# Patient Record
Sex: MALE | NOT HISPANIC OR LATINO | Employment: OTHER | URBAN - NONMETROPOLITAN AREA
[De-identification: names, ages, dates, MRNs, and addresses within clinical notes are randomized per-mention and may not be internally consistent; named-entity substitution may affect disease eponyms.]

---

## 2017-02-04 ENCOUNTER — HOSPITAL ENCOUNTER (INPATIENT)
Facility: HOSPITAL | Age: 52
LOS: 7 days | Discharge: HOME | End: 2017-02-11
Attending: EMERGENCY MEDICINE | Admitting: INTERNAL MEDICINE

## 2017-02-04 DIAGNOSIS — L03.113 CELLULITIS OF RIGHT HAND EXCLUDING FINGERS AND THUMB: Primary | ICD-10-CM

## 2017-02-04 LAB
ALBUMIN SERPL-MCNC: 3.5 G/DL (ref 3.5–5)
ALBUMIN/GLOB SERPL: 1.4 G/DL (ref 1.5–2.5)
ALP SERPL-CCNC: 88 U/L (ref 46–116)
ALT SERPL W P-5'-P-CCNC: 219 U/L (ref 10–44)
ANION GAP SERPL CALCULATED.3IONS-SCNC: 1.3 MMOL/L (ref 3.6–11.2)
AST SERPL-CCNC: 284 U/L (ref 10–34)
BASOPHILS # BLD AUTO: 0.01 10*3/MM3 (ref 0–0.3)
BASOPHILS NFR BLD AUTO: 0.3 % (ref 0–2)
BILIRUB SERPL-MCNC: 2.2 MG/DL (ref 0.2–1.8)
BUN BLD-MCNC: 9 MG/DL (ref 7–21)
BUN/CREAT SERPL: 17.3 (ref 7–25)
CALCIUM SPEC-SCNC: 8.8 MG/DL (ref 7.7–10)
CHLORIDE SERPL-SCNC: 106 MMOL/L (ref 99–112)
CO2 SERPL-SCNC: 30.7 MMOL/L (ref 24.3–31.9)
CREAT BLD-MCNC: 0.52 MG/DL (ref 0.43–1.29)
CRP SERPL-MCNC: 3.68 MG/DL (ref 0–0.99)
D-LACTATE SERPL-SCNC: 1.3 MMOL/L (ref 0.5–2)
DEPRECATED RDW RBC AUTO: 51.6 FL (ref 37–54)
EOSINOPHIL # BLD AUTO: 0.04 10*3/MM3 (ref 0–0.7)
EOSINOPHIL NFR BLD AUTO: 1 % (ref 0–5)
ERYTHROCYTE [DISTWIDTH] IN BLOOD BY AUTOMATED COUNT: 16.2 % (ref 11.5–14.5)
ERYTHROCYTE [SEDIMENTATION RATE] IN BLOOD: 6 MM/HR (ref 0–20)
GFR SERPL CREATININE-BSD FRML MDRD: >150 ML/MIN/1.73
GLOBULIN UR ELPH-MCNC: 2.5 GM/DL
GLUCOSE BLD-MCNC: 177 MG/DL (ref 70–110)
GLUCOSE BLDC GLUCOMTR-MCNC: 122 MG/DL (ref 70–130)
GLUCOSE BLDC GLUCOMTR-MCNC: 237 MG/DL (ref 70–130)
HBA1C MFR BLD: 8.1 % (ref 4.5–5.7)
HCT VFR BLD AUTO: 41.9 % (ref 42–52)
HGB BLD-MCNC: 13.5 G/DL (ref 14–18)
IMM GRANULOCYTES # BLD: 0.01 10*3/MM3 (ref 0–0.03)
IMM GRANULOCYTES NFR BLD: 0.3 % (ref 0–0.5)
LYMPHOCYTES # BLD AUTO: 0.63 10*3/MM3 (ref 1–3)
LYMPHOCYTES NFR BLD AUTO: 15.8 % (ref 21–51)
MAGNESIUM SERPL-MCNC: 1.7 MG/DL (ref 1.7–2.6)
MCH RBC QN AUTO: 27.8 PG (ref 27–33)
MCHC RBC AUTO-ENTMCNC: 32.2 G/DL (ref 33–37)
MCV RBC AUTO: 86.4 FL (ref 80–94)
MONOCYTES # BLD AUTO: 0.34 10*3/MM3 (ref 0.1–0.9)
MONOCYTES NFR BLD AUTO: 8.5 % (ref 0–10)
NEUTROPHILS # BLD AUTO: 2.96 10*3/MM3 (ref 1.4–6.5)
NEUTROPHILS NFR BLD AUTO: 74.1 % (ref 30–70)
OSMOLALITY SERPL CALC.SUM OF ELEC: 278.7 MOSM/KG (ref 273–305)
PLATELET # BLD AUTO: 63 10*3/MM3 (ref 130–400)
PMV BLD AUTO: 11.2 FL (ref 6–10)
POTASSIUM BLD-SCNC: 4.9 MMOL/L (ref 3.5–5.3)
PROT SERPL-MCNC: 6 G/DL (ref 6–8)
RBC # BLD AUTO: 4.85 10*6/MM3 (ref 4.7–6.1)
SODIUM BLD-SCNC: 138 MMOL/L (ref 135–153)
T4 FREE SERPL-MCNC: 1.47 NG/DL (ref 0.89–1.76)
TSH SERPL DL<=0.05 MIU/L-ACNC: 0.28 MIU/ML (ref 0.55–4.78)
WBC NRBC COR # BLD: 3.99 10*3/MM3 (ref 4.5–12.5)

## 2017-02-04 PROCEDURE — 80053 COMPREHEN METABOLIC PANEL: CPT | Performed by: EMERGENCY MEDICINE

## 2017-02-04 PROCEDURE — 85652 RBC SED RATE AUTOMATED: CPT | Performed by: EMERGENCY MEDICINE

## 2017-02-04 PROCEDURE — G0378 HOSPITAL OBSERVATION PER HR: HCPCS

## 2017-02-04 PROCEDURE — 25010000002 VANCOMYCIN PER 500 MG

## 2017-02-04 PROCEDURE — 25010000002 VANCOMYCIN PER 500 MG: Performed by: EMERGENCY MEDICINE

## 2017-02-04 PROCEDURE — 93005 ELECTROCARDIOGRAM TRACING: CPT | Performed by: PHYSICIAN ASSISTANT

## 2017-02-04 PROCEDURE — 87077 CULTURE AEROBIC IDENTIFY: CPT | Performed by: EMERGENCY MEDICINE

## 2017-02-04 PROCEDURE — 86140 C-REACTIVE PROTEIN: CPT | Performed by: EMERGENCY MEDICINE

## 2017-02-04 PROCEDURE — 83735 ASSAY OF MAGNESIUM: CPT | Performed by: PHYSICIAN ASSISTANT

## 2017-02-04 PROCEDURE — 83036 HEMOGLOBIN GLYCOSYLATED A1C: CPT | Performed by: PHYSICIAN ASSISTANT

## 2017-02-04 PROCEDURE — 94799 UNLISTED PULMONARY SVC/PX: CPT

## 2017-02-04 PROCEDURE — 87186 SC STD MICRODIL/AGAR DIL: CPT | Performed by: EMERGENCY MEDICINE

## 2017-02-04 PROCEDURE — 93010 ELECTROCARDIOGRAM REPORT: CPT | Performed by: INTERNAL MEDICINE

## 2017-02-04 PROCEDURE — 82962 GLUCOSE BLOOD TEST: CPT

## 2017-02-04 PROCEDURE — 83605 ASSAY OF LACTIC ACID: CPT | Performed by: PHYSICIAN ASSISTANT

## 2017-02-04 PROCEDURE — 87040 BLOOD CULTURE FOR BACTERIA: CPT | Performed by: EMERGENCY MEDICINE

## 2017-02-04 PROCEDURE — 94640 AIRWAY INHALATION TREATMENT: CPT

## 2017-02-04 PROCEDURE — 87147 CULTURE TYPE IMMUNOLOGIC: CPT | Performed by: EMERGENCY MEDICINE

## 2017-02-04 PROCEDURE — 84443 ASSAY THYROID STIM HORMONE: CPT | Performed by: PHYSICIAN ASSISTANT

## 2017-02-04 PROCEDURE — 99223 1ST HOSP IP/OBS HIGH 75: CPT | Performed by: INTERNAL MEDICINE

## 2017-02-04 PROCEDURE — 85025 COMPLETE CBC W/AUTO DIFF WBC: CPT | Performed by: EMERGENCY MEDICINE

## 2017-02-04 PROCEDURE — 63710000001 INSULIN ASPART PER 5 UNITS: Performed by: PHYSICIAN ASSISTANT

## 2017-02-04 PROCEDURE — 99285 EMERGENCY DEPT VISIT HI MDM: CPT

## 2017-02-04 PROCEDURE — 84439 ASSAY OF FREE THYROXINE: CPT | Performed by: PHYSICIAN ASSISTANT

## 2017-02-04 PROCEDURE — 36415 COLL VENOUS BLD VENIPUNCTURE: CPT

## 2017-02-04 RX ORDER — IPRATROPIUM BROMIDE AND ALBUTEROL SULFATE 2.5; .5 MG/3ML; MG/3ML
3 SOLUTION RESPIRATORY (INHALATION) EVERY 6 HOURS PRN
Status: DISCONTINUED | OUTPATIENT
Start: 2017-02-04 | End: 2017-02-11 | Stop reason: HOSPADM

## 2017-02-04 RX ORDER — SODIUM CHLORIDE 0.9 % (FLUSH) 0.9 %
10 SYRINGE (ML) INJECTION AS NEEDED
Status: DISCONTINUED | OUTPATIENT
Start: 2017-02-04 | End: 2017-02-11 | Stop reason: HOSPADM

## 2017-02-04 RX ORDER — HYDROCODONE BITARTRATE AND ACETAMINOPHEN 7.5; 325 MG/1; MG/1
1 TABLET ORAL EVERY 8 HOURS PRN
Status: ON HOLD | COMMUNITY
End: 2017-02-11

## 2017-02-04 RX ORDER — SODIUM CHLORIDE 0.9 % (FLUSH) 0.9 %
1-10 SYRINGE (ML) INJECTION AS NEEDED
Status: DISCONTINUED | OUTPATIENT
Start: 2017-02-04 | End: 2017-02-11 | Stop reason: HOSPADM

## 2017-02-04 RX ORDER — MAGNESIUM GLUCONATE 27 MG(500)
500 TABLET ORAL 2 TIMES DAILY
COMMUNITY
End: 2017-02-11 | Stop reason: HOSPADM

## 2017-02-04 RX ORDER — METOPROLOL SUCCINATE 25 MG/1
25 TABLET, EXTENDED RELEASE ORAL DAILY
COMMUNITY
End: 2017-02-11 | Stop reason: HOSPADM

## 2017-02-04 RX ORDER — OXYCODONE HYDROCHLORIDE 15 MG/1
15 TABLET ORAL EVERY 4 HOURS PRN
Status: DISCONTINUED | OUTPATIENT
Start: 2017-02-04 | End: 2017-02-11 | Stop reason: HOSPADM

## 2017-02-04 RX ORDER — DEXTROSE MONOHYDRATE 25 G/50ML
25 INJECTION, SOLUTION INTRAVENOUS
Status: DISCONTINUED | OUTPATIENT
Start: 2017-02-04 | End: 2017-02-11 | Stop reason: HOSPADM

## 2017-02-04 RX ORDER — NICOTINE POLACRILEX 4 MG
15 LOZENGE BUCCAL
Status: DISCONTINUED | OUTPATIENT
Start: 2017-02-04 | End: 2017-02-11 | Stop reason: HOSPADM

## 2017-02-04 RX ORDER — DICYCLOMINE HYDROCHLORIDE 10 MG/1
10 CAPSULE ORAL
COMMUNITY
End: 2017-02-11 | Stop reason: HOSPADM

## 2017-02-04 RX ADMIN — CEFEPIME 2 G: 2 INJECTION, POWDER, FOR SOLUTION INTRAVENOUS at 18:36

## 2017-02-04 RX ADMIN — IPRATROPIUM BROMIDE AND ALBUTEROL SULFATE 3 ML: .5; 3 SOLUTION RESPIRATORY (INHALATION) at 19:12

## 2017-02-04 RX ADMIN — VANCOMYCIN HYDROCHLORIDE 1500 MG: 5 INJECTION, POWDER, LYOPHILIZED, FOR SOLUTION INTRAVENOUS at 14:07

## 2017-02-04 RX ADMIN — OXYCODONE HYDROCHLORIDE 15 MG: 15 TABLET ORAL at 20:39

## 2017-02-04 RX ADMIN — INSULIN ASPART 3 UNITS: 100 INJECTION, SOLUTION INTRAVENOUS; SUBCUTANEOUS at 20:39

## 2017-02-04 RX ADMIN — VANCOMYCIN HYDROCHLORIDE 1250 MG: 5 INJECTION, POWDER, LYOPHILIZED, FOR SOLUTION INTRAVENOUS at 22:38

## 2017-02-04 RX ADMIN — METRONIDAZOLE 500 MG: 500 INJECTION, SOLUTION INTRAVENOUS at 21:26

## 2017-02-04 NOTE — ED NOTES
PT IS AAO X4 PT DENIES ANY NEEDS AT THIS TIME NO SIGNS OF DISTRESS BREATHING IS EQUAL AND UNLABORED BILATERALLY     Marika Pearson RN  02/04/17 9846
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
## 2017-02-05 PROBLEM — L03.113 CELLULITIS OF RIGHT HAND EXCLUDING FINGERS AND THUMB: Status: ACTIVE | Noted: 2017-02-05

## 2017-02-05 LAB
ALBUMIN SERPL-MCNC: 2.8 G/DL (ref 3.5–5)
ALBUMIN/GLOB SERPL: 1.2 G/DL (ref 1.5–2.5)
ALP SERPL-CCNC: 91 U/L (ref 46–116)
ALT SERPL W P-5'-P-CCNC: 178 U/L (ref 10–44)
AMPHET+METHAMPHET UR QL: POSITIVE
ANION GAP SERPL CALCULATED.3IONS-SCNC: 2.3 MMOL/L (ref 3.6–11.2)
AST SERPL-CCNC: 221 U/L (ref 10–34)
BARBITURATES UR QL SCN: NEGATIVE
BASOPHILS # BLD AUTO: 0.01 10*3/MM3 (ref 0–0.3)
BASOPHILS NFR BLD AUTO: 0.3 % (ref 0–2)
BENZODIAZ UR QL SCN: NEGATIVE
BILIRUB SERPL-MCNC: 1.3 MG/DL (ref 0.2–1.8)
BILIRUB UR QL STRIP: NEGATIVE
BUN BLD-MCNC: 8 MG/DL (ref 7–21)
BUN/CREAT SERPL: 16.3 (ref 7–25)
BUPRENORPHINE+NOR UR QL SCN: POSITIVE
CALCIUM SPEC-SCNC: 8.3 MG/DL (ref 7.7–10)
CANNABINOIDS SERPL QL: NEGATIVE
CHLORIDE SERPL-SCNC: 107 MMOL/L (ref 99–112)
CLARITY UR: CLEAR
CO2 SERPL-SCNC: 28.7 MMOL/L (ref 24.3–31.9)
COCAINE UR QL: NEGATIVE
COLOR UR: YELLOW
CREAT BLD-MCNC: 0.49 MG/DL (ref 0.43–1.29)
CRP SERPL-MCNC: 3.99 MG/DL (ref 0–0.99)
DEPRECATED RDW RBC AUTO: 52 FL (ref 37–54)
EOSINOPHIL # BLD AUTO: 0.02 10*3/MM3 (ref 0–0.7)
EOSINOPHIL NFR BLD AUTO: 0.6 % (ref 0–5)
ERYTHROCYTE [DISTWIDTH] IN BLOOD BY AUTOMATED COUNT: 16.3 % (ref 11.5–14.5)
GFR SERPL CREATININE-BSD FRML MDRD: >150 ML/MIN/1.73
GLOBULIN UR ELPH-MCNC: 2.3 GM/DL
GLUCOSE BLD-MCNC: 247 MG/DL (ref 70–110)
GLUCOSE BLDC GLUCOMTR-MCNC: 203 MG/DL (ref 70–130)
GLUCOSE BLDC GLUCOMTR-MCNC: 211 MG/DL (ref 70–130)
GLUCOSE BLDC GLUCOMTR-MCNC: 236 MG/DL (ref 70–130)
GLUCOSE BLDC GLUCOMTR-MCNC: 310 MG/DL (ref 70–130)
GLUCOSE UR STRIP-MCNC: ABNORMAL MG/DL
HCT VFR BLD AUTO: 39.5 % (ref 42–52)
HGB BLD-MCNC: 12.9 G/DL (ref 14–18)
HGB UR QL STRIP.AUTO: NEGATIVE
IMM GRANULOCYTES # BLD: 0 10*3/MM3 (ref 0–0.03)
IMM GRANULOCYTES NFR BLD: 0 % (ref 0–0.5)
KETONES UR QL STRIP: ABNORMAL
LEUKOCYTE ESTERASE UR QL STRIP.AUTO: NEGATIVE
LYMPHOCYTES # BLD AUTO: 0.58 10*3/MM3 (ref 1–3)
LYMPHOCYTES NFR BLD AUTO: 17.1 % (ref 21–51)
MCH RBC QN AUTO: 28.4 PG (ref 27–33)
MCHC RBC AUTO-ENTMCNC: 32.7 G/DL (ref 33–37)
MCV RBC AUTO: 86.8 FL (ref 80–94)
METHADONE UR QL SCN: NEGATIVE
MONOCYTES # BLD AUTO: 0.39 10*3/MM3 (ref 0.1–0.9)
MONOCYTES NFR BLD AUTO: 11.5 % (ref 0–10)
NEUTROPHILS # BLD AUTO: 2.4 10*3/MM3 (ref 1.4–6.5)
NEUTROPHILS NFR BLD AUTO: 70.5 % (ref 30–70)
NITRITE UR QL STRIP: NEGATIVE
OPIATES UR QL: NEGATIVE
OSMOLALITY SERPL CALC.SUM OF ELEC: 282.3 MOSM/KG (ref 273–305)
OXYCODONE UR QL SCN: NEGATIVE
PCP UR QL SCN: NEGATIVE
PH UR STRIP.AUTO: 6.5 [PH] (ref 5–8)
PLATELET # BLD AUTO: 66 10*3/MM3 (ref 130–400)
PMV BLD AUTO: 11.9 FL (ref 6–10)
POTASSIUM BLD-SCNC: 3.7 MMOL/L (ref 3.5–5.3)
PROPOXYPH UR QL: NEGATIVE
PROT SERPL-MCNC: 5.1 G/DL (ref 6–8)
PROT UR QL STRIP: NEGATIVE
RBC # BLD AUTO: 4.55 10*6/MM3 (ref 4.7–6.1)
SODIUM BLD-SCNC: 138 MMOL/L (ref 135–153)
SP GR UR STRIP: 1.02 (ref 1–1.03)
UROBILINOGEN UR QL STRIP: ABNORMAL
VANCOMYCIN TROUGH SERPL-MCNC: 8.8 MCG/ML (ref 5–15)
WBC NRBC COR # BLD: 3.4 10*3/MM3 (ref 4.5–12.5)

## 2017-02-05 PROCEDURE — 86140 C-REACTIVE PROTEIN: CPT | Performed by: PHYSICIAN ASSISTANT

## 2017-02-05 PROCEDURE — 25010000002 VANCOMYCIN PER 500 MG

## 2017-02-05 PROCEDURE — 80307 DRUG TEST PRSMV CHEM ANLYZR: CPT | Performed by: INTERNAL MEDICINE

## 2017-02-05 PROCEDURE — 80053 COMPREHEN METABOLIC PANEL: CPT | Performed by: INTERNAL MEDICINE

## 2017-02-05 PROCEDURE — 94640 AIRWAY INHALATION TREATMENT: CPT

## 2017-02-05 PROCEDURE — 81003 URINALYSIS AUTO W/O SCOPE: CPT | Performed by: PHYSICIAN ASSISTANT

## 2017-02-05 PROCEDURE — 80202 ASSAY OF VANCOMYCIN: CPT

## 2017-02-05 PROCEDURE — 99233 SBSQ HOSP IP/OBS HIGH 50: CPT | Performed by: INTERNAL MEDICINE

## 2017-02-05 PROCEDURE — 99252 IP/OBS CONSLTJ NEW/EST SF 35: CPT | Performed by: PHYSICIAN ASSISTANT

## 2017-02-05 PROCEDURE — 94799 UNLISTED PULMONARY SVC/PX: CPT

## 2017-02-05 PROCEDURE — 85025 COMPLETE CBC W/AUTO DIFF WBC: CPT | Performed by: INTERNAL MEDICINE

## 2017-02-05 PROCEDURE — 82962 GLUCOSE BLOOD TEST: CPT

## 2017-02-05 PROCEDURE — 63710000001 INSULIN ASPART PER 5 UNITS: Performed by: PHYSICIAN ASSISTANT

## 2017-02-05 PROCEDURE — 63710000001 INSULIN DETEMIR PER 5 UNITS: Performed by: PHYSICIAN ASSISTANT

## 2017-02-05 RX ORDER — FUROSEMIDE 20 MG/1
20 TABLET ORAL DAILY
Status: CANCELLED | OUTPATIENT
Start: 2017-02-05

## 2017-02-05 RX ORDER — POTASSIUM CHLORIDE 20 MEQ/1
20 TABLET, EXTENDED RELEASE ORAL DAILY
Status: CANCELLED | OUTPATIENT
Start: 2017-02-05

## 2017-02-05 RX ORDER — NAPROXEN 250 MG/1
500 TABLET ORAL 2 TIMES DAILY PRN
Status: CANCELLED | OUTPATIENT
Start: 2017-02-05

## 2017-02-05 RX ORDER — METOPROLOL SUCCINATE 25 MG/1
25 TABLET, EXTENDED RELEASE ORAL DAILY
Status: DISCONTINUED | OUTPATIENT
Start: 2017-02-05 | End: 2017-02-10

## 2017-02-05 RX ORDER — HYDROCODONE BITARTRATE AND ACETAMINOPHEN 7.5; 325 MG/1; MG/1
1 TABLET ORAL EVERY 8 HOURS PRN
Status: CANCELLED | OUTPATIENT
Start: 2017-02-05

## 2017-02-05 RX ORDER — GABAPENTIN 400 MG/1
400 CAPSULE ORAL EVERY 8 HOURS SCHEDULED
Status: DISCONTINUED | OUTPATIENT
Start: 2017-02-05 | End: 2017-02-11 | Stop reason: HOSPADM

## 2017-02-05 RX ORDER — IPRATROPIUM BROMIDE AND ALBUTEROL SULFATE 2.5; .5 MG/3ML; MG/3ML
3 SOLUTION RESPIRATORY (INHALATION) EVERY 4 HOURS PRN
Status: CANCELLED | OUTPATIENT
Start: 2017-02-05

## 2017-02-05 RX ORDER — PANTOPRAZOLE SODIUM 40 MG/1
40 TABLET, DELAYED RELEASE ORAL
Status: DISCONTINUED | OUTPATIENT
Start: 2017-02-05 | End: 2017-02-11 | Stop reason: HOSPADM

## 2017-02-05 RX ORDER — METOCLOPRAMIDE 10 MG/1
10 TABLET ORAL
Status: DISCONTINUED | OUTPATIENT
Start: 2017-02-05 | End: 2017-02-08

## 2017-02-05 RX ORDER — DICYCLOMINE HYDROCHLORIDE 10 MG/1
10 CAPSULE ORAL
Status: DISCONTINUED | OUTPATIENT
Start: 2017-02-05 | End: 2017-02-08

## 2017-02-05 RX ORDER — ASPIRIN 81 MG/1
81 TABLET ORAL DAILY
Status: DISCONTINUED | OUTPATIENT
Start: 2017-02-05 | End: 2017-02-11 | Stop reason: HOSPADM

## 2017-02-05 RX ORDER — POLYETHYLENE GLYCOL 3350 17 G/17G
17 POWDER, FOR SOLUTION ORAL EVERY 12 HOURS SCHEDULED
Status: DISCONTINUED | OUTPATIENT
Start: 2017-02-05 | End: 2017-02-11 | Stop reason: HOSPADM

## 2017-02-05 RX ADMIN — IPRATROPIUM BROMIDE AND ALBUTEROL SULFATE 3 ML: .5; 3 SOLUTION RESPIRATORY (INHALATION) at 06:45

## 2017-02-05 RX ADMIN — INSULIN ASPART 5 UNITS: 100 INJECTION, SOLUTION INTRAVENOUS; SUBCUTANEOUS at 21:30

## 2017-02-05 RX ADMIN — IPRATROPIUM BROMIDE AND ALBUTEROL SULFATE 3 ML: .5; 3 SOLUTION RESPIRATORY (INHALATION) at 17:33

## 2017-02-05 RX ADMIN — METRONIDAZOLE 500 MG: 500 INJECTION, SOLUTION INTRAVENOUS at 14:25

## 2017-02-05 RX ADMIN — CEFEPIME 2 G: 2 INJECTION, POWDER, FOR SOLUTION INTRAVENOUS at 17:07

## 2017-02-05 RX ADMIN — OXYCODONE HYDROCHLORIDE 15 MG: 15 TABLET ORAL at 17:18

## 2017-02-05 RX ADMIN — INSULIN ASPART 3 UNITS: 100 INJECTION, SOLUTION INTRAVENOUS; SUBCUTANEOUS at 12:01

## 2017-02-05 RX ADMIN — VANCOMYCIN HYDROCHLORIDE 1250 MG: 5 INJECTION, POWDER, LYOPHILIZED, FOR SOLUTION INTRAVENOUS at 15:00

## 2017-02-05 RX ADMIN — OXYCODONE HYDROCHLORIDE 15 MG: 15 TABLET ORAL at 08:28

## 2017-02-05 RX ADMIN — ASPIRIN 81 MG: 81 TABLET ORAL at 15:00

## 2017-02-05 RX ADMIN — POLYETHYLENE GLYCOL (3350) 17 G: 17 POWDER, FOR SOLUTION ORAL at 21:29

## 2017-02-05 RX ADMIN — PANTOPRAZOLE SODIUM 40 MG: 40 TABLET, DELAYED RELEASE ORAL at 17:08

## 2017-02-05 RX ADMIN — METOCLOPRAMIDE 10 MG: 10 TABLET ORAL at 17:08

## 2017-02-05 RX ADMIN — PANCRELIPASE 24000 UNITS OF LIPASE: 60000; 12000; 38000 CAPSULE, DELAYED RELEASE PELLETS ORAL at 17:07

## 2017-02-05 RX ADMIN — METRONIDAZOLE 500 MG: 500 INJECTION, SOLUTION INTRAVENOUS at 21:27

## 2017-02-05 RX ADMIN — VANCOMYCIN HYDROCHLORIDE 1500 MG: 5 INJECTION, POWDER, LYOPHILIZED, FOR SOLUTION INTRAVENOUS at 22:29

## 2017-02-05 RX ADMIN — METRONIDAZOLE 500 MG: 500 INJECTION, SOLUTION INTRAVENOUS at 05:12

## 2017-02-05 RX ADMIN — DICYCLOMINE HYDROCHLORIDE 10 MG: 10 CAPSULE ORAL at 17:08

## 2017-02-05 RX ADMIN — INSULIN ASPART 3 UNITS: 100 INJECTION, SOLUTION INTRAVENOUS; SUBCUTANEOUS at 08:30

## 2017-02-05 RX ADMIN — VANCOMYCIN HYDROCHLORIDE 1250 MG: 5 INJECTION, POWDER, LYOPHILIZED, FOR SOLUTION INTRAVENOUS at 06:09

## 2017-02-05 RX ADMIN — CEFEPIME 2 G: 2 INJECTION, POWDER, FOR SOLUTION INTRAVENOUS at 10:36

## 2017-02-05 RX ADMIN — INSULIN DETEMIR 15 UNITS: 100 INJECTION, SOLUTION SUBCUTANEOUS at 21:32

## 2017-02-05 RX ADMIN — INSULIN ASPART 3 UNITS: 100 INJECTION, SOLUTION INTRAVENOUS; SUBCUTANEOUS at 17:18

## 2017-02-05 RX ADMIN — GABAPENTIN 400 MG: 400 CAPSULE ORAL at 15:34

## 2017-02-05 RX ADMIN — METOPROLOL SUCCINATE 25 MG: 25 TABLET, FILM COATED, EXTENDED RELEASE ORAL at 15:00

## 2017-02-05 RX ADMIN — OXYCODONE HYDROCHLORIDE 15 MG: 15 TABLET ORAL at 22:01

## 2017-02-05 RX ADMIN — CEFEPIME 2 G: 2 INJECTION, POWDER, FOR SOLUTION INTRAVENOUS at 01:43

## 2017-02-05 RX ADMIN — PANCRELIPASE 24000 UNITS OF LIPASE: 60000; 12000; 38000 CAPSULE, DELAYED RELEASE PELLETS ORAL at 21:29

## 2017-02-05 RX ADMIN — GABAPENTIN 400 MG: 400 CAPSULE ORAL at 21:29

## 2017-02-05 NOTE — CONSULTS
Patient: Isaias Lang  YOB: 1965  Date of encounter: 2/5/2017  Admitting Physician:     History of Present Illness:   Isaias Lang is a 51 y.o. male who presented to the emergency room with a 1 day history of erythema, edema, and pain of the right dorsal hand.  He states that upon waking yesterday morning he noticed that his entire right hand was swollen and erythemic.  He states is tender throughout the entire hand.  He also reports fever and chills.  He attributes his hand swelling to using dirty insulin needles for drug use.    PMH:   Patient Active Problem List   Diagnosis   • MDD (major depressive disorder), recurrent episode, moderate   • Type 1 diabetes mellitus   • 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   • Cellulitis of right hand       Past Medical History   Diagnosis Date   • Abscess of antecubital fossa 05/2014     Left that required I and D and grew Haemophilus influenza group 1   • Anxiety    • Asthma    • Chronic pancreatitis    • COPD (chronic obstructive pulmonary disease)    • DDD (degenerative disc disease), lumbosacral    • Depression    • Diabetes mellitus    • DVT (deep venous thrombosis)      Right arm   • Essential hypertension    • GERD (gastroesophageal reflux disease)    • Hepatitis-C    • Hypercholesteremia    • Injury of back    • Injury of right Achilles tendon      Complicated by MRSA and Pseudomonas   • Mild CAD      Left heart cath at  2012   • MRSA (methicillin resistant staph aureus) culture positive 09/14/2016     LUE   • Obesity    • Opiate addiction      Suboxone IV   • Self-injurious behavior    • Sleep apnea    • Suicide attempt    • Systolic CHF, chronic      EF 45-50% in 2013, EF 60% 9/2016       PSH:  Past Surgical History   Procedure Laterality Date   • Cholecystectomy  1990s   • Orif ankle fracture Right 2014   • Incision and drainage abscess Left 2016     wrist   • Incision and drainage arm Left 9/15/2016     Procedure: INCISION AND DRAINAGE UPPER  "EXTREMITY;  Surgeon: Rico Oseguera MD;  Location: [facility] OR;  Service:        Allergies:     Allergies   Allergen Reactions   • Robitussin Dm [Dextromethorphan-Guaifenesin] Shortness Of Breath   • Tizanidine Shortness Of Breath   • Metformin Nausea And Vomiting   • Sulfa Antibiotics Other (See Comments)     \"I cannot take them, they do something to me.\"   • Contrast Dye Rash   • Tramadol Rash       Medications:   Prior to Admission medications    Medication Sig Start Date End Date Taking? Authorizing Provider   albuterol (PROVENTIL HFA;VENTOLIN HFA) 108 (90 BASE) MCG/ACT inhaler Inhale 2 puffs Every 6 (Six) Hours As Needed for wheezing.  Patient taking differently: Inhale 2 puffs Every 6 (Six) Hours As Needed for wheezing or shortness of air. 11/18/16  Yes Ankit Barnes MD   aspirin 81 MG EC tablet Take 1 tablet by mouth Daily. 11/18/16  Yes Ankit Barnes MD   dicyclomine (BENTYL) 10 MG capsule Take 10 mg by mouth 2 (Two) Times a Day Before Meals.   Yes Historical Provider, MD   furosemide (LASIX) 20 MG tablet Take 1 tablet by mouth Daily. 11/18/16  Yes Ankit Barnes MD   gabapentin (NEURONTIN) 800 MG tablet Take 1 tablet by mouth 3 (Three) Times a Day.  Patient taking differently: Take 800 mg by mouth 4 (Four) Times a Day. 11/18/16 11/18/17 Yes Ankit Barnes MD   HYDROcodone-acetaminophen (NORCO) 7.5-325 MG per tablet Take 1 tablet by mouth Every 8 (Eight) Hours As Needed for moderate pain (4-6). Patient states he fills rx at pain clinic in [place], not showing on [facility]   Yes Historical Provider, MD   insulin aspart (novoLOG) 100 UNIT/ML injection Inject 20 Units under the skin 3 (Three) Times a Day With Meals.  Patient taking differently: Inject 10-20 Units under the skin 3 (Three) Times a Day With Meals. 11/18/16  Yes Ankit Barnes MD   insulin detemir (LEVEMIR) 100 UNIT/ML injection 20 unit AM , 35 unit PM  Patient taking differently: Inject 20 Units " under the skin Daily With Breakfast. 20 unit AM , 35 unit PM 11/18/16  Yes Ankit Barnes MD   insulin detemir (LEVEMIR) 100 UNIT/ML injection Inject 35 Units under the skin Every Night.   Yes Historical Provider, MD   ipratropium-albuterol (COMBIVENT)  MCG/ACT inhaler Inhale 2 puffs Every 4 (Four) Hours As Needed for wheezing.  Patient taking differently: Inhale 2 puffs Every 4 (Four) Hours As Needed for wheezing or shortness of air. 11/18/16  Yes Ankit Barnes MD   magnesium gluconate (MAGONATE) 500 MG tablet Take 500 mg by mouth 2 (Two) Times a Day.   Yes Historical Provider, MD   metoclopramide (REGLAN) 10 MG tablet Take 1 tablet by mouth 2 (Two) Times a Day. 10/25/16  Yes Ankit Barnes MD   metoprolol succinate XL (TOPROL-XL) 25 MG 24 hr tablet Take 25 mg by mouth Daily.   Yes Historical Provider, MD   naproxen (NAPROSYN) 500 MG tablet Take 1 tablet by mouth 2 (Two) Times a Day As Needed for mild pain (1-3). 11/18/16  Yes Ankit Barnes MD   pancrelipase, Lip-Prot-Amyl, (CREON) 31737 UNITS capsule delayed-release particles capsule Take 2 capsules by mouth 4 (Four) Times a Day With Meals & at Bedtime. 11/18/16  Yes Ankit Barnes MD   pantoprazole (PROTONIX) 40 MG EC tablet Take 1 tablet by mouth 2 (Two) Times a Day. 11/18/16  Yes Ankit Barnes MD   polyethylene glycol (MIRALAX) packet Take 17 g by mouth Daily.  Patient taking differently: Take 17 g by mouth 2 (Two) Times a Day. 11/18/16  Yes Ankit Barnes MD   potassium chloride (K-DUR,KLOR-CON) 20 MEQ CR tablet Take 1 tablet by mouth Daily. 11/18/16  Yes Ankit Barnes MD       Social History:  Social History     Social History   • Marital status:      Spouse name: N/A   • Number of children: N/A   • Years of education: N/A     Occupational History   • Not on file.     Social History Main Topics   • Smoking status: Former Smoker     Years: 1.00     Types:  Cigarettes   • Smokeless tobacco: Never Used      Comment: quit smoking in my 20's   • Alcohol use No      Comment: denies   • Drug use: Yes     Special: IV      Comment: PT STATES THAT HE USED SUBOXONE IN LEFT AC YESTERDAY   • Sexual activity: Defer      Comment: not currently active.      Other Topics Concern   • Not on file     Social History Narrative       Family History:     Family History   Problem Relation Age of Onset   • Gout Other    • Osteoporosis Other    • Arthritis Other      Rheumatoid and osteoarthritis   • Hypertension Other    • Heart disease Other    • Cancer Other    • Coronary artery disease Mother    • Lung disease Mother    • Gout Sister    • Cancer Maternal Grandmother    • Hypertension Maternal Grandfather    • Gout Maternal Grandfather        Review of Systems:   Constitutional: Positive for chills, fatigue and fever. Negative for activity change and appetite change.   HENT: Negative for congestion, postnasal drip, rhinorrhea, sinus pressure and sore throat.   Eyes: Negative for photophobia, pain and visual disturbance.   Respiratory: Negative for cough, chest tightness, shortness of breath and wheezing.   Cardiovascular: Negative for chest pain, palpitations and leg swelling.   Gastrointestinal: Negative for abdominal pain, constipation, diarrhea, nausea and vomiting.   Endocrine: Negative for cold intolerance and heat intolerance.   Genitourinary: Negative for dysuria, flank pain, frequency, hematuria and urgency.   Musculoskeletal: Negative for arthralgias, back pain, gait problem and myalgias.   Erythema and edema of the right dorsal hand    Skin:   Erythema of the right dorsal hand with associated edema    Allergic/Immunologic: Negative for environmental allergies and food allergies.   Neurological: Negative for syncope, weakness, light-headedness, numbness and headaches.   Hematological: Negative for adenopathy. Does not bruise/bleed easily.   Psychiatric/Behavioral: Negative for  confusion, decreased concentration, self-injury and suicidal ideas. The patient is not nervous/anxious.     Physical Exam: 51 y.o. male  General Appearance:    Alert and oriented x 3, cooperative, in no acute distress                   Vitals:    02/04/17 1924 02/04/17 1926 02/05/17 0645 02/05/17 0803   BP:  111/75  117/76   BP Location:  Left arm  Left arm   Patient Position:  Lying  Lying   Pulse:  86 80 90   Resp:  18 18 18   Temp:  98.2 °F (36.8 °C)  97.9 °F (36.6 °C)   TempSrc:  Oral  Oral   SpO2: 96% 97% 96%    Weight:       Height:              HEENT: Unremarkable      Neck: Supple without lymphadenopathy.      Chest: Clear to ascultation bilaterally. Normal respiratory effort.      Heart: Regular rate and rhythm. No murmurs noted.      Musculoskeletal: Examination of the right hand reveals swelling of the right dorsal aspect of the hand with mild erythema.  The digits are spared.  There is no specific area of induration or fluctuation.  He has generalized tenderness throughout the entire dorsal aspect of his hand.  His neurovascular status is grossly intact.      Radiology:     Imaging Results (last 72 hours)     ** No results found for the last 72 hours. **          Assessment    ICD-10-CM ICD-9-CM   1. Cellulitis of right hand excluding fingers and thumb L03.113 682.4       Plan:  Plan to continue  IV antibiotics including vancomycin, cefepime and flagyl. We will watch this closely and if it there is no improvement with antibiotics we will discuss proceeding with an MRI to further evaluate for possible abscess.  We'll continue to watch him closely throughout his stay.    Angie GUERRERO

## 2017-02-05 NOTE — PROGRESS NOTES
Pharmacokinetics Service Note:    Mr. Lang continues on day 2 of vancomycin 1.25 gm q8hrs for his RUE cellulitis and gram positive bacteremia.  A 5.75 hour post infusion trough level was reported as 8.8 mg/L.  This is lower than desired and consistent with good clearance.  Based on this level and his dosing history on our service in the past, will increase the dosage to 1.5 gm q8hrs to target troughs = 15-20 mg/L.  Will plan to repeat a trough level at steady state if treatment continues.      Thank you.  Tanna Rainey, Pharm.D.  2/5/2017  3:53 PM

## 2017-02-05 NOTE — CONSULTS
Nutrition Services    Patient Name:  Isaias Lang  YOB: 1965  MRN: [id number]  Admit Date:  2/4/2017        Rec MVI daily to aide with wound healing.       Electronically signed by:  Telma Aparicio RD  02/05/17 12:55 PM

## 2017-02-05 NOTE — PLAN OF CARE
Problem: Pain, Acute (Adult)  Goal: Identify Related Risk Factors and Signs and Symptoms  Outcome: Ongoing (interventions implemented as appropriate)  Goal: Acceptable Pain Control/Comfort Level  Outcome: Ongoing (interventions implemented as appropriate)    Problem: Cellulitis (Adult)  Goal: Signs and Symptoms of Listed Potential Problems Will be Absent or Manageable (Cellulitis)  Outcome: Ongoing (interventions implemented as appropriate)    Problem: Patient Care Overview (Adult)  Goal: Plan of Care Review  Outcome: Ongoing (interventions implemented as appropriate)  Goal: Adult Individualization and Mutuality  Outcome: Ongoing (interventions implemented as appropriate)  Goal: Discharge Needs Assessment  Outcome: Ongoing (interventions implemented as appropriate)

## 2017-02-05 NOTE — PROGRESS NOTES
[facility] HOSPITALIST PROGRESS NOTE     Patient Identification:  Name:  Isaias Lang  Age:  51 y.o.  Sex:  male  :  1965  MRN:  [id number]  Visit Number:  [id number]  Primary Care Provider:  NOHEMI Felipe    Length of stay:  1    Subjective:  50 yo WM admitted on 2017 with sepsis due to a right hand cellulitis in the setting of continued IV drug use.  Today, the patient states that his right dorsal hand is less swollen and less red today.  He denies fever and denies chills.  He denies chest pain.  He denies shortness of air.  He is still denying any IV drug use in the affected arm and hand.  He is eating well with no nausea, no emesis, and no diarrhea.  ----------------------------------------------------------------------------------------------------------------------  Current Hospital Meds:  aspirin 81 mg Oral Daily   cefepime 2 g Intravenous Q8H   dicyclomine 10 mg Oral BID AC   gabapentin 400 mg Oral Q8H   insulin aspart 0-7 Units Subcutaneous 4x Daily AC & at Bedtime   insulin detemir 15 Units Subcutaneous Nightly   metoclopramide 10 mg Oral BID AC   metoprolol succinate XL 25 mg Oral Daily   metroNIDAZOLE 500 mg Intravenous Q8H   pancrelipase (Lip-Prot-Amyl) 24,000 units of lipase Oral 4x Daily With Meals & Nightly   pantoprazole 40 mg Oral BID AC   polyethylene glycol 17 g Oral Q12H   vancomycin 1,250 mg Intravenous Q8H   ----------------------------------------------------------------------------------------------------------------------  Vital Signs:  Temp:  [97.9 °F (36.6 °C)-98.4 °F (36.9 °C)] 97.9 °F (36.6 °C)  Heart Rate:  [68-91] 90  Resp:  [18] 18  BP: (111-121)/(70-81) 117/76  Last 3 weights    17  1112 17  1642   Weight: 175 lb (79.4 kg) 178 lb 9.6 oz (81 kg)     Body mass index is 27.16 kg/(m^2).        Diet Regular; Consistent Carbohydrate,  Cardiac  ----------------------------------------------------------------------------------------------------------------------  Physical exam:  Constitutional: Well-developed and well-nourished. No respiratory distress.    HENT:  Head: Normocephalic and atraumatic.  Mouth: Moist mucous membranes.    Eyes: Conjunctivae and EOM are normal. Pupils are equal, round, and reactive to light. No scleral icterus.  Neck: Neck supple. No JVD present.    Cardiovascular: Normal rate, regular rhythm and normal heart sounds with no murmur.  Pulmonary/Chest: No respiratory distress, no wheezes, no crackles, with normal breath sounds and good air movement.  Abdominal: Soft. Bowel sounds are normal. No distension and no tenderness.   Musculoskeletal:  Yesterday, the right dorsal hand is erythematous and edematous sparing the digits with decreased ROM of the wrist and the MCP, PIP, and DIP joints, inability to make a fist and decreased ability to move fingers.  Today, the swelling and erythema is much less and he is able to make a fist.  There are no areas opening up to drain. Neurovascularly intact. Tenderness to palpation of the dorsal side of the right hand. No deformities of the left upper extremity or lower extremities.   Neurological: Alert and oriented to person, place, and time. No cranial nerve deficit. No tongue deviation. No facial droop. No slurred speech.   Skin: Skin is warm and dry. No rash noted. No pallor. Erythema and edema of the right dorsal hand sparing the digits. Track marks noted in bilateral antecubital fossas and on the radial right hand. He also had numerous abrasions on his forearms and hands. His toenails and fingernails have been cut to quick and have dry blood around the periphery.  Psychiatric: Normal mood and affect. Behavior is normal. Judgment and thought content normal.   Peripheral vascular: No edema and strong pulses on all 4 extremities.  Genitourinary: making urine, no mccann catheter noted    ----------------------------------------------------------------------------------------------------------------------  Tele:  None  ----------------------------------------------------------------------------------------------------------------------  Results from last 7 days  Lab Units 02/05/17 0447 02/04/17 1709 02/04/17  1354 02/04/17  1319   CRP mg/dL 3.99*  --   --  3.68*   LACTATE mmol/L  --  1.3  --   --    WBC 10*3/mm3 3.40*  --  3.99*  --    HEMOGLOBIN g/dL 12.9*  --  13.5*  --    HEMATOCRIT % 39.5*  --  41.9*  --    MCV fL 86.8  --  86.4  --    MCHC g/dL 32.7*  --  32.2*  --    PLATELETS 10*3/mm3 66*  --  63*  --      Results from last 7 days  Lab Units 02/05/17 0447 02/04/17 1709 02/04/17  1319   SODIUM mmol/L 138  --  138   POTASSIUM mmol/L 3.7  --  4.9   MAGNESIUM mg/dL  --  1.7  --    CHLORIDE mmol/L 107  --  106   TOTAL CO2 mmol/L 28.7  --  30.7   BUN mg/dL 8  --  9   CREATININE mg/dL 0.49  --  0.52   EGFR IF NONAFRICN AM mL/min/1.73 >150  --  >150   CALCIUM mg/dL 8.3  --  8.8   GLUCOSE mg/dL 247*  --  177*   ALBUMIN g/dL 2.80*  --  3.50   BILIRUBIN mg/dL 1.3  --  2.2*   ALK PHOS U/L 91  --  88   AST (SGOT) U/L 221*  --  284*   ALT (SGPT) U/L 178*  --  219*   Estimated Creatinine Clearance: 172.6 mL/min (by C-G formula based on Cr of 0.49).      BLOOD CULTURE   Date Value Ref Range Status   02/04/2017 No growth at less than 24 hours  Preliminary   02/04/2017 No growth at less than 24 hours  Preliminary   ----------------------------------------------------------------------------------------------------------------------  Imaging Results (last 24 hours)     ** No results found for the last 24 hours. **   ----------------------------------------------------------------------------------------------------------------------  Assessment and Plan:  -Cellulitis of the right hand causing sepsis  -Type II diabetes mellitus, insulin dependent with hyperglycemia   -COPD without acute exacerbation    -Essential hypertension, controlled   -Pancytopenia and transaminitis as result of the the  -Hyperlipidemia   -History of coronary artery disease, S/P heart catheterization in 2012  -History of systolic congestive heart failure with EF of 60% on 9/14/2016  -IV drug abuse with history of amphetamine, suboxone, cocaine, and opioid abuse  -History of MRSA of the achilles tendon and LUE  -Mild normocytic anemia   -Depression   -Anxiety   -Obstructive sleep apnea without use of BiPAP  -History of chronic pancreatitis   -Morbid obesity  -Tobacco smoking addiction    I have discussed the patient in person with Dr. Calero; there does not seem to be an abscess present for drainage.  I will continue the vancomycin, Flagyl, and cefepime.  I will repeat his blood work in the morning.  If none of the cultures grow MRSA, then I will discontinue the vancomycin tomorrow.  The sepsis has improved.  His glucose levels are in the 200 range despite us decreasing his insulin; we have started back some of his home Levemir as he does not seem to be eating very well and we don't want to cause any hypoglycemia.     The patient is high risk due to the following diagnoses/reasons:  Cellulitis of the right hand, IV drug abuse, hepatitis C cirrhosis with acute transaminitis causing leukopenia and thrombocytopenia, history of coronary artery disease, type 2 diabetes    Elva Hdz MD  02/05/17  2:34 PM
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
## 2017-02-06 LAB
ALBUMIN SERPL-MCNC: 2.7 G/DL (ref 3.5–5)
ALBUMIN/GLOB SERPL: 1.2 G/DL (ref 1.5–2.5)
ALP SERPL-CCNC: 96 U/L (ref 46–116)
ALT SERPL W P-5'-P-CCNC: 169 U/L (ref 10–44)
ANION GAP SERPL CALCULATED.3IONS-SCNC: 1.3 MMOL/L (ref 3.6–11.2)
AST SERPL-CCNC: 206 U/L (ref 10–34)
BASOPHILS # BLD AUTO: 0.02 10*3/MM3 (ref 0–0.3)
BASOPHILS NFR BLD AUTO: 0.7 % (ref 0–2)
BILIRUB SERPL-MCNC: 1.1 MG/DL (ref 0.2–1.8)
BUN BLD-MCNC: 8 MG/DL (ref 7–21)
BUN/CREAT SERPL: 16.3 (ref 7–25)
CALCIUM SPEC-SCNC: 8.1 MG/DL (ref 7.7–10)
CHLORIDE SERPL-SCNC: 107 MMOL/L (ref 99–112)
CK MB SERPL-CCNC: 5.41 NG/ML (ref 0–5)
CK MB SERPL-RTO: 7 % (ref 0–3)
CK SERPL-CCNC: 77 U/L (ref 24–204)
CO2 SERPL-SCNC: 28.7 MMOL/L (ref 24.3–31.9)
CREAT BLD-MCNC: 0.49 MG/DL (ref 0.43–1.29)
CRP SERPL-MCNC: 3.07 MG/DL (ref 0–0.99)
DEPRECATED RDW RBC AUTO: 52.4 FL (ref 37–54)
EOSINOPHIL # BLD AUTO: 0.05 10*3/MM3 (ref 0–0.7)
EOSINOPHIL NFR BLD AUTO: 1.7 % (ref 0–5)
ERYTHROCYTE [DISTWIDTH] IN BLOOD BY AUTOMATED COUNT: 16.5 % (ref 11.5–14.5)
GFR SERPL CREATININE-BSD FRML MDRD: >150 ML/MIN/1.73
GLOBULIN UR ELPH-MCNC: 2.3 GM/DL
GLUCOSE BLD-MCNC: 246 MG/DL (ref 70–110)
GLUCOSE BLDC GLUCOMTR-MCNC: 163 MG/DL (ref 70–130)
GLUCOSE BLDC GLUCOMTR-MCNC: 169 MG/DL (ref 70–130)
GLUCOSE BLDC GLUCOMTR-MCNC: 191 MG/DL (ref 70–130)
GLUCOSE BLDC GLUCOMTR-MCNC: 206 MG/DL (ref 70–130)
HCT VFR BLD AUTO: 40 % (ref 42–52)
HGB BLD-MCNC: 13 G/DL (ref 14–18)
IMM GRANULOCYTES # BLD: 0 10*3/MM3 (ref 0–0.03)
IMM GRANULOCYTES NFR BLD: 0 % (ref 0–0.5)
LYMPHOCYTES # BLD AUTO: 0.61 10*3/MM3 (ref 1–3)
LYMPHOCYTES NFR BLD AUTO: 20.9 % (ref 21–51)
MAGNESIUM SERPL-MCNC: 1.6 MG/DL (ref 1.7–2.6)
MCH RBC QN AUTO: 28.3 PG (ref 27–33)
MCHC RBC AUTO-ENTMCNC: 32.5 G/DL (ref 33–37)
MCV RBC AUTO: 87.1 FL (ref 80–94)
MONOCYTES # BLD AUTO: 0.35 10*3/MM3 (ref 0.1–0.9)
MONOCYTES NFR BLD AUTO: 12 % (ref 0–10)
MYOGLOBIN SERPL-MCNC: 49 NG/ML (ref 0–109)
NEUTROPHILS # BLD AUTO: 1.89 10*3/MM3 (ref 1.4–6.5)
NEUTROPHILS NFR BLD AUTO: 64.7 % (ref 30–70)
OSMOLALITY SERPL CALC.SUM OF ELEC: 280.3 MOSM/KG (ref 273–305)
PLATELET # BLD AUTO: 68 10*3/MM3 (ref 130–400)
PMV BLD AUTO: 11.5 FL (ref 6–10)
POTASSIUM BLD-SCNC: 4.1 MMOL/L (ref 3.5–5.3)
PROT SERPL-MCNC: 5 G/DL (ref 6–8)
RBC # BLD AUTO: 4.59 10*6/MM3 (ref 4.7–6.1)
SODIUM BLD-SCNC: 137 MMOL/L (ref 135–153)
TROPONIN I SERPL-MCNC: <0.006 NG/ML
WBC NRBC COR # BLD: 2.92 10*3/MM3 (ref 4.5–12.5)

## 2017-02-06 PROCEDURE — 85025 COMPLETE CBC W/AUTO DIFF WBC: CPT | Performed by: INTERNAL MEDICINE

## 2017-02-06 PROCEDURE — 80053 COMPREHEN METABOLIC PANEL: CPT | Performed by: INTERNAL MEDICINE

## 2017-02-06 PROCEDURE — 83735 ASSAY OF MAGNESIUM: CPT | Performed by: INTERNAL MEDICINE

## 2017-02-06 PROCEDURE — 82553 CREATINE MB FRACTION: CPT | Performed by: INTERNAL MEDICINE

## 2017-02-06 PROCEDURE — 63710000001 INSULIN ASPART PER 5 UNITS: Performed by: PHYSICIAN ASSISTANT

## 2017-02-06 PROCEDURE — 82962 GLUCOSE BLOOD TEST: CPT

## 2017-02-06 PROCEDURE — 87341 HEP B SURFACE AG NEUTRLZJ IA: CPT | Performed by: INTERNAL MEDICINE

## 2017-02-06 PROCEDURE — 86140 C-REACTIVE PROTEIN: CPT | Performed by: INTERNAL MEDICINE

## 2017-02-06 PROCEDURE — 84484 ASSAY OF TROPONIN QUANT: CPT | Performed by: INTERNAL MEDICINE

## 2017-02-06 PROCEDURE — 80074 ACUTE HEPATITIS PANEL: CPT | Performed by: INTERNAL MEDICINE

## 2017-02-06 PROCEDURE — 94799 UNLISTED PULMONARY SVC/PX: CPT

## 2017-02-06 PROCEDURE — C1751 CATH, INF, PER/CENT/MIDLINE: HCPCS

## 2017-02-06 PROCEDURE — 25010000002 VANCOMYCIN PER 500 MG

## 2017-02-06 PROCEDURE — 99233 SBSQ HOSP IP/OBS HIGH 50: CPT | Performed by: INTERNAL MEDICINE

## 2017-02-06 PROCEDURE — 82550 ASSAY OF CK (CPK): CPT | Performed by: INTERNAL MEDICINE

## 2017-02-06 PROCEDURE — 83874 ASSAY OF MYOGLOBIN: CPT | Performed by: INTERNAL MEDICINE

## 2017-02-06 PROCEDURE — 25010000002 CEFEPIME: Performed by: PHYSICIAN ASSISTANT

## 2017-02-06 PROCEDURE — 63710000001 INSULIN ASPART PER 5 UNITS: Performed by: INTERNAL MEDICINE

## 2017-02-06 PROCEDURE — 94640 AIRWAY INHALATION TREATMENT: CPT

## 2017-02-06 PROCEDURE — 63710000001 INSULIN DETEMIR PER 5 UNITS: Performed by: INTERNAL MEDICINE

## 2017-02-06 RX ORDER — SODIUM CHLORIDE 0.9 % (FLUSH) 0.9 %
10 SYRINGE (ML) INJECTION AS NEEDED
Status: DISCONTINUED | OUTPATIENT
Start: 2017-02-06 | End: 2017-02-11 | Stop reason: HOSPADM

## 2017-02-06 RX ORDER — SODIUM CHLORIDE 0.9 % (FLUSH) 0.9 %
10 SYRINGE (ML) INJECTION EVERY 12 HOURS SCHEDULED
Status: DISCONTINUED | OUTPATIENT
Start: 2017-02-06 | End: 2017-02-11 | Stop reason: HOSPADM

## 2017-02-06 RX ADMIN — PANTOPRAZOLE SODIUM 40 MG: 40 TABLET, DELAYED RELEASE ORAL at 17:38

## 2017-02-06 RX ADMIN — PANTOPRAZOLE SODIUM 40 MG: 40 TABLET, DELAYED RELEASE ORAL at 08:22

## 2017-02-06 RX ADMIN — INSULIN ASPART 3 UNITS: 100 INJECTION, SOLUTION INTRAVENOUS; SUBCUTANEOUS at 12:33

## 2017-02-06 RX ADMIN — GABAPENTIN 400 MG: 400 CAPSULE ORAL at 14:32

## 2017-02-06 RX ADMIN — METOCLOPRAMIDE 10 MG: 10 TABLET ORAL at 17:38

## 2017-02-06 RX ADMIN — Medication 10 ML: at 21:20

## 2017-02-06 RX ADMIN — INSULIN DETEMIR 20 UNITS: 100 INJECTION, SOLUTION SUBCUTANEOUS at 21:18

## 2017-02-06 RX ADMIN — GABAPENTIN 400 MG: 400 CAPSULE ORAL at 06:21

## 2017-02-06 RX ADMIN — VANCOMYCIN HYDROCHLORIDE 1500 MG: 5 INJECTION, POWDER, LYOPHILIZED, FOR SOLUTION INTRAVENOUS at 14:32

## 2017-02-06 RX ADMIN — POLYETHYLENE GLYCOL (3350) 17 G: 17 POWDER, FOR SOLUTION ORAL at 08:23

## 2017-02-06 RX ADMIN — PANCRELIPASE 24000 UNITS OF LIPASE: 60000; 12000; 38000 CAPSULE, DELAYED RELEASE PELLETS ORAL at 17:38

## 2017-02-06 RX ADMIN — OXYCODONE HYDROCHLORIDE 15 MG: 15 TABLET ORAL at 07:55

## 2017-02-06 RX ADMIN — CEFEPIME 2 G: 2 INJECTION, POWDER, FOR SOLUTION INTRAVENOUS at 02:40

## 2017-02-06 RX ADMIN — INSULIN ASPART 2 UNITS: 100 INJECTION, SOLUTION INTRAVENOUS; SUBCUTANEOUS at 21:19

## 2017-02-06 RX ADMIN — VANCOMYCIN HYDROCHLORIDE 1500 MG: 5 INJECTION, POWDER, LYOPHILIZED, FOR SOLUTION INTRAVENOUS at 21:21

## 2017-02-06 RX ADMIN — DICYCLOMINE HYDROCHLORIDE 10 MG: 10 CAPSULE ORAL at 17:38

## 2017-02-06 RX ADMIN — PANCRELIPASE 24000 UNITS OF LIPASE: 60000; 12000; 38000 CAPSULE, DELAYED RELEASE PELLETS ORAL at 08:22

## 2017-02-06 RX ADMIN — CEFEPIME 2 G: 2 INJECTION, POWDER, FOR SOLUTION INTRAVENOUS at 09:56

## 2017-02-06 RX ADMIN — POLYETHYLENE GLYCOL (3350) 17 G: 17 POWDER, FOR SOLUTION ORAL at 21:20

## 2017-02-06 RX ADMIN — METRONIDAZOLE 500 MG: 500 INJECTION, SOLUTION INTRAVENOUS at 05:14

## 2017-02-06 RX ADMIN — CEFEPIME 2 G: 2 INJECTION, POWDER, FOR SOLUTION INTRAVENOUS at 17:38

## 2017-02-06 RX ADMIN — OXYCODONE HYDROCHLORIDE 15 MG: 15 TABLET ORAL at 21:46

## 2017-02-06 RX ADMIN — METOPROLOL SUCCINATE 25 MG: 25 TABLET, FILM COATED, EXTENDED RELEASE ORAL at 08:22

## 2017-02-06 RX ADMIN — INSULIN ASPART 2 UNITS: 100 INJECTION, SOLUTION INTRAVENOUS; SUBCUTANEOUS at 08:23

## 2017-02-06 RX ADMIN — PANCRELIPASE 24000 UNITS OF LIPASE: 60000; 12000; 38000 CAPSULE, DELAYED RELEASE PELLETS ORAL at 12:33

## 2017-02-06 RX ADMIN — GABAPENTIN 400 MG: 400 CAPSULE ORAL at 21:20

## 2017-02-06 RX ADMIN — METOCLOPRAMIDE 10 MG: 10 TABLET ORAL at 08:22

## 2017-02-06 RX ADMIN — INSULIN ASPART 5 UNITS: 100 INJECTION, SOLUTION INTRAVENOUS; SUBCUTANEOUS at 17:39

## 2017-02-06 RX ADMIN — VANCOMYCIN HYDROCHLORIDE 1500 MG: 5 INJECTION, POWDER, LYOPHILIZED, FOR SOLUTION INTRAVENOUS at 06:24

## 2017-02-06 RX ADMIN — ASPIRIN 81 MG: 81 TABLET ORAL at 08:22

## 2017-02-06 RX ADMIN — METRONIDAZOLE 500 MG: 500 INJECTION, SOLUTION INTRAVENOUS at 21:21

## 2017-02-06 RX ADMIN — OXYCODONE HYDROCHLORIDE 15 MG: 15 TABLET ORAL at 17:39

## 2017-02-06 RX ADMIN — METRONIDAZOLE 500 MG: 500 INJECTION, SOLUTION INTRAVENOUS at 13:39

## 2017-02-06 RX ADMIN — INSULIN ASPART 2 UNITS: 100 INJECTION, SOLUTION INTRAVENOUS; SUBCUTANEOUS at 17:39

## 2017-02-06 RX ADMIN — OXYCODONE HYDROCHLORIDE 15 MG: 15 TABLET ORAL at 02:40

## 2017-02-06 RX ADMIN — DICYCLOMINE HYDROCHLORIDE 10 MG: 10 CAPSULE ORAL at 08:22

## 2017-02-06 RX ADMIN — OXYCODONE HYDROCHLORIDE 15 MG: 15 TABLET ORAL at 12:34

## 2017-02-06 RX ADMIN — PANCRELIPASE 24000 UNITS OF LIPASE: 60000; 12000; 38000 CAPSULE, DELAYED RELEASE PELLETS ORAL at 21:20

## 2017-02-06 RX ADMIN — IPRATROPIUM BROMIDE AND ALBUTEROL SULFATE 3 ML: .5; 3 SOLUTION RESPIRATORY (INHALATION) at 08:04

## 2017-02-06 NOTE — PLAN OF CARE
Problem: Pain, Acute (Adult)  Goal: Identify Related Risk Factors and Signs and Symptoms  Outcome: Ongoing (interventions implemented as appropriate)  Goal: Acceptable Pain Control/Comfort Level  Outcome: Ongoing (interventions implemented as appropriate)    02/06/17 1150   Pain, Acute (Adult)   Acceptable Pain Control/Comfort Level making progress toward outcome         Problem: Cellulitis (Adult)  Goal: Signs and Symptoms of Listed Potential Problems Will be Absent or Manageable (Cellulitis)  Outcome: Ongoing (interventions implemented as appropriate)    02/06/17 1150   Cellulitis   Problems Assessed (Cellulitis) all   Problems Present (Cellulitis) pain         Problem: Patient Care Overview (Adult)  Goal: Plan of Care Review  Outcome: Ongoing (interventions implemented as appropriate)    02/06/17 1150   Coping/Psychosocial Response Interventions   Plan Of Care Reviewed With patient       Goal: Adult Individualization and Mutuality  Outcome: Ongoing (interventions implemented as appropriate)  Goal: Discharge Needs Assessment  Outcome: Ongoing (interventions implemented as appropriate)

## 2017-02-06 NOTE — PROGRESS NOTES
"  I have seen the patient in conjunction with Pattie CULLEN       History of presenting illness:  History was reviewed with the patient.  He woke up suddenly 2 mornings ago with his right hand is edematous and part of his forearm.  This pain associated with this he described as a deep \"ache\".  He states trying to close his hand or move his wrist makes this worse.  He's had no associated fever with this and adamantly denies any IV drug use recently in this arm.  He states breathing is okay although occasionally has some chest discomfort.  Sometimes this is worse when he is up walking around.    Vital Signs  Temp:  [97.4 °F (36.3 °C)-98.9 °F (37.2 °C)] 98.1 °F (36.7 °C)  Heart Rate:  [] 101  Resp:  [18-20] 18  BP: (116-126)/(70-81) 126/81  Body mass index is 27.16 kg/(m^2).      Intake/Output Summary (Last 24 hours) at 02/06/17 1726  Last data filed at 02/06/17 1400   Gross per 24 hour   Intake   1920 ml   Output      0 ml   Net   1920 ml     Intake & Output (last 3 days)       02/03 0701 - 02/04 0700 02/04 0701 - 02/05 0700 02/05 0701 - 02/06 0700 02/06 0701 - 02/07 0700    P.O.  1440 1920 600    IV Piggyback  100 350     Total Intake(mL/kg)  1540 (19) 2270 (28) 600 (7.4)    Urine (mL/kg/hr)  450      Stool  0      Total Output   450        Net   +1090 +2270 +600            Unmeasured Urine Occurrence  3 x 11 x 5 x    Unmeasured Stool Occurrence  0 x 0 x 0 x          Physical exam:  Physical Exam   Constitutional: Well-developed, well-nourished.  Pleasant.  No distress, sleeping on my arrival, easily awakened  Head: Normocephalic and atraumatic.  Hearing is intact, mucous membranes are moist.   Eyes:  Pupils are equal, round, and reactive to light. No scleral icterus.   Cardiovascular: Normal rate, regular rhythm and normal heart sounds.  No murmur rub or gallop  Pulmonary/Chest: Bilateral breath sounds no rhonchus rales or wheezing heard  Abdominal: Soft, flat, bowel sounds are active, nondistended nontender.  No " peritoneal signs   Musculoskeletal: Lower extremity strength is symmetric however and his right hand there is much edema of the fingers and hand including the palmar surface limiting his range of motion is increased pain also with attempt to flex or extend the wrist.  He has excellent palpable distal pulses the edema does not proceed much above the distal forearm, no adenopathy is noted in this arm.  The area of the hand is tender but has minimal erythema.  No track marks are currently noted.  The left arm is unremarkable the left leg is unremarkable the right leg has trace edema good palpable pulses  Neurological is non-focal however subjectively sensation decreased in his feet in a stocking pattern consistent with his diagnosis of neuropathy.  Psychiatric: Mood appears good and he is appreciative of care  Neurological: Nonfocal, alert.  Sensation intact in the lower extremities.    Old records reviewed from a cardiac standpoint    Results Review:  Lab Results   Component Value Date    WBC 2.92 (L) 02/06/2017    HGB 13.0 (L) 02/06/2017    HCT 40.0 (L) 02/06/2017    MCV 87.1 02/06/2017    PLT 68 (L) 02/06/2017     Lab Results   Component Value Date    GLUCOSE 246 (H) 02/06/2017    BUN 8 02/06/2017    CREATININE 0.49 02/06/2017    EGFRIFNONA >150 02/06/2017    EGFRIFAFRI  09/26/2016      Comment:      <15 Indicative of kidney failure.    BCR 16.3 02/06/2017    CO2 28.7 02/06/2017    CALCIUM 8.1 02/06/2017    ALBUMIN 2.70 (L) 02/06/2017    LABIL2 1.2 (L) 02/06/2017     (H) 02/06/2017     (H) 02/06/2017       Imaging Results (last 72 hours)     ** No results found for the last 72 hours. **         Chest x-ray ordered        Assessment/Plan     Principal Problem:    Possible cellulitis of his right hand.  This is somewhat of an atypical presentation.  He did clip his own nails which may have contributed to this.  Patient currently is on vancomycin and Maxipime and Flagyl.  Unfortunately his blood culture  is growing now, will continue vancomycin.  This may be contaminant but he has been bacteremic in the past.  CRP was only modestly elevated, I will consult infectious diseases well.  Orthopedics is also following.  Because of this day mom also going to get a venous Doppler this arm.  I've asked nursing to change IV site to the left arm    Chest pain, patient had a cardiac catheterization 2011 with some minimal plaquing at that time.  He had a stress test with a fixed defect January 2016.  Although is having no chest pain now I am going to check serial enzymes, he had an echocardiogram in the fall without significant findings.  If bacteremia confirmed however he will probably need to have this repeated.  I've consulted cardiology for further evaluation.  Chest x-ray has been ordered as well.  Patient is on aspirin beta blocker but he is not a statin candidate due to elevated transaminases    Thrombocytopenia most likely related to his hepatitis C, as is leukopenia, follow    DVT prophylaxis, SCDs, he is not an anticoagulant candidate secondary to his thrombocytopenia    Diabetes, not well controlled, insulin has been adjusted.    Transaminitis, he is hepatitis C positive in January 2016 however his transaminases normal in November, I'm checking an ultrasound      Memo Quarles MD  02/06/17  5:26 PM

## 2017-02-06 NOTE — PLAN OF CARE
Problem: Pain, Acute (Adult)  Goal: Identify Related Risk Factors and Signs and Symptoms  Outcome: Ongoing (interventions implemented as appropriate)  Goal: Acceptable Pain Control/Comfort Level  Outcome: Ongoing (interventions implemented as appropriate)    Problem: Cellulitis (Adult)  Goal: Signs and Symptoms of Listed Potential Problems Will be Absent or Manageable (Cellulitis)  Outcome: Ongoing (interventions implemented as appropriate)    Problem: Patient Care Overview (Adult)  Goal: Plan of Care Review  Outcome: Ongoing (interventions implemented as appropriate)  Goal: Adult Individualization and Mutuality  Outcome: Ongoing (interventions implemented as appropriate)

## 2017-02-06 NOTE — PAYOR COMM NOTE
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 Intravenous Q8H   ----------------------------------------------------------------------------------------------------------------------  Vital Signs:  Temp:  [97.9 °F (36.6 °C)-98.4 °F (36.9 °C)] 97.9 °F (36.6 °C)  Heart Rate:  [68-91] 90  Resp:  [18] 18  BP: (111-121)/(70-81) 117/76  Last 3 weights    02/04/17  1112 02/04/17  1642   Weight: 175 lb (79.4 kg) 178 lb 9.6 oz (81 kg)     Body mass index is 27.16 kg/(m^2).        Diet Regular; Consistent Carbohydrate, Cardiac  ----------------------------------------------------------------------------------------------------------------------  Physical exam:  Constitutional: Well-developed and well-nourished. No respiratory distress.    HENT:  Head: Normocephalic and atraumatic.  Mouth: Moist mucous membranes.    Eyes: Conjunctivae and EOM are normal. Pupils are equal, round, and reactive to  light. No scleral icterus.  Neck: Neck supple. No JVD present.    Cardiovascular: Normal rate, regular rhythm and normal heart sounds with no murmur.  Pulmonary/Chest: No respiratory distress, no wheezes, no crackles, with normal breath sounds and good air movement.  Abdominal: Soft. Bowel sounds are normal. No distension and no tenderness.   Musculoskeletal:  Yesterday, the right dorsal hand is erythematous and edematous sparing the digits with decreased ROM of the wrist and the MCP, PIP, and DIP joints, inability to make a fist and decreased ability to move fingers.  Today, the swelling and erythema is much less and he is able to make a fist.  There are no areas opening up to drain. Neurovascularly intact. Tenderness to palpation of the dorsal side of the right hand. No deformities of the left upper extremity or lower extremities.   Neurological: Alert and oriented to person, place, and time. No cranial nerve deficit. No tongue deviation. No facial droop. No slurred speech.   Skin: Skin is warm and dry. No rash noted. No pallor. Erythema and edema of the right dorsal hand sparing the digits. Track marks noted in bilateral antecubital fossas and on the radial right hand. He also had numerous abrasions on his forearms and hands. His toenails and fingernails have been cut to quick and have dry blood around the periphery.  Psychiatric: Normal mood and affect. Behavior is normal. Judgment and thought content normal.   Peripheral vascular: No edema and strong pulses on all 4 extremities.  Genitourinary: making urine, no mccann catheter noted   ----------------------------------------------------------------------------------------------------------------------  Tele:  None  ----------------------------------------------------------------------------------------------------------------------  Results from last 7 days  Lab Units 02/05/17  0447 02/04/17  1709 02/04/17  1354 02/04/17  1319   CRP mg/dL 3.99*  --   --  3.68*    LACTATE mmol/L  --  1.3  --   --    WBC 10*3/mm3 3.40*  --  3.99*  --    HEMOGLOBIN g/dL 12.9*  --  13.5*  --    HEMATOCRIT % 39.5*  --  41.9*  --    MCV fL 86.8  --  86.4  --    MCHC g/dL 32.7*  --  32.2*  --    PLATELETS 10*3/mm3 66*  --  63*  --      Results from last 7 days  Lab Units 02/05/17  0447 02/04/17  1709 02/04/17  1319   SODIUM mmol/L 138  --  138   POTASSIUM mmol/L 3.7  --  4.9   MAGNESIUM mg/dL  --  1.7  --    CHLORIDE mmol/L 107  --  106   TOTAL CO2 mmol/L 28.7  --  30.7   BUN mg/dL 8  --  9   CREATININE mg/dL 0.49  --  0.52   EGFR IF NONAFRICN AM mL/min/1.73 >150  --  >150   CALCIUM mg/dL 8.3  --  8.8   GLUCOSE mg/dL 247*  --  177*   ALBUMIN g/dL 2.80*  --  3.50   BILIRUBIN mg/dL 1.3  --  2.2*   ALK PHOS U/L 91  --  88   AST (SGOT) U/L 221*  --  284*   ALT (SGPT) U/L 178*  --  219*   Estimated Creatinine Clearance: 172.6 mL/min (by C-G formula based on Cr of 0.49).      BLOOD CULTURE   Date Value Ref Range Status   02/04/2017 No growth at less than 24 hours  Preliminary   02/04/2017 No growth at less than 24 hours  Preliminary   ----------------------------------------------------------------------------------------------------------------------  Imaging Results (last 24 hours)     ** No results found for the last 24 hours. **   ----------------------------------------------------------------------------------------------------------------------  Assessment and Plan:  -Cellulitis of the right hand causing sepsis  -Type II diabetes mellitus, insulin dependent with hyperglycemia   -COPD without acute exacerbation   -Essential hypertension, controlled   -Pancytopenia and transaminitis as result of the the  -Hyperlipidemia   -History of coronary artery disease, S/P heart catheterization in 2012  -History of systolic congestive heart failure with EF of 60% on 9/14/2016  -IV drug abuse with history of amphetamine, suboxone, cocaine, and opioid abuse  -History of MRSA of the achilles tendon and  LUE  -Mild normocytic anemia   -Depression   -Anxiety   -Obstructive sleep apnea without use of BiPAP  -History of chronic pancreatitis   -Morbid obesity  -Tobacco smoking addiction    I have discussed the patient in person with Dr. Calero; there does not seem to be an abscess present for drainage.  I will continue the vancomycin, Flagyl, and cefepime.  I will repeat his blood work in the morning.  If none of the cultures grow MRSA, then I will discontinue the vancomycin tomorrow.  The sepsis has improved.  His glucose levels are in the 200 range despite us decreasing his insulin; we have started back some of his home Levemir as he does not seem to be eating very well and we don't want to cause any hypoglycemia.     The patient is high risk due to the following diagnoses/reasons:  Cellulitis of the right hand, IV drug abuse, hepatitis C cirrhosis with acute transaminitis causing leukopenia and thrombocytopenia, history of coronary artery disease, type 2 diabetes    Elva Hdz MD  02/05/17  2:34 PM       Electronically signed by Elva Hdz MD at 2/5/2017  2:50 PM        All medication doses during the admission are shown, including meds that are no longer on order.     Scheduled Meds Sorted by Name for Isaias Lang as of 02/06/17 1029       1 Day 3 Days 7 Days 10 Days < Today >    Legend:                           Inactive     Active     Other Encounter    Linked               Medications 01/28 01/29 01/30 01/31 02/01 02/02 02/03 02/04 02/05 02/06   aspirin EC tablet 81 mg  Dose: 81 mg Freq: Daily Route: PO  Start: 02/05/17 1415            1500        0822          cefepime (MAXIPIME) 2 g/100 mL 0.9% NS (mbp)  Dose: 2 g Freq: Every 8 Hours Route: IV  Indications of Use: SKIN AND SOFT TISSUE INFECTION  Start: 02/04/17 1800    Admin Instructions:   Activate vial before using.           1836        0143       1036       1707        0240       0956       1800          dicyclomine (BENTYL) capsule 10  mg  Dose: 10 mg Freq: 2 Times Daily Before Meals Route: PO  Start: 02/05/17 1730            1708        0822       1730          gabapentin (NEURONTIN) capsule 400 mg  Dose: 400 mg Freq: Every 8 Hours Scheduled Route: PO  Start: 02/05/17 1445            1534       2129        0621       1400       2200          insulin aspart (novoLOG) injection 0-7 Units  Dose: 0-7 Units Freq: 4 Times Daily Before Meals & Nightly Route: SC  Start: 02/04/17 1730    Admin Instructions:   Correction - Low Dose.  Less than 40 units/day total insulin dose or lean, elderly, renal patients    Blood glucose 150-199 mg/dL - 2 units  Blood glucose 200-249 mg/dL - 3 units  Blood glucose 250-299 mg/dL - 4 units  Blood glucose 300-349 mg/dL - 5 units  Blood glucose 350-400 mg/dL - 6 units  Blood glucose greater than 400 mg/dL - 7 units and call provider             (1068) [C]       2039        0830       1201 [C]       1718       2130        0823       1130       1730       2100          insulin detemir (LEVEMIR) injection 15 Units  Dose: 15 Units Freq: Nightly Route: SC  Start: 02/05/17 2100    Admin Instructions:               2132 2100          metoclopramide (REGLAN) tablet 10 mg  Dose: 10 mg Freq: 2 Times Daily Before Meals Route: PO  Start: 02/05/17 1730    Admin Instructions:               1708        0822       1730          metoprolol succinate XL (TOPROL-XL) 24 hr tablet 25 mg  Dose: 25 mg Freq: Daily Route: PO  Start: 02/05/17 1415    Admin Instructions:   Hold for hr <60  Do not crush or chew.            1500        0822          metroNIDAZOLE (FLAGYL) IVPB 500 mg  Dose: 500 mg Freq: Every 8 Hours Route: IV  Indications of Use: SKIN AND SOFT TISSUE INFECTION  Start: 02/04/17 2200    Admin Instructions:   Do not refrigerate.           2126        0512       1425       2127        0514       1400       2200          pancrelipase (Lip-Prot-Amyl) (CREON) capsule 24,000 units of lipase  Dose: 24,000 units of lipase Freq: 4 Times  Daily With Meals & Nightly Route: PO  Start: 02/05/17 1800    Admin Instructions:   Give with meals.  Swallow whole.  Do not open, crush or chew capsule.            1703       2127        0822       1200       1800       2100          pantoprazole (PROTONIX) EC tablet 40 mg  Dose: 40 mg Freq: 2 Times Daily Before Meals Route: PO  Start: 02/05/17 1730    Admin Instructions:   Swallow whole; do not crush, split, or chew.            1701        0822       1730          polyethylene glycol (MIRALAX) powder 17 g  Dose: 17 g Freq: Every 12 Hours Scheduled Route: PO  Start: 02/05/17 2499 [phone]       2100          vancomycin (VANCOCIN) 1,250 mg in sodium chloride 0.9 % 250 mL IVPB  Dose: 1,250 mg Freq: Every 8 Hours Route: IV  Indications of Use: SKIN AND SOFT TISSUE INFECTION  Start: 02/04/17 2200 End: 02/05/17 1551           2238        0609       1500       1551-D/C'd       vancomycin (VANCOCIN) 1,500 mg in sodium chloride 0.9 % 500 mL IVPB  Dose: 1,500 mg Freq: Every 8 Hours Route: IV  Indications of Use: SKIN AND SOFT TISSUE INFECTION  Start: 02/05/17 2200            2229        0624       1400       2200          vancomycin (VANCOCIN) 1,500 mg in sodium chloride 0.9 % 500 mL IVPB  Dose: 20 mg/kg Freq: Once Route: IV  Indications of Use: SKIN AND SOFT TISSUE INFECTION  Last Dose: Stopped (02/04/17 1616)  Start: 02/04/17 1256 End: 02/04/17 1616           1407 [C]       1613       1616            Medications 01/28 01/29 01/30 01/31 02/01 02/02 02/03 02/04 02/05 02/06         Continuous Meds Sorted by Name for Isaias Lang as of 02/06/17 1029      Legend:         Inactive     Active     Other Encounter    Linked               Medications 01/28 01/29 01/30 01/31 02/01 02/02 02/03 02/04 02/05 02/06   Pharmacy to dose vancomycin  Freq: Continuous PRN Route: XX  PRN Reason: consult  Start: 02/04/17 1631 End: 02/04/17 1649           1649-D/C'd              PRN Meds Sorted by Name for  Isaias Lang as of 02/06/17 1029      Legend:         Inactive     Active     Other Encounter    Linked               Medications 01/28 01/29 01/30 01/31 02/01 02/02 02/03 02/04 02/05 02/06   dextrose (D50W) solution 25 g  Dose: 25 g Freq: Every 15 Minutes PRN Route: IV  PRN Reason: low blood sugar  PRN Comment: Blood Sugar Less Than 70, Patient Has IV Access - Unresponsive, NPO or Unable To Safely Swallow  Start: 02/04/17 1639                dextrose (GLUTOSE) oral gel 15 g  Dose: 15 g Freq: Every 15 Minutes PRN Route: PO  PRN Reason: low blood sugar  PRN Comment: Blood Sugar Less Than 70, Patient Alert, Is Not NPO & Can Safely Swallow  Start: 02/04/17 1639                glucagon (GLUCAGEN) injection 1 mg  Dose: 1 mg Freq: Every 15 Minutes PRN Route: SC  PRN Comment: Blood Glucose Less Than 70 - Patient Without IV Access - Unresponsive, NPO or Unable To Safely Swallow  Start: 02/04/17 1639                ipratropium-albuterol (DUO-NEB) nebulizer solution 3 mL  Dose: 3 mL Freq: Every 6 Hours PRN Route: NEBULIZATION  PRN Reason: shortness of air  Start: 02/04/17 1658           1912        0645       1733        0804          oxyCODONE (ROXICODONE) immediate release tablet 15 mg  Dose: 15 mg Freq: Every 4 Hours PRN Route: PO  PRN Reason: moderate pain (4-6)  Start: 02/04/17 1950 End: 02/14/17 1949           2039        0828       1718       2201        0240       0755          Pharmacy to dose vancomycin  Freq: Continuous PRN Route: XX  PRN Reason: consult  Start: 02/04/17 1631 End: 02/04/17 1649           1649-D/C'd        sodium chloride 0.9 % flush 1-10 mL  Dose: 1-10 mL Freq: As Needed Route: IV  PRN Reason: line care  Start: 02/04/17 1631                sodium chloride 0.9 % flush 10 mL  Dose: 10 mL Freq: As Needed Route: IV  PRN Reason: line care  Start: 02/04/17 1254                Medications 01/28 01/29 01/30 01/31 02/01 02/02 02/03 02/04 02/05 02/06              Consult Notes (last 72 hours) (Notes from  2/3/2017 10:29 AM through 2/6/2017 10:29 AM)      PATRICK Walters at 2/5/2017  9:49 AM  Version 1 of 1     Consult Orders:    1. Inpatient Consult to Orthopedic Surgery [[id number]] ordered by Rowan Juan DO at 02/04/17 1524                    Patient: Isaias Lang  YOB: 1965  Date of encounter: 2/5/2017  Admitting Physician:     History of Present Illness:   Isaias Lang is a 51 y.o. male who presented to the emergency room with a 1 day history of erythema, edema, and pain of the right dorsal hand.  He states that upon waking yesterday morning he noticed that his entire right hand was swollen and erythemic.  He states is tender throughout the entire hand.  He also reports fever and chills.  He attributes his hand swelling to using dirty insulin needles for drug use.    PMH:   Patient Active Problem List   Diagnosis   • MDD (major depressive disorder), recurrent episode, moderate   • Type 1 diabetes mellitus   • 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   • Cellulitis of right hand       Past Medical History   Diagnosis Date   • Abscess of antecubital fossa 05/2014     Left that required I and D and grew Haemophilus influenza group 1   • Anxiety    • Asthma    • Chronic pancreatitis    • COPD (chronic obstructive pulmonary disease)    • DDD (degenerative disc disease), lumbosacral    • Depression    • Diabetes mellitus    • DVT (deep venous thrombosis)      Right arm   • Essential hypertension    • GERD (gastroesophageal reflux disease)    • Hepatitis-C    • Hypercholesteremia    • Injury of back    • Injury of right Achilles tendon      Complicated by MRSA and Pseudomonas   • Mild CAD      Left heart cath at  2012   • MRSA (methicillin resistant staph aureus) culture positive 09/14/2016     LUE   • Obesity    • Opiate addiction      Suboxone IV   • Self-injurious behavior    • Sleep apnea    • Suicide attempt    • Systolic CHF, chronic      EF 45-50% in 2013, EF 60% 9/2016       PSH:  Past  "Surgical History   Procedure Laterality Date   • Cholecystectomy  1990s   • Orif ankle fracture Right 2014   • Incision and drainage abscess Left 2016     wrist   • Incision and drainage arm Left 9/15/2016     Procedure: INCISION AND DRAINAGE UPPER EXTREMITY;  Surgeon: Rico Oseguera MD;  Location: [facility] OR;  Service:        Allergies:     Allergies   Allergen Reactions   • Robitussin Dm [Dextromethorphan-Guaifenesin] Shortness Of Breath   • Tizanidine Shortness Of Breath   • Metformin Nausea And Vomiting   • Sulfa Antibiotics Other (See Comments)     \"I cannot take them, they do something to me.\"   • Contrast Dye Rash   • Tramadol Rash       Medications:   Prior to Admission medications    Medication Sig Start Date End Date Taking? Authorizing Provider   albuterol (PROVENTIL HFA;VENTOLIN HFA) 108 (90 BASE) MCG/ACT inhaler Inhale 2 puffs Every 6 (Six) Hours As Needed for wheezing.  Patient taking differently: Inhale 2 puffs Every 6 (Six) Hours As Needed for wheezing or shortness of air. 11/18/16  Yes Ankit Barnes MD   aspirin 81 MG EC tablet Take 1 tablet by mouth Daily. 11/18/16  Yes Ankit Barnes MD   dicyclomine (BENTYL) 10 MG capsule Take 10 mg by mouth 2 (Two) Times a Day Before Meals.   Yes Historical Provider, MD   furosemide (LASIX) 20 MG tablet Take 1 tablet by mouth Daily. 11/18/16  Yes Ankit Barnes MD   gabapentin (NEURONTIN) 800 MG tablet Take 1 tablet by mouth 3 (Three) Times a Day.  Patient taking differently: Take 800 mg by mouth 4 (Four) Times a Day. 11/18/16 11/18/17 Yes Ankit Barnes MD   HYDROcodone-acetaminophen (NORCO) 7.5-325 MG per tablet Take 1 tablet by mouth Every 8 (Eight) Hours As Needed for moderate pain (4-6). Patient states he fills rx at pain clinic in [place], not showing on [facility]   Yes Historical Provider, MD   insulin aspart (novoLOG) 100 UNIT/ML injection Inject 20 Units under the skin 3 (Three) Times a Day With Meals.  Patient " taking differently: Inject 10-20 Units under the skin 3 (Three) Times a Day With Meals. 11/18/16  Yes Ankit Barnes MD   insulin detemir (LEVEMIR) 100 UNIT/ML injection 20 unit AM , 35 unit PM  Patient taking differently: Inject 20 Units under the skin Daily With Breakfast. 20 unit AM , 35 unit PM 11/18/16  Yes Ankit Barnes MD   insulin detemir (LEVEMIR) 100 UNIT/ML injection Inject 35 Units under the skin Every Night.   Yes Historical Provider, MD   ipratropium-albuterol (COMBIVENT)  MCG/ACT inhaler Inhale 2 puffs Every 4 (Four) Hours As Needed for wheezing.  Patient taking differently: Inhale 2 puffs Every 4 (Four) Hours As Needed for wheezing or shortness of air. 11/18/16  Yes Ankit Barnes MD   magnesium gluconate (MAGONATE) 500 MG tablet Take 500 mg by mouth 2 (Two) Times a Day.   Yes Historical Provider, MD   metoclopramide (REGLAN) 10 MG tablet Take 1 tablet by mouth 2 (Two) Times a Day. 10/25/16  Yes Ankit Barnes MD   metoprolol succinate XL (TOPROL-XL) 25 MG 24 hr tablet Take 25 mg by mouth Daily.   Yes Historical Provider, MD   naproxen (NAPROSYN) 500 MG tablet Take 1 tablet by mouth 2 (Two) Times a Day As Needed for mild pain (1-3). 11/18/16  Yes Ankit Barnes MD   pancrelipase, Lip-Prot-Amyl, (CREON) 10863 UNITS capsule delayed-release particles capsule Take 2 capsules by mouth 4 (Four) Times a Day With Meals & at Bedtime. 11/18/16  Yes Ankit Barnes MD   pantoprazole (PROTONIX) 40 MG EC tablet Take 1 tablet by mouth 2 (Two) Times a Day. 11/18/16  Yes Ankit aBrnes MD   polyethylene glycol (MIRALAX) packet Take 17 g by mouth Daily.  Patient taking differently: Take 17 g by mouth 2 (Two) Times a Day. 11/18/16  Yes Ankit Barnes MD   potassium chloride (K-DUR,KLOR-CON) 20 MEQ CR tablet Take 1 tablet by mouth Daily. 11/18/16  Yes Ankit Barnes MD       Social History:  Social History     Social History    • Marital status:      Spouse name: N/A   • Number of children: N/A   • Years of education: N/A     Occupational History   • Not on file.     Social History Main Topics   • Smoking status: Former Smoker     Years: 1.00     Types: Cigarettes   • Smokeless tobacco: Never Used      Comment: quit smoking in my 20's   • Alcohol use No      Comment: denies   • Drug use: Yes     Special: IV      Comment: PT STATES THAT HE USED SUBOXONE IN LEFT AC YESTERDAY   • Sexual activity: Defer      Comment: not currently active.      Other Topics Concern   • Not on file     Social History Narrative       Family History:     Family History   Problem Relation Age of Onset   • Gout Other    • Osteoporosis Other    • Arthritis Other      Rheumatoid and osteoarthritis   • Hypertension Other    • Heart disease Other    • Cancer Other    • Coronary artery disease Mother    • Lung disease Mother    • Gout Sister    • Cancer Maternal Grandmother    • Hypertension Maternal Grandfather    • Gout Maternal Grandfather        Review of Systems:   Constitutional: Positive for chills, fatigue and fever. Negative for activity change and appetite change.   HENT: Negative for congestion, postnasal drip, rhinorrhea, sinus pressure and sore throat.   Eyes: Negative for photophobia, pain and visual disturbance.   Respiratory: Negative for cough, chest tightness, shortness of breath and wheezing.   Cardiovascular: Negative for chest pain, palpitations and leg swelling.   Gastrointestinal: Negative for abdominal pain, constipation, diarrhea, nausea and vomiting.   Endocrine: Negative for cold intolerance and heat intolerance.   Genitourinary: Negative for dysuria, flank pain, frequency, hematuria and urgency.   Musculoskeletal: Negative for arthralgias, back pain, gait problem and myalgias.   Erythema and edema of the right dorsal hand    Skin:   Erythema of the right dorsal hand with associated edema    Allergic/Immunologic: Negative for  environmental allergies and food allergies.   Neurological: Negative for syncope, weakness, light-headedness, numbness and headaches.   Hematological: Negative for adenopathy. Does not bruise/bleed easily.   Psychiatric/Behavioral: Negative for confusion, decreased concentration, self-injury and suicidal ideas. The patient is not nervous/anxious.     Physical Exam: 51 y.o. male  General Appearance:    Alert and oriented x 3, cooperative, in no acute distress                   Vitals:    02/04/17 1924 02/04/17 1926 02/05/17 0645 02/05/17 0803   BP:  111/75  117/76   BP Location:  Left arm  Left arm   Patient Position:  Lying  Lying   Pulse:  86 80 90   Resp:  18 18 18   Temp:  98.2 °F (36.8 °C)  97.9 °F (36.6 °C)   TempSrc:  Oral  Oral   SpO2: 96% 97% 96%    Weight:       Height:              HEENT: Unremarkable      Neck: Supple without lymphadenopathy.      Chest: Clear to ascultation bilaterally. Normal respiratory effort.      Heart: Regular rate and rhythm. No murmurs noted.      Musculoskeletal: Examination of the right hand reveals swelling of the right dorsal aspect of the hand with mild erythema.  The digits are spared.  There is no specific area of induration or fluctuation.  He has generalized tenderness throughout the entire dorsal aspect of his hand.  His neurovascular status is grossly intact.      Radiology:     Imaging Results (last 72 hours)     ** No results found for the last 72 hours. **          Assessment    ICD-10-CM ICD-9-CM   1. Cellulitis of right hand excluding fingers and thumb L03.113 682.4       Plan:  Plan to continue  IV antibiotics including vancomycin, cefepime and flagyl. We will watch this closely and if it there is no improvement with antibiotics we will discuss proceeding with an MRI to further evaluate for possible abscess.  We'll continue to watch him closely throughout his stay.    Angie Metz PAC             Electronically signed by PATRICK Walters at 2/5/2017  9:55  AM
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
## 2017-02-07 ENCOUNTER — APPOINTMENT (OUTPATIENT)
Dept: ULTRASOUND IMAGING | Facility: HOSPITAL | Age: 52
End: 2017-02-07

## 2017-02-07 ENCOUNTER — APPOINTMENT (OUTPATIENT)
Dept: GENERAL RADIOLOGY | Facility: HOSPITAL | Age: 52
End: 2017-02-07

## 2017-02-07 ENCOUNTER — APPOINTMENT (OUTPATIENT)
Dept: ULTRASOUND IMAGING | Facility: HOSPITAL | Age: 52
End: 2017-02-07
Attending: INTERNAL MEDICINE

## 2017-02-07 LAB
ALBUMIN SERPL-MCNC: 2.7 G/DL (ref 3.5–5)
ALBUMIN/GLOB SERPL: 1.1 G/DL (ref 1.5–2.5)
ALP SERPL-CCNC: 98 U/L (ref 46–116)
ALT SERPL W P-5'-P-CCNC: 167 U/L (ref 10–44)
ANION GAP SERPL CALCULATED.3IONS-SCNC: 0.5 MMOL/L (ref 3.6–11.2)
AST SERPL-CCNC: 201 U/L (ref 10–34)
BASOPHILS # BLD AUTO: 0.01 10*3/MM3 (ref 0–0.3)
BASOPHILS NFR BLD AUTO: 0.4 % (ref 0–2)
BILIRUB SERPL-MCNC: 1 MG/DL (ref 0.2–1.8)
BUN BLD-MCNC: 8 MG/DL (ref 7–21)
BUN/CREAT SERPL: 10.3 (ref 7–25)
CALCIUM SPEC-SCNC: 8.4 MG/DL (ref 7.7–10)
CHLORIDE SERPL-SCNC: 109 MMOL/L (ref 99–112)
CK MB SERPL-CCNC: 4.78 NG/ML (ref 0–5)
CK MB SERPL-CCNC: 5.14 NG/ML (ref 0–5)
CK MB SERPL-RTO: 6.9 % (ref 0–3)
CK MB SERPL-RTO: 8.6 % (ref 0–3)
CK SERPL-CCNC: 60 U/L (ref 24–204)
CK SERPL-CCNC: 69 U/L (ref 24–204)
CO2 SERPL-SCNC: 30.5 MMOL/L (ref 24.3–31.9)
CREAT BLD-MCNC: 0.78 MG/DL (ref 0.43–1.29)
CRP SERPL-MCNC: 2.2 MG/DL (ref 0–0.99)
DEPRECATED RDW RBC AUTO: 52.9 FL (ref 37–54)
EOSINOPHIL # BLD AUTO: 0.06 10*3/MM3 (ref 0–0.7)
EOSINOPHIL NFR BLD AUTO: 2.5 % (ref 0–5)
ERYTHROCYTE [DISTWIDTH] IN BLOOD BY AUTOMATED COUNT: 16.3 % (ref 11.5–14.5)
GFR SERPL CREATININE-BSD FRML MDRD: 105 ML/MIN/1.73
GLOBULIN UR ELPH-MCNC: 2.4 GM/DL
GLUCOSE BLD-MCNC: 233 MG/DL (ref 70–110)
GLUCOSE BLDC GLUCOMTR-MCNC: 133 MG/DL (ref 70–130)
GLUCOSE BLDC GLUCOMTR-MCNC: 140 MG/DL (ref 70–130)
GLUCOSE BLDC GLUCOMTR-MCNC: 202 MG/DL (ref 70–130)
GLUCOSE BLDC GLUCOMTR-MCNC: 267 MG/DL (ref 70–130)
HAV IGM SERPL QL IA: ABNORMAL
HBV CORE IGM SERPL QL IA: REACTIVE
HBV SURFACE AG SERPL QL CFM: NORMAL
HBV SURFACE AG SERPL QL IA: REACTIVE
HCT VFR BLD AUTO: 40 % (ref 42–52)
HCV AB SER DONR QL: REACTIVE
HGB BLD-MCNC: 13.3 G/DL (ref 14–18)
IMM GRANULOCYTES # BLD: 0 10*3/MM3 (ref 0–0.03)
IMM GRANULOCYTES NFR BLD: 0 % (ref 0–0.5)
LYMPHOCYTES # BLD AUTO: 0.69 10*3/MM3 (ref 1–3)
LYMPHOCYTES NFR BLD AUTO: 28.6 % (ref 21–51)
MCH RBC QN AUTO: 29.2 PG (ref 27–33)
MCHC RBC AUTO-ENTMCNC: 33.3 G/DL (ref 33–37)
MCV RBC AUTO: 87.9 FL (ref 80–94)
MONOCYTES # BLD AUTO: 0.29 10*3/MM3 (ref 0.1–0.9)
MONOCYTES NFR BLD AUTO: 12 % (ref 0–10)
MYOGLOBIN SERPL-MCNC: 28 NG/ML (ref 0–109)
MYOGLOBIN SERPL-MCNC: 41 NG/ML (ref 0–109)
NEUTROPHILS # BLD AUTO: 1.36 10*3/MM3 (ref 1.4–6.5)
NEUTROPHILS NFR BLD AUTO: 56.5 % (ref 30–70)
OSMOLALITY SERPL CALC.SUM OF ELEC: 285.2 MOSM/KG (ref 273–305)
PLATELET # BLD AUTO: 63 10*3/MM3 (ref 130–400)
PMV BLD AUTO: 11.7 FL (ref 6–10)
POTASSIUM BLD-SCNC: 4.3 MMOL/L (ref 3.5–5.3)
PROT SERPL-MCNC: 5.1 G/DL (ref 6–8)
RBC # BLD AUTO: 4.55 10*6/MM3 (ref 4.7–6.1)
SODIUM BLD-SCNC: 140 MMOL/L (ref 135–153)
TROPONIN I SERPL-MCNC: <0.006 NG/ML
TROPONIN I SERPL-MCNC: <0.006 NG/ML
VANCOMYCIN TROUGH SERPL-MCNC: 14.9 MCG/ML (ref 5–15)
WBC NRBC COR # BLD: 2.41 10*3/MM3 (ref 4.5–12.5)

## 2017-02-07 PROCEDURE — 82962 GLUCOSE BLOOD TEST: CPT

## 2017-02-07 PROCEDURE — 80202 ASSAY OF VANCOMYCIN: CPT | Performed by: INTERNAL MEDICINE

## 2017-02-07 PROCEDURE — 93005 ELECTROCARDIOGRAM TRACING: CPT | Performed by: INTERNAL MEDICINE

## 2017-02-07 PROCEDURE — 99254 IP/OBS CNSLTJ NEW/EST MOD 60: CPT | Performed by: INTERNAL MEDICINE

## 2017-02-07 PROCEDURE — 80053 COMPREHEN METABOLIC PANEL: CPT | Performed by: INTERNAL MEDICINE

## 2017-02-07 PROCEDURE — 63710000001 INSULIN ASPART PER 5 UNITS: Performed by: PHYSICIAN ASSISTANT

## 2017-02-07 PROCEDURE — 63710000001 INSULIN DETEMIR PER 5 UNITS: Performed by: INTERNAL MEDICINE

## 2017-02-07 PROCEDURE — 99233 SBSQ HOSP IP/OBS HIGH 50: CPT | Performed by: INTERNAL MEDICINE

## 2017-02-07 PROCEDURE — 86692 HEPATITIS DELTA AGENT ANTBDY: CPT | Performed by: INTERNAL MEDICINE

## 2017-02-07 PROCEDURE — 93971 EXTREMITY STUDY: CPT

## 2017-02-07 PROCEDURE — 83874 ASSAY OF MYOGLOBIN: CPT | Performed by: INTERNAL MEDICINE

## 2017-02-07 PROCEDURE — 71010 XR CHEST AP: CPT | Performed by: RADIOLOGY

## 2017-02-07 PROCEDURE — 93971 EXTREMITY STUDY: CPT | Performed by: RADIOLOGY

## 2017-02-07 PROCEDURE — 25010000002 CEFEPIME: Performed by: PHYSICIAN ASSISTANT

## 2017-02-07 PROCEDURE — 71010 HC CHEST AP: CPT

## 2017-02-07 PROCEDURE — 82553 CREATINE MB FRACTION: CPT | Performed by: INTERNAL MEDICINE

## 2017-02-07 PROCEDURE — 82550 ASSAY OF CK (CPK): CPT | Performed by: INTERNAL MEDICINE

## 2017-02-07 PROCEDURE — 94640 AIRWAY INHALATION TREATMENT: CPT

## 2017-02-07 PROCEDURE — 86140 C-REACTIVE PROTEIN: CPT | Performed by: INTERNAL MEDICINE

## 2017-02-07 PROCEDURE — 94799 UNLISTED PULMONARY SVC/PX: CPT

## 2017-02-07 PROCEDURE — 76700 US EXAM ABDOM COMPLETE: CPT | Performed by: RADIOLOGY

## 2017-02-07 PROCEDURE — 63710000001 INSULIN ASPART PER 5 UNITS: Performed by: INTERNAL MEDICINE

## 2017-02-07 PROCEDURE — 76700 US EXAM ABDOM COMPLETE: CPT

## 2017-02-07 PROCEDURE — 84484 ASSAY OF TROPONIN QUANT: CPT | Performed by: INTERNAL MEDICINE

## 2017-02-07 PROCEDURE — 85025 COMPLETE CBC W/AUTO DIFF WBC: CPT | Performed by: INTERNAL MEDICINE

## 2017-02-07 PROCEDURE — 25010000002 VANCOMYCIN PER 500 MG

## 2017-02-07 PROCEDURE — 93010 ELECTROCARDIOGRAM REPORT: CPT | Performed by: INTERNAL MEDICINE

## 2017-02-07 RX ADMIN — OXYCODONE HYDROCHLORIDE 15 MG: 15 TABLET ORAL at 06:51

## 2017-02-07 RX ADMIN — VANCOMYCIN HYDROCHLORIDE 1500 MG: 5 INJECTION, POWDER, LYOPHILIZED, FOR SOLUTION INTRAVENOUS at 06:51

## 2017-02-07 RX ADMIN — PANTOPRAZOLE SODIUM 40 MG: 40 TABLET, DELAYED RELEASE ORAL at 06:51

## 2017-02-07 RX ADMIN — PANCRELIPASE 24000 UNITS OF LIPASE: 60000; 12000; 38000 CAPSULE, DELAYED RELEASE PELLETS ORAL at 12:25

## 2017-02-07 RX ADMIN — CEFEPIME 2 G: 2 INJECTION, POWDER, FOR SOLUTION INTRAVENOUS at 10:22

## 2017-02-07 RX ADMIN — GABAPENTIN 400 MG: 400 CAPSULE ORAL at 06:51

## 2017-02-07 RX ADMIN — METRONIDAZOLE 500 MG: 500 INJECTION, SOLUTION INTRAVENOUS at 21:36

## 2017-02-07 RX ADMIN — POLYETHYLENE GLYCOL (3350) 17 G: 17 POWDER, FOR SOLUTION ORAL at 20:08

## 2017-02-07 RX ADMIN — GABAPENTIN 400 MG: 400 CAPSULE ORAL at 21:35

## 2017-02-07 RX ADMIN — VANCOMYCIN HYDROCHLORIDE 1500 MG: 5 INJECTION, POWDER, LYOPHILIZED, FOR SOLUTION INTRAVENOUS at 22:50

## 2017-02-07 RX ADMIN — Medication 10 ML: at 20:12

## 2017-02-07 RX ADMIN — METOCLOPRAMIDE 10 MG: 10 TABLET ORAL at 17:12

## 2017-02-07 RX ADMIN — Medication 10 ML: at 13:36

## 2017-02-07 RX ADMIN — OXYCODONE HYDROCHLORIDE 15 MG: 15 TABLET ORAL at 21:35

## 2017-02-07 RX ADMIN — OXYCODONE HYDROCHLORIDE 15 MG: 15 TABLET ORAL at 12:47

## 2017-02-07 RX ADMIN — INSULIN DETEMIR 20 UNITS: 100 INJECTION, SOLUTION SUBCUTANEOUS at 20:14

## 2017-02-07 RX ADMIN — GABAPENTIN 400 MG: 400 CAPSULE ORAL at 13:35

## 2017-02-07 RX ADMIN — DICYCLOMINE HYDROCHLORIDE 10 MG: 10 CAPSULE ORAL at 17:25

## 2017-02-07 RX ADMIN — POLYETHYLENE GLYCOL (3350) 17 G: 17 POWDER, FOR SOLUTION ORAL at 12:47

## 2017-02-07 RX ADMIN — OXYCODONE HYDROCHLORIDE 15 MG: 15 TABLET ORAL at 17:12

## 2017-02-07 RX ADMIN — INSULIN ASPART 5 UNITS: 100 INJECTION, SOLUTION INTRAVENOUS; SUBCUTANEOUS at 17:13

## 2017-02-07 RX ADMIN — PANCRELIPASE 24000 UNITS OF LIPASE: 60000; 12000; 38000 CAPSULE, DELAYED RELEASE PELLETS ORAL at 20:08

## 2017-02-07 RX ADMIN — METOPROLOL SUCCINATE 25 MG: 25 TABLET, FILM COATED, EXTENDED RELEASE ORAL at 12:46

## 2017-02-07 RX ADMIN — METRONIDAZOLE 500 MG: 500 INJECTION, SOLUTION INTRAVENOUS at 13:35

## 2017-02-07 RX ADMIN — VANCOMYCIN HYDROCHLORIDE 1500 MG: 5 INJECTION, POWDER, LYOPHILIZED, FOR SOLUTION INTRAVENOUS at 13:35

## 2017-02-07 RX ADMIN — PANCRELIPASE 24000 UNITS OF LIPASE: 60000; 12000; 38000 CAPSULE, DELAYED RELEASE PELLETS ORAL at 17:12

## 2017-02-07 RX ADMIN — CEFEPIME 2 G: 2 INJECTION, POWDER, FOR SOLUTION INTRAVENOUS at 01:53

## 2017-02-07 RX ADMIN — METRONIDAZOLE 500 MG: 500 INJECTION, SOLUTION INTRAVENOUS at 06:51

## 2017-02-07 RX ADMIN — IPRATROPIUM BROMIDE AND ALBUTEROL SULFATE 3 ML: .5; 3 SOLUTION RESPIRATORY (INHALATION) at 06:37

## 2017-02-07 RX ADMIN — NITROGLYCERIN 1 INCH: 20 OINTMENT TOPICAL at 17:47

## 2017-02-07 RX ADMIN — INSULIN ASPART 5 UNITS: 100 INJECTION, SOLUTION INTRAVENOUS; SUBCUTANEOUS at 12:48

## 2017-02-07 RX ADMIN — PANTOPRAZOLE SODIUM 40 MG: 40 TABLET, DELAYED RELEASE ORAL at 18:34

## 2017-02-07 RX ADMIN — INSULIN ASPART 4 UNITS: 100 INJECTION, SOLUTION INTRAVENOUS; SUBCUTANEOUS at 20:13

## 2017-02-07 RX ADMIN — METOCLOPRAMIDE 10 MG: 10 TABLET ORAL at 06:51

## 2017-02-07 RX ADMIN — DICYCLOMINE HYDROCHLORIDE 10 MG: 10 CAPSULE ORAL at 06:51

## 2017-02-07 RX ADMIN — OXYCODONE HYDROCHLORIDE 15 MG: 15 TABLET ORAL at 01:52

## 2017-02-07 RX ADMIN — ASPIRIN 81 MG: 81 TABLET ORAL at 12:47

## 2017-02-07 RX ADMIN — IPRATROPIUM BROMIDE AND ALBUTEROL SULFATE 3 ML: .5; 3 SOLUTION RESPIRATORY (INHALATION) at 00:58

## 2017-02-07 NOTE — CONSULTS
INFECTIOUS DISEASE CONSULTATION REPORT      Referring Provider: Dr. Quarles  Reason for Consultation: Evaluation positive blood culture and hand abscess      Principal problem: Cellulitis of right hand    Subjective .     History of present illness:    As you well know Dr. Quarles, Mr. Isaias Lang is a 51 y.o. years old male with past medical history significant for diabetes, COPD, hypertension, hepatitis C, multiple abscesses with a history of MRSA , who presented to [facility] Emergency Department on 2/4/2017 for recent swelling of his right hand with erythema and significant pain.  He states he has had multiple abscesses in the past and underwent I&D's.  He states he has had fever and chills as well.  The patient attributes the swelling and pain to using dirty insulin needles.  He has a history of IV drug use and has a positive urine drug screen.  Admitted in September 2016 with left upper extremity cellulitis with an abscess showing growth of MRSA.  One blood culture out of 2 from 2/4/2017 showing growth of gram-positive cocci consistent with staph species. Nurse, Mehnaz, at bedside during exam.    Infectious Disease consultation was requested for antimicrobial management.     History taken from: patient chart RN    Case was discussed with patient and nursing staff    Review of Systems    Constitutional: Positive for fever and chills but no night sweats. No appetite change or unexpected weight change. No fatigue.  Eyes: no eye drainage, itching or redness.  HEENT: no mouth sores, dysphagia or nose bleed.  Respiratory: no for shortness of breath, cough or production of sputum.  Cardiovascular: no chest pain, no palpitations, no orthopnea.  Gastrointestinal: no nausea, vomiting or diarrhea. No abdominal pain, hematemesis or rectal bleeding.  Genitourinary: no dysuria or polyuria.  Hematologic/lymphatic: no lymph node abnormalities, no easy bruising or easy bleeding.  Musculoskeletal: no muscle or  joint pain.  Skin: See history of present illness  Neurological: no loss of consciousness, no seizure, no headache.  Behavioral/Psych: no depression or suicidal ideation.  Endocrine: no hot flashes.  Immunologic: negative.    Past Medical History    Past Medical History   Diagnosis Date   • Abscess of antecubital fossa 05/2014     Left that required I and D and grew Haemophilus influenza group 1   • Anxiety    • Asthma    • Chronic pancreatitis    • COPD (chronic obstructive pulmonary disease)    • DDD (degenerative disc disease), lumbosacral    • Depression    • Diabetes mellitus    • DVT (deep venous thrombosis)      Right arm   • Essential hypertension    • GERD (gastroesophageal reflux disease)    • Hepatitis-C    • Hypercholesteremia    • Injury of back    • Injury of right Achilles tendon      Complicated by MRSA and Pseudomonas   • Mild CAD      Left heart cath at  2012   • MRSA (methicillin resistant staph aureus) culture positive 09/14/2016     LUE   • Obesity    • Opiate addiction      Suboxone IV   • Self-injurious behavior    • Sleep apnea    • Suicide attempt    • Systolic CHF, chronic      EF 45-50% in 2013, EF 60% 9/2016       Past Surgical History    Past Surgical History   Procedure Laterality Date   • Cholecystectomy  1990s   • Orif ankle fracture Right 2014   • Incision and drainage abscess Left 2016     wrist   • Incision and drainage arm Left 9/15/2016     Procedure: INCISION AND DRAINAGE UPPER EXTREMITY;  Surgeon: Rico Oseguera MD;  Location: [facility] OR;  Service:        Family History    Family History   Problem Relation Age of Onset   • Gout Other    • Osteoporosis Other    • Arthritis Other      Rheumatoid and osteoarthritis   • Hypertension Other    • Heart disease Other    • Cancer Other    • Coronary artery disease Mother    • Lung disease Mother    • Gout Sister    • Cancer Maternal Grandmother    • Hypertension Maternal Grandfather    • Gout Maternal Grandfather        Social  "History    Social History   Substance Use Topics   • Smoking status: Former Smoker     Years: 1.00     Types: Cigarettes   • Smokeless tobacco: Never Used      Comment: quit smoking in my 20's   • Alcohol use No      Comment: denies       Allergies    Robitussin dm [dextromethorphan-guaifenesin]; Tizanidine; Metformin; Sulfa antibiotics; Contrast dye; and Tramadol    Objective     Visit Vitals   • /77 (BP Location: Right leg, Patient Position: Lying)   • Pulse 84   • Temp 97.9 °F (36.6 °C) (Oral)   • Resp 18   • Ht 68\" (172.7 cm)   • Wt 178 lb 9.6 oz (81 kg)   • SpO2 93%   • BMI 27.16 kg/m2       Temp:  [97 °F (36.1 °C)-98.3 °F (36.8 °C)] 97.9 °F (36.6 °C)        Intake/Output Summary (Last 24 hours) at 02/07/17 1034  Last data filed at 02/07/17 0800   Gross per 24 hour   Intake    720 ml   Output      0 ml   Net    720 ml         Physical Exam:     General Appearance:    Alert, cooperative, in no acute distress   Head:    Normocephalic, without obvious abnormality, atraumatic   Eyes:            Lids and lashes normal, conjunctivae and sclerae normal, no   icterus, no pallor, corneas clear, PERRLA   Ears:    Ears appear intact with no abnormalities noted   Throat:   No oral lesions, no thrush, oral mucosa moist   Neck:   No adenopathy, supple, trachea midline, no thyromegaly, no   carotid bruit, no JVD   Back:     No tenderness to percussion or palpation, range of motion   normal   Lungs:     Clear to auscultation,respirations regular, even and unlabored. No wheezing, no ronchi and no crackles.    Heart:    Regular rhythm and normal rate, normal S1 and S2, no            murmur, no gallop, no rub, no click   Chest Wall:    No abnormalities observed   Abdomen:     Normal bowel sounds, no masses, no organomegaly, soft        non-tender, non-distended, no guarding, no rebound                tenderness   Rectal:     Deferred   Extremities:   Moves all extremities well, no edema, no cyanosis, no             redness "   Pulses:   Pulses palpable and equal bilaterally   Skin:   right hand with edema extending from fingers to forearm with pain and only minimal erythema.  His fingernails are very short with poor hygiene.     Lymph nodes:   No palpable adenopathy   Neurologic:   Awake, alert and oriented x 3. Following commands.       Results:      Results from last 7 days  Lab Units 02/07/17  0113 02/06/17  0210 02/05/17  0447 02/04/17  1354   WBC 10*3/mm3 2.41* 2.92* 3.40* 3.99*     Lab Results   Component Value Date    NEUTROABS 1.36 (L) 02/07/2017         Results from last 7 days  Lab Units 02/07/17  0113   CREATININE mg/dL 0.78         Results from last 7 days  Lab Units 02/07/17  0113 02/06/17  0210 02/05/17  0447   CRP mg/dL 2.20* 3.07* 3.99*       Imaging Results (last 24 hours)     Procedure Component Value Units Date/Time    XR Chest AP [[id number]] Collected:  02/07/17 1002     Updated:  02/07/17 1018    Narrative:       XR CHEST AP-     REASON FOR EXAM:  cp; L03.113-Cellulitis of right upper limb     The chest is compared with earlier chest done 10/17/2016     FINDINGS: The cardiac silhouette and mediastinum are stable in size and  shape. The lungs are both well aerated and clear. There is some bowel  interposed between the liver and the right diaphragm. The cardiac  silhouette is normal in size and shape.       Impression:       Stable chest. A source of patient's chest pain is not  demonstrated.     This report was finalized on 2/7/2017 10:15 AM by Dr. Keaton Oh II, MD.               Cultures:    BLOOD CULTURE   Date Value Ref Range Status   02/04/2017 No growth at 2 days  Preliminary   02/04/2017 Abnormal Stain (A)  Final   02/04/2017 Staphylococcus, coagulase negative (A)  Final     Comment:     Probable Contaminant.        Results Review:    I have personally reviewed laboratory data, culture results, radiology studies and antimicrobial therapy.    Hospital Medications (active)       Dose Frequency Start End     aspirin EC tablet 81 mg 81 mg Daily 2/5/2017     Sig - Route: Take 1 tablet by mouth Daily. - Oral    Cosign for Ordering: Accepted by Elva Hdz MD on 2/5/2017  3:26 PM    cefepime (MAXIPIME) 2 g/100 mL 0.9% NS (mbp) 2 g Every 8 Hours 2/4/2017     Sig - Route: Infuse 100 mL into a venous catheter Every 8 (Eight) Hours. - Intravenous    Cosign for Ordering: Accepted by Rowan Juan DO on 2/4/2017  8:23 PM    dextrose (D50W) solution 25 g 25 g Every 15 Minutes PRN 2/4/2017     Sig - Route: Infuse 50 mL into a venous catheter Every 15 (Fifteen) Minutes As Needed for low blood sugar (Blood Sugar Less Than 70, Patient Has IV Access - Unresponsive, NPO or Unable To Safely Swallow). - Intravenous    Cosign for Ordering: Accepted by Rowan Juan DO on 2/4/2017  8:23 PM    dextrose (GLUTOSE) oral gel 15 g 15 g Every 15 Minutes PRN 2/4/2017     Sig - Route: Take 15 g by mouth Every 15 (Fifteen) Minutes As Needed for low blood sugar (Blood Sugar Less Than 70, Patient Alert, Is Not NPO & Can Safely Swallow). - Oral    Cosign for Ordering: Accepted by Rowan Juan DO on 2/4/2017  8:23 PM    dicyclomine (BENTYL) capsule 10 mg 10 mg 2 Times Daily Before Meals 2/5/2017     Sig - Route: Take 1 capsule by mouth 2 (Two) Times a Day Before Meals. - Oral    Cosign for Ordering: Accepted by Elva Hdz MD on 2/5/2017  3:26 PM    gabapentin (NEURONTIN) capsule 400 mg 400 mg Every 8 Hours Scheduled 2/5/2017     Sig - Route: Take 1 capsule by mouth Every 8 (Eight) Hours. - Oral    Cosign for Ordering: Accepted by Elva Hdz MD on 2/5/2017  3:26 PM    glucagon (GLUCAGEN) injection 1 mg 1 mg Every 15 Minutes PRN 2/4/2017     Sig - Route: Inject 1 mg under the skin Every 15 (Fifteen) Minutes As Needed (Blood Glucose Less Than 70 - Patient Without IV Access - Unresponsive, NPO or Unable To Safely Swallow). - Subcutaneous    Cosign for Ordering: Accepted by Rowan Juan DO on 2/4/2017  8:23 PM     insulin aspart (novoLOG) injection 0-7 Units 0-7 Units 4 Times Daily Before Meals & Nightly 2/4/2017     Sig - Route: Inject 0-7 Units under the skin 4 (Four) Times a Day Before Meals & at Bedtime. - Subcutaneous    Cosign for Ordering: Accepted by Rowan Juan DO on 2/4/2017  8:23 PM    insulin aspart (novoLOG) injection 5 Units 5 Units 3 Times Daily With Meals 2/6/2017     Sig - Route: Inject 5 Units under the skin 3 (Three) Times a Day With Meals. - Subcutaneous    insulin detemir (LEVEMIR) injection 20 Units 20 Units Nightly 2/6/2017     Sig - Route: Inject 20 Units under the skin Every Night. - Subcutaneous    ipratropium-albuterol (DUO-NEB) nebulizer solution 3 mL 3 mL Every 6 Hours PRN 2/4/2017     Sig - Route: Take 3 mL by nebulization Every 6 (Six) Hours As Needed for shortness of air. - Nebulization    Cosign for Ordering: Accepted by Rowan Juan DO on 2/4/2017  8:23 PM    metoclopramide (REGLAN) tablet 10 mg 10 mg 2 Times Daily Before Meals 2/5/2017     Sig - Route: Take 1 tablet by mouth 2 (Two) Times a Day Before Meals. - Oral    Cosign for Ordering: Accepted by Elva Hdz MD on 2/5/2017  3:26 PM    metoprolol succinate XL (TOPROL-XL) 24 hr tablet 25 mg 25 mg Daily 2/5/2017     Sig - Route: Take 1 tablet by mouth Daily. - Oral    Cosign for Ordering: Accepted by Elva Hdz MD on 2/5/2017  3:26 PM    metroNIDAZOLE (FLAGYL) IVPB 500 mg 500 mg Every 8 Hours 2/4/2017     Sig - Route: Infuse 100 mL into a venous catheter Every 8 (Eight) Hours. - Intravenous    Cosign for Ordering: Accepted by Rowan Juan DO on 2/4/2017  8:23 PM    oxyCODONE (ROXICODONE) immediate release tablet 15 mg 15 mg Every 4 Hours PRN 2/4/2017 2/14/2017    Sig - Route: Take 1 tablet by mouth Every 4 (Four) Hours As Needed for moderate pain (4-6). - Oral    pancrelipase (Lip-Prot-Amyl) (CREON) capsule 24,000 units of lipase 24,000 units of lipase 4 Times Daily With Meals & Nightly 2/5/2017      "Sig - Route: Take 2 capsules by mouth 4 (Four) Times a Day With Meals & at Bedtime. - Oral    Cosign for Ordering: Accepted by Elva Hdz MD on 2/5/2017  3:26 PM    pantoprazole (PROTONIX) EC tablet 40 mg 40 mg 2 Times Daily Before Meals 2/5/2017     Sig - Route: Take 1 tablet by mouth 2 (Two) Times a Day Before Meals. - Oral    Cosign for Ordering: Accepted by Elva Hdz MD on 2/5/2017  3:26 PM    polyethylene glycol (MIRALAX) powder 17 g 17 g Every 12 Hours Scheduled 2/5/2017     Sig - Route: Take 17 g by mouth Every 12 (Twelve) Hours. - Oral    Cosign for Ordering: Accepted by Elva Hdz MD on 2/5/2017  3:26 PM    sodium chloride 0.9 % flush 1-10 mL 1-10 mL As Needed 2/4/2017     Sig - Route: Infuse 1-10 mL into a venous catheter As Needed for line care. - Intravenous    sodium chloride 0.9 % flush 10 mL 10 mL As Needed 2/4/2017     Sig - Route: Infuse 10 mL into a venous catheter As Needed for line care. - Intravenous    Linked Group 1:  \"And\" Linked Group Details        sodium chloride 0.9 % flush 10 mL 10 mL Every 12 Hours Scheduled 2/6/2017     Sig - Route: 10 mL by Intracatheter route Every 12 (Twelve) Hours. - Intracatheter    sodium chloride 0.9 % flush 10 mL 10 mL As Needed 2/6/2017     Sig - Route: 10 mL by Intracatheter route As Needed for line care (After Medication Administration). - Intracatheter    vancomycin (VANCOCIN) 1,500 mg in sodium chloride 0.9 % 500 mL IVPB 1,500 mg Every 8 Hours 2/5/2017     Sig - Route: Infuse 1,500 mg into a venous catheter Every 8 (Eight) Hours. - Intravenous    insulin detemir (LEVEMIR) injection 15 Units (Discontinued) 15 Units Nightly 2/5/2017 2/6/2017    Sig - Route: Inject 15 Units under the skin Every Night. - Subcutaneous    Cosign for Ordering: Accepted by Elva Hdz MD on 2/5/2017  3:26 PM            PROBLEM LIST:    Patient Active Problem List   Diagnosis   • MDD (major depressive disorder), recurrent episode, moderate   • Type 1 " diabetes mellitus   • 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   • Cellulitis of right hand   • Cellulitis of right hand excluding fingers and thumb       Assessment/Plan     ASSESSMENT:    1.  Staph bacteremia versus contaminant in the setting of IV drug use    PLAN:    Due to his illicit drug use history the patient is at high risk for skin and skin structure infection as well as bacteremia.  Would recommend to continue antibiotic coverage with vancomycin and Flagyl and discontinue cefepime for now.    Antibiotics and duration to be decided when blood culture results are available.      Patients findings and recommendations were discussed with patient and nursing staff    Code Status: Full Code    Bethany Piper PA-C  02/07/17  10:34 AM

## 2017-02-07 NOTE — PROGRESS NOTES
I have seen the patient in conjunction with Lidia CULLEN       History of presenting illness:  Patient states that his hand he feels like his right hand pain intensity is about the same.  He feels like the edema is about same as well.  He states when he moves his wrist across to close his hand this gets worse.  No associated fever chills overnight.  This hand edema has been going on now approximately 3 days.  Currently he is having 0/10 chest discomfort, no shortness of breath.  He has tolerated a meal today.  He states his bowels are moving although somewhat liquid.  Patient states that people to live with him are drug and headaches and do use the patient's needles that he uses for insulin.  One of these apparently is hepatitis B positive.    Vital Signs  Temp:  [97 °F (36.1 °C)-98.5 °F (36.9 °C)] 98.5 °F (36.9 °C)  Heart Rate:  [74-88] 79  Resp:  [16-20] 16  BP: (110-136)/(62-77) 122/62  Body mass index is 27.16 kg/(m^2).      Intake/Output Summary (Last 24 hours) at 02/07/17 1701  Last data filed at 02/07/17 1300   Gross per 24 hour   Intake    820 ml   Output      0 ml   Net    820 ml     Intake & Output (last 3 days)       02/04 0701 - 02/05 0700 02/05 0701 - 02/06 0700 02/06 0701 - 02/07 0700 02/07 0701 - 02/08 0700    P.O. 1440 1920 960 460    IV Piggyback 100 350      Total Intake(mL/kg) 1540 (19) 2270 (28) 960 (11.9) 460 (5.7)    Urine (mL/kg/hr) 450  0 (0)     Stool 0  0 (0)     Total Output 450   0      Net +1090 +2270 +960 +460            Unmeasured Urine Occurrence 3 x 11 x 9 x     Unmeasured Stool Occurrence 0 x 0 x 0 x           Physical exam:  Physical Exam   Constitutional: Well-developed, well-nourished.  Pleasant.  No distress  Head: Normocephalic and atraumatic.  Hearing is intact, mucous membranes are moist.   Eyes:  Pupils are equal, round, and reactive to light. No scleral icterus.   Cardiovascular: Normal rate, regular rhythm and normal heart sounds.  No murmur rub or gallop  Pulmonary/Chest:  Bilateral breath sounds no rhonchus rales or wheezing heard  Abdominal: Soft, flat, bowel sounds are active, nondistended nontender.  No peritoneal signs   Musculoskeletal: Lower extremity strength is symmetric, left hand  is good, right hand the edema is about same as yesterday exam was about the same as yesterday as well.  He has good capillary refill and good pulses sensation is intact pain increases with any attempted joint mobility of his metacarpals or interphalangeal joints or wrist.  Neurological: Nonfocal, alert.  Sensation intact in the lower extremities.  Skin: Skin is warm and dry.     EKG: Sinus rhythm, reviewed      Results Review:  Lab Results   Component Value Date    WBC 2.41 (C) 02/07/2017    HGB 13.3 (L) 02/07/2017    HCT 40.0 (L) 02/07/2017    MCV 87.9 02/07/2017    PLT 63 (L) 02/07/2017     Lab Results   Component Value Date    GLUCOSE 233 (H) 02/07/2017    BUN 8 02/07/2017    CREATININE 0.78 02/07/2017    EGFRIFNONA 105 02/07/2017    EGFRIFAFRI  09/26/2016      Comment:      <15 Indicative of kidney failure.    BCR 10.3 02/07/2017    CO2 30.5 02/07/2017    CALCIUM 8.4 02/07/2017    ALBUMIN 2.70 (L) 02/07/2017    LABIL2 1.1 (L) 02/07/2017     (H) 02/07/2017     (H) 02/07/2017       Imaging Results (last 72 hours)     Procedure Component Value Units Date/Time    XR Chest AP [[id number]] Collected:  02/07/17 1002     Updated:  02/07/17 1018    Narrative:       XR CHEST AP-     REASON FOR EXAM:  cp; L03.113-Cellulitis of right upper limb     The chest is compared with earlier chest done 10/17/2016     FINDINGS: The cardiac silhouette and mediastinum are stable in size and  shape. The lungs are both well aerated and clear. There is some bowel  interposed between the liver and the right diaphragm. The cardiac  silhouette is normal in size and shape.       Impression:       Stable chest. A source of patient's chest pain is not  demonstrated.     This report was finalized on 2/7/2017  10:15 AM by Dr. Keaton Oh II, MD.            D/w Dr Nguyễn    No new culture results      Assessment/Plan     Principal Problem:    Cellulitis of right hand, continue vancomycin and Flagyl as recommended by infectious disease.  CRP is coming down.    Positive blood culture, probable contaminant.    Hepatitis B positive, this is new, patient had had previous hepatitis see.  I feel like this is the source of his thrombocytopenia and leukopenia.  Implications and transmissibility explained to the patient.  I recommend he follow-[facility] with [facility] clinic on outpatient basis.  I am going to check hepatitis D antibody.  It is possible patient may be developing portal hypertension.  Spleen was significantly enlarged.  This would also be a possible source of his cytopenias I will check a pro time in the a.m.    Diabetes, improving control, follow.     chest pain, some typical some atypical features, discussed with Dr. Nguyễn, plans stress test tomorrow.    DVT prophylaxis, SCDs, not an anticoagulant candidate secondary to thrombocytopenia        Memo Quarles MD  02/07/17  5:01 PM

## 2017-02-07 NOTE — PLAN OF CARE
Problem: Pain, Acute (Adult)  Intervention: Monitor/Manage Analgesia    02/06/17 0800 02/06/17 2000 02/07/17 0800   Manage Acute Burn Pain   Bowel Intervention --  --  adequate fluid intake promoted   Pain Management Interventions --  relaxation techniques promoted;pillow support;medicated --    Safety Interventions   Medication Review/Management medications reviewed --  --        Intervention: Mutually Develop/Implement Acute Pain Management Plan    02/06/17 2000 02/07/17 0800   Manage Acute Burn Pain   Pain Management Interventions relaxation techniques promoted;pillow support;medicated --    Cognitive Interventions   Sensory Stimulation Regulation --  quiet environment promoted       Intervention: Support/Optimize Psychosocial Response to Acute Pain    02/07/17 0800   Coping Strategies   Trust Relationship/Rapport questions answered;questions encouraged   Diversional Activities television   Family/Support System Care self-care encouraged   Supportive Measures verbalization of feelings encouraged;self-care encouraged         Goal: Acceptable Pain Control/Comfort Level  Outcome: Ongoing (interventions implemented as appropriate)    02/07/17 0123   Pain, Acute (Adult)   Acceptable Pain Control/Comfort Level making progress toward outcome         Problem: Cellulitis (Adult)  Intervention: Monitor/Manage Neurovascular Compromise    02/07/17 0800   Skin Interventions   Skin Protection adhesive use limited;transparent dressing maintained       Intervention: Monitor/Manage Pain    02/06/17 0800 02/06/17 2000 02/07/17 0800   Manage Acute Burn Pain   Bowel Intervention --  --  adequate fluid intake promoted   Pain Management Interventions --  relaxation techniques promoted;pillow support;medicated --    Safety Interventions   Medication Review/Management medications reviewed --  --        Intervention: Prevent/Manage Infection Progression    02/05/17 0828 02/07/17 0000   Safety Interventions   Infection Prevention --   rest/sleep promoted   Isolation Precautions --  contact precautions maintained   Safety Interventions   Infection Management aseptic technique maintained --        Intervention: Prevent/Manage DVT/VTE Risk    02/06/17 0755   Support Surgical/Anesthesia Recovery   Venous Thromboembolism Prevent/Manage bilateral;sequential compression devices off         Goal: Signs and Symptoms of Listed Potential Problems Will be Absent or Manageable (Cellulitis)  Outcome: Ongoing (interventions implemented as appropriate)    02/07/17 0123   Cellulitis   Problems Assessed (Cellulitis) all   Problems Present (Cellulitis) pain         Problem: Patient Care Overview (Adult)  Goal: Plan of Care Review  Outcome: Ongoing (interventions implemented as appropriate)    02/07/17 0123 02/07/17 0800   Coping/Psychosocial Response Interventions   Plan Of Care Reviewed With --  patient   Patient Care Overview   Progress progress toward functional goals as expected --        Goal: Adult Individualization and Mutuality  Outcome: Ongoing (interventions implemented as appropriate)  Goal: Discharge Needs Assessment  Outcome: Ongoing (interventions implemented as appropriate)    02/05/17 1406   Discharge Needs Assessment   Equipment Needed After Discharge none   Living Environment   Transportation Available other (see comments)   Self-Care   Equipment Currently Used at Home (Pt does not have DME at this time. )

## 2017-02-07 NOTE — CONSULTS
"Date of Admit: 2/4/2017  Date of Consult: 02/07/17  No ref. provider found      Principal Problem:    Cellulitis of right hand  Active Problems:    Cellulitis of right hand excluding fingers and thumb        Assessment:    1. Recurrent chest pains with some typical and some atypical features for CCS class 3-4 angina.  2. Nonobstructive coronary artery disease on coronary angiography in January 2011  3. A multiple risk factors for coronary artery disease including a strongly positive family history and insulin-dependent diabetes mellitus  4. Cellulitis of the left arm being treated by the primary care team  5. History of anxiety/depression  6. Dyslipidemia  7. Essential hypertension  8. History of pancreatitis  9. History of hepatitis C with abnormal LFTs.  10. Leukopenia and thrombocytopenia    Recommendations:    1. Continue with aspirin 81 mg daily and metoprolol XL  2. And Nitropaste as tolerated  3. Evaluate further with Lexiscan sestamibi study    Reason for consultation:    Subjective     History of Present Illness: Mr. Lang is a 51-year-old  male with history of nonobstructive coronary artery disease with vascular catheterization in January 2011 revealing about 50% diffuse disease in the distal LAD and about the 50% stenosis in the small second obtuse marginal branch.  He was admitted with complaints of mainly swelling of his right upper extremity for the last few days and was diagnosed to have possible cellulitis in this arm.  He has also been complaining of chest pains for the last week or so and hence a cardiology consultation has been asked for.  On further questioning Mr. Lang describes his chest pains as being felt as sometimes a dull pain and sometimes felt as \"ton of bricks on my chest\" with some shortness of breath.  He had an episode of bleed on the day of admission and then since then has been having some chest soreness.  He also indicates that he gets short of breath walking short " distances.  His cardiac markers so far have been negative for troponin ×3.  Total CPK was normal but the MB fraction is a proportionately  mildly elevated.  His EKG so far has revealed sinus rhythm with no significant ST-T wave changes of myocardial ischemia or infarction.    Cardiac risk factors:diabetes mellitus, hypercholesterolemia, hypertension, Positive family Hx. of premature athersclerotivc disease., Sedentary life style and Age and male gender.    Last Echo-Doppler study on 9/14/2016 revealed:  · Left ventricular function is normal. Estimated EF = 60%.  · All left ventricular wall segments contract normally.  · Mild tricuspid valve regurgitation is present.  · The aortic valve is structurally normal. No aortic valve regurgitation is present. No aortic valve stenosis is present. Nnt  · The mitral valve is normal in structure. Trace mitral valve regurgitation is present. No significant mitral valve stenosis is present.  · The pulmonic valve is structurally normal. There is no significant pulmonic valve stenosis present. There is no pulmonic valve regurgitation present.  · No vegetation seen on any valve.  · There is no evidence of pericardial effusion.      Last Lexiscan Stress test in January 2016 revealed:         Last cardiac Cath in January 2011 revealed:      Past Medical History   Diagnosis Date   • Abscess of antecubital fossa 05/2014     Left that required I and D and grew Haemophilus influenza group 1   • Anxiety    • Asthma    • Chronic pancreatitis    • COPD (chronic obstructive pulmonary disease)    • DDD (degenerative disc disease), lumbosacral    • Depression    • Diabetes mellitus    • DVT (deep venous thrombosis)      Right arm   • Essential hypertension    • GERD (gastroesophageal reflux disease)    • Hepatitis-C    • Hypercholesteremia    • Injury of back    • Injury of right Achilles tendon      Complicated by MRSA and Pseudomonas   • Mild CAD      Left heart cath at  2012   • MRSA  (methicillin resistant staph aureus) culture positive 09/14/2016     LUE   • Obesity    • Opiate addiction      Suboxone IV   • Self-injurious behavior    • Sleep apnea    • Suicide attempt    • Systolic CHF, chronic      EF 45-50% in 2013, EF 60% 9/2016     Past Surgical History   Procedure Laterality Date   • Cholecystectomy  1990s   • Orif ankle fracture Right 2014   • Incision and drainage abscess Left 2016     wrist   • Incision and drainage arm Left 9/15/2016     Procedure: INCISION AND DRAINAGE UPPER EXTREMITY;  Surgeon: Rico Oseguera MD;  Location: [facility] OR;  Service:      Family History   Problem Relation Age of Onset   • Gout Other    • Osteoporosis Other    • Arthritis Other      Rheumatoid and osteoarthritis   • Hypertension Other    • Heart disease Other    • Cancer Other    • Coronary artery disease Mother    • Lung disease Mother    • Gout Sister    • Cancer Maternal Grandmother    • Hypertension Maternal Grandfather    • Gout Maternal Grandfather      Social History   Substance Use Topics   • Smoking status: Former Smoker     Years: 1.00     Types: Cigarettes   • Smokeless tobacco: Never Used      Comment: quit smoking in my 20's   • Alcohol use No      Comment: denies     Prescriptions Prior to Admission   Medication Sig Dispense Refill Last Dose   • albuterol (PROVENTIL HFA;VENTOLIN HFA) 108 (90 BASE) MCG/ACT inhaler Inhale 2 puffs Every 6 (Six) Hours As Needed for wheezing. (Patient taking differently: Inhale 2 puffs Every 6 (Six) Hours As Needed for wheezing or shortness of air.) 1 inhaler 1 2/4/2017 at AM   • aspirin 81 MG EC tablet Take 1 tablet by mouth Daily. 100 tablet 0 2/3/2017 at Unknown time   • dicyclomine (BENTYL) 10 MG capsule Take 10 mg by mouth 2 (Two) Times a Day Before Meals.   2/3/2017 at Unknown time   • furosemide (LASIX) 20 MG tablet Take 1 tablet by mouth Daily. 30 tablet 0 2/3/2017 at Unknown time   • gabapentin (NEURONTIN) 800 MG tablet Take 1 tablet by mouth 3 (Three)  Times a Day. (Patient taking differently: Take 800 mg by mouth 4 (Four) Times a Day.) 90 tablet 0 2/3/2017 at Unknown time   • HYDROcodone-acetaminophen (NORCO) 7.5-325 MG per tablet Take 1 tablet by mouth Every 8 (Eight) Hours As Needed for moderate pain (4-6). Patient states he fills rx at pain clinic in [place], not showing on [facility]   2/3/2017 at Unknown time   • insulin aspart (novoLOG) 100 UNIT/ML injection Inject 20 Units under the skin 3 (Three) Times a Day With Meals. (Patient taking differently: Inject 10-20 Units under the skin 3 (Three) Times a Day With Meals.) 10 mL 4 2/3/2017 at Unknown time   • insulin detemir (LEVEMIR) 100 UNIT/ML injection 20 unit AM , 35 unit PM (Patient taking differently: Inject 20 Units under the skin Daily With Breakfast. 20 unit AM , 35 unit PM) 10 mL 4 2/3/2017 at Unknown time   • insulin detemir (LEVEMIR) 100 UNIT/ML injection Inject 35 Units under the skin Every Night.   2/3/2017 at Unknown time   • ipratropium-albuterol (COMBIVENT)  MCG/ACT inhaler Inhale 2 puffs Every 4 (Four) Hours As Needed for wheezing. (Patient taking differently: Inhale 2 puffs Every 4 (Four) Hours As Needed for wheezing or shortness of air.) 4 g 1 2/3/2017 at Unknown time   • magnesium gluconate (MAGONATE) 500 MG tablet Take 500 mg by mouth 2 (Two) Times a Day.   2/3/2017 at Unknown time   • metoclopramide (REGLAN) 10 MG tablet Take 1 tablet by mouth 2 (Two) Times a Day. 60 tablet 0 2/3/2017 at Unknown time   • metoprolol succinate XL (TOPROL-XL) 25 MG 24 hr tablet Take 25 mg by mouth Daily.   2/3/2017 at Unknown time   • naproxen (NAPROSYN) 500 MG tablet Take 1 tablet by mouth 2 (Two) Times a Day As Needed for mild pain (1-3). 60 tablet 0 2/3/2017 at Unknown time   • pancrelipase, Lip-Prot-Amyl, (CREON) 55025 UNITS capsule delayed-release particles capsule Take 2 capsules by mouth 4 (Four) Times a Day With Meals & at Bedtime. 240 capsule 0 2/3/2017 at Unknown time   • pantoprazole (PROTONIX)  40 MG EC tablet Take 1 tablet by mouth 2 (Two) Times a Day. 60 tablet 0 2/3/2017 at Unknown time   • polyethylene glycol (MIRALAX) packet Take 17 g by mouth Daily. (Patient taking differently: Take 17 g by mouth 2 (Two) Times a Day.) 100 packet 0 2/3/2017 at Unknown time   • potassium chloride (K-DUR,KLOR-CON) 20 MEQ CR tablet Take 1 tablet by mouth Daily. 30 tablet 0 2/3/2017 at Unknown time     Allergies:  Robitussin dm [dextromethorphan-guaifenesin]; Tizanidine; Metformin; Sulfa antibiotics; Contrast dye; and Tramadol    Review of Systems   In general patient has been feeling weak and tired lately.  His been having low-grade fever.  HEENT: Has intermittent mild headaches.  Denies any ear nose or throat problem.  Eyes: No eye redness, photophobia or visual disturbance.  Respiratory: Has mild dry cough.  No hemoptysis.  Shortness of breath with mild-to-moderate exertion.  Cardiovascular: Has intermittent chest pains as mentioned above.  Has palpitations but no dizziness.  Had an episode of syncope apparently about a week ago while he was carrying wood at his house.  Gastrointestinal: No abdominal pains, nausea, vomiting, diarrhea, hematemesis or melena.  Genitourinary: No dysuria or immature, flank pain.  Musculoskeletal: Has swelling of the right upper extremity.  Has generalized weakness especially in his lower extremities he says.  Neurological: Has numbness in both lower extremities and his hands.  Complains of being weak in both lower extremities for a long time.    Hematological: No adenopathy or bleeding problems.  Psychiatric: Has history of intermittent anxiety and depression.  No recent suicidal ideation.    Objective      Vital Signs  Temp:  [97 °F (36.1 °C)-98.3 °F (36.8 °C)] 97.8 °F (36.6 °C)  Heart Rate:  [74-88] 84  Resp:  [18-20] 18  BP: (110-136)/(62-77) 117/67  Vital Signs (last 72 hrs)       02/04 0700  -  02/05 0659 02/05 0700  -  02/06 0659 02/06 0700  -  02/07 0659 02/07 0700  -  02/07 1350    Most Recent    Temp (°F) 98.1 -  98.4    97.4 -  98.9    97 -  98.3    97.8 -  97.9     97.8 (36.6)    Heart Rate 68 -  97    80 -  92    74 -  101      84     84    Resp   18    18 -  20    18 -  20      18     18    /71 -  133/74    116/70 -  120/84    110/62 -  136/74    117/67 -  133/77     117/67    SpO2 (%) 94 -  100    96 -  98    96 -  97    93 -  94     94        Body mass index is 27.16 kg/(m^2).    Intake/Output Summary (Last 24 hours) at 02/07/17 1350  Last data filed at 02/07/17 0800   Gross per 24 hour   Intake    720 ml   Output      0 ml   Net    720 ml     Physical Exam   Constitutional: He appears well-developed and well-nourished.   HENT:   Head: Normocephalic and atraumatic.   Eyes: EOM are normal. Right eye exhibits no discharge. Left eye exhibits no discharge.   Neck: No JVD present. No tracheal deviation present. No thyromegaly present.   Cardiovascular: S1 normal and S2 normal.  PMI is not displaced.  Exam reveals no gallop and no friction rub.    Murmur heard.   Systolic murmur is present with a grade of 2/6   Pulmonary/Chest: No stridor. No respiratory distress. He has wheezes (bilateral mild expiratory wheezing). He has no rales. He exhibits no tenderness.   Abdominal: Soft. He exhibits no distension and no mass. There is no tenderness. There is no guarding.   Musculoskeletal: He exhibits edema (of the right upper extremity).   Neurological: He is alert.   Skin: Skin is warm and dry. No erythema.   Psychiatric: Judgment normal.         Results Review:    I reviewed the patient's new clinical results.    Results from last 7 days  Lab Units 02/07/17  0459 02/07/17  0113 02/06/17  1817   CK TOTAL U/L 60 69 77   TROPONIN I ng/mL <0.006 <0.006 <0.006   CKMB ng/mL 5.14* 4.78 5.41*   MYOGLOBIN ng/mL 28.0 41.0 49.0       Results from last 7 days  Lab Units 02/07/17  0113 02/06/17  0210 02/05/17  0447 02/04/17  1354   WBC 10*3/mm3 2.41* 2.92* 3.40* 3.99*   HEMOGLOBIN g/dL 13.3* 13.0* 12.9*  13.5*   PLATELETS 10*3/mm3 63* 68* 66* 63*       Results from last 7 days  Lab Units 02/07/17  0113 02/06/17  0210 02/05/17  0447 02/04/17  1319   SODIUM mmol/L 140 137 138 138   POTASSIUM mmol/L 4.3 4.1 3.7 4.9   CHLORIDE mmol/L 109 107 107 106   TOTAL CO2 mmol/L 30.5 28.7 28.7 30.7   BUN mg/dL 8 8 8 9   CREATININE mg/dL 0.78 0.49 0.49 0.52   CALCIUM mg/dL 8.4 8.1 8.3 8.8   GLUCOSE mg/dL 233* 246* 247* 177*   ALT (SGPT) U/L 167* 169* 178* 219*   AST (SGOT) U/L 201* 206* 221* 284*     Lab Results   Component Value Date    INR 1.06 09/28/2016    INR 1.47 (H) 01/22/2016    INR 1.30 (H) 01/21/2016    INR 1.62 09/26/2014    INR 1.08 05/14/2014    INR 1.03 03/21/2014     Lab Results   Component Value Date    MG 1.6 (L) 02/06/2017    MG 1.7 02/04/2017    MG 1.6 (L) 10/19/2016     Lab Results   Component Value Date    TSH 0.284 (L) 02/04/2017    CHLPL 193 09/26/2014    TRIG 109 09/18/2016    HDL 27 (L) 09/18/2016     (H) 09/26/2014      Lab Results   Component Value Date    BNP 26 03/21/2014       ECG          Imaging Results (last 72 hours)     Procedure Component Value Units Date/Time    XR Chest AP [[id number]] Collected:  02/07/17 1002     Updated:  02/07/17 1018    Narrative:       XR CHEST AP-     REASON FOR EXAM:  cp; L03.113-Cellulitis of right upper limb     The chest is compared with earlier chest done 10/17/2016     FINDINGS: The cardiac silhouette and mediastinum are stable in size and  shape. The lungs are both well aerated and clear. There is some bowel  interposed between the liver and the right diaphragm. The cardiac  silhouette is normal in size and shape.       Impression:       Stable chest. A source of patient's chest pain is not  demonstrated.     This report was finalized on 2/7/2017 10:15 AM by Dr. Keaton Oh II, MD.              Echo   Lab Results   Component Value Date    ECHOEFEST 60 09/14/2016       Nuclear Stress Test  No components found for: NUCEF    Thank you very much for asking us  to be involved in this patient's care.  We will follow along with you.      Aman Nguyễn MD,[facility]  02/07/17  1:50 PM    Dragon disclaimer:  Much of this encounter note is an electronic transcription/translation of spoken language to printed text. The electronic translation of spoken language may permit erroneous, or at times, nonsensical words or phrases to be inadvertently transcribed; Although I have reviewed the note for such errors, some may still exist.

## 2017-02-07 NOTE — PROGRESS NOTES
Patient continues on day 4 vancomycin and day 4 Flagyl. Vancomycin trough level was reported as 14.9 mg/L today. Based on this level, will continue vancomycin dose of 1.5 gm q8 hrs. Will continue to follow and recheck a trough level when appropriate.    Thank you,    Shiloh Foster [facility]

## 2017-02-08 ENCOUNTER — APPOINTMENT (OUTPATIENT)
Dept: GENERAL RADIOLOGY | Facility: HOSPITAL | Age: 52
End: 2017-02-08

## 2017-02-08 ENCOUNTER — APPOINTMENT (OUTPATIENT)
Dept: NUCLEAR MEDICINE | Facility: HOSPITAL | Age: 52
End: 2017-02-08
Attending: INTERNAL MEDICINE

## 2017-02-08 LAB
ALBUMIN SERPL-MCNC: 2.8 G/DL (ref 3.5–5)
ALBUMIN/GLOB SERPL: 1.1 G/DL (ref 1.5–2.5)
ALP SERPL-CCNC: 88 U/L (ref 46–116)
ALT SERPL W P-5'-P-CCNC: 158 U/L (ref 10–44)
ANION GAP SERPL CALCULATED.3IONS-SCNC: 1.8 MMOL/L (ref 3.6–11.2)
AST SERPL-CCNC: 220 U/L (ref 10–34)
BASOPHILS # BLD AUTO: 0.01 10*3/MM3 (ref 0–0.3)
BASOPHILS NFR BLD AUTO: 0.3 % (ref 0–2)
BILIRUB SERPL-MCNC: 1.3 MG/DL (ref 0.2–1.8)
BUN BLD-MCNC: 9 MG/DL (ref 7–21)
BUN/CREAT SERPL: 23.7 (ref 7–25)
CALCIUM SPEC-SCNC: 8.8 MG/DL (ref 7.7–10)
CHLORIDE SERPL-SCNC: 108 MMOL/L (ref 99–112)
CO2 SERPL-SCNC: 30.2 MMOL/L (ref 24.3–31.9)
CREAT BLD-MCNC: 0.38 MG/DL (ref 0.43–1.29)
CRP SERPL-MCNC: 1.84 MG/DL (ref 0–0.99)
DEPRECATED RDW RBC AUTO: 52.4 FL (ref 37–54)
EOSINOPHIL # BLD AUTO: 0.14 10*3/MM3 (ref 0–0.7)
EOSINOPHIL NFR BLD AUTO: 4.3 % (ref 0–5)
ERYTHROCYTE [DISTWIDTH] IN BLOOD BY AUTOMATED COUNT: 16.5 % (ref 11.5–14.5)
GFR SERPL CREATININE-BSD FRML MDRD: >150 ML/MIN/1.73
GLOBULIN UR ELPH-MCNC: 2.5 GM/DL
GLUCOSE BLD-MCNC: 91 MG/DL (ref 70–110)
GLUCOSE BLDC GLUCOMTR-MCNC: 177 MG/DL (ref 70–130)
GLUCOSE BLDC GLUCOMTR-MCNC: 206 MG/DL (ref 70–130)
GLUCOSE BLDC GLUCOMTR-MCNC: 84 MG/DL (ref 70–130)
GLUCOSE BLDC GLUCOMTR-MCNC: 92 MG/DL (ref 70–130)
GLUCOSE BLDC GLUCOMTR-MCNC: 96 MG/DL (ref 70–130)
HCT VFR BLD AUTO: 40.6 % (ref 42–52)
HGB BLD-MCNC: 13.4 G/DL (ref 14–18)
IMM GRANULOCYTES # BLD: 0.01 10*3/MM3 (ref 0–0.03)
IMM GRANULOCYTES NFR BLD: 0.3 % (ref 0–0.5)
INR PPP: 1.4 (ref 0.8–1.1)
LYMPHOCYTES # BLD AUTO: 0.8 10*3/MM3 (ref 1–3)
LYMPHOCYTES NFR BLD AUTO: 24.6 % (ref 21–51)
MCH RBC QN AUTO: 28.6 PG (ref 27–33)
MCHC RBC AUTO-ENTMCNC: 33 G/DL (ref 33–37)
MCV RBC AUTO: 86.8 FL (ref 80–94)
MONOCYTES # BLD AUTO: 0.35 10*3/MM3 (ref 0.1–0.9)
MONOCYTES NFR BLD AUTO: 10.8 % (ref 0–10)
NEUTROPHILS # BLD AUTO: 1.94 10*3/MM3 (ref 1.4–6.5)
NEUTROPHILS NFR BLD AUTO: 59.7 % (ref 30–70)
OSMOLALITY SERPL CALC.SUM OF ELEC: 277.7 MOSM/KG (ref 273–305)
PLATELET # BLD AUTO: 91 10*3/MM3 (ref 130–400)
PMV BLD AUTO: 11.2 FL (ref 6–10)
POTASSIUM BLD-SCNC: 3.9 MMOL/L (ref 3.5–5.3)
PROT SERPL-MCNC: 5.3 G/DL (ref 6–8)
PROTHROMBIN TIME: 15.9 SECONDS (ref 9.8–11.9)
RBC # BLD AUTO: 4.68 10*6/MM3 (ref 4.7–6.1)
SODIUM BLD-SCNC: 140 MMOL/L (ref 135–153)
TROPONIN I SERPL-MCNC: <0.006 NG/ML
WBC NRBC COR # BLD: 3.25 10*3/MM3 (ref 4.5–12.5)

## 2017-02-08 PROCEDURE — 99233 SBSQ HOSP IP/OBS HIGH 50: CPT | Performed by: INTERNAL MEDICINE

## 2017-02-08 PROCEDURE — 74022 RADEX COMPL AQT ABD SERIES: CPT

## 2017-02-08 PROCEDURE — 93010 ELECTROCARDIOGRAM REPORT: CPT | Performed by: INTERNAL MEDICINE

## 2017-02-08 PROCEDURE — 84484 ASSAY OF TROPONIN QUANT: CPT | Performed by: NURSE PRACTITIONER

## 2017-02-08 PROCEDURE — 85610 PROTHROMBIN TIME: CPT | Performed by: INTERNAL MEDICINE

## 2017-02-08 PROCEDURE — 25010000002 ONDANSETRON PER 1 MG: Performed by: NURSE PRACTITIONER

## 2017-02-08 PROCEDURE — 86140 C-REACTIVE PROTEIN: CPT | Performed by: INTERNAL MEDICINE

## 2017-02-08 PROCEDURE — 63710000001 INSULIN ASPART PER 5 UNITS: Performed by: INTERNAL MEDICINE

## 2017-02-08 PROCEDURE — 93005 ELECTROCARDIOGRAM TRACING: CPT | Performed by: NURSE PRACTITIONER

## 2017-02-08 PROCEDURE — 63710000001 INSULIN DETEMIR PER 5 UNITS: Performed by: INTERNAL MEDICINE

## 2017-02-08 PROCEDURE — 80053 COMPREHEN METABOLIC PANEL: CPT | Performed by: INTERNAL MEDICINE

## 2017-02-08 PROCEDURE — 74022 RADEX COMPL AQT ABD SERIES: CPT | Performed by: RADIOLOGY

## 2017-02-08 PROCEDURE — 82962 GLUCOSE BLOOD TEST: CPT

## 2017-02-08 PROCEDURE — 94640 AIRWAY INHALATION TREATMENT: CPT

## 2017-02-08 PROCEDURE — 94799 UNLISTED PULMONARY SVC/PX: CPT

## 2017-02-08 PROCEDURE — 25010000002 VITAMIN K1 PER 1 MG: Performed by: INTERNAL MEDICINE

## 2017-02-08 PROCEDURE — 25010000002 VANCOMYCIN PER 500 MG

## 2017-02-08 PROCEDURE — 63710000001 INSULIN ASPART PER 5 UNITS: Performed by: PHYSICIAN ASSISTANT

## 2017-02-08 PROCEDURE — 85025 COMPLETE CBC W/AUTO DIFF WBC: CPT | Performed by: INTERNAL MEDICINE

## 2017-02-08 RX ORDER — ONDANSETRON 2 MG/ML
4 INJECTION INTRAMUSCULAR; INTRAVENOUS EVERY 6 HOURS PRN
Status: DISCONTINUED | OUTPATIENT
Start: 2017-02-08 | End: 2017-02-11 | Stop reason: HOSPADM

## 2017-02-08 RX ORDER — PHYTONADIONE 10 MG/ML
5 INJECTION, EMULSION INTRAMUSCULAR; INTRAVENOUS; SUBCUTANEOUS ONCE
Status: COMPLETED | OUTPATIENT
Start: 2017-02-08 | End: 2017-02-08

## 2017-02-08 RX ADMIN — INSULIN ASPART 5 UNITS: 100 INJECTION, SOLUTION INTRAVENOUS; SUBCUTANEOUS at 12:21

## 2017-02-08 RX ADMIN — PANCRELIPASE 24000 UNITS OF LIPASE: 60000; 12000; 38000 CAPSULE, DELAYED RELEASE PELLETS ORAL at 20:04

## 2017-02-08 RX ADMIN — INSULIN ASPART 3 UNITS: 100 INJECTION, SOLUTION INTRAVENOUS; SUBCUTANEOUS at 17:07

## 2017-02-08 RX ADMIN — METOPROLOL SUCCINATE 25 MG: 25 TABLET, FILM COATED, EXTENDED RELEASE ORAL at 09:39

## 2017-02-08 RX ADMIN — OXYCODONE HYDROCHLORIDE 15 MG: 15 TABLET ORAL at 20:14

## 2017-02-08 RX ADMIN — ONDANSETRON 4 MG: 2 INJECTION, SOLUTION INTRAMUSCULAR; INTRAVENOUS at 17:07

## 2017-02-08 RX ADMIN — Medication 10 ML: at 20:04

## 2017-02-08 RX ADMIN — NITROGLYCERIN 1 INCH: 20 OINTMENT TOPICAL at 12:16

## 2017-02-08 RX ADMIN — Medication 10 ML: at 09:40

## 2017-02-08 RX ADMIN — METOCLOPRAMIDE 10 MG: 10 TABLET ORAL at 09:40

## 2017-02-08 RX ADMIN — OXYCODONE HYDROCHLORIDE 15 MG: 15 TABLET ORAL at 02:21

## 2017-02-08 RX ADMIN — PANCRELIPASE 24000 UNITS OF LIPASE: 60000; 12000; 38000 CAPSULE, DELAYED RELEASE PELLETS ORAL at 12:16

## 2017-02-08 RX ADMIN — NITROGLYCERIN 1 INCH: 20 OINTMENT TOPICAL at 00:26

## 2017-02-08 RX ADMIN — METRONIDAZOLE 500 MG: 500 INJECTION, SOLUTION INTRAVENOUS at 06:16

## 2017-02-08 RX ADMIN — VANCOMYCIN HYDROCHLORIDE 1500 MG: 5 INJECTION, POWDER, LYOPHILIZED, FOR SOLUTION INTRAVENOUS at 13:40

## 2017-02-08 RX ADMIN — GABAPENTIN 400 MG: 400 CAPSULE ORAL at 13:40

## 2017-02-08 RX ADMIN — GABAPENTIN 400 MG: 400 CAPSULE ORAL at 21:10

## 2017-02-08 RX ADMIN — OXYCODONE HYDROCHLORIDE 15 MG: 15 TABLET ORAL at 17:06

## 2017-02-08 RX ADMIN — POLYETHYLENE GLYCOL (3350) 17 G: 17 POWDER, FOR SOLUTION ORAL at 20:04

## 2017-02-08 RX ADMIN — IPRATROPIUM BROMIDE AND ALBUTEROL SULFATE 3 ML: .5; 3 SOLUTION RESPIRATORY (INHALATION) at 19:09

## 2017-02-08 RX ADMIN — ASPIRIN 81 MG: 81 TABLET ORAL at 09:38

## 2017-02-08 RX ADMIN — METRONIDAZOLE 500 MG: 500 INJECTION, SOLUTION INTRAVENOUS at 21:10

## 2017-02-08 RX ADMIN — VANCOMYCIN HYDROCHLORIDE 1500 MG: 5 INJECTION, POWDER, LYOPHILIZED, FOR SOLUTION INTRAVENOUS at 07:26

## 2017-02-08 RX ADMIN — Medication 10 ML: at 07:29

## 2017-02-08 RX ADMIN — PANCRELIPASE 24000 UNITS OF LIPASE: 60000; 12000; 38000 CAPSULE, DELAYED RELEASE PELLETS ORAL at 17:06

## 2017-02-08 RX ADMIN — PANTOPRAZOLE SODIUM 40 MG: 40 TABLET, DELAYED RELEASE ORAL at 17:06

## 2017-02-08 RX ADMIN — NITROGLYCERIN 1 INCH: 20 OINTMENT TOPICAL at 17:07

## 2017-02-08 RX ADMIN — OXYCODONE HYDROCHLORIDE 15 MG: 15 TABLET ORAL at 07:36

## 2017-02-08 RX ADMIN — IPRATROPIUM BROMIDE AND ALBUTEROL SULFATE 3 ML: .5; 3 SOLUTION RESPIRATORY (INHALATION) at 06:49

## 2017-02-08 RX ADMIN — METRONIDAZOLE 500 MG: 500 INJECTION, SOLUTION INTRAVENOUS at 13:40

## 2017-02-08 RX ADMIN — DICYCLOMINE HYDROCHLORIDE 10 MG: 10 CAPSULE ORAL at 09:38

## 2017-02-08 RX ADMIN — VANCOMYCIN HYDROCHLORIDE 1500 MG: 5 INJECTION, POWDER, LYOPHILIZED, FOR SOLUTION INTRAVENOUS at 22:21

## 2017-02-08 RX ADMIN — INSULIN DETEMIR 20 UNITS: 100 INJECTION, SOLUTION SUBCUTANEOUS at 21:10

## 2017-02-08 RX ADMIN — INSULIN ASPART 2 UNITS: 100 INJECTION, SOLUTION INTRAVENOUS; SUBCUTANEOUS at 20:07

## 2017-02-08 RX ADMIN — GABAPENTIN 400 MG: 400 CAPSULE ORAL at 06:16

## 2017-02-08 RX ADMIN — PHYTONADIONE 5 MG: 10 INJECTION, EMULSION INTRAMUSCULAR; INTRAVENOUS; SUBCUTANEOUS at 17:38

## 2017-02-08 RX ADMIN — NITROGLYCERIN 1 INCH: 20 OINTMENT TOPICAL at 06:16

## 2017-02-08 RX ADMIN — ONDANSETRON 4 MG: 2 INJECTION, SOLUTION INTRAMUSCULAR; INTRAVENOUS at 10:05

## 2017-02-08 RX ADMIN — PANCRELIPASE 24000 UNITS OF LIPASE: 60000; 12000; 38000 CAPSULE, DELAYED RELEASE PELLETS ORAL at 09:39

## 2017-02-08 RX ADMIN — PANTOPRAZOLE SODIUM 40 MG: 40 TABLET, DELAYED RELEASE ORAL at 09:40

## 2017-02-08 RX ADMIN — OXYCODONE HYDROCHLORIDE 15 MG: 15 TABLET ORAL at 12:26

## 2017-02-08 NOTE — PROGRESS NOTES
"  I have personally seen and examined the patient today and discussed overnight interval progress and issues with nursing staff.    Subjective    Awake and alert. Right hand continues to have swelling and tenderness but no erythema noted. CRP showing improvement and close to normal with improving leukopenia. Venous Duplex shows no DVT. Blood culture one set out of two finalized as coag negative staph. No fever or chills. Denies nausea, vomiting and diarrhea.     History taken from: patient chart RN      Vital Signs    Visit Vitals   • /56 (BP Location: Right leg)   • Pulse 82   • Temp 97.9 °F (36.6 °C) (Oral)   • Resp 18   • Ht 68\" (172.7 cm)   • Wt 178 lb 9.6 oz (81 kg)   • SpO2 95%   • BMI 27.16 kg/m2       Temp:  [97.1 °F (36.2 °C)-98.5 °F (36.9 °C)] 97.9 °F (36.6 °C)      Intake/Output Summary (Last 24 hours) at 02/08/17 1214  Last data filed at 02/08/17 0300   Gross per 24 hour   Intake    820 ml   Output      0 ml   Net    820 ml     Intake & Output (last 3 days)       02/05 0701 - 02/06 0700 02/06 0701 - 02/07 0700 02/07 0701 - 02/08 0700 02/08 0701 - 02/09 0700    P.O. 1920 960 820     IV Piggyback 350       Total Intake(mL/kg) 2270 (28) 960 (11.9) 820 (10.1)     Urine (mL/kg/hr)  0 (0) 0 (0)     Stool  0 (0) 0 (0)     Total Output   0 0      Net +2270 +960 +820              Unmeasured Urine Occurrence 11 x 9 x 3 x     Unmeasured Stool Occurrence 0 x 0 x          Physical Exam:      General Appearance:  Alert, cooperative, in no acute distress   Head:  Normocephalic, without obvious abnormality, atraumatic   Eyes:      Lids and lashes normal, conjunctivae and sclerae normal, no icterus, no pallor, corneas clear, PERRLA   Ears:  Ears appear intact with no abnormalities noted   Throat: No oral lesions, no thrush, oral mucosa moist   Neck: No adenopathy, supple, trachea midline, no thyromegaly, no carotid bruit, no JVD   Back:  No tenderness to percussion or palpation, range of motion normal   Lungs:  " Clear to auscultation,respirations regular, even and unlabored. No wheezing, no ronchi and no crackles.   Heart:  Regular rhythm and normal rate, normal S1 and S2, no murmur, no gallop, no rub, no click   Chest Wall:  No abnormalities observed   Abdomen:  Normal bowel sounds, no masses, no organomegaly, soft non-tender, non-distended, no guarding, no rebound tenderness   Rectal:  Deferred   Extremities: Moves all extremities well, no edema, no cyanosis, no redness   Pulses: Pulses palpable and equal bilaterally   Skin: right hand with edema extending from fingers to forearm with pain and no erythema. His fingernails are very short with poor hygiene.    Lymph nodes: No palpable adenopathy   Neurologic: Awake, alert and oriented x 3. Following commands.     Results:      Results from last 7 days  Lab Units 02/08/17  0612 02/07/17  0113 02/06/17  0210 02/05/17  0447 02/04/17  1354   WBC 10*3/mm3 3.25* 2.41* 2.92* 3.40* 3.99*     Lab Results   Component Value Date    NEUTROABS 1.94 02/08/2017         Results from last 7 days  Lab Units 02/08/17  0612   CREATININE mg/dL 0.38*         Results from last 7 days  Lab Units 02/08/17  0612 02/07/17  0113 02/06/17  0210   CRP mg/dL 1.84* 2.20* 3.07*       Imaging Results (last 24 hours)     Procedure Component Value Units Date/Time    XR Abdomen 2 View With Chest 1 View [[id number]] Collected:  02/08/17 1125     Updated:  02/08/17 1125    Narrative:       XR ABDOMEN 2 VIEW WITH CHEST 1 VIEW-     REASON FOR EXAM:  Vomiting; L03.113-Cellulitis of right upper limb.     FINDINGS: The chest portion of the exam shows the lungs to be well  aerated and clear. There was no pleural fluid or free air beneath the  diaphragm. Supine and upright views of the abdomen show nonspecific  bowel gas pattern. There was nothing seen to suggest obstruction to the  bowel. No soft tissue masses are demonstrated.       Impression:       Nonspecific acute abdominal series.                  Results  Review:    I have personally reviewed laboratory data, culture results, radiology studies and antimicrobial therapy.    Hospital Medications (active)       Dose Frequency Start End    aspirin EC tablet 81 mg 81 mg Daily 2/5/2017     Sig - Route: Take 1 tablet by mouth Daily. - Oral    Cosign for Ordering: Accepted by Elva Hdz MD on 2/5/2017  3:26 PM    dextrose (D50W) solution 25 g 25 g Every 15 Minutes PRN 2/4/2017     Sig - Route: Infuse 50 mL into a venous catheter Every 15 (Fifteen) Minutes As Needed for low blood sugar (Blood Sugar Less Than 70, Patient Has IV Access - Unresponsive, NPO or Unable To Safely Swallow). - Intravenous    Cosign for Ordering: Accepted by Rowan Juan DO on 2/4/2017  8:23 PM    dextrose (GLUTOSE) oral gel 15 g 15 g Every 15 Minutes PRN 2/4/2017     Sig - Route: Take 15 g by mouth Every 15 (Fifteen) Minutes As Needed for low blood sugar (Blood Sugar Less Than 70, Patient Alert, Is Not NPO & Can Safely Swallow). - Oral    Cosign for Ordering: Accepted by Rowan Juan DO on 2/4/2017  8:23 PM    dicyclomine (BENTYL) capsule 10 mg 10 mg 2 Times Daily Before Meals 2/5/2017     Sig - Route: Take 1 capsule by mouth 2 (Two) Times a Day Before Meals. - Oral    Cosign for Ordering: Accepted by Elva Hdz MD on 2/5/2017  3:26 PM    gabapentin (NEURONTIN) capsule 400 mg 400 mg Every 8 Hours Scheduled 2/5/2017     Sig - Route: Take 1 capsule by mouth Every 8 (Eight) Hours. - Oral    Cosign for Ordering: Accepted by Elva Hdz MD on 2/5/2017  3:26 PM    glucagon (GLUCAGEN) injection 1 mg 1 mg Every 15 Minutes PRN 2/4/2017     Sig - Route: Inject 1 mg under the skin Every 15 (Fifteen) Minutes As Needed (Blood Glucose Less Than 70 - Patient Without IV Access - Unresponsive, NPO or Unable To Safely Swallow). - Subcutaneous    Cosign for Ordering: Accepted by Rowan Juan DO on 2/4/2017  8:23 PM    insulin aspart (novoLOG) injection 0-7 Units 0-7 Units 4  Times Daily Before Meals & Nightly 2/4/2017     Sig - Route: Inject 0-7 Units under the skin 4 (Four) Times a Day Before Meals & at Bedtime. - Subcutaneous    Cosign for Ordering: Accepted by Rowan Juan DO on 2/4/2017  8:23 PM    insulin aspart (novoLOG) injection 5 Units 5 Units 3 Times Daily With Meals 2/6/2017     Sig - Route: Inject 5 Units under the skin 3 (Three) Times a Day With Meals. - Subcutaneous    insulin detemir (LEVEMIR) injection 20 Units 20 Units Nightly 2/6/2017     Sig - Route: Inject 20 Units under the skin Every Night. - Subcutaneous    ipratropium-albuterol (DUO-NEB) nebulizer solution 3 mL 3 mL Every 6 Hours PRN 2/4/2017     Sig - Route: Take 3 mL by nebulization Every 6 (Six) Hours As Needed for shortness of air. - Nebulization    Cosign for Ordering: Accepted by Rowan Juan DO on 2/4/2017  8:23 PM    metoclopramide (REGLAN) tablet 10 mg 10 mg 2 Times Daily Before Meals 2/5/2017     Sig - Route: Take 1 tablet by mouth 2 (Two) Times a Day Before Meals. - Oral    Cosign for Ordering: Accepted by Elva Hdz MD on 2/5/2017  3:26 PM    metoprolol succinate XL (TOPROL-XL) 24 hr tablet 25 mg 25 mg Daily 2/5/2017     Sig - Route: Take 1 tablet by mouth Daily. - Oral    Cosign for Ordering: Accepted by Elva Hdz MD on 2/5/2017  3:26 PM    metroNIDAZOLE (FLAGYL) IVPB 500 mg 500 mg Every 8 Hours 2/4/2017     Sig - Route: Infuse 100 mL into a venous catheter Every 8 (Eight) Hours. - Intravenous    Cosign for Ordering: Accepted by Rowan Juan DO on 2/4/2017  8:23 PM    nitroglycerin (NITROSTAT) ointment 1 inch 1 inch Every 6 Hours Scheduled 2/7/2017     Sig - Route: Apply 1 inch topically Every 6 (Six) Hours. - Topical    ondansetron (ZOFRAN) injection 4 mg 4 mg Every 6 Hours PRN 2/8/2017     Sig - Route: Infuse 2 mL into a venous catheter Every 6 (Six) Hours As Needed for nausea or vomiting. - Intravenous    oxyCODONE (ROXICODONE) immediate release tablet 15 mg  "15 mg Every 4 Hours PRN 2/4/2017 2/14/2017    Sig - Route: Take 1 tablet by mouth Every 4 (Four) Hours As Needed for moderate pain (4-6). - Oral    pancrelipase (Lip-Prot-Amyl) (CREON) capsule 24,000 units of lipase 24,000 units of lipase 4 Times Daily With Meals & Nightly 2/5/2017     Sig - Route: Take 2 capsules by mouth 4 (Four) Times a Day With Meals & at Bedtime. - Oral    Cosign for Ordering: Accepted by Elva Hdz MD on 2/5/2017  3:26 PM    pantoprazole (PROTONIX) EC tablet 40 mg 40 mg 2 Times Daily Before Meals 2/5/2017     Sig - Route: Take 1 tablet by mouth 2 (Two) Times a Day Before Meals. - Oral    Cosign for Ordering: Accepted by Elva Hdz MD on 2/5/2017  3:26 PM    polyethylene glycol (MIRALAX) powder 17 g 17 g Every 12 Hours Scheduled 2/5/2017     Sig - Route: Take 17 g by mouth Every 12 (Twelve) Hours. - Oral    Cosign for Ordering: Accepted by Elva Hdz MD on 2/5/2017  3:26 PM    sodium chloride 0.9 % flush 1-10 mL 1-10 mL As Needed 2/4/2017     Sig - Route: Infuse 1-10 mL into a venous catheter As Needed for line care. - Intravenous    sodium chloride 0.9 % flush 10 mL 10 mL As Needed 2/4/2017     Sig - Route: Infuse 10 mL into a venous catheter As Needed for line care. - Intravenous    Linked Group 1:  \"And\" Linked Group Details        sodium chloride 0.9 % flush 10 mL 10 mL Every 12 Hours Scheduled 2/6/2017     Sig - Route: 10 mL by Intracatheter route Every 12 (Twelve) Hours. - Intracatheter    sodium chloride 0.9 % flush 10 mL 10 mL As Needed 2/6/2017     Sig - Route: 10 mL by Intracatheter route As Needed for line care (After Medication Administration). - Intracatheter    vancomycin (VANCOCIN) 1,500 mg in sodium chloride 0.9 % 500 mL IVPB 1,500 mg Every 8 Hours 2/5/2017     Sig - Route: Infuse 1,500 mg into a venous catheter Every 8 (Eight) Hours. - Intravenous            Cultures:    BLOOD CULTURE   Date Value Ref Range Status   02/04/2017 No growth at 3 days  " Preliminary   02/04/2017 Abnormal Stain (A)  Final   02/04/2017 Staphylococcus, coagulase negative (A)  Final     Comment:     Probable Contaminant.        PROBLEM LIST:    Patient Active Problem List   Diagnosis   • MDD (major depressive disorder), recurrent episode, moderate   • Type 1 diabetes mellitus   • 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   • Cellulitis of right hand   • Cellulitis of right hand excluding fingers and thumb       Assessment/Plan     ASSESSMENT:    1. Staph bacteremia versus contaminant in the setting of IV drug use     PLAN:    In the setting of significant clinical improvement and blood culture showing contaminant would consider de-escalating to Doxycycline and Flagyl both PO upon discharge x a 10 days course. The patient is ok to discharge from ID standpoint with application of warm compresses to right hand.      Patients findings and recommendations were discussed with patient and nursing staff    Code Status: Full Code    Bethany Piper PA-C  02/08/17  12:14 PM

## 2017-02-08 NOTE — PLAN OF CARE
Problem: Patient Care Overview (Adult)  Goal: Plan of Care Review  Outcome: Ongoing (interventions implemented as appropriate)  Goal: Discharge Needs Assessment  Outcome: Ongoing (interventions implemented as appropriate)

## 2017-02-08 NOTE — PLAN OF CARE
Problem: Pain, Acute (Adult)  Goal: Identify Related Risk Factors and Signs and Symptoms  Outcome: Ongoing (interventions implemented as appropriate)  Goal: Acceptable Pain Control/Comfort Level  Outcome: Ongoing (interventions implemented as appropriate)    Problem: Cellulitis (Adult)  Goal: Signs and Symptoms of Listed Potential Problems Will be Absent or Manageable (Cellulitis)  Outcome: Ongoing (interventions implemented as appropriate)    Problem: Patient Care Overview (Adult)  Goal: Adult Individualization and Mutuality  Outcome: Ongoing (interventions implemented as appropriate)  Goal: Discharge Needs Assessment  Outcome: Ongoing (interventions implemented as appropriate)

## 2017-02-08 NOTE — CONSULTS
"Diabetes Education  Assessment/Teaching    Patient Name:  Isaias Lang  YOB: 1965  MRN: [id number]  Admit Date:  2/4/2017      Assessment Date:  2/8/2017       Most Recent Value    General Information      Height  5' 8\" (1.727 m)    Height Method  Stated    Weight  178 lb 9.6 oz (81 kg)    Pregnancy Assessment     Diabetes History     Education Preferences     Nutrition Information     Assessment Topics     DM Goals                 Other Comments:  Client very well known to me form previous multiple admissions , isolation noted as well as nausea, will see closer to discharge        Electronically signed by:  Cherri Oliver RN  02/08/17 4:38 PM  "

## 2017-02-08 NOTE — PLAN OF CARE
Problem: Pain, Acute (Adult)  Goal: Identify Related Risk Factors and Signs and Symptoms  Outcome: Outcome(s) achieved Date Met:  02/08/17

## 2017-02-08 NOTE — PROGRESS NOTES
I have seen the patient in conjunction with Lidia CULLEN      History of presenting illness:  Patient states he really didn't feel well through the night with some nausea without he was doing okay, he went down to get a stress test and had a large amount of emesis there and has had occasional emesis since that time.  He states he is however starting to feel better but he has had Zofran.  He still having some epigastric and right upper quadrant abdominal pain at times.  This is about the same intensity is had.  He thinks his hand may be a little bit betterassociated fever and chills with this overnight.  This was right hand and thought to be secondary to cellulitis.  He did eat some lunch and held some of this down but did have a small amount emesis after this.  He states he did have a moderate bowel movement today.    Vital Signs  Temp:  [97.1 °F (36.2 °C)-98.5 °F (36.9 °C)] 97.9 °F (36.6 °C)  Heart Rate:  [78-87] 82  Resp:  [16-20] 18  BP: (103-124)/(56-68) 103/56  Body mass index is 27.16 kg/(m^2).      Intake/Output Summary (Last 24 hours) at 02/08/17 1502  Last data filed at 02/08/17 1300   Gross per 24 hour   Intake   1080 ml   Output      0 ml   Net   1080 ml     Intake & Output (last 3 days)       02/05 0701 - 02/06 0700 02/06 0701 - 02/07 0700 02/07 0701 - 02/08 0700 02/08 0701 - 02/09 0700    P.O. 1920 960 820 720    IV Piggyback 350       Total Intake(mL/kg) 2270 (28) 960 (11.9) 820 (10.1) 720 (8.9)    Urine (mL/kg/hr)  0 (0) 0 (0)     Stool  0 (0) 0 (0)     Total Output   0 0      Net +2270 +960 +820 +720            Unmeasured Urine Occurrence 11 x 9 x 3 x 3 x    Unmeasured Stool Occurrence 0 x 0 x  1 x    Unmeasured Emesis Occurrence    1 x          Physical exam:  Physical Exam   Constitutional: Well-developed, well-nourished.  Appears not feel well, pleasant  Head: Normocephalic and atraumatic.  Hearing is intact, mucous membranes are moist.   Eyes:  Pupils are equal, round, and reactive to light. No  scleral icterus.   Cardiovascular: Normal rate, regular rhythm and normal heart sounds.  No murmur rub or gallop  Pulmonary/Chest: Bilateral breath sounds no rhonchus rales or wheezing heard  Abdominal: Soft, flat, bowel sounds are active, nondistended minimal epigastric tenderness deep palpation but no peritoneal signs  Musculoskeletal: No change in strength he is still has significant edema of his right hand but no erythema and actually edema is less than it has been.  He has better range of motion of the wrist and his fingers as well still fairly significant palmar edema around the thenar eminence especially with some tenderness there is no forearm erythema or tenderness noted no significant lower extremity edema noted  Neurological: Nonfocal, alert.    Skin: Skin is warm and dry.   Psychiatric:  Normal mood and affect.       Results Review:  Lab Results   Component Value Date    WBC 3.25 (L) 02/08/2017    HGB 13.4 (L) 02/08/2017    HCT 40.6 (L) 02/08/2017    MCV 86.8 02/08/2017    PLT 91 (L) 02/08/2017     Lab Results   Component Value Date    GLUCOSE 91 02/08/2017    BUN 9 02/08/2017    CREATININE 0.38 (L) 02/08/2017    EGFRIFNONA >150 02/08/2017    EGFRIFAFRI  09/26/2016      Comment:      <15 Indicative of kidney failure.    BCR 23.7 02/08/2017    CO2 30.2 02/08/2017    CALCIUM 8.8 02/08/2017    ALBUMIN 2.80 (L) 02/08/2017    LABIL2 1.1 (L) 02/08/2017     (H) 02/08/2017     (H) 02/08/2017       Imaging Results (last 72 hours)     Procedure Component Value Units Date/Time    XR Chest AP [[id number]] Collected:  02/07/17 1002     Updated:  02/07/17 1018    Narrative:       XR CHEST AP-     REASON FOR EXAM:  cp; L03.113-Cellulitis of right upper limb     The chest is compared with earlier chest done 10/17/2016     FINDINGS: The cardiac silhouette and mediastinum are stable in size and  shape. The lungs are both well aerated and clear. There is some bowel  interposed between the liver and the right  diaphragm. The cardiac  silhouette is normal in size and shape.       Impression:       Stable chest. A source of patient's chest pain is not  demonstrated.     This report was finalized on 2/7/2017 10:15 AM by Dr. Keaton Oh II, MD.       XR Abdomen 2 View With Chest 1 View [[id number]] Collected:  02/08/17 1125     Updated:  02/08/17 1300    Narrative:       XR ABDOMEN 2 VIEW WITH CHEST 1 VIEW-     REASON FOR EXAM:  Vomiting; L03.113-Cellulitis of right upper limb.     FINDINGS: The chest portion of the exam shows the lungs to be well  aerated and clear. There was no pleural fluid or free air beneath the  diaphragm. Supine and upright views of the abdomen show nonspecific  bowel gas pattern. There was nothing seen to suggest obstruction to the  bowel. No soft tissue masses are demonstrated.       Impression:       Nonspecific acute abdominal series.     This report was finalized on 2/8/2017 12:58 PM by Dr. Keaton Oh II, MD.            EKG image reviewed from yesterday, nonspecific findings  Acute abdominal series Images reviewed      Assessment/Plan     Principal Problem:    Cellulitis of right hand, CRP continues to improve, white count is still low but improving.  His neutropenia however may be related to his hepatitis virus.  Continue Flagyl and vancomycin.    Nausea and vomiting.  Appears to be resolving, he did have a bowel movement and his abdominal series is unremarkable.  I am going to follow, potentially Flagyl could be causing some of this nausea is persistent tomorrow may consider switching to Cleocin.  Abdomen is benign.  I will check an amylase lipase in the a.m.    Hepatitis B, acute, as well as hepatitis C antibody positive.  I feel like this is most likely cause of his transaminases elevation, I'm going to follow the trend    Thrombocytopenia, improving as infectious process is treated, follow.    Positive blood culture, coagulase-negative staph, I feel this is a contaminant.  His other  blood culture negative    Chest pain, for stress test tomorrow.    DVT prophylaxis, continue SCDs    Splenomegaly, his INR is elevated, patient may have an element of cirrhosis, I am going to give vitamin K today and recheck INR in the a.m.    Memo Quarles MD  02/08/17  3:02 PM

## 2017-02-08 NOTE — PROGRESS NOTES
Discharge Planning Assessment  [facility]     Patient Name: Isaias Lang  MRN: [id number]  Today's Date: 2/8/2017    Admit Date: 2/4/2017       Discharge Plan       02/08/17 1130    Case Management/Social Work Plan    Plan SS spoke with pt on this date. Pt states that he still does not have a place to go at discharge. SS contacted [facility] in [place] per Suellen who states that she does have a male room available. Suellen states for pt to contact her before discharge. SS contacted the homeless shelter in [place] [phone] who states they do not have a male spot available at this time. SS spoke with pt to inform. SS informed pt that to go to the homeless shelter in [place], he would have to be able to pass a drug screen before they would accept him. SS will follow and assist as needed.     Patient/Family In Agreement With Plan yes        Discharge Placement     No information found        Expected Discharge Date and Time     Expected Discharge Date Expected Discharge Time    Feb 9, 2017             Opal Rod

## 2017-02-08 NOTE — PLAN OF CARE
Problem: Pain, Acute (Adult)  Goal: Identify Related Risk Factors and Signs and Symptoms  Outcome: Ongoing (interventions implemented as appropriate)  Goal: Acceptable Pain Control/Comfort Level  Outcome: Ongoing (interventions implemented as appropriate)    Problem: Cellulitis (Adult)  Goal: Signs and Symptoms of Listed Potential Problems Will be Absent or Manageable (Cellulitis)  Outcome: Ongoing (interventions implemented as appropriate)    Problem: Patient Care Overview (Adult)  Goal: Plan of Care Review  Outcome: Ongoing (interventions implemented as appropriate)  Goal: Discharge Needs Assessment  Outcome: Ongoing (interventions implemented as appropriate)

## 2017-02-08 NOTE — PLAN OF CARE
Problem: Pain, Acute (Adult)  Goal: Identify Related Risk Factors and Signs and Symptoms  Outcome: Ongoing (interventions implemented as appropriate)  Goal: Acceptable Pain Control/Comfort Level  Outcome: Ongoing (interventions implemented as appropriate)    Problem: Cellulitis (Adult)  Goal: Signs and Symptoms of Listed Potential Problems Will be Absent or Manageable (Cellulitis)  Outcome: Ongoing (interventions implemented as appropriate)

## 2017-02-09 ENCOUNTER — APPOINTMENT (OUTPATIENT)
Dept: CARDIOLOGY | Facility: HOSPITAL | Age: 52
End: 2017-02-09
Attending: INTERNAL MEDICINE

## 2017-02-09 ENCOUNTER — APPOINTMENT (OUTPATIENT)
Dept: NUCLEAR MEDICINE | Facility: HOSPITAL | Age: 52
End: 2017-02-09
Attending: INTERNAL MEDICINE

## 2017-02-09 LAB
ALBUMIN SERPL-MCNC: 2.5 G/DL (ref 3.5–5)
ALBUMIN/GLOB SERPL: 1.1 G/DL (ref 1.5–2.5)
ALP SERPL-CCNC: 94 U/L (ref 46–116)
ALT SERPL W P-5'-P-CCNC: 140 U/L (ref 10–44)
AMYLASE SERPL-CCNC: 38 U/L (ref 28–100)
ANION GAP SERPL CALCULATED.3IONS-SCNC: 1.7 MMOL/L (ref 3.6–11.2)
AST SERPL-CCNC: 193 U/L (ref 10–34)
BACTERIA SPEC AEROBE CULT: ABNORMAL
BACTERIA SPEC AEROBE CULT: ABNORMAL
BACTERIA SPEC AEROBE CULT: NORMAL
BASOPHILS # BLD AUTO: 0.01 10*3/MM3 (ref 0–0.3)
BASOPHILS NFR BLD AUTO: 0.4 % (ref 0–2)
BH CV NUCLEAR PRIOR STUDY: 3
BH CV STRESS BP STAGE 1: NORMAL
BH CV STRESS BP STAGE 2: NORMAL
BH CV STRESS COMMENTS STAGE 1: NORMAL
BH CV STRESS COMMENTS STAGE 2: NORMAL
BH CV STRESS DOSE REGADENOSON STAGE 1: 0.4
BH CV STRESS DURATION MIN STAGE 1: 0
BH CV STRESS DURATION MIN STAGE 2: 4
BH CV STRESS DURATION SEC STAGE 1: 15
BH CV STRESS DURATION SEC STAGE 2: 0
BH CV STRESS HR STAGE 1: 74
BH CV STRESS HR STAGE 2: 83
BH CV STRESS PROTOCOL 1: NORMAL
BH CV STRESS RECOVERY BP: NORMAL MMHG
BH CV STRESS RECOVERY HR: 80 BPM
BH CV STRESS STAGE 1: 1
BH CV STRESS STAGE 2: 2
BILIRUB SERPL-MCNC: 0.8 MG/DL (ref 0.2–1.8)
BUN BLD-MCNC: 9 MG/DL (ref 7–21)
BUN/CREAT SERPL: 19.6 (ref 7–25)
CALCIUM SPEC-SCNC: 8.3 MG/DL (ref 7.7–10)
CHLORIDE SERPL-SCNC: 107 MMOL/L (ref 99–112)
CO2 SERPL-SCNC: 28.3 MMOL/L (ref 24.3–31.9)
CREAT BLD-MCNC: 0.46 MG/DL (ref 0.43–1.29)
DEPRECATED RDW RBC AUTO: 53 FL (ref 37–54)
EOSINOPHIL # BLD AUTO: 0.1 10*3/MM3 (ref 0–0.7)
EOSINOPHIL NFR BLD AUTO: 4.2 % (ref 0–5)
ERYTHROCYTE [DISTWIDTH] IN BLOOD BY AUTOMATED COUNT: 16.5 % (ref 11.5–14.5)
GFR SERPL CREATININE-BSD FRML MDRD: >150 ML/MIN/1.73
GLOBULIN UR ELPH-MCNC: 2.3 GM/DL
GLUCOSE BLD-MCNC: 253 MG/DL (ref 70–110)
GLUCOSE BLDC GLUCOMTR-MCNC: 170 MG/DL (ref 70–130)
GLUCOSE BLDC GLUCOMTR-MCNC: 175 MG/DL (ref 70–130)
GLUCOSE BLDC GLUCOMTR-MCNC: 212 MG/DL (ref 70–130)
GLUCOSE BLDC GLUCOMTR-MCNC: 258 MG/DL (ref 70–130)
GRAM STN SPEC: ABNORMAL
HCT VFR BLD AUTO: 39.7 % (ref 42–52)
HGB BLD-MCNC: 12.7 G/DL (ref 14–18)
IMM GRANULOCYTES # BLD: 0 10*3/MM3 (ref 0–0.03)
IMM GRANULOCYTES NFR BLD: 0 % (ref 0–0.5)
INR PPP: 1.36 (ref 0.8–1.1)
ISOLATED FROM: ABNORMAL
LIPASE SERPL-CCNC: 24 U/L (ref 13–60)
LV EF NUC BP: 55 %
LYMPHOCYTES # BLD AUTO: 0.51 10*3/MM3 (ref 1–3)
LYMPHOCYTES NFR BLD AUTO: 21.3 % (ref 21–51)
MAXIMAL PREDICTED HEART RATE: 169 BPM
MCH RBC QN AUTO: 28.2 PG (ref 27–33)
MCHC RBC AUTO-ENTMCNC: 32 G/DL (ref 33–37)
MCV RBC AUTO: 88.2 FL (ref 80–94)
MONOCYTES # BLD AUTO: 0.26 10*3/MM3 (ref 0.1–0.9)
MONOCYTES NFR BLD AUTO: 10.8 % (ref 0–10)
NEUTROPHILS # BLD AUTO: 1.52 10*3/MM3 (ref 1.4–6.5)
NEUTROPHILS NFR BLD AUTO: 63.3 % (ref 30–70)
OSMOLALITY SERPL CALC.SUM OF ELEC: 281.1 MOSM/KG (ref 273–305)
PERCENT MAX PREDICTED HR: 72.19 %
PLATELET # BLD AUTO: 79 10*3/MM3 (ref 130–400)
PMV BLD AUTO: 11.5 FL (ref 6–10)
POTASSIUM BLD-SCNC: 4 MMOL/L (ref 3.5–5.3)
PROT SERPL-MCNC: 4.8 G/DL (ref 6–8)
PROTHROMBIN TIME: 15.4 SECONDS (ref 9.8–11.9)
RBC # BLD AUTO: 4.5 10*6/MM3 (ref 4.7–6.1)
SODIUM BLD-SCNC: 137 MMOL/L (ref 135–153)
STRESS BASELINE BP: NORMAL MMHG
STRESS BASELINE HR: 81 BPM
STRESS PERCENT HR: 85 %
STRESS POST PEAK BP: NORMAL MMHG
STRESS POST PEAK HR: 122 BPM
STRESS TARGET HR: 144 BPM
WBC NRBC COR # BLD: 2.4 10*3/MM3 (ref 4.5–12.5)

## 2017-02-09 PROCEDURE — 82150 ASSAY OF AMYLASE: CPT | Performed by: INTERNAL MEDICINE

## 2017-02-09 PROCEDURE — 78452 HT MUSCLE IMAGE SPECT MULT: CPT | Performed by: INTERNAL MEDICINE

## 2017-02-09 PROCEDURE — 80053 COMPREHEN METABOLIC PANEL: CPT | Performed by: INTERNAL MEDICINE

## 2017-02-09 PROCEDURE — 63710000001 INSULIN DETEMIR PER 5 UNITS: Performed by: INTERNAL MEDICINE

## 2017-02-09 PROCEDURE — 78451 HT MUSCLE IMAGE SPECT SING: CPT

## 2017-02-09 PROCEDURE — 85025 COMPLETE CBC W/AUTO DIFF WBC: CPT | Performed by: INTERNAL MEDICINE

## 2017-02-09 PROCEDURE — 99232 SBSQ HOSP IP/OBS MODERATE 35: CPT | Performed by: INTERNAL MEDICINE

## 2017-02-09 PROCEDURE — 0 TECHNETIUM SESTAMIBI: Performed by: INTERNAL MEDICINE

## 2017-02-09 PROCEDURE — 85610 PROTHROMBIN TIME: CPT | Performed by: INTERNAL MEDICINE

## 2017-02-09 PROCEDURE — 93017 CV STRESS TEST TRACING ONLY: CPT

## 2017-02-09 PROCEDURE — A9500 TC99M SESTAMIBI: HCPCS | Performed by: INTERNAL MEDICINE

## 2017-02-09 PROCEDURE — 25010000002 VANCOMYCIN PER 500 MG

## 2017-02-09 PROCEDURE — 25010000002 ONDANSETRON PER 1 MG: Performed by: NURSE PRACTITIONER

## 2017-02-09 PROCEDURE — 93018 CV STRESS TEST I&R ONLY: CPT | Performed by: INTERNAL MEDICINE

## 2017-02-09 PROCEDURE — 94640 AIRWAY INHALATION TREATMENT: CPT

## 2017-02-09 PROCEDURE — 82962 GLUCOSE BLOOD TEST: CPT

## 2017-02-09 PROCEDURE — 25010000002 REGADENOSON 0.4 MG/5ML SOLUTION: Performed by: INTERNAL MEDICINE

## 2017-02-09 PROCEDURE — 83690 ASSAY OF LIPASE: CPT | Performed by: INTERNAL MEDICINE

## 2017-02-09 PROCEDURE — 94799 UNLISTED PULMONARY SVC/PX: CPT

## 2017-02-09 PROCEDURE — 63710000001 INSULIN ASPART PER 5 UNITS: Performed by: PHYSICIAN ASSISTANT

## 2017-02-09 RX ADMIN — ASPIRIN 81 MG: 81 TABLET ORAL at 14:09

## 2017-02-09 RX ADMIN — GABAPENTIN 400 MG: 400 CAPSULE ORAL at 14:09

## 2017-02-09 RX ADMIN — Medication 1 DOSE: at 09:00

## 2017-02-09 RX ADMIN — OXYCODONE HYDROCHLORIDE 15 MG: 15 TABLET ORAL at 00:33

## 2017-02-09 RX ADMIN — IPRATROPIUM BROMIDE AND ALBUTEROL SULFATE 3 ML: .5; 3 SOLUTION RESPIRATORY (INHALATION) at 07:44

## 2017-02-09 RX ADMIN — OXYCODONE HYDROCHLORIDE 15 MG: 15 TABLET ORAL at 21:44

## 2017-02-09 RX ADMIN — GABAPENTIN 400 MG: 400 CAPSULE ORAL at 21:31

## 2017-02-09 RX ADMIN — OXYCODONE HYDROCHLORIDE 15 MG: 15 TABLET ORAL at 18:23

## 2017-02-09 RX ADMIN — IPRATROPIUM BROMIDE AND ALBUTEROL SULFATE 3 ML: .5; 3 SOLUTION RESPIRATORY (INHALATION) at 18:45

## 2017-02-09 RX ADMIN — GABAPENTIN 400 MG: 400 CAPSULE ORAL at 05:00

## 2017-02-09 RX ADMIN — VANCOMYCIN HYDROCHLORIDE 1500 MG: 5 INJECTION, POWDER, LYOPHILIZED, FOR SOLUTION INTRAVENOUS at 06:09

## 2017-02-09 RX ADMIN — METRONIDAZOLE 500 MG: 500 INJECTION, SOLUTION INTRAVENOUS at 14:11

## 2017-02-09 RX ADMIN — PANCRELIPASE 24000 UNITS OF LIPASE: 60000; 12000; 38000 CAPSULE, DELAYED RELEASE PELLETS ORAL at 17:26

## 2017-02-09 RX ADMIN — INSULIN ASPART 3 UNITS: 100 INJECTION, SOLUTION INTRAVENOUS; SUBCUTANEOUS at 17:25

## 2017-02-09 RX ADMIN — ONDANSETRON 4 MG: 2 INJECTION, SOLUTION INTRAMUSCULAR; INTRAVENOUS at 06:22

## 2017-02-09 RX ADMIN — METRONIDAZOLE 500 MG: 500 INJECTION, SOLUTION INTRAVENOUS at 05:00

## 2017-02-09 RX ADMIN — OXYCODONE HYDROCHLORIDE 15 MG: 15 TABLET ORAL at 14:10

## 2017-02-09 RX ADMIN — Medication 10 ML: at 21:45

## 2017-02-09 RX ADMIN — ONDANSETRON 4 MG: 2 INJECTION, SOLUTION INTRAMUSCULAR; INTRAVENOUS at 18:23

## 2017-02-09 RX ADMIN — NITROGLYCERIN 1 INCH: 20 OINTMENT TOPICAL at 00:27

## 2017-02-09 RX ADMIN — NITROGLYCERIN 1 INCH: 20 OINTMENT TOPICAL at 05:00

## 2017-02-09 RX ADMIN — OXYCODONE HYDROCHLORIDE 15 MG: 15 TABLET ORAL at 04:57

## 2017-02-09 RX ADMIN — PANTOPRAZOLE SODIUM 40 MG: 40 TABLET, DELAYED RELEASE ORAL at 14:10

## 2017-02-09 RX ADMIN — Medication 1 DOSE: at 12:15

## 2017-02-09 RX ADMIN — INSULIN ASPART 2 UNITS: 100 INJECTION, SOLUTION INTRAVENOUS; SUBCUTANEOUS at 14:11

## 2017-02-09 RX ADMIN — VANCOMYCIN HYDROCHLORIDE 1500 MG: 5 INJECTION, POWDER, LYOPHILIZED, FOR SOLUTION INTRAVENOUS at 21:31

## 2017-02-09 RX ADMIN — PANTOPRAZOLE SODIUM 40 MG: 40 TABLET, DELAYED RELEASE ORAL at 17:26

## 2017-02-09 RX ADMIN — METOPROLOL SUCCINATE 25 MG: 25 TABLET, FILM COATED, EXTENDED RELEASE ORAL at 14:10

## 2017-02-09 RX ADMIN — PANCRELIPASE 24000 UNITS OF LIPASE: 60000; 12000; 38000 CAPSULE, DELAYED RELEASE PELLETS ORAL at 14:09

## 2017-02-09 RX ADMIN — INSULIN ASPART 4 UNITS: 100 INJECTION, SOLUTION INTRAVENOUS; SUBCUTANEOUS at 21:28

## 2017-02-09 RX ADMIN — INSULIN DETEMIR 20 UNITS: 100 INJECTION, SOLUTION SUBCUTANEOUS at 21:28

## 2017-02-09 RX ADMIN — REGADENOSON 0.4 MG: 0.08 INJECTION, SOLUTION INTRAVENOUS at 12:15

## 2017-02-09 RX ADMIN — PANCRELIPASE 24000 UNITS OF LIPASE: 60000; 12000; 38000 CAPSULE, DELAYED RELEASE PELLETS ORAL at 21:30

## 2017-02-09 NOTE — PROGRESS NOTES
Discharge Planning Assessment  [facility]     Patient Name: Isaias Lang  MRN: [id number]  Today's Date: 2/9/2017    Admit Date: 2/4/2017       Discharge Plan       02/09/17 1620    Case Management/Social Work Plan    Plan SS spoke to nurse who states pt is requesting telephone #'s for [facility] and [facility]. SS provided pt with telephone #'s for [facility] and [facility]. SS contacted [facility] [phone] per Suellen who states she will have a place for pt to stay upon discharge. [facility] spoke to pt and request to be contacted back on Friday, 2-10-17. SS discussed nursing home placement with pt. SS to f/u with pt on Friday, 2-10-17.              Expected Discharge Date and Time     Expected Discharge Date Expected Discharge Time    Feb 11, 2017           Gosia Mendoza

## 2017-02-09 NOTE — PROGRESS NOTES
"  I have personally seen and examined the patient today and discussed overnight interval progress and issues with nursing staff.    Subjective    The patient was awake and alert this morning sitting at edge of his bed receiving a breathing treatment.  He reports that the swelling to his right hand has worsened with some mild erythema.  No fever, chills, nausea, vomiting or diarrhea reported.     History taken from: patient chart RN      Vital Signs    Visit Vitals   • /72 (BP Location: Right leg, Patient Position: Lying)   • Pulse 95   • Temp 97.4 °F (36.3 °C) (Oral)   • Resp 18   • Ht 68\" (172.7 cm)   • Wt 178 lb 9.6 oz (81 kg)   • SpO2 95%   • BMI 27.16 kg/m2       Temp:  [97.3 °F (36.3 °C)-97.9 °F (36.6 °C)] 97.4 °F (36.3 °C)      Intake/Output Summary (Last 24 hours) at 02/09/17 1040  Last data filed at 02/09/17 0300   Gross per 24 hour   Intake   1200 ml   Output      0 ml   Net   1200 ml     Intake & Output (last 3 days)       02/06 0701 - 02/07 0700 02/07 0701 - 02/08 0700 02/08 0701 - 02/09 0700 02/09 0701 - 02/10 0700    P.O.      IV Piggyback        Total Intake(mL/kg) 960 (11.9) 820 (10.1) 1200 (14.8)     Urine (mL/kg/hr) 0 (0) 0 (0)      Stool 0 (0) 0 (0)      Total Output 0 0        Net +960 +820 +1200              Unmeasured Urine Occurrence 9 x 3 x 5 x     Unmeasured Stool Occurrence 0 x  1 x     Unmeasured Emesis Occurrence   1 x         Physical Exam:      General Appearance:  Alert, cooperative, in no acute distress   Head:  Normocephalic, without obvious abnormality, atraumatic   Eyes:      Lids and lashes normal, conjunctivae and sclerae normal, no icterus, no pallor, corneas clear, PERRLA   Ears:  Ears appear intact with no abnormalities noted   Throat: No oral lesions, no thrush, oral mucosa moist   Neck: No adenopathy, supple, trachea midline, no thyromegaly, no carotid bruit, no JVD   Back:  No tenderness to percussion or palpation, range of motion normal   Lungs:  Clear to " auscultation,respirations regular, even and unlabored. No wheezing, no ronchi and no crackles.   Heart:  Regular rhythm and normal rate, normal S1 and S2, no murmur, no gallop, no rub, no click   Chest Wall:  No abnormalities observed   Abdomen:  Normal bowel sounds, no masses, no organomegaly, soft non-tender, non-distended, no guarding, no rebound tenderness   Rectal:  Deferred   Extremities: Moves all extremities well, no edema, no cyanosis, no redness   Pulses: Pulses palpable and equal bilaterally   Skin: right hand with edema extending from fingers to forearm with pain and minimal erythema. His fingernails are very short with poor hygiene.    Lymph nodes: No palpable adenopathy   Neurologic: Awake, alert and oriented x 3. Following commands.     Results:      Results from last 7 days  Lab Units 02/09/17  0117 02/08/17  0612 02/07/17  0113 02/06/17  0210 02/05/17  0447 02/04/17  1354   WBC 10*3/mm3 2.40* 3.25* 2.41* 2.92* 3.40* 3.99*     Lab Results   Component Value Date    NEUTROABS 1.52 02/09/2017         Results from last 7 days  Lab Units 02/09/17  0117   CREATININE mg/dL 0.46         Results from last 7 days  Lab Units 02/08/17  0612 02/07/17  0113 02/06/17  0210   CRP mg/dL 1.84* 2.20* 3.07*       Imaging Results (last 24 hours)     Procedure Component Value Units Date/Time    XR Abdomen 2 View With Chest 1 View [[id number]] Collected:  02/08/17 1125     Updated:  02/08/17 1125    Narrative:       XR ABDOMEN 2 VIEW WITH CHEST 1 VIEW-     REASON FOR EXAM:  Vomiting; L03.113-Cellulitis of right upper limb.     FINDINGS: The chest portion of the exam shows the lungs to be well  aerated and clear. There was no pleural fluid or free air beneath the  diaphragm. Supine and upright views of the abdomen show nonspecific  bowel gas pattern. There was nothing seen to suggest obstruction to the  bowel. No soft tissue masses are demonstrated.       Impression:       Nonspecific acute abdominal series.                   Results Review:    I have personally reviewed laboratory data, culture results, radiology studies and antimicrobial therapy.    Hospital Medications (active)       Dose Frequency Start End    aspirin EC tablet 81 mg 81 mg Daily 2/5/2017     Sig - Route: Take 1 tablet by mouth Daily. - Oral    Cosign for Ordering: Accepted by Elva Hdz MD on 2/5/2017  3:26 PM    dextrose (D50W) solution 25 g 25 g Every 15 Minutes PRN 2/4/2017     Sig - Route: Infuse 50 mL into a venous catheter Every 15 (Fifteen) Minutes As Needed for low blood sugar (Blood Sugar Less Than 70, Patient Has IV Access - Unresponsive, NPO or Unable To Safely Swallow). - Intravenous    Cosign for Ordering: Accepted by Rowan Juan DO on 2/4/2017  8:23 PM    dextrose (GLUTOSE) oral gel 15 g 15 g Every 15 Minutes PRN 2/4/2017     Sig - Route: Take 15 g by mouth Every 15 (Fifteen) Minutes As Needed for low blood sugar (Blood Sugar Less Than 70, Patient Alert, Is Not NPO & Can Safely Swallow). - Oral    Cosign for Ordering: Accepted by Rowan Juan DO on 2/4/2017  8:23 PM    dicyclomine (BENTYL) capsule 10 mg 10 mg 2 Times Daily Before Meals 2/5/2017     Sig - Route: Take 1 capsule by mouth 2 (Two) Times a Day Before Meals. - Oral    Cosign for Ordering: Accepted by Elva Hdz MD on 2/5/2017  3:26 PM    gabapentin (NEURONTIN) capsule 400 mg 400 mg Every 8 Hours Scheduled 2/5/2017     Sig - Route: Take 1 capsule by mouth Every 8 (Eight) Hours. - Oral    Cosign for Ordering: Accepted by Elva Hdz MD on 2/5/2017  3:26 PM    glucagon (GLUCAGEN) injection 1 mg 1 mg Every 15 Minutes PRN 2/4/2017     Sig - Route: Inject 1 mg under the skin Every 15 (Fifteen) Minutes As Needed (Blood Glucose Less Than 70 - Patient Without IV Access - Unresponsive, NPO or Unable To Safely Swallow). - Subcutaneous    Cosign for Ordering: Accepted by Rowan Juan DO on 2/4/2017  8:23 PM    insulin aspart (novoLOG) injection 0-7  Units 0-7 Units 4 Times Daily Before Meals & Nightly 2/4/2017     Sig - Route: Inject 0-7 Units under the skin 4 (Four) Times a Day Before Meals & at Bedtime. - Subcutaneous    Cosign for Ordering: Accepted by Rowan Juan DO on 2/4/2017  8:23 PM    insulin aspart (novoLOG) injection 5 Units 5 Units 3 Times Daily With Meals 2/6/2017     Sig - Route: Inject 5 Units under the skin 3 (Three) Times a Day With Meals. - Subcutaneous    insulin detemir (LEVEMIR) injection 20 Units 20 Units Nightly 2/6/2017     Sig - Route: Inject 20 Units under the skin Every Night. - Subcutaneous    ipratropium-albuterol (DUO-NEB) nebulizer solution 3 mL 3 mL Every 6 Hours PRN 2/4/2017     Sig - Route: Take 3 mL by nebulization Every 6 (Six) Hours As Needed for shortness of air. - Nebulization    Cosign for Ordering: Accepted by Rowan Juan DO on 2/4/2017  8:23 PM    metoclopramide (REGLAN) tablet 10 mg 10 mg 2 Times Daily Before Meals 2/5/2017     Sig - Route: Take 1 tablet by mouth 2 (Two) Times a Day Before Meals. - Oral    Cosign for Ordering: Accepted by Elva Hdz MD on 2/5/2017  3:26 PM    metoprolol succinate XL (TOPROL-XL) 24 hr tablet 25 mg 25 mg Daily 2/5/2017     Sig - Route: Take 1 tablet by mouth Daily. - Oral    Cosign for Ordering: Accepted by Elva Hdz MD on 2/5/2017  3:26 PM    metroNIDAZOLE (FLAGYL) IVPB 500 mg 500 mg Every 8 Hours 2/4/2017     Sig - Route: Infuse 100 mL into a venous catheter Every 8 (Eight) Hours. - Intravenous    Cosign for Ordering: Accepted by Rowan Juan DO on 2/4/2017  8:23 PM    nitroglycerin (NITROSTAT) ointment 1 inch 1 inch Every 6 Hours Scheduled 2/7/2017     Sig - Route: Apply 1 inch topically Every 6 (Six) Hours. - Topical    ondansetron (ZOFRAN) injection 4 mg 4 mg Every 6 Hours PRN 2/8/2017     Sig - Route: Infuse 2 mL into a venous catheter Every 6 (Six) Hours As Needed for nausea or vomiting. - Intravenous    oxyCODONE (ROXICODONE) immediate  "release tablet 15 mg 15 mg Every 4 Hours PRN 2/4/2017 2/14/2017    Sig - Route: Take 1 tablet by mouth Every 4 (Four) Hours As Needed for moderate pain (4-6). - Oral    pancrelipase (Lip-Prot-Amyl) (CREON) capsule 24,000 units of lipase 24,000 units of lipase 4 Times Daily With Meals & Nightly 2/5/2017     Sig - Route: Take 2 capsules by mouth 4 (Four) Times a Day With Meals & at Bedtime. - Oral    Cosign for Ordering: Accepted by Elva Hdz MD on 2/5/2017  3:26 PM    pantoprazole (PROTONIX) EC tablet 40 mg 40 mg 2 Times Daily Before Meals 2/5/2017     Sig - Route: Take 1 tablet by mouth 2 (Two) Times a Day Before Meals. - Oral    Cosign for Ordering: Accepted by Elva Hdz MD on 2/5/2017  3:26 PM    polyethylene glycol (MIRALAX) powder 17 g 17 g Every 12 Hours Scheduled 2/5/2017     Sig - Route: Take 17 g by mouth Every 12 (Twelve) Hours. - Oral    Cosign for Ordering: Accepted by Elva Hdz MD on 2/5/2017  3:26 PM    sodium chloride 0.9 % flush 1-10 mL 1-10 mL As Needed 2/4/2017     Sig - Route: Infuse 1-10 mL into a venous catheter As Needed for line care. - Intravenous    sodium chloride 0.9 % flush 10 mL 10 mL As Needed 2/4/2017     Sig - Route: Infuse 10 mL into a venous catheter As Needed for line care. - Intravenous    Linked Group 1:  \"And\" Linked Group Details        sodium chloride 0.9 % flush 10 mL 10 mL Every 12 Hours Scheduled 2/6/2017     Sig - Route: 10 mL by Intracatheter route Every 12 (Twelve) Hours. - Intracatheter    sodium chloride 0.9 % flush 10 mL 10 mL As Needed 2/6/2017     Sig - Route: 10 mL by Intracatheter route As Needed for line care (After Medication Administration). - Intracatheter    vancomycin (VANCOCIN) 1,500 mg in sodium chloride 0.9 % 500 mL IVPB 1,500 mg Every 8 Hours 2/5/2017     Sig - Route: Infuse 1,500 mg into a venous catheter Every 8 (Eight) Hours. - Intravenous            Cultures:    BLOOD CULTURE   Date Value Ref Range Status   02/04/2017 No growth " at 3 days  Preliminary   02/04/2017 Abnormal Stain (A)  Final   02/04/2017 Staphylococcus, coagulase negative (A)  Final     Comment:     Probable Contaminant.        PROBLEM LIST:    Patient Active Problem List   Diagnosis   • MDD (major depressive disorder), recurrent episode, moderate   • Type 1 diabetes mellitus   • 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   • Cellulitis of right hand   • Cellulitis of right hand excluding fingers and thumb       Assessment/Plan     ASSESSMENT:    1. Staph bacteremia versus contaminant in the setting of IV drug use     PLAN:    Recommend to continue with previous recommendations outlined below.    In the setting of significant clinical improvement and blood culture showing contaminant would consider de-escalating to Doxycycline and Flagyl both PO upon discharge x a 10 days course. The patient is ok to discharge from ID standpoint with application of warm compresses to right hand.      Patients findings and recommendations were discussed with patient and nursing staff    Code Status: Full Code    Bethany Piper PA-C  02/09/17  10:40 AM

## 2017-02-09 NOTE — PROGRESS NOTES
I have seen the patient in conjunction with Mehnaz CULLEN       History of presenting illness:  Patient states he is feeling better he did have a stress test today and no chest pain around the time of the stress test.  He thinks his hand is still swelling but better.  His nausea and vomiting is resolved exam no abdominal pain has tolerated a diet since the stress test, bowels are moving.    Vital Signs  Temp:  [97.3 °F (36.3 °C)-97.8 °F (36.6 °C)] 97.4 °F (36.3 °C)  Heart Rate:  [72-95] 76  Resp:  [18-22] 22  BP: (117-130)/(59-75) 126/66  Body mass index is 27.16 kg/(m^2).      Intake/Output Summary (Last 24 hours) at 02/09/17 1553  Last data filed at 02/09/17 0300   Gross per 24 hour   Intake    480 ml   Output      0 ml   Net    480 ml     Intake & Output (last 3 days)       02/06 0701 - 02/07 0700 02/07 0701 - 02/08 0700 02/08 0701 - 02/09 0700 02/09 0701 - 02/10 0700    P.O.      IV Piggyback        Total Intake(mL/kg) 960 (11.9) 820 (10.1) 1200 (14.8)     Urine (mL/kg/hr) 0 (0) 0 (0)      Stool 0 (0) 0 (0)      Total Output 0 0        Net +960 +820 +1200              Unmeasured Urine Occurrence 9 x 3 x 5 x 3 x    Unmeasured Stool Occurrence 0 x  1 x     Unmeasured Emesis Occurrence   1 x           Physical exam:  Physical Exam   His mood appears good he is requesting some help with his living situation as he states he has to get of his current situation, there are many drugs around him,    Lungs have bilateral breath sounds are clear throughout heart regular rhythm without murmur gallop abdomen is soft and benign nondistended nontender    There is right hand edema is improving there is no erythema he can almost make a complete fist now his wrist has much better range of motion.     It is okay, he is appreciative of care.        Results Review:  Lab Results   Component Value Date    WBC 2.40 (C) 02/09/2017    HGB 12.7 (L) 02/09/2017    HCT 39.7 (L) 02/09/2017    MCV 88.2 02/09/2017    PLT 79 (L)  02/09/2017     Lab Results   Component Value Date    GLUCOSE 253 (H) 02/09/2017    BUN 9 02/09/2017    CREATININE 0.46 02/09/2017    EGFRIFNONA >150 02/09/2017    EGFRIFAFRI  09/26/2016      Comment:      <15 Indicative of kidney failure.    BCR 19.6 02/09/2017    CO2 28.3 02/09/2017    CALCIUM 8.3 02/09/2017    ALBUMIN 2.50 (L) 02/09/2017    LABIL2 1.1 (L) 02/09/2017     (H) 02/09/2017     (H) 02/09/2017       Imaging Results (last 72 hours)     Procedure Component Value Units Date/Time    XR Chest AP [[id number]] Collected:  02/07/17 1002     Updated:  02/07/17 1018    Narrative:       XR CHEST AP-     REASON FOR EXAM:  cp; L03.113-Cellulitis of right upper limb     The chest is compared with earlier chest done 10/17/2016     FINDINGS: The cardiac silhouette and mediastinum are stable in size and  shape. The lungs are both well aerated and clear. There is some bowel  interposed between the liver and the right diaphragm. The cardiac  silhouette is normal in size and shape.       Impression:       Stable chest. A source of patient's chest pain is not  demonstrated.     This report was finalized on 2/7/2017 10:15 AM by Dr. Keaton Oh II, MD.       XR Abdomen 2 View With Chest 1 View [[id number]] Collected:  02/08/17 1125     Updated:  02/08/17 1300    Narrative:       XR ABDOMEN 2 VIEW WITH CHEST 1 VIEW-     REASON FOR EXAM:  Vomiting; L03.113-Cellulitis of right upper limb.     FINDINGS: The chest portion of the exam shows the lungs to be well  aerated and clear. There was no pleural fluid or free air beneath the  diaphragm. Supine and upright views of the abdomen show nonspecific  bowel gas pattern. There was nothing seen to suggest obstruction to the  bowel. No soft tissue masses are demonstrated.       Impression:       Nonspecific acute abdominal series.     This report was finalized on 2/8/2017 12:58 PM by Dr. Keaton Oh II, MD.                    Assessment/Plan     Principal  Problem:  Cellulitis right hand hand/ arm, significantly improved, continue vancomycin and Flagyl, consider de-escalating to oral antibiotics tomorrow if present progress continues, I am going to elevate the hand overnight    Elevated INR, slightly improved after vitamin K but overall stable, again he may have cirrhosis.  Further workup as an outpatient    Pancytopenia probably related to the active hepatitis infection as well as splenomegaly    DVT prophylaxis, continue SCDs, not an anticoagulant candidate secondary to thrombocytopenia    Chest pain, resolved, he did not have chest pain during the stress test, stress test results pending.  EF normal in September 1 positive blood culture that is coagulase-negative staph, probable contaminant    Acute hepatitis B infection with associated elevated transaminases, continue to follow trend.    Nausea and vomiting resolved.    Diabetes, overall acceptable control, follow.    Disposition, discussed with , discussed with the patient, we are attempting to find a better solution for him.  With his drug history he does need of his current living situation if he can all possible(                  Memo Quarles MD  02/09/17  3:53 PM

## 2017-02-09 NOTE — CONSULTS
"Diabetes Education  Assessment/Teaching    Patient Name:  Isaias Lang  YOB: 1965  MRN: [id number]  Admit Date:  2/4/2017      Assessment Date:  2/9/2017       Most Recent Value    General Information      Height  5' 8\" (1.727 m)    Height Method  Stated    Weight  178 lb 9.6 oz (81 kg)    Pregnancy Assessment     Diabetes History     What type of diabetes do you have?  Type 2    Length of Diabetes Diagnosis  10 + years    Current DM knowledge  fair    Have you had diabetes education/teaching in the past?  -- [in hospital]    Have you had low blood sugar? (<70mg/dl)  yes [62]    When was your last high blood sugar?  275 something like that I know it is running high    What makes it difficult for you to take care of your diabetes or yourself?  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  other (comment), recurring infections, foot ulcers    Education Preferences     Nutrition Information     Assessment Topics     Healthy Eating - Assessment  Competent    Being Active - Assessment  Competent    Taking Medication - Assessment  Competent    Problem Solving - Assessment  Competent    Reducing Risk - Assessment  Competent    Healthy Coping - Assessment  Competent    Monitoring - Assessment  Competent [\"checking about 4 times  aday\"]    DM Goals                Most Recent Value    DM Education Needs     Meter  Has own    Frequency of Testing  4 times a day    Medication  Insulin, Actions, Side effects, Vial    Problem Solving  Sick days, Signs, Symptoms, Treatment, Other (comment) [infection healing with elevation of glucoses discussed]    Healthy Coping  Appropriate [drowsy]    Discharge Plan  Other (comment)    Motivation  Moderate    Teaching Method  Explanation, Discussion, Handouts, Teach back    Patient Response  Verbalized understanding            Other Comments:  Discussed meter and requirements related " costs & coverage and 800#        Electronically signed by:  Cherri Oliver RN  02/09/17 3:37 PM

## 2017-02-09 NOTE — PLAN OF CARE
Problem: Pain, Acute (Adult)  Goal: Acceptable Pain Control/Comfort Level  Outcome: Ongoing (interventions implemented as appropriate)    Problem: Cellulitis (Adult)  Goal: Signs and Symptoms of Listed Potential Problems Will be Absent or Manageable (Cellulitis)  Outcome: Ongoing (interventions implemented as appropriate)    Problem: Patient Care Overview (Adult)  Goal: Plan of Care Review  Outcome: Ongoing (interventions implemented as appropriate)  Goal: Discharge Needs Assessment  Outcome: Ongoing (interventions implemented as appropriate)    Problem: Diabetes, Type 1 (Adult)  Goal: Signs and Symptoms of Listed Potential Problems Will be Absent or Manageable (Diabetes, Type 1)  Outcome: Ongoing (interventions implemented as appropriate)    Problem: Diabetes, Type 2 (Adult)  Goal: Signs and Symptoms of Listed Potential Problems Will be Absent or Manageable (Diabetes, Type 2)  Outcome: Ongoing (interventions implemented as appropriate)

## 2017-02-10 LAB
ALBUMIN SERPL-MCNC: 2.8 G/DL (ref 3.5–5)
ALBUMIN/GLOB SERPL: 1.2 G/DL (ref 1.5–2.5)
ALP SERPL-CCNC: 123 U/L (ref 46–116)
ALT SERPL W P-5'-P-CCNC: 153 U/L (ref 10–44)
ANION GAP SERPL CALCULATED.3IONS-SCNC: 0.3 MMOL/L (ref 3.6–11.2)
AST SERPL-CCNC: 229 U/L (ref 10–34)
BASOPHILS # BLD AUTO: 0.01 10*3/MM3 (ref 0–0.3)
BASOPHILS NFR BLD AUTO: 0.4 % (ref 0–2)
BILIRUB SERPL-MCNC: 0.8 MG/DL (ref 0.2–1.8)
BUN BLD-MCNC: 8 MG/DL (ref 7–21)
BUN/CREAT SERPL: 19.5 (ref 7–25)
CALCIUM SPEC-SCNC: 8.5 MG/DL (ref 7.7–10)
CHLORIDE SERPL-SCNC: 107 MMOL/L (ref 99–112)
CO2 SERPL-SCNC: 30.7 MMOL/L (ref 24.3–31.9)
CREAT BLD-MCNC: 0.41 MG/DL (ref 0.43–1.29)
DEPRECATED RDW RBC AUTO: 53.2 FL (ref 37–54)
EOSINOPHIL # BLD AUTO: 0.08 10*3/MM3 (ref 0–0.7)
EOSINOPHIL NFR BLD AUTO: 3.5 % (ref 0–5)
ERYTHROCYTE [DISTWIDTH] IN BLOOD BY AUTOMATED COUNT: 16.7 % (ref 11.5–14.5)
GFR SERPL CREATININE-BSD FRML MDRD: >150 ML/MIN/1.73
GLOBULIN UR ELPH-MCNC: 2.3 GM/DL
GLUCOSE BLD-MCNC: 227 MG/DL (ref 70–110)
GLUCOSE BLDC GLUCOMTR-MCNC: 156 MG/DL (ref 70–130)
GLUCOSE BLDC GLUCOMTR-MCNC: 166 MG/DL (ref 70–130)
GLUCOSE BLDC GLUCOMTR-MCNC: 191 MG/DL (ref 70–130)
GLUCOSE BLDC GLUCOMTR-MCNC: 205 MG/DL (ref 70–130)
HCT VFR BLD AUTO: 41.1 % (ref 42–52)
HGB BLD-MCNC: 13.2 G/DL (ref 14–18)
IMM GRANULOCYTES # BLD: 0 10*3/MM3 (ref 0–0.03)
IMM GRANULOCYTES NFR BLD: 0 % (ref 0–0.5)
LYMPHOCYTES # BLD AUTO: 0.47 10*3/MM3 (ref 1–3)
LYMPHOCYTES NFR BLD AUTO: 20.8 % (ref 21–51)
MCH RBC QN AUTO: 28.1 PG (ref 27–33)
MCHC RBC AUTO-ENTMCNC: 32.1 G/DL (ref 33–37)
MCV RBC AUTO: 87.6 FL (ref 80–94)
MONOCYTES # BLD AUTO: 0.3 10*3/MM3 (ref 0.1–0.9)
MONOCYTES NFR BLD AUTO: 13.3 % (ref 0–10)
NEUTROPHILS # BLD AUTO: 1.4 10*3/MM3 (ref 1.4–6.5)
NEUTROPHILS NFR BLD AUTO: 62 % (ref 30–70)
OSMOLALITY SERPL CALC.SUM OF ELEC: 281.1 MOSM/KG (ref 273–305)
PLATELET # BLD AUTO: 82 10*3/MM3 (ref 130–400)
PMV BLD AUTO: 11.7 FL (ref 6–10)
POTASSIUM BLD-SCNC: 4 MMOL/L (ref 3.5–5.3)
PROT SERPL-MCNC: 5.1 G/DL (ref 6–8)
RBC # BLD AUTO: 4.69 10*6/MM3 (ref 4.7–6.1)
SODIUM BLD-SCNC: 138 MMOL/L (ref 135–153)
WBC NRBC COR # BLD: 2.26 10*3/MM3 (ref 4.5–12.5)

## 2017-02-10 PROCEDURE — 63710000001 INSULIN ASPART PER 5 UNITS: Performed by: PHYSICIAN ASSISTANT

## 2017-02-10 PROCEDURE — 99232 SBSQ HOSP IP/OBS MODERATE 35: CPT | Performed by: INTERNAL MEDICINE

## 2017-02-10 PROCEDURE — 25010000002 ONDANSETRON PER 1 MG: Performed by: NURSE PRACTITIONER

## 2017-02-10 PROCEDURE — 82962 GLUCOSE BLOOD TEST: CPT

## 2017-02-10 PROCEDURE — 25010000002 VANCOMYCIN PER 500 MG

## 2017-02-10 PROCEDURE — 63710000001 INSULIN ASPART PER 5 UNITS: Performed by: INTERNAL MEDICINE

## 2017-02-10 PROCEDURE — 94640 AIRWAY INHALATION TREATMENT: CPT

## 2017-02-10 PROCEDURE — 85025 COMPLETE CBC W/AUTO DIFF WBC: CPT | Performed by: INTERNAL MEDICINE

## 2017-02-10 PROCEDURE — 80053 COMPREHEN METABOLIC PANEL: CPT | Performed by: INTERNAL MEDICINE

## 2017-02-10 PROCEDURE — 94799 UNLISTED PULMONARY SVC/PX: CPT

## 2017-02-10 PROCEDURE — 63710000001 INSULIN DETEMIR PER 5 UNITS: Performed by: INTERNAL MEDICINE

## 2017-02-10 RX ORDER — ISOSORBIDE MONONITRATE 60 MG/1
60 TABLET, EXTENDED RELEASE ORAL
Status: DISCONTINUED | OUTPATIENT
Start: 2017-02-10 | End: 2017-02-11 | Stop reason: HOSPADM

## 2017-02-10 RX ORDER — METOPROLOL SUCCINATE 50 MG/1
50 TABLET, EXTENDED RELEASE ORAL DAILY
Status: DISCONTINUED | OUTPATIENT
Start: 2017-02-11 | End: 2017-02-11 | Stop reason: HOSPADM

## 2017-02-10 RX ORDER — DOXYCYCLINE 100 MG/1
100 CAPSULE ORAL EVERY 12 HOURS SCHEDULED
Status: DISCONTINUED | OUTPATIENT
Start: 2017-02-10 | End: 2017-02-11 | Stop reason: HOSPADM

## 2017-02-10 RX ADMIN — METOPROLOL SUCCINATE 25 MG: 25 TABLET, FILM COATED, EXTENDED RELEASE ORAL at 08:14

## 2017-02-10 RX ADMIN — Medication 10 ML: at 00:10

## 2017-02-10 RX ADMIN — PANCRELIPASE 24000 UNITS OF LIPASE: 60000; 12000; 38000 CAPSULE, DELAYED RELEASE PELLETS ORAL at 17:42

## 2017-02-10 RX ADMIN — Medication 10 ML: at 06:17

## 2017-02-10 RX ADMIN — METRONIDAZOLE 500 MG: 500 INJECTION, SOLUTION INTRAVENOUS at 00:10

## 2017-02-10 RX ADMIN — OXYCODONE HYDROCHLORIDE 15 MG: 15 TABLET ORAL at 01:31

## 2017-02-10 RX ADMIN — OXYCODONE HYDROCHLORIDE 15 MG: 15 TABLET ORAL at 11:03

## 2017-02-10 RX ADMIN — OXYCODONE HYDROCHLORIDE 15 MG: 15 TABLET ORAL at 23:16

## 2017-02-10 RX ADMIN — POLYETHYLENE GLYCOL (3350) 17 G: 17 POWDER, FOR SOLUTION ORAL at 08:15

## 2017-02-10 RX ADMIN — GABAPENTIN 400 MG: 400 CAPSULE ORAL at 20:11

## 2017-02-10 RX ADMIN — ONDANSETRON 4 MG: 2 INJECTION, SOLUTION INTRAMUSCULAR; INTRAVENOUS at 11:51

## 2017-02-10 RX ADMIN — GABAPENTIN 400 MG: 400 CAPSULE ORAL at 14:51

## 2017-02-10 RX ADMIN — IPRATROPIUM BROMIDE AND ALBUTEROL SULFATE 3 ML: .5; 3 SOLUTION RESPIRATORY (INHALATION) at 08:06

## 2017-02-10 RX ADMIN — Medication 10 ML: at 06:48

## 2017-02-10 RX ADMIN — PANCRELIPASE 24000 UNITS OF LIPASE: 60000; 12000; 38000 CAPSULE, DELAYED RELEASE PELLETS ORAL at 20:10

## 2017-02-10 RX ADMIN — PANTOPRAZOLE SODIUM 40 MG: 40 TABLET, DELAYED RELEASE ORAL at 08:15

## 2017-02-10 RX ADMIN — PANTOPRAZOLE SODIUM 40 MG: 40 TABLET, DELAYED RELEASE ORAL at 17:42

## 2017-02-10 RX ADMIN — GABAPENTIN 400 MG: 400 CAPSULE ORAL at 06:16

## 2017-02-10 RX ADMIN — DOXYCYCLINE 100 MG: 100 CAPSULE ORAL at 20:10

## 2017-02-10 RX ADMIN — PANCRELIPASE 24000 UNITS OF LIPASE: 60000; 12000; 38000 CAPSULE, DELAYED RELEASE PELLETS ORAL at 08:14

## 2017-02-10 RX ADMIN — Medication 10 ML: at 08:29

## 2017-02-10 RX ADMIN — INSULIN ASPART 2 UNITS: 100 INJECTION, SOLUTION INTRAVENOUS; SUBCUTANEOUS at 17:42

## 2017-02-10 RX ADMIN — INSULIN ASPART 3 UNITS: 100 INJECTION, SOLUTION INTRAVENOUS; SUBCUTANEOUS at 11:51

## 2017-02-10 RX ADMIN — OXYCODONE HYDROCHLORIDE 15 MG: 15 TABLET ORAL at 14:51

## 2017-02-10 RX ADMIN — OXYCODONE HYDROCHLORIDE 15 MG: 15 TABLET ORAL at 06:16

## 2017-02-10 RX ADMIN — INSULIN ASPART 2 UNITS: 100 INJECTION, SOLUTION INTRAVENOUS; SUBCUTANEOUS at 08:28

## 2017-02-10 RX ADMIN — Medication 10 ML: at 21:00

## 2017-02-10 RX ADMIN — DOXYCYCLINE 100 MG: 100 CAPSULE ORAL at 10:00

## 2017-02-10 RX ADMIN — ONDANSETRON 4 MG: 2 INJECTION, SOLUTION INTRAMUSCULAR; INTRAVENOUS at 06:47

## 2017-02-10 RX ADMIN — NITROGLYCERIN 1 INCH: 20 OINTMENT TOPICAL at 00:10

## 2017-02-10 RX ADMIN — INSULIN DETEMIR 25 UNITS: 100 INJECTION, SOLUTION SUBCUTANEOUS at 21:00

## 2017-02-10 RX ADMIN — Medication 10 ML: at 00:11

## 2017-02-10 RX ADMIN — ASPIRIN 81 MG: 81 TABLET ORAL at 08:15

## 2017-02-10 RX ADMIN — POLYETHYLENE GLYCOL (3350) 17 G: 17 POWDER, FOR SOLUTION ORAL at 20:09

## 2017-02-10 RX ADMIN — VANCOMYCIN HYDROCHLORIDE 1500 MG: 5 INJECTION, POWDER, LYOPHILIZED, FOR SOLUTION INTRAVENOUS at 06:16

## 2017-02-10 RX ADMIN — PANCRELIPASE 24000 UNITS OF LIPASE: 60000; 12000; 38000 CAPSULE, DELAYED RELEASE PELLETS ORAL at 11:51

## 2017-02-10 RX ADMIN — POLYETHYLENE GLYCOL (3350) 17 G: 17 POWDER, FOR SOLUTION ORAL at 00:10

## 2017-02-10 RX ADMIN — ISOSORBIDE MONONITRATE 60 MG: 60 TABLET, EXTENDED RELEASE ORAL at 11:03

## 2017-02-10 RX ADMIN — IPRATROPIUM BROMIDE AND ALBUTEROL SULFATE 3 ML: .5; 3 SOLUTION RESPIRATORY (INHALATION) at 19:51

## 2017-02-10 RX ADMIN — OXYCODONE HYDROCHLORIDE 15 MG: 15 TABLET ORAL at 19:21

## 2017-02-10 NOTE — PROGRESS NOTES
"     LOS: 5 days     Name: Isaias Lang  Age/Sex: 51 y.o. male  :  1965        PCP: NOHEMI Felipe  REF: No ref. provider found    Principal Problem:    Cellulitis of right hand  Active Problems:    Cellulitis of right hand excluding fingers and thumb        Subjective  Mr. Lang is a 51-year-old  male with history of nonobstructive coronary artery disease with vascular catheterization in 2011 revealing about 50% diffuse disease in the distal LAD and about the 50% stenosis in the small second obtuse marginal branch. He was admitted with complaints of mainly swelling of his right upper extremity for the last few days and was diagnosed to have possible cellulitis in this arm. He has also been complaining of chest pains for the last week or so and hence a cardiology consultation has been asked for. On further questioning Mr. Lang describes his chest pains as being felt as sometimes a dull pain and sometimes felt as \"ton of bricks on my chest\" with some shortness of breath. He had an episode of bleed on the day of admission and then since then has been having some chest soreness. He also indicates that he gets short of breath walking short distances. His cardiac markers so far have been negative for troponin ×3. Total CPK was normal but the MB fraction is a proportionately  mildly elevated. His EKG so far has revealed sinus rhythm with no significant ST-T wave changes of myocardial ischemia or infarction.     Interval History: He still has some intermittent left-sided chest pains that seem to be associated with some abdominal pains.  He has some nausea with abdominal pains and threw up this morning.  No hematemesis or melena.    ROS    Vital Signs  Temp:  [97.4 °F (36.3 °C)-97.9 °F (36.6 °C)] 97.5 °F (36.4 °C)  Heart Rate:  [72-81] 77  Resp:  [18-22] 18  BP: (126-140)/(66-85) 133/78  Vital Signs (last 72 hrs)        0700  -   0659  0700  -   0659  07 "  -  02/10 0659 02/10 0700  -  02/10 0856   Most Recent    Temp (°F) 97.1 -  98.5    97.3 -  98.1    97.4 -  97.9       97.5 (36.4)    Heart Rate 78 -  87    72 -  84    72 -  95      77     77    Resp 16 -  18    18 -  20    18 -  22      18     18    /67 -  133/77    103/56 -  156/98    126/66 -  140/85       133/78    SpO2 (%) 93 -  96    95 -  100    92 -  98      92     92        Body mass index is 27.16 kg/(m^2).    Intake/Output Summary (Last 24 hours) at 02/10/17 0856  Last data filed at 02/10/17 0300   Gross per 24 hour   Intake   1200 ml   Output      0 ml   Net   1200 ml     Objective        Physical Exam:     General Appearance:    Alert, cooperative, in no acute distress   Head:    Normocephalic, without obvious abnormality, atraumatic   Eyes:            Conjunctivae and sclerae normal, no   icterus, no pallor, corneas clear.   Ears:    Ears appear intact with no abnormalities noted   Throat:   No oral lesions, no thrush, oral mucosa moist   Neck:   No adenopathy, supple, trachea midline, no thyromegaly, no   carotid bruit, no JVD   Back:     No kyphosis present, no scoliosis present, no skin lesions,      erythema or scars, no tenderness to percussion or                   palpation,   range of motion normal   Lungs:     Clear to auscultation,respirations regular, even and                  unlabored    Heart:    Regular rhythm and normal rate, normal S1 and S2, no            murmur, no gallop, no rub, no click   Chest Wall:    No abnormalities observed   Abdomen:     Normal bowel sounds, no masses, no organomegaly, soft        mild tenderness in the epigastrium and left upper quadrant with no rebound tenderness., non-distended, no guarding.   Rectal:     Deferred   Extremities:   Moves all extremities well, no edema, no cyanosis, no             redness   Pulses:   Pulses palpable and equal bilaterally   Skin:   No bleeding, bruising or rash              Procedures    Results Review:     Results  from last 7 days  Lab Units 02/10/17  0211 02/09/17  0117 02/08/17  0612 02/07/17  0113 02/06/17  0210 02/05/17  0447 02/04/17  1354   WBC 10*3/mm3 2.26* 2.40* 3.25* 2.41* 2.92* 3.40* 3.99*   HEMOGLOBIN g/dL 13.2* 12.7* 13.4* 13.3* 13.0* 12.9* 13.5*   PLATELETS 10*3/mm3 82* 79* 91* 63* 68* 66* 63*       Results from last 7 days  Lab Units 02/10/17  0211 02/09/17  0117 02/08/17  0612 02/07/17  0113 02/06/17  0210 02/05/17 0447 02/04/17  1319   SODIUM mmol/L 138 137 140 140 137 138 138   POTASSIUM mmol/L 4.0 4.0 3.9 4.3 4.1 3.7 4.9   CHLORIDE mmol/L 107 107 108 109 107 107 106   TOTAL CO2 mmol/L 30.7 28.3 30.2 30.5 28.7 28.7 30.7   BUN mg/dL 8 9 9 8 8 8 9   CREATININE mg/dL 0.41* 0.46 0.38* 0.78 0.49 0.49 0.52   CALCIUM mg/dL 8.5 8.3 8.8 8.4 8.1 8.3 8.8   GLUCOSE mg/dL 227* 253* 91 233* 246* 247* 177*   ALT (SGPT) U/L 153* 140* 158* 167* 169* 178* 219*   AST (SGOT) U/L 229* 193* 220* 201* 206* 221* 284*       Results from last 7 days  Lab Units 02/08/17  1030 02/07/17  0459 02/07/17  0113 02/06/17  1817   CK TOTAL U/L  --  60 69 77   TROPONIN I ng/mL <0.006 <0.006 <0.006 <0.006   CKMB ng/mL  --  5.14* 4.78 5.41*   MYOGLOBIN ng/mL  --  28.0 41.0 49.0     Lab Results   Component Value Date    INR 1.36 (H) 02/09/2017    INR 1.40 (H) 02/08/2017    INR 1.06 09/28/2016    INR 1.47 (H) 01/22/2016    INR 1.30 (H) 01/21/2016    INR 1.62 09/26/2014    INR 1.08 05/14/2014     Lab Results   Component Value Date    MG 1.6 (L) 02/06/2017    MG 1.7 02/04/2017    MG 1.6 (L) 10/19/2016     Lab Results   Component Value Date    TSH 0.284 (L) 02/04/2017    CHLPL 193 09/26/2014    TRIG 109 09/18/2016    HDL 27 (L) 09/18/2016     (H) 09/26/2014      Lab Results   Component Value Date    BNP 26 03/21/2014     Echo-Doppler study on 9/14/2016:  Lab Results   Component Value Date    ECHOEFEST 60 09/14/2016   · Left ventricular function is normal. Estimated EF = 60%.  · All left ventricular wall segments contract normally.  · Mild  tricuspid valve regurgitation is present.  · The aortic valve is structurally normal. No aortic valve regurgitation is present. No aortic valve stenosis is present. Nnt  · The mitral valve is normal in structure. Trace mitral valve regurgitation is present. No significant mitral valve stenosis is present.  · The pulmonic valve is structurally normal. There is no significant pulmonic valve stenosis present. There is no pulmonic valve regurgitation present.  · No vegetation seen on any valve.  · There is no evidence of pericardial effusion.      Nuclear Stress Test on 2/9/2017 revealed:  · Findings consistent with a normal ECG stress test.  · Myocardial perfusion imaging indicates a normal myocardial perfusion study with no evidence of ischemia.  · Normal LV cavity size. Normal LV wall motion noted.  · Left ventricular ejection fraction is normal (Calculated EF = 50-55%).  · Impressions are consistent with a low risk study.       I reviewed the patient's new clinical results.      Medication Review:     aspirin 81 mg Oral Daily   doxycycline 100 mg Oral Q12H   gabapentin 400 mg Oral Q8H   insulin aspart 0-7 Units Subcutaneous 4x Daily AC & at Bedtime   insulin detemir 20 Units Subcutaneous Nightly   metoprolol succinate XL 25 mg Oral Daily   nitroglycerin 1 inch Topical Q6H   pancrelipase (Lip-Prot-Amyl) 24,000 units of lipase Oral 4x Daily With Meals & Nightly   pantoprazole 40 mg Oral BID AC   polyethylene glycol 17 g Oral Q12H   sodium chloride 10 mL Intracatheter Q12H   technetium sestamibi 1 dose Intravenous Once in imaging            Assessment:    1. Recurrent chest pains with some typical and some atypical features for CCS class III angina  2. Abnormal Lexiscan sestamibi study with no evidence of myocardial ischemia on 2/9/2017  3. History of anxiety and panic attacks  4. History of pancreatitis with intermittent abdominal pains  5. Essential hypertension, controlled    Recommendations:    1. I have reviewed  the results of the nuclear stress test with him.   2. Since he does get some relief with sublingual nitroglycerin, will start him on oral nitrates for symptomatic relief.  3. If he has recurrent significant anginal symptoms then may consider further cardiac evaluation with cardiac catheterization later.      I discussed the patients findings and my recommendations with patient and family        ELSA Mota  02/10/17  8:56 AM    Dragon disclaimer:  Much of this encounter note is an electronic transcription/translation of spoken language to printed text. The electronic translation of spoken language may permit erroneous, or at times, nonsensical words or phrases to be inadvertently transcribed; Although I have reviewed the note for such errors, some may still exist.

## 2017-02-10 NOTE — PLAN OF CARE
Problem: Pain, Acute (Adult)  Goal: Acceptable Pain Control/Comfort Level  Outcome: Ongoing (interventions implemented as appropriate)    02/10/17 0300   Pain, Acute (Adult)   Acceptable Pain Control/Comfort Level making progress toward outcome         Problem: Cellulitis (Adult)  Goal: Signs and Symptoms of Listed Potential Problems Will be Absent or Manageable (Cellulitis)  Outcome: Ongoing (interventions implemented as appropriate)    02/07/17 0123   Cellulitis   Problems Assessed (Cellulitis) all   Problems Present (Cellulitis) pain         Problem: Patient Care Overview (Adult)  Goal: Plan of Care Review  Outcome: Ongoing (interventions implemented as appropriate)    02/07/17 0123 02/09/17 0715   Coping/Psychosocial Response Interventions   Plan Of Care Reviewed With --  patient   Patient Care Overview   Progress progress toward functional goals as expected --        Goal: Adult Individualization and Mutuality  Outcome: Ongoing (interventions implemented as appropriate)    Problem: Diabetes, Type 1 (Adult)  Goal: Signs and Symptoms of Listed Potential Problems Will be Absent or Manageable (Diabetes, Type 1)  Outcome: Ongoing (interventions implemented as appropriate)    02/08/17 2350   Diabetes, Type 1   Problems Assessed (Type 1 Diabetes) all   Problems Present (Type 1 Diabetes) impaired glycemic control         Problem: Diabetes, Type 2 (Adult)  Goal: Signs and Symptoms of Listed Potential Problems Will be Absent or Manageable (Diabetes, Type 2)  Outcome: Ongoing (interventions implemented as appropriate)    02/08/17 2350   Diabetes, Type 2   Problems Assessed (Type 2 Diabetes) all   Problems Present (Type 2 Diabetes) impaired glycemic control

## 2017-02-10 NOTE — PROGRESS NOTES
I have seen the patient in conjunction with Leena CULLEN    History of presenting illness:  Patient states overall he is doing okay he feels like his right hand is improving he's had no fever overnight.  He said no chest pain and breathing is doing okay.  His stress test was negative.  He is tolerating his diet.    Vital Signs  Temp:  [97.4 °F (36.3 °C)-97.9 °F (36.6 °C)] 97.6 °F (36.4 °C)  Heart Rate:  [72-80] 76  Resp:  [18-22] 18  BP: (123-140)/(66-85) 123/68  Body mass index is 27.16 kg/(m^2).      Intake/Output Summary (Last 24 hours) at 02/10/17 1415  Last data filed at 02/10/17 1300   Gross per 24 hour   Intake   2160 ml   Output      0 ml   Net   2160 ml     Intake & Output (last 3 days)       02/07 0701 - 02/08 0700 02/08 0701 - 02/09 0700 02/09 0701 - 02/10 0700 02/10 0701 - 02/11 0700    P.O. 820 1200 1200 960    Total Intake(mL/kg) 820 (10.1) 1200 (14.8) 1200 (14.8) 960 (11.9)    Urine (mL/kg/hr) 0 (0)       Stool 0 (0)       Total Output 0          Net +820 +1200 +1200 +960            Unmeasured Urine Occurrence 3 x 5 x 8 x 2 x    Unmeasured Stool Occurrence  1 x 1 x     Unmeasured Emesis Occurrence  1 x            Physical exam:  Physical Exam   Constitutional: Well-developed, well-nourished.  Pleasant.  No distress  Head: Normocephalic and atraumatic.  Hearing is intact, mucous membranes are moist.   Eyes:  Pupils are equal, round, and reactive to light. No scleral icterus.   Cardiovascular: Normal rate, regular rhythm and normal heart sounds.  No murmur rub or gallop  Pulmonary/Chest: Bilateral breath sounds no rhonchus rales or wheezing heard  Abdominal: Soft, flat, bowel sounds are active, nondistended nontender.  No peritoneal signs   Musculoskeletal: Strength is symmetric, right hand edema is much better wrist has good range of motion he can make a fist now no cellulitis noted  Neurological: Nonfocal, alert.  .  Skin: Skin is warm and dry.   Psychiatric:  Normal mood and affect.       Results  Review:  Lab Results   Component Value Date    WBC 2.26 (C) 02/10/2017    HGB 13.2 (L) 02/10/2017    HCT 41.1 (L) 02/10/2017    MCV 87.6 02/10/2017    PLT 82 (L) 02/10/2017     Lab Results   Component Value Date    GLUCOSE 227 (H) 02/10/2017    BUN 8 02/10/2017    CREATININE 0.41 (L) 02/10/2017    EGFRIFNONA >150 02/10/2017    EGFRIFAFRI  09/26/2016      Comment:      <15 Indicative of kidney failure.    BCR 19.5 02/10/2017    CO2 30.7 02/10/2017    CALCIUM 8.5 02/10/2017    ALBUMIN 2.80 (L) 02/10/2017    LABIL2 1.2 (L) 02/10/2017     (H) 02/10/2017     (H) 02/10/2017       Imaging Results (last 72 hours)     Procedure Component Value Units Date/Time    XR Abdomen 2 View With Chest 1 View [[id number]] Collected:  02/08/17 1125     Updated:  02/08/17 1300    Narrative:       XR ABDOMEN 2 VIEW WITH CHEST 1 VIEW-     REASON FOR EXAM:  Vomiting; L03.113-Cellulitis of right upper limb.     FINDINGS: The chest portion of the exam shows the lungs to be well  aerated and clear. There was no pleural fluid or free air beneath the  diaphragm. Supine and upright views of the abdomen show nonspecific  bowel gas pattern. There was nothing seen to suggest obstruction to the  bowel. No soft tissue masses are demonstrated.       Impression:       Nonspecific acute abdominal series.     This report was finalized on 2/8/2017 12:58 PM by Dr. Keaton Oh II, MD.                    Assessment/Plan     Principal Problem:    Cellulitis of right hand, much improved, on oral antibiotics, anticipate discharge tomorrow.  Active Problems:  Chest pain, noncardiac, stress test negative, resolved, no further intervention    Diabetes, not well controlled, insulin adjusted    Acute/subacute hepatitis B superimposed on hepatitis C antibody positive, he will need outpatient follow-up for this.  I suggested a follow-up with [facility] liver clinic    Disposition, is located at home that can written him a room.  Hospital has  "been gracious in trying to arrange this and make this happen including financial.  Patient states he feels \"scared\".  We discussed this in some detail.    DVT prophylax, continue SCDs, because of thrombocytopenia is not an anticoagulant candidate    Splenomegaly            Memo Quarles MD  02/10/17  2:15 PM    "

## 2017-02-10 NOTE — PROGRESS NOTES
Discharge Planning Assessment   [place]     Patient Name: Isaias Lang  MRN: [id number]  Today's Date: 2/10/2017    Admit Date: 2/4/2017       Discharge Plan       02/10/17 1251    Case Management/Social Work Plan    Plan SS contacted [facility] ([address] [place]) [phone] per Suellen who states rent is $400 per month upon arrival of person. HAMLETRefugiosameer will prorate this month's rent to $300 including $100 deposit. scarletts agreeable to give $100 deposit back if pt doesn't stay. SS spoke to Dr. Quarles who spoke to pt. Pt is unable to pay $300 at this time due to receiving monthly income at a different time. Dr. Quarles plans to discharge pt on Saturday, 2-11-17, if pt is able to go to [facility]. SS contacted [facility] who is agreeable to provide $300 for rent. SS spoke to Administration per Angie who will provide check for rent made out to Suellen Delgado with Hema Madrid approval. SS was informed by Cookie per Suellen Delgado that hospital is not responsible for pt. Cookie to pick check up at [facility] at 14:30. Cookie to give $300 back to [facility] if pt does not come. Pt's dtr to provide transportation on Saturday, 2-11-17 per Dr. Quarles. SS to follow.     14:28 SS spoke to pt who is agreeable to be discharged to [facility] on Saturday, 2-11-17 and is attempting to contact his dtr to assist with picking his belongings up. SS made pt aware that hospital is agreeable to assist with one time $300 rent payment. SS provided check to [facility] per Diamond Oliver. SS discussed R-Yanick with pt. SS to follow.               Expected Discharge Date and Time     Expected Discharge Date Expected Discharge Time    Feb 11, 2017                Gosia Mendoza

## 2017-02-10 NOTE — PAYOR COMM NOTE
"Contact: Bernie Lynch RN @ [facility]  Phone: [phone]  Fax: [phone]    Clinical update              Isaias Avendano (51 y.o. Male)     Date of Birth Social Security Number Address Home Phone MRN    1965  [address]  [place] [address] [phone] [id number]    Mandaeism Marital Status          Yarsani        Admission Date Admission Type Admitting Provider Attending Provider Department, Room/Bed    2/4/17 Emergency Elva Hdz MD Grace, Rowan Samuel DO [address], 3327/1P    Discharge Date Discharge Disposition Discharge Destination                      Attending Provider: Rowan Juan DO     Allergies:  Robitussin Dm [Dextromethorphan-guaifenesin], Tizanidine, Metformin, Sulfa Antibiotics, Contrast Dye, Tramadol    Isolation:  Contact   Infection:  MRSA (01/25/16)   Code Status:  FULL    Ht:  68\" (172.7 cm)   Wt:  178 lb 9.6 oz (81 kg)    Admission Cmt:  None   Principal Problem:  Cellulitis of right hand [L03.113]                 Active Insurance as of 2/4/2017     Primary Coverage     Payor Plan Insurance Group Employer/Plan Group    [facility]      Payor Plan Address Payor Plan Phone Number Effective From Effective To    [address] [address]  2/1/2016     [place] [address]       Subscriber Name Subscriber Birth Date Member ID       ISAIAS AVENDANO 1965 [id number]                 Emergency Contacts      (Rel.) Home Phone Work Phone Mobile Phone    Nicki Dempsey (Sister) [phone] -- --               Physician Progress Notes (last 24 hours) (Notes from 2/9/2017  1:27 PM through 2/10/2017  1:27 PM)      Memo Quarles MD at 2/9/2017  3:53 PM  Version 1 of 1           I have seen the patient in conjunction with Mehnaz CULLEN       History of presenting illness:  Patient states he is feeling better he did have a stress test today and no chest pain around the time of the stress test.  " He thinks his hand is still swelling but better.  His nausea and vomiting is resolved exam no abdominal pain has tolerated a diet since the stress test, bowels are moving.    Vital Signs  Temp:  [97.3 °F (36.3 °C)-97.8 °F (36.6 °C)] 97.4 °F (36.3 °C)  Heart Rate:  [72-95] 76  Resp:  [18-22] 22  BP: (117-130)/(59-75) 126/66  Body mass index is 27.16 kg/(m^2).      Intake/Output Summary (Last 24 hours) at 02/09/17 1553  Last data filed at 02/09/17 0300   Gross per 24 hour   Intake    480 ml   Output      0 ml   Net    480 ml     Intake & Output (last 3 days)       02/06 0701 - 02/07 0700 02/07 0701 - 02/08 0700 02/08 0701 - 02/09 0700 02/09 0701 - 02/10 0700    P.O.      IV Piggyback        Total Intake(mL/kg) 960 (11.9) 820 (10.1) 1200 (14.8)     Urine (mL/kg/hr) 0 (0) 0 (0)      Stool 0 (0) 0 (0)      Total Output 0 0        Net +960 +820 +1200              Unmeasured Urine Occurrence 9 x 3 x 5 x 3 x    Unmeasured Stool Occurrence 0 x  1 x     Unmeasured Emesis Occurrence   1 x           Physical exam:  Physical Exam mood appears good he is requesting some help with his living situation as he states he has to get of his current situation, there are many drugs around him,    Lungs have bilateral breath sounds are clear throughout heart regular rhythm without murmur gallop abdomen is soft and benign nondistended nontender    There is right hand edema is improving there is no erythema he can almost make a complete fist now his wrist has much better range of motion.     It is okay, he is appreciative of care.        Results Review:  Lab Results   Component Value Date    WBC 2.40 (C) 02/09/2017    HGB 12.7 (L) 02/09/2017    HCT 39.7 (L) 02/09/2017    MCV 88.2 02/09/2017    PLT 79 (L) 02/09/2017     Lab Results   Component Value Date    GLUCOSE 253 (H) 02/09/2017    BUN 9 02/09/2017    CREATININE 0.46 02/09/2017    EGFRIFNONA >150 02/09/2017    EGFRIFAFRI  09/26/2016      Comment:      <15 Indicative of kidney  failure.    BCR 19.6 02/09/2017    CO2 28.3 02/09/2017    CALCIUM 8.3 02/09/2017    ALBUMIN 2.50 (L) 02/09/2017    LABIL2 1.1 (L) 02/09/2017     (H) 02/09/2017     (H) 02/09/2017       Imaging Results (last 72 hours)     Procedure Component Value Units Date/Time    XR Chest AP [[id number]] Collected:  02/07/17 1002     Updated:  02/07/17 1018    Narrative:       XR CHEST AP-     REASON FOR EXAM:  cp; L03.113-Cellulitis of right upper limb     The chest is compared with earlier chest done 10/17/2016     FINDINGS: The cardiac silhouette and mediastinum are stable in size and  shape. The lungs are both well aerated and clear. There is some bowel  interposed between the liver and the right diaphragm. The cardiac  silhouette is normal in size and shape.       Impression:       Stable chest. A source of patient's chest pain is not  demonstrated.     This report was finalized on 2/7/2017 10:15 AM by Dr. Keaton Oh II, MD.       XR Abdomen 2 View With Chest 1 View [[id number]] Collected:  02/08/17 1125     Updated:  02/08/17 1300    Narrative:       XR ABDOMEN 2 VIEW WITH CHEST 1 VIEW-     REASON FOR EXAM:  Vomiting; L03.113-Cellulitis of right upper limb.     FINDINGS: The chest portion of the exam shows the lungs to be well  aerated and clear. There was no pleural fluid or free air beneath the  diaphragm. Supine and upright views of the abdomen show nonspecific  bowel gas pattern. There was nothing seen to suggest obstruction to the  bowel. No soft tissue masses are demonstrated.       Impression:       Nonspecific acute abdominal series.     This report was finalized on 2/8/2017 12:58 PM by Dr. Keaton Oh II, MD.                    Assessment&#47;Plan     Principal Problem:right hand hand/ arm, significantly improved, continue vancomycin and Flagyl, consider de-escalating to oral antibiotics tomorrow if present progress continues, I am going to elevate the hand overnight    Elevated INR, slightly  "improved after vitamin K but overall stable, again he may have cirrhosis.  Further workup as an outpatient    Pancytopenia probably related to the active hepatitis infection as well as splenomegaly    DVT prophylaxis, continue SCDs, not an anticoagulant candidate secondary to thrombocytopenia    Chest pain, resolved, he did not have chest pain during the stress test, stress test results pending.  EF normal in September 1 positive blood culture that is coagulase-negative staph, probable contaminant    Acute hepatitis B infection with associated elevated transaminases, continue to follow trend.    Nausea and vomiting resolved.    Diabetes, overall acceptable control, follow.    Disposition, discussed with , discussed with the patient, we are attempting to find a better solution for him.  With his drug history he does need of his current living situation if he can all possible(                  Memo Quarles MD  02/09/17  3:53 PM       Electronically signed by Memo Quarles MD at 2/9/2017  4:04 PM      Tamara Bobby MD at 2/10/2017  8:35 AM  Version 2 of 2           I have personally seen and examined the patient today and discussed overnight interval progress and issues with nursing staff.    Subjective    The patient continues to have significant overall edema especially to the right upper extremity and left lower extremity without any evidence of abscess or warmth with remaining mild erythema to the right thumb but otherwise significant improvement.     History taken from: patient chart RN      Vital Signs    Visit Vitals   • /78 (BP Location: Right leg, Patient Position: Lying)   • Pulse 77   • Temp 97.5 °F (36.4 °C) (Oral)   • Resp 18   • Ht 68\" (172.7 cm)   • Wt 178 lb 9.6 oz (81 kg)   • SpO2 92%   • BMI 27.16 kg/m2       Temp:  [97.4 °F (36.3 °C)-97.9 °F (36.6 °C)] 97.5 °F (36.4 °C)      Intake/Output Summary (Last 24 hours) at 02/10/17 0820  Last data filed at 02/10/17 0300   " Gross per 24 hour   Intake   1200 ml   Output      0 ml   Net   1200 ml     Intake & Output (last 3 days)       02/07 0701 - 02/08 0700 02/08 0701 - 02/09 0700 02/09 0701 - 02/10 0700 02/10 0701 - 02/11 0700    P.O. 820 1200 1200     Total Intake(mL/kg) 820 (10.1) 1200 (14.8) 1200 (14.8)     Urine (mL/kg/hr) 0 (0)       Stool 0 (0)       Total Output 0          Net +820 +1200 +1200              Unmeasured Urine Occurrence 3 x 5 x 8 x     Unmeasured Stool Occurrence  1 x 1 x     Unmeasured Emesis Occurrence  1 x          Physical Exam:      General Appearance:  Alert, cooperative, in no acute distress   Head:  Normocephalic, without obvious abnormality, atraumatic   Eyes:      Lids and lashes normal, conjunctivae and sclerae normal, no icterus, no pallor, corneas clear, PERRLA   Ears:  Ears appear intact with no abnormalities noted   Throat: No oral lesions, no thrush, oral mucosa moist   Neck: No adenopathy, supple, trachea midline, no thyromegaly, no carotid bruit, no JVD   Back:  No tenderness to percussion or palpation, range of motion normal   Lungs:  Clear to auscultation,respirations regular, even and unlabored. No wheezing, no ronchi and no crackles.   Heart:  Regular rhythm and normal rate, normal S1 and S2, no murmur, no gallop, no rub, no click   Chest Wall:  No abnormalities observed   Abdomen:  Normal bowel sounds, no masses, no organomegaly, soft non-tender, non-distended, no guarding, no rebound tenderness   Rectal:  Deferred   Extremities: Moves all extremities well, no edema, no cyanosis, no redness   Pulses: Pulses palpable and equal bilaterally   Skin: overall edema especially to the right upper extremity and left lower extremity without any evidence of abscess or warmth with remaining mild erythema to the right thumb but otherwise significant improvement.   Lymph nodes: No palpable adenopathy   Neurologic: Awake, alert and oriented x 3. Following commands.     Results:      Results from last 7  days  Lab Units 02/10/17  0211 02/09/17  0117 02/08/17  0612 02/07/17  0113 02/06/17  0210 02/05/17  0447 02/04/17  1354   WBC 10*3/mm3 2.26* 2.40* 3.25* 2.41* 2.92* 3.40* 3.99*     Lab Results   Component Value Date    NEUTROABS 1.40 02/10/2017         Results from last 7 days  Lab Units 02/10/17  0211   CREATININE mg/dL 0.41*         Results from last 7 days  Lab Units 02/08/17  0612 02/07/17  0113 02/06/17  0210   CRP mg/dL 1.84* 2.20* 3.07*       Imaging Results (last 24 hours)     Procedure Component Value Units Date/Time    XR Abdomen 2 View With Chest 1 View [[id number]] Collected:  02/08/17 1125     Updated:  02/08/17 1125    Narrative:       XR ABDOMEN 2 VIEW WITH CHEST 1 VIEW-     REASON FOR EXAM:  Vomiting; L03.113-Cellulitis of right upper limb.     FINDINGS: The chest portion of the exam shows the lungs to be well  aerated and clear. There was no pleural fluid or free air beneath the  diaphragm. Supine and upright views of the abdomen show nonspecific  bowel gas pattern. There was nothing seen to suggest obstruction to the  bowel. No soft tissue masses are demonstrated.       Impression:       Nonspecific acute abdominal series.                  Results Review:    I have personally reviewed laboratory data, culture results, radiology studies and antimicrobial therapy.    Hospital Medications (active)       Dose Frequency Start End    aspirin EC tablet 81 mg 81 mg Daily 2/5/2017     Sig - Route: Take 1 tablet by mouth Daily. - Oral    Cosign for Ordering: Accepted by Elva Hdz MD on 2/5/2017  3:26 PM    dextrose (D50W) solution 25 g 25 g Every 15 Minutes PRN 2/4/2017     Sig - Route: Infuse 50 mL into a venous catheter Every 15 (Fifteen) Minutes As Needed for low blood sugar (Blood Sugar Less Than 70, Patient Has IV Access - Unresponsive, NPO or Unable To Safely Swallow). - Intravenous    Cosign for Ordering: Accepted by Rowan Juan DO on 2/4/2017  8:23 PM    dextrose (GLUTOSE) oral gel 15  g 15 g Every 15 Minutes PRN 2/4/2017     Sig - Route: Take 15 g by mouth Every 15 (Fifteen) Minutes As Needed for low blood sugar (Blood Sugar Less Than 70, Patient Alert, Is Not NPO & Can Safely Swallow). - Oral    Cosign for Ordering: Accepted by Rowan uJan DO on 2/4/2017  8:23 PM    dicyclomine (BENTYL) capsule 10 mg 10 mg 2 Times Daily Before Meals 2/5/2017     Sig - Route: Take 1 capsule by mouth 2 (Two) Times a Day Before Meals. - Oral    Cosign for Ordering: Accepted by Elva Hdz MD on 2/5/2017  3:26 PM    gabapentin (NEURONTIN) capsule 400 mg 400 mg Every 8 Hours Scheduled 2/5/2017     Sig - Route: Take 1 capsule by mouth Every 8 (Eight) Hours. - Oral    Cosign for Ordering: Accepted by Elva Hdz MD on 2/5/2017  3:26 PM    glucagon (GLUCAGEN) injection 1 mg 1 mg Every 15 Minutes PRN 2/4/2017     Sig - Route: Inject 1 mg under the skin Every 15 (Fifteen) Minutes As Needed (Blood Glucose Less Than 70 - Patient Without IV Access - Unresponsive, NPO or Unable To Safely Swallow). - Subcutaneous    Cosign for Ordering: Accepted by Rowan Juan DO on 2/4/2017  8:23 PM    insulin aspart (novoLOG) injection 0-7 Units 0-7 Units 4 Times Daily Before Meals & Nightly 2/4/2017     Sig - Route: Inject 0-7 Units under the skin 4 (Four) Times a Day Before Meals & at Bedtime. - Subcutaneous    Cosign for Ordering: Accepted by Rowan Juan DO on 2/4/2017  8:23 PM    insulin aspart (novoLOG) injection 5 Units 5 Units 3 Times Daily With Meals 2/6/2017     Sig - Route: Inject 5 Units under the skin 3 (Three) Times a Day With Meals. - Subcutaneous    insulin detemir (LEVEMIR) injection 20 Units 20 Units Nightly 2/6/2017     Sig - Route: Inject 20 Units under the skin Every Night. - Subcutaneous    ipratropium-albuterol (DUO-NEB) nebulizer solution 3 mL 3 mL Every 6 Hours PRN 2/4/2017     Sig - Route: Take 3 mL by nebulization Every 6 (Six) Hours As Needed for shortness of air. -  Nebulization    Cosign for Ordering: Accepted by Rowan Juan DO on 2/4/2017  8:23 PM    metoclopramide (REGLAN) tablet 10 mg 10 mg 2 Times Daily Before Meals 2/5/2017     Sig - Route: Take 1 tablet by mouth 2 (Two) Times a Day Before Meals. - Oral    Cosign for Ordering: Accepted by Elva Hdz MD on 2/5/2017  3:26 PM    metoprolol succinate XL (TOPROL-XL) 24 hr tablet 25 mg 25 mg Daily 2/5/2017     Sig - Route: Take 1 tablet by mouth Daily. - Oral    Cosign for Ordering: Accepted by Elva Hdz MD on 2/5/2017  3:26 PM    metroNIDAZOLE (FLAGYL) IVPB 500 mg 500 mg Every 8 Hours 2/4/2017     Sig - Route: Infuse 100 mL into a venous catheter Every 8 (Eight) Hours. - Intravenous    Cosign for Ordering: Accepted by Rowan Juan DO on 2/4/2017  8:23 PM    nitroglycerin (NITROSTAT) ointment 1 inch 1 inch Every 6 Hours Scheduled 2/7/2017     Sig - Route: Apply 1 inch topically Every 6 (Six) Hours. - Topical    ondansetron (ZOFRAN) injection 4 mg 4 mg Every 6 Hours PRN 2/8/2017     Sig - Route: Infuse 2 mL into a venous catheter Every 6 (Six) Hours As Needed for nausea or vomiting. - Intravenous    oxyCODONE (ROXICODONE) immediate release tablet 15 mg 15 mg Every 4 Hours PRN 2/4/2017 2/14/2017    Sig - Route: Take 1 tablet by mouth Every 4 (Four) Hours As Needed for moderate pain (4-6). - Oral    pancrelipase (Lip-Prot-Amyl) (CREON) capsule 24,000 units of lipase 24,000 units of lipase 4 Times Daily With Meals & Nightly 2/5/2017     Sig - Route: Take 2 capsules by mouth 4 (Four) Times a Day With Meals & at Bedtime. - Oral    Cosign for Ordering: Accepted by Elva Hdz MD on 2/5/2017  3:26 PM    pantoprazole (PROTONIX) EC tablet 40 mg 40 mg 2 Times Daily Before Meals 2/5/2017     Sig - Route: Take 1 tablet by mouth 2 (Two) Times a Day Before Meals. - Oral    Cosign for Ordering: Accepted by Elva Hdz MD on 2/5/2017  3:26 PM    polyethylene glycol (MIRALAX) powder 17 g 17 g Every 12  "Hours Scheduled 2/5/2017     Sig - Route: Take 17 g by mouth Every 12 (Twelve) Hours. - Oral    Cosign for Ordering: Accepted by Elva Hdz MD on 2/5/2017  3:26 PM    sodium chloride 0.9 % flush 1-10 mL 1-10 mL As Needed 2/4/2017     Sig - Route: Infuse 1-10 mL into a venous catheter As Needed for line care. - Intravenous    sodium chloride 0.9 % flush 10 mL 10 mL As Needed 2/4/2017     Sig - Route: Infuse 10 mL into a venous catheter As Needed for line care. - Intravenous    Linked Group 1:  \"And\" Linked Group Details        sodium chloride 0.9 % flush 10 mL 10 mL Every 12 Hours Scheduled 2/6/2017     Sig - Route: 10 mL by Intracatheter route Every 12 (Twelve) Hours. - Intracatheter    sodium chloride 0.9 % flush 10 mL 10 mL As Needed 2/6/2017     Sig - Route: 10 mL by Intracatheter route As Needed for line care (After Medication Administration). - Intracatheter    vancomycin (VANCOCIN) 1,500 mg in sodium chloride 0.9 % 500 mL IVPB 1,500 mg Every 8 Hours 2/5/2017     Sig - Route: Infuse 1,500 mg into a venous catheter Every 8 (Eight) Hours. - Intravenous            Cultures:    BLOOD CULTURE   Date Value Ref Range Status   02/04/2017 No growth at 3 days  Preliminary   02/04/2017 Abnormal Stain (A)  Final   02/04/2017 Staphylococcus, coagulase negative (A)  Final     Comment:     Probable Contaminant.        PROBLEM LIST:    Patient Active Problem List   Diagnosis   • MDD (major depressive disorder), recurrent episode, moderate   • Type 1 diabetes mellitus   • 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   • Cellulitis of right hand   • Cellulitis of right hand excluding fingers and thumb       Assessment&#47;Plan     ASSESSMENT:    1. Staph bacteremia versus contaminant in the setting of IV drug use     PLAN:    The patient continues to have significant overall edema especially to the right upper extremity and left lower extremity without any evidence of abscess or warmth with remaining mild erythema to the right thumb but " "otherwise significant improvement.    In the setting of significant clinical improvement and resolution of phlebitis, his antibiotic therapy was de-escalated from vancomycin and Flagyl to oral doxycycline 100 mg by mouth twice a day ×7 more days.      Patients findings and recommendations were discussed with patient and nursing staff    Code Status: Full Code    Bethany Piper PA-C  02/10/17  8:35 AM    Physician Attestation:    I have personally seen and examined the patient. I reviewed the patient's data including history of present illness, review of systems, physical examination, assessment and treatment plan and agree with findings above. The assessment and plan are my own.  I have reviewed and edited the note above after discussing the findings with Bethany Piper PA-C.    Tamara Bobby MD  Infectious Diseases  02/10/17  10:24 AM       Electronically signed by Tamara Bobby MD at 2/10/2017 10:24 AM      Bethany Piper PA-C at 2/10/2017  8:35 AM  Version 1 of 2           I have personally seen and examined the patient today and discussed overnight interval progress and issues with nursing staff.    Subjective    The patient continues to have significant overall edema especially to the right upper extremity and left lower extremity without any evidence of abscess or warmth with remaining mild erythema to the right thumb but otherwise significant improvement.     History taken from: patient chart RN      Vital Signs    Visit Vitals   • /78 (BP Location: Right leg, Patient Position: Lying)   • Pulse 77   • Temp 97.5 °F (36.4 °C) (Oral)   • Resp 18   • Ht 68\" (172.7 cm)   • Wt 178 lb 9.6 oz (81 kg)   • SpO2 92%   • BMI 27.16 kg/m2       Temp:  [97.4 °F (36.3 °C)-97.9 °F (36.6 °C)] 97.5 °F (36.4 °C)      Intake/Output Summary (Last 24 hours) at 02/10/17 0835  Last data filed at 02/10/17 0300   Gross per 24 hour   Intake   1200 ml   Output      0 ml   Net   1200 ml     Intake & Output (last 3 " days)       02/07 0701 - 02/08 0700 02/08 0701 - 02/09 0700 02/09 0701 - 02/10 0700 02/10 0701 - 02/11 0700    P.O. 820 1200 1200     Total Intake(mL/kg) 820 (10.1) 1200 (14.8) 1200 (14.8)     Urine (mL/kg/hr) 0 (0)       Stool 0 (0)       Total Output 0          Net +820 +1200 +1200              Unmeasured Urine Occurrence 3 x 5 x 8 x     Unmeasured Stool Occurrence  1 x 1 x     Unmeasured Emesis Occurrence  1 x          Physical Exam:      General Appearance:  Alert, cooperative, in no acute distress   Head:  Normocephalic, without obvious abnormality, atraumatic   Eyes:      Lids and lashes normal, conjunctivae and sclerae normal, no icterus, no pallor, corneas clear, PERRLA   Ears:  Ears appear intact with no abnormalities noted   Throat: No oral lesions, no thrush, oral mucosa moist   Neck: No adenopathy, supple, trachea midline, no thyromegaly, no carotid bruit, no JVD   Back:  No tenderness to percussion or palpation, range of motion normal   Lungs:  Clear to auscultation,respirations regular, even and unlabored. No wheezing, no ronchi and no crackles.   Heart:  Regular rhythm and normal rate, normal S1 and S2, no murmur, no gallop, no rub, no click   Chest Wall:  No abnormalities observed   Abdomen:  Normal bowel sounds, no masses, no organomegaly, soft non-tender, non-distended, no guarding, no rebound tenderness   Rectal:  Deferred   Extremities: Moves all extremities well, no edema, no cyanosis, no redness   Pulses: Pulses palpable and equal bilaterally   Skin: overall edema especially to the right upper extremity and left lower extremity without any evidence of abscess or warmth with remaining mild erythema to the right thumb but otherwise significant improvement.   Lymph nodes: No palpable adenopathy   Neurologic: Awake, alert and oriented x 3. Following commands.     Results:      Results from last 7 days  Lab Units 02/10/17  0211 02/09/17  0117 02/08/17  0612 02/07/17  0113 02/06/17  0210  02/05/17  0447 02/04/17  1354   WBC 10*3/mm3 2.26* 2.40* 3.25* 2.41* 2.92* 3.40* 3.99*     Lab Results   Component Value Date    NEUTROABS 1.40 02/10/2017         Results from last 7 days  Lab Units 02/10/17  0211   CREATININE mg/dL 0.41*         Results from last 7 days  Lab Units 02/08/17  0612 02/07/17  0113 02/06/17  0210   CRP mg/dL 1.84* 2.20* 3.07*       Imaging Results (last 24 hours)     Procedure Component Value Units Date/Time    XR Abdomen 2 View With Chest 1 View [[id number]] Collected:  02/08/17 1125     Updated:  02/08/17 1125    Narrative:       XR ABDOMEN 2 VIEW WITH CHEST 1 VIEW-     REASON FOR EXAM:  Vomiting; L03.113-Cellulitis of right upper limb.     FINDINGS: The chest portion of the exam shows the lungs to be well  aerated and clear. There was no pleural fluid or free air beneath the  diaphragm. Supine and upright views of the abdomen show nonspecific  bowel gas pattern. There was nothing seen to suggest obstruction to the  bowel. No soft tissue masses are demonstrated.       Impression:       Nonspecific acute abdominal series.                  Results Review:    I have personally reviewed laboratory data, culture results, radiology studies and antimicrobial therapy.    Hospital Medications (active)       Dose Frequency Start End    aspirin EC tablet 81 mg 81 mg Daily 2/5/2017     Sig - Route: Take 1 tablet by mouth Daily. - Oral    Cosign for Ordering: Accepted by Elva Hdz MD on 2/5/2017  3:26 PM    dextrose (D50W) solution 25 g 25 g Every 15 Minutes PRN 2/4/2017     Sig - Route: Infuse 50 mL into a venous catheter Every 15 (Fifteen) Minutes As Needed for low blood sugar (Blood Sugar Less Than 70, Patient Has IV Access - Unresponsive, NPO or Unable To Safely Swallow). - Intravenous    Cosign for Ordering: Accepted by Rowan Juan DO on 2/4/2017  8:23 PM    dextrose (GLUTOSE) oral gel 15 g 15 g Every 15 Minutes PRN 2/4/2017     Sig - Route: Take 15 g by mouth Every 15  (Fifteen) Minutes As Needed for low blood sugar (Blood Sugar Less Than 70, Patient Alert, Is Not NPO & Can Safely Swallow). - Oral    Cosign for Ordering: Accepted by Rowan Juan DO on 2/4/2017  8:23 PM    dicyclomine (BENTYL) capsule 10 mg 10 mg 2 Times Daily Before Meals 2/5/2017     Sig - Route: Take 1 capsule by mouth 2 (Two) Times a Day Before Meals. - Oral    Cosign for Ordering: Accepted by Elva Hdz MD on 2/5/2017  3:26 PM    gabapentin (NEURONTIN) capsule 400 mg 400 mg Every 8 Hours Scheduled 2/5/2017     Sig - Route: Take 1 capsule by mouth Every 8 (Eight) Hours. - Oral    Cosign for Ordering: Accepted by Elva Hdz MD on 2/5/2017  3:26 PM    glucagon (GLUCAGEN) injection 1 mg 1 mg Every 15 Minutes PRN 2/4/2017     Sig - Route: Inject 1 mg under the skin Every 15 (Fifteen) Minutes As Needed (Blood Glucose Less Than 70 - Patient Without IV Access - Unresponsive, NPO or Unable To Safely Swallow). - Subcutaneous    Cosign for Ordering: Accepted by Rowan Juan DO on 2/4/2017  8:23 PM    insulin aspart (novoLOG) injection 0-7 Units 0-7 Units 4 Times Daily Before Meals & Nightly 2/4/2017     Sig - Route: Inject 0-7 Units under the skin 4 (Four) Times a Day Before Meals & at Bedtime. - Subcutaneous    Cosign for Ordering: Accepted by Rowan Juan DO on 2/4/2017  8:23 PM    insulin aspart (novoLOG) injection 5 Units 5 Units 3 Times Daily With Meals 2/6/2017     Sig - Route: Inject 5 Units under the skin 3 (Three) Times a Day With Meals. - Subcutaneous    insulin detemir (LEVEMIR) injection 20 Units 20 Units Nightly 2/6/2017     Sig - Route: Inject 20 Units under the skin Every Night. - Subcutaneous    ipratropium-albuterol (DUO-NEB) nebulizer solution 3 mL 3 mL Every 6 Hours PRN 2/4/2017     Sig - Route: Take 3 mL by nebulization Every 6 (Six) Hours As Needed for shortness of air. - Nebulization    Cosign for Ordering: Accepted by Rowan Juan DO on 2/4/2017  8:23  PM    metoclopramide (REGLAN) tablet 10 mg 10 mg 2 Times Daily Before Meals 2/5/2017     Sig - Route: Take 1 tablet by mouth 2 (Two) Times a Day Before Meals. - Oral    Cosign for Ordering: Accepted by Elva Hdz MD on 2/5/2017  3:26 PM    metoprolol succinate XL (TOPROL-XL) 24 hr tablet 25 mg 25 mg Daily 2/5/2017     Sig - Route: Take 1 tablet by mouth Daily. - Oral    Cosign for Ordering: Accepted by Elva Hdz MD on 2/5/2017  3:26 PM    metroNIDAZOLE (FLAGYL) IVPB 500 mg 500 mg Every 8 Hours 2/4/2017     Sig - Route: Infuse 100 mL into a venous catheter Every 8 (Eight) Hours. - Intravenous    Cosign for Ordering: Accepted by Rowan Juan DO on 2/4/2017  8:23 PM    nitroglycerin (NITROSTAT) ointment 1 inch 1 inch Every 6 Hours Scheduled 2/7/2017     Sig - Route: Apply 1 inch topically Every 6 (Six) Hours. - Topical    ondansetron (ZOFRAN) injection 4 mg 4 mg Every 6 Hours PRN 2/8/2017     Sig - Route: Infuse 2 mL into a venous catheter Every 6 (Six) Hours As Needed for nausea or vomiting. - Intravenous    oxyCODONE (ROXICODONE) immediate release tablet 15 mg 15 mg Every 4 Hours PRN 2/4/2017 2/14/2017    Sig - Route: Take 1 tablet by mouth Every 4 (Four) Hours As Needed for moderate pain (4-6). - Oral    pancrelipase (Lip-Prot-Amyl) (CREON) capsule 24,000 units of lipase 24,000 units of lipase 4 Times Daily With Meals & Nightly 2/5/2017     Sig - Route: Take 2 capsules by mouth 4 (Four) Times a Day With Meals & at Bedtime. - Oral    Cosign for Ordering: Accepted by Elva Hdz MD on 2/5/2017  3:26 PM    pantoprazole (PROTONIX) EC tablet 40 mg 40 mg 2 Times Daily Before Meals 2/5/2017     Sig - Route: Take 1 tablet by mouth 2 (Two) Times a Day Before Meals. - Oral    Cosign for Ordering: Accepted by Elva Hdz MD on 2/5/2017  3:26 PM    polyethylene glycol (MIRALAX) powder 17 g 17 g Every 12 Hours Scheduled 2/5/2017     Sig - Route: Take 17 g by mouth Every 12 (Twelve) Hours. -  "Oral    Cosign for Ordering: Accepted by Elva Hdz MD on 2/5/2017  3:26 PM    sodium chloride 0.9 % flush 1-10 mL 1-10 mL As Needed 2/4/2017     Sig - Route: Infuse 1-10 mL into a venous catheter As Needed for line care. - Intravenous    sodium chloride 0.9 % flush 10 mL 10 mL As Needed 2/4/2017     Sig - Route: Infuse 10 mL into a venous catheter As Needed for line care. - Intravenous    Linked Group 1:  \"And\" Linked Group Details        sodium chloride 0.9 % flush 10 mL 10 mL Every 12 Hours Scheduled 2/6/2017     Sig - Route: 10 mL by Intracatheter route Every 12 (Twelve) Hours. - Intracatheter    sodium chloride 0.9 % flush 10 mL 10 mL As Needed 2/6/2017     Sig - Route: 10 mL by Intracatheter route As Needed for line care (After Medication Administration). - Intracatheter    vancomycin (VANCOCIN) 1,500 mg in sodium chloride 0.9 % 500 mL IVPB 1,500 mg Every 8 Hours 2/5/2017     Sig - Route: Infuse 1,500 mg into a venous catheter Every 8 (Eight) Hours. - Intravenous            Cultures:    BLOOD CULTURE   Date Value Ref Range Status   02/04/2017 No growth at 3 days  Preliminary   02/04/2017 Abnormal Stain (A)  Final   02/04/2017 Staphylococcus, coagulase negative (A)  Final     Comment:     Probable Contaminant.        PROBLEM LIST:    Patient Active Problem List   Diagnosis   • MDD (major depressive disorder), recurrent episode, moderate   • Type 1 diabetes mellitus   • 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   • Cellulitis of right hand   • Cellulitis of right hand excluding fingers and thumb       Assessment&#47;Plan     ASSESSMENT:    1. Staph bacteremia versus contaminant in the setting of IV drug use     PLAN:    The patient continues to have significant overall edema especially to the right upper extremity and left lower extremity without any evidence of abscess or warmth with remaining mild erythema to the right thumb but otherwise significant improvement.    In the setting of significant clinical improvement " "and resolution of phlebitis, his antibiotic therapy was de-escalated from vancomycin and Flagyl to oral doxycycline 100 mg by mouth twice a day ×7 more days.      Patients findings and recommendations were discussed with patient and nursing staff    Code Status: Full Code    Bethany Piper PA-C  02/10/17  8:35 AM  k       Electronically signed by Bethany Piper PA-C at 2/10/2017  8:38 AM      Aman Nguyễn MD at 2/10/2017  8:56 AM  Version 1 of 1              LOS: 5 days     Name: Isaias Lang  Age/Sex: 51 y.o. male  :  1965        PCP: NOHEMI Felipe  REF: No ref. provider found    Principal Problem:    Cellulitis of right hand  Active Problems:    Cellulitis of right hand excluding fingers and thumb        Subjective  Mr. Lang is a 51-year-old  male with history of nonobstructive coronary artery disease with vascular catheterization in 2011 revealing about 50% diffuse disease in the distal LAD and about the 50% stenosis in the small second obtuse marginal branch. He was admitted with complaints of mainly swelling of his right upper extremity for the last few days and was diagnosed to have possible cellulitis in this arm. He has also been complaining of chest pains for the last week or so and hence a cardiology consultation has been asked for. On further questioning Mr. Lang describes his chest pains as being felt as sometimes a dull pain and sometimes felt as \"ton of bricks on my chest\" with some shortness of breath. He had an episode of bleed on the day of admission and then since then has been having some chest soreness. He also indicates that he gets short of breath walking short distances. His cardiac markers so far have been negative for troponin ×3. Total CPK was normal but the MB fraction is a proportionately  mildly elevated. His EKG so far has revealed sinus rhythm with no significant ST-T wave changes of myocardial ischemia or " infarction.     Interval History: He still has some intermittent left-sided chest pains that seem to be associated with some abdominal pains.  He has some nausea with abdominal pains and threw up this morning.  No hematemesis or melena.    ROS    Vital Signs  Temp:  [97.4 °F (36.3 °C)-97.9 °F (36.6 °C)] 97.5 °F (36.4 °C)  Heart Rate:  [72-81] 77  Resp:  [18-22] 18  BP: (126-140)/(66-85) 133/78  Vital Signs (last 72 hrs)       02/07 0700  -  02/08 0659 02/08 0700  -  02/09 0659 02/09 0700  -  02/10 0659 02/10 0700  -  02/10 0856   Most Recent    Temp (°F) 97.1 -  98.5    97.3 -  98.1    97.4 -  97.9       97.5 (36.4)    Heart Rate 78 -  87    72 -  84    72 -  95      77     77    Resp 16 -  18    18 -  20    18 -  22      18     18    /67 -  133/77    103/56 -  156/98    126/66 -  140/85       133/78    SpO2 (%) 93 -  96    95 -  100    92 -  98      92     92        Body mass index is 27.16 kg/(m^2).    Intake/Output Summary (Last 24 hours) at 02/10/17 0856  Last data filed at 02/10/17 0300   Gross per 24 hour   Intake   1200 ml   Output      0 ml   Net   1200 ml     Objective        Physical Exam:     General Appearance:    Alert, cooperative, in no acute distress   Head:    Normocephalic, without obvious abnormality, atraumatic   Eyes:            Conjunctivae and sclerae normal, no   icterus, no pallor, corneas clear.   Ears:    Ears appear intact with no abnormalities noted   Throat:   No oral lesions, no thrush, oral mucosa moist   Neck:   No adenopathy, supple, trachea midline, no thyromegaly, no   carotid bruit, no JVD   Back:     No kyphosis present, no scoliosis present, no skin lesions,      erythema or scars, no tenderness to percussion or                   palpation,   range of motion normal   Lungs:     Clear to auscultation,respirations regular, even and                  unlabored    Heart:    Regular rhythm and normal rate, normal S1 and S2, no            murmur, no gallop, no rub, no click    Chest Wall:    No abnormalities observed   Abdomen:     Normal bowel sounds, no masses, no organomegaly, soft        mild tenderness in the epigastrium and left upper quadrant with no rebound tenderness., non-distended, no guarding.   Rectal:     Deferred   Extremities:   Moves all extremities well, no edema, no cyanosis, no             redness   Pulses:   Pulses palpable and equal bilaterally   Skin:   No bleeding, bruising or rash              Procedures    Results Review:     Results from last 7 days  Lab Units 02/10/17  0211 02/09/17  0117 02/08/17  0612 02/07/17  0113 02/06/17  0210 02/05/17  0447 02/04/17  1354   WBC 10*3/mm3 2.26* 2.40* 3.25* 2.41* 2.92* 3.40* 3.99*   HEMOGLOBIN g/dL 13.2* 12.7* 13.4* 13.3* 13.0* 12.9* 13.5*   PLATELETS 10*3/mm3 82* 79* 91* 63* 68* 66* 63*       Results from last 7 days  Lab Units 02/10/17  0211 02/09/17  0117 02/08/17  0612 02/07/17  0113 02/06/17  0210 02/05/17  0447 02/04/17  1319   SODIUM mmol/L 138 137 140 140 137 138 138   POTASSIUM mmol/L 4.0 4.0 3.9 4.3 4.1 3.7 4.9   CHLORIDE mmol/L 107 107 108 109 107 107 106   TOTAL CO2 mmol/L 30.7 28.3 30.2 30.5 28.7 28.7 30.7   BUN mg/dL 8 9 9 8 8 8 9   CREATININE mg/dL 0.41* 0.46 0.38* 0.78 0.49 0.49 0.52   CALCIUM mg/dL 8.5 8.3 8.8 8.4 8.1 8.3 8.8   GLUCOSE mg/dL 227* 253* 91 233* 246* 247* 177*   ALT (SGPT) U/L 153* 140* 158* 167* 169* 178* 219*   AST (SGOT) U/L 229* 193* 220* 201* 206* 221* 284*       Results from last 7 days  Lab Units 02/08/17  1030 02/07/17  0459 02/07/17  0113 02/06/17  1817   CK TOTAL U/L  --  60 69 77   TROPONIN I ng/mL <0.006 <0.006 <0.006 <0.006   CKMB ng/mL  --  5.14* 4.78 5.41*   MYOGLOBIN ng/mL  --  28.0 41.0 49.0     Lab Results   Component Value Date    INR 1.36 (H) 02/09/2017    INR 1.40 (H) 02/08/2017    INR 1.06 09/28/2016    INR 1.47 (H) 01/22/2016    INR 1.30 (H) 01/21/2016    INR 1.62 09/26/2014    INR 1.08 05/14/2014     Lab Results   Component Value Date    MG 1.6 (L) 02/06/2017    MG  1.7 02/04/2017    MG 1.6 (L) 10/19/2016     Lab Results   Component Value Date    TSH 0.284 (L) 02/04/2017    CHLPL 193 09/26/2014    TRIG 109 09/18/2016    HDL 27 (L) 09/18/2016     (H) 09/26/2014      Lab Results   Component Value Date    BNP 26 03/21/2014     Echo-Doppler study on 9/14/2016:  Lab Results   Component Value Date    ECHOEFEST 60 09/14/2016   · Left ventricular function is normal. Estimated EF = 60%.  · All left ventricular wall segments contract normally.  · Mild tricuspid valve regurgitation is present.  · The aortic valve is structurally normal. No aortic valve regurgitation is present. No aortic valve stenosis is present. Nnt  · The mitral valve is normal in structure. Trace mitral valve regurgitation is present. No significant mitral valve stenosis is present.  · The pulmonic valve is structurally normal. There is no significant pulmonic valve stenosis present. There is no pulmonic valve regurgitation present.  · No vegetation seen on any valve.  · There is no evidence of pericardial effusion.      Nuclear Stress Test on 2/9/2017 revealed:  · Findings consistent with a normal ECG stress test.  · Myocardial perfusion imaging indicates a normal myocardial perfusion study with no evidence of ischemia.  · Normal LV cavity size. Normal LV wall motion noted.  · Left ventricular ejection fraction is normal (Calculated EF = 50-55%).  · Impressions are consistent with a low risk study.       I reviewed the patient's new clinical results.      Medication Review:     aspirin 81 mg Oral Daily   doxycycline 100 mg Oral Q12H   gabapentin 400 mg Oral Q8H   insulin aspart 0-7 Units Subcutaneous 4x Daily AC & at Bedtime   insulin detemir 20 Units Subcutaneous Nightly   metoprolol succinate XL 25 mg Oral Daily   nitroglycerin 1 inch Topical Q6H   pancrelipase (Lip-Prot-Amyl) 24,000 units of lipase Oral 4x Daily With Meals & Nightly   pantoprazole 40 mg Oral BID AC   polyethylene glycol 17 g Oral Q12H    sodium chloride 10 mL Intracatheter Q12H   technetium sestamibi 1 dose Intravenous Once in imaging            Assessment:    1. Recurrent chest pains with some typical and some atypical features for CCS class III angina  2. Abnormal Lexiscan sestamibi study with no evidence of myocardial ischemia on 2/9/2017  3. History of anxiety and panic attacks  4. History of pancreatitis with intermittent abdominal pains  5. Essential hypertension, controlled    Recommendations:    1. I have reviewed the results of the nuclear stress test with him.   2. Since he does get some relief with sublingual nitroglycerin, will start him on oral nitrates for symptomatic relief.  3. If he has recurrent significant anginal symptoms then may consider further cardiac evaluation with cardiac catheterization later.      I discussed the patients findings and my recommendations with patient and family        Aman Nguyễn MDFormerly [facility]  02/10/17  8:56 AM    Dragon disclaimer:  Much of this encounter note is an electronic transcription/translation of spoken language to printed text. The electronic translation of spoken language may permit erroneous, or at times, nonsensical words or phrases to be inadvertently transcribed; Although I have reviewed the note for such errors, some may still exist.     Electronically signed by Aman Nguyễn MD at 2/10/2017  9:13 AM        Only active medications are shown.     Scheduled Meds Sorted by Name for Isaias Lang as of 02/10/17 1327      Legend:                           Inactive     Active     Linked               Medications 02/10/17 02/11/17 02/12/17 02/13/17 02/14/17 02/15/17 02/16/17   aspirin EC tablet 81 mg  Dose: 81 mg Freq: Daily Route: PO  Start: 02/05/17 1415    0815        0900        0900        0900        0900        0900        0900          doxycycline (MONODOX) capsule 100 mg  Dose: 100 mg Freq: Every 12 Hours Scheduled Route: PO  Indications of Use: SKIN AND SOFT TISSUE  INFECTION  Start: 02/10/17 0900 End: 02/17/17 0859    Admin Instructions:   Take with food if GI upset occurs. Avoid taking antacids, iron, or dairy products within 2 hours of dose.    1000       2100        0900       2100        0900       2100        0900 2100        0900 2100 0900 2100 0900 2100          gabapentin (NEURONTIN) capsule 400 mg  Dose: 400 mg Freq: Every 8 Hours Scheduled Route: PO  Start: 02/05/17 1445    0616       1400       2200        0600       1400       2200        0600       1400       2200        0600       1400       2200        0600       1400       2200        0600       1400       2200        0600       1400       2200          insulin aspart (novoLOG) injection 0-7 Units  Dose: 0-7 Units Freq: 4 Times Daily Before Meals & Nightly Route: SC  Start: 02/04/17 1730    Admin Instructions:   Correction - Low Dose.  Less than 40 units/day total insulin dose or lean, elderly, renal patients    Blood glucose 150-199 mg/dL - 2 units  Blood glucose 200-249 mg/dL - 3 units  Blood glucose 250-299 mg/dL - 4 units  Blood glucose 300-349 mg/dL - 5 units  Blood glucose 350-400 mg/dL - 6 units  Blood glucose greater than 400 mg/dL - 7 units and call provider      0828       1151       1730       2100        0730       1130       1730       2100        0730       1130       1730       2100        0730       1130       1730       2100        0730       1130       1730       2100        0730       1130       1730       2100        0730       1130       1730       2100          insulin detemir (LEVEMIR) injection 20 Units  Dose: 20 Units Freq: Nightly Route: SC  Start: 02/06/17 2100    Admin Instructions:       2100 2100 2100 2100 2100 2100 2100          isosorbide mononitrate (IMDUR) 24 hr tablet 60 mg  Dose: 60 mg Freq: Every 24 Hours Scheduled Route: PO  Start: 02/10/17 0945    Admin Instructions:   Do not crush, or chew.     1103        0900        0900        0900        0900        0900        0900          metoprolol succinate XL (TOPROL-XL) 24 hr tablet 50 mg  Dose: 50 mg Freq: Daily Route: PO  Start: 02/11/17 0900    Admin Instructions:   Hold for hr <60  Do not crush or chew.     0900        0900        0900        0900        0900        0900          pancrelipase (Lip-Prot-Amyl) (CREON) capsule 24,000 units of lipase  Dose: 24,000 units of lipase Freq: 4 Times Daily With Meals & Nightly Route: PO  Start: 02/05/17 1800    Admin Instructions:   Give with meals.  Swallow whole.  Do not open, crush or chew capsule.    0814       1151       1800       2100        0800       1200       1800       2100        0800       1200       1800       2100        0800       1200       1800       2100        0800       1200       1800       2100        0800       1200       1800       2100        0800       1200       1800       2100          pantoprazole (PROTONIX) EC tablet 40 mg  Dose: 40 mg Freq: 2 Times Daily Before Meals Route: PO  Start: 02/05/17 1730    Admin Instructions:   Swallow whole; do not crush, split, or chew.    0815       1730        0730       1730        0730       1730        0730       1730        0730       1730        0730       1730        0730       1730          polyethylene glycol (MIRALAX) powder 17 g  Dose: 17 g Freq: Every 12 Hours Scheduled Route: PO  Start: 02/05/17 1445    0010       0815       2100        0900 2100        0900 2100 0900 2100 0900 2100 0900 2100 0900 2100          sodium chloride 0.9 % flush 10 mL  Dose: 10 mL Freq: Every 12 Hours Scheduled Route: IK  Start: 02/06/17 2100    0011       0829       2100        0900 2100        0900 2100 0900 2100 0900 2100 0900 2100 0900 2100          technetium sestamibi (CARDIOLITE) injection 1 dose  Dose: 1 dose Freq: Once in Imaging  Route: IV  Start: 02/09/17 1215             Medications 02/10/17 02/11/17 02/12/17 02/13/17 02/14/17 02/15/17 02/16/17         Continuous Meds Sorted by Name for Isaias Lang as of 02/10/17 1327      Legend:         Inactive     Active     Linked               Medications 02/10/17 02/11/17 02/12/17 02/13/17 02/14/17 02/15/17 02/16/17         PRN Meds Sorted by Name for Isaias Lang as of 02/10/17 1327      Legend:         Inactive     Active     Linked               Medications 02/10/17 02/11/17 02/12/17 02/13/17 02/14/17 02/15/17 02/16/17   dextrose (D50W) solution 25 g  Dose: 25 g Freq: Every 15 Minutes PRN Route: IV  PRN Reason: low blood sugar  PRN Comment: Blood Sugar Less Than 70, Patient Has IV Access - Unresponsive, NPO or Unable To Safely Swallow  Start: 02/04/17 1639             dextrose (GLUTOSE) oral gel 15 g  Dose: 15 g Freq: Every 15 Minutes PRN Route: PO  PRN Reason: low blood sugar  PRN Comment: Blood Sugar Less Than 70, Patient Alert, Is Not NPO & Can Safely Swallow  Start: 02/04/17 1639             glucagon (GLUCAGEN) injection 1 mg  Dose: 1 mg Freq: Every 15 Minutes PRN Route: SC  PRN Comment: Blood Glucose Less Than 70 - Patient Without IV Access - Unresponsive, NPO or Unable To Safely Swallow  Start: 02/04/17 1639             ipratropium-albuterol (DUO-NEB) nebulizer solution 3 mL  Dose: 3 mL Freq: Every 6 Hours PRN Route: NEBULIZATION  PRN Reason: shortness of air  Start: 02/04/17 1658    0806                ondansetron (ZOFRAN) injection 4 mg  Dose: 4 mg Freq: Every 6 Hours PRN Route: IV  PRN Reasons: nausea,vomiting  Start: 02/08/17 0958    0647       1151                oxyCODONE (ROXICODONE) immediate release tablet 15 mg  Dose: 15 mg Freq: Every 4 Hours PRN Route: PO  PRN Reason: moderate pain (4-6)  Start: 02/04/17 1950 End: 02/14/17 1949    0131 [C]       0616 [C]       1103           1949-D/C'd        sodium chloride 0.9 % flush 1-10 mL  Dose: 1-10 mL Freq: As Needed Route:  IV  PRN Reason: line care  Start: 02/04/17 1631    0010                sodium chloride 0.9 % flush 10 mL  Dose: 10 mL Freq: As Needed Route: IK  PRN Reason: line care  PRN Comment: After Medication Administration  Start: 02/06/17 1738             sodium chloride 0.9 % flush 10 mL  Dose: 10 mL Freq: As Needed Route: IV  PRN Reason: line care  Start: 02/04/17 1254    0617       0648                Medications 02/10/17 02/11/17 02/12/17 02/13/17 02/14/17 02/15/17 02/16/17           Consult Notes (last 24 hours) (Notes from 2/9/2017  1:27 PM through 2/10/2017  1:27 PM)     No notes of this type exist for this encounter.

## 2017-02-10 NOTE — PLAN OF CARE
Problem: Pain, Acute (Adult)  Goal: Acceptable Pain Control/Comfort Level    02/10/17 0300   Pain, Acute (Adult)   Acceptable Pain Control/Comfort Level making progress toward outcome         Problem: Cellulitis (Adult)  Goal: Signs and Symptoms of Listed Potential Problems Will be Absent or Manageable (Cellulitis)    02/07/17 0123   Cellulitis   Problems Assessed (Cellulitis) all   Problems Present (Cellulitis) pain         Problem: Patient Care Overview (Adult)  Goal: Plan of Care Review    02/07/17 0123 02/10/17 0800   Coping/Psychosocial Response Interventions   Plan Of Care Reviewed With --  patient   Patient Care Overview   Progress progress toward functional goals as expected --          Problem: Diabetes, Type 1 (Adult)  Goal: Signs and Symptoms of Listed Potential Problems Will be Absent or Manageable (Diabetes, Type 1)    02/08/17 2350   Diabetes, Type 1   Problems Assessed (Type 1 Diabetes) all   Problems Present (Type 1 Diabetes) impaired glycemic control         Problem: Diabetes, Type 2 (Adult)  Goal: Signs and Symptoms of Listed Potential Problems Will be Absent or Manageable (Diabetes, Type 2)    02/08/17 2350   Diabetes, Type 2   Problems Assessed (Type 2 Diabetes) all   Problems Present (Type 2 Diabetes) impaired glycemic control

## 2017-02-10 NOTE — PROGRESS NOTES
"  I have personally seen and examined the patient today and discussed overnight interval progress and issues with nursing staff.    Subjective    The patient continues to have significant overall edema especially to the right upper extremity and left lower extremity without any evidence of abscess or warmth with remaining mild erythema to the right thumb but otherwise significant improvement.     History taken from: patient chart RN      Vital Signs    Visit Vitals   • /78 (BP Location: Right leg, Patient Position: Lying)   • Pulse 77   • Temp 97.5 °F (36.4 °C) (Oral)   • Resp 18   • Ht 68\" (172.7 cm)   • Wt 178 lb 9.6 oz (81 kg)   • SpO2 92%   • BMI 27.16 kg/m2       Temp:  [97.4 °F (36.3 °C)-97.9 °F (36.6 °C)] 97.5 °F (36.4 °C)      Intake/Output Summary (Last 24 hours) at 02/10/17 0835  Last data filed at 02/10/17 0300   Gross per 24 hour   Intake   1200 ml   Output      0 ml   Net   1200 ml     Intake & Output (last 3 days)       02/07 0701 - 02/08 0700 02/08 0701 - 02/09 0700 02/09 0701 - 02/10 0700 02/10 0701 - 02/11 0700    P.O. 820 1200 1200     Total Intake(mL/kg) 820 (10.1) 1200 (14.8) 1200 (14.8)     Urine (mL/kg/hr) 0 (0)       Stool 0 (0)       Total Output 0          Net +820 +1200 +1200              Unmeasured Urine Occurrence 3 x 5 x 8 x     Unmeasured Stool Occurrence  1 x 1 x     Unmeasured Emesis Occurrence  1 x          Physical Exam:      General Appearance:  Alert, cooperative, in no acute distress   Head:  Normocephalic, without obvious abnormality, atraumatic   Eyes:      Lids and lashes normal, conjunctivae and sclerae normal, no icterus, no pallor, corneas clear, PERRLA   Ears:  Ears appear intact with no abnormalities noted   Throat: No oral lesions, no thrush, oral mucosa moist   Neck: No adenopathy, supple, trachea midline, no thyromegaly, no carotid bruit, no JVD   Back:  No tenderness to percussion or palpation, range of motion normal   Lungs:  Clear to auscultation,respirations " regular, even and unlabored. No wheezing, no ronchi and no crackles.   Heart:  Regular rhythm and normal rate, normal S1 and S2, no murmur, no gallop, no rub, no click   Chest Wall:  No abnormalities observed   Abdomen:  Normal bowel sounds, no masses, no organomegaly, soft non-tender, non-distended, no guarding, no rebound tenderness   Rectal:  Deferred   Extremities: Moves all extremities well, no edema, no cyanosis, no redness   Pulses: Pulses palpable and equal bilaterally   Skin: overall edema especially to the right upper extremity and left lower extremity without any evidence of abscess or warmth with remaining mild erythema to the right thumb but otherwise significant improvement.   Lymph nodes: No palpable adenopathy   Neurologic: Awake, alert and oriented x 3. Following commands.     Results:      Results from last 7 days  Lab Units 02/10/17  0211 02/09/17  0117 02/08/17  0612 02/07/17  0113 02/06/17  0210 02/05/17  0447 02/04/17  1354   WBC 10*3/mm3 2.26* 2.40* 3.25* 2.41* 2.92* 3.40* 3.99*     Lab Results   Component Value Date    NEUTROABS 1.40 02/10/2017         Results from last 7 days  Lab Units 02/10/17  0211   CREATININE mg/dL 0.41*         Results from last 7 days  Lab Units 02/08/17  0612 02/07/17  0113 02/06/17  0210   CRP mg/dL 1.84* 2.20* 3.07*       Imaging Results (last 24 hours)     Procedure Component Value Units Date/Time    XR Abdomen 2 View With Chest 1 View [[id number]] Collected:  02/08/17 1125     Updated:  02/08/17 1125    Narrative:       XR ABDOMEN 2 VIEW WITH CHEST 1 VIEW-     REASON FOR EXAM:  Vomiting; L03.113-Cellulitis of right upper limb.     FINDINGS: The chest portion of the exam shows the lungs to be well  aerated and clear. There was no pleural fluid or free air beneath the  diaphragm. Supine and upright views of the abdomen show nonspecific  bowel gas pattern. There was nothing seen to suggest obstruction to the  bowel. No soft tissue masses are demonstrated.        Impression:       Nonspecific acute abdominal series.                  Results Review:    I have personally reviewed laboratory data, culture results, radiology studies and antimicrobial therapy.    Hospital Medications (active)       Dose Frequency Start End    aspirin EC tablet 81 mg 81 mg Daily 2/5/2017     Sig - Route: Take 1 tablet by mouth Daily. - Oral    Cosign for Ordering: Accepted by Elva Hdz MD on 2/5/2017  3:26 PM    dextrose (D50W) solution 25 g 25 g Every 15 Minutes PRN 2/4/2017     Sig - Route: Infuse 50 mL into a venous catheter Every 15 (Fifteen) Minutes As Needed for low blood sugar (Blood Sugar Less Than 70, Patient Has IV Access - Unresponsive, NPO or Unable To Safely Swallow). - Intravenous    Cosign for Ordering: Accepted by Rowan Juan DO on 2/4/2017  8:23 PM    dextrose (GLUTOSE) oral gel 15 g 15 g Every 15 Minutes PRN 2/4/2017     Sig - Route: Take 15 g by mouth Every 15 (Fifteen) Minutes As Needed for low blood sugar (Blood Sugar Less Than 70, Patient Alert, Is Not NPO & Can Safely Swallow). - Oral    Cosign for Ordering: Accepted by Rowan Juan DO on 2/4/2017  8:23 PM    dicyclomine (BENTYL) capsule 10 mg 10 mg 2 Times Daily Before Meals 2/5/2017     Sig - Route: Take 1 capsule by mouth 2 (Two) Times a Day Before Meals. - Oral    Cosign for Ordering: Accepted by Elva Hdz MD on 2/5/2017  3:26 PM    gabapentin (NEURONTIN) capsule 400 mg 400 mg Every 8 Hours Scheduled 2/5/2017     Sig - Route: Take 1 capsule by mouth Every 8 (Eight) Hours. - Oral    Cosign for Ordering: Accepted by Elva Hdz MD on 2/5/2017  3:26 PM    glucagon (GLUCAGEN) injection 1 mg 1 mg Every 15 Minutes PRN 2/4/2017     Sig - Route: Inject 1 mg under the skin Every 15 (Fifteen) Minutes As Needed (Blood Glucose Less Than 70 - Patient Without IV Access - Unresponsive, NPO or Unable To Safely Swallow). - Subcutaneous    Cosign for Ordering: Accepted by Rowan Juan DO on  2/4/2017  8:23 PM    insulin aspart (novoLOG) injection 0-7 Units 0-7 Units 4 Times Daily Before Meals & Nightly 2/4/2017     Sig - Route: Inject 0-7 Units under the skin 4 (Four) Times a Day Before Meals & at Bedtime. - Subcutaneous    Cosign for Ordering: Accepted by Rowan Juan DO on 2/4/2017  8:23 PM    insulin aspart (novoLOG) injection 5 Units 5 Units 3 Times Daily With Meals 2/6/2017     Sig - Route: Inject 5 Units under the skin 3 (Three) Times a Day With Meals. - Subcutaneous    insulin detemir (LEVEMIR) injection 20 Units 20 Units Nightly 2/6/2017     Sig - Route: Inject 20 Units under the skin Every Night. - Subcutaneous    ipratropium-albuterol (DUO-NEB) nebulizer solution 3 mL 3 mL Every 6 Hours PRN 2/4/2017     Sig - Route: Take 3 mL by nebulization Every 6 (Six) Hours As Needed for shortness of air. - Nebulization    Cosign for Ordering: Accepted by Rowan Juan DO on 2/4/2017  8:23 PM    metoclopramide (REGLAN) tablet 10 mg 10 mg 2 Times Daily Before Meals 2/5/2017     Sig - Route: Take 1 tablet by mouth 2 (Two) Times a Day Before Meals. - Oral    Cosign for Ordering: Accepted by Elva Hdz MD on 2/5/2017  3:26 PM    metoprolol succinate XL (TOPROL-XL) 24 hr tablet 25 mg 25 mg Daily 2/5/2017     Sig - Route: Take 1 tablet by mouth Daily. - Oral    Cosign for Ordering: Accepted by Elva Hdz MD on 2/5/2017  3:26 PM    metroNIDAZOLE (FLAGYL) IVPB 500 mg 500 mg Every 8 Hours 2/4/2017     Sig - Route: Infuse 100 mL into a venous catheter Every 8 (Eight) Hours. - Intravenous    Cosign for Ordering: Accepted by Rowan Juan DO on 2/4/2017  8:23 PM    nitroglycerin (NITROSTAT) ointment 1 inch 1 inch Every 6 Hours Scheduled 2/7/2017     Sig - Route: Apply 1 inch topically Every 6 (Six) Hours. - Topical    ondansetron (ZOFRAN) injection 4 mg 4 mg Every 6 Hours PRN 2/8/2017     Sig - Route: Infuse 2 mL into a venous catheter Every 6 (Six) Hours As Needed for nausea or  "vomiting. - Intravenous    oxyCODONE (ROXICODONE) immediate release tablet 15 mg 15 mg Every 4 Hours PRN 2/4/2017 2/14/2017    Sig - Route: Take 1 tablet by mouth Every 4 (Four) Hours As Needed for moderate pain (4-6). - Oral    pancrelipase (Lip-Prot-Amyl) (CREON) capsule 24,000 units of lipase 24,000 units of lipase 4 Times Daily With Meals & Nightly 2/5/2017     Sig - Route: Take 2 capsules by mouth 4 (Four) Times a Day With Meals & at Bedtime. - Oral    Cosign for Ordering: Accepted by Elva Hdz MD on 2/5/2017  3:26 PM    pantoprazole (PROTONIX) EC tablet 40 mg 40 mg 2 Times Daily Before Meals 2/5/2017     Sig - Route: Take 1 tablet by mouth 2 (Two) Times a Day Before Meals. - Oral    Cosign for Ordering: Accepted by Elva Hdz MD on 2/5/2017  3:26 PM    polyethylene glycol (MIRALAX) powder 17 g 17 g Every 12 Hours Scheduled 2/5/2017     Sig - Route: Take 17 g by mouth Every 12 (Twelve) Hours. - Oral    Cosign for Ordering: Accepted by Elva Hdz MD on 2/5/2017  3:26 PM    sodium chloride 0.9 % flush 1-10 mL 1-10 mL As Needed 2/4/2017     Sig - Route: Infuse 1-10 mL into a venous catheter As Needed for line care. - Intravenous    sodium chloride 0.9 % flush 10 mL 10 mL As Needed 2/4/2017     Sig - Route: Infuse 10 mL into a venous catheter As Needed for line care. - Intravenous    Linked Group 1:  \"And\" Linked Group Details        sodium chloride 0.9 % flush 10 mL 10 mL Every 12 Hours Scheduled 2/6/2017     Sig - Route: 10 mL by Intracatheter route Every 12 (Twelve) Hours. - Intracatheter    sodium chloride 0.9 % flush 10 mL 10 mL As Needed 2/6/2017     Sig - Route: 10 mL by Intracatheter route As Needed for line care (After Medication Administration). - Intracatheter    vancomycin (VANCOCIN) 1,500 mg in sodium chloride 0.9 % 500 mL IVPB 1,500 mg Every 8 Hours 2/5/2017     Sig - Route: Infuse 1,500 mg into a venous catheter Every 8 (Eight) Hours. - Intravenous            Cultures:    BLOOD " CULTURE   Date Value Ref Range Status   02/04/2017 No growth at 3 days  Preliminary   02/04/2017 Abnormal Stain (A)  Final   02/04/2017 Staphylococcus, coagulase negative (A)  Final     Comment:     Probable Contaminant.        PROBLEM LIST:    Patient Active Problem List   Diagnosis   • MDD (major depressive disorder), recurrent episode, moderate   • Type 1 diabetes mellitus   • 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   • Cellulitis of right hand   • Cellulitis of right hand excluding fingers and thumb       Assessment/Plan     ASSESSMENT:    1. Staph bacteremia versus contaminant in the setting of IV drug use     PLAN:    The patient continues to have significant overall edema especially to the right upper extremity and left lower extremity without any evidence of abscess or warmth with remaining mild erythema to the right thumb but otherwise significant improvement.    In the setting of significant clinical improvement and resolution of phlebitis, his antibiotic therapy was de-escalated from vancomycin and Flagyl to oral doxycycline 100 mg by mouth twice a day ×7 more days.      Patients findings and recommendations were discussed with patient and nursing staff    Code Status: Full Code    Bethany Piper PA-C  02/10/17  8:35 AM    Physician Attestation:    I have personally seen and examined the patient. I reviewed the patient's data including history of present illness, review of systems, physical examination, assessment and treatment plan and agree with findings above. The assessment and plan are my own.  I have reviewed and edited the note above after discussing the findings with Bethany Piper PA-C.    Tamara Bobby MD  Infectious Diseases  02/10/17  10:24 AM

## 2017-02-11 VITALS
HEIGHT: 68 IN | HEART RATE: 75 BPM | WEIGHT: 178.6 LBS | DIASTOLIC BLOOD PRESSURE: 59 MMHG | BODY MASS INDEX: 27.07 KG/M2 | OXYGEN SATURATION: 96 % | RESPIRATION RATE: 18 BRPM | TEMPERATURE: 97.5 F | SYSTOLIC BLOOD PRESSURE: 114 MMHG

## 2017-02-11 LAB
ALBUMIN SERPL-MCNC: 3 G/DL (ref 3.5–5)
ALBUMIN/GLOB SERPL: 1.2 G/DL (ref 1.5–2.5)
ALP SERPL-CCNC: 108 U/L (ref 46–116)
ALT SERPL W P-5'-P-CCNC: 156 U/L (ref 10–44)
ANION GAP SERPL CALCULATED.3IONS-SCNC: 3.3 MMOL/L (ref 3.6–11.2)
AST SERPL-CCNC: 221 U/L (ref 10–34)
BASOPHILS # BLD AUTO: 0.01 10*3/MM3 (ref 0–0.3)
BASOPHILS NFR BLD AUTO: 0.4 % (ref 0–2)
BILIRUB SERPL-MCNC: 0.9 MG/DL (ref 0.2–1.8)
BUN BLD-MCNC: 6 MG/DL (ref 7–21)
BUN/CREAT SERPL: 11.5 (ref 7–25)
CALCIUM SPEC-SCNC: 9 MG/DL (ref 7.7–10)
CHLORIDE SERPL-SCNC: 106 MMOL/L (ref 99–112)
CO2 SERPL-SCNC: 30.7 MMOL/L (ref 24.3–31.9)
CREAT BLD-MCNC: 0.52 MG/DL (ref 0.43–1.29)
DEPRECATED RDW RBC AUTO: 53.5 FL (ref 37–54)
EOSINOPHIL # BLD AUTO: 0.07 10*3/MM3 (ref 0–0.7)
EOSINOPHIL NFR BLD AUTO: 2.5 % (ref 0–5)
ERYTHROCYTE [DISTWIDTH] IN BLOOD BY AUTOMATED COUNT: 16.7 % (ref 11.5–14.5)
GFR SERPL CREATININE-BSD FRML MDRD: >150 ML/MIN/1.73
GLOBULIN UR ELPH-MCNC: 2.5 GM/DL
GLUCOSE BLD-MCNC: 209 MG/DL (ref 70–110)
GLUCOSE BLDC GLUCOMTR-MCNC: 145 MG/DL (ref 70–130)
GLUCOSE BLDC GLUCOMTR-MCNC: 210 MG/DL (ref 70–130)
HCT VFR BLD AUTO: 39.6 % (ref 42–52)
HGB BLD-MCNC: 12.7 G/DL (ref 14–18)
IMM GRANULOCYTES # BLD: 0 10*3/MM3 (ref 0–0.03)
IMM GRANULOCYTES NFR BLD: 0 % (ref 0–0.5)
INR PPP: 1.28 (ref 0.8–1.1)
LYMPHOCYTES # BLD AUTO: 0.66 10*3/MM3 (ref 1–3)
LYMPHOCYTES NFR BLD AUTO: 23.7 % (ref 21–51)
MCH RBC QN AUTO: 28.3 PG (ref 27–33)
MCHC RBC AUTO-ENTMCNC: 32.1 G/DL (ref 33–37)
MCV RBC AUTO: 88.4 FL (ref 80–94)
MONOCYTES # BLD AUTO: 0.3 10*3/MM3 (ref 0.1–0.9)
MONOCYTES NFR BLD AUTO: 10.8 % (ref 0–10)
NEUTROPHILS # BLD AUTO: 1.74 10*3/MM3 (ref 1.4–6.5)
NEUTROPHILS NFR BLD AUTO: 62.6 % (ref 30–70)
OSMOLALITY SERPL CALC.SUM OF ELEC: 283.2 MOSM/KG (ref 273–305)
PLATELET # BLD AUTO: 90 10*3/MM3 (ref 130–400)
PMV BLD AUTO: 11.4 FL (ref 6–10)
POTASSIUM BLD-SCNC: 4.1 MMOL/L (ref 3.5–5.3)
PROT SERPL-MCNC: 5.5 G/DL (ref 6–8)
PROTHROMBIN TIME: 14.5 SECONDS (ref 9.8–11.9)
RBC # BLD AUTO: 4.48 10*6/MM3 (ref 4.7–6.1)
SODIUM BLD-SCNC: 140 MMOL/L (ref 135–153)
WBC NRBC COR # BLD: 2.78 10*3/MM3 (ref 4.5–12.5)

## 2017-02-11 PROCEDURE — 85610 PROTHROMBIN TIME: CPT | Performed by: INTERNAL MEDICINE

## 2017-02-11 PROCEDURE — 94799 UNLISTED PULMONARY SVC/PX: CPT

## 2017-02-11 PROCEDURE — 85025 COMPLETE CBC W/AUTO DIFF WBC: CPT | Performed by: INTERNAL MEDICINE

## 2017-02-11 PROCEDURE — 94640 AIRWAY INHALATION TREATMENT: CPT

## 2017-02-11 PROCEDURE — 80053 COMPREHEN METABOLIC PANEL: CPT | Performed by: INTERNAL MEDICINE

## 2017-02-11 PROCEDURE — 99239 HOSP IP/OBS DSCHRG MGMT >30: CPT | Performed by: INTERNAL MEDICINE

## 2017-02-11 PROCEDURE — 82962 GLUCOSE BLOOD TEST: CPT

## 2017-02-11 PROCEDURE — 63710000001 INSULIN ASPART PER 5 UNITS: Performed by: INTERNAL MEDICINE

## 2017-02-11 RX ORDER — BLOOD-GLUCOSE METER
1 KIT MISCELLANEOUS 4 TIMES DAILY
Qty: 1 EACH | Refills: 0 | Status: SHIPPED | OUTPATIENT
Start: 2017-02-11 | End: 2017-04-03

## 2017-02-11 RX ORDER — GABAPENTIN 400 MG/1
400 CAPSULE ORAL 3 TIMES DAILY
Qty: 90 CAPSULE | Refills: 0 | Status: SHIPPED | OUTPATIENT
Start: 2017-02-11 | End: 2017-04-03

## 2017-02-11 RX ORDER — DOXYCYCLINE 100 MG/1
100 CAPSULE ORAL EVERY 12 HOURS SCHEDULED
Qty: 12 CAPSULE | Refills: 0 | Status: SHIPPED | OUTPATIENT
Start: 2017-02-11 | End: 2017-02-17

## 2017-02-11 RX ORDER — HYDROCODONE BITARTRATE AND ACETAMINOPHEN 7.5; 325 MG/1; MG/1
1 TABLET ORAL EVERY 8 HOURS PRN
Qty: 15 TABLET | Refills: 0 | Status: SHIPPED | OUTPATIENT
Start: 2017-02-11 | End: 2017-04-03

## 2017-02-11 RX ORDER — ASPIRIN 81 MG/1
81 TABLET ORAL DAILY
Qty: 30 TABLET | Refills: 0 | Status: SHIPPED | OUTPATIENT
Start: 2017-02-11 | End: 2017-04-03

## 2017-02-11 RX ORDER — LANCETS
EACH MISCELLANEOUS
Qty: 120 EACH | Refills: 12 | Status: SHIPPED | OUTPATIENT
Start: 2017-02-11 | End: 2017-04-03

## 2017-02-11 RX ORDER — PANTOPRAZOLE SODIUM 40 MG/1
40 TABLET, DELAYED RELEASE ORAL DAILY
Qty: 60 TABLET | Refills: 0 | Status: SHIPPED | OUTPATIENT
Start: 2017-02-11 | End: 2017-04-03

## 2017-02-11 RX ORDER — METOPROLOL SUCCINATE 50 MG/1
50 TABLET, EXTENDED RELEASE ORAL DAILY
Qty: 30 TABLET | Refills: 0 | Status: SHIPPED | OUTPATIENT
Start: 2017-02-11 | End: 2017-04-03

## 2017-02-11 RX ADMIN — METOPROLOL SUCCINATE 50 MG: 50 TABLET, FILM COATED, EXTENDED RELEASE ORAL at 08:05

## 2017-02-11 RX ADMIN — DOXYCYCLINE 100 MG: 100 CAPSULE ORAL at 08:05

## 2017-02-11 RX ADMIN — GABAPENTIN 400 MG: 400 CAPSULE ORAL at 05:22

## 2017-02-11 RX ADMIN — IPRATROPIUM BROMIDE AND ALBUTEROL SULFATE 3 ML: .5; 3 SOLUTION RESPIRATORY (INHALATION) at 07:33

## 2017-02-11 RX ADMIN — PANCRELIPASE 24000 UNITS OF LIPASE: 60000; 12000; 38000 CAPSULE, DELAYED RELEASE PELLETS ORAL at 12:30

## 2017-02-11 RX ADMIN — Medication 10 ML: at 08:15

## 2017-02-11 RX ADMIN — OXYCODONE HYDROCHLORIDE 15 MG: 15 TABLET ORAL at 03:48

## 2017-02-11 RX ADMIN — PANTOPRAZOLE SODIUM 40 MG: 40 TABLET, DELAYED RELEASE ORAL at 08:03

## 2017-02-11 RX ADMIN — ASPIRIN 81 MG: 81 TABLET ORAL at 08:04

## 2017-02-11 RX ADMIN — OXYCODONE HYDROCHLORIDE 15 MG: 15 TABLET ORAL at 12:28

## 2017-02-11 RX ADMIN — OXYCODONE HYDROCHLORIDE 15 MG: 15 TABLET ORAL at 08:03

## 2017-02-11 RX ADMIN — PANCRELIPASE 24000 UNITS OF LIPASE: 60000; 12000; 38000 CAPSULE, DELAYED RELEASE PELLETS ORAL at 08:04

## 2017-02-11 RX ADMIN — POLYETHYLENE GLYCOL (3350) 17 G: 17 POWDER, FOR SOLUTION ORAL at 08:04

## 2017-02-11 RX ADMIN — ISOSORBIDE MONONITRATE 60 MG: 60 TABLET, EXTENDED RELEASE ORAL at 08:05

## 2017-02-11 RX ADMIN — INSULIN ASPART 4 UNITS: 100 INJECTION, SOLUTION INTRAVENOUS; SUBCUTANEOUS at 12:29

## 2017-02-11 NOTE — PROGRESS NOTES
"  I have personally seen and examined the patient today and discussed overnight interval progress and issues with nursing staff.    Subjective    The patient is showing improvement this morning with less edema.  Seems be doing well on de-escalate antibiotic regimen.     History taken from: patient chart RN      Vital Signs    Visit Vitals   • /73 (BP Location: Left arm, Patient Position: Lying)   • Pulse 87   • Temp 97.4 °F (36.3 °C) (Oral)   • Resp 18   • Ht 68\" (172.7 cm)   • Wt 178 lb 9.6 oz (81 kg)   • SpO2 96%   • BMI 27.16 kg/m2       Temp:  [97.4 °F (36.3 °C)-98.6 °F (37 °C)] 97.4 °F (36.3 °C)      Intake/Output Summary (Last 24 hours) at 02/11/17 0929  Last data filed at 02/11/17 0900   Gross per 24 hour   Intake   2280 ml   Output      0 ml   Net   2280 ml     Intake & Output (last 3 days)       02/08 0701 - 02/09 0700 02/09 0701 - 02/10 0700 02/10 0701 - 02/11 0700 02/11 0701 - 02/12 0700    P.O. 1200 1200 2280 360    Total Intake(mL/kg) 1200 (14.8) 1200 (14.8) 2280 (28.1) 360 (4.4)    Urine (mL/kg/hr)        Stool        Total Output            Net +1200 +1200 +2280 +360            Unmeasured Urine Occurrence 5 x 8 x 7 x     Unmeasured Stool Occurrence 1 x 1 x 0 x     Unmeasured Emesis Occurrence 1 x           Physical Exam:      General Appearance:  Alert, cooperative, in no acute distress   Head:  Normocephalic, without obvious abnormality, atraumatic   Eyes:      Lids and lashes normal, conjunctivae and sclerae normal, no icterus, no pallor, corneas clear, PERRLA   Ears:  Ears appear intact with no abnormalities noted   Throat: No oral lesions, no thrush, oral mucosa moist   Neck: No adenopathy, supple, trachea midline, no thyromegaly, no carotid bruit, no JVD   Back:  No tenderness to percussion or palpation, range of motion normal   Lungs:  Clear to auscultation,respirations regular, even and unlabored. No wheezing, no ronchi and no crackles.   Heart:  Regular rhythm and normal rate, normal S1 " and S2, no murmur, no gallop, no rub, no click   Chest Wall:  No abnormalities observed   Abdomen:  Normal bowel sounds, no masses, no organomegaly, soft non-tender, non-distended, no guarding, no rebound tenderness   Rectal:  Deferred   Extremities: Moves all extremities well, no edema, no cyanosis, no redness   Pulses: Pulses palpable and equal bilaterally   Skin:  improvement of overall edema especially to the right upper extremity and left lower extremity without any evidence of abscess or warmth with remaining mild erythema to the right thumb but otherwise significant improvement.   Lymph nodes: No palpable adenopathy   Neurologic: Awake, alert and oriented x 3. Following commands.     Results:      Results from last 7 days  Lab Units 02/11/17  0429 02/10/17  0211 02/09/17  0117 02/08/17  0612 02/07/17  0113 02/06/17  0210 02/05/17  0447   WBC 10*3/mm3 2.78* 2.26* 2.40* 3.25* 2.41* 2.92* 3.40*     Lab Results   Component Value Date    NEUTROABS 1.74 02/11/2017         Results from last 7 days  Lab Units 02/11/17  0429   CREATININE mg/dL 0.52         Results from last 7 days  Lab Units 02/08/17  0612 02/07/17  0113 02/06/17  0210   CRP mg/dL 1.84* 2.20* 3.07*       Imaging Results (last 24 hours)     Procedure Component Value Units Date/Time    XR Abdomen 2 View With Chest 1 View [[id number]] Collected:  02/08/17 1125     Updated:  02/08/17 1125    Narrative:       XR ABDOMEN 2 VIEW WITH CHEST 1 VIEW-     REASON FOR EXAM:  Vomiting; L03.113-Cellulitis of right upper limb.     FINDINGS: The chest portion of the exam shows the lungs to be well  aerated and clear. There was no pleural fluid or free air beneath the  diaphragm. Supine and upright views of the abdomen show nonspecific  bowel gas pattern. There was nothing seen to suggest obstruction to the  bowel. No soft tissue masses are demonstrated.       Impression:       Nonspecific acute abdominal series.                  Results Review:    I have personally  reviewed laboratory data, culture results, radiology studies and antimicrobial therapy.    Hospital Medications (active)       Dose Frequency Start End    aspirin EC tablet 81 mg 81 mg Daily 2/5/2017     Sig - Route: Take 1 tablet by mouth Daily. - Oral    Cosign for Ordering: Accepted by Elva Hdz MD on 2/5/2017  3:26 PM    dextrose (D50W) solution 25 g 25 g Every 15 Minutes PRN 2/4/2017     Sig - Route: Infuse 50 mL into a venous catheter Every 15 (Fifteen) Minutes As Needed for low blood sugar (Blood Sugar Less Than 70, Patient Has IV Access - Unresponsive, NPO or Unable To Safely Swallow). - Intravenous    Cosign for Ordering: Accepted by Rowan Juan DO on 2/4/2017  8:23 PM    dextrose (GLUTOSE) oral gel 15 g 15 g Every 15 Minutes PRN 2/4/2017     Sig - Route: Take 15 g by mouth Every 15 (Fifteen) Minutes As Needed for low blood sugar (Blood Sugar Less Than 70, Patient Alert, Is Not NPO & Can Safely Swallow). - Oral    Cosign for Ordering: Accepted by Rowan Juan DO on 2/4/2017  8:23 PM    dicyclomine (BENTYL) capsule 10 mg 10 mg 2 Times Daily Before Meals 2/5/2017     Sig - Route: Take 1 capsule by mouth 2 (Two) Times a Day Before Meals. - Oral    Cosign for Ordering: Accepted by Elva Hdz MD on 2/5/2017  3:26 PM    gabapentin (NEURONTIN) capsule 400 mg 400 mg Every 8 Hours Scheduled 2/5/2017     Sig - Route: Take 1 capsule by mouth Every 8 (Eight) Hours. - Oral    Cosign for Ordering: Accepted by Elva Hdz MD on 2/5/2017  3:26 PM    glucagon (GLUCAGEN) injection 1 mg 1 mg Every 15 Minutes PRN 2/4/2017     Sig - Route: Inject 1 mg under the skin Every 15 (Fifteen) Minutes As Needed (Blood Glucose Less Than 70 - Patient Without IV Access - Unresponsive, NPO or Unable To Safely Swallow). - Subcutaneous    Cosign for Ordering: Accepted by Rowan Juan DO on 2/4/2017  8:23 PM    insulin aspart (novoLOG) injection 0-7 Units 0-7 Units 4 Times Daily Before Meals &  Nightly 2/4/2017     Sig - Route: Inject 0-7 Units under the skin 4 (Four) Times a Day Before Meals & at Bedtime. - Subcutaneous    Cosign for Ordering: Accepted by Rowan Juan DO on 2/4/2017  8:23 PM    insulin aspart (novoLOG) injection 5 Units 5 Units 3 Times Daily With Meals 2/6/2017     Sig - Route: Inject 5 Units under the skin 3 (Three) Times a Day With Meals. - Subcutaneous    insulin detemir (LEVEMIR) injection 20 Units 20 Units Nightly 2/6/2017     Sig - Route: Inject 20 Units under the skin Every Night. - Subcutaneous    ipratropium-albuterol (DUO-NEB) nebulizer solution 3 mL 3 mL Every 6 Hours PRN 2/4/2017     Sig - Route: Take 3 mL by nebulization Every 6 (Six) Hours As Needed for shortness of air. - Nebulization    Cosign for Ordering: Accepted by Rowan Juan DO on 2/4/2017  8:23 PM    metoclopramide (REGLAN) tablet 10 mg 10 mg 2 Times Daily Before Meals 2/5/2017     Sig - Route: Take 1 tablet by mouth 2 (Two) Times a Day Before Meals. - Oral    Cosign for Ordering: Accepted by Elva Hdz MD on 2/5/2017  3:26 PM    metoprolol succinate XL (TOPROL-XL) 24 hr tablet 25 mg 25 mg Daily 2/5/2017     Sig - Route: Take 1 tablet by mouth Daily. - Oral    Cosign for Ordering: Accepted by Elva Hdz MD on 2/5/2017  3:26 PM    metroNIDAZOLE (FLAGYL) IVPB 500 mg 500 mg Every 8 Hours 2/4/2017     Sig - Route: Infuse 100 mL into a venous catheter Every 8 (Eight) Hours. - Intravenous    Cosign for Ordering: Accepted by Rowan Juan DO on 2/4/2017  8:23 PM    nitroglycerin (NITROSTAT) ointment 1 inch 1 inch Every 6 Hours Scheduled 2/7/2017     Sig - Route: Apply 1 inch topically Every 6 (Six) Hours. - Topical    ondansetron (ZOFRAN) injection 4 mg 4 mg Every 6 Hours PRN 2/8/2017     Sig - Route: Infuse 2 mL into a venous catheter Every 6 (Six) Hours As Needed for nausea or vomiting. - Intravenous    oxyCODONE (ROXICODONE) immediate release tablet 15 mg 15 mg Every 4 Hours PRN  "2/4/2017 2/14/2017    Sig - Route: Take 1 tablet by mouth Every 4 (Four) Hours As Needed for moderate pain (4-6). - Oral    pancrelipase (Lip-Prot-Amyl) (CREON) capsule 24,000 units of lipase 24,000 units of lipase 4 Times Daily With Meals & Nightly 2/5/2017     Sig - Route: Take 2 capsules by mouth 4 (Four) Times a Day With Meals & at Bedtime. - Oral    Cosign for Ordering: Accepted by Elva Hdz MD on 2/5/2017  3:26 PM    pantoprazole (PROTONIX) EC tablet 40 mg 40 mg 2 Times Daily Before Meals 2/5/2017     Sig - Route: Take 1 tablet by mouth 2 (Two) Times a Day Before Meals. - Oral    Cosign for Ordering: Accepted by Elva Hdz MD on 2/5/2017  3:26 PM    polyethylene glycol (MIRALAX) powder 17 g 17 g Every 12 Hours Scheduled 2/5/2017     Sig - Route: Take 17 g by mouth Every 12 (Twelve) Hours. - Oral    Cosign for Ordering: Accepted by Elva Hdz MD on 2/5/2017  3:26 PM    sodium chloride 0.9 % flush 1-10 mL 1-10 mL As Needed 2/4/2017     Sig - Route: Infuse 1-10 mL into a venous catheter As Needed for line care. - Intravenous    sodium chloride 0.9 % flush 10 mL 10 mL As Needed 2/4/2017     Sig - Route: Infuse 10 mL into a venous catheter As Needed for line care. - Intravenous    Linked Group 1:  \"And\" Linked Group Details        sodium chloride 0.9 % flush 10 mL 10 mL Every 12 Hours Scheduled 2/6/2017     Sig - Route: 10 mL by Intracatheter route Every 12 (Twelve) Hours. - Intracatheter    sodium chloride 0.9 % flush 10 mL 10 mL As Needed 2/6/2017     Sig - Route: 10 mL by Intracatheter route As Needed for line care (After Medication Administration). - Intracatheter    vancomycin (VANCOCIN) 1,500 mg in sodium chloride 0.9 % 500 mL IVPB 1,500 mg Every 8 Hours 2/5/2017     Sig - Route: Infuse 1,500 mg into a venous catheter Every 8 (Eight) Hours. - Intravenous            Cultures:    BLOOD CULTURE   Date Value Ref Range Status   02/04/2017 No growth at 3 days  Preliminary   02/04/2017 Abnormal " Stain (A)  Final   02/04/2017 Staphylococcus, coagulase negative (A)  Final     Comment:     Probable Contaminant.        PROBLEM LIST:    Patient Active Problem List   Diagnosis   • MDD (major depressive disorder), recurrent episode, moderate   • Type 1 diabetes mellitus   • 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   • Cellulitis of right hand   • Cellulitis of right hand excluding fingers and thumb       Assessment/Plan     ASSESSMENT:    1. Staph bacteremia versus contaminant in the setting of IV drug use     PLAN:    The patient is showing improvement this morning with less edema.  Seems be doing well on de-escalated antibiotic regimen.    In the setting of significant clinical improvement and resolution of phlebitis, his antibiotic therapy was de-escalated from vancomycin and Flagyl to oral doxycycline 100 mg by mouth twice a day ×6 more days.      Patients findings and recommendations were discussed with patient and nursing staff    Code Status: Full Code    Bethany Piper PA-C  02/11/17  9:29 AM
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
## 2017-02-11 NOTE — PLAN OF CARE
Problem: Pain, Acute (Adult)  Goal: Acceptable Pain Control/Comfort Level  Outcome: Ongoing (interventions implemented as appropriate)    02/10/17 0300   Pain, Acute (Adult)   Acceptable Pain Control/Comfort Level making progress toward outcome         Problem: Cellulitis (Adult)  Goal: Signs and Symptoms of Listed Potential Problems Will be Absent or Manageable (Cellulitis)  Outcome: Ongoing (interventions implemented as appropriate)    02/07/17 0123   Cellulitis   Problems Assessed (Cellulitis) all   Problems Present (Cellulitis) pain         Problem: Patient Care Overview (Adult)  Goal: Plan of Care Review  Outcome: Ongoing (interventions implemented as appropriate)    02/07/17 0123 02/10/17 2000   Coping/Psychosocial Response Interventions   Plan Of Care Reviewed With --  patient   Patient Care Overview   Progress progress toward functional goals as expected --        Goal: Adult Individualization and Mutuality  Outcome: Ongoing (interventions implemented as appropriate)    Problem: Diabetes, Type 1 (Adult)  Goal: Signs and Symptoms of Listed Potential Problems Will be Absent or Manageable (Diabetes, Type 1)  Outcome: Ongoing (interventions implemented as appropriate)    02/08/17 2350   Diabetes, Type 1   Problems Assessed (Type 1 Diabetes) all   Problems Present (Type 1 Diabetes) impaired glycemic control         Problem: Diabetes, Type 2 (Adult)  Goal: Signs and Symptoms of Listed Potential Problems Will be Absent or Manageable (Diabetes, Type 2)  Outcome: Ongoing (interventions implemented as appropriate)    02/08/17 2350   Diabetes, Type 2   Problems Assessed (Type 2 Diabetes) all   Problems Present (Type 2 Diabetes) impaired glycemic control

## 2017-02-11 NOTE — PROGRESS NOTES
Discharge Planning Assessment   Art     Patient Name: Isaias Lang  MRN: [id number]  Today's Date: 2/11/2017    Admit Date: 2/4/2017       Discharge Plan       02/11/17 1145    Case Management/Social Work Plan    Plan SS spoke with pt on this date. Pt states that it is a possibility that he will have transportation on this date. SS spoke with the House Supervisor to inform that if pt does not have transportation, that pt may need help getting the money for a taxi. SS will follow and assist as needed.    Patient/Family In Agreement With Plan yes        Discharge Placement     No information found        Expected Discharge Date and Time     Expected Discharge Date Expected Discharge Time    Feb 11, 2017             Opal Rod

## 2017-02-15 LAB — HDV AB SER-ACNC: NEGATIVE

## 2017-04-02 ENCOUNTER — HOSPITAL ENCOUNTER (EMERGENCY)
Facility: HOSPITAL | Age: 52
Discharge: HOME | End: 2017-04-03
Attending: EMERGENCY MEDICINE | Admitting: EMERGENCY MEDICINE

## 2017-04-02 DIAGNOSIS — K86.1 CHRONIC PANCREATITIS, UNSPECIFIED PANCREATITIS TYPE (HCC): Primary | ICD-10-CM

## 2017-04-02 LAB
BACTERIA UR QL AUTO: ABNORMAL /HPF
BASOPHILS # BLD AUTO: 0.03 10*3/MM3 (ref 0–0.3)
BASOPHILS NFR BLD AUTO: 0.6 % (ref 0–2)
BILIRUB UR QL STRIP: ABNORMAL
CLARITY UR: CLEAR
COLOR UR: ABNORMAL
DEPRECATED RDW RBC AUTO: 51 FL (ref 37–54)
EOSINOPHIL # BLD AUTO: 0.07 10*3/MM3 (ref 0–0.7)
EOSINOPHIL NFR BLD AUTO: 1.5 % (ref 0–5)
ERYTHROCYTE [DISTWIDTH] IN BLOOD BY AUTOMATED COUNT: 15.8 % (ref 11.5–14.5)
FLUAV AG NPH QL: NEGATIVE
FLUBV AG NPH QL IA: NEGATIVE
GLUCOSE UR STRIP-MCNC: NEGATIVE MG/DL
HCT VFR BLD AUTO: 39.1 % (ref 42–52)
HGB BLD-MCNC: 13.3 G/DL (ref 14–18)
HGB UR QL STRIP.AUTO: NEGATIVE
HYALINE CASTS UR QL AUTO: ABNORMAL /LPF
IMM GRANULOCYTES # BLD: 0 10*3/MM3 (ref 0–0.03)
IMM GRANULOCYTES NFR BLD: 0 % (ref 0–0.5)
KETONES UR QL STRIP: ABNORMAL
LEUKOCYTE ESTERASE UR QL STRIP.AUTO: NEGATIVE
LYMPHOCYTES # BLD AUTO: 0.95 10*3/MM3 (ref 1–3)
LYMPHOCYTES NFR BLD AUTO: 19.9 % (ref 21–51)
MCH RBC QN AUTO: 30.1 PG (ref 27–33)
MCHC RBC AUTO-ENTMCNC: 34 G/DL (ref 33–37)
MCV RBC AUTO: 88.5 FL (ref 80–94)
MONOCYTES # BLD AUTO: 0.62 10*3/MM3 (ref 0.1–0.9)
MONOCYTES NFR BLD AUTO: 13 % (ref 0–10)
NEUTROPHILS # BLD AUTO: 3.1 10*3/MM3 (ref 1.4–6.5)
NEUTROPHILS NFR BLD AUTO: 65 % (ref 30–70)
NITRITE UR QL STRIP: POSITIVE
PH UR STRIP.AUTO: 5.5 [PH] (ref 5–8)
PLATELET # BLD AUTO: 80 10*3/MM3 (ref 130–400)
PMV BLD AUTO: 10.7 FL (ref 6–10)
PROT UR QL STRIP: NEGATIVE
RBC # BLD AUTO: 4.42 10*6/MM3 (ref 4.7–6.1)
RBC # UR: ABNORMAL /HPF
REF LAB TEST METHOD: ABNORMAL
SP GR UR STRIP: 1.03 (ref 1–1.03)
SQUAMOUS #/AREA URNS HPF: ABNORMAL /HPF
UROBILINOGEN UR QL STRIP: ABNORMAL
WBC NRBC COR # BLD: 4.77 10*3/MM3 (ref 4.5–12.5)
WBC UR QL AUTO: ABNORMAL /HPF

## 2017-04-02 PROCEDURE — 93010 ELECTROCARDIOGRAM REPORT: CPT | Performed by: INTERNAL MEDICINE

## 2017-04-02 RX ORDER — MORPHINE SULFATE 2 MG/ML
2 INJECTION, SOLUTION INTRAMUSCULAR; INTRAVENOUS ONCE
Status: COMPLETED | OUTPATIENT
Start: 2017-04-02 | End: 2017-04-02

## 2017-04-02 RX ORDER — ONDANSETRON 2 MG/ML
4 INJECTION INTRAMUSCULAR; INTRAVENOUS ONCE
Status: COMPLETED | OUTPATIENT
Start: 2017-04-02 | End: 2017-04-02

## 2017-04-02 RX ADMIN — SODIUM CHLORIDE 1000 ML: 9 INJECTION, SOLUTION INTRAVENOUS at 23:05

## 2017-04-02 RX ADMIN — ONDANSETRON 4 MG: 2 INJECTION INTRAMUSCULAR; INTRAVENOUS at 23:05

## 2017-04-02 RX ADMIN — MORPHINE SULFATE 2 MG: 2 INJECTION, SOLUTION INTRAMUSCULAR; INTRAVENOUS at 23:07

## 2017-04-03 ENCOUNTER — APPOINTMENT (OUTPATIENT)
Dept: GENERAL RADIOLOGY | Facility: HOSPITAL | Age: 52
End: 2017-04-03

## 2017-04-03 ENCOUNTER — HOSPITAL ENCOUNTER (INPATIENT)
Facility: HOSPITAL | Age: 52
LOS: 4 days | Discharge: HOME | End: 2017-04-07
Attending: EMERGENCY MEDICINE | Admitting: INTERNAL MEDICINE

## 2017-04-03 ENCOUNTER — APPOINTMENT (OUTPATIENT)
Dept: CT IMAGING | Facility: HOSPITAL | Age: 52
End: 2017-04-03

## 2017-04-03 VITALS
OXYGEN SATURATION: 97 % | RESPIRATION RATE: 16 BRPM | WEIGHT: 175 LBS | HEIGHT: 68 IN | HEART RATE: 98 BPM | BODY MASS INDEX: 26.52 KG/M2 | DIASTOLIC BLOOD PRESSURE: 74 MMHG | SYSTOLIC BLOOD PRESSURE: 112 MMHG | TEMPERATURE: 97.1 F

## 2017-04-03 DIAGNOSIS — F19.10 POLYSUBSTANCE ABUSE (HCC): ICD-10-CM

## 2017-04-03 DIAGNOSIS — IMO0001 UNCONTROLLED TYPE 1 DIABETES MELLITUS WITHOUT COMPLICATION: ICD-10-CM

## 2017-04-03 DIAGNOSIS — A41.9 SEPSIS, DUE TO UNSPECIFIED ORGANISM: Primary | ICD-10-CM

## 2017-04-03 PROBLEM — R65.21 SEPTIC SHOCK (HCC): Status: ACTIVE | Noted: 2017-04-03

## 2017-04-03 LAB
A-A DO2: 15.9 MMHG (ref 0–300)
ACETONE BLD QL: NEGATIVE
ALBUMIN SERPL-MCNC: 3.4 G/DL (ref 3.5–5)
ALBUMIN SERPL-MCNC: 3.5 G/DL (ref 3.5–5)
ALBUMIN/GLOB SERPL: 1.5 G/DL (ref 1.5–2.5)
ALBUMIN/GLOB SERPL: 1.5 G/DL (ref 1.5–2.5)
ALP SERPL-CCNC: 140 U/L (ref 40–129)
ALP SERPL-CCNC: 166 U/L (ref 40–129)
ALT SERPL W P-5'-P-CCNC: 183 U/L (ref 10–44)
ALT SERPL W P-5'-P-CCNC: 187 U/L (ref 10–44)
AMPHET+METHAMPHET UR QL: POSITIVE
AMYLASE SERPL-CCNC: 29 U/L (ref 28–100)
ANION GAP SERPL CALCULATED.3IONS-SCNC: 5 MMOL/L (ref 3.6–11.2)
ANION GAP SERPL CALCULATED.3IONS-SCNC: 8.6 MMOL/L (ref 3.6–11.2)
ARTERIAL PATENCY WRIST A: POSITIVE
AST SERPL-CCNC: 305 U/L (ref 10–34)
AST SERPL-CCNC: 386 U/L (ref 10–34)
ATMOSPHERIC PRESS: 731 MMHG
BARBITURATES UR QL SCN: NEGATIVE
BASE EXCESS BLDA CALC-SCNC: -2.9 MMOL/L
BASOPHILS # BLD AUTO: 0 10*3/MM3 (ref 0–0.3)
BASOPHILS NFR BLD AUTO: 0 % (ref 0–2)
BDY SITE: ABNORMAL
BENZODIAZ UR QL SCN: NEGATIVE
BILIRUB SERPL-MCNC: 1.7 MG/DL (ref 0.2–1.8)
BILIRUB SERPL-MCNC: 1.9 MG/DL (ref 0.2–1.8)
BILIRUB UR QL STRIP: NEGATIVE
BODY TEMPERATURE: 98.6 C
BUN BLD-MCNC: 12 MG/DL (ref 7–21)
BUN BLD-MCNC: 17 MG/DL (ref 7–21)
BUN/CREAT SERPL: 17.4 (ref 7–25)
BUN/CREAT SERPL: 21.5 (ref 7–25)
BUPRENORPHINE+NOR UR QL SCN: POSITIVE
CALCIUM SPEC-SCNC: 9 MG/DL (ref 7.7–10)
CALCIUM SPEC-SCNC: 9.5 MG/DL (ref 7.7–10)
CANNABINOIDS SERPL QL: NEGATIVE
CHLORIDE SERPL-SCNC: 103 MMOL/L (ref 99–112)
CHLORIDE SERPL-SCNC: 104 MMOL/L (ref 99–112)
CK MB SERPL-CCNC: 46.88 NG/ML (ref 0–5)
CK MB SERPL-RTO: 4.7 % (ref 0–3)
CK SERPL-CCNC: 992 U/L (ref 24–204)
CLARITY UR: CLEAR
CO2 SERPL-SCNC: 27 MMOL/L (ref 24.3–31.9)
CO2 SERPL-SCNC: 27.4 MMOL/L (ref 24.3–31.9)
COCAINE UR QL: NEGATIVE
COHGB MFR BLD: 1.2 % (ref 0–5)
COLOR UR: ABNORMAL
CREAT BLD-MCNC: 0.69 MG/DL (ref 0.43–1.29)
CREAT BLD-MCNC: 0.79 MG/DL (ref 0.43–1.29)
CRP SERPL-MCNC: 3.88 MG/DL (ref 0–0.99)
D-LACTATE SERPL-SCNC: 2.1 MMOL/L (ref 0.5–2)
D-LACTATE SERPL-SCNC: 2.5 MMOL/L (ref 0.5–2)
D-LACTATE SERPL-SCNC: 3.1 MMOL/L (ref 0.5–2)
D-LACTATE SERPL-SCNC: 5.3 MMOL/L (ref 0.5–2)
DEPRECATED RDW RBC AUTO: 51.4 FL (ref 37–54)
EOSINOPHIL # BLD AUTO: 0 10*3/MM3 (ref 0–0.7)
EOSINOPHIL NFR BLD AUTO: 0 % (ref 0–5)
ERYTHROCYTE [DISTWIDTH] IN BLOOD BY AUTOMATED COUNT: 15.9 % (ref 11.5–14.5)
GFR SERPL CREATININE-BSD FRML MDRD: 103 ML/MIN/1.73
GFR SERPL CREATININE-BSD FRML MDRD: 120 ML/MIN/1.73
GLOBULIN UR ELPH-MCNC: 2.2 GM/DL
GLOBULIN UR ELPH-MCNC: 2.3 GM/DL
GLUCOSE BLD-MCNC: 209 MG/DL (ref 70–110)
GLUCOSE BLD-MCNC: 371 MG/DL (ref 70–110)
GLUCOSE BLDC GLUCOMTR-MCNC: 195 MG/DL (ref 70–130)
GLUCOSE BLDC GLUCOMTR-MCNC: 258 MG/DL (ref 70–130)
GLUCOSE UR STRIP-MCNC: ABNORMAL MG/DL
HCO3 BLDA-SCNC: 20.5 MMOL/L (ref 22–26)
HCT VFR BLD AUTO: 39.8 % (ref 42–52)
HCT VFR BLD CALC: 42 % (ref 42–52)
HGB BLD-MCNC: 13.2 G/DL (ref 14–18)
HGB BLDA-MCNC: 14.3 G/DL (ref 12–16)
HGB UR QL STRIP.AUTO: NEGATIVE
HOLD SPECIMEN: NORMAL
HOLD SPECIMEN: NORMAL
HOROWITZ INDEX BLD+IHG-RTO: 21 %
IMM GRANULOCYTES # BLD: 0 10*3/MM3 (ref 0–0.03)
IMM GRANULOCYTES NFR BLD: 0 % (ref 0–0.5)
KETONES UR QL STRIP: ABNORMAL
LEUKOCYTE ESTERASE UR QL STRIP.AUTO: NEGATIVE
LIPASE SERPL-CCNC: 28 U/L (ref 13–60)
LYMPHOCYTES # BLD AUTO: 0.23 10*3/MM3 (ref 1–3)
LYMPHOCYTES NFR BLD AUTO: 11.9 % (ref 21–51)
MAGNESIUM SERPL-MCNC: 1.3 MG/DL (ref 1.7–2.6)
MCH RBC QN AUTO: 29.5 PG (ref 27–33)
MCHC RBC AUTO-ENTMCNC: 33.2 G/DL (ref 33–37)
MCV RBC AUTO: 89 FL (ref 80–94)
METHADONE UR QL SCN: NEGATIVE
METHGB BLD QL: 0.3 % (ref 0–3)
MODALITY: ABNORMAL
MONOCYTES # BLD AUTO: 0.02 10*3/MM3 (ref 0.1–0.9)
MONOCYTES NFR BLD AUTO: 1 % (ref 0–10)
MYOGLOBIN SERPL-MCNC: 268 NG/ML (ref 0–109)
NEUTROPHILS # BLD AUTO: 1.69 10*3/MM3 (ref 1.4–6.5)
NEUTROPHILS NFR BLD AUTO: 87.1 % (ref 30–70)
NITRITE UR QL STRIP: NEGATIVE
OPIATES UR QL: POSITIVE
OSMOLALITY SERPL CALC.SUM OF ELEC: 285.2 MOSM/KG (ref 273–305)
OSMOLALITY SERPL CALC.SUM OF ELEC: 286.9 MOSM/KG (ref 273–305)
OXYCODONE UR QL SCN: NEGATIVE
OXYHGB MFR BLDV: 95 % (ref 85–100)
PCO2 BLDA: 32.3 MM HG (ref 35–45)
PCP UR QL SCN: NEGATIVE
PH BLDA: 7.42 PH UNITS (ref 7.35–7.45)
PH UR STRIP.AUTO: 5.5 [PH] (ref 5–8)
PLATELET # BLD AUTO: 61 10*3/MM3 (ref 130–400)
PMV BLD AUTO: 11.2 FL (ref 6–10)
PO2 BLDA: 89.1 MM HG (ref 80–100)
POTASSIUM BLD-SCNC: 3.5 MMOL/L (ref 3.5–5.3)
POTASSIUM BLD-SCNC: 3.7 MMOL/L (ref 3.5–5.3)
PROPOXYPH UR QL: NEGATIVE
PROT SERPL-MCNC: 5.6 G/DL (ref 6–8)
PROT SERPL-MCNC: 5.8 G/DL (ref 6–8)
PROT UR QL STRIP: NEGATIVE
RBC # BLD AUTO: 4.47 10*6/MM3 (ref 4.7–6.1)
SAO2 % BLDCOA: 96.4 % (ref 90–100)
SODIUM BLD-SCNC: 136 MMOL/L (ref 135–153)
SODIUM BLD-SCNC: 139 MMOL/L (ref 135–153)
SP GR UR STRIP: >1.03 (ref 1–1.03)
TROPONIN I SERPL-MCNC: <0.006 NG/ML
UROBILINOGEN UR QL STRIP: ABNORMAL
WBC NRBC COR # BLD: 1.94 10*3/MM3 (ref 4.5–12.5)
WHOLE BLOOD HOLD SPECIMEN: NORMAL
WHOLE BLOOD HOLD SPECIMEN: NORMAL

## 2017-04-03 PROCEDURE — 80307 DRUG TEST PRSMV CHEM ANLYZR: CPT | Performed by: EMERGENCY MEDICINE

## 2017-04-03 PROCEDURE — B548ZZA ULTRASONOGRAPHY OF SUPERIOR VENA CAVA, GUIDANCE: ICD-10-PCS | Performed by: INTERNAL MEDICINE

## 2017-04-03 PROCEDURE — 84484 ASSAY OF TROPONIN QUANT: CPT | Performed by: NURSE PRACTITIONER

## 2017-04-03 PROCEDURE — 25010000002 VANCOMYCIN PER 500 MG: Performed by: EMERGENCY MEDICINE

## 2017-04-03 PROCEDURE — 83735 ASSAY OF MAGNESIUM: CPT | Performed by: NURSE PRACTITIONER

## 2017-04-03 PROCEDURE — 74022 RADEX COMPL AQT ABD SERIES: CPT | Performed by: RADIOLOGY

## 2017-04-03 PROCEDURE — 71010 HC CHEST PA OR AP: CPT

## 2017-04-03 PROCEDURE — 25010000002 HYDROMORPHONE PER 4 MG

## 2017-04-03 PROCEDURE — 85025 COMPLETE CBC W/AUTO DIFF WBC: CPT | Performed by: EMERGENCY MEDICINE

## 2017-04-03 PROCEDURE — 83050 HGB METHEMOGLOBIN QUAN: CPT | Performed by: EMERGENCY MEDICINE

## 2017-04-03 PROCEDURE — 82009 KETONE BODYS QUAL: CPT | Performed by: EMERGENCY MEDICINE

## 2017-04-03 PROCEDURE — 02HV33Z INSERTION OF INFUSION DEVICE INTO SUPERIOR VENA CAVA, PERCUTANEOUS APPROACH: ICD-10-PCS | Performed by: INTERNAL MEDICINE

## 2017-04-03 PROCEDURE — 74010 HC ABDOMEN SERIES FOR ANEURYSM: CPT

## 2017-04-03 PROCEDURE — 36415 COLL VENOUS BLD VENIPUNCTURE: CPT

## 2017-04-03 PROCEDURE — 36600 WITHDRAWAL OF ARTERIAL BLOOD: CPT | Performed by: EMERGENCY MEDICINE

## 2017-04-03 PROCEDURE — 94640 AIRWAY INHALATION TREATMENT: CPT

## 2017-04-03 PROCEDURE — 99223 1ST HOSP IP/OBS HIGH 75: CPT | Performed by: INTERNAL MEDICINE

## 2017-04-03 PROCEDURE — 83605 ASSAY OF LACTIC ACID: CPT | Performed by: EMERGENCY MEDICINE

## 2017-04-03 PROCEDURE — 82375 ASSAY CARBOXYHB QUANT: CPT | Performed by: EMERGENCY MEDICINE

## 2017-04-03 PROCEDURE — 82550 ASSAY OF CK (CPK): CPT | Performed by: NURSE PRACTITIONER

## 2017-04-03 PROCEDURE — 71010 XR CHEST 1 VW: CPT | Performed by: RADIOLOGY

## 2017-04-03 PROCEDURE — 80053 COMPREHEN METABOLIC PANEL: CPT | Performed by: EMERGENCY MEDICINE

## 2017-04-03 PROCEDURE — 83874 ASSAY OF MYOGLOBIN: CPT | Performed by: NURSE PRACTITIONER

## 2017-04-03 PROCEDURE — 82805 BLOOD GASES W/O2 SATURATION: CPT | Performed by: EMERGENCY MEDICINE

## 2017-04-03 PROCEDURE — 25010000002 HYDROMORPHONE PER 4 MG: Performed by: FAMILY MEDICINE

## 2017-04-03 PROCEDURE — 63710000001 INSULIN ASPART PER 5 UNITS: Performed by: NURSE PRACTITIONER

## 2017-04-03 PROCEDURE — 82962 GLUCOSE BLOOD TEST: CPT

## 2017-04-03 PROCEDURE — 82553 CREATINE MB FRACTION: CPT | Performed by: NURSE PRACTITIONER

## 2017-04-03 PROCEDURE — 25010000002 PIPERACILLIN-TAZOBACTAM: Performed by: EMERGENCY MEDICINE

## 2017-04-03 PROCEDURE — 74176 CT ABD & PELVIS W/O CONTRAST: CPT | Performed by: RADIOLOGY

## 2017-04-03 PROCEDURE — 99285 EMERGENCY DEPT VISIT HI MDM: CPT

## 2017-04-03 PROCEDURE — 81003 URINALYSIS AUTO W/O SCOPE: CPT | Performed by: EMERGENCY MEDICINE

## 2017-04-03 RX ORDER — TRAZODONE HYDROCHLORIDE 100 MG/1
100 TABLET ORAL NIGHTLY PRN
COMMUNITY
End: 2017-04-07 | Stop reason: HOSPADM

## 2017-04-03 RX ORDER — DICYCLOMINE HYDROCHLORIDE 10 MG/1
10 CAPSULE ORAL 2 TIMES DAILY
COMMUNITY
End: 2017-04-07 | Stop reason: HOSPADM

## 2017-04-03 RX ORDER — METOPROLOL TARTRATE 50 MG/1
50 TABLET, FILM COATED ORAL DAILY
COMMUNITY
End: 2017-07-11 | Stop reason: HOSPADM

## 2017-04-03 RX ORDER — MAGNESIUM SULFATE HEPTAHYDRATE 40 MG/ML
2 INJECTION, SOLUTION INTRAVENOUS AS NEEDED
Status: DISCONTINUED | OUTPATIENT
Start: 2017-04-03 | End: 2017-04-07 | Stop reason: HOSPADM

## 2017-04-03 RX ORDER — INSULIN GLARGINE 100 [IU]/ML
35 INJECTION, SOLUTION SUBCUTANEOUS NIGHTLY
Status: ON HOLD | COMMUNITY
End: 2017-04-04

## 2017-04-03 RX ORDER — METHYLPREDNISOLONE SODIUM SUCCINATE 125 MG/2ML
60 INJECTION, POWDER, LYOPHILIZED, FOR SOLUTION INTRAMUSCULAR; INTRAVENOUS ONCE
Status: COMPLETED | OUTPATIENT
Start: 2017-04-03 | End: 2017-04-03

## 2017-04-03 RX ORDER — CITALOPRAM 20 MG/1
20 TABLET ORAL DAILY
Status: ON HOLD | COMMUNITY
End: 2017-07-03

## 2017-04-03 RX ORDER — NITROGLYCERIN 0.4 MG/1
0.4 TABLET SUBLINGUAL
Status: DISCONTINUED | OUTPATIENT
Start: 2017-04-03 | End: 2017-04-07 | Stop reason: HOSPADM

## 2017-04-03 RX ORDER — POTASSIUM CHLORIDE 750 MG/1
40 CAPSULE, EXTENDED RELEASE ORAL AS NEEDED
Status: DISCONTINUED | OUTPATIENT
Start: 2017-04-03 | End: 2017-04-07 | Stop reason: HOSPADM

## 2017-04-03 RX ORDER — MULTIVITAMIN WITH IRON
250 TABLET ORAL 2 TIMES DAILY
COMMUNITY
End: 2017-07-11 | Stop reason: HOSPADM

## 2017-04-03 RX ORDER — NAPROXEN 250 MG/1
250 TABLET ORAL 2 TIMES DAILY
COMMUNITY
End: 2017-04-07 | Stop reason: HOSPADM

## 2017-04-03 RX ORDER — NITROGLYCERIN 0.4 MG/1
0.4 TABLET SUBLINGUAL
COMMUNITY
End: 2017-07-11 | Stop reason: HOSPADM

## 2017-04-03 RX ORDER — GABAPENTIN 800 MG/1
800 TABLET ORAL 4 TIMES DAILY
COMMUNITY
End: 2017-04-07 | Stop reason: HOSPADM

## 2017-04-03 RX ORDER — POTASSIUM CHLORIDE 7.45 MG/ML
10 INJECTION INTRAVENOUS
Status: DISCONTINUED | OUTPATIENT
Start: 2017-04-03 | End: 2017-04-07 | Stop reason: HOSPADM

## 2017-04-03 RX ORDER — NICOTINE POLACRILEX 4 MG
15 LOZENGE BUCCAL
Status: DISCONTINUED | OUTPATIENT
Start: 2017-04-03 | End: 2017-04-07 | Stop reason: HOSPADM

## 2017-04-03 RX ORDER — MAGNESIUM SULFATE HEPTAHYDRATE 40 MG/ML
2 INJECTION, SOLUTION INTRAVENOUS
Status: COMPLETED | OUTPATIENT
Start: 2017-04-04 | End: 2017-04-04

## 2017-04-03 RX ORDER — ALBUTEROL SULFATE 90 UG/1
2 AEROSOL, METERED RESPIRATORY (INHALATION) EVERY 6 HOURS PRN
COMMUNITY
End: 2017-07-11 | Stop reason: HOSPADM

## 2017-04-03 RX ORDER — SODIUM CHLORIDE 9 MG/ML
75 INJECTION, SOLUTION INTRAVENOUS CONTINUOUS
Status: DISCONTINUED | OUTPATIENT
Start: 2017-04-03 | End: 2017-04-06

## 2017-04-03 RX ORDER — POTASSIUM CHLORIDE 750 MG/1
20 TABLET, FILM COATED, EXTENDED RELEASE ORAL 2 TIMES DAILY
COMMUNITY
End: 2017-04-07 | Stop reason: HOSPADM

## 2017-04-03 RX ORDER — DEXTROSE MONOHYDRATE 25 G/50ML
25 INJECTION, SOLUTION INTRAVENOUS
Status: DISCONTINUED | OUTPATIENT
Start: 2017-04-03 | End: 2017-04-07 | Stop reason: HOSPADM

## 2017-04-03 RX ORDER — HYDROMORPHONE HYDROCHLORIDE 1 MG/ML
0.5 INJECTION, SOLUTION INTRAMUSCULAR; INTRAVENOUS; SUBCUTANEOUS ONCE
Status: COMPLETED | OUTPATIENT
Start: 2017-04-03 | End: 2017-04-03

## 2017-04-03 RX ORDER — MAGNESIUM SULFATE HEPTAHYDRATE 40 MG/ML
4 INJECTION, SOLUTION INTRAVENOUS AS NEEDED
Status: DISCONTINUED | OUTPATIENT
Start: 2017-04-03 | End: 2017-04-07 | Stop reason: HOSPADM

## 2017-04-03 RX ORDER — METOCLOPRAMIDE 10 MG/1
10 TABLET ORAL 2 TIMES DAILY
COMMUNITY
End: 2017-04-07 | Stop reason: HOSPADM

## 2017-04-03 RX ORDER — HEPARIN SODIUM 5000 [USP'U]/ML
5000 INJECTION, SOLUTION INTRAVENOUS; SUBCUTANEOUS EVERY 12 HOURS SCHEDULED
Status: CANCELLED | OUTPATIENT
Start: 2017-04-03

## 2017-04-03 RX ORDER — SODIUM CHLORIDE 9 MG/ML
125 INJECTION, SOLUTION INTRAVENOUS CONTINUOUS
Status: DISCONTINUED | OUTPATIENT
Start: 2017-04-03 | End: 2017-04-03

## 2017-04-03 RX ORDER — IPRATROPIUM BROMIDE AND ALBUTEROL SULFATE 2.5; .5 MG/3ML; MG/3ML
3 SOLUTION RESPIRATORY (INHALATION)
COMMUNITY
End: 2017-04-07 | Stop reason: HOSPADM

## 2017-04-03 RX ORDER — ATORVASTATIN CALCIUM 20 MG/1
20 TABLET, FILM COATED ORAL DAILY
COMMUNITY
End: 2017-04-07 | Stop reason: HOSPADM

## 2017-04-03 RX ORDER — SODIUM CHLORIDE 0.9 % (FLUSH) 0.9 %
1-10 SYRINGE (ML) INJECTION AS NEEDED
Status: DISCONTINUED | OUTPATIENT
Start: 2017-04-03 | End: 2017-04-07 | Stop reason: HOSPADM

## 2017-04-03 RX ORDER — INSULIN GLARGINE 100 [IU]/ML
20 INJECTION, SOLUTION SUBCUTANEOUS EVERY MORNING
Status: ON HOLD | COMMUNITY
End: 2017-04-04

## 2017-04-03 RX ORDER — FUROSEMIDE 20 MG/1
20 TABLET ORAL DAILY
COMMUNITY
End: 2017-04-07 | Stop reason: HOSPADM

## 2017-04-03 RX ORDER — POTASSIUM CHLORIDE 1.5 G/1.77G
40 POWDER, FOR SOLUTION ORAL AS NEEDED
Status: DISCONTINUED | OUTPATIENT
Start: 2017-04-03 | End: 2017-04-07 | Stop reason: HOSPADM

## 2017-04-03 RX ORDER — ALBUTEROL SULFATE 2.5 MG/3ML
2.5 SOLUTION RESPIRATORY (INHALATION) ONCE
Status: COMPLETED | OUTPATIENT
Start: 2017-04-03 | End: 2017-04-03

## 2017-04-03 RX ORDER — POLYETHYLENE GLYCOL 3350 17 G/17G
17 POWDER, FOR SOLUTION ORAL DAILY
Qty: 850 G | Refills: 0 | Status: SHIPPED | OUTPATIENT
Start: 2017-04-03 | End: 2017-07-11 | Stop reason: HOSPADM

## 2017-04-03 RX ADMIN — VANCOMYCIN HYDROCHLORIDE 1500 MG: 5 INJECTION, POWDER, LYOPHILIZED, FOR SOLUTION INTRAVENOUS at 16:00

## 2017-04-03 RX ADMIN — PIPERACILLIN SODIUM,TAZOBACTAM SODIUM 3.38 G: 3; .375 INJECTION, POWDER, FOR SOLUTION INTRAVENOUS at 23:53

## 2017-04-03 RX ADMIN — VANCOMYCIN HYDROCHLORIDE 1500 MG: 5 INJECTION, POWDER, LYOPHILIZED, FOR SOLUTION INTRAVENOUS at 23:53

## 2017-04-03 RX ADMIN — HYDROMORPHONE HYDROCHLORIDE 0.5 MG: 1 INJECTION, SOLUTION INTRAMUSCULAR; INTRAVENOUS; SUBCUTANEOUS at 02:10

## 2017-04-03 RX ADMIN — METHYLPREDNISOLONE SODIUM SUCCINATE 60 MG: 125 INJECTION, POWDER, FOR SOLUTION INTRAMUSCULAR; INTRAVENOUS at 01:10

## 2017-04-03 RX ADMIN — SODIUM CHLORIDE 2382 ML: 9 INJECTION, SOLUTION INTRAVENOUS at 15:38

## 2017-04-03 RX ADMIN — MAGNESIUM SULFATE IN WATER 2 G: 40 INJECTION, SOLUTION INTRAVENOUS at 23:54

## 2017-04-03 RX ADMIN — Medication 1 MG: at 20:57

## 2017-04-03 RX ADMIN — INSULIN ASPART 8 UNITS: 100 INJECTION, SOLUTION INTRAVENOUS; SUBCUTANEOUS at 22:16

## 2017-04-03 RX ADMIN — PIPERACILLIN SODIUM,TAZOBACTAM SODIUM 3.38 G: 3; .375 INJECTION, POWDER, FOR SOLUTION INTRAVENOUS at 15:38

## 2017-04-03 RX ADMIN — HYDROMORPHONE HYDROCHLORIDE 1 MG: 1 INJECTION, SOLUTION INTRAMUSCULAR; INTRAVENOUS; SUBCUTANEOUS at 20:15

## 2017-04-03 RX ADMIN — HYDROMORPHONE HYDROCHLORIDE 1 MG: 1 INJECTION, SOLUTION INTRAMUSCULAR; INTRAVENOUS; SUBCUTANEOUS at 20:57

## 2017-04-03 RX ADMIN — ALBUTEROL SULFATE 2.5 MG: 2.5 SOLUTION RESPIRATORY (INHALATION) at 19:21

## 2017-04-03 RX ADMIN — SODIUM CHLORIDE 1000 ML: 9 INJECTION, SOLUTION INTRAVENOUS at 01:10

## 2017-04-03 RX ADMIN — SODIUM CHLORIDE 125 ML/HR: 9 INJECTION, SOLUTION INTRAVENOUS at 18:40

## 2017-04-03 NOTE — ED NOTES
Patient called ambulance feeling like he was hypoglycemic, bs 210 per ems.     Beverley Fernandez RN  04/02/17 2067

## 2017-04-03 NOTE — ED PROVIDER NOTES
"Subjective   HPI Comments: 51yo WM presented to ER w/ c/o of fatigue, and abdominal pain.  Pt stated his blood sugar was low and called an ambulance to pick him up.  Pt admitted to hx of hepatitis.  Pt stated that he just \"feels bad all over.\"        Review of Systems   Constitutional: Positive for fatigue and fever.   HENT: Negative.    Respiratory: Negative.    Cardiovascular: Negative.  Negative for chest pain.   Gastrointestinal: Positive for abdominal pain and nausea.   Endocrine: Negative.    Genitourinary: Negative.  Negative for dysuria.   Skin: Negative.    Neurological: Positive for speech difficulty.   Psychiatric/Behavioral: Positive for confusion.   All other systems reviewed and are negative.      Past Medical History:   Diagnosis Date   • Abscess of antecubital fossa 05/2014    Left that required I and D and grew Haemophilus influenza group 1   • Anxiety    • Asthma    • Chronic pancreatitis    • COPD (chronic obstructive pulmonary disease)    • DDD (degenerative disc disease), lumbosacral    • Depression    • Diabetes mellitus    • DVT (deep venous thrombosis)     Right arm   • Essential hypertension    • GERD (gastroesophageal reflux disease)    • Hepatitis-C    • Hypercholesteremia    • Injury of back    • Injury of right Achilles tendon     Complicated by MRSA and Pseudomonas   • Mild CAD     Left heart cath at  2012   • MRSA (methicillin resistant staph aureus) culture positive 09/14/2016    LUE   • Obesity    • Opiate addiction     Suboxone IV   • Self-injurious behavior    • Sleep apnea    • Suicide attempt    • Systolic CHF, chronic     EF 45-50% in 2013, EF 60% 9/2016       Allergies   Allergen Reactions   • Robitussin Dm [Dextromethorphan-Guaifenesin] Shortness Of Breath   • Tizanidine Shortness Of Breath   • Metformin Nausea And Vomiting   • Sulfa Antibiotics Other (See Comments)     \"I cannot take them, they do something to me.\"   • Contrast Dye Rash   • Tramadol Rash       Past Surgical " History:   Procedure Laterality Date   • CHOLECYSTECTOMY  1990s   • INCISION AND DRAINAGE ABSCESS Left 2016    wrist   • INCISION AND DRAINAGE ARM Left 9/15/2016    Procedure: INCISION AND DRAINAGE UPPER EXTREMITY;  Surgeon: Rico Oseguera MD;  Location: [facility] OR;  Service:    • ORIF ANKLE FRACTURE Right 2014       Family History   Problem Relation Age of Onset   • Gout Other    • Osteoporosis Other    • Arthritis Other      Rheumatoid and osteoarthritis   • Hypertension Other    • Heart disease Other    • Cancer Other    • Coronary artery disease Mother    • Lung disease Mother    • Gout Sister    • Cancer Maternal Grandmother    • Hypertension Maternal Grandfather    • Gout Maternal Grandfather        Social History     Social History   • Marital status:      Spouse name: N/A   • Number of children: N/A   • Years of education: N/A     Social History Main Topics   • Smoking status: Former Smoker     Years: 1.00     Types: Cigarettes   • Smokeless tobacco: Never Used      Comment: quit smoking in my 20's   • Alcohol use No      Comment: denies   • Drug use: Yes     Special: IV      Comment: PT STATES THAT HE USED SUBOXONE IN LEFT AC YESTERDAY   • Sexual activity: Defer      Comment: not currently active.      Other Topics Concern   • None     Social History Narrative           Objective   Physical Exam   Constitutional: He is oriented to person, place, and time. He appears well-developed and well-nourished. No distress.   HENT:   Head: Normocephalic and atraumatic.   Right Ear: External ear normal.   Left Ear: External ear normal.   Nose: Nose normal.   Eyes: Conjunctivae and EOM are normal. Pupils are equal, round, and reactive to light.   Neck: Normal range of motion. Neck supple. No JVD present. No tracheal deviation present.   Cardiovascular: Normal rate, regular rhythm and normal heart sounds.    No murmur heard.  Pulmonary/Chest: Effort normal and breath sounds normal. No respiratory distress. He  has no wheezes.   Abdominal: Soft. Bowel sounds are normal. There is tenderness. There is guarding.   Diffuse tenderness noted.    Musculoskeletal: Normal range of motion. He exhibits no edema or deformity.   Neurological: He is alert and oriented to person, place, and time. No cranial nerve deficit.   Skin: Skin is warm and dry. No rash noted. He is not diaphoretic. No erythema. No pallor.   Psychiatric: He has a normal mood and affect. His behavior is normal. Thought content normal.   Nursing note and vitals reviewed.      Procedures         ED Course  ED Course   Comment By Time    per tech PATRICK Bentley 04/02 2228   Lactic acid 3.1 per lab. PATRICK Bentley 04/03 0025   Discussed findings w/ Dr. Juan.  At present time criteria for admission is not met.  Pt will be able to be d/c home and will f/u w/ pcp. PATRICK Bentley 04/03 0221   CT Abd and Pelvis VRAD:  Questionable mild pancreatitis.  Cirrhosis w/ splenomegaly.  Large stool burden. PATRICK Bentley 04/03 0232                  MDM  Number of Diagnoses or Management Options  Chronic pancreatitis, unspecified pancreatitis type: established and improving     Amount and/or Complexity of Data Reviewed  Clinical lab tests: ordered and reviewed  Tests in the radiology section of CPT®: ordered and reviewed    Risk of Complications, Morbidity, and/or Mortality  Presenting problems: moderate  Diagnostic procedures: moderate  Management options: moderate    Patient Progress  Patient progress: stable      Final diagnoses:   Chronic pancreatitis, unspecified pancreatitis type            PATRICK Bentley  04/03/17 3693
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
## 2017-04-03 NOTE — ED NOTES
Medications given per orders, will continue to monitor.     Beverley Fernandez RN  04/03/17 0148

## 2017-04-03 NOTE — ED NOTES
Upon entering room, pt noted to only respond to painful stimuli.  During conversation, pt closes eyes and only responds to repeated loud verbal stimuli.  Dr. Silva notified of pt's change in mental status.     Marika Sanchez RN  04/03/17 1922

## 2017-04-03 NOTE — ED NOTES
Pt's home medications prepared and placed in secure bags number [id number], [id number], and [id number]; taken to pharmacy by pharmacy personnel.  Pt's small folding knife taken by security in secure envelope no. [id number].  Receipts for pharmacy bags and security bag placed on chart.     Marika Sanchez RN  04/03/17 4037

## 2017-04-03 NOTE — ED PROVIDER NOTES
"Subjective   History of Present Illness  52-year-old white male here today complaining of feeling better.  Patient states that he is having nausea, vomiting unable to keep down any food or fluids.  He complains of diffuse myalgias and generalized weakness.  He was seen last night and discharged home, because there was no indication for admission.  Today he called Dr. Quarles who recommended he return to the emergency department.  Patient is requesting pain medicine at this time.  Review of Systems   All other systems reviewed and are negative.      Past Medical History:   Diagnosis Date   • Abscess of antecubital fossa 05/2014    Left that required I and D and grew Haemophilus influenza group 1   • Anxiety    • Asthma    • Chronic pancreatitis    • COPD (chronic obstructive pulmonary disease)    • DDD (degenerative disc disease), lumbosacral    • Depression    • Diabetes mellitus    • DVT (deep venous thrombosis)     Right arm   • Essential hypertension    • GERD (gastroesophageal reflux disease)    • Hepatitis-C    • Hypercholesteremia    • Injury of back    • Injury of right Achilles tendon     Complicated by MRSA and Pseudomonas   • IV drug abuse    • Mild CAD     Left heart cath at  2012   • MRSA (methicillin resistant staph aureus) culture positive 09/14/2016    LUE   • Obesity    • Opiate addiction     Suboxone IV   • Self-injurious behavior    • Sleep apnea    • Suicide attempt    • Systolic CHF, chronic     EF 45-50% in 2013, EF 60% 9/2016       Allergies   Allergen Reactions   • Robitussin Dm [Dextromethorphan-Guaifenesin] Shortness Of Breath   • Tizanidine Shortness Of Breath   • Metformin Nausea And Vomiting   • Sulfa Antibiotics Other (See Comments)     \"I cannot take them, they do something to me.\"   • Contrast Dye Rash   • Tramadol Rash       Past Surgical History:   Procedure Laterality Date   • CHOLECYSTECTOMY  1990s   • INCISION AND DRAINAGE ABSCESS Left 2016    wrist   • INCISION AND DRAINAGE ARM " Left 9/15/2016    Procedure: INCISION AND DRAINAGE UPPER EXTREMITY;  Surgeon: Rico Oseguera MD;  Location: [facility] OR;  Service:    • ORIF ANKLE FRACTURE Right 2014       Family History   Problem Relation Age of Onset   • Gout Other    • Osteoporosis Other    • Arthritis Other      Rheumatoid and osteoarthritis   • Hypertension Other    • Heart disease Other    • Cancer Other    • Coronary artery disease Mother    • Lung disease Mother    • Gout Sister    • Cancer Maternal Grandmother    • Hypertension Maternal Grandfather    • Gout Maternal Grandfather        Social History     Social History   • Marital status:      Spouse name: N/A   • Number of children: N/A   • Years of education: N/A     Social History Main Topics   • Smoking status: Former Smoker     Years: 1.00     Types: Cigarettes   • Smokeless tobacco: Never Used      Comment: quit smoking in my 20's   • Alcohol use No      Comment: denies   • Drug use: Yes     Special: IV      Comment: PT STATES THAT HE USED SUBOXONE IN LEFT AC YESTERDAY   • Sexual activity: Defer      Comment: not currently active.      Other Topics Concern   • None     Social History Narrative           Objective   Physical Exam   Constitutional: He is oriented to person, place, and time. He appears well-developed and well-nourished.   HENT:   Head: Normocephalic and atraumatic.   Mouth/Throat: Oropharynx is clear and moist.   Cardiovascular: Normal rate, regular rhythm and normal heart sounds.  Exam reveals no gallop and no friction rub.    No murmur heard.  Pulmonary/Chest: Effort normal and breath sounds normal. No respiratory distress. He has no wheezes. He has no rales.   Abdominal: Soft. Bowel sounds are normal. He exhibits no distension. There is tenderness (diffusely mildly tender with no rebound or guarding.).   Musculoskeletal: Normal range of motion. He exhibits no edema.   Neurological: He is alert and oriented to person, place, and time.   Skin: Skin is warm and  dry.   Psychiatric: He has a normal mood and affect.   Nursing note and vitals reviewed.      Procedures  Results for orders placed or performed during the hospital encounter of 04/03/17   Comprehensive Metabolic Panel   Result Value Ref Range    Glucose 371 (H) 70 - 110 mg/dL    BUN 12 7 - 21 mg/dL    Creatinine 0.69 0.43 - 1.29 mg/dL    Sodium 136 135 - 153 mmol/L    Potassium 3.7 3.5 - 5.3 mmol/L    Chloride 104 99 - 112 mmol/L    CO2 27.0 24.3 - 31.9 mmol/L    Calcium 9.0 7.7 - 10.0 mg/dL    Total Protein 5.6 (L) 6.0 - 8.0 g/dL    Albumin 3.40 (L) 3.50 - 5.00 g/dL    ALT (SGPT) 183 (H) 10 - 44 U/L    AST (SGOT) 305 (H) 10 - 34 U/L    Alkaline Phosphatase 166 (H) 40 - 129 U/L    Total Bilirubin 1.7 0.2 - 1.8 mg/dL    eGFR Non African Amer 120 >60 mL/min/1.73    Globulin 2.2 gm/dL    A/G Ratio 1.5 1.5 - 2.5 g/dL    BUN/Creatinine Ratio 17.4 7.0 - 25.0    Anion Gap 5.0 3.6 - 11.2 mmol/L   Urinalysis With / Culture If Indicated   Result Value Ref Range    Color, UA Dark Yellow (A) Yellow, Straw    Appearance, UA Clear Clear    pH, UA 5.5 5.0 - 8.0    Specific Gravity, UA >1.030 (H) 1.005 - 1.030    Glucose, UA >=1000 mg/dL (3+) (A) Negative    Ketones, UA Trace (A) Negative    Bilirubin, UA Negative Negative    Blood, UA Negative Negative    Protein, UA Negative Negative    Leuk Esterase, UA Negative Negative    Nitrite, UA Negative Negative    Urobilinogen, UA 4.0 E.U./dL (A) 0.2 - 1.0 E.U./dL   Urine Drug Screen   Result Value Ref Range    Amphetamine Screen, Urine Positive (A) Negative    Barbiturates Screen, Urine Negative Negative    Benzodiazepine Screen, Urine Negative Negative    Cocaine Screen, Urine Negative Negative    Methadone Screen, Urine Negative Negative    Opiate Screen Positive (A) Negative    Phencyclidine (PCP), Urine Negative Negative    THC, Screen, Urine Negative Negative    Propoxyphene Screen Negative Negative   Buprenorphine Screen Urine   Result Value Ref Range    Buprenorphine, Urine  Positive (A) Negative   Lactic Acid, Plasma   Result Value Ref Range    Lactate 5.3 (C) 0.5 - 2.0 mmol/L   Blood Gas, Arterial   Result Value Ref Range    Site Arterial: left radial     Mihir's Test Positive     pH, Arterial 7.421 7.350 - 7.450 pH units    pCO2, Arterial 32.3 (L) 35.0 - 45.0 mm Hg    pO2, Arterial 89.1 80.0 - 100.0 mm Hg    HCO3, Arterial 20.5 (L) 22.0 - 26.0 mmol/L    Base Excess, Arterial -2.9 mmol/L    O2 Saturation, Arterial 96.4 90.0 - 100.0 %    Hemoglobin, Blood Gas 14.3 12 - 16 g/dL    Hematocrit, Blood Gas 42.0 42.0 - 52.0 %    Oxyhemoglobin 95.0 85 - 100 %    Methemoglobin 0.3 0 - 3 %    Carboxyhemoglobin 1.2 0 - 5 %    A-a Gradiant 15.9 0.0 - 300.0 mmHg    Temperature 98.6 C    Barometric Pressure for Blood Gas 731 mmHg    Modality Room air     FIO2 21 %   Acetone   Result Value Ref Range    Acetone Negative Negative   CBC Auto Differential   Result Value Ref Range    WBC 1.94 (C) 4.50 - 12.50 10*3/mm3    RBC 4.47 (L) 4.70 - 6.10 10*6/mm3    Hemoglobin 13.2 (L) 14.0 - 18.0 g/dL    Hematocrit 39.8 (L) 42.0 - 52.0 %    MCV 89.0 80.0 - 94.0 fL    MCH 29.5 27.0 - 33.0 pg    MCHC 33.2 33.0 - 37.0 g/dL    RDW 15.9 (H) 11.5 - 14.5 %    RDW-SD 51.4 37.0 - 54.0 fl    MPV 11.2 (H) 6.0 - 10.0 fL    Platelets 61 (L) 130 - 400 10*3/mm3    Neutrophil % 87.1 (H) 30.0 - 70.0 %    Lymphocyte % 11.9 (L) 21.0 - 51.0 %    Monocyte % 1.0 0.0 - 10.0 %    Eosinophil % 0.0 0.0 - 5.0 %    Basophil % 0.0 0.0 - 2.0 %    Immature Grans % 0.0 0.0 - 0.5 %    Neutrophils, Absolute 1.69 1.40 - 6.50 10*3/mm3    Lymphocytes, Absolute 0.23 (L) 1.00 - 3.00 10*3/mm3    Monocytes, Absolute 0.02 (L) 0.10 - 0.90 10*3/mm3    Eosinophils, Absolute 0.00 0.00 - 0.70 10*3/mm3    Basophils, Absolute 0.00 0.00 - 0.30 10*3/mm3    Immature Grans, Absolute 0.00 0.00 - 0.03 10*3/mm3   Osmolality, Calculated   Result Value Ref Range    Osmolality Calc 286.9 273.0 - 305.0 mOsm/kg   Oxycodone, Urine   Result Value Ref Range    Oxycodone  Screen, Urine Negative Negative     Ct Abdomen Pelvis Without Contrast    Result Date: 4/3/2017  Narrative: CT ABDOMEN PELVIS WO CONTRAST-  CLINICAL INDICATION: abdominal pain     COMPARISON: 05/12/2013  TECHNIQUE: Axial images were acquired from the lung bases through the pubic symphysis without any IV or oral contrast. Reformatted images were created in both the coronal and sagittal planes.  Radiation dose reduction techniques were utilized per ALARA protocol. Automated exposure control was initiated through either or Internet[employer] or DoseRight software packages by  protocol.     FINDINGS: The lung bases are clear. There are no pleural effusions.  The liver is homogeneous. There is no evidence of focal hepatic mass  The spleen is homogeneous but does appear to be slightly enlarged.  Pancreas is full. Minimal adjacent stranding which could be indicative of pancreatitis. I recommend laboratory correlation with amylase and lipase.  There is no adrenal enlargement.  The kidneys show no evidence of hydronephrosis or hydroureter. I do not see any distal ureteral stones.  Otherwise I do not see any free fluid or walled off fluid collections.  Moderate stool is present in the colon.  There is no evidence of mesenteric or retroperitoneal adenopathy          Impression: 1. Equivocal mild pancreatitis. 2. Large volume stool in the colon. 3. Splenomegaly. 4. Other findings as above.       This report was finalized on 4/3/2017 8:00 AM by Dr. Jersey Maher MD.      Xr Abdomen 3 View    Result Date: 4/3/2017  Narrative: XR ABDOMEN 3 VW-  CLINICAL INDICATION: Abdominal pain     COMPARISON: None available  FINDINGS: Chest: The included view of the chest demonstrates the lungs to be well aerated. There is no focal alveolar infiltrate or pleural effusion. The cardiac silhouette is normal. No acute osseous abnormality is seen within the chest.  Abdomen: Two views of the abdomen show no free air. I do not see abnormal bowel  distention to suggest complete obstruction. The bony structures are intact. No abnormal soft tissue masses are seen. No suspicious appearing calcifications are identified on today's exam.      Impression: 1. Moderate to large volume stool in the colon 2. The lungs are clear. 3. No evidence of bowel obstruction. 4. No free air.        This report was finalized on 4/3/2017 1:01 PM by Dr. Jersey Maher MD.             ED Course  ED Course   Comment By Time   Discussed with Dr. Quarles.  Patient admitted, see orders. Gallo Silva MD 04/03 1442      Diagnosis: Septic Shock    Post Bolus Vital Signs: Temp 97.9 HR 65 RR16 BP98/68    General: Resting Comfortably no distress, Acute Distress and Ill Appearing    Respiratory: Chest non-tender, lungs clear, Normal Breath Sounds, No Respiratory Distress, No accessory muscle use, Respiratory Distress, Decreased Breath Sounds, Accesory Muscle Use, Crackles, Rales, Rhonchi, Stridor, Wheezing, Expiratory, Inspiratory and Pleural Rub    Cardiac/Chest:  Regular Rate, rhythm, No Edema, No Gallop, No JVD, No Murmur, JVD, Bradycardia, Tachycardia, Diastolic Murmur, Systolic Murmur, Gallop/S3,  Gallop/S4, Extra Beats, Friction Rub and Irregularity Irregular    Skin: Normal Color, Warm/Dry, Cyanosis, Diaphoresis, Jaundice, Mottled and Pallor    Capillary Refill: <3 Seconds, Immediate, >3 Seconds, Delayed and None    Peripheral Pulses: Palpable            MDM  Number of Diagnoses or Management Options  Polysubstance abuse:   Sepsis, due to unspecified organism:   Uncontrolled type 1 diabetes mellitus without complication:      Amount and/or Complexity of Data Reviewed  Clinical lab tests: reviewed and ordered  Tests in the radiology section of CPT®: ordered and reviewed  Decide to obtain previous medical records or to obtain history from someone other than the patient: yes    Risk of Complications, Morbidity, and/or Mortality  Presenting problems: high  Diagnostic procedures:  high  Management options: high        Final diagnoses:   Sepsis, due to unspecified organism   Polysubstance abuse   Uncontrolled type 1 diabetes mellitus without complication            Gallo Silva MD  04/03/17 1446       Gallo Silva MD  04/03/17 [id number]

## 2017-04-03 NOTE — PROGRESS NOTES
Discharge Planning Assessment   Art     Patient Name: Isaias Lang  MRN: [id number]  Today's Date: 4/3/2017    Admit Date: 4/3/2017          Discharge Needs Assessment       04/03/17 1944    Discharge Needs Assessment    Concerns To Be Addressed discharge planning concerns    Readmission Within The Last 30 Days no previous admission in last 30 days    Discharge Planning Comments SOCIAL SERVICE CONSULT PLACED R/T DISCHARGE PLANNING.            Discharge Plan             Discharge Placement                     Demographic Summary       04/03/17 1944    Referral Information    Admission Type inpatient    Arrived From home or self-care    Referral Source admission list    Reason For Consult discharge planning            Functional Status                 Psychosocial                 Abuse/Neglect                 Legal                 Substance Abuse                 Patient Forms               Nakia Saez RN

## 2017-04-03 NOTE — ED NOTES
Reviewed previous ED chart, noted blood cultures were obtained on previous ED visit last night.      Marika Sanchez RN  04/03/17 [id number]

## 2017-04-04 ENCOUNTER — APPOINTMENT (OUTPATIENT)
Dept: CARDIOLOGY | Facility: HOSPITAL | Age: 52
End: 2017-04-04
Attending: INTERNAL MEDICINE

## 2017-04-04 ENCOUNTER — APPOINTMENT (OUTPATIENT)
Dept: ULTRASOUND IMAGING | Facility: HOSPITAL | Age: 52
End: 2017-04-04

## 2017-04-04 ENCOUNTER — APPOINTMENT (OUTPATIENT)
Dept: ULTRASOUND IMAGING | Facility: HOSPITAL | Age: 52
End: 2017-04-04
Attending: INTERNAL MEDICINE

## 2017-04-04 LAB
ALBUMIN SERPL-MCNC: 2.8 G/DL (ref 3.5–5)
ALBUMIN/GLOB SERPL: 1.4 G/DL (ref 1.5–2.5)
ALP SERPL-CCNC: 120 U/L (ref 40–129)
ALT SERPL W P-5'-P-CCNC: 144 U/L (ref 10–44)
AMYLASE SERPL-CCNC: 17 U/L (ref 28–100)
ANION GAP SERPL CALCULATED.3IONS-SCNC: 4.7 MMOL/L (ref 3.6–11.2)
AST SERPL-CCNC: 174 U/L (ref 10–34)
BASOPHILS # BLD AUTO: 0 10*3/MM3 (ref 0–0.3)
BASOPHILS NFR BLD AUTO: 0 % (ref 0–2)
BH CV ECHO MEAS - BSA(HAYCOCK): 2 M^2
BH CV ECHO MEAS - BSA: 2 M^2
BH CV ECHO MEAS - BZI_BMI: 28.6 KILOGRAMS/M^2
BH CV ECHO MEAS - BZI_METRIC_HEIGHT: 172.7 CM
BH CV ECHO MEAS - BZI_METRIC_WEIGHT: 85.3 KG
BH CV ECHO MEAS - CONTRAST EF 4CH: 63.2 ML/M^2
BH CV ECHO MEAS - EDV(MOD-SP4): 76 ML
BH CV ECHO MEAS - EF(MOD-SP4): 63.2 %
BH CV ECHO MEAS - ESV(MOD-SP4): 28 ML
BH CV ECHO MEAS - LV DIASTOLIC VOL/BSA (35-75): 38.2 ML/M^2
BH CV ECHO MEAS - LV SYSTOLIC VOL/BSA (12-30): 14.1 ML/M^2
BH CV ECHO MEAS - LVLD AP4: 7.2 CM
BH CV ECHO MEAS - LVLS AP4: 6.8 CM
BH CV ECHO MEAS - MV A MAX VEL: 36 CM/SEC
BH CV ECHO MEAS - MV E MAX VEL: 128.8 CM/SEC
BH CV ECHO MEAS - MV E/A: 3.6
BH CV ECHO MEAS - SI(MOD-SP4): 24.1 ML/M^2
BH CV ECHO MEAS - SV(MOD-SP4): 48 ML
BILIRUB SERPL-MCNC: 1.3 MG/DL (ref 0.2–1.8)
BNP SERPL-MCNC: 504 PG/ML (ref 0–100)
BUN BLD-MCNC: 9 MG/DL (ref 7–21)
BUN/CREAT SERPL: 13 (ref 7–25)
CALCIUM SPEC-SCNC: 7.8 MG/DL (ref 7.7–10)
CHLORIDE SERPL-SCNC: 110 MMOL/L (ref 99–112)
CK MB SERPL-CCNC: 29.23 NG/ML (ref 0–5)
CK MB SERPL-CCNC: 33.84 NG/ML (ref 0–5)
CK MB SERPL-RTO: 5.7 % (ref 0–3)
CK MB SERPL-RTO: 6.9 % (ref 0–3)
CK SERPL-CCNC: 425 U/L (ref 24–204)
CK SERPL-CCNC: 590 U/L (ref 24–204)
CO2 SERPL-SCNC: 24.3 MMOL/L (ref 24.3–31.9)
CREAT BLD-MCNC: 0.69 MG/DL (ref 0.43–1.29)
CRP SERPL-MCNC: 2.28 MG/DL (ref 0–0.99)
D-LACTATE SERPL-SCNC: 1.7 MMOL/L (ref 0.5–2)
D-LACTATE SERPL-SCNC: 2.6 MMOL/L (ref 0.5–2)
DEPRECATED RDW RBC AUTO: 52 FL (ref 37–54)
EOSINOPHIL # BLD AUTO: 0 10*3/MM3 (ref 0–0.7)
EOSINOPHIL NFR BLD AUTO: 0 % (ref 0–5)
ERYTHROCYTE [DISTWIDTH] IN BLOOD BY AUTOMATED COUNT: 16.1 % (ref 11.5–14.5)
GFR SERPL CREATININE-BSD FRML MDRD: 120 ML/MIN/1.73
GLOBULIN UR ELPH-MCNC: 2 GM/DL
GLUCOSE BLD-MCNC: 313 MG/DL (ref 70–110)
GLUCOSE BLDC GLUCOMTR-MCNC: 125 MG/DL (ref 70–130)
GLUCOSE BLDC GLUCOMTR-MCNC: 130 MG/DL (ref 70–130)
GLUCOSE BLDC GLUCOMTR-MCNC: 188 MG/DL (ref 70–130)
GLUCOSE BLDC GLUCOMTR-MCNC: 251 MG/DL (ref 70–130)
HCT VFR BLD AUTO: 35.1 % (ref 42–52)
HGB BLD-MCNC: 11.5 G/DL (ref 14–18)
IMM GRANULOCYTES # BLD: 0.01 10*3/MM3 (ref 0–0.03)
IMM GRANULOCYTES NFR BLD: 0.3 % (ref 0–0.5)
INR PPP: 1.48 (ref 0.8–1.1)
LIPASE SERPL-CCNC: 17 U/L (ref 13–60)
LYMPHOCYTES # BLD AUTO: 0.27 10*3/MM3 (ref 1–3)
LYMPHOCYTES NFR BLD AUTO: 6.9 % (ref 21–51)
MAGNESIUM SERPL-MCNC: 2.3 MG/DL (ref 1.7–2.6)
MCH RBC QN AUTO: 29.9 PG (ref 27–33)
MCHC RBC AUTO-ENTMCNC: 32.8 G/DL (ref 33–37)
MCV RBC AUTO: 91.2 FL (ref 80–94)
MONOCYTES # BLD AUTO: 0.39 10*3/MM3 (ref 0.1–0.9)
MONOCYTES NFR BLD AUTO: 10 % (ref 0–10)
MYOGLOBIN SERPL-MCNC: 148 NG/ML (ref 0–109)
MYOGLOBIN SERPL-MCNC: 182 NG/ML (ref 0–109)
NEUTROPHILS # BLD AUTO: 3.22 10*3/MM3 (ref 1.4–6.5)
NEUTROPHILS NFR BLD AUTO: 82.8 % (ref 30–70)
OSMOLALITY SERPL CALC.SUM OF ELEC: 288.1 MOSM/KG (ref 273–305)
PLATELET # BLD AUTO: 63 10*3/MM3 (ref 130–400)
PMV BLD AUTO: 10.6 FL (ref 6–10)
POTASSIUM BLD-SCNC: 3.6 MMOL/L (ref 3.5–5.3)
POTASSIUM BLD-SCNC: 4.2 MMOL/L (ref 3.5–5.3)
PROT SERPL-MCNC: 4.8 G/DL (ref 6–8)
PROTHROMBIN TIME: 16.9 SECONDS (ref 9.8–11.9)
RBC # BLD AUTO: 3.85 10*6/MM3 (ref 4.7–6.1)
SODIUM BLD-SCNC: 139 MMOL/L (ref 135–153)
TROPONIN I SERPL-MCNC: <0.006 NG/ML
TROPONIN I SERPL-MCNC: <0.006 NG/ML
WBC NRBC COR # BLD: 3.89 10*3/MM3 (ref 4.5–12.5)

## 2017-04-04 PROCEDURE — 25010000002 VITAMIN K1 PER 1 MG: Performed by: INTERNAL MEDICINE

## 2017-04-04 PROCEDURE — 93306 TTE W/DOPPLER COMPLETE: CPT | Performed by: INTERNAL MEDICINE

## 2017-04-04 PROCEDURE — 84132 ASSAY OF SERUM POTASSIUM: CPT | Performed by: INTERNAL MEDICINE

## 2017-04-04 PROCEDURE — 84484 ASSAY OF TROPONIN QUANT: CPT | Performed by: NURSE PRACTITIONER

## 2017-04-04 PROCEDURE — 99233 SBSQ HOSP IP/OBS HIGH 50: CPT | Performed by: INTERNAL MEDICINE

## 2017-04-04 PROCEDURE — 82550 ASSAY OF CK (CPK): CPT | Performed by: NURSE PRACTITIONER

## 2017-04-04 PROCEDURE — 80053 COMPREHEN METABOLIC PANEL: CPT | Performed by: INTERNAL MEDICINE

## 2017-04-04 PROCEDURE — 82150 ASSAY OF AMYLASE: CPT | Performed by: INTERNAL MEDICINE

## 2017-04-04 PROCEDURE — 76705 ECHO EXAM OF ABDOMEN: CPT | Performed by: RADIOLOGY

## 2017-04-04 PROCEDURE — 25010000002 PIPERACILLIN-TAZOBACTAM

## 2017-04-04 PROCEDURE — 25010000002 VANCOMYCIN PER 500 MG

## 2017-04-04 PROCEDURE — 83874 ASSAY OF MYOGLOBIN: CPT | Performed by: NURSE PRACTITIONER

## 2017-04-04 PROCEDURE — 25010000002 HYDROMORPHONE PER 4 MG: Performed by: INTERNAL MEDICINE

## 2017-04-04 PROCEDURE — 93970 EXTREMITY STUDY: CPT

## 2017-04-04 PROCEDURE — 76705 ECHO EXAM OF ABDOMEN: CPT

## 2017-04-04 PROCEDURE — 63710000001 INSULIN ASPART PER 5 UNITS: Performed by: INTERNAL MEDICINE

## 2017-04-04 PROCEDURE — 83735 ASSAY OF MAGNESIUM: CPT | Performed by: INTERNAL MEDICINE

## 2017-04-04 PROCEDURE — 83605 ASSAY OF LACTIC ACID: CPT | Performed by: INTERNAL MEDICINE

## 2017-04-04 PROCEDURE — 82553 CREATINE MB FRACTION: CPT | Performed by: NURSE PRACTITIONER

## 2017-04-04 PROCEDURE — 83880 ASSAY OF NATRIURETIC PEPTIDE: CPT | Performed by: INTERNAL MEDICINE

## 2017-04-04 PROCEDURE — 86140 C-REACTIVE PROTEIN: CPT | Performed by: INTERNAL MEDICINE

## 2017-04-04 PROCEDURE — 93306 TTE W/DOPPLER COMPLETE: CPT

## 2017-04-04 PROCEDURE — 93970 EXTREMITY STUDY: CPT | Performed by: RADIOLOGY

## 2017-04-04 PROCEDURE — 25010000002 MAGNESIUM SULFATE 2 GM/50ML SOLUTION: Performed by: NURSE PRACTITIONER

## 2017-04-04 PROCEDURE — 94799 UNLISTED PULMONARY SVC/PX: CPT

## 2017-04-04 PROCEDURE — 82962 GLUCOSE BLOOD TEST: CPT

## 2017-04-04 PROCEDURE — 63710000001 INSULIN DETEMIR PER 5 UNITS: Performed by: INTERNAL MEDICINE

## 2017-04-04 PROCEDURE — 85610 PROTHROMBIN TIME: CPT | Performed by: INTERNAL MEDICINE

## 2017-04-04 PROCEDURE — 25010000003 POTASSIUM CHLORIDE 10 MEQ/100ML SOLUTION: Performed by: NURSE PRACTITIONER

## 2017-04-04 PROCEDURE — 83690 ASSAY OF LIPASE: CPT | Performed by: INTERNAL MEDICINE

## 2017-04-04 PROCEDURE — 85025 COMPLETE CBC W/AUTO DIFF WBC: CPT | Performed by: INTERNAL MEDICINE

## 2017-04-04 RX ORDER — HYDROMORPHONE HYDROCHLORIDE 1 MG/ML
0.25 INJECTION, SOLUTION INTRAMUSCULAR; INTRAVENOUS; SUBCUTANEOUS EVERY 4 HOURS PRN
Status: DISCONTINUED | OUTPATIENT
Start: 2017-04-04 | End: 2017-04-07 | Stop reason: HOSPADM

## 2017-04-04 RX ORDER — TRAZODONE HYDROCHLORIDE 50 MG/1
100 TABLET ORAL NIGHTLY PRN
Status: CANCELLED | OUTPATIENT
Start: 2017-04-04

## 2017-04-04 RX ORDER — FUROSEMIDE 20 MG/1
20 TABLET ORAL DAILY
Status: CANCELLED | OUTPATIENT
Start: 2017-04-04

## 2017-04-04 RX ORDER — LACTULOSE 10 G/15ML
20 SOLUTION ORAL 2 TIMES DAILY
Status: DISCONTINUED | OUTPATIENT
Start: 2017-04-04 | End: 2017-04-07 | Stop reason: HOSPADM

## 2017-04-04 RX ORDER — ASPIRIN 81 MG/1
81 TABLET, CHEWABLE ORAL DAILY
Status: ON HOLD | COMMUNITY
End: 2017-07-03

## 2017-04-04 RX ORDER — PHYTONADIONE 10 MG/ML
5 INJECTION, EMULSION INTRAMUSCULAR; INTRAVENOUS; SUBCUTANEOUS ONCE
Status: COMPLETED | OUTPATIENT
Start: 2017-04-04 | End: 2017-04-04

## 2017-04-04 RX ORDER — POLYETHYLENE GLYCOL 3350 17 G/17G
17 POWDER, FOR SOLUTION ORAL DAILY
Status: CANCELLED | OUTPATIENT
Start: 2017-04-04

## 2017-04-04 RX ORDER — METOPROLOL TARTRATE 50 MG/1
50 TABLET, FILM COATED ORAL DAILY
Status: CANCELLED | OUTPATIENT
Start: 2017-04-04

## 2017-04-04 RX ORDER — DICYCLOMINE HYDROCHLORIDE 10 MG/1
10 CAPSULE ORAL 2 TIMES DAILY
Status: CANCELLED | OUTPATIENT
Start: 2017-04-04

## 2017-04-04 RX ORDER — PANTOPRAZOLE SODIUM 40 MG/1
40 TABLET, DELAYED RELEASE ORAL DAILY
Status: CANCELLED | OUTPATIENT
Start: 2017-04-04

## 2017-04-04 RX ORDER — IPRATROPIUM BROMIDE AND ALBUTEROL SULFATE 2.5; .5 MG/3ML; MG/3ML
3 SOLUTION RESPIRATORY (INHALATION)
Status: CANCELLED | OUTPATIENT
Start: 2017-04-04

## 2017-04-04 RX ORDER — ALBUTEROL SULFATE 2.5 MG/3ML
2.5 SOLUTION RESPIRATORY (INHALATION) EVERY 6 HOURS PRN
Status: DISCONTINUED | OUTPATIENT
Start: 2017-04-04 | End: 2017-04-07 | Stop reason: HOSPADM

## 2017-04-04 RX ORDER — POTASSIUM CHLORIDE 7.45 MG/ML
10 INJECTION INTRAVENOUS
Status: COMPLETED | OUTPATIENT
Start: 2017-04-04 | End: 2017-04-04

## 2017-04-04 RX ORDER — ALBUTEROL SULFATE 2.5 MG/3ML
2.5 SOLUTION RESPIRATORY (INHALATION) EVERY 6 HOURS PRN
Status: CANCELLED | OUTPATIENT
Start: 2017-04-04

## 2017-04-04 RX ORDER — METOCLOPRAMIDE 10 MG/1
10 TABLET ORAL 2 TIMES DAILY
Status: CANCELLED | OUTPATIENT
Start: 2017-04-04

## 2017-04-04 RX ORDER — NAPROXEN 250 MG/1
250 TABLET ORAL 2 TIMES DAILY
Status: CANCELLED | OUTPATIENT
Start: 2017-04-04

## 2017-04-04 RX ORDER — POTASSIUM CHLORIDE 750 MG/1
20 TABLET, FILM COATED, EXTENDED RELEASE ORAL 2 TIMES DAILY
Status: CANCELLED | OUTPATIENT
Start: 2017-04-04

## 2017-04-04 RX ADMIN — MUPIROCIN: 20 OINTMENT TOPICAL at 18:00

## 2017-04-04 RX ADMIN — PIPERACILLIN SODIUM,TAZOBACTAM SODIUM 3.38 G: 3; .375 INJECTION, POWDER, FOR SOLUTION INTRAVENOUS at 05:34

## 2017-04-04 RX ADMIN — LACTULOSE 20 G: 10 SOLUTION ORAL at 17:51

## 2017-04-04 RX ADMIN — POTASSIUM CHLORIDE 10 MEQ: 10 INJECTION, SOLUTION INTRAVENOUS at 10:34

## 2017-04-04 RX ADMIN — HYDROMORPHONE HYDROCHLORIDE 0.25 MG: 1 INJECTION, SOLUTION INTRAMUSCULAR; INTRAVENOUS; SUBCUTANEOUS at 15:28

## 2017-04-04 RX ADMIN — HYDROMORPHONE HYDROCHLORIDE 0.25 MG: 1 INJECTION, SOLUTION INTRAMUSCULAR; INTRAVENOUS; SUBCUTANEOUS at 18:47

## 2017-04-04 RX ADMIN — SODIUM CHLORIDE 125 ML/HR: 9 INJECTION, SOLUTION INTRAVENOUS at 00:54

## 2017-04-04 RX ADMIN — MAGNESIUM SULFATE IN WATER 2 G: 40 INJECTION, SOLUTION INTRAVENOUS at 04:00

## 2017-04-04 RX ADMIN — MAGNESIUM SULFATE IN WATER 2 G: 40 INJECTION, SOLUTION INTRAVENOUS at 02:08

## 2017-04-04 RX ADMIN — INSULIN DETEMIR 5 UNITS: 100 INJECTION, SOLUTION SUBCUTANEOUS at 00:54

## 2017-04-04 RX ADMIN — POTASSIUM CHLORIDE 10 MEQ: 10 INJECTION, SOLUTION INTRAVENOUS at 05:34

## 2017-04-04 RX ADMIN — INSULIN ASPART 5 UNITS: 100 INJECTION, SOLUTION INTRAVENOUS; SUBCUTANEOUS at 17:50

## 2017-04-04 RX ADMIN — VANCOMYCIN HYDROCHLORIDE 1500 MG: 5 INJECTION, POWDER, LYOPHILIZED, FOR SOLUTION INTRAVENOUS at 10:34

## 2017-04-04 RX ADMIN — SODIUM CHLORIDE 125 ML/HR: 9 INJECTION, SOLUTION INTRAVENOUS at 10:33

## 2017-04-04 RX ADMIN — ALBUTEROL SULFATE 2.5 MG: 2.5 SOLUTION RESPIRATORY (INHALATION) at 23:25

## 2017-04-04 RX ADMIN — PHYTONADIONE 5 MG: 10 INJECTION, EMULSION INTRAMUSCULAR; INTRAVENOUS; SUBCUTANEOUS at 15:27

## 2017-04-04 RX ADMIN — POTASSIUM CHLORIDE 10 MEQ: 10 INJECTION, SOLUTION INTRAVENOUS at 06:00

## 2017-04-04 RX ADMIN — POTASSIUM CHLORIDE 10 MEQ: 7.46 INJECTION, SOLUTION INTRAVENOUS at 11:55

## 2017-04-04 RX ADMIN — POTASSIUM CHLORIDE 10 MEQ: 10 INJECTION, SOLUTION INTRAVENOUS at 06:53

## 2017-04-04 RX ADMIN — MUPIROCIN 1 APPLICATION: 20 OINTMENT TOPICAL at 22:05

## 2017-04-04 RX ADMIN — INSULIN ASPART 5 UNITS: 100 INJECTION, SOLUTION INTRAVENOUS; SUBCUTANEOUS at 11:54

## 2017-04-04 RX ADMIN — INSULIN DETEMIR 10 UNITS: 100 INJECTION, SOLUTION SUBCUTANEOUS at 10:34

## 2017-04-04 NOTE — H&P
ATTENDING PHYSICIAN: Memo Quarles MD    CHIEF COMPLAINT: Abdominal pain, nausea and vomiting.     HISTORY OF PRESENT ILLNESS: The patient is a 52-year-old  male who presented to the emergency department with a chief complaint of abdominal pain, nausea, and vomiting. He also tells me that he has had pain radiating into his back and at times has had pain all over. He tells me the onset of these symptoms was approximately 2 days ago. He has had subjective fever and chills. He states he did have an episode of nausea and vomiting but no diarrhea. Again, he states this pain is in his lower abdomen and that it radiates around to his back. He describes it as achy and sharp. He tells me his last bowel movement was normal and it was on the day prior to presentation.  He was recently hospitalized in February 2017 for sepsis secondary to cellulitis of the right hand. The patient does have a history of IV drug use and has used Suboxone,  amphetamine  and opioids in the past.  He tells me today that he has not used IV drugs for a couple of weeks.     DIAGNOSTIC STUDIES:  A CBC done on arrival revealed a white count of 4.77, and later on dropped down to 1.94. Platelets are 61,000. A CMP revealed a glucose of 371 with elevated LFTs. ABGs were unremarkable except for bicarbonate of 20.5. Influenza A and B tests were both negative. A urine drug screen was positive for amphetamine and opiates as well as buprenorphine. A UA was unremarkable. A chest x-ray was normal. A CT scan of the abdomen and pelvis  showed equivocal mild pancreatitis with a large amount of stool in the colon. An EKG showed sinus tachycardia, heart rate 102. Cardiac markers were negative. A BNP was 504. A lactic acid was 5.3.     He was placed on IV Zosyn and vancomycin and admitted for further evaluation and management.     PAST MEDICAL HISTORY:  1.  Generalized anxiety disorder.  2. Chronic pancreatitis.  3. Hepatitis C.  4. COPD.  5. Degenerative  "disk disease.   6. Asthma.  7.  Multiple abscesses with incision and drainage.  8.  Depression.  9.  Type 2 diabetes mellitus.  10. DVT in the right arm.  11. Essential hypertension.   12. GERD.  13. Hypercholesterolemia.  14. Mild coronary artery disease,  patient tells me that he has had a \"heart attack.\"   15. TIA.  16. Polysubstance abuse.  17. Self-injurious behavior.  18. Sleep apnea.  19. Suicide attempt.   20. Chronic systolic congestive heart failure, ejection fraction was 60% in 2016.     PAST SURGICAL HISTORY:  1.  Cholecystectomy.   2.  ORIF right ankle fracture.   3.  Multiple incision and drainage of  extremities with an MRSA.     FAMILY HISTORY: Mother had coronary artery disease and COPD.     SOCIAL HISTORY:  The patient is . He has 4 children. He has a former history of tobacco abuse. He tells me that he quit smoking in his 20s. He tells me that he thinks his last drug use was 2-3 weeks ago. He denies alcohol use.     ALLERGIES:   1.  ROBITUSSIN.  2.  TIZANIDINE.   3.  METFORMIN.  4.  SULFA.   5.  CONTRAST DYE.  6.  TRAMADOL.     CURRENT MEDICATIONS:  1.  Albuterol HFA 2 puffs q.6 h. p.r.n.   2.  Aspirin 81 mg daily.   3.  Lipitor 20 mg daily.   4.  Celexa 20 mg daily.   5.  Bentyl 10 mg b.i.d.   6.  Lasix 20 mg daily.   7.  Gabapentin 800 mg q.i.d.   8.  Lantus 20 units in the morning and 35 units in the evening.  9.  Sliding scale.  10. Humalog 20 units before each meal.  11. Combivent Respimat, 1 puff q.i.d.   12. DuoNeb 0.5/2.5 q.i.d. p.r.n.  13. Magnesium 250 b.i.d.  14. Reglan 10 mg b.i.d.   15. Lopressor 50 mg daily.   16. Naproxen 250 mg b.i.d.   17. Creon 2 capsules p.o. 4 times a day with meals and at bedtime.   18. Protonix 40 mg daily.   19. MiraLax 17 grams daily.   20. K-Dur 20 mEq b.i.d.   21. Desyrel 100 mg at night p.r.n.   22. Nitrostat 0.4 SL every 5 minutes x3.    Patient states he does not take his medications as he should.     REVIEW OF SYSTEMS:  CONSTITUTIONAL: He " denies unexplained weight loss, weight gain. He states he thinks he has had a fever and chills. He has had increased fatigue and malaise. Appetite has been good except for yesterday when he had nausea and vomiting.   HEENT: He denies any visual disturbance, ear, nose or throat problems.   CARDIOVASCULAR: He has had intermittent chest pain in the left anterior chest associated with some diaphoresis. He reports having swelling in his legs and he is uncertain as to why.   PULMONARY: He has had shortness of breath, cough, and wheezes. He states this is no different than what he usually has.   GASTROINTESTINAL: He had a couple episodes of nausea and vomiting and he has had some dark stools intermittently. He states he has had abdominal pain. See HPI.   GENITOURINARY: He states he has some dysuria. He denies any hematuria or polyuria.   HEMATOLOGIC: He states he has had a blood clot in his arm in the past. He denies he bruises easily or has any bleeding tendencies.   ENDOCRINE: He reports blood sugars are running low at times, however, he tells me he does not check them very often.   NEUROLOGICAL: He denies any history of seizures or unilateral weakness. He reports having a TIA and that he was told by someone in the hospital  that he had a TIA.  He thought he had some left-sided weakness in the past.    INTEGUMENT: He states he has had a rash on his feet which started yesterday. PSYCHIATRIC: Anxiety, depression.    PHYSICAL EXAMINATION:  GENERAL: The patient is a well-developed, well-nourished 52-year-old  male lying in bed. He is in no acute distress.   VITAL SIGNS: Temperature 97.1, blood pressure 120/89, heart rate 98, respiratory rate 20, O2 saturation 100% on room air.   HEENT: Head is normocephalic, atraumatic. Eyes: Pupils are round, equal, and reactive to light and accommodation. Sclerae are clear. There is no icterus. Extraocular movements are intact. Nares are patent without drainage. Mouth and throat:  Oropharynx is clear. Mucous membranes are moist and pink.   NECK: Supple. He has no thyromegaly or lymphadenopathy. He has no carotid bruits. He has no JVD.   HEART: S1, S2. He is tachycardic. He has no murmur, gallop, or rub.   PULMONARY:  Lungs are clear bilaterally.   ABDOMEN: Soft. He has some tenderness in the right and left lower quadrant. He has no rebound, tenderness, or guarding. Bowel sounds are positive in all 4 quadrants.   MUSCULOSKELETAL: He has full range of motion in all extremities with no deformities.   EXTREMITIES: He has 1- 2+ pitting edema bilaterally from feet to the calf.   NEUROLOGICAL: He is alert and oriented x3. He has no focal deficits.   SKIN: Warm, dry, and pink.   PSYCHIATRIC: Normal mood and affect.     ASSESSMENT AND PLAN:  1.  Septic shock.  The patient did have 30 mL/kg bolus in the emergency department and he has been placed on Zosyn and vancomycin. We will evaluate further. Blood cultures are pending. We will also consult infectious disease.   2.  Type 2 diabetes mellitus. We will place on diabetic protocol and adjust medications as needed. He will be placed on low-dose basal insulin for now.   3.  Polysubstance abuse. His drug screen did show positive for amphetamines, as well as buprenorphine. We will continue to monitor and evaluate further.   4.  Abdominal pain. He was noted to be constipated. He will be given enemas and we will evaluate further.   5.  Transaminitis. We will continue to monitor CMP daily. He does have a history of hepatitis C.   6.  Bilateral lower extremity edema. We will check venous Dopplers and repeat echocardiogram. This could be secondary to cirrhosis.   7.  Pancytopenia. Will repeat CBC in the morning. This could be secondary to cirrhosis or sepsis.   8.  DVT prophylaxis. He does have thrombocytopenia, as well as bilateral lower extremity edema and is too high risk for anticoagulation.     I concur, see my separate note     D: GARLAND 04/04/2017 10:04:15  KARINE  T: arben / 04/04/2017 10:57:38 ET  Revision Count: 0  Job ID: [id number]  [id number]  cc:        Shreya Signature:___________________________     NOHEMI Del Rosario

## 2017-04-04 NOTE — ED PROVIDER NOTES
"Subjective   History of Present Illness    Review of Systems    Past Medical History:   Diagnosis Date   • Abscess of antecubital fossa 05/2014    Left that required I and D and grew Haemophilus influenza group 1   • Anxiety    • Asthma    • Chronic pancreatitis    • COPD (chronic obstructive pulmonary disease)    • DDD (degenerative disc disease), lumbosacral    • Depression    • Diabetes mellitus    • DVT (deep venous thrombosis)     Right arm   • Essential hypertension    • GERD (gastroesophageal reflux disease)    • Hepatitis-C    • Hypercholesteremia    • Injury of back    • Injury of right Achilles tendon     Complicated by MRSA and Pseudomonas   • IV drug abuse    • Mild CAD     Left heart cath at  2012   • MRSA (methicillin resistant staph aureus) culture positive 09/14/2016    LUE   • Obesity    • Opiate addiction     Suboxone IV   • Self-injurious behavior    • Sleep apnea    • Suicide attempt    • Systolic CHF, chronic     EF 45-50% in 2013, EF 60% 9/2016       Allergies   Allergen Reactions   • Robitussin Dm [Dextromethorphan-Guaifenesin] Shortness Of Breath   • Tizanidine Shortness Of Breath   • Metformin Nausea And Vomiting   • Sulfa Antibiotics Other (See Comments)     \"I cannot take them, they do something to me.\"   • Contrast Dye Rash   • Tramadol Rash       Past Surgical History:   Procedure Laterality Date   • CHOLECYSTECTOMY  1990s   • INCISION AND DRAINAGE ABSCESS Left 2016    wrist   • INCISION AND DRAINAGE ARM Left 9/15/2016    Procedure: INCISION AND DRAINAGE UPPER EXTREMITY;  Surgeon: Rico Oseguera MD;  Location: [facility];  Service:    • ORIF ANKLE FRACTURE Right 2014       Family History   Problem Relation Age of Onset   • Gout Other    • Osteoporosis Other    • Arthritis Other      Rheumatoid and osteoarthritis   • Hypertension Other    • Heart disease Other    • Cancer Other    • Coronary artery disease Mother    • Lung disease Mother    • Gout Sister    • Cancer Maternal " "Grandmother    • Hypertension Maternal Grandfather    • Gout Maternal Grandfather        Social History     Social History   • Marital status:      Spouse name: N/A   • Number of children: N/A   • Years of education: N/A     Social History Main Topics   • Smoking status: Former Smoker     Years: 1.00     Types: Cigarettes   • Smokeless tobacco: Never Used      Comment: quit smoking in my 20's   • Alcohol use No      Comment: denies   • Drug use: Yes     Special: IV      Comment: PT STATES THAT HE USED SUBOXONE IN LEFT AC YESTERDAY   • Sexual activity: Defer      Comment: not currently active.      Other Topics Concern   • None     Social History Narrative           Objective   Physical Exam    Insert Central Line At Bedside  Date/Time: 4/3/2017 8:40 PM  Performed by: ISSAC YORK  Authorized by: ROYA QUEZADA   Consent: Verbal consent obtained.  Risks and benefits: risks, benefits and alternatives were discussed  Consent given by: patient  Patient understanding: patient states understanding of the procedure being performed  Patient consent: the patient's understanding of the procedure matches consent given  Procedure consent: procedure consent matches procedure scheduled  Relevant documents: relevant documents present and verified  Test results: test results available and properly labeled  Site marked: the operative site was marked  Imaging studies: imaging studies available  Required items: required blood products, implants, devices, and special equipment available  Patient identity confirmed: verbally with patient  Time out: Immediately prior to procedure a \"time out\" was called to verify the correct patient, procedure, equipment, support staff and site/side marked as required.  Indications: vascular access  Anesthesia: local infiltration    Anesthesia:  Anesthesia: local infiltration  Local Anesthetic: lidocaine 1% without epinephrine   Anesthetic total: 2 mL  Sedation:  Patient sedated: " no    Preparation: skin prepped with 2% chlorhexidine  Skin prep agent dried: skin prep agent completely dried prior to procedure  Sterile barriers: all five maximum sterile barriers used - cap, mask, sterile gown, sterile gloves, and large sterile sheet  Hand hygiene: hand hygiene performed prior to central venous catheter insertion  Location details: right subclavian  Patient position: Trendelenburg  Catheter type: triple lumen  Catheter size: 7.5 Fr  Pre-procedure: landmarks identified  Ultrasound guidance: no  Number of attempts: 1  Successful placement: yes  Post-procedure: line sutured and dressing applied  Assessment: blood return through all ports and no pneumothorax on x-ray  Patient tolerance: Patient tolerated the procedure well with no immediate complications               ED Course  ED Course   Comment By Time   Discussed with Dr. Quarles.  Patient admitted, see orders. Gallo Silva MD 04/03 1442                  [facility]    Final diagnoses:   Sepsis, due to unspecified organism   Polysubstance abuse   Uncontrolled type 1 diabetes mellitus without complication            Angie Arreola DO  04/03/17 2273

## 2017-04-04 NOTE — ED NOTES
Attempting to gain another IV access at this time due to sepsis diagnosis.      Lashell Salas, ALYSE  04/04/17 0899

## 2017-04-04 NOTE — PAYOR COMM NOTE
"Rachelle  [phone] fax [phone]      Isaias Avendano (52 y.o. Male)     Date of Birth Social Security Number Address Home Phone MRN    1965  [address] [place] [address] [phone] [id number]    [employer]eterian Marital Status          Mandaeism        Admission Date Admission Type Admitting Provider Attending Provider Department, Room/Bed    4/3/17 Emergency Memo Quarles MD Heinss, Karl F, MD [address], [address]S    Discharge Date Discharge Disposition Discharge Destination                      Attending Provider: Memo Quarles MD     Allergies:  Robitussin Dm [Dextromethorphan-guaifenesin], Tizanidine, Metformin, Sulfa Antibiotics, Contrast Dye, Tramadol    Isolation:  None   Infection:  None   Code Status:  FULL    Ht:  68\" (172.7 cm)   Wt:  188 lb 5 oz (85.4 kg)    Admission Cmt:  None   Principal Problem:  None                Active Insurance as of 4/3/2017     Primary Coverage     Payor Plan Insurance Group Employer/Plan Group    [employer] [place] [facility] [employer] [facility]      Payor Plan Address Payor Plan Phone Number Effective From Effective To    [address] [address]  2/1/2016     [place] [address]       Subscriber Name Subscriber Birth Date Member ID       ISAIAS AVENDANO 1965 [id number]                 Emergency Contacts      (Rel.) Home Phone Work Phone Mobile Phone    Dr Robina (Other) [phone] -- --            History & Physical     No notes of this type exist for this encounter.           Emergency Department Notes      Gallo Silva MD at 4/3/2017 12:13 PM          Subjective   History of Present Illness  52-year-old white male here today complaining of feeling better.  Patient states that he is having nausea, vomiting unable to keep down any food or fluids.  He complains of diffuse myalgias and generalized weakness.  He was seen last night and discharged home, because there was no indication for admission.  Today he called " "Dr. Quarles who recommended he return to the emergency department.  Patient is requesting pain medicine at this time.  Review of Systems   All other systems reviewed and are negative.      Past Medical History:   Diagnosis Date   • Abscess of antecubital fossa 05/2014    Left that required I and D and grew Haemophilus influenza group 1   • Anxiety    • Asthma    • Chronic pancreatitis    • COPD (chronic obstructive pulmonary disease)    • DDD (degenerative disc disease), lumbosacral    • Depression    • Diabetes mellitus    • DVT (deep venous thrombosis)     Right arm   • Essential hypertension    • GERD (gastroesophageal reflux disease)    • Hepatitis-C    • Hypercholesteremia    • Injury of back    • Injury of right Achilles tendon     Complicated by MRSA and Pseudomonas   • IV drug abuse    • Mild CAD     Left heart cath at  2012   • MRSA (methicillin resistant staph aureus) culture positive 09/14/2016    LUE   • Obesity    • Opiate addiction     Suboxone IV   • Self-injurious behavior    • Sleep apnea    • Suicide attempt    • Systolic CHF, chronic     EF 45-50% in 2013, EF 60% 9/2016       Allergies   Allergen Reactions   • Robitussin Dm [Dextromethorphan-Guaifenesin] Shortness Of Breath   • Tizanidine Shortness Of Breath   • Metformin Nausea And Vomiting   • Sulfa Antibiotics Other (See Comments)     \"I cannot take them, they do something to me.\"   • Contrast Dye Rash   • Tramadol Rash       Past Surgical History:   Procedure Laterality Date   • CHOLECYSTECTOMY  1990s   • INCISION AND DRAINAGE ABSCESS Left 2016    wrist   • INCISION AND DRAINAGE ARM Left 9/15/2016    Procedure: INCISION AND DRAINAGE UPPER EXTREMITY;  Surgeon: Rico Oseguera MD;  Location: [facility];  Service:    • ORIF ANKLE FRACTURE Right 2014       Family History   Problem Relation Age of Onset   • Gout Other    • Osteoporosis Other    • Arthritis Other      Rheumatoid and osteoarthritis   • Hypertension Other    • Heart disease Other  "   • Cancer Other    • Coronary artery disease Mother    • Lung disease Mother    • Gout Sister    • Cancer Maternal Grandmother    • Hypertension Maternal Grandfather    • Gout Maternal Grandfather        Social History     Social History   • Marital status:      Spouse name: N/A   • Number of children: N/A   • Years of education: N/A     Social History Main Topics   • Smoking status: Former Smoker     Years: 1.00     Types: Cigarettes   • Smokeless tobacco: Never Used      Comment: quit smoking in my 20's   • Alcohol use No      Comment: denies   • Drug use: Yes     Special: IV      Comment: PT STATES THAT HE USED SUBOXONE IN LEFT AC YESTERDAY   • Sexual activity: Defer      Comment: not currently active.      Other Topics Concern   • None     Social History Narrative           Objective   Physical Exam   Constitutional: He is oriented to person, place, and time. He appears well-developed and well-nourished.   HENT:   Head: Normocephalic and atraumatic.   Mouth/Throat: Oropharynx is clear and moist.   Cardiovascular: Normal rate, regular rhythm and normal heart sounds.  Exam reveals no gallop and no friction rub.    No murmur heard.  Pulmonary/Chest: Effort normal and breath sounds normal. No respiratory distress. He has no wheezes. He has no rales.   Abdominal: Soft. Bowel sounds are normal. He exhibits no distension. There is tenderness (diffusely mildly tender with no rebound or guarding.).   Musculoskeletal: Normal range of motion. He exhibits no edema.   Neurological: He is alert and oriented to person, place, and time.   Skin: Skin is warm and dry.   Psychiatric: He has a normal mood and affect.   Nursing note and vitals reviewed.      Procedures  Results for orders placed or performed during the hospital encounter of 04/03/17   Comprehensive Metabolic Panel   Result Value Ref Range    Glucose 371 (H) 70 - 110 mg/dL    BUN 12 7 - 21 mg/dL    Creatinine 0.69 0.43 - 1.29 mg/dL    Sodium 136 135 - 153  mmol/L    Potassium 3.7 3.5 - 5.3 mmol/L    Chloride 104 99 - 112 mmol/L    CO2 27.0 24.3 - 31.9 mmol/L    Calcium 9.0 7.7 - 10.0 mg/dL    Total Protein 5.6 (L) 6.0 - 8.0 g/dL    Albumin 3.40 (L) 3.50 - 5.00 g/dL    ALT (SGPT) 183 (H) 10 - 44 U/L    AST (SGOT) 305 (H) 10 - 34 U/L    Alkaline Phosphatase 166 (H) 40 - 129 U/L    Total Bilirubin 1.7 0.2 - 1.8 mg/dL    eGFR Non African Amer 120 >60 mL/min/1.73    Globulin 2.2 gm/dL    A/G Ratio 1.5 1.5 - 2.5 g/dL    BUN/Creatinine Ratio 17.4 7.0 - 25.0    Anion Gap 5.0 3.6 - 11.2 mmol/L   Urinalysis With / Culture If Indicated   Result Value Ref Range    Color, UA Dark Yellow (A) Yellow, Straw    Appearance, UA Clear Clear    pH, UA 5.5 5.0 - 8.0    Specific Gravity, UA >1.030 (H) 1.005 - 1.030    Glucose, UA >=1000 mg/dL (3+) (A) Negative    Ketones, UA Trace (A) Negative    Bilirubin, UA Negative Negative    Blood, UA Negative Negative    Protein, UA Negative Negative    Leuk Esterase, UA Negative Negative    Nitrite, UA Negative Negative    Urobilinogen, UA 4.0 E.U./dL (A) 0.2 - 1.0 E.U./dL   Urine Drug Screen   Result Value Ref Range    Amphetamine Screen, Urine Positive (A) Negative    Barbiturates Screen, Urine Negative Negative    Benzodiazepine Screen, Urine Negative Negative    Cocaine Screen, Urine Negative Negative    Methadone Screen, Urine Negative Negative    Opiate Screen Positive (A) Negative    Phencyclidine (PCP), Urine Negative Negative    THC, Screen, Urine Negative Negative    Propoxyphene Screen Negative Negative   Buprenorphine Screen Urine   Result Value Ref Range    Buprenorphine, Urine Positive (A) Negative   Lactic Acid, Plasma   Result Value Ref Range    Lactate 5.3 (C) 0.5 - 2.0 mmol/L   Blood Gas, Arterial   Result Value Ref Range    Site Arterial: left radial     Mihir's Test Positive     pH, Arterial 7.421 7.350 - 7.450 pH units    pCO2, Arterial 32.3 (L) 35.0 - 45.0 mm Hg    pO2, Arterial 89.1 80.0 - 100.0 mm Hg    HCO3, Arterial 20.5  (L) 22.0 - 26.0 mmol/L    Base Excess, Arterial -2.9 mmol/L    O2 Saturation, Arterial 96.4 90.0 - 100.0 %    Hemoglobin, Blood Gas 14.3 12 - 16 g/dL    Hematocrit, Blood Gas 42.0 42.0 - 52.0 %    Oxyhemoglobin 95.0 85 - 100 %    Methemoglobin 0.3 0 - 3 %    Carboxyhemoglobin 1.2 0 - 5 %    A-a Gradiant 15.9 0.0 - 300.0 mmHg    Temperature 98.6 C    Barometric Pressure for Blood Gas 731 mmHg    Modality Room air     FIO2 21 %   Acetone   Result Value Ref Range    Acetone Negative Negative   CBC Auto Differential   Result Value Ref Range    WBC 1.94 (C) 4.50 - 12.50 10*3/mm3    RBC 4.47 (L) 4.70 - 6.10 10*6/mm3    Hemoglobin 13.2 (L) 14.0 - 18.0 g/dL    Hematocrit 39.8 (L) 42.0 - 52.0 %    MCV 89.0 80.0 - 94.0 fL    MCH 29.5 27.0 - 33.0 pg    MCHC 33.2 33.0 - 37.0 g/dL    RDW 15.9 (H) 11.5 - 14.5 %    RDW-SD 51.4 37.0 - 54.0 fl    MPV 11.2 (H) 6.0 - 10.0 fL    Platelets 61 (L) 130 - 400 10*3/mm3    Neutrophil % 87.1 (H) 30.0 - 70.0 %    Lymphocyte % 11.9 (L) 21.0 - 51.0 %    Monocyte % 1.0 0.0 - 10.0 %    Eosinophil % 0.0 0.0 - 5.0 %    Basophil % 0.0 0.0 - 2.0 %    Immature Grans % 0.0 0.0 - 0.5 %    Neutrophils, Absolute 1.69 1.40 - 6.50 10*3/mm3    Lymphocytes, Absolute 0.23 (L) 1.00 - 3.00 10*3/mm3    Monocytes, Absolute 0.02 (L) 0.10 - 0.90 10*3/mm3    Eosinophils, Absolute 0.00 0.00 - 0.70 10*3/mm3    Basophils, Absolute 0.00 0.00 - 0.30 10*3/mm3    Immature Grans, Absolute 0.00 0.00 - 0.03 10*3/mm3   Osmolality, Calculated   Result Value Ref Range    Osmolality Calc 286.9 273.0 - 305.0 mOsm/kg   Oxycodone, Urine   Result Value Ref Range    Oxycodone Screen, Urine Negative Negative     Ct Abdomen Pelvis Without Contrast    Result Date: 4/3/2017  Narrative: CT ABDOMEN PELVIS WO CONTRAST-  CLINICAL INDICATION: abdominal pain     COMPARISON: 05/12/2013  TECHNIQUE: Axial images were acquired from the lung bases through the pubic symphysis without any IV or oral contrast. Reformatted images were created in both the  coronal and sagittal planes.  Radiation dose reduction techniques were utilized per ALARA protocol. Automated exposure control was initiated through either or CareDose or DoseRigAllocade software packages by  protocol.     FINDINGS: The lung bases are clear. There are no pleural effusions.  The liver is homogeneous. There is no evidence of focal hepatic mass  The spleen is homogeneous but does appear to be slightly enlarged.  Pancreas is full. Minimal adjacent stranding which could be indicative of pancreatitis. I recommend laboratory correlation with amylase and lipase.  There is no adrenal enlargement.  The kidneys show no evidence of hydronephrosis or hydroureter. I do not see any distal ureteral stones.  Otherwise I do not see any free fluid or walled off fluid collections.  Moderate stool is present in the colon.  There is no evidence of mesenteric or retroperitoneal adenopathy          Impression: 1. Equivocal mild pancreatitis. 2. Large volume stool in the colon. 3. Splenomegaly. 4. Other findings as above.       This report was finalized on 4/3/2017 8:00 AM by Dr. Jersey Maher MD.      Xr Abdomen 3 View    Result Date: 4/3/2017  Narrative: XR ABDOMEN 3 VW-  CLINICAL INDICATION: Abdominal pain     COMPARISON: None available  FINDINGS: Chest: The included view of the chest demonstrates the lungs to be well aerated. There is no focal alveolar infiltrate or pleural effusion. The cardiac silhouette is normal. No acute osseous abnormality is seen within the chest.  Abdomen: Two views of the abdomen show no free air. I do not see abnormal bowel distention to suggest complete obstruction. The bony structures are intact. No abnormal soft tissue masses are seen. No suspicious appearing calcifications are identified on today's exam.      Impression: 1. Moderate to large volume stool in the colon 2. The lungs are clear. 3. No evidence of bowel obstruction. 4. No free air.        This report was finalized on  4/3/2017 1:01 PM by Dr. Jersey Maher MD.            ED Course  ED Course   Comment By Time   Discussed with Dr. Quarles.  Patient admitted, see orders. Gallo Silva MD 04/03 1442      Diagnosis: Septic Shock    Post Bolus Vital Signs: Temp 97.9 HR 65 RR16 BP98/68    General: Resting Comfortably no distress, Acute Distress and Ill Appearing    Respiratory: Chest non-tender, lungs clear, Normal Breath Sounds, No Respiratory Distress, No accessory muscle use, Respiratory Distress, Decreased Breath Sounds, Accesory Muscle Use, Crackles, Rales, Rhonchi, Stridor, Wheezing, Expiratory, Inspiratory and Pleural Rub    Cardiac/Chest:  Regular Rate, rhythm, No Edema, No Gallop, No JVD, No Murmur, JVD, Bradycardia, Tachycardia, Diastolic Murmur, Systolic Murmur, Gallop/S3,  Gallop/S4, Extra Beats, Friction Rub and Irregularity Irregular    Skin: Normal Color, Warm/Dry, Cyanosis, Diaphoresis, Jaundice, Mottled and Pallor    Capillary Refill: <3 Seconds, Immediate, >3 Seconds, Delayed and None    Peripheral Pulses: Palpable            MDM  Number of Diagnoses or Management Options  Polysubstance abuse:   Sepsis, due to unspecified organism:   Uncontrolled type 1 diabetes mellitus without complication:      Amount and/or Complexity of Data Reviewed  Clinical lab tests: reviewed and ordered  Tests in the radiology section of CPT®:  ordered and reviewed  Decide to obtain previous medical records or to obtain history from someone other than the patient: yes    Risk of Complications, Morbidity, and/or Mortality  Presenting problems: high  Diagnostic procedures: high  Management options: high        Final diagnoses:   Sepsis, due to unspecified organism   Polysubstance abuse   Uncontrolled type 1 diabetes mellitus without complication            Gallo Silva MD  04/03/17 1446       Gallo Silva MD  04/03/17 1914       Electronically signed by Gallo Silva MD at 4/3/2017  7:14 PM      Marika Sanchez RN at 4/3/2017  3:21  PM          Pt's home medications prepared and placed in secure bags number [id number], [id number], and [id number]; taken to pharmacy by pharmacy personnel.  Pt's small folding knife taken by security in secure envelope no. [id number].  Receipts for pharmacy bags and security bag placed on chart.     Marika Sanchez RN  04/03/17 1523       Electronically signed by Marika Sanchez RN at 4/3/2017  3:23 PM      Marika Sanchez RN at 4/3/2017  3:30 PM          Reviewed previous ED chart, noted blood cultures were obtained on previous ED visit last night.      Marika Sanchez RN  04/03/17 1618       Electronically signed by Marika Sanchez RN at 4/3/2017  4:18 PM      Marika Sanchez RN at 4/3/2017  4:05 PM          Upon entering room, pt noted to only respond to painful stimuli.  During conversation, pt closes eyes and only responds to repeated loud verbal stimuli.  Dr. Silva notified of pt's change in mental status.     Marika Sanchez RN  04/03/17 1922       Electronically signed by Marika Sanchez RN at 4/3/2017  7:22 PM      Lashell Salas RN at 4/3/2017  7:19 PM          Attempting to gain another IV access at this time due to sepsis diagnosis.      Lashell Salas RN  04/04/17 0823       Electronically signed by Lashell Salas RN at 4/4/2017  8:23 AM      Lashell Salas RN at 4/3/2017  7:30 PM          Attempted another IV access x 2 with no success at this time.     Lashell Salas RN  04/04/17 0824       Electronically signed by Lashell Salas RN at 4/4/2017  8:24 AM      Lashell Salas RN at 4/3/2017  8:45 PM          Radiology at bedside at this time.      Lashell Salas RN  04/03/17 2049       Electronically signed by Lashell Salas RN at 4/3/2017  8:49 PM      Angie Arreola DO at 4/3/2017  8:47 PM   "    Procedure Orders:    1. Insert Central Line At Bedside [[id number]] ordered by Angie Arreola DO at 04/03/17 2047                Subjective   History of Present Illness    Review of Systems    Past Medical History:   Diagnosis Date   • Abscess of antecubital fossa 05/2014    Left that required I and D and grew Haemophilus influenza group 1   • Anxiety    • Asthma    • Chronic pancreatitis    • COPD (chronic obstructive pulmonary disease)    • DDD (degenerative disc disease), lumbosacral    • Depression    • Diabetes mellitus    • DVT (deep venous thrombosis)     Right arm   • Essential hypertension    • GERD (gastroesophageal reflux disease)    • Hepatitis-C    • Hypercholesteremia    • Injury of back    • Injury of right Achilles tendon     Complicated by MRSA and Pseudomonas   • IV drug abuse    • Mild CAD     Left heart cath at  2012   • MRSA (methicillin resistant staph aureus) culture positive 09/14/2016    LUE   • Obesity    • Opiate addiction     Suboxone IV   • Self-injurious behavior    • Sleep apnea    • Suicide attempt    • Systolic CHF, chronic     EF 45-50% in 2013, EF 60% 9/2016       Allergies   Allergen Reactions   • Robitussin Dm [Dextromethorphan-Guaifenesin] Shortness Of Breath   • Tizanidine Shortness Of Breath   • Metformin Nausea And Vomiting   • Sulfa Antibiotics Other (See Comments)     \"I cannot take them, they do something to me.\"   • Contrast Dye Rash   • Tramadol Rash       Past Surgical History:   Procedure Laterality Date   • CHOLECYSTECTOMY  1990s   • INCISION AND DRAINAGE ABSCESS Left 2016    wrist   • INCISION AND DRAINAGE ARM Left 9/15/2016    Procedure: INCISION AND DRAINAGE UPPER EXTREMITY;  Surgeon: Rico Oseguera MD;  Location: [facility];  Service:    • ORIF ANKLE FRACTURE Right 2014       Family History   Problem Relation Age of Onset   • Gout Other    • Osteoporosis Other    • Arthritis Other      Rheumatoid and osteoarthritis   • Hypertension Other    • Heart " "disease Other    • Cancer Other    • Coronary artery disease Mother    • Lung disease Mother    • Gout Sister    • Cancer Maternal Grandmother    • Hypertension Maternal Grandfather    • Gout Maternal Grandfather        Social History     Social History   • Marital status:      Spouse name: N/A   • Number of children: N/A   • Years of education: N/A     Social History Main Topics   • Smoking status: Former Smoker     Years: 1.00     Types: Cigarettes   • Smokeless tobacco: Never Used      Comment: quit smoking in my 20's   • Alcohol use No      Comment: denies   • Drug use: Yes     Special: IV      Comment: PT STATES THAT HE USED SUBOXONE IN LEFT AC YESTERDAY   • Sexual activity: Defer      Comment: not currently active.      Other Topics Concern   • None     Social History Narrative           Objective   Physical Exam    Insert Central Line At Bedside  Date/Time: 4/3/2017 8:40 PM  Performed by: ISSAC YORK  Authorized by: ROYA QUEZADA   Consent: Verbal consent obtained.  Risks and benefits: risks, benefits and alternatives were discussed  Consent given by: patient  Patient understanding: patient states understanding of the procedure being performed  Patient consent: the patient's understanding of the procedure matches consent given  Procedure consent: procedure consent matches procedure scheduled  Relevant documents: relevant documents present and verified  Test results: test results available and properly labeled  Site marked: the operative site was marked  Imaging studies: imaging studies available  Required items: required blood products, implants, devices, and special equipment available  Patient identity confirmed: verbally with patient  Time out: Immediately prior to procedure a \"time out\" was called to verify the correct patient, procedure, equipment, support staff and site/side marked as required.  Indications: vascular access  Anesthesia: local infiltration    Anesthesia:  Anesthesia: " local infiltration  Local Anesthetic: lidocaine 1% without epinephrine   Anesthetic total: 2 mL  Sedation:  Patient sedated: no    Preparation: skin prepped with 2% chlorhexidine  Skin prep agent dried: skin prep agent completely dried prior to procedure  Sterile barriers: all five maximum sterile barriers used - cap, mask, sterile gown, sterile gloves, and large sterile sheet  Hand hygiene: hand hygiene performed prior to central venous catheter insertion  Location details: right subclavian  Patient position: Trendelenburg  Catheter type: triple lumen  Catheter size: 7.5 Fr  Pre-procedure: landmarks identified  Ultrasound guidance: no  Number of attempts: 1  Successful placement: yes  Post-procedure: line sutured and dressing applied  Assessment: blood return through all ports and no pneumothorax on x-ray  Patient tolerance: Patient tolerated the procedure well with no immediate complications              ED Course  ED Course   Comment By Time   Discussed with Dr. Quarles.  Patient admitted, see orders. Gallo Silva MD 04/03 3112                  [facility]    Final diagnoses:   Sepsis, due to unspecified organism   Polysubstance abuse   Uncontrolled type 1 diabetes mellitus without complication            Angie Arreola DO  04/03/17 2053       Electronically signed by Angie Arreola DO at 4/3/2017  8:53 PM      Lashell Salas RN at 4/3/2017  9:06 PM          Pt left ED at this time with ED tech via stretcher. Neutropenic precautions remain in place, mask on pt upon leaving ED. Contact precaution remained in place upon transport to in patient room assignment., due to history of MRSA. NADN. VS remain stable. NS infusing at 125 ml/hr with no difficulty. Pts security tags placed on pts chart.          Lashell Salas RN  04/04/17 2155       Electronically signed by Lashell Salas RN at 4/4/2017  8:26 AM        Hospital Medications (all)       Dose Frequency Start  End    albuterol (PROVENTIL) nebulizer solution 0.083% 2.5 mg/3mL 2.5 mg Once 4/3/2017 4/3/2017    Sig - Route: Take 2.5 mg by nebulization 1 (One) Time. - Nebulization    dextrose (D50W) solution 25 g 25 g Every 15 Minutes PRN 4/3/2017     Sig - Route: Infuse 50 mL into a venous catheter Every 15 (Fifteen) Minutes As Needed for Low Blood Sugar (Blood Sugar Less Than 70, Patient Has IV Access - Unresponsive, NPO or Unable To Safely Swallow). - Intravenous    dextrose (GLUTOSE) oral gel 15 g 15 g Every 15 Minutes PRN 4/3/2017     Sig - Route: Take 15 g by mouth Every 15 (Fifteen) Minutes As Needed for Low Blood Sugar (Blood Sugar Less Than 70, Patient Alert, Is Not NPO & Can Safely Swallow). - Oral    glucagon (GLUCAGEN) injection 1 mg 1 mg Every 15 Minutes PRN 4/3/2017     Sig - Route: Inject 1 mg under the skin Every 15 (Fifteen) Minutes As Needed (Blood Glucose Less Than 70 - Patient Without IV Access - Unresponsive, NPO or Unable To Safely Swallow). - Subcutaneous    HYDROmorphone (DILAUDID) injection 1 mg 1 mg Once 4/3/2017 4/3/2017    Sig - Route: Infuse 1 mg into a venous catheter 1 (One) Time. - Intravenous    HYDROmorphone (DILAUDID) injection 1 mg 1 mg Once 4/3/2017 4/3/2017    Sig - Route: Infuse 1 mg into a venous catheter 1 (One) Time. - Intravenous    insulin aspart (novoLOG) injection 0-14 Units 0-14 Units 4 Times Daily Before Meals & Nightly 4/3/2017     Sig - Route: Inject 0-14 Units under the skin 4 (Four) Times a Day Before Meals & at Bedtime. - Subcutaneous    insulin aspart (novoLOG) injection 5 Units 5 Units 3 Times Daily With Meals 4/4/2017     Sig - Route: Inject 5 Units under the skin 3 (Three) Times a Day With Meals. - Subcutaneous    insulin detemir (LEVEMIR) injection 10 Units 10 Units Every Morning 4/4/2017     Sig - Route: Inject 10 Units under the skin Every Morning. - Subcutaneous    Magnesium Sulfate 2 gram Bolus, followed by 8 gram infusion (total Mg dose 10 grams)- Mg less than or  "equal to 1mg/dL 2 g As Needed 4/3/2017     Sig - Route: Infuse 50 mL into a venous catheter As Needed (Mg less than or equal to 1mg/dL). - Intravenous    Linked Group 1:  \"Or\" Linked Group Details        magnesium sulfate 2g/50 mL (PREMIX) infusion 2 g Every 2 Hours 4/4/2017 4/4/2017    Sig - Route: Infuse 50 mL into a venous catheter Every 2 (Two) Hours. - Intravenous    magnesium sulfate 4 gram infusion- Mg 1.6-1.9 mg/dL 4 g As Needed 4/3/2017     Sig - Route: Infuse 100 mL into a venous catheter As Needed (Mg 1.6-1.9 mg/dL). - Intravenous    Linked Group 1:  \"Or\" Linked Group Details        Magnesium Sulfate 6 gram Infusion (2 gm x 3) -Mg 1.1 -1.5 mg/dL 2 g As Needed 4/3/2017     Sig - Route: Infuse 50 mL into a venous catheter As Needed (Mg 1.1 -1.5 mg/dL). - Intravenous    Linked Group 1:  \"Or\" Linked Group Details        nitroglycerin (NITROSTAT) SL tablet 0.4 mg 0.4 mg Every 5 Minutes PRN 4/3/2017     Sig - Route: Place 1 tablet under the tongue Every 5 (Five) Minutes As Needed for Chest Pain (if systolic BP greater than 100 mm/Hg.). - Sublingual    Pharmacy to dose vancomycin  Continuous PRN 4/3/2017     Sig - Route: Continuous As Needed for Consult. - Does not apply    Pharmacy to Dose Zosyn  Continuous PRN 4/3/2017     Sig - Route: Continuous As Needed for Consult. - Does not apply    piperacillin-tazobactam (ZOSYN) 3.375 g/100 mL 0.9% NS IVPB (mbp) 3.375 g Once 4/3/2017 4/3/2017    Sig - Route: Infuse 100 mL into a venous catheter 1 (One) Time. - Intravenous    piperacillin-tazobactam (ZOSYN) 3.375 g/100 mL 0.9% NS IVPB (mbp) 3.375 g Every 6 Hours 4/4/2017     Sig - Route: Infuse 100 mL into a venous catheter Every 6 (Six) Hours. - Intravenous    potassium chloride (KLOR-CON) packet 40 mEq 40 mEq As Needed 4/3/2017     Sig - Route: Take 40 mEq by mouth As Needed (potassium replacement, see admin instructions). - Oral    Linked Group 2:  \"Or\" Linked Group Details        potassium chloride (MICRO-K) CR " "capsule 40 mEq 40 mEq As Needed 4/3/2017     Sig - Route: Take 4 capsules by mouth As Needed (potassium replacement.  see admin instructions). - Oral    Linked Group 2:  \"Or\" Linked Group Details        potassium chloride 10 mEq in 100 mL IVPB 10 mEq Every 1 Hour PRN 4/3/2017     Sig - Route: Infuse 100 mL into a venous catheter Every 1 (One) Hour As Needed (potassium protocol PERIPHERAL - see admin instructions). - Intravenous    Linked Group 2:  \"Or\" Linked Group Details        potassium chloride 10 mEq in 100 mL IVPB 10 mEq Every 1 Hour 4/4/2017 4/4/2017    Sig - Route: Infuse 100 mL into a venous catheter Every 1 (One) Hour. - Intravenous    sodium chloride 0.9 % bolus 2,382 mL 30 mL/kg × 79.4 kg Once 4/3/2017 4/3/2017    Sig - Route: Infuse 2,382 mL into a venous catheter 1 (One) Time. - Intravenous    sodium chloride 0.9 % flush 1-10 mL 1-10 mL As Needed 4/3/2017     Sig - Route: Infuse 1-10 mL into a venous catheter As Needed for Line Care. - Intravenous    sodium chloride 0.9 % infusion 125 mL/hr Continuous 4/3/2017     Sig - Route: Infuse 125 mL/hr into a venous catheter Continuous. - Intravenous    vancomycin (VANCOCIN) 1,500 mg in sodium chloride 0.9 % 500 mL IVPB 20 mg/kg × 79.4 kg Once 4/3/2017 4/3/2017    Sig - Route: Infuse 1,500 mg into a venous catheter 1 (One) Time. - Intravenous    vancomycin (VANCOCIN) 1,500 mg in sodium chloride 0.9 % 500 mL IVPB 1,500 mg Every 8 Hours 4/4/2017     Sig - Route: Infuse 1,500 mg into a venous catheter Every 8 (Eight) Hours. - Intravenous    glucagon (human recombinant) (GLUCAGEN DIAGNOSTIC) injection 1 mg (Discontinued) 1 mg Every 15 Minutes PRN 4/3/2017 4/3/2017    Sig - Route: Inject 1 mg under the skin Every 15 (Fifteen) Minutes As Needed (Blood Glucose Less Than 70 - Patient Without IV Access - Unresponsive, NPO or Unable To Safely Swallow). - Subcutaneous    Reason for Discontinue: Reorder    insulin detemir (LEVEMIR) injection 5 Units (Discontinued) 5 Units " Nightly 4/4/2017 4/4/2017    Sig - Route: Inject 5 Units under the skin Every Night. - Subcutaneous    sodium chloride 0.9 % infusion (Discontinued) 125 mL/hr Continuous 4/3/2017 4/3/2017    Sig - Route: Infuse 125 mL/hr into a venous catheter Continuous. - Intravenous          Lab Results (last 72 hours)     Procedure Component Value Units Date/Time    Blood Gas, Arterial [[id number]]  (Abnormal) Collected:  04/03/17 1223    Specimen:  Arterial Blood Updated:  04/03/17 1224     Site Arterial: left radial     Mihir's Test Positive     pH, Arterial 7.421 pH units      pCO2, Arterial 32.3 (L) mm Hg      pO2, Arterial 89.1 mm Hg      HCO3, Arterial 20.5 (L) mmol/L      Base Excess, Arterial -2.9 mmol/L      O2 Saturation, Arterial 96.4 %      Hemoglobin, Blood Gas 14.3 g/dL      Hematocrit, Blood Gas 42.0 %      Oxyhemoglobin 95.0 %      Methemoglobin 0.3 %      Carboxyhemoglobin 1.2 %      A-a Gradiant 15.9 mmHg      Temperature 98.6 C      Barometric Pressure for Blood Gas 731 mmHg      Modality Room air     FIO2 21 %     Acetone [[id number]]  (Normal) Collected:  04/03/17 1236    Specimen:  Blood from Arm, Left Updated:  04/03/17 1250     Acetone Negative    Lactic Acid, Plasma [[id number]]  (Abnormal) Collected:  04/03/17 1236    Specimen:  Blood from Arm, Left Updated:  04/03/17 1257     Lactate 5.3 (C) mmol/L     CBC & Differential [[id number]] Collected:  04/03/17 1236    Specimen:  Blood Updated:  04/03/17 1259    Narrative:       The following orders were created for panel order CBC & Differential.  Procedure                               Abnormality         Status                     ---------                               -----------         ------                     CBC Auto Differential[[id number]]         Abnormal            Final result                 Please view results for these tests on the individual orders.    CBC Auto Differential [[id number]]  (Abnormal) Collected:  04/03/17 1236    Specimen:  Blood from  Arm, Left Updated:  04/03/17 1259     WBC 1.94 (C) 10*3/mm3      RBC 4.47 (L) 10*6/mm3      Hemoglobin 13.2 (L) g/dL      Hematocrit 39.8 (L) %      MCV 89.0 fL      MCH 29.5 pg      MCHC 33.2 g/dL      RDW 15.9 (H) %      RDW-SD 51.4 fl      MPV 11.2 (H) fL      Platelets 61 (L) 10*3/mm3      Neutrophil % 87.1 (H) %      Lymphocyte % 11.9 (L) %      Monocyte % 1.0 %      Eosinophil % 0.0 %      Basophil % 0.0 %      Immature Grans % 0.0 %      Neutrophils, Absolute 1.69 10*3/mm3      Lymphocytes, Absolute 0.23 (L) 10*3/mm3      Monocytes, Absolute 0.02 (L) 10*3/mm3      Eosinophils, Absolute 0.00 10*3/mm3      Basophils, Absolute 0.00 10*3/mm3      Immature Grans, Absolute 0.00 10*3/mm3     Comprehensive Metabolic Panel [[id number]]  (Abnormal) Collected:  04/03/17 1236    Specimen:  Blood from Arm, Left Updated:  04/03/17 1306     Glucose 371 (H) mg/dL      BUN 12 mg/dL      Creatinine 0.69 mg/dL      Sodium 136 mmol/L      Potassium 3.7 mmol/L       1+ Icteric         Chloride 104 mmol/L      CO2 27.0 mmol/L      Calcium 9.0 mg/dL      Total Protein 5.6 (L) g/dL      Albumin 3.40 (L) g/dL      ALT (SGPT) 183 (H) U/L      AST (SGOT) 305 (H) U/L      Alkaline Phosphatase 166 (H) U/L      Total Bilirubin 1.7 mg/dL      eGFR Non African Amer 120 mL/min/1.73      Globulin 2.2 gm/dL      A/G Ratio 1.5 g/dL      BUN/Creatinine Ratio 17.4     Anion Gap 5.0 mmol/L     Osmolality, Calculated [[id number]]  (Normal) Collected:  04/03/17 1236    Specimen:  Blood from Arm, Left Updated:  04/03/17 1306     Osmolality Calc 286.9 mOsm/kg     Urinalysis With / Culture If Indicated [[id number]]  (Abnormal) Collected:  04/03/17 1408    Specimen:  Urine from Urine, Clean Catch Updated:  04/03/17 1414     Color, UA Dark Yellow (A)     Appearance, UA Clear     pH, UA 5.5     Specific Gravity, UA >1.030 (H)     Glucose, UA >=1000 mg/dL (3+) (A)     Ketones, UA Trace (A)     Bilirubin, UA Negative     Blood, UA Negative     Protein, UA  Negative     Leuk Esterase, UA Negative     Nitrite, UA Negative     Urobilinogen, UA 4.0 E.U./dL (A)    Narrative:       Urine microscopic not indicated.    Oxycodone, Urine [[id number]]  (Normal) Collected:  04/03/17 1408    Specimen:  Urine from Urine, Clean Catch Updated:  04/03/17 1416     Oxycodone Screen, Urine Negative    Narrative:       Oxycodone Screen Detects:    Oxycodone          100 ng/mL    Hydrocodone       1562 ng/mL    Codeine          18457 ng/mL    Dihydrocodeine   11889 ng/mL    Ethylmorphine    84728 ng/mL    Hydromorphone    57280 ng/mL    Oxymorphone      1562  ng/mL    Thebaine         48784 ng/mL    Buprenorphine Screen Urine [[id number]]  (Abnormal) Collected:  04/03/17 1408    Specimen:  Urine from Urine, Clean Catch Updated:  04/03/17 1417     Buprenorphine, Urine Positive (A)    Narrative:       Buprenorphine Screen Detects:            Buprenorphine 3 B D Glucaronide             2.5 ng/mL            Buprenorphine                               10 ng/mL            Nalorphine                                  1000 ng/mL            Norbuprenorphine                            42525 ng/ml            Norbuprenorphine  3 B D Glucaronide         87348 ng/mL    Urine Drug Screen [[id number]]  (Abnormal) Collected:  04/03/17 1408    Specimen:  Urine from Urine, Clean Catch Updated:  04/03/17 1437     Amphetamine Screen, Urine Positive (A)     Barbiturates Screen, Urine Negative     Benzodiazepine Screen, Urine Negative     Cocaine Screen, Urine Negative     Methadone Screen, Urine Negative     Opiate Screen Positive (A)     Phencyclidine (PCP), Urine Negative     THC, Screen, Urine Negative     Propoxyphene Screen Negative    Narrative:       Negative Thresholds For Drugs Screened:                  Amphetamines              1000 ng/ml               Barbiturates               200 ng/ml               Benzodiazepines            200 ng/ml              Cocaine                    300 ng/ml               Methadone                  300 ng/ml              Opiates                    300 ng/ml               Phencyclidine               25 ng/ml               Propoxyphene               300 ng/ml              THC                         50 ng/ml    The reference range for all drugs tested is negative. This report includes final unconfirmed qualitative results to be used for medical treatment purposes only. Unconfirmed results must not be used for non-medical purposes such as employment or legal testing. Clinical consideration should be applied to any drug of abuse test, especially when unconfirmed quantitative results are used.      POC Glucose Fingerstick [[id number]]  (Abnormal) Collected:  04/03/17 1901    Specimen:  Blood Updated:  04/03/17 1910     Glucose 195 (H) mg/dL     Narrative:       Meter: [id number] : [id number] Daniel Marika    Lactic Acid, Plasma [[id number]]  (Abnormal) Collected:  04/03/17 2133    Specimen:  Blood Updated:  04/03/17 2207     Lactate 2.5 (C) mmol/L     POC Glucose Fingerstick [[id number]]  (Abnormal) Collected:  04/03/17 2209    Specimen:  Blood Updated:  04/03/17 2212     Glucose 258 (H) mg/dL     Narrative:       Meter: [id number] : [id number] damien francisco    Magnesium [[id number]]  (Abnormal) Collected:  04/03/17 1236    Specimen:  Blood from Arm, Left Updated:  04/03/17 2232     Magnesium 1.3 (C) mg/dL     Troponin [[id number]]  (Normal) Collected:  04/03/17 2225    Specimen:  Blood Updated:  04/03/17 2303     Troponin I <0.006 ng/mL     Narrative:       Ultra Troponin I Reference Range:         <=0.039 ng/mL: Negative    0.04-0.779 ng/mL: Indeterminate Range. Suspicious of MI.  Clinical correlation required.       >=0.78  ng/mL: Consistent with myocardial injury.  Clinical correlation required.    CK [[id number]]  (Abnormal) Collected:  04/03/17 2225    Specimen:  Blood Updated:  04/03/17 2310     Creatine Kinase 992 (C) U/L     CK-MB [[id number]]  (Abnormal) Collected:  04/03/17 2225     Specimen:  Blood Updated:  04/03/17 2310     CKMB 46.88 (C) ng/mL     Myoglobin, Serum [[id number]]  (Abnormal) Collected:  04/03/17 2225    Specimen:  Blood Updated:  04/03/17 2310     Myoglobin 268.0 (C) ng/mL     CK-MB Index [[id number]]  (Abnormal) Collected:  04/03/17 2225    Specimen:  Blood Updated:  04/03/17 2310     CK-MB Index 4.7 (H) %     POC Glucose Fingerstick [[id number]]  (Abnormal) Collected:  04/04/17 0056    Specimen:  Blood Updated:  04/04/17 0103     Glucose 251 (H) mg/dL     Narrative:       Meter: [id number] : [id number] damien maryam    Protime-INR [[id number]]  (Abnormal) Collected:  04/04/17 0200    Specimen:  Blood Updated:  04/04/17 0229     Protime 16.9 (H) Seconds      INR 1.48 (H)    Narrative:       Patients not on anticoagulant therapy:    INR 0.90-1.10     Suggested INR therapeutic range for stable oral anticoagulant therapy:             Routine therapy                      2.00-3.00           Recurrent MI                         2.50-3.50           Mechanical prosthetic valve          2.50-3.50    CBC & Differential [[id number]] Collected:  04/04/17 0201    Specimen:  Blood Updated:  04/04/17 0231    Narrative:       The following orders were created for panel order CBC & Differential.  Procedure                               Abnormality         Status                     ---------                               -----------         ------                     CBC Auto Differential[[id number]]         Abnormal            Final result                 Please view results for these tests on the individual orders.    CBC Auto Differential [[id number]]  (Abnormal) Collected:  04/04/17 0201    Specimen:  Blood Updated:  04/04/17 0231     WBC 3.89 (L) 10*3/mm3      RBC 3.85 (L) 10*6/mm3      Hemoglobin 11.5 (L) g/dL      Hematocrit 35.1 (L) %      MCV 91.2 fL      MCH 29.9 pg      MCHC 32.8 (L) g/dL      RDW 16.1 (H) %      RDW-SD 52.0 fl      MPV 10.6 (H) fL      Platelets 63 (L) 10*3/mm3       Neutrophil % 82.8 (H) %      Lymphocyte % 6.9 (L) %      Monocyte % 10.0 %      Eosinophil % 0.0 %      Basophil % 0.0 %      Immature Grans % 0.3 %      Neutrophils, Absolute 3.22 10*3/mm3      Lymphocytes, Absolute 0.27 (L) 10*3/mm3      Monocytes, Absolute 0.39 10*3/mm3      Eosinophils, Absolute 0.00 10*3/mm3      Basophils, Absolute 0.00 10*3/mm3      Immature Grans, Absolute 0.01 10*3/mm3     C-reactive Protein [[id number]]  (Abnormal) Collected:  04/04/17 0200    Specimen:  Blood Updated:  04/04/17 0240     C-Reactive Protein 2.28 (H) mg/dL     Lactate Acid, Reflex [[id number]]  (Abnormal) Collected:  04/04/17 0201    Specimen:  Blood Updated:  04/04/17 0242     Lactate 2.6 (C) mmol/L     Comprehensive Metabolic Panel [[id number]]  (Abnormal) Collected:  04/04/17 0201    Specimen:  Blood Updated:  04/04/17 0245     Glucose 313 (H) mg/dL      BUN 9 mg/dL      Creatinine 0.69 mg/dL      Sodium 139 mmol/L      Potassium 3.6 mmol/L      Chloride 110 mmol/L      CO2 24.3 mmol/L      Calcium 7.8 mg/dL      Total Protein 4.8 (L) g/dL      Albumin 2.80 (L) g/dL      ALT (SGPT) 144 (H) U/L      AST (SGOT) 174 (H) U/L      Alkaline Phosphatase 120 U/L       Note New Reference Ranges        Total Bilirubin 1.3 mg/dL      eGFR Non African Amer 120 mL/min/1.73      Globulin 2.0 gm/dL      A/G Ratio 1.4 (L) g/dL      BUN/Creatinine Ratio 13.0     Anion Gap 4.7 mmol/L     Osmolality, Calculated [[id number]]  (Normal) Collected:  04/04/17 0201    Specimen:  Blood Updated:  04/04/17 0245     Osmolality Calc 288.1 mOsm/kg     Amylase [[id number]]  (Abnormal) Collected:  04/04/17 0201    Specimen:  Blood Updated:  04/04/17 0249     Amylase 17 (L) U/L     BNP [[id number]]  (Abnormal) Collected:  04/04/17 0201    Specimen:  Blood Updated:  04/04/17 0258     .0 (H) pg/mL     Lipase [[id number]]  (Normal) Collected:  04/04/17 0438    Specimen:  Blood Updated:  04/04/17 0529     Lipase 17 U/L     Myoglobin, Serum [[id number]]  (Abnormal)  Collected:  04/04/17 0438    Specimen:  Blood Updated:  04/04/17 0533     Myoglobin 148.0 (H) ng/mL     Troponin [[id number]]  (Normal) Collected:  04/04/17 0438    Specimen:  Blood Updated:  04/04/17 0533     Troponin I <0.006 ng/mL     Narrative:       Ultra Troponin I Reference Range:         <=0.039 ng/mL: Negative    0.04-0.779 ng/mL: Indeterminate Range. Suspicious of MI.  Clinical correlation required.       >=0.78  ng/mL: Consistent with myocardial injury.  Clinical correlation required.    CK [[id number]]  (Abnormal) Collected:  04/04/17 0438    Specimen:  Blood Updated:  04/04/17 0538     Creatine Kinase 590 (C) U/L     CK-MB [[id number]]  (Abnormal) Collected:  04/04/17 0438    Specimen:  Blood Updated:  04/04/17 0538     CKMB 33.84 (C) ng/mL     CK-MB Index [[id number]]  (Abnormal) Collected:  04/04/17 0438    Specimen:  Blood Updated:  04/04/17 0538     CK-MB Index 5.7 (H) %     Lactic Acid, Plasma [[id number]]  (Normal) Collected:  04/04/17 0752    Specimen:  Blood Updated:  04/04/17 0827     Lactate 1.7 mmol/L     Magnesium [[id number]]  (Normal) Collected:  04/04/17 0752    Specimen:  Blood Updated:  04/04/17 0833     Magnesium 2.3 mg/dL           Imaging Results (last 24 hours)     Procedure Component Value Units Date/Time    XR Abdomen 3 View [[id number]] Collected:  04/03/17 1301     Updated:  04/03/17 1303    Narrative:       XR ABDOMEN 3 VW-     CLINICAL INDICATION: Abdominal pain          COMPARISON: None available      FINDINGS:  Chest:  The included view of the chest demonstrates the lungs to be well  aerated. There is no focal alveolar infiltrate or pleural effusion. The  cardiac silhouette is normal. No acute osseous abnormality is seen  within the chest.     Abdomen:  Two views of the abdomen show no free air. I do not see abnormal bowel  distention to suggest complete obstruction. The bony structures are  intact. No abnormal soft tissue masses are seen. No suspicious appearing  calcifications are  identified on today's exam.       Impression:       1. Moderate to large volume stool in the colon  2. The lungs are clear.  3. No evidence of bowel obstruction.  4. No free air.               This report was finalized on 4/3/2017 1:01 PM by Dr. Jersey Maher MD.       XR Chest 1 View [[id number]] Collected:  04/04/17 0745     Updated:  04/04/17 0812    Narrative:       XR CHEST 1 VW-     CLINICAL INDICATION: subclavian line placement; A41.9-Sepsis,  unspecified organism; F19.10-Other psychoactive substance abuse,  uncomplicated; E10.9-Type 1 diabetes mellitus without complications          COMPARISON: 02/08/2017      TECHNIQUE: Single frontal view of the chest.     FINDINGS:     Right-sided central line has been placed. The tip is in the superior  vena cava. There is no pneumothorax.       There is no evidence of an acute osseous abnormality.   There are no suspicious-appearing parenchymal soft tissue nodules.            Impression:       Central line tip in the superior vena cava.         This report was finalized on 4/4/2017 8:09 AM by Dr. Jersey Maher MD.       US Venous Doppler Lower Extremity Bilateral (duplex) [[id number]] Updated:  04/04/17 0921    US Spleen [[id number]] Collected:  04/04/17 0956     Updated:  04/04/17 1004    Narrative:       EXAMINATION: US SPLEEN-      CLINICAL INDICATION: Thrombocytopenia; A41.9-Sepsis, unspecified  organism; F19.10-Other psychoactive substance abuse, uncomplicated;  E10.9-Type 1 diabetes mellitus without complications          COMPARISON: 02/07/2017     FINDINGS: Sonographic imaging of the spleen demonstrates the spleen to  be enlarged at 18.5 cm. It is homogeneous in echotexture. There is no  focal splenic mass.       Impression:       Splenomegaly.      This report was finalized on 4/4/2017 9:57 AM by Dr. Jersey Maher MD.                Physician Progress Notes (last 24 hours) (Notes from 4/3/2017 10:08 AM through 4/4/2017 10:08 AM)      Memo Quarles MD at 4/3/2017   "7:31 PM  Version 1 of 1           I have seen the patient in conjunction with Nurse in er     I have reviewed this case with the nurse practitioner, have interviewed and examined the patient, I concur with her findings in this note constitutes my findings.    History of presenting illness:  Patient was brought emergency room again this morning because he states he was hurting all over.  He states his abdomen was hurting his back was hurting his legs felt heavy he was shaky.  He is uncertain how long this had gone on although he has had some interaction with the people that live in the home he lives in and apparently has been \"kicked out\".  Some of this may have been doing to heavy laundry as it broke the machine he is unsure exactly.  He states his glucoses have been running low.  The intensity of the pain he relates as moderate to high but tends to wax and wane and he really does not know modifying factors.  He denies any associated fever at home.  He really cannot characterize this pain.    Vital Signs  Temp:  [97.1 °F (36.2 °C)-98.9 °F (37.2 °C)] 97.9 °F (36.6 °C)  Heart Rate:  [] 91  Resp:  [14-18] 18  BP: ()/() 120/89  Body mass index is 26.61 kg/(m^2).    No intake or output data in the 24 hours ending 04/03/17 1931  Intake & Output (last 3 days)     None          Physical exam:  Physical Exam   Constitutional: Well-developed, well-nourished.  Overall appears to be comfortable, pleasant, he became somewhat agitated however when discussing events surrounding where he lives.  Head: Normocephalic and atraumatic.  Hearing is intact, mucous membranes are moist.  Speech is normal  Eyes:  Pupils are equal, round, and reactive to light. No scleral icterus.   Cardiovascular: Mildly tachycardic regular rhythm without murmur rub or gallop  Pulmonary/Chest: Bilateral breath sounds no rhonchus rales or wheezing heard  Abdominal: Soft, rounded mildly distended nontender bowel sounds are active no CVA " tenderness  Musculoskeletal: Strength is symmetric, no joint effusions noted.  2+ edema he had an IV in his right foot that is going to be removed  Neurological: Nonfocal, alert.  Reflexes are 2+ in his patellar region strength overall.  Be symmetric  Skin: Skin is warm and dry.   Psychiatric:  Normal mood and affect.     EKG: nonspecific, reviewed      Results Review:  Lab Results   Component Value Date    WBC 1.94 (C) 04/03/2017    HGB 13.2 (L) 04/03/2017    HCT 39.8 (L) 04/03/2017    MCV 89.0 04/03/2017    PLT 61 (L) 04/03/2017     Lab Results   Component Value Date    GLUCOSE 371 (H) 04/03/2017    BUN 12 04/03/2017    CREATININE 0.69 04/03/2017    EGFRIFNONA 120 04/03/2017    EGFRIFAFRI  09/26/2016      Comment:      <15 Indicative of kidney failure.    BCR 17.4 04/03/2017    CO2 27.0 04/03/2017    CALCIUM 9.0 04/03/2017    ALBUMIN 3.40 (L) 04/03/2017    LABIL2 1.5 04/03/2017     (H) 04/03/2017     (H) 04/03/2017       Imaging Results (last 72 hours)     Procedure Component Value Units Date/Time    XR Abdomen 3 View [[id number]] Collected:  04/03/17 1301     Updated:  04/03/17 1303    Narrative:       XR ABDOMEN 3 VW-     CLINICAL INDICATION: Abdominal pain          COMPARISON: None available      FINDINGS:  Chest:  The included view of the chest demonstrates the lungs to be well  aerated. There is no focal alveolar infiltrate or pleural effusion. The  cardiac silhouette is normal. No acute osseous abnormality is seen  within the chest.     Abdomen:  Two views of the abdomen show no free air. I do not see abnormal bowel  distention to suggest complete obstruction. The bony structures are  intact. No abnormal soft tissue masses are seen. No suspicious appearing  calcifications are identified on today's exam.       Impression:       1. Moderate to large volume stool in the colon  2. The lungs are clear.  3. No evidence of bowel obstruction.  4. No free air.               This report was finalized on  4/3/2017 1:01 PM by Dr. Jersey Maher MD.              Acute abdominal series images reviewed.      Assessment/Plan     Active Problems:    Septic shock by criteria including tachycardia, low white count and elevated lactic acid.  Patient did have the 30 cc/kg bolus and has been started on Zosyn and vancomycin.  Patient however does not appear overall to have an infectious process this may be related to his severe constipation combined with dehydration.  There was a question of pancreatitis.  His amylase lipase were normal last night.  I'm at this time however continuing Zosyn and vancomycin, will follow cultures.  Patient was monitored on telemetry, I've asked infectious disease to evaluate as well.    Diabetes, he relates that he is really not had to take much insulin has been getting some hypoglycemic episodes.  We'll follow his glucoses but of hypoglycemia confirmed may consider cosyntropin test.  No evidence of DKA, I'm starting low-dose basal insulin    Illicit drug use, he admitted to the Suboxone, I was unaware of the amphetamine at the time of my interview however he has a history of methamphetamine use.  I will discuss with him further.    Abdominal discomfort, I feel like this is secondary to constipation, he will be given 2 tap water enemas, will follow.  He has transaminitis probably related to his hepatitis.  This is essentially stable.    DVT prophylaxis, patient is thrombocytopenic therefore will use SCDs only    Edema, check venous Dopplers, EF normal in September, will repeat echo and check BNP, most likely this is due to his cirrhosis    Presumed possible cirrhosis, he had active hepatitis recently, I will check a pro time in the a.m.    Pancytopenia again probably related to cirrhosis        Memo Quarles MD  04/03/17  7:31 PM       Electronically signed by Memo Quarles MD at 4/3/2017  7:47 PM        Consult Notes (last 24 hours) (Notes from 4/3/2017 10:08 AM through 4/4/2017 10:08 AM)     No  notes of this type exist for this encounter.

## 2017-04-04 NOTE — PROGRESS NOTES
I have seen the patient in conjunction with Jeannie CULLEN      History of presenting illness:  Patient states he has had success with his enemas.  He is having some pain on urination, occasional abdominal pain however is having no chest pain or shortness of breath, no nausea no vomiting and he has tolerated his diet here.  He's had no hypoglycemia.  No chills or fever overnight.  He still states he has some chronic low back pain and leg pain.  He adamantly denies the methamphetamine use.  He has amphetamine screen was positive in his urine.    Vital Signs  Temp:  [97.7 °F (36.5 °C)-98.9 °F (37.2 °C)] 98 °F (36.7 °C)  Heart Rate:  [] 76  Resp:  [14-20] 18  BP: ()/(56-89) 108/74  Body mass index is 28.63 kg/(m^2).      Intake/Output Summary (Last 24 hours) at 04/04/17 1506  Last data filed at 04/04/17 1300   Gross per 24 hour   Intake             1320 ml   Output             1100 ml   Net              220 ml     Intake & Output (last 3 days)       04/01 0701 - 04/02 0700 04/02 0701 - 04/03 0700 04/03 0701 - 04/04 0700 04/04 0701 - 04/05 0700    P.O.   600 720    Total Intake(mL/kg)   600 (7) 720 (8.4)    Urine (mL/kg/hr)   300 800 (1.2)    Total Output     300 800    Net     +300 -80                  Physical exam:  Physical Exam   Constitutional: Well-developed, well-nourished.  Pleasant.  No distress overall appears to feel better today  Head: Normocephalic and atraumatic.  Hearing is intact, mucous membranes are moist.  Speech is normal  Eyes:  Pupils are equal, round, and reactive to light. No scleral icterus. .   Cardiovascular: Normal rate, regular rhythm and normal heart sounds.  No murmur rub or gallop  Pulmonary/Chest: Bilateral breath sounds no rhonchus rales or wheezing heard  Abdominal: Soft, rounded, nondistended, minimal diffuse tenderness to deep palpation without peritoneal signs.  Musculoskeletal: Strength is symmetric, 1+ edema but overall improved over yesterday.  Neurological: Nonfocal,  alert.    Skin: Skin is warm and dry.   Psychiatric:  Normal mood and affect.       Telemetry: sinus, reviewed    Results Review:  Lab Results   Component Value Date    WBC 3.89 (L) 04/04/2017    HGB 11.5 (L) 04/04/2017    HCT 35.1 (L) 04/04/2017    MCV 91.2 04/04/2017    PLT 63 (L) 04/04/2017     Lab Results   Component Value Date    GLUCOSE 313 (H) 04/04/2017    BUN 9 04/04/2017    CREATININE 0.69 04/04/2017    EGFRIFNONA 120 04/04/2017    EGFRIFAFRI  09/26/2016      Comment:      <15 Indicative of kidney failure.    BCR 13.0 04/04/2017    CO2 24.3 04/04/2017    CALCIUM 7.8 04/04/2017    ALBUMIN 2.80 (L) 04/04/2017    LABIL2 1.4 (L) 04/04/2017     (H) 04/04/2017     (H) 04/04/2017       Imaging Results (last 72 hours)     Procedure Component Value Units Date/Time    XR Abdomen 3 View [[id number]] Collected:  04/03/17 1301     Updated:  04/03/17 1303    Narrative:       XR ABDOMEN 3 VW-     CLINICAL INDICATION: Abdominal pain          COMPARISON: None available      FINDINGS:  Chest:  The included view of the chest demonstrates the lungs to be well  aerated. There is no focal alveolar infiltrate or pleural effusion. The  cardiac silhouette is normal. No acute osseous abnormality is seen  within the chest.     Abdomen:  Two views of the abdomen show no free air. I do not see abnormal bowel  distention to suggest complete obstruction. The bony structures are  intact. No abnormal soft tissue masses are seen. No suspicious appearing  calcifications are identified on today's exam.       Impression:       1. Moderate to large volume stool in the colon  2. The lungs are clear.  3. No evidence of bowel obstruction.  4. No free air.               This report was finalized on 4/3/2017 1:01 PM by Dr. Jersey Maher MD.       XR Chest 1 View [[id number]] Collected:  04/04/17 0745     Updated:  04/04/17 0812    Narrative:       XR CHEST 1 VW-     CLINICAL INDICATION: subclavian line placement;  A41.9-Sepsis,  unspecified organism; F19.10-Other psychoactive substance abuse,  uncomplicated; E10.9-Type 1 diabetes mellitus without complications          COMPARISON: 02/08/2017      TECHNIQUE: Single frontal view of the chest.     FINDINGS:     Right-sided central line has been placed. The tip is in the superior  vena cava. There is no pneumothorax.       There is no evidence of an acute osseous abnormality.   There are no suspicious-appearing parenchymal soft tissue nodules.            Impression:       Central line tip in the superior vena cava.         This report was finalized on 4/4/2017 8:09 AM by Dr. Jersey Maher MD.       US Spleen [[id number]] Collected:  04/04/17 0956     Updated:  04/04/17 1004    Narrative:       EXAMINATION: US SPLEEN-      CLINICAL INDICATION: Thrombocytopenia; A41.9-Sepsis, unspecified  organism; F19.10-Other psychoactive substance abuse, uncomplicated;  E10.9-Type 1 diabetes mellitus without complications          COMPARISON: 02/07/2017     FINDINGS: Sonographic imaging of the spleen demonstrates the spleen to  be enlarged at 18.5 cm. It is homogeneous in echotexture. There is no  focal splenic mass.       Impression:       Splenomegaly.      This report was finalized on 4/4/2017 9:57 AM by Dr. Jersey Maher MD.       US Venous Doppler Lower Extremity Bilateral (duplex) [[id number]] Collected:  04/04/17 1016     Updated:  04/04/17 1018    Narrative:       EXAMINATION: US VENOUS DOPPLER LOWER EXTREMITY BILATERAL (DUPLEX)-      CLINICAL INDICATION:     Leg Pain and Swelling  leg edema; A41.9-Sepsis, unspecified organism; F19.10-Other psychoactive  substance abuse, uncomplicated; E10.9-Type 1 diabetes mellitus without  complications      TECHNIQUE: Multiplanar gray scale and Doppler vascular sonographic  imaging of the deep veins  without and with compression.      COMPARISON: NONE      FINDINGS: The visualized deep veins demonstrate flow and are  compressible. No evidence of deep  venous thrombosis.             Impression:       No DVT.     This report was finalized on 4/4/2017 10:16 AM by Dr. Brendan Tolentino MD.            cxr image reviewed, deep line looks good        Assessment/Plan     Active Problems:    Septic shock, ruled out overall cultures are negative.  Antibiotics have been stopped, lactic acid normalized.  His constipation has improved.  I'm continuing IV fluids were decreased these.    Diabetes, A1c will be rechecked in the a.m., glucoses are overall under acceptable control, will follow on current insulin.    Cirrhosis most likely, INR is up, spleen ultrasound is 18.5 cm.  This is consistent with portal hypertension.  Patient has a history of hepatitis.  I've explained this to the patient this is very serious condition.  I am giving a dose of vitamin K to see if INR corrects.  Hepatitis B and C have been positive in the past.    Illicit drug use, patient adamantly denies any methamphetamine use.  He does admit to the Suboxone use.    Pain, he is not a good NSAID or Tylenol candidate because of his cirrhosis and a low counts, I'm going to give a very low-dose of Dilaudid when necessary, he is allergic to Ultram.    Constipation, improved, I have added scheduled lactulose.    Edema, slightly improved, venous Dopplers negative.  This likely is secondary to the cirrhosis as well.     is working on his disposition, patient is wanting potentially nursing home placement.          Memo Quarles MD  04/04/17  3:06 PM

## 2017-04-04 NOTE — DISCHARGE PLACEMENT REQUEST
"Isaias Avendano (52 y.o. Male)     Date of Birth Social Security Number Address Home Phone MRN    1965  [address] [facility] [phone] [id number]    Congregation Marital Status          Synagogue        Admission Date Admission Type Admitting Provider Attending Provider Department, Room/Bed    4/3/17 Emergency Memo Quarles MD Heinss, Karl F, MD [address], 3308/1S    Discharge Date Discharge Disposition Discharge Destination                      Attending Provider: Memo Quarles MD     Allergies:  Robitussin Dm [Dextromethorphan-guaifenesin], Tizanidine, Metformin, Sulfa Antibiotics, Contrast Dye, Tramadol    Isolation:  None   Infection:  None   Code Status:  FULL    Ht:  68\" (172.7 cm)   Wt:  188 lb 5 oz (85.4 kg)    Admission Cmt:  None   Principal Problem:  None                Active Insurance as of 4/3/2017     Primary Coverage     Payor Plan Insurance Group Employer/Plan Group    [facility] [employer] [facility] [employer] [place]      Payor Plan Address Payor Plan Phone Number Effective From Effective To    [address] [address]  2/1/2016     [facility] [place] [address]       Subscriber Name Subscriber Birth Date Member ID       ISAIAS AVENDANO 1965 [id number]                 Emergency Contacts      (Rel.) Home Phone Work Phone Mobile Phone    Dr Robina (Other) [phone] -- --            Emergency Contact Information     Name Relation Home Work Mobile    Dr Robina Other [phone]            Insurance Information                [facility] [employer] [place]/[facility] [employer] [place] Phone:     Subscriber: Isaias Avendano Subscriber#: [id number]    Group#:  Precert#:           Treatment Team     Provider Relationship Specialty Contact    Memo Quarles MD Attending, Consulting Physician Hospitalist  [phone]    NOHEMI Aguilar Nurse Practitioner Nurse Practitioner  [phone]    Jeannie Burr, RN Registered Nurse --  [phone]    " Winifred Elam Patient Care Technician --  [phone]    Opal Morales Rashid, RRT Respiratory Therapist --  [phone]    Nakia Cabrera, RRT Respiratory Therapist --  [phone]    Tamara Bobby MD Consulting Physician Infectious Diseases  [phone]          Problem List           Codes Noted - Resolved       Hospital    Septic shock ICD-10-CM: A41.9, R65.21  ICD-9-CM: 038.9, 785.52, 995.92 4/3/2017 - Present       Non-Hospital    Cellulitis of right hand excluding fingers and thumb ICD-10-CM: L03.113  ICD-9-CM: 682.4 2/5/2017 - Present    Cellulitis of right hand ICD-10-CM: L03.113  ICD-9-CM: 682.4 2/4/2017 - Present    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 ICD-10-CM: G89.21  ICD-9-CM: 338.21 11/9/2016 - Present    Type 1 diabetes mellitus ICD-10-CM: E10.9  ICD-9-CM: 250.01 11/8/2016 - Present    MDD (major depressive disorder), recurrent episode, moderate ICD-10-CM: F33.1  ICD-9-CM: 296.32 11/7/2016 - Present          History & Physical     No notes of this type exist for this encounter.        Vital Signs (last 24 hours)       04/03 0700  -  04/04 0659 04/04 0700  -  04/04 1330   Most Recent    Temp (°F) 97.9 -  98.9    97.7 -  98     98 (36.7)    Heart Rate 65 -  (!)123    76 -  79     76    Resp 14 -  20    16 -  18     18    BP 98/56 -  120/89    101/70 -  108/74     108/74    SpO2 (%) 94 -  100      98     98          Lines, Drains & Airways    Active LDAs     Name:   Placement date:   Placement time:   Site:   Days:    Percutaneous Central Line - Triple Lumen 04/03/17 2040 subclavian vein, right 7 Fr;20 gauge  04/03/17    2040      less than 1    Peripheral IV Line - Single Lumen 04/03/17 1200 other (see comments) 20 gauge  04/03/17    1200      1    Peripheral IV Line - Single Lumen 04/03/17 1500  22 gauge  04/03/17    1500      less than 1                Prior to Admission Medications     Prescriptions Last Dose Informant Patient Reported? Taking?    albuterol (PROVENTIL HFA;VENTOLIN HFA) 108 (90 BASE)  MCG/ACT inhaler 4/2/2017 Self Yes Yes    Inhale 2 puffs Every 6 (Six) Hours As Needed for Wheezing.    aspirin 81 MG chewable tablet 4/2/2017 Self Yes Yes    Chew 81 mg Daily.    atorvastatin (LIPITOR) 20 MG tablet 4/2/2017 Self Yes Yes    Take 20 mg by mouth Daily.    citalopram (CeleXA) 20 MG tablet 4/2/2017 Self Yes Yes    Take 20 mg by mouth Daily.    dicyclomine (BENTYL) 10 MG capsule 4/2/2017 Self Yes Yes    Take 10 mg by mouth 2 (Two) Times a Day.    furosemide (LASIX) 20 MG tablet 4/2/2017 Self Yes Yes    Take 20 mg by mouth Daily.    gabapentin (NEURONTIN) 800 MG tablet 4/2/2017 Self Yes Yes    Take 800 mg by mouth 4 (Four) Times a Day.    insulin detemir (LEVEMIR) 100 UNIT/ML injection 04/03/2017  Yes Yes    Inject 20 Units under the skin Daily.    insulin detemir (LEVEMIR) 100 UNIT/ML injection 04/02/2017  Yes Yes    Inject 35 Units under the skin Every Night.    insulin lispro (humaLOG) 100 UNIT/ML injection 4/2/2017 Self Yes Yes    Inject 0-5 Units under the skin Every Night. 150-200: 2 units; 200-250: 5 units    insulin lispro (humaLOG) 100 UNIT/ML injection  Self Yes Yes    Inject 20 Units under the skin 3 (Three) Times a Day Before Meals.    ipratropium-albuterol (COMBIVENT RESPIMAT)  MCG/ACT inhaler 4/2/2017 Self Yes Yes    Inhale 1 puff 4 (Four) Times a Day.    ipratropium-albuterol (DUO-NEB) 0.5-2.5 mg/mL nebulizer 4/3/2017 Self Yes Yes    Take 3 mL by nebulization 4 (Four) Times a Day.    Magnesium 250 MG tablet 4/2/2017 Self Yes Yes    Take 250 mg by mouth 2 (Two) Times a Day.    metoclopramide (REGLAN) 10 MG tablet 4/2/2017 Self Yes Yes    Take 10 mg by mouth 2 (Two) Times a Day.    metoprolol tartrate (LOPRESSOR) 50 MG tablet 4/2/2017 Self Yes Yes    Take 50 mg by mouth Daily.    naproxen (NAPROSYN) 250 MG tablet 4/2/2017 Self Yes Yes    Take 250 mg by mouth 2 (Two) Times a Day.    pancrelipase, Lip-Prot-Amyl, (CREON) 86413 UNITS capsule delayed-release particles capsule 4/2/2017 Self No  Yes    Take 2 capsules by mouth 4 (Four) Times a Day With Meals & at Bedtime.    pantoprazole (PROTONIX) 40 MG EC tablet 4/2/2017 Self No Yes    Take 1 tablet by mouth Daily.    Patient taking differently:  Take 40 mg by mouth 2 (Two) Times a Day.    polyethylene glycol (MIRALAX) powder 4/2/2017 Self No Yes    Take 17 g by mouth Daily.    Patient taking differently:  Take 17 g by mouth 2 (Two) Times a Day.    potassium chloride (K-DUR) 10 MEQ CR tablet 4/2/2017 Self Yes Yes    Take 20 mEq by mouth 2 (Two) Times a Day.    traZODone (DESYREL) 100 MG tablet Past Week Self Yes Yes    Take 100 mg by mouth At Night As Needed for Sleep.    EASY TOUCH LANCETS 28G/TWIST misc   No No    use as directed    nitroglycerin (NITROSTAT) 0.4 MG SL tablet Unknown Self Yes No    Place 0.4 mg under the tongue Every 5 (Five) Minutes As Needed for Chest Pain. Take no more than 3 doses in 15 minutes.          Hospital Medications (active)       Dose Frequency Start End    albuterol (PROVENTIL) nebulizer solution 0.083% 2.5 mg/3mL 2.5 mg Once 4/3/2017 4/3/2017    Sig - Route: Take 2.5 mg by nebulization 1 (One) Time. - Nebulization    albuterol (PROVENTIL) nebulizer solution 0.083% 2.5 mg/3mL 2.5 mg Every 6 Hours PRN 4/4/2017     Sig - Route: Take 2.5 mg by nebulization Every 6 (Six) Hours As Needed for Shortness of Air. - Nebulization    dextrose (D50W) solution 25 g 25 g Every 15 Minutes PRN 4/3/2017     Sig - Route: Infuse 50 mL into a venous catheter Every 15 (Fifteen) Minutes As Needed for Low Blood Sugar (Blood Sugar Less Than 70, Patient Has IV Access - Unresponsive, NPO or Unable To Safely Swallow). - Intravenous    dextrose (GLUTOSE) oral gel 15 g 15 g Every 15 Minutes PRN 4/3/2017     Sig - Route: Take 15 g by mouth Every 15 (Fifteen) Minutes As Needed for Low Blood Sugar (Blood Sugar Less Than 70, Patient Alert, Is Not NPO & Can Safely Swallow). - Oral    glucagon (GLUCAGEN) injection 1 mg 1 mg Every 15 Minutes PRN 4/3/2017      "Sig - Route: Inject 1 mg under the skin Every 15 (Fifteen) Minutes As Needed (Blood Glucose Less Than 70 - Patient Without IV Access - Unresponsive, NPO or Unable To Safely Swallow). - Subcutaneous    HYDROmorphone (DILAUDID) injection 1 mg 1 mg Once 4/3/2017 4/3/2017    Sig - Route: Infuse 1 mg into a venous catheter 1 (One) Time. - Intravenous    HYDROmorphone (DILAUDID) injection 1 mg 1 mg Once 4/3/2017 4/3/2017    Sig - Route: Infuse 1 mg into a venous catheter 1 (One) Time. - Intravenous    insulin aspart (novoLOG) injection 0-14 Units 0-14 Units 4 Times Daily Before Meals & Nightly 4/3/2017     Sig - Route: Inject 0-14 Units under the skin 4 (Four) Times a Day Before Meals & at Bedtime. - Subcutaneous    insulin aspart (novoLOG) injection 5 Units 5 Units 3 Times Daily With Meals 4/4/2017     Sig - Route: Inject 5 Units under the skin 3 (Three) Times a Day With Meals. - Subcutaneous    insulin detemir (LEVEMIR) injection 10 Units 10 Units Every Morning 4/4/2017     Sig - Route: Inject 10 Units under the skin Every Morning. - Subcutaneous    Magnesium Sulfate 2 gram Bolus, followed by 8 gram infusion (total Mg dose 10 grams)- Mg less than or equal to 1mg/dL 2 g As Needed 4/3/2017     Sig - Route: Infuse 50 mL into a venous catheter As Needed (Mg less than or equal to 1mg/dL). - Intravenous    Linked Group 1:  \"Or\" Linked Group Details        magnesium sulfate 2g/50 mL (PREMIX) infusion 2 g Every 2 Hours 4/4/2017 4/4/2017    Sig - Route: Infuse 50 mL into a venous catheter Every 2 (Two) Hours. - Intravenous    magnesium sulfate 4 gram infusion- Mg 1.6-1.9 mg/dL 4 g As Needed 4/3/2017     Sig - Route: Infuse 100 mL into a venous catheter As Needed (Mg 1.6-1.9 mg/dL). - Intravenous    Linked Group 1:  \"Or\" Linked Group Details        Magnesium Sulfate 6 gram Infusion (2 gm x 3) -Mg 1.1 -1.5 mg/dL 2 g As Needed 4/3/2017     Sig - Route: Infuse 50 mL into a venous catheter As Needed (Mg 1.1 -1.5 mg/dL). - Intravenous " "   Linked Group 1:  \"Or\" Linked Group Details        mupirocin (BACTROBAN) 2 % ointment  Every 12 Hours Scheduled 4/4/2017     Sig - Route: Apply  topically Every 12 (Twelve) Hours. - Topical    Cosign for Ordering: Required by Memo Quarles MD    nitroglycerin (NITROSTAT) SL tablet 0.4 mg 0.4 mg Every 5 Minutes PRN 4/3/2017     Sig - Route: Place 1 tablet under the tongue Every 5 (Five) Minutes As Needed for Chest Pain (if systolic BP greater than 100 mm/Hg.). - Sublingual    Pharmacy to dose vancomycin  Continuous PRN 4/3/2017     Sig - Route: Continuous As Needed for Consult. - Does not apply    Pharmacy to Dose Zosyn  Continuous PRN 4/3/2017     Sig - Route: Continuous As Needed for Consult. - Does not apply    piperacillin-tazobactam (ZOSYN) 3.375 g/100 mL 0.9% NS IVPB (mbp) 3.375 g Once 4/3/2017 4/3/2017    Sig - Route: Infuse 100 mL into a venous catheter 1 (One) Time. - Intravenous    potassium chloride (KLOR-CON) packet 40 mEq 40 mEq As Needed 4/3/2017     Sig - Route: Take 40 mEq by mouth As Needed (potassium replacement, see admin instructions). - Oral    Linked Group 2:  \"Or\" Linked Group Details        potassium chloride (MICRO-K) CR capsule 40 mEq 40 mEq As Needed 4/3/2017     Sig - Route: Take 4 capsules by mouth As Needed (potassium replacement.  see admin instructions). - Oral    Linked Group 2:  \"Or\" Linked Group Details        potassium chloride 10 mEq in 100 mL IVPB 10 mEq Every 1 Hour PRN 4/3/2017     Sig - Route: Infuse 100 mL into a venous catheter Every 1 (One) Hour As Needed (potassium protocol PERIPHERAL - see admin instructions). - Intravenous    Linked Group 2:  \"Or\" Linked Group Details        potassium chloride 10 mEq in 100 mL IVPB 10 mEq Every 1 Hour 4/4/2017 4/4/2017    Sig - Route: Infuse 100 mL into a venous catheter Every 1 (One) Hour. - Intravenous    sodium chloride 0.9 % bolus 2,382 mL 30 mL/kg × 79.4 kg Once 4/3/2017 4/3/2017    Sig - Route: Infuse 2,382 mL into a venous " catheter 1 (One) Time. - Intravenous    sodium chloride 0.9 % flush 1-10 mL 1-10 mL As Needed 4/3/2017     Sig - Route: Infuse 1-10 mL into a venous catheter As Needed for Line Care. - Intravenous    sodium chloride 0.9 % infusion 125 mL/hr Continuous 4/3/2017     Sig - Route: Infuse 125 mL/hr into a venous catheter Continuous. - Intravenous    vancomycin (VANCOCIN) 1,500 mg in sodium chloride 0.9 % 500 mL IVPB 20 mg/kg × 79.4 kg Once 4/3/2017 4/3/2017    Sig - Route: Infuse 1,500 mg into a venous catheter 1 (One) Time. - Intravenous    glucagon (human recombinant) (GLUCAGEN DIAGNOSTIC) injection 1 mg (Discontinued) 1 mg Every 15 Minutes PRN 4/3/2017 4/3/2017    Sig - Route: Inject 1 mg under the skin Every 15 (Fifteen) Minutes As Needed (Blood Glucose Less Than 70 - Patient Without IV Access - Unresponsive, NPO or Unable To Safely Swallow). - Subcutaneous    Reason for Discontinue: Reorder    insulin detemir (LEVEMIR) injection 5 Units (Discontinued) 5 Units Nightly 4/4/2017 4/4/2017    Sig - Route: Inject 5 Units under the skin Every Night. - Subcutaneous    piperacillin-tazobactam (ZOSYN) 3.375 g/100 mL 0.9% NS IVPB (mbp) (Discontinued) 3.375 g Every 6 Hours 4/4/2017 4/4/2017    Sig - Route: Infuse 100 mL into a venous catheter Every 6 (Six) Hours. - Intravenous    sodium chloride 0.9 % infusion (Discontinued) 125 mL/hr Continuous 4/3/2017 4/3/2017    Sig - Route: Infuse 125 mL/hr into a venous catheter Continuous. - Intravenous    vancomycin (VANCOCIN) 1,500 mg in sodium chloride 0.9 % 500 mL IVPB (Discontinued) 1,500 mg Every 8 Hours 4/4/2017 4/4/2017    Sig - Route: Infuse 1,500 mg into a venous catheter Every 8 (Eight) Hours. - Intravenous            Lab Results (last 24 hours)     Procedure Component Value Units Date/Time    Urinalysis With / Culture If Indicated [[id number]]  (Abnormal) Collected:  04/03/17 1408    Specimen:  Urine from Urine, Clean Catch Updated:  04/03/17 1414     Color, UA Dark Yellow (A)      Appearance, UA Clear     pH, UA 5.5     Specific Gravity, UA >1.030 (H)     Glucose, UA >=1000 mg/dL (3+) (A)     Ketones, UA Trace (A)     Bilirubin, UA Negative     Blood, UA Negative     Protein, UA Negative     Leuk Esterase, UA Negative     Nitrite, UA Negative     Urobilinogen, UA 4.0 E.U./dL (A)    Narrative:       Urine microscopic not indicated.    Oxycodone, Urine [[id number]]  (Normal) Collected:  04/03/17 1408    Specimen:  Urine from Urine, Clean Catch Updated:  04/03/17 1416     Oxycodone Screen, Urine Negative    Narrative:       Oxycodone Screen Detects:    Oxycodone          100 ng/mL    Hydrocodone       1562 ng/mL    Codeine          62387 ng/mL    Dihydrocodeine   24050 ng/mL    Ethylmorphine    28978 ng/mL    Hydromorphone    64694 ng/mL    Oxymorphone      1562  ng/mL    Thebaine         78263 ng/mL    Buprenorphine Screen Urine [[id number]]  (Abnormal) Collected:  04/03/17 1408    Specimen:  Urine from Urine, Clean Catch Updated:  04/03/17 1417     Buprenorphine, Urine Positive (A)    Narrative:       Buprenorphine Screen Detects:            Buprenorphine 3 B D Glucaronide             2.5 ng/mL            Buprenorphine                               10 ng/mL            Nalorphine                                  1000 ng/mL            Norbuprenorphine                            31926 ng/ml            Norbuprenorphine  3 B D Glucaronide         84321 ng/mL    Urine Drug Screen [[id number]]  (Abnormal) Collected:  04/03/17 1408    Specimen:  Urine from Urine, Clean Catch Updated:  04/03/17 1437     Amphetamine Screen, Urine Positive (A)     Barbiturates Screen, Urine Negative     Benzodiazepine Screen, Urine Negative     Cocaine Screen, Urine Negative     Methadone Screen, Urine Negative     Opiate Screen Positive (A)     Phencyclidine (PCP), Urine Negative     THC, Screen, Urine Negative     Propoxyphene Screen Negative    Narrative:       Negative Thresholds For Drugs Screened:                   Amphetamines              1000 ng/ml               Barbiturates               200 ng/ml               Benzodiazepines            200 ng/ml              Cocaine                    300 ng/ml              Methadone                  300 ng/ml              Opiates                    300 ng/ml               Phencyclidine               25 ng/ml               Propoxyphene               300 ng/ml              THC                         50 ng/ml    The reference range for all drugs tested is negative. This report includes final unconfirmed qualitative results to be used for medical treatment purposes only. Unconfirmed results must not be used for non-medical purposes such as employment or legal testing. Clinical consideration should be applied to any drug of abuse test, especially when unconfirmed quantitative results are used.      POC Glucose Fingerstick [[id number]]  (Abnormal) Collected:  04/03/17 1901    Specimen:  Blood Updated:  04/03/17 1910     Glucose 195 (H) mg/dL     Narrative:       Meter: [id number] : [id number] Daniel Hairston    Lactic Acid, Plasma [[id number]]  (Abnormal) Collected:  04/03/17 2133    Specimen:  Blood Updated:  04/03/17 2207     Lactate 2.5 (C) mmol/L     POC Glucose Fingerstick [[id number]]  (Abnormal) Collected:  04/03/17 2209    Specimen:  Blood Updated:  04/03/17 2212     Glucose 258 (H) mg/dL     Narrative:       Meter: [id number] : [id number] damien francisco    Magnesium [[id number]]  (Abnormal) Collected:  04/03/17 1236    Specimen:  Blood from Arm, Left Updated:  04/03/17 2232     Magnesium 1.3 (C) mg/dL     Troponin [[id number]]  (Normal) Collected:  04/03/17 2225    Specimen:  Blood Updated:  04/03/17 2303     Troponin I <0.006 ng/mL     Narrative:       Ultra Troponin I Reference Range:         <=0.039 ng/mL: Negative    0.04-0.779 ng/mL: Indeterminate Range. Suspicious of MI.  Clinical correlation required.       >=0.78  ng/mL: Consistent with myocardial injury.  Clinical correlation  required.    CK [[id number]]  (Abnormal) Collected:  04/03/17 2225    Specimen:  Blood Updated:  04/03/17 2310     Creatine Kinase 992 (C) U/L     CK-MB [[id number]]  (Abnormal) Collected:  04/03/17 2225    Specimen:  Blood Updated:  04/03/17 2310     CKMB 46.88 (C) ng/mL     Myoglobin, Serum [[id number]]  (Abnormal) Collected:  04/03/17 2225    Specimen:  Blood Updated:  04/03/17 2310     Myoglobin 268.0 (C) ng/mL     CK-MB Index [[id number]]  (Abnormal) Collected:  04/03/17 2225    Specimen:  Blood Updated:  04/03/17 2310     CK-MB Index 4.7 (H) %     POC Glucose Fingerstick [[id number]]  (Abnormal) Collected:  04/04/17 0056    Specimen:  Blood Updated:  04/04/17 0103     Glucose 251 (H) mg/dL     Narrative:       Meter: [id number] : [id number] damien maryam    Protime-INR [[id number]]  (Abnormal) Collected:  04/04/17 0200    Specimen:  Blood Updated:  04/04/17 0229     Protime 16.9 (H) Seconds      INR 1.48 (H)    Narrative:       Patients not on anticoagulant therapy:    INR 0.90-1.10     Suggested INR therapeutic range for stable oral anticoagulant therapy:             Routine therapy                      2.00-3.00           Recurrent MI                         2.50-3.50           Mechanical prosthetic valve          2.50-3.50    CBC & Differential [[id number]] Collected:  04/04/17 0201    Specimen:  Blood Updated:  04/04/17 0231    Narrative:       The following orders were created for panel order CBC & Differential.  Procedure                               Abnormality         Status                     ---------                               -----------         ------                     CBC Auto Differential[[id number]]         Abnormal            Final result                 Please view results for these tests on the individual orders.    CBC Auto Differential [[id number]]  (Abnormal) Collected:  04/04/17 0201    Specimen:  Blood Updated:  04/04/17 0231     WBC 3.89 (L) 10*3/mm3      RBC 3.85 (L) 10*6/mm3       Hemoglobin 11.5 (L) g/dL      Hematocrit 35.1 (L) %      MCV 91.2 fL      MCH 29.9 pg      MCHC 32.8 (L) g/dL      RDW 16.1 (H) %      RDW-SD 52.0 fl      MPV 10.6 (H) fL      Platelets 63 (L) 10*3/mm3      Neutrophil % 82.8 (H) %      Lymphocyte % 6.9 (L) %      Monocyte % 10.0 %      Eosinophil % 0.0 %      Basophil % 0.0 %      Immature Grans % 0.3 %      Neutrophils, Absolute 3.22 10*3/mm3      Lymphocytes, Absolute 0.27 (L) 10*3/mm3      Monocytes, Absolute 0.39 10*3/mm3      Eosinophils, Absolute 0.00 10*3/mm3      Basophils, Absolute 0.00 10*3/mm3      Immature Grans, Absolute 0.01 10*3/mm3     C-reactive Protein [[id number]]  (Abnormal) Collected:  04/04/17 0200    Specimen:  Blood Updated:  04/04/17 0240     C-Reactive Protein 2.28 (H) mg/dL     Lactate Acid, Reflex [[id number]]  (Abnormal) Collected:  04/04/17 0201    Specimen:  Blood Updated:  04/04/17 0242     Lactate 2.6 (C) mmol/L     Comprehensive Metabolic Panel [[id number]]  (Abnormal) Collected:  04/04/17 0201    Specimen:  Blood Updated:  04/04/17 0245     Glucose 313 (H) mg/dL      BUN 9 mg/dL      Creatinine 0.69 mg/dL      Sodium 139 mmol/L      Potassium 3.6 mmol/L      Chloride 110 mmol/L      CO2 24.3 mmol/L      Calcium 7.8 mg/dL      Total Protein 4.8 (L) g/dL      Albumin 2.80 (L) g/dL      ALT (SGPT) 144 (H) U/L      AST (SGOT) 174 (H) U/L      Alkaline Phosphatase 120 U/L       Note New Reference Ranges        Total Bilirubin 1.3 mg/dL      eGFR Non African Amer 120 mL/min/1.73      Globulin 2.0 gm/dL      A/G Ratio 1.4 (L) g/dL      BUN/Creatinine Ratio 13.0     Anion Gap 4.7 mmol/L     Osmolality, Calculated [[id number]]  (Normal) Collected:  04/04/17 0201    Specimen:  Blood Updated:  04/04/17 0245     Osmolality Calc 288.1 mOsm/kg     Amylase [[id number]]  (Abnormal) Collected:  04/04/17 0201    Specimen:  Blood Updated:  04/04/17 0249     Amylase 17 (L) U/L     BNP [[id number]]  (Abnormal) Collected:  04/04/17 0201    Specimen:  Blood Updated:   04/04/17 0258     .0 (H) pg/mL     Lipase [[id number]]  (Normal) Collected:  04/04/17 0438    Specimen:  Blood Updated:  04/04/17 0529     Lipase 17 U/L     Myoglobin, Serum [[id number]]  (Abnormal) Collected:  04/04/17 0438    Specimen:  Blood Updated:  04/04/17 0533     Myoglobin 148.0 (H) ng/mL     Troponin [[id number]]  (Normal) Collected:  04/04/17 0438    Specimen:  Blood Updated:  04/04/17 0533     Troponin I <0.006 ng/mL     Narrative:       Ultra Troponin I Reference Range:         <=0.039 ng/mL: Negative    0.04-0.779 ng/mL: Indeterminate Range. Suspicious of MI.  Clinical correlation required.       >=0.78  ng/mL: Consistent with myocardial injury.  Clinical correlation required.    CK [[id number]]  (Abnormal) Collected:  04/04/17 0438    Specimen:  Blood Updated:  04/04/17 0538     Creatine Kinase 590 (C) U/L     CK-MB [[id number]]  (Abnormal) Collected:  04/04/17 0438    Specimen:  Blood Updated:  04/04/17 0538     CKMB 33.84 (C) ng/mL     CK-MB Index [[id number]]  (Abnormal) Collected:  04/04/17 0438    Specimen:  Blood Updated:  04/04/17 0538     CK-MB Index 5.7 (H) %     Lactic Acid, Plasma [[id number]]  (Normal) Collected:  04/04/17 0752    Specimen:  Blood Updated:  04/04/17 0827     Lactate 1.7 mmol/L     Magnesium [[id number]]  (Normal) Collected:  04/04/17 0752    Specimen:  Blood Updated:  04/04/17 0833     Magnesium 2.3 mg/dL     POC Glucose Fingerstick [[id number]]  (Abnormal) Collected:  04/04/17 1018    Specimen:  Blood Updated:  04/04/17 1022     Glucose 188 (H) mg/dL     Narrative:       Meter: [id number] : [id number] Leno Dennis    Myoglobin, Serum [[id number]]  (Abnormal) Collected:  04/04/17 1101    Specimen:  Blood Updated:  04/04/17 1147     Myoglobin 182.0 (H) ng/mL     Troponin [[id number]]  (Normal) Collected:  04/04/17 1101    Specimen:  Blood Updated:  04/04/17 1147     Troponin I <0.006 ng/mL     Narrative:       Ultra Troponin I Reference Range:         <=0.039 ng/mL: Negative     0.04-0.779 ng/mL: Indeterminate Range. Suspicious of MI.  Clinical correlation required.       >=0.78  ng/mL: Consistent with myocardial injury.  Clinical correlation required.    CK-MB [[id number]]  (Abnormal) Collected:  04/04/17 1101    Specimen:  Blood Updated:  04/04/17 1159     CKMB 29.23 (C) ng/mL     CK [[id number]]  (Abnormal) Collected:  04/04/17 1101    Specimen:  Blood Updated:  04/04/17 1200     Creatine Kinase 425 (C) U/L     CK-MB Index [[id number]]  (Abnormal) Collected:  04/04/17 1101    Specimen:  Blood Updated:  04/04/17 1200     CK-MB Index 6.9 (H) %     Potassium [[id number]]  (Normal) Collected:  04/04/17 1101    Specimen:  Blood Updated:  04/04/17 1204     Potassium 4.2 mmol/L         Imaging Results (last 24 hours)     Procedure Component Value Units Date/Time    XR Chest 1 View [[id number]] Collected:  04/04/17 0745     Updated:  04/04/17 0812    Narrative:       XR CHEST 1 VW-     CLINICAL INDICATION: subclavian line placement; A41.9-Sepsis,  unspecified organism; F19.10-Other psychoactive substance abuse,  uncomplicated; E10.9-Type 1 diabetes mellitus without complications          COMPARISON: 02/08/2017      TECHNIQUE: Single frontal view of the chest.     FINDINGS:     Right-sided central line has been placed. The tip is in the superior  vena cava. There is no pneumothorax.       There is no evidence of an acute osseous abnormality.   There are no suspicious-appearing parenchymal soft tissue nodules.            Impression:       Central line tip in the superior vena cava.         This report was finalized on 4/4/2017 8:09 AM by Dr. Jersey Maher MD.       US Spleen [[id number]] Collected:  04/04/17 0956     Updated:  04/04/17 1004    Narrative:       EXAMINATION: US SPLEEN-      CLINICAL INDICATION: Thrombocytopenia; A41.9-Sepsis, unspecified  organism; F19.10-Other psychoactive substance abuse, uncomplicated;  E10.9-Type 1 diabetes mellitus without complications          COMPARISON: 02/07/2017      FINDINGS: Sonographic imaging of the spleen demonstrates the spleen to  be enlarged at 18.5 cm. It is homogeneous in echotexture. There is no  focal splenic mass.       Impression:       Splenomegaly.      This report was finalized on 4/4/2017 9:57 AM by Dr. Jersey Maher MD.       US Venous Doppler Lower Extremity Bilateral (duplex) [[id number]] Collected:  04/04/17 1016     Updated:  04/04/17 1018    Narrative:       EXAMINATION: US VENOUS DOPPLER LOWER EXTREMITY BILATERAL (DUPLEX)-      CLINICAL INDICATION:     Leg Pain and Swelling  leg edema; A41.9-Sepsis, unspecified organism; F19.10-Other psychoactive  substance abuse, uncomplicated; E10.9-Type 1 diabetes mellitus without  complications      TECHNIQUE: Multiplanar gray scale and Doppler vascular sonographic  imaging of the deep veins  without and with compression.      COMPARISON: NONE      FINDINGS: The visualized deep veins demonstrate flow and are  compressible. No evidence of deep venous thrombosis.             Impression:       No DVT.     This report was finalized on 4/4/2017 10:16 AM by Dr. Brendan Tolentino MD.             ECG/EMG Results (last 24 hours)     Procedure Component Value Units Date/Time    Adult Transthoracic Echo Complete [[id number]] Collected:  04/04/17 0903     Updated:  04/04/17 0932     BSA 2.0 m^2      LVLd ap4 7.2 cm      EDV(MOD-sp4) 76.0 ml      LVLs ap4 6.8 cm      ESV(MOD-sp4) 28.0 ml      EF(MOD-sp4) 63.2 %      SV(MOD-sp4) 48.0 ml      SI(MOD-sp4) 24.1 ml/m^2      CONTRAST EF 4CH 63.2 ml/m^2      LV Diastolic corrected for BSA 38.2 ml/m^2      LV Systolic corrected for BSA 14.1 ml/m^2      MV E max asaf 128.8 cm/sec      MV A max asaf 36.0 cm/sec      MV E/A 3.6     BH CV ECHO YONATHAN - BZI_BMI 28.6 kilograms/m^2       CV ECHO YONATHAN - BSA(HAYCOCK) 2.0 m^2       CV ECHO YONATHAN - BZI_METRIC_WEIGHT 85.3 kg       CV ECHO YONATHAN - BZI_METRIC_HEIGHT 172.7 cm           Orders (last 24 hrs)     Start     Ordered    04/05/17 0600   Comprehensive Metabolic Panel  Morning Draw      04/04/17 0727    04/05/17 0600  CBC & Differential  Morning Draw      04/04/17 0727    04/05/17 0600  C-reactive Protein  Morning Draw      04/04/17 0727    04/05/17 0600  Magnesium  Morning Draw      04/04/17 0727    04/04/17 1600  Potassium  Once      04/04/17 1147    04/04/17 1240  albuterol (PROVENTIL) nebulizer solution 0.083% 2.5 mg/3mL  Every 6 Hours PRN      04/04/17 1240    04/04/17 1200  mupirocin (BACTROBAN) 2 % ointment  Every 12 Hours Scheduled      04/04/17 1040    04/04/17 1151  CK-MB Index  Once      04/04/17 1150    04/04/17 1041  Wound care  2 Times Daily      04/04/17 1040    04/04/17 1022  POC Glucose Fingerstick  Once      04/04/17 1022    04/04/17 0900  insulin detemir (LEVEMIR) injection 10 Units  Every Morning      04/04/17 0726    04/04/17 0800  insulin aspart (novoLOG) injection 5 Units  3 Times Daily With Meals      04/04/17 0726    04/04/17 0731  Magnesium  Once      04/04/17 0730    04/04/17 0728  Lactic Acid, Plasma  STAT      04/04/17 0727    04/04/17 0728  US Spleen  1 Time Imaging      04/04/17 0727    04/04/17 0700  Wound Ostomy Eval & Treat  Once      04/04/17 0311    04/04/17 0600  CBC Auto Differential  Morning Draw,   Status:  Canceled      04/03/17 2146 04/04/17 0600  Comprehensive Metabolic Panel  Morning Draw,   Status:  Canceled      04/03/17 2146 04/04/17 0600  Comprehensive Metabolic Panel  Morning Draw      04/03/17 2146 04/04/17 0600  CBC & Differential  Morning Draw      04/03/17 2146 04/04/17 0600  C-reactive Protein  Morning Draw      04/03/17 2146    04/04/17 0600  Lipase  Morning Draw      04/03/17 2146    04/04/17 0600  Amylase  Morning Draw      04/03/17 2146    04/04/17 0600  Protime-INR  Morning Draw      04/03/17 2146    04/04/17 0600  BNP  Morning Draw      04/03/17 2146    04/04/17 0600  CBC Auto Differential  PROCEDURE ONCE      04/04/17 0001    04/04/17 0535  CK-MB Index  Once      04/04/17 0534     04/04/17 0500  potassium chloride 10 mEq in 100 mL IVPB  Every 1 Hour      04/04/17 0317    04/04/17 0246  Osmolality, Calculated  Once      04/04/17 0245    04/04/17 0200  Lactate Acid, Reflex  Timed      04/03/17 2207    04/04/17 0104  POC Glucose Fingerstick  Once      04/04/17 0103    04/04/17 0000  Vital Signs  Every 4 Hours      04/03/17 2146    04/04/17 0000  insulin detemir (LEVEMIR) injection 5 Units  Nightly,   Status:  Discontinued      04/03/17 2146 04/04/17 0000  vancomycin (VANCOCIN) 1,500 mg in sodium chloride 0.9 % 500 mL IVPB  Every 8 Hours,   Status:  Discontinued      04/03/17 2241 04/04/17 0000  piperacillin-tazobactam (ZOSYN) 3.375 g/100 mL 0.9% NS IVPB (mbp)  Every 6 Hours,   Status:  Discontinued      04/03/17 2241 04/04/17 0000  magnesium sulfate 2g/50 mL (PREMIX) infusion  Every 2 Hours      04/03/17 2250    04/03/17 2306  CK-MB Index  Once      04/03/17 2305    04/03/17 2300  insulin aspart (novoLOG) injection 0-14 Units  4 Times Daily Before Meals & Nightly      04/03/17 2146    04/03/17 2230  sodium chloride 0.9 % infusion  Continuous      04/03/17 2146    04/03/17 2213  POC Glucose Fingerstick  Once      04/03/17 2212    04/03/17 2211  glucagon (GLUCAGEN) injection 1 mg  Every 15 Minutes PRN      04/03/17 2211    04/03/17 2200  POC Glucose Fingerstick  4 Times Daily Before Meals & at Bedtime      04/03/17 2146 04/03/17 2200  Tap Water Enema  Every 2 Hours      04/03/17 2146 04/03/17 2147  Weigh patient  Once      04/03/17 2146 04/03/17 2147  Oxygen Therapy-  Continuous      04/03/17 2146 04/03/17 2147  Insert Peripheral IV  Once      04/03/17 2146 04/03/17 2147  Saline Lock & Maintain IV Access  Continuous      04/03/17 2146 04/03/17 2147  Full Code  Continuous      04/03/17 2146 04/03/17 2147  VTE Risk Assessment - Moderate Risk  Once      04/03/17 2146 04/03/17 2147  Place Sequential Compression Device  Once      04/03/17 2146 04/03/17 2147   Maintain Sequential Compression Device  Continuous      04/03/17 2146 04/03/17 2147  Saline Lock  Continuous,   Status:  Canceled      04/03/17 2146 04/03/17 2147  Continuous Pulse Oximetry  Continuous     Comments:  For Unresponsiveness, Acute Dyspnea, Cyanosis or Suspected Hypoxemia.  Notify Physician    04/03/17 2146    04/03/17 2147  Oxygen Therapy- Nasal Cannula; Titrate for spO2: 90%, equal to or greater than  Continuous     Comments:  For Unresponsiveness, Acute Dyspnea, Cyanosis or Suspected Hypoxemia.  Notify Physician    04/03/17 2146 04/03/17 2147  Nurse to Place Order for ECG 12-Lead if Patient Experiences Acute Chest Pain or Dysrhythmias  Continuous      04/03/17 2146 04/03/17 2147  May Be Off Telemetry for Tests  Continuous      04/03/17 2146 04/03/17 2147  ACLS Protocol For Life Threatening Dysrhythmias (If No DNR Order)  Continuous      04/03/17 2146 04/03/17 2147  Notify Provider if ACLS Protocol Activated  Until Discontinued      04/03/17 2146 04/03/17 2147  Diet Regular  Diet Effective Now      04/03/17 2146 04/03/17 2147  Magnesium  Once      04/03/17 2146 04/03/17 2147  Inpatient Consult to Infectious Diseases  Once     Specialty:  Infectious Diseases  Provider:  Tamara Bobby MD    04/03/17 2146 04/03/17 2147  CK  Now Then Every 6 Hours      04/03/17 2146 04/03/17 2147  CK-MB  Now Then Every 6 Hours      04/03/17 2146 04/03/17 2147  Myoglobin, Serum  Now Then Every 6 Hours      04/03/17 2146 04/03/17 2147  Troponin  Now Then Every 6 Hours      04/03/17 2146 04/03/17 2147  Do NOT Hold Basal Insulin When Patient is NPO, Hold Bolus Dose if NPO  Continuous      04/03/17 2146 04/03/17 2147  Follow [facility] Hypoglycemia Protocol For Blood Glucose Less Than 70 mg/dL  Until Discontinued      04/03/17 2146 04/03/17 2147  Hypoglycemia Treatment - Alert Patient That is Not NPO and Can Safely Swallow  Until Discontinued     Comments:  Administer 4 oz Fruit  Juice OR 4 oz Regular Soda OR 8 oz Milk OR 15-30 grams (1 tube) of Glucose Gel  Recheck Blood Glucose 15 Minutes After Ingestion, Repeat Treatment & Continue to Recheck Blood Sugar Every 15 Minutes Until Blood Glucose is 70 or Higher  Once Blood Glucose is 70 or Higher, Give Snack (Peanut Butter & Crackers) if Next Meal Will Be Given in More Than 60 Minutes, Provide Meal Tray As Soon As Possible    04/03/17 2146 04/03/17 2147  Hypoglycemia Treatment - Patient Has IV Access - Unresponsive, NPO or Unable To Safely Swallow  Until Discontinued     Comments:  Administer 25g D50W IV Push (1 Whole Vial)  Recheck Blood Glucose 15 Minutes After Administration, if Blood Glucose Remains Less Than 70, Repeat Treatment   Recheck Blood Glucose 15 Minutes After 2nd Administration, if Blood Glucose Remains Less Than 70 After 2nd Dose of D50W Contact Provider for Further Treatment Orders & Consider Adding IVF With D5 for Maintenance    04/03/17 2146 04/03/17 2147  Hypoglycemia Treatment - Patient Without IV Access - Unresponsive, NPO or Unable To Safely Swallow  Until Discontinued     Comments:  Administer 1mg Glucagon SQ & Establish IV Access  Turn Patient on Side - Nausea / Vomiting May Occur  Recheck Blood Glucose 15 Minutes After Administration  If IV Access Has Not Been Established & Blood Glucose Remains Less Than 70, Repeat Treatment With Glucagon  If IV Access Established, Administer 25g D50W IV Push (1 Whole Vial)  Recheck Blood Glucose 15 Minutes After Administration of 2nd Medication, if Blood Glucose Remains Less Than 70, Contact Provider for Further Treatment Orders & Consider Adding IVF With D5 for Maintenance    04/03/17 2146 04/03/17 2147  Inpatient Consult to Infectious Diseases  Once     Specialty:  Infectious Diseases  Provider:  Tamara Bobby MD    04/03/17 2146 04/03/17 2147  US Venous Doppler Lower Extremity Bilateral (duplex)  1 Time Imaging      04/03/17 2146 04/03/17 2147  Inpatient  Consult For PICC  Once     Comments:  After Line Placement, Flushes and Line Care Should Be Ordered Using the Line Care (Flushes & Dressing Changes) - All Line Types Order Set   Provider:  (Not yet assigned)    04/03/17 2146 04/03/17 2147  Adult Transthoracic Echo Complete  Once      04/03/17 2146 04/03/17 2146  potassium chloride (MICRO-K) CR capsule 40 mEq  As Needed      04/03/17 2146 04/03/17 2146  potassium chloride (KLOR-CON) packet 40 mEq  As Needed      04/03/17 2146 04/03/17 2146  potassium chloride 10 mEq in 100 mL IVPB  Every 1 Hour PRN      04/03/17 2146 04/03/17 2146  Magnesium Sulfate 2 gram Bolus, followed by 8 gram infusion (total Mg dose 10 grams)- Mg less than or equal to 1mg/dL  As Needed      04/03/17 2146 04/03/17 2146  Magnesium Sulfate 6 gram Infusion (2 gm x 3) -Mg 1.1 -1.5 mg/dL  As Needed      04/03/17 2146 04/03/17 2146  magnesium sulfate 4 gram infusion- Mg 1.6-1.9 mg/dL  As Needed      04/03/17 2146 04/03/17 2146  Pharmacy to dose vancomycin  Continuous PRN      04/03/17 2146 04/03/17 2146  Pharmacy to Dose Zosyn  Continuous PRN      04/03/17 2146 04/03/17 2146  sodium chloride 0.9 % flush 1-10 mL  As Needed      04/03/17 2146 04/03/17 2146  nitroglycerin (NITROSTAT) SL tablet 0.4 mg  Every 5 Minutes PRN      04/03/17 2146 04/03/17 2146  dextrose (GLUTOSE) oral gel 15 g  Every 15 Minutes PRN      04/03/17 2146 04/03/17 2146  dextrose (D50W) solution 25 g  Every 15 Minutes PRN      04/03/17 2146 04/03/17 2146  glucagon (human recombinant) (GLUCAGEN DIAGNOSTIC) injection 1 mg  Every 15 Minutes PRN,   Status:  Discontinued      04/03/17 2146 04/03/17 2054  HYDROmorphone (DILAUDID) injection 1 mg  Once      04/03/17 2052 04/03/17 2048  Insert Central Line At Bedside  Once     Comments:  This order was created via procedure documentation    04/03/17 2047 04/03/17 2042  XR Chest 1 View  1 Time Imaging      04/03/17 2041 04/03/17 2011   HYDROmorphone (DILAUDID) injection 1 mg  Once      04/03/17 2009 04/03/17 1947  Inpatient Consult to Case Management   Once     Provider:  (Not yet assigned)    04/03/17 1946 04/03/17 1917  POC Glucose Fingerstick  STAT      04/03/17 1916 04/03/17 1912  Lactic Acid, Plasma  Once      04/03/17 1911 04/03/17 1911  POC Glucose Fingerstick  Once      04/03/17 1910 04/03/17 1910  albuterol (PROVENTIL) nebulizer solution 0.083% 2.5 mg/3mL  Once      04/03/17 1908    04/03/17 1846  sodium chloride 0.9 % infusion  Continuous,   Status:  Discontinued      04/03/17 1844 04/03/17 1814  Diet Regular; Consistent Carbohydrate  Diet Effective Now,   Status:  Canceled      04/03/17 1814 04/03/17 1444  piperacillin-tazobactam (ZOSYN) 3.375 g/100 mL 0.9% NS IVPB (mbp)  Once      04/03/17 1442    04/03/17 1444  vancomycin (VANCOCIN) 1,500 mg in sodium chloride 0.9 % 500 mL IVPB  Once      04/03/17 1442    04/03/17 1441  sodium chloride 0.9 % bolus 2,382 mL  Once      04/03/17 1439    04/03/17 1441  Hospitalist (on-call MD unless specified)  Once     Comments:  1440   Specialty:  Hospitalist  Provider:  Memo Zhou MD    04/03/17 1440    04/03/17 1434  Inpatient Admission  Once     Comments:  Admit as inpatient  vo dr zhou    04/03/17 1438    04/03/17 1411  Oxycodone, Urine  Once      04/03/17 1410    Unscheduled  Potassium  As Needed     Comments:  For Ventricular Arrhythmias    04/03/17 2146    Unscheduled  Magnesium  As Needed     Comments:  For Ventricular Arrhythmias    04/03/17 2146    Unscheduled  Digoxin Level  As Needed     Comments:  For Atrial Arrhythmias    04/03/17 2146    Unscheduled  Blood Gas, Arterial  As Needed     Comments:  Per O2 Policy  Notify Physician    04/03/17 2146    Signed and Held  heparin (porcine) 5000 UNIT/ML injection 5,000 Units  Every 12 Hours Scheduled,   Status:  Canceled      Signed and Held    --  ipratropium-albuterol (COMBIVENT RESPIMAT)  MCG/ACT  inhaler  4 Times Daily      04/03/17 1918    --  metoclopramide (REGLAN) 10 MG tablet  2 Times Daily      04/03/17 1918    --  ipratropium-albuterol (DUO-NEB) 0.5-2.5 mg/mL nebulizer  4 Times Daily - RT      04/03/17 1918    --  potassium chloride (K-DUR) 10 MEQ CR tablet  2 Times Daily      04/03/17 1918    --  naproxen (NAPROSYN) 250 MG tablet  2 Times Daily      04/03/17 1918    --  metoprolol tartrate (LOPRESSOR) 50 MG tablet  Daily      04/03/17 1920    --  Magnesium 250 MG tablet  2 Times Daily      04/03/17 1920    --  gabapentin (NEURONTIN) 800 MG tablet  4 Times Daily      04/03/17 1923    --  furosemide (LASIX) 20 MG tablet  Daily      04/03/17 1923    --  dicyclomine (BENTYL) 10 MG capsule  2 Times Daily      04/03/17 1923    --  citalopram (CeleXA) 20 MG tablet  Daily      04/03/17 1923    --  atorvastatin (LIPITOR) 20 MG tablet  Daily      04/03/17 1923    --  albuterol (PROVENTIL HFA;VENTOLIN HFA) 108 (90 BASE) MCG/ACT inhaler  Every 6 Hours PRN      04/03/17 1923    --  nitroglycerin (NITROSTAT) 0.4 MG SL tablet  Every 5 Minutes PRN      04/03/17 1925    --  traZODone (DESYREL) 100 MG tablet  Nightly PRN      04/03/17 1925    --  insulin glargine (LANTUS) 100 UNIT/ML injection  Every Morning,   Status:  Discontinued      04/03/17 1934    --  insulin glargine (LANTUS) 100 UNIT/ML injection  Nightly,   Status:  Discontinued      04/03/17 1934    --  insulin lispro (humaLOG) 100 UNIT/ML injection  Nightly PRN,   Status:  Discontinued      04/03/17 1937    --  insulin lispro (humaLOG) 100 UNIT/ML injection  Nightly      04/03/17 1937    Pending  albuterol (PROVENTIL) nebulizer solution 0.083% 2.5 mg/3mL  Every 6 Hours PRN      Pending    Pending  atorvastatin (LIPITOR) tablet 20 mg  Daily      Pending    Pending  citalopram (CeleXA) tablet 20 mg  Daily      Pending    Pending  dicyclomine (BENTYL) capsule 10 mg  2 Times Daily      Pending    Pending  furosemide (LASIX) tablet 20 mg  Daily      Pending     Pending  gabapentin (NEURONTIN) capsule 800 mg  4 Times Daily      Pending    Pending  ipratropium-albuterol (DUO-NEB) nebulizer solution 3 mL  4 Times Daily - RT      Pending    Pending  magnesium oxide (MAGOX) tablet 200 mg  2 Times Daily      Pending    Pending  metoclopramide (REGLAN) tablet 10 mg  2 Times Daily      Pending    Pending  metoprolol tartrate (LOPRESSOR) tablet 50 mg  Daily      Pending    Pending  naproxen (NAPROSYN) tablet 250 mg  2 Times Daily      Pending    Pending  nitroglycerin (NITROSTAT) SL tablet 0.4 mg  Every 5 Minutes PRN      Pending    Pending  pancrelipase (Lip-Prot-Amyl) (CREON) capsule 24,000 units of lipase  4 Times Daily With Meals & Nightly      Pending    Pending  potassium chloride (K-DUR,KLOR-CON) ER tablet 20 mEq  2 Times Daily      Pending    Pending  traZODone (DESYREL) tablet 100 mg  Nightly PRN      Pending    --  aspirin 81 MG chewable tablet  Daily      04/04/17 0053    Pending  aspirin chewable tablet 81 mg  Daily      Pending    Pending  pantoprazole (PROTONIX) EC tablet 40 mg  2 Times Daily      Pending    Pending  polyethylene glycol (MIRALAX) powder 17 g  Daily      Pending    --  insulin lispro (humaLOG) 100 UNIT/ML injection  3 Times Daily Before Meals      04/04/17 0635    Pending  insulin aspart (novoLOG) injection 20 Units  3 Times Daily With Meals      Pending    --  insulin detemir (LEVEMIR) 100 UNIT/ML injection  Daily      04/04/17 1111    --  insulin detemir (LEVEMIR) 100 UNIT/ML injection  Nightly      04/04/17 1111    Pending  insulin detemir (LEVEMIR) injection 20 Units  Daily      Pending    Pending  insulin detemir (LEVEMIR) injection 35 Units  Nightly      Pending          Operative/Procedure Notes (last 24 hours) (Notes from 4/3/2017  1:30 PM through 4/4/2017  1:30 PM)     No notes of this type exist for this encounter.           Physician Progress Notes (last 24 hours) (Notes from 4/3/2017  1:30 PM through 4/4/2017  1:30 PM)      Memo Quarles  "MD at 4/3/2017  7:31 PM  Version 1 of 1           I have seen the patient in conjunction with Nurse in er     I have reviewed this case with the nurse practitioner, have interviewed and examined the patient, I concur with her findings in this note constitutes my findings.    History of presenting illness:  Patient was brought emergency room again this morning because he states he was hurting all over.  He states his abdomen was hurting his back was hurting his legs felt heavy he was shaky.  He is uncertain how long this had gone on although he has had some interaction with the people that live in the home he lives in and apparently has been \"kicked out\".  Some of this may have been doing to heavy laundry as it broke the machine he is unsure exactly.  He states his glucoses have been running low.  The intensity of the pain he relates as moderate to high but tends to wax and wane and he really does not know modifying factors.  He denies any associated fever at home.  He really cannot characterize this pain.    Vital Signs  Temp:  [97.1 °F (36.2 °C)-98.9 °F (37.2 °C)] 97.9 °F (36.6 °C)  Heart Rate:  [] 91  Resp:  [14-18] 18  BP: ()/() 120/89  Body mass index is 26.61 kg/(m^2).    No intake or output data in the 24 hours ending 04/03/17 1931  Intake & Output (last 3 days)     None          Physical exam:  Physical Exam   Constitutional: Well-developed, well-nourished.  Overall appears to be comfortable, pleasant, he became somewhat agitated however when discussing events surrounding where he lives.  Head: Normocephalic and atraumatic.  Hearing is intact, mucous membranes are moist.  Speech is normal  Eyes:  Pupils are equal, round, and reactive to light. No scleral icterus.   Cardiovascular: Mildly tachycardic regular rhythm without murmur rub or gallop  Pulmonary/Chest: Bilateral breath sounds no rhonchus rales or wheezing heard  Abdominal: Soft, rounded mildly distended nontender bowel sounds are " active no CVA tenderness  Musculoskeletal: Strength is symmetric, no joint effusions noted.  2+ edema he had an IV in his right foot that is going to be removed  Neurological: Nonfocal, alert.  Reflexes are 2+ in his patellar region strength overall.  Be symmetric  Skin: Skin is warm and dry.   Psychiatric:  Normal mood and affect.     EKG: nonspecific, reviewed      Results Review:  Lab Results   Component Value Date    WBC 1.94 (C) 04/03/2017    HGB 13.2 (L) 04/03/2017    HCT 39.8 (L) 04/03/2017    MCV 89.0 04/03/2017    PLT 61 (L) 04/03/2017     Lab Results   Component Value Date    GLUCOSE 371 (H) 04/03/2017    BUN 12 04/03/2017    CREATININE 0.69 04/03/2017    EGFRIFNONA 120 04/03/2017    EGFRIFAFRI  09/26/2016      Comment:      <15 Indicative of kidney failure.    BCR 17.4 04/03/2017    CO2 27.0 04/03/2017    CALCIUM 9.0 04/03/2017    ALBUMIN 3.40 (L) 04/03/2017    LABIL2 1.5 04/03/2017     (H) 04/03/2017     (H) 04/03/2017       Imaging Results (last 72 hours)     Procedure Component Value Units Date/Time    XR Abdomen 3 View [[id number]] Collected:  04/03/17 1301     Updated:  04/03/17 1303    Narrative:       XR ABDOMEN 3 VW-     CLINICAL INDICATION: Abdominal pain          COMPARISON: None available      FINDINGS:  Chest:  The included view of the chest demonstrates the lungs to be well  aerated. There is no focal alveolar infiltrate or pleural effusion. The  cardiac silhouette is normal. No acute osseous abnormality is seen  within the chest.     Abdomen:  Two views of the abdomen show no free air. I do not see abnormal bowel  distention to suggest complete obstruction. The bony structures are  intact. No abnormal soft tissue masses are seen. No suspicious appearing  calcifications are identified on today's exam.       Impression:       1. Moderate to large volume stool in the colon  2. The lungs are clear.  3. No evidence of bowel obstruction.  4. No free air.               This report was  finalized on 4/3/2017 1:01 PM by Dr. Jersey Maher MD.              Acute abdominal series images reviewed.      Assessment/Plan     Active Problems:    Septic shock by criteria including tachycardia, low white count and elevated lactic acid.  Patient did have the 30 cc/kg bolus and has been started on Zosyn and vancomycin.  Patient however does not appear overall to have an infectious process this may be related to his severe constipation combined with dehydration.  There was a question of pancreatitis.  His amylase lipase were normal last night.  I'm at this time however continuing Zosyn and vancomycin, will follow cultures.  Patient was monitored on telemetry, I've asked infectious disease to evaluate as well.    Diabetes, he relates that he is really not had to take much insulin has been getting some hypoglycemic episodes.  We'll follow his glucoses but of hypoglycemia confirmed may consider cosyntropin test.  No evidence of DKA, I'm starting low-dose basal insulin    Illicit drug use, he admitted to the Suboxone, I was unaware of the amphetamine at the time of my interview however he has a history of methamphetamine use.  I will discuss with him further.    Abdominal discomfort, I feel like this is secondary to constipation, he will be given 2 tap water enemas, will follow.  He has transaminitis probably related to his hepatitis.  This is essentially stable.    DVT prophylaxis, patient is thrombocytopenic therefore will use SCDs only    Edema, check venous Dopplers, EF normal in September, will repeat echo and check BNP, most likely this is due to his cirrhosis    Presumed possible cirrhosis, he had active hepatitis recently, I will check a pro time in the a.m.    Pancytopenia again probably related to cirrhosis        Memo Quarles MD  04/03/17  7:31 PM       Electronically signed by Memo Quarles MD at 4/3/2017  7:47 PM           Consult Notes (last 24 hours) (Notes from 4/3/2017  1:30 PM through 4/4/2017   1:30 PM)      Bethany Piper PA-C at 4/4/2017 11:37 AM  Version 1 of 1     Consult Orders:    1. Inpatient Consult to Infectious Diseases [[id number]] ordered by Memo Quarles MD at 04/03/17 1929                  INFECTIOUS DISEASE CONSULTATION REPORT      Referring Provider: Dr. Quarles  Reason for Consultation: Severe sepsis      Principal problem: <principal problem not specified>    Subjective .     History of present illness:    As you well know Dr. Quarles, Mr. Isaias Lang is a 52 y.o. years old male who was recently admitted with right hand phlebitis and seen by the infectious disease team.  He presented to the ED on 4/3/2017 complaining of hurting all over and was found to be tachycardic with leukopenia and elevated lactic acid.  Upon exam today the patient was very sedated and is unable to provide history of present illness or review of systems.    Infectious Disease consultation was requested for antimicrobial management.     History taken from: patient chart RN    Case was discussed with patient and nursing staff    Review of Systems-unable to obtain as the patient was very sedated this morning    Past Medical History    Past Medical History:   Diagnosis Date   • Abscess of antecubital fossa 05/2014    Left that required I and D and grew Haemophilus influenza group 1   • Anxiety    • Asthma    • Chronic pancreatitis    • COPD (chronic obstructive pulmonary disease)    • DDD (degenerative disc disease), lumbosacral    • Depression    • Diabetes mellitus    • DVT (deep venous thrombosis)     Right arm   • Essential hypertension    • GERD (gastroesophageal reflux disease)    • Hepatitis-C    • Hypercholesteremia    • Injury of back    • Injury of right Achilles tendon     Complicated by MRSA and Pseudomonas   • IV drug abuse    • Mild CAD     Left heart cath at  2012   • MRSA (methicillin resistant staph aureus) culture positive 09/14/2016    LUE   • Obesity    • Opiate addiction     Suboxone IV   •  "Self-injurious behavior    • Sleep apnea    • Suicide attempt    • Systolic CHF, chronic     EF 45-50% in 2013, EF 60% 9/2016       Past Surgical History    Past Surgical History:   Procedure Laterality Date   • CHOLECYSTECTOMY  1990s   • INCISION AND DRAINAGE ABSCESS Left 2016    wrist   • INCISION AND DRAINAGE ARM Left 9/15/2016    Procedure: INCISION AND DRAINAGE UPPER EXTREMITY;  Surgeon: Rico Oseguera MD;  Location: [facility];  Service:    • ORIF ANKLE FRACTURE Right 2014       Family History    Family History   Problem Relation Age of Onset   • Gout Other    • Osteoporosis Other    • Arthritis Other      Rheumatoid and osteoarthritis   • Hypertension Other    • Heart disease Other    • Cancer Other    • Coronary artery disease Mother    • Lung disease Mother    • Gout Sister    • Cancer Maternal Grandmother    • Hypertension Maternal Grandfather    • Gout Maternal Grandfather        Social History    Social History   Substance Use Topics   • Smoking status: Former Smoker     Years: 1.00     Types: Cigarettes   • Smokeless tobacco: Never Used      Comment: quit smoking in my 20's   • Alcohol use No      Comment: denies       Allergies    Robitussin dm [dextromethorphan-guaifenesin]; Tizanidine; Metformin; Sulfa antibiotics; Contrast dye; and Tramadol    Objective     /74 (BP Location: Right arm, Patient Position: Lying)  Pulse 76  Temp 98 °F (36.7 °C) (Oral)   Resp 18  Ht 68\" (172.7 cm)  Wt 188 lb 5 oz (85.4 kg)  SpO2 98%  BMI 28.63 kg/m2    Temp:  [97.7 °F (36.5 °C)-98.9 °F (37.2 °C)] 98 °F (36.7 °C)        Intake/Output Summary (Last 24 hours) at 04/04/17 1138  Last data filed at 04/03/17 2115   Gross per 24 hour   Intake              600 ml   Output                0 ml   Net              600 ml         Physical Exam:     General Appearance:    very sedated    Head:    Normocephalic, without obvious abnormality, atraumatic   Eyes:            Lids and lashes normal, conjunctivae and sclerae " normal, no   icterus, no pallor, corneas clear, PERRLA   Ears:    Ears appear intact with no abnormalities noted   Throat:   No oral lesions, no thrush, oral mucosa moist   Neck:   No adenopathy, supple, trachea midline, no thyromegaly, no   carotid bruit, no JVD   Back:     No tenderness to percussion or palpation, range of motion   normal   Lungs:     Clear to auscultation,respirations regular, even and unlabored. No wheezing, no ronchi and no crackles.    Heart:    Regular rhythm and normal rate, normal S1 and S2, no            murmur, no gallop, no rub, no click   Chest Wall:    No abnormalities observed   Abdomen:     Normal bowel sounds, no masses, no organomegaly, soft        non-tender, non-distended, no guarding, no rebound                tenderness   Rectal:     Deferred   Extremities:   Moves all extremities well, no edema, no cyanosis, no             redness   Pulses:   Pulses palpable and equal bilaterally   Skin:   No bleeding, bruising or rash   Lymph nodes:   No palpable adenopathy   Neurologic:   very sedated        Results:      Results from last 7 days  Lab Units 04/04/17  0201 04/03/17  1236 04/02/17  2318   WBC 10*3/mm3 3.89* 1.94* 4.77     Lab Results   Component Value Date    NEUTROABS 3.22 04/04/2017         Results from last 7 days  Lab Units 04/04/17  0201   CREATININE mg/dL 0.69         Results from last 7 days  Lab Units 04/04/17  0200 04/02/17  2318   CRP mg/dL 2.28* 3.88*       Imaging Results (last 24 hours)     Procedure Component Value Units Date/Time    XR Abdomen 3 View [[id number]] Collected:  04/03/17 1301     Updated:  04/03/17 1303    Narrative:       XR ABDOMEN 3 VW-     CLINICAL INDICATION: Abdominal pain          COMPARISON: None available      FINDINGS:  Chest:  The included view of the chest demonstrates the lungs to be well  aerated. There is no focal alveolar infiltrate or pleural effusion. The  cardiac silhouette is normal. No acute osseous abnormality is seen  within the  chest.     Abdomen:  Two views of the abdomen show no free air. I do not see abnormal bowel  distention to suggest complete obstruction. The bony structures are  intact. No abnormal soft tissue masses are seen. No suspicious appearing  calcifications are identified on today's exam.       Impression:       1. Moderate to large volume stool in the colon  2. The lungs are clear.  3. No evidence of bowel obstruction.  4. No free air.               This report was finalized on 4/3/2017 1:01 PM by Dr. Jersey Maher MD.       XR Chest 1 View [[id number]] Collected:  04/04/17 0745     Updated:  04/04/17 0812    Narrative:       XR CHEST 1 VW-     CLINICAL INDICATION: subclavian line placement; A41.9-Sepsis,  unspecified organism; F19.10-Other psychoactive substance abuse,  uncomplicated; E10.9-Type 1 diabetes mellitus without complications          COMPARISON: 02/08/2017      TECHNIQUE: Single frontal view of the chest.     FINDINGS:     Right-sided central line has been placed. The tip is in the superior  vena cava. There is no pneumothorax.       There is no evidence of an acute osseous abnormality.   There are no suspicious-appearing parenchymal soft tissue nodules.            Impression:       Central line tip in the superior vena cava.         This report was finalized on 4/4/2017 8:09 AM by Dr. Jersey Maher MD.       US Spleen [[id number]] Collected:  04/04/17 0956     Updated:  04/04/17 1004    Narrative:       EXAMINATION: US SPLEEN-      CLINICAL INDICATION: Thrombocytopenia; A41.9-Sepsis, unspecified  organism; F19.10-Other psychoactive substance abuse, uncomplicated;  E10.9-Type 1 diabetes mellitus without complications          COMPARISON: 02/07/2017     FINDINGS: Sonographic imaging of the spleen demonstrates the spleen to  be enlarged at 18.5 cm. It is homogeneous in echotexture. There is no  focal splenic mass.       Impression:       Splenomegaly.      This report was finalized on 4/4/2017 9:57 AM by Dr. Putnam  MD Gunnar.       US Venous Doppler Lower Extremity Bilateral (duplex) [[id number]] Collected:  04/04/17 1016     Updated:  04/04/17 1018    Narrative:       EXAMINATION: US VENOUS DOPPLER LOWER EXTREMITY BILATERAL (DUPLEX)-      CLINICAL INDICATION:     Leg Pain and Swelling  leg edema; A41.9-Sepsis, unspecified organism; F19.10-Other psychoactive  substance abuse, uncomplicated; E10.9-Type 1 diabetes mellitus without  complications      TECHNIQUE: Multiplanar gray scale and Doppler vascular sonographic  imaging of the deep veins  without and with compression.      COMPARISON: NONE      FINDINGS: The visualized deep veins demonstrate flow and are  compressible. No evidence of deep venous thrombosis.             Impression:       No DVT.     This report was finalized on 4/4/2017 10:16 AM by Dr. Brendan Tolentino MD.               Cultures:    Blood Culture   Date Value Ref Range Status   04/02/2017 No growth at 24 hours  Preliminary     Urine Culture   Date Value Ref Range Status   04/02/2017 No growth at 24 hours  Preliminary       Results Review:    I have personally reviewed laboratory data, culture results, radiology studies and antimicrobial therapy.    Hospital Medications (active)       Dose Frequency Start End    albuterol (PROVENTIL) nebulizer solution 0.083% 2.5 mg/3mL 2.5 mg Once 4/3/2017 4/3/2017    Sig - Route: Take 2.5 mg by nebulization 1 (One) Time. - Nebulization    dextrose (D50W) solution 25 g 25 g Every 15 Minutes PRN 4/3/2017     Sig - Route: Infuse 50 mL into a venous catheter Every 15 (Fifteen) Minutes As Needed for Low Blood Sugar (Blood Sugar Less Than 70, Patient Has IV Access - Unresponsive, NPO or Unable To Safely Swallow). - Intravenous    dextrose (GLUTOSE) oral gel 15 g 15 g Every 15 Minutes PRN 4/3/2017     Sig - Route: Take 15 g by mouth Every 15 (Fifteen) Minutes As Needed for Low Blood Sugar (Blood Sugar Less Than 70, Patient Alert, Is Not NPO & Can Safely Swallow). - Oral    glucagon  "(GLUCAGEN) injection 1 mg 1 mg Every 15 Minutes PRN 4/3/2017     Sig - Route: Inject 1 mg under the skin Every 15 (Fifteen) Minutes As Needed (Blood Glucose Less Than 70 - Patient Without IV Access - Unresponsive, NPO or Unable To Safely Swallow). - Subcutaneous    HYDROmorphone (DILAUDID) injection 1 mg 1 mg Once 4/3/2017 4/3/2017    Sig - Route: Infuse 1 mg into a venous catheter 1 (One) Time. - Intravenous    HYDROmorphone (DILAUDID) injection 1 mg 1 mg Once 4/3/2017 4/3/2017    Sig - Route: Infuse 1 mg into a venous catheter 1 (One) Time. - Intravenous    insulin aspart (novoLOG) injection 0-14 Units 0-14 Units 4 Times Daily Before Meals & Nightly 4/3/2017     Sig - Route: Inject 0-14 Units under the skin 4 (Four) Times a Day Before Meals & at Bedtime. - Subcutaneous    insulin aspart (novoLOG) injection 5 Units 5 Units 3 Times Daily With Meals 4/4/2017     Sig - Route: Inject 5 Units under the skin 3 (Three) Times a Day With Meals. - Subcutaneous    insulin detemir (LEVEMIR) injection 10 Units 10 Units Every Morning 4/4/2017     Sig - Route: Inject 10 Units under the skin Every Morning. - Subcutaneous    Magnesium Sulfate 2 gram Bolus, followed by 8 gram infusion (total Mg dose 10 grams)- Mg less than or equal to 1mg/dL 2 g As Needed 4/3/2017     Sig - Route: Infuse 50 mL into a venous catheter As Needed (Mg less than or equal to 1mg/dL). - Intravenous    Linked Group 1:  \"Or\" Linked Group Details        magnesium sulfate 2g/50 mL (PREMIX) infusion 2 g Every 2 Hours 4/4/2017 4/4/2017    Sig - Route: Infuse 50 mL into a venous catheter Every 2 (Two) Hours. - Intravenous    magnesium sulfate 4 gram infusion- Mg 1.6-1.9 mg/dL 4 g As Needed 4/3/2017     Sig - Route: Infuse 100 mL into a venous catheter As Needed (Mg 1.6-1.9 mg/dL). - Intravenous    Linked Group 1:  \"Or\" Linked Group Details        Magnesium Sulfate 6 gram Infusion (2 gm x 3) -Mg 1.1 -1.5 mg/dL 2 g As Needed 4/3/2017     Sig - Route: Infuse 50 mL " "into a venous catheter As Needed (Mg 1.1 -1.5 mg/dL). - Intravenous    Linked Group 1:  \"Or\" Linked Group Details        mupirocin (BACTROBAN) 2 % ointment  Every 12 Hours Scheduled 4/4/2017     Sig - Route: Apply  topically Every 12 (Twelve) Hours. - Topical    Cosign for Ordering: Required by Memo Quarles MD    nitroglycerin (NITROSTAT) SL tablet 0.4 mg 0.4 mg Every 5 Minutes PRN 4/3/2017     Sig - Route: Place 1 tablet under the tongue Every 5 (Five) Minutes As Needed for Chest Pain (if systolic BP greater than 100 mm/Hg.). - Sublingual    Pharmacy to dose vancomycin  Continuous PRN 4/3/2017     Sig - Route: Continuous As Needed for Consult. - Does not apply    Pharmacy to Dose Zosyn  Continuous PRN 4/3/2017     Sig - Route: Continuous As Needed for Consult. - Does not apply    piperacillin-tazobactam (ZOSYN) 3.375 g/100 mL 0.9% NS IVPB (mbp) 3.375 g Once 4/3/2017 4/3/2017    Sig - Route: Infuse 100 mL into a venous catheter 1 (One) Time. - Intravenous    piperacillin-tazobactam (ZOSYN) 3.375 g/100 mL 0.9% NS IVPB (mbp) 3.375 g Every 6 Hours 4/4/2017     Sig - Route: Infuse 100 mL into a venous catheter Every 6 (Six) Hours. - Intravenous    potassium chloride (KLOR-CON) packet 40 mEq 40 mEq As Needed 4/3/2017     Sig - Route: Take 40 mEq by mouth As Needed (potassium replacement, see admin instructions). - Oral    Linked Group 2:  \"Or\" Linked Group Details        potassium chloride (MICRO-K) CR capsule 40 mEq 40 mEq As Needed 4/3/2017     Sig - Route: Take 4 capsules by mouth As Needed (potassium replacement.  see admin instructions). - Oral    Linked Group 2:  \"Or\" Linked Group Details        potassium chloride 10 mEq in 100 mL IVPB 10 mEq Every 1 Hour PRN 4/3/2017     Sig - Route: Infuse 100 mL into a venous catheter Every 1 (One) Hour As Needed (potassium protocol PERIPHERAL - see admin instructions). - Intravenous    Linked Group 2:  \"Or\" Linked Group Details        potassium chloride 10 mEq in 100 mL IVPB " 10 mEq Every 1 Hour 4/4/2017 4/4/2017    Sig - Route: Infuse 100 mL into a venous catheter Every 1 (One) Hour. - Intravenous    sodium chloride 0.9 % bolus 2,382 mL 30 mL/kg × 79.4 kg Once 4/3/2017 4/3/2017    Sig - Route: Infuse 2,382 mL into a venous catheter 1 (One) Time. - Intravenous    sodium chloride 0.9 % flush 1-10 mL 1-10 mL As Needed 4/3/2017     Sig - Route: Infuse 1-10 mL into a venous catheter As Needed for Line Care. - Intravenous    sodium chloride 0.9 % infusion 125 mL/hr Continuous 4/3/2017     Sig - Route: Infuse 125 mL/hr into a venous catheter Continuous. - Intravenous    vancomycin (VANCOCIN) 1,500 mg in sodium chloride 0.9 % 500 mL IVPB 20 mg/kg × 79.4 kg Once 4/3/2017 4/3/2017    Sig - Route: Infuse 1,500 mg into a venous catheter 1 (One) Time. - Intravenous    vancomycin (VANCOCIN) 1,500 mg in sodium chloride 0.9 % 500 mL IVPB 1,500 mg Every 8 Hours 4/4/2017     Sig - Route: Infuse 1,500 mg into a venous catheter Every 8 (Eight) Hours. - Intravenous    glucagon (human recombinant) (GLUCAGEN DIAGNOSTIC) injection 1 mg (Discontinued) 1 mg Every 15 Minutes PRN 4/3/2017 4/3/2017    Sig - Route: Inject 1 mg under the skin Every 15 (Fifteen) Minutes As Needed (Blood Glucose Less Than 70 - Patient Without IV Access - Unresponsive, NPO or Unable To Safely Swallow). - Subcutaneous    Reason for Discontinue: Reorder    insulin detemir (LEVEMIR) injection 5 Units (Discontinued) 5 Units Nightly 4/4/2017 4/4/2017    Sig - Route: Inject 5 Units under the skin Every Night. - Subcutaneous    sodium chloride 0.9 % infusion (Discontinued) 125 mL/hr Continuous 4/3/2017 4/3/2017    Sig - Route: Infuse 125 mL/hr into a venous catheter Continuous. - Intravenous            PROBLEM LIST:    Patient Active Problem List   Diagnosis   • MDD (major depressive disorder), recurrent episode, moderate   • Type 1 diabetes mellitus   • 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   • Cellulitis of right hand   • Cellulitis of right hand  excluding fingers and thumb   • Septic shock       Assessment/Plan     ASSESSMENT:    1.  Severe sepsis with lactic acid over 2    PLAN:    At this time an infectious etiology of the patient's sepsis is not obvious with chest x-ray showing no evidence of pneumonia, normalized lactic acid with cultures in progress.  We agree that this could be related to constipation as well as dehydration.  Antibiotic therapy will be discontinued at this point and we will continue to observe off of coverage very closely.        Patients findings and recommendations were discussed with patient and nursing staff    Code Status: Full Code    Bethany Piper PA-C  04/04/17  11:38 AM       Electronically signed by Bethany Piper PA-C at 4/4/2017 11:49 AM      Bethany Piper PA-C at 4/4/2017 11:49 AM  Version 1 of 1     Consult Orders:    1. Inpatient Consult to Infectious Diseases [[id number]] ordered by NOHEMI Levine at 04/03/17 1438                Repeat consult. See other consult note     Electronically signed by Bethany Piper PA-C at 4/4/2017 11:49 AM        Nutrition Notes (last 24 hours) (Notes from 4/3/2017  1:30 PM through 4/4/2017  1:30 PM)     No notes of this type exist for this encounter.            Speech Language Pathology Notes (last 24 hours) (Notes from 4/3/2017  1:31 PM through 4/4/2017  1:31 PM)     No notes of this type exist for this encounter.            Discharge Summary     No notes of this type exist for this encounter.        Discharge Order     None

## 2017-04-04 NOTE — CONSULTS
MIDLINE CONSULT ORDERED PRIOR TO CENTRAL LINE INSERTION. MIDLINE ON HOLD AT THIS TIME. Dr Quarles to notify me if pt requires further evaluation for IV access at a later date.
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
## 2017-04-04 NOTE — CONSULTS
INFECTIOUS DISEASE CONSULTATION REPORT      Referring Provider: Dr. Quarles  Reason for Consultation: Severe sepsis      Principal problem: <principal problem not specified>    Subjective .     History of present illness:    As you well know Dr. Quarles, Mr. Isaias Lang is a 52 y.o. years old male who was recently admitted with right hand phlebitis and seen by the infectious disease team.  He presented to the ED on 4/3/2017 complaining of hurting all over and was found to be tachycardic with leukopenia and elevated lactic acid.  Upon exam today the patient was very sedated and is unable to provide history of present illness or review of systems.    Infectious Disease consultation was requested for antimicrobial management.     History taken from: patient chart RN    Case was discussed with patient and nursing staff    Review of Systems-unable to obtain as the patient was very sedated this morning    Past Medical History    Past Medical History:   Diagnosis Date   • Abscess of antecubital fossa 05/2014    Left that required I and D and grew Haemophilus influenza group 1   • Anxiety    • Asthma    • Chronic pancreatitis    • COPD (chronic obstructive pulmonary disease)    • DDD (degenerative disc disease), lumbosacral    • Depression    • Diabetes mellitus    • DVT (deep venous thrombosis)     Right arm   • Essential hypertension    • GERD (gastroesophageal reflux disease)    • Hepatitis-C    • Hypercholesteremia    • Injury of back    • Injury of right Achilles tendon     Complicated by MRSA and Pseudomonas   • IV drug abuse    • Mild CAD     Left heart cath at  2012   • MRSA (methicillin resistant staph aureus) culture positive 09/14/2016    LUE   • Obesity    • Opiate addiction     Suboxone IV   • Self-injurious behavior    • Sleep apnea    • Suicide attempt    • Systolic CHF, chronic     EF 45-50% in 2013, EF 60% 9/2016       Past Surgical History    Past Surgical History:   Procedure Laterality Date   •  "CHOLECYSTECTOMY  1990s   • INCISION AND DRAINAGE ABSCESS Left 2016    wrist   • INCISION AND DRAINAGE ARM Left 9/15/2016    Procedure: INCISION AND DRAINAGE UPPER EXTREMITY;  Surgeon: Rico Oseguera MD;  Location: [facility];  Service:    • ORIF ANKLE FRACTURE Right 2014       Family History    Family History   Problem Relation Age of Onset   • Gout Other    • Osteoporosis Other    • Arthritis Other      Rheumatoid and osteoarthritis   • Hypertension Other    • Heart disease Other    • Cancer Other    • Coronary artery disease Mother    • Lung disease Mother    • Gout Sister    • Cancer Maternal Grandmother    • Hypertension Maternal Grandfather    • Gout Maternal Grandfather        Social History    Social History   Substance Use Topics   • Smoking status: Former Smoker     Years: 1.00     Types: Cigarettes   • Smokeless tobacco: Never Used      Comment: quit smoking in my 20's   • Alcohol use No      Comment: denies       Allergies    Robitussin dm [dextromethorphan-guaifenesin]; Tizanidine; Metformin; Sulfa antibiotics; Contrast dye; and Tramadol    Objective     /74 (BP Location: Right arm, Patient Position: Lying)  Pulse 76  Temp 98 °F (36.7 °C) (Oral)   Resp 18  Ht 68\" (172.7 cm)  Wt 188 lb 5 oz (85.4 kg)  SpO2 98%  BMI 28.63 kg/m2    Temp:  [97.7 °F (36.5 °C)-98.9 °F (37.2 °C)] 98 °F (36.7 °C)        Intake/Output Summary (Last 24 hours) at 04/04/17 1138  Last data filed at 04/03/17 2115   Gross per 24 hour   Intake              600 ml   Output                0 ml   Net              600 ml         Physical Exam:     General Appearance:    very sedated    Head:    Normocephalic, without obvious abnormality, atraumatic   Eyes:            Lids and lashes normal, conjunctivae and sclerae normal, no   icterus, no pallor, corneas clear, PERRLA   Ears:    Ears appear intact with no abnormalities noted   Throat:   No oral lesions, no thrush, oral mucosa moist   Neck:   No adenopathy, supple, trachea " midline, no thyromegaly, no   carotid bruit, no JVD   Back:     No tenderness to percussion or palpation, range of motion   normal   Lungs:     Clear to auscultation,respirations regular, even and unlabored. No wheezing, no ronchi and no crackles.    Heart:    Regular rhythm and normal rate, normal S1 and S2, no            murmur, no gallop, no rub, no click   Chest Wall:    No abnormalities observed   Abdomen:     Normal bowel sounds, no masses, no organomegaly, soft        non-tender, non-distended, no guarding, no rebound                tenderness   Rectal:     Deferred   Extremities:   Moves all extremities well, no edema, no cyanosis, no             redness   Pulses:   Pulses palpable and equal bilaterally   Skin:   No bleeding, bruising or rash   Lymph nodes:   No palpable adenopathy   Neurologic:   very sedated        Results:      Results from last 7 days  Lab Units 04/04/17  0201 04/03/17  1236 04/02/17  2318   WBC 10*3/mm3 3.89* 1.94* 4.77     Lab Results   Component Value Date    NEUTROABS 3.22 04/04/2017         Results from last 7 days  Lab Units 04/04/17  0201   CREATININE mg/dL 0.69         Results from last 7 days  Lab Units 04/04/17  0200 04/02/17  2318   CRP mg/dL 2.28* 3.88*       Imaging Results (last 24 hours)     Procedure Component Value Units Date/Time    XR Abdomen 3 View [[id number]] Collected:  04/03/17 1301     Updated:  04/03/17 1303    Narrative:       XR ABDOMEN 3 VW-     CLINICAL INDICATION: Abdominal pain          COMPARISON: None available      FINDINGS:  Chest:  The included view of the chest demonstrates the lungs to be well  aerated. There is no focal alveolar infiltrate or pleural effusion. The  cardiac silhouette is normal. No acute osseous abnormality is seen  within the chest.     Abdomen:  Two views of the abdomen show no free air. I do not see abnormal bowel  distention to suggest complete obstruction. The bony structures are  intact. No abnormal soft tissue masses are seen.  No suspicious appearing  calcifications are identified on today's exam.       Impression:       1. Moderate to large volume stool in the colon  2. The lungs are clear.  3. No evidence of bowel obstruction.  4. No free air.               This report was finalized on 4/3/2017 1:01 PM by Dr. Jersey Maher MD.       XR Chest 1 View [[id number]] Collected:  04/04/17 0745     Updated:  04/04/17 0812    Narrative:       XR CHEST 1 VW-     CLINICAL INDICATION: subclavian line placement; A41.9-Sepsis,  unspecified organism; F19.10-Other psychoactive substance abuse,  uncomplicated; E10.9-Type 1 diabetes mellitus without complications          COMPARISON: 02/08/2017      TECHNIQUE: Single frontal view of the chest.     FINDINGS:     Right-sided central line has been placed. The tip is in the superior  vena cava. There is no pneumothorax.       There is no evidence of an acute osseous abnormality.   There are no suspicious-appearing parenchymal soft tissue nodules.            Impression:       Central line tip in the superior vena cava.         This report was finalized on 4/4/2017 8:09 AM by Dr. Jersey Maher MD.       US Spleen [[id number]] Collected:  04/04/17 0956     Updated:  04/04/17 1004    Narrative:       EXAMINATION: US SPLEEN-      CLINICAL INDICATION: Thrombocytopenia; A41.9-Sepsis, unspecified  organism; F19.10-Other psychoactive substance abuse, uncomplicated;  E10.9-Type 1 diabetes mellitus without complications          COMPARISON: 02/07/2017     FINDINGS: Sonographic imaging of the spleen demonstrates the spleen to  be enlarged at 18.5 cm. It is homogeneous in echotexture. There is no  focal splenic mass.       Impression:       Splenomegaly.      This report was finalized on 4/4/2017 9:57 AM by Dr. Jersey Maher MD.       US Venous Doppler Lower Extremity Bilateral (duplex) [[id number]] Collected:  04/04/17 1016     Updated:  04/04/17 1018    Narrative:       EXAMINATION: US VENOUS DOPPLER LOWER EXTREMITY  BILATERAL (DUPLEX)-      CLINICAL INDICATION:     Leg Pain and Swelling  leg edema; A41.9-Sepsis, unspecified organism; F19.10-Other psychoactive  substance abuse, uncomplicated; E10.9-Type 1 diabetes mellitus without  complications      TECHNIQUE: Multiplanar gray scale and Doppler vascular sonographic  imaging of the deep veins  without and with compression.      COMPARISON: NONE      FINDINGS: The visualized deep veins demonstrate flow and are  compressible. No evidence of deep venous thrombosis.             Impression:       No DVT.     This report was finalized on 4/4/2017 10:16 AM by Dr. Brendan Tolentino MD.               Cultures:    Blood Culture   Date Value Ref Range Status   04/02/2017 No growth at 24 hours  Preliminary     Urine Culture   Date Value Ref Range Status   04/02/2017 No growth at 24 hours  Preliminary       Results Review:    I have personally reviewed laboratory data, culture results, radiology studies and antimicrobial therapy.    Hospital Medications (active)       Dose Frequency Start End    albuterol (PROVENTIL) nebulizer solution 0.083% 2.5 mg/3mL 2.5 mg Once 4/3/2017 4/3/2017    Sig - Route: Take 2.5 mg by nebulization 1 (One) Time. - Nebulization    dextrose (D50W) solution 25 g 25 g Every 15 Minutes PRN 4/3/2017     Sig - Route: Infuse 50 mL into a venous catheter Every 15 (Fifteen) Minutes As Needed for Low Blood Sugar (Blood Sugar Less Than 70, Patient Has IV Access - Unresponsive, NPO or Unable To Safely Swallow). - Intravenous    dextrose (GLUTOSE) oral gel 15 g 15 g Every 15 Minutes PRN 4/3/2017     Sig - Route: Take 15 g by mouth Every 15 (Fifteen) Minutes As Needed for Low Blood Sugar (Blood Sugar Less Than 70, Patient Alert, Is Not NPO & Can Safely Swallow). - Oral    glucagon (GLUCAGEN) injection 1 mg 1 mg Every 15 Minutes PRN 4/3/2017     Sig - Route: Inject 1 mg under the skin Every 15 (Fifteen) Minutes As Needed (Blood Glucose Less Than 70 - Patient Without IV Access -  "Unresponsive, NPO or Unable To Safely Swallow). - Subcutaneous    HYDROmorphone (DILAUDID) injection 1 mg 1 mg Once 4/3/2017 4/3/2017    Sig - Route: Infuse 1 mg into a venous catheter 1 (One) Time. - Intravenous    HYDROmorphone (DILAUDID) injection 1 mg 1 mg Once 4/3/2017 4/3/2017    Sig - Route: Infuse 1 mg into a venous catheter 1 (One) Time. - Intravenous    insulin aspart (novoLOG) injection 0-14 Units 0-14 Units 4 Times Daily Before Meals & Nightly 4/3/2017     Sig - Route: Inject 0-14 Units under the skin 4 (Four) Times a Day Before Meals & at Bedtime. - Subcutaneous    insulin aspart (novoLOG) injection 5 Units 5 Units 3 Times Daily With Meals 4/4/2017     Sig - Route: Inject 5 Units under the skin 3 (Three) Times a Day With Meals. - Subcutaneous    insulin detemir (LEVEMIR) injection 10 Units 10 Units Every Morning 4/4/2017     Sig - Route: Inject 10 Units under the skin Every Morning. - Subcutaneous    Magnesium Sulfate 2 gram Bolus, followed by 8 gram infusion (total Mg dose 10 grams)- Mg less than or equal to 1mg/dL 2 g As Needed 4/3/2017     Sig - Route: Infuse 50 mL into a venous catheter As Needed (Mg less than or equal to 1mg/dL). - Intravenous    Linked Group 1:  \"Or\" Linked Group Details        magnesium sulfate 2g/50 mL (PREMIX) infusion 2 g Every 2 Hours 4/4/2017 4/4/2017    Sig - Route: Infuse 50 mL into a venous catheter Every 2 (Two) Hours. - Intravenous    magnesium sulfate 4 gram infusion- Mg 1.6-1.9 mg/dL 4 g As Needed 4/3/2017     Sig - Route: Infuse 100 mL into a venous catheter As Needed (Mg 1.6-1.9 mg/dL). - Intravenous    Linked Group 1:  \"Or\" Linked Group Details        Magnesium Sulfate 6 gram Infusion (2 gm x 3) -Mg 1.1 -1.5 mg/dL 2 g As Needed 4/3/2017     Sig - Route: Infuse 50 mL into a venous catheter As Needed (Mg 1.1 -1.5 mg/dL). - Intravenous    Linked Group 1:  \"Or\" Linked Group Details        mupirocin (BACTROBAN) 2 % ointment  Every 12 Hours Scheduled 4/4/2017     Sig - " "Route: Apply  topically Every 12 (Twelve) Hours. - Topical    Cosign for Ordering: Required by Memo Quarles MD    nitroglycerin (NITROSTAT) SL tablet 0.4 mg 0.4 mg Every 5 Minutes PRN 4/3/2017     Sig - Route: Place 1 tablet under the tongue Every 5 (Five) Minutes As Needed for Chest Pain (if systolic BP greater than 100 mm/Hg.). - Sublingual    Pharmacy to dose vancomycin  Continuous PRN 4/3/2017     Sig - Route: Continuous As Needed for Consult. - Does not apply    Pharmacy to Dose Zosyn  Continuous PRN 4/3/2017     Sig - Route: Continuous As Needed for Consult. - Does not apply    piperacillin-tazobactam (ZOSYN) 3.375 g/100 mL 0.9% NS IVPB (mbp) 3.375 g Once 4/3/2017 4/3/2017    Sig - Route: Infuse 100 mL into a venous catheter 1 (One) Time. - Intravenous    piperacillin-tazobactam (ZOSYN) 3.375 g/100 mL 0.9% NS IVPB (mbp) 3.375 g Every 6 Hours 4/4/2017     Sig - Route: Infuse 100 mL into a venous catheter Every 6 (Six) Hours. - Intravenous    potassium chloride (KLOR-CON) packet 40 mEq 40 mEq As Needed 4/3/2017     Sig - Route: Take 40 mEq by mouth As Needed (potassium replacement, see admin instructions). - Oral    Linked Group 2:  \"Or\" Linked Group Details        potassium chloride (MICRO-K) CR capsule 40 mEq 40 mEq As Needed 4/3/2017     Sig - Route: Take 4 capsules by mouth As Needed (potassium replacement.  see admin instructions). - Oral    Linked Group 2:  \"Or\" Linked Group Details        potassium chloride 10 mEq in 100 mL IVPB 10 mEq Every 1 Hour PRN 4/3/2017     Sig - Route: Infuse 100 mL into a venous catheter Every 1 (One) Hour As Needed (potassium protocol PERIPHERAL - see admin instructions). - Intravenous    Linked Group 2:  \"Or\" Linked Group Details        potassium chloride 10 mEq in 100 mL IVPB 10 mEq Every 1 Hour 4/4/2017 4/4/2017    Sig - Route: Infuse 100 mL into a venous catheter Every 1 (One) Hour. - Intravenous    sodium chloride 0.9 % bolus 2,382 mL 30 mL/kg × 79.4 kg Once 4/3/2017 " 4/3/2017    Sig - Route: Infuse 2,382 mL into a venous catheter 1 (One) Time. - Intravenous    sodium chloride 0.9 % flush 1-10 mL 1-10 mL As Needed 4/3/2017     Sig - Route: Infuse 1-10 mL into a venous catheter As Needed for Line Care. - Intravenous    sodium chloride 0.9 % infusion 125 mL/hr Continuous 4/3/2017     Sig - Route: Infuse 125 mL/hr into a venous catheter Continuous. - Intravenous    vancomycin (VANCOCIN) 1,500 mg in sodium chloride 0.9 % 500 mL IVPB 20 mg/kg × 79.4 kg Once 4/3/2017 4/3/2017    Sig - Route: Infuse 1,500 mg into a venous catheter 1 (One) Time. - Intravenous    vancomycin (VANCOCIN) 1,500 mg in sodium chloride 0.9 % 500 mL IVPB 1,500 mg Every 8 Hours 4/4/2017     Sig - Route: Infuse 1,500 mg into a venous catheter Every 8 (Eight) Hours. - Intravenous    glucagon (human recombinant) (GLUCAGEN DIAGNOSTIC) injection 1 mg (Discontinued) 1 mg Every 15 Minutes PRN 4/3/2017 4/3/2017    Sig - Route: Inject 1 mg under the skin Every 15 (Fifteen) Minutes As Needed (Blood Glucose Less Than 70 - Patient Without IV Access - Unresponsive, NPO or Unable To Safely Swallow). - Subcutaneous    Reason for Discontinue: Reorder    insulin detemir (LEVEMIR) injection 5 Units (Discontinued) 5 Units Nightly 4/4/2017 4/4/2017    Sig - Route: Inject 5 Units under the skin Every Night. - Subcutaneous    sodium chloride 0.9 % infusion (Discontinued) 125 mL/hr Continuous 4/3/2017 4/3/2017    Sig - Route: Infuse 125 mL/hr into a venous catheter Continuous. - Intravenous            PROBLEM LIST:    Patient Active Problem List   Diagnosis   • MDD (major depressive disorder), recurrent episode, moderate   • Type 1 diabetes mellitus   • 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   • Cellulitis of right hand   • Cellulitis of right hand excluding fingers and thumb   • Septic shock       Assessment/Plan     ASSESSMENT:    1.  Severe sepsis with lactic acid over 2    PLAN:    At this time an infectious etiology of the patient's sepsis is  not obvious with chest x-ray showing no evidence of pneumonia, normalized lactic acid with cultures in progress.  We agree that this could be related to constipation as well as dehydration.  Antibiotic therapy will be discontinued at this point and we will continue to observe off of coverage very closely.        Patients findings and recommendations were discussed with patient and nursing staff    Code Status: Full Code    Bethany Piper PA-C  04/04/17  11:38 AM

## 2017-04-04 NOTE — ED NOTES
Pt left ED at this time with ED tech via stretcher. Neutropenic precautions remain in place, mask on pt upon leaving ED. Contact precaution remained in place upon transport to in patient room assignment., due to history of MRSA. NADN. VS remain stable. NS infusing at 125 ml/hr with no difficulty. Pts security tags placed on pts chart.          Lashell Salas RN  04/04/17 [id number]

## 2017-04-04 NOTE — ED NOTES
Attempted another IV access x 2 with no success at this time.     Lashell Salas, ALYSE  04/04/17 7089

## 2017-04-05 ENCOUNTER — APPOINTMENT (OUTPATIENT)
Dept: GENERAL RADIOLOGY | Facility: HOSPITAL | Age: 52
End: 2017-04-05

## 2017-04-05 PROBLEM — A41.9 SEPSIS (HCC): Status: ACTIVE | Noted: 2017-04-05

## 2017-04-05 LAB
ALBUMIN SERPL-MCNC: 2.8 G/DL (ref 3.5–5)
ALBUMIN/GLOB SERPL: 1.3 G/DL (ref 1.5–2.5)
ALP SERPL-CCNC: 123 U/L (ref 40–129)
ALT SERPL W P-5'-P-CCNC: 127 U/L (ref 10–44)
ANION GAP SERPL CALCULATED.3IONS-SCNC: 2.6 MMOL/L (ref 3.6–11.2)
AST SERPL-CCNC: 106 U/L (ref 10–34)
BACTERIA SPEC AEROBE CULT: NO GROWTH
BASOPHILS # BLD AUTO: 0.01 10*3/MM3 (ref 0–0.3)
BASOPHILS NFR BLD AUTO: 0.3 % (ref 0–2)
BILIRUB SERPL-MCNC: 1.1 MG/DL (ref 0.2–1.8)
BUN BLD-MCNC: 8 MG/DL (ref 7–21)
BUN/CREAT SERPL: 12.7 (ref 7–25)
CALCIUM SPEC-SCNC: 8.3 MG/DL (ref 7.7–10)
CHLORIDE SERPL-SCNC: 109 MMOL/L (ref 99–112)
CK MB SERPL-CCNC: 10.56 NG/ML (ref 0–5)
CK MB SERPL-RTO: 6 % (ref 0–3)
CK SERPL-CCNC: 177 U/L (ref 24–204)
CO2 SERPL-SCNC: 28.4 MMOL/L (ref 24.3–31.9)
CREAT BLD-MCNC: 0.63 MG/DL (ref 0.43–1.29)
CRP SERPL-MCNC: 1.86 MG/DL (ref 0–0.99)
DEPRECATED RDW RBC AUTO: 53.8 FL (ref 37–54)
EOSINOPHIL # BLD AUTO: 0.04 10*3/MM3 (ref 0–0.7)
EOSINOPHIL NFR BLD AUTO: 1.2 % (ref 0–5)
ERYTHROCYTE [DISTWIDTH] IN BLOOD BY AUTOMATED COUNT: 16.4 % (ref 11.5–14.5)
GFR SERPL CREATININE-BSD FRML MDRD: 134 ML/MIN/1.73
GLOBULIN UR ELPH-MCNC: 2.1 GM/DL
GLUCOSE BLD-MCNC: 300 MG/DL (ref 70–110)
GLUCOSE BLDC GLUCOMTR-MCNC: 134 MG/DL (ref 70–130)
GLUCOSE BLDC GLUCOMTR-MCNC: 142 MG/DL (ref 70–130)
GLUCOSE BLDC GLUCOMTR-MCNC: 180 MG/DL (ref 70–130)
GLUCOSE BLDC GLUCOMTR-MCNC: 191 MG/DL (ref 70–130)
GLUCOSE BLDC GLUCOMTR-MCNC: 252 MG/DL (ref 70–130)
HBA1C MFR BLD: 6.9 % (ref 4.5–5.7)
HCT VFR BLD AUTO: 36.9 % (ref 42–52)
HGB BLD-MCNC: 12 G/DL (ref 14–18)
HIV1+2 AB SER QL: NORMAL
IMM GRANULOCYTES # BLD: 0 10*3/MM3 (ref 0–0.03)
IMM GRANULOCYTES NFR BLD: 0 % (ref 0–0.5)
INR PPP: 1.37 (ref 0.8–1.1)
LYMPHOCYTES # BLD AUTO: 0.64 10*3/MM3 (ref 1–3)
LYMPHOCYTES NFR BLD AUTO: 19.2 % (ref 21–51)
MAGNESIUM SERPL-MCNC: 1.8 MG/DL (ref 1.7–2.6)
MCH RBC QN AUTO: 30.1 PG (ref 27–33)
MCHC RBC AUTO-ENTMCNC: 32.5 G/DL (ref 33–37)
MCV RBC AUTO: 92.5 FL (ref 80–94)
MONOCYTES # BLD AUTO: 0.42 10*3/MM3 (ref 0.1–0.9)
MONOCYTES NFR BLD AUTO: 12.6 % (ref 0–10)
MYOGLOBIN SERPL-MCNC: 102 NG/ML (ref 0–109)
NEUTROPHILS # BLD AUTO: 2.22 10*3/MM3 (ref 1.4–6.5)
NEUTROPHILS NFR BLD AUTO: 66.7 % (ref 30–70)
OSMOLALITY SERPL CALC.SUM OF ELEC: 288.9 MOSM/KG (ref 273–305)
PLATELET # BLD AUTO: 67 10*3/MM3 (ref 130–400)
PMV BLD AUTO: 10.6 FL (ref 6–10)
POTASSIUM BLD-SCNC: 3.8 MMOL/L (ref 3.5–5.3)
PROT SERPL-MCNC: 4.9 G/DL (ref 6–8)
PROTHROMBIN TIME: 15.5 SECONDS (ref 9.8–11.9)
RBC # BLD AUTO: 3.99 10*6/MM3 (ref 4.7–6.1)
SODIUM BLD-SCNC: 140 MMOL/L (ref 135–153)
TROPONIN I SERPL-MCNC: <0.006 NG/ML
WBC NRBC COR # BLD: 3.33 10*3/MM3 (ref 4.5–12.5)

## 2017-04-05 PROCEDURE — 99233 SBSQ HOSP IP/OBS HIGH 50: CPT | Performed by: INTERNAL MEDICINE

## 2017-04-05 PROCEDURE — 83874 ASSAY OF MYOGLOBIN: CPT | Performed by: INTERNAL MEDICINE

## 2017-04-05 PROCEDURE — 83735 ASSAY OF MAGNESIUM: CPT | Performed by: INTERNAL MEDICINE

## 2017-04-05 PROCEDURE — 85025 COMPLETE CBC W/AUTO DIFF WBC: CPT | Performed by: INTERNAL MEDICINE

## 2017-04-05 PROCEDURE — 83036 HEMOGLOBIN GLYCOSYLATED A1C: CPT | Performed by: INTERNAL MEDICINE

## 2017-04-05 PROCEDURE — 93005 ELECTROCARDIOGRAM TRACING: CPT | Performed by: INTERNAL MEDICINE

## 2017-04-05 PROCEDURE — 74000 HC ABDOMEN KUB: CPT

## 2017-04-05 PROCEDURE — 74000 XR ABDOMEN KUB: CPT | Performed by: RADIOLOGY

## 2017-04-05 PROCEDURE — 63710000001 INSULIN ASPART PER 5 UNITS: Performed by: NURSE PRACTITIONER

## 2017-04-05 PROCEDURE — 94799 UNLISTED PULMONARY SVC/PX: CPT

## 2017-04-05 PROCEDURE — 93010 ELECTROCARDIOGRAM REPORT: CPT | Performed by: INTERNAL MEDICINE

## 2017-04-05 PROCEDURE — 25010000002 HYDROMORPHONE PER 4 MG: Performed by: INTERNAL MEDICINE

## 2017-04-05 PROCEDURE — 63710000001 INSULIN ASPART PER 5 UNITS: Performed by: INTERNAL MEDICINE

## 2017-04-05 PROCEDURE — 80053 COMPREHEN METABOLIC PANEL: CPT | Performed by: INTERNAL MEDICINE

## 2017-04-05 PROCEDURE — 63710000001 INSULIN DETEMIR PER 5 UNITS: Performed by: INTERNAL MEDICINE

## 2017-04-05 PROCEDURE — 86140 C-REACTIVE PROTEIN: CPT | Performed by: INTERNAL MEDICINE

## 2017-04-05 PROCEDURE — G0378 HOSPITAL OBSERVATION PER HR: HCPCS

## 2017-04-05 PROCEDURE — 84484 ASSAY OF TROPONIN QUANT: CPT | Performed by: INTERNAL MEDICINE

## 2017-04-05 PROCEDURE — 85610 PROTHROMBIN TIME: CPT | Performed by: INTERNAL MEDICINE

## 2017-04-05 PROCEDURE — 82553 CREATINE MB FRACTION: CPT | Performed by: INTERNAL MEDICINE

## 2017-04-05 PROCEDURE — G0432 EIA HIV-1/HIV-2 SCREEN: HCPCS | Performed by: PHYSICIAN ASSISTANT

## 2017-04-05 PROCEDURE — 82550 ASSAY OF CK (CPK): CPT | Performed by: INTERNAL MEDICINE

## 2017-04-05 PROCEDURE — 82962 GLUCOSE BLOOD TEST: CPT

## 2017-04-05 RX ORDER — NITROGLYCERIN 0.4 MG/1
0.4 TABLET SUBLINGUAL
Status: DISCONTINUED | OUTPATIENT
Start: 2017-04-05 | End: 2017-04-05

## 2017-04-05 RX ORDER — METOPROLOL TARTRATE 50 MG/1
50 TABLET, FILM COATED ORAL EVERY 12 HOURS SCHEDULED
Status: DISCONTINUED | OUTPATIENT
Start: 2017-04-05 | End: 2017-04-07 | Stop reason: HOSPADM

## 2017-04-05 RX ORDER — GABAPENTIN 300 MG/1
300 CAPSULE ORAL 3 TIMES DAILY
Status: DISCONTINUED | OUTPATIENT
Start: 2017-04-05 | End: 2017-04-07 | Stop reason: HOSPADM

## 2017-04-05 RX ORDER — MAGNESIUM SULFATE HEPTAHYDRATE 40 MG/ML
4 INJECTION, SOLUTION INTRAVENOUS ONCE
Status: COMPLETED | OUTPATIENT
Start: 2017-04-05 | End: 2017-04-05

## 2017-04-05 RX ORDER — POLYETHYLENE GLYCOL 3350 17 G/17G
17 POWDER, FOR SOLUTION ORAL 2 TIMES DAILY
Status: DISCONTINUED | OUTPATIENT
Start: 2017-04-05 | End: 2017-04-07 | Stop reason: HOSPADM

## 2017-04-05 RX ORDER — CITALOPRAM 20 MG/1
20 TABLET ORAL DAILY
Status: DISCONTINUED | OUTPATIENT
Start: 2017-04-05 | End: 2017-04-07 | Stop reason: HOSPADM

## 2017-04-05 RX ORDER — ATORVASTATIN CALCIUM 20 MG/1
20 TABLET, FILM COATED ORAL DAILY
Status: DISCONTINUED | OUTPATIENT
Start: 2017-04-05 | End: 2017-04-05

## 2017-04-05 RX ORDER — PANTOPRAZOLE SODIUM 40 MG/1
40 TABLET, DELAYED RELEASE ORAL 2 TIMES DAILY
Status: DISCONTINUED | OUTPATIENT
Start: 2017-04-05 | End: 2017-04-07 | Stop reason: HOSPADM

## 2017-04-05 RX ORDER — ASPIRIN 81 MG/1
81 TABLET, CHEWABLE ORAL DAILY
Status: DISCONTINUED | OUTPATIENT
Start: 2017-04-05 | End: 2017-04-05

## 2017-04-05 RX ADMIN — HYDROMORPHONE HYDROCHLORIDE 0.25 MG: 1 INJECTION, SOLUTION INTRAMUSCULAR; INTRAVENOUS; SUBCUTANEOUS at 18:04

## 2017-04-05 RX ADMIN — ASPIRIN 81 MG: 81 TABLET ORAL at 10:40

## 2017-04-05 RX ADMIN — ALBUTEROL SULFATE 2.5 MG: 2.5 SOLUTION RESPIRATORY (INHALATION) at 07:10

## 2017-04-05 RX ADMIN — METOPROLOL TARTRATE 50 MG: 50 TABLET, FILM COATED ORAL at 22:29

## 2017-04-05 RX ADMIN — POLYETHYLENE GLYCOL (3350) 17 G: 17 POWDER, FOR SOLUTION ORAL at 17:22

## 2017-04-05 RX ADMIN — PANCRELIPASE 24000 UNITS OF LIPASE: 60000; 12000; 38000 CAPSULE, DELAYED RELEASE PELLETS ORAL at 20:20

## 2017-04-05 RX ADMIN — INSULIN DETEMIR 10 UNITS: 100 INJECTION, SOLUTION SUBCUTANEOUS at 08:14

## 2017-04-05 RX ADMIN — INSULIN ASPART 5 UNITS: 100 INJECTION, SOLUTION INTRAVENOUS; SUBCUTANEOUS at 12:17

## 2017-04-05 RX ADMIN — MUPIROCIN: 20 OINTMENT TOPICAL at 08:17

## 2017-04-05 RX ADMIN — HYDROMORPHONE HYDROCHLORIDE 0.25 MG: 1 INJECTION, SOLUTION INTRAMUSCULAR; INTRAVENOUS; SUBCUTANEOUS at 13:42

## 2017-04-05 RX ADMIN — MUPIROCIN: 20 OINTMENT TOPICAL at 21:21

## 2017-04-05 RX ADMIN — CITALOPRAM HYDROBROMIDE 20 MG: 20 TABLET ORAL at 09:35

## 2017-04-05 RX ADMIN — PANTOPRAZOLE SODIUM 40 MG: 40 TABLET, DELAYED RELEASE ORAL at 17:22

## 2017-04-05 RX ADMIN — LACTULOSE 20 G: 10 SOLUTION ORAL at 17:23

## 2017-04-05 RX ADMIN — GABAPENTIN 300 MG: 300 CAPSULE ORAL at 15:42

## 2017-04-05 RX ADMIN — GABAPENTIN 300 MG: 300 CAPSULE ORAL at 20:21

## 2017-04-05 RX ADMIN — INSULIN ASPART 8 UNITS: 100 INJECTION, SOLUTION INTRAVENOUS; SUBCUTANEOUS at 12:16

## 2017-04-05 RX ADMIN — INSULIN ASPART 3 UNITS: 100 INJECTION, SOLUTION INTRAVENOUS; SUBCUTANEOUS at 08:13

## 2017-04-05 RX ADMIN — HYDROMORPHONE HYDROCHLORIDE 0.25 MG: 1 INJECTION, SOLUTION INTRAMUSCULAR; INTRAVENOUS; SUBCUTANEOUS at 09:25

## 2017-04-05 RX ADMIN — LACTULOSE 20 G: 10 SOLUTION ORAL at 08:16

## 2017-04-05 RX ADMIN — SODIUM CHLORIDE 75 ML/HR: 9 INJECTION, SOLUTION INTRAVENOUS at 20:21

## 2017-04-05 RX ADMIN — PANCRELIPASE 24000 UNITS OF LIPASE: 60000; 12000; 38000 CAPSULE, DELAYED RELEASE PELLETS ORAL at 09:34

## 2017-04-05 RX ADMIN — HYDROMORPHONE HYDROCHLORIDE 0.25 MG: 1 INJECTION, SOLUTION INTRAMUSCULAR; INTRAVENOUS; SUBCUTANEOUS at 05:03

## 2017-04-05 RX ADMIN — MAGNESIUM SULFATE HEPTAHYDRATE 4 G: 40 INJECTION, SOLUTION INTRAVENOUS at 04:57

## 2017-04-05 RX ADMIN — ATORVASTATIN CALCIUM 20 MG: 20 TABLET, FILM COATED ORAL at 09:35

## 2017-04-05 RX ADMIN — HYDROMORPHONE HYDROCHLORIDE 0.25 MG: 1 INJECTION, SOLUTION INTRAMUSCULAR; INTRAVENOUS; SUBCUTANEOUS at 00:16

## 2017-04-05 RX ADMIN — PANCRELIPASE 24000 UNITS OF LIPASE: 60000; 12000; 38000 CAPSULE, DELAYED RELEASE PELLETS ORAL at 17:22

## 2017-04-05 RX ADMIN — PANTOPRAZOLE SODIUM 40 MG: 40 TABLET, DELAYED RELEASE ORAL at 09:35

## 2017-04-05 RX ADMIN — INSULIN ASPART 5 UNITS: 100 INJECTION, SOLUTION INTRAVENOUS; SUBCUTANEOUS at 17:22

## 2017-04-05 RX ADMIN — SODIUM CHLORIDE 75 ML/HR: 9 INJECTION, SOLUTION INTRAVENOUS at 05:33

## 2017-04-05 RX ADMIN — GABAPENTIN 300 MG: 300 CAPSULE ORAL at 09:34

## 2017-04-05 RX ADMIN — INSULIN ASPART 5 UNITS: 100 INJECTION, SOLUTION INTRAVENOUS; SUBCUTANEOUS at 08:14

## 2017-04-05 NOTE — PAYOR COMM NOTE
"Rachelle  [phone] fax [phone]    Faxing additional infor for reconsideration    Isaias Avendano (52 y.o. Male)     Date of Birth Social Security Number Address Home Phone MRN    1965   [facility] [phone] [id number]    Yazidi Marital Status          Congregational        Admission Date Admission Type Admitting Provider Attending Provider Department, Room/Bed    4/3/17 Emergency Memo Quarles MD Heinss, Karl F, MD [address], 3308/1S    Discharge Date Discharge Disposition Discharge Destination                      Attending Provider: Memo Quarles MD     Allergies:  Robitussin Dm [Dextromethorphan-guaifenesin], Tizanidine, Metformin, Sulfa Antibiotics, Contrast Dye, Tramadol    Isolation:  None   Infection:  None   Code Status:  FULL    Ht:  68\" (172.7 cm)   Wt:  186 lb 11.2 oz (84.7 kg)    Admission Cmt:  None   Principal Problem:  None                Active Insurance as of 4/3/2017     Primary Coverage     Payor Plan Insurance Group Employer/Plan Group    [facility]      Payor Plan Address Payor Plan Phone Number Effective From Effective To    [address] [address]  2/1/2016     [place] [address]       Subscriber Name Subscriber Birth Date Member ID       ISAIAS AVENDANO 1965 [id number]                 Emergency Contacts      (Rel.) Home Phone Work Phone Mobile Phone    Dr Robina (Other) [phone] -- --            Vital Signs (last 72 hrs)       04/02 0700  -  04/03 0659 04/03 0700  -  04/04 0659 04/04 0700  -  04/05 0659 04/05 0700  -  04/05 0823   Most Recent    Temp (°F) 97.1 -  98.9    97.9 -  98.9    97.7 -  98       97.9 (36.6)    Heart Rate 98 -  115    65 -  (!)123    70 -  109      82     82    Resp 16 -  18    14 -  20    16 -  20      20     20    /74 -  (!) 143/120    98/56 -  120/89    101/70 -  124/57       103/69    SpO2 (%) 96 -  97    94 -  100    97 -  98     "  95     95          Hospital Medications (all)       Dose Frequency Start End    albuterol (PROVENTIL) nebulizer solution 0.083% 2.5 mg/3mL 2.5 mg Once 4/3/2017 4/3/2017    Sig - Route: Take 2.5 mg by nebulization 1 (One) Time. - Nebulization    albuterol (PROVENTIL) nebulizer solution 0.083% 2.5 mg/3mL 2.5 mg Every 6 Hours PRN 4/4/2017     Sig - Route: Take 2.5 mg by nebulization Every 6 (Six) Hours As Needed for Shortness of Air. - Nebulization    aspirin chewable tablet 81 mg 81 mg Daily 4/5/2017     Sig - Route: Chew 1 tablet Daily. - Oral    Cosign for Ordering: Required by Memo Quarles MD    atorvastatin (LIPITOR) tablet 20 mg 20 mg Daily 4/5/2017     Sig - Route: Take 1 tablet by mouth Daily. - Oral    Cosign for Ordering: Required by Memo Quarles MD    citalopram (CeleXA) tablet 20 mg 20 mg Daily 4/5/2017     Sig - Route: Take 1 tablet by mouth Daily. - Oral    Cosign for Ordering: Required by Memo Quarles MD    dextrose (D50W) solution 25 g 25 g Every 15 Minutes PRN 4/3/2017     Sig - Route: Infuse 50 mL into a venous catheter Every 15 (Fifteen) Minutes As Needed for Low Blood Sugar (Blood Sugar Less Than 70, Patient Has IV Access - Unresponsive, NPO or Unable To Safely Swallow). - Intravenous    dextrose (GLUTOSE) oral gel 15 g 15 g Every 15 Minutes PRN 4/3/2017     Sig - Route: Take 15 g by mouth Every 15 (Fifteen) Minutes As Needed for Low Blood Sugar (Blood Sugar Less Than 70, Patient Alert, Is Not NPO & Can Safely Swallow). - Oral    gabapentin (NEURONTIN) capsule 300 mg 300 mg 3 Times Daily 4/5/2017     Sig - Route: Take 1 capsule by mouth 3 (Three) Times a Day. - Oral    Cosign for Ordering: Required by Memo Quarles MD    glucagon (GLUCAGEN) injection 1 mg 1 mg Every 15 Minutes PRN 4/3/2017     Sig - Route: Inject 1 mg under the skin Every 15 (Fifteen) Minutes As Needed (Blood Glucose Less Than 70 - Patient Without IV Access - Unresponsive, NPO or Unable To Safely Swallow). - Subcutaneous     "HYDROmorphone (DILAUDID) injection 0.25 mg 0.25 mg Every 4 Hours PRN 4/4/2017 4/14/2017    Sig - Route: Infuse 0.25 mg into a venous catheter Every 4 (Four) Hours As Needed for Severe Pain (7-10). - Intravenous    HYDROmorphone (DILAUDID) injection 1 mg 1 mg Once 4/3/2017 4/3/2017    Sig - Route: Infuse 1 mg into a venous catheter 1 (One) Time. - Intravenous    HYDROmorphone (DILAUDID) injection 1 mg 1 mg Once 4/3/2017 4/3/2017    Sig - Route: Infuse 1 mg into a venous catheter 1 (One) Time. - Intravenous    insulin aspart (novoLOG) injection 0-14 Units 0-14 Units 4 Times Daily Before Meals & Nightly 4/3/2017     Sig - Route: Inject 0-14 Units under the skin 4 (Four) Times a Day Before Meals & at Bedtime. - Subcutaneous    insulin aspart (novoLOG) injection 5 Units 5 Units 3 Times Daily With Meals 4/4/2017     Sig - Route: Inject 5 Units under the skin 3 (Three) Times a Day With Meals. - Subcutaneous    insulin detemir (LEVEMIR) injection 10 Units 10 Units Every Morning 4/4/2017     Sig - Route: Inject 10 Units under the skin Every Morning. - Subcutaneous    lactulose (CHRONULAC) 10 GM/15ML solution 20 g 20 g 2 Times Daily 4/4/2017     Sig - Route: Take 30 mL by mouth 2 (Two) Times a Day. - Oral    Magnesium Sulfate 2 gram Bolus, followed by 8 gram infusion (total Mg dose 10 grams)- Mg less than or equal to 1mg/dL 2 g As Needed 4/3/2017     Sig - Route: Infuse 50 mL into a venous catheter As Needed (Mg less than or equal to 1mg/dL). - Intravenous    Linked Group 1:  \"Or\" Linked Group Details        magnesium sulfate 2g/50 mL (PREMIX) infusion 2 g Every 2 Hours 4/4/2017 4/4/2017    Sig - Route: Infuse 50 mL into a venous catheter Every 2 (Two) Hours. - Intravenous    magnesium sulfate 4 gram infusion- Mg 1.6-1.9 mg/dL 4 g As Needed 4/3/2017     Sig - Route: Infuse 100 mL into a venous catheter As Needed (Mg 1.6-1.9 mg/dL). - Intravenous    Linked Group 1:  \"Or\" Linked Group Details        magnesium sulfate 4 gram " "infusion- Mg 1.6-1.9 mg/dL 4 g Once 4/5/2017     Sig - Route: Infuse 100 mL into a venous catheter 1 (One) Time. - Intravenous    Magnesium Sulfate 6 gram Infusion (2 gm x 3) -Mg 1.1 -1.5 mg/dL 2 g As Needed 4/3/2017     Sig - Route: Infuse 50 mL into a venous catheter As Needed (Mg 1.1 -1.5 mg/dL). - Intravenous    Linked Group 1:  \"Or\" Linked Group Details        mupirocin (BACTROBAN) 2 % ointment  Every 12 Hours Scheduled 4/4/2017     Sig - Route: Apply  topically Every 12 (Twelve) Hours. - Topical    Cosign for Ordering: Required by Memo Quarles MD    nitroglycerin (NITROSTAT) SL tablet 0.4 mg 0.4 mg Every 5 Minutes PRN 4/3/2017     Sig - Route: Place 1 tablet under the tongue Every 5 (Five) Minutes As Needed for Chest Pain (if systolic BP greater than 100 mm/Hg.). - Sublingual    pancrelipase (Lip-Prot-Amyl) (CREON) capsule 24,000 units of lipase 24,000 units of lipase 4 Times Daily With Meals & Nightly 4/5/2017     Sig - Route: Take 2 capsules by mouth 4 (Four) Times a Day With Meals & at Bedtime. - Oral    Cosign for Ordering: Required by Memo Quarles MD    pantoprazole (PROTONIX) EC tablet 40 mg 40 mg 2 Times Daily 4/5/2017     Sig - Route: Take 1 tablet by mouth 2 (Two) Times a Day. - Oral    Cosign for Ordering: Required by Memo Quarles MD    Pharmacy to dose vancomycin  Continuous PRN 4/3/2017     Sig - Route: Continuous As Needed for Consult. - Does not apply    Pharmacy to Dose Zosyn  Continuous PRN 4/3/2017     Sig - Route: Continuous As Needed for Consult. - Does not apply    phytonadione (AQUA-MEPHYTON, VITAMIN K) injection 5 mg 5 mg Once 4/4/2017 4/4/2017    Sig - Route: Take 0.5 mL by mouth 1 (One) Time. - Oral    piperacillin-tazobactam (ZOSYN) 3.375 g/100 mL 0.9% NS IVPB (mbp) 3.375 g Once 4/3/2017 4/3/2017    Sig - Route: Infuse 100 mL into a venous catheter 1 (One) Time. - Intravenous    potassium chloride (KLOR-CON) packet 40 mEq 40 mEq As Needed 4/3/2017     Sig - Route: Take 40 mEq by " "mouth As Needed (potassium replacement, see admin instructions). - Oral    Linked Group 2:  \"Or\" Linked Group Details        potassium chloride (MICRO-K) CR capsule 40 mEq 40 mEq As Needed 4/3/2017     Sig - Route: Take 4 capsules by mouth As Needed (potassium replacement.  see admin instructions). - Oral    Linked Group 2:  \"Or\" Linked Group Details        potassium chloride 10 mEq in 100 mL IVPB 10 mEq Every 1 Hour PRN 4/3/2017     Sig - Route: Infuse 100 mL into a venous catheter Every 1 (One) Hour As Needed (potassium protocol PERIPHERAL - see admin instructions). - Intravenous    Linked Group 2:  \"Or\" Linked Group Details        potassium chloride 10 mEq in 100 mL IVPB 10 mEq Every 1 Hour 4/4/2017 4/4/2017    Sig - Route: Infuse 100 mL into a venous catheter Every 1 (One) Hour. - Intravenous    sodium chloride 0.9 % bolus 2,382 mL 30 mL/kg × 79.4 kg Once 4/3/2017 4/3/2017    Sig - Route: Infuse 2,382 mL into a venous catheter 1 (One) Time. - Intravenous    sodium chloride 0.9 % flush 1-10 mL 1-10 mL As Needed 4/3/2017     Sig - Route: Infuse 1-10 mL into a venous catheter As Needed for Line Care. - Intravenous    sodium chloride 0.9 % infusion 75 mL/hr Continuous 4/3/2017     Sig - Route: Infuse 75 mL/hr into a venous catheter Continuous. - Intravenous    vancomycin (VANCOCIN) 1,500 mg in sodium chloride 0.9 % 500 mL IVPB 20 mg/kg × 79.4 kg Once 4/3/2017 4/3/2017    Sig - Route: Infuse 1,500 mg into a venous catheter 1 (One) Time. - Intravenous    glucagon (human recombinant) (GLUCAGEN DIAGNOSTIC) injection 1 mg (Discontinued) 1 mg Every 15 Minutes PRN 4/3/2017 4/3/2017    Sig - Route: Inject 1 mg under the skin Every 15 (Fifteen) Minutes As Needed (Blood Glucose Less Than 70 - Patient Without IV Access - Unresponsive, NPO or Unable To Safely Swallow). - Subcutaneous    Reason for Discontinue: Reorder    insulin detemir (LEVEMIR) injection 5 Units (Discontinued) 5 Units Nightly 4/4/2017 4/4/2017    Sig - Route: " Inject 5 Units under the skin Every Night. - Subcutaneous    nitroglycerin (NITROSTAT) SL tablet 0.4 mg (Discontinued) 0.4 mg Every 5 Minutes PRN 4/5/2017 4/5/2017    Sig - Route: Place 1 tablet under the tongue Every 5 (Five) Minutes As Needed for Chest Pain. - Sublingual    Cosign for Ordering: Required by Memo Quarles MD    piperacillin-tazobactam (ZOSYN) 3.375 g/100 mL 0.9% NS IVPB (mbp) (Discontinued) 3.375 g Every 6 Hours 4/4/2017 4/4/2017    Sig - Route: Infuse 100 mL into a venous catheter Every 6 (Six) Hours. - Intravenous    sodium chloride 0.9 % infusion (Discontinued) 125 mL/hr Continuous 4/3/2017 4/3/2017    Sig - Route: Infuse 125 mL/hr into a venous catheter Continuous. - Intravenous    vancomycin (VANCOCIN) 1,500 mg in sodium chloride 0.9 % 500 mL IVPB (Discontinued) 1,500 mg Every 8 Hours 4/4/2017 4/4/2017    Sig - Route: Infuse 1,500 mg into a venous catheter Every 8 (Eight) Hours. - Intravenous          Lab Results (last 72 hours)     Procedure Component Value Units Date/Time    Blood Gas, Arterial [[id number]]  (Abnormal) Collected:  04/03/17 1223    Specimen:  Arterial Blood Updated:  04/03/17 1224     Site Arterial: left radial     Mihir's Test Positive     pH, Arterial 7.421 pH units      pCO2, Arterial 32.3 (L) mm Hg      pO2, Arterial 89.1 mm Hg      HCO3, Arterial 20.5 (L) mmol/L      Base Excess, Arterial -2.9 mmol/L      O2 Saturation, Arterial 96.4 %      Hemoglobin, Blood Gas 14.3 g/dL      Hematocrit, Blood Gas 42.0 %      Oxyhemoglobin 95.0 %      Methemoglobin 0.3 %      Carboxyhemoglobin 1.2 %      A-a Gradiant 15.9 mmHg      Temperature 98.6 C      Barometric Pressure for Blood Gas 731 mmHg      Modality Room air     FIO2 21 %     Acetone [[id number]]  (Normal) Collected:  04/03/17 1236    Specimen:  Blood from Arm, Left Updated:  04/03/17 1250     Acetone Negative    Lactic Acid, Plasma [[id number]]  (Abnormal) Collected:  04/03/17 1236    Specimen:  Blood from Arm, Left Updated:   04/03/17 1257     Lactate 5.3 (C) mmol/L     CBC & Differential [[id number]] Collected:  04/03/17 1236    Specimen:  Blood Updated:  04/03/17 1259    Narrative:       The following orders were created for panel order CBC & Differential.  Procedure                               Abnormality         Status                     ---------                               -----------         ------                     CBC Auto Differential[[id number]]         Abnormal            Final result                 Please view results for these tests on the individual orders.    CBC Auto Differential [[id number]]  (Abnormal) Collected:  04/03/17 1236    Specimen:  Blood from Arm, Left Updated:  04/03/17 1259     WBC 1.94 (C) 10*3/mm3      RBC 4.47 (L) 10*6/mm3      Hemoglobin 13.2 (L) g/dL      Hematocrit 39.8 (L) %      MCV 89.0 fL      MCH 29.5 pg      MCHC 33.2 g/dL      RDW 15.9 (H) %      RDW-SD 51.4 fl      MPV 11.2 (H) fL      Platelets 61 (L) 10*3/mm3      Neutrophil % 87.1 (H) %      Lymphocyte % 11.9 (L) %      Monocyte % 1.0 %      Eosinophil % 0.0 %      Basophil % 0.0 %      Immature Grans % 0.0 %      Neutrophils, Absolute 1.69 10*3/mm3      Lymphocytes, Absolute 0.23 (L) 10*3/mm3      Monocytes, Absolute 0.02 (L) 10*3/mm3      Eosinophils, Absolute 0.00 10*3/mm3      Basophils, Absolute 0.00 10*3/mm3      Immature Grans, Absolute 0.00 10*3/mm3     Comprehensive Metabolic Panel [[id number]]  (Abnormal) Collected:  04/03/17 1236    Specimen:  Blood from Arm, Left Updated:  04/03/17 1306     Glucose 371 (H) mg/dL      BUN 12 mg/dL      Creatinine 0.69 mg/dL      Sodium 136 mmol/L      Potassium 3.7 mmol/L       1+ Icteric         Chloride 104 mmol/L      CO2 27.0 mmol/L      Calcium 9.0 mg/dL      Total Protein 5.6 (L) g/dL      Albumin 3.40 (L) g/dL      ALT (SGPT) 183 (H) U/L      AST (SGOT) 305 (H) U/L      Alkaline Phosphatase 166 (H) U/L      Total Bilirubin 1.7 mg/dL      eGFR Non African Amer 120 mL/min/1.73      Globulin  2.2 gm/dL      A/G Ratio 1.5 g/dL      BUN/Creatinine Ratio 17.4     Anion Gap 5.0 mmol/L     Osmolality, Calculated [[id number]]  (Normal) Collected:  04/03/17 1236    Specimen:  Blood from Arm, Left Updated:  04/03/17 1306     Osmolality Calc 286.9 mOsm/kg     Urinalysis With / Culture If Indicated [[id number]]  (Abnormal) Collected:  04/03/17 1408    Specimen:  Urine from Urine, Clean Catch Updated:  04/03/17 1414     Color, UA Dark Yellow (A)     Appearance, UA Clear     pH, UA 5.5     Specific Gravity, UA >1.030 (H)     Glucose, UA >=1000 mg/dL (3+) (A)     Ketones, UA Trace (A)     Bilirubin, UA Negative     Blood, UA Negative     Protein, UA Negative     Leuk Esterase, UA Negative     Nitrite, UA Negative     Urobilinogen, UA 4.0 E.U./dL (A)    Narrative:       Urine microscopic not indicated.    Oxycodone, Urine [[id number]]  (Normal) Collected:  04/03/17 1408    Specimen:  Urine from Urine, Clean Catch Updated:  04/03/17 1416     Oxycodone Screen, Urine Negative    Narrative:       Oxycodone Screen Detects:    Oxycodone          100 ng/mL    Hydrocodone       1562 ng/mL    Codeine          19821 ng/mL    Dihydrocodeine   31906 ng/mL    Ethylmorphine    00771 ng/mL    Hydromorphone    22540 ng/mL    Oxymorphone      1562  ng/mL    Thebaine         46632 ng/mL    Buprenorphine Screen Urine [[id number]]  (Abnormal) Collected:  04/03/17 1408    Specimen:  Urine from Urine, Clean Catch Updated:  04/03/17 1417     Buprenorphine, Urine Positive (A)    Narrative:       Buprenorphine Screen Detects:            Buprenorphine 3 B D Glucaronide             2.5 ng/mL            Buprenorphine                               10 ng/mL            Nalorphine                                  1000 ng/mL            Norbuprenorphine                            93436 ng/ml            Norbuprenorphine  3 B D Glucaronide         95026 ng/mL    Urine Drug Screen [[id number]]  (Abnormal) Collected:  04/03/17 1408    Specimen:  Urine from Urine,  Clean Catch Updated:  04/03/17 1437     Amphetamine Screen, Urine Positive (A)     Barbiturates Screen, Urine Negative     Benzodiazepine Screen, Urine Negative     Cocaine Screen, Urine Negative     Methadone Screen, Urine Negative     Opiate Screen Positive (A)     Phencyclidine (PCP), Urine Negative     THC, Screen, Urine Negative     Propoxyphene Screen Negative    Narrative:       Negative Thresholds For Drugs Screened:                  Amphetamines              1000 ng/ml               Barbiturates               200 ng/ml               Benzodiazepines            200 ng/ml              Cocaine                    300 ng/ml              Methadone                  300 ng/ml              Opiates                    300 ng/ml               Phencyclidine               25 ng/ml               Propoxyphene               300 ng/ml              THC                         50 ng/ml    The reference range for all drugs tested is negative. This report includes final unconfirmed qualitative results to be used for medical treatment purposes only. Unconfirmed results must not be used for non-medical purposes such as employment or legal testing. Clinical consideration should be applied to any drug of abuse test, especially when unconfirmed quantitative results are used.      POC Glucose Fingerstick [[id number]]  (Abnormal) Collected:  04/03/17 1901    Specimen:  Blood Updated:  04/03/17 1910     Glucose 195 (H) mg/dL     Narrative:       Meter: [id number] : [id number] Daniel Hairston    Lactic Acid, Plasma [[id number]]  (Abnormal) Collected:  04/03/17 2133    Specimen:  Blood Updated:  04/03/17 2207     Lactate 2.5 (C) mmol/L     POC Glucose Fingerstick [[id number]]  (Abnormal) Collected:  04/03/17 2209    Specimen:  Blood Updated:  04/03/17 2212     Glucose 258 (H) mg/dL     Narrative:       Meter: [id number] : [id number] damien francisco    Magnesium [[id number]]  (Abnormal) Collected:  04/03/17 1236    Specimen:  Blood from Arm,  Left Updated:  04/03/17 2232     Magnesium 1.3 (C) mg/dL     Troponin [[id number]]  (Normal) Collected:  04/03/17 2225    Specimen:  Blood Updated:  04/03/17 2303     Troponin I <0.006 ng/mL     Narrative:       Ultra Troponin I Reference Range:         <=0.039 ng/mL: Negative    0.04-0.779 ng/mL: Indeterminate Range. Suspicious of MI.  Clinical correlation required.       >=0.78  ng/mL: Consistent with myocardial injury.  Clinical correlation required.    CK [[id number]]  (Abnormal) Collected:  04/03/17 2225    Specimen:  Blood Updated:  04/03/17 2310     Creatine Kinase 992 (C) U/L     CK-MB [[id number]]  (Abnormal) Collected:  04/03/17 2225    Specimen:  Blood Updated:  04/03/17 2310     CKMB 46.88 (C) ng/mL     Myoglobin, Serum [[id number]]  (Abnormal) Collected:  04/03/17 2225    Specimen:  Blood Updated:  04/03/17 2310     Myoglobin 268.0 (C) ng/mL     CK-MB Index [[id number]]  (Abnormal) Collected:  04/03/17 2225    Specimen:  Blood Updated:  04/03/17 2310     CK-MB Index 4.7 (H) %     POC Glucose Fingerstick [[id number]]  (Abnormal) Collected:  04/04/17 0056    Specimen:  Blood Updated:  04/04/17 0103     Glucose 251 (H) mg/dL     Narrative:       Meter: [id number] : [id number] damien francisco    Protime-INR [[id number]]  (Abnormal) Collected:  04/04/17 0200    Specimen:  Blood Updated:  04/04/17 0229     Protime 16.9 (H) Seconds      INR 1.48 (H)    Narrative:       Patients not on anticoagulant therapy:    INR 0.90-1.10     Suggested INR therapeutic range for stable oral anticoagulant therapy:             Routine therapy                      2.00-3.00           Recurrent MI                         2.50-3.50           Mechanical prosthetic valve          2.50-3.50    CBC & Differential [[id number]] Collected:  04/04/17 0201    Specimen:  Blood Updated:  04/04/17 0231    Narrative:       The following orders were created for panel order CBC & Differential.  Procedure                               Abnormality          Status                     ---------                               -----------         ------                     CBC Auto Differential[[id number]]         Abnormal            Final result                 Please view results for these tests on the individual orders.    CBC Auto Differential [[id number]]  (Abnormal) Collected:  04/04/17 0201    Specimen:  Blood Updated:  04/04/17 0231     WBC 3.89 (L) 10*3/mm3      RBC 3.85 (L) 10*6/mm3      Hemoglobin 11.5 (L) g/dL      Hematocrit 35.1 (L) %      MCV 91.2 fL      MCH 29.9 pg      MCHC 32.8 (L) g/dL      RDW 16.1 (H) %      RDW-SD 52.0 fl      MPV 10.6 (H) fL      Platelets 63 (L) 10*3/mm3      Neutrophil % 82.8 (H) %      Lymphocyte % 6.9 (L) %      Monocyte % 10.0 %      Eosinophil % 0.0 %      Basophil % 0.0 %      Immature Grans % 0.3 %      Neutrophils, Absolute 3.22 10*3/mm3      Lymphocytes, Absolute 0.27 (L) 10*3/mm3      Monocytes, Absolute 0.39 10*3/mm3      Eosinophils, Absolute 0.00 10*3/mm3      Basophils, Absolute 0.00 10*3/mm3      Immature Grans, Absolute 0.01 10*3/mm3     C-reactive Protein [[id number]]  (Abnormal) Collected:  04/04/17 0200    Specimen:  Blood Updated:  04/04/17 0240     C-Reactive Protein 2.28 (H) mg/dL     Lactate Acid, Reflex [[id number]]  (Abnormal) Collected:  04/04/17 0201    Specimen:  Blood Updated:  04/04/17 0242     Lactate 2.6 (C) mmol/L     Comprehensive Metabolic Panel [[id number]]  (Abnormal) Collected:  04/04/17 0201    Specimen:  Blood Updated:  04/04/17 0245     Glucose 313 (H) mg/dL      BUN 9 mg/dL      Creatinine 0.69 mg/dL      Sodium 139 mmol/L      Potassium 3.6 mmol/L      Chloride 110 mmol/L      CO2 24.3 mmol/L      Calcium 7.8 mg/dL      Total Protein 4.8 (L) g/dL      Albumin 2.80 (L) g/dL      ALT (SGPT) 144 (H) U/L      AST (SGOT) 174 (H) U/L      Alkaline Phosphatase 120 U/L       Note New Reference Ranges        Total Bilirubin 1.3 mg/dL      eGFR Non African Amer 120 mL/min/1.73      Globulin 2.0 gm/dL      A/G  Ratio 1.4 (L) g/dL      BUN/Creatinine Ratio 13.0     Anion Gap 4.7 mmol/L     Osmolality, Calculated [[id number]]  (Normal) Collected:  04/04/17 0201    Specimen:  Blood Updated:  04/04/17 0245     Osmolality Calc 288.1 mOsm/kg     Amylase [[id number]]  (Abnormal) Collected:  04/04/17 0201    Specimen:  Blood Updated:  04/04/17 0249     Amylase 17 (L) U/L     BNP [[id number]]  (Abnormal) Collected:  04/04/17 0201    Specimen:  Blood Updated:  04/04/17 0258     .0 (H) pg/mL     Lipase [[id number]]  (Normal) Collected:  04/04/17 0438    Specimen:  Blood Updated:  04/04/17 0529     Lipase 17 U/L     Myoglobin, Serum [[id number]]  (Abnormal) Collected:  04/04/17 0438    Specimen:  Blood Updated:  04/04/17 0533     Myoglobin 148.0 (H) ng/mL     Troponin [[id number]]  (Normal) Collected:  04/04/17 0438    Specimen:  Blood Updated:  04/04/17 0533     Troponin I <0.006 ng/mL     Narrative:       Ultra Troponin I Reference Range:         <=0.039 ng/mL: Negative    0.04-0.779 ng/mL: Indeterminate Range. Suspicious of MI.  Clinical correlation required.       >=0.78  ng/mL: Consistent with myocardial injury.  Clinical correlation required.    CK [[id number]]  (Abnormal) Collected:  04/04/17 0438    Specimen:  Blood Updated:  04/04/17 0538     Creatine Kinase 590 (C) U/L     CK-MB [[id number]]  (Abnormal) Collected:  04/04/17 0438    Specimen:  Blood Updated:  04/04/17 0538     CKMB 33.84 (C) ng/mL     CK-MB Index [[id number]]  (Abnormal) Collected:  04/04/17 0438    Specimen:  Blood Updated:  04/04/17 0538     CK-MB Index 5.7 (H) %     Lactic Acid, Plasma [[id number]]  (Normal) Collected:  04/04/17 0752    Specimen:  Blood Updated:  04/04/17 0827     Lactate 1.7 mmol/L     Magnesium [[id number]]  (Normal) Collected:  04/04/17 0752    Specimen:  Blood Updated:  04/04/17 0833     Magnesium 2.3 mg/dL     POC Glucose Fingerstick [[id number]]  (Abnormal) Collected:  04/04/17 1018    Specimen:  Blood Updated:  04/04/17 1022     Glucose 188 (H)  mg/dL     Narrative:       Meter: [id number] : [id number] Leno Dennis    Myoglobin, Serum [[id number]]  (Abnormal) Collected:  04/04/17 1101    Specimen:  Blood Updated:  04/04/17 1147     Myoglobin 182.0 (H) ng/mL     Troponin [[id number]]  (Normal) Collected:  04/04/17 1101    Specimen:  Blood Updated:  04/04/17 1147     Troponin I <0.006 ng/mL     Narrative:       Ultra Troponin I Reference Range:         <=0.039 ng/mL: Negative    0.04-0.779 ng/mL: Indeterminate Range. Suspicious of MI.  Clinical correlation required.       >=0.78  ng/mL: Consistent with myocardial injury.  Clinical correlation required.    CK-MB [[id number]]  (Abnormal) Collected:  04/04/17 1101    Specimen:  Blood Updated:  04/04/17 1159     CKMB 29.23 (C) ng/mL     CK [[id number]]  (Abnormal) Collected:  04/04/17 1101    Specimen:  Blood Updated:  04/04/17 1200     Creatine Kinase 425 (C) U/L     CK-MB Index [[id number]]  (Abnormal) Collected:  04/04/17 1101    Specimen:  Blood Updated:  04/04/17 1200     CK-MB Index 6.9 (H) %     Potassium [[id number]]  (Normal) Collected:  04/04/17 1101    Specimen:  Blood Updated:  04/04/17 1204     Potassium 4.2 mmol/L     POC Glucose Fingerstick [[id number]]  (Normal) Collected:  04/04/17 1537    Specimen:  Blood Updated:  04/04/17 1549     Glucose 130 mg/dL     Narrative:       Meter: [id number] : [id number] ULISES YOUNG    POC Glucose Fingerstick [[id number]]  (Normal) Collected:  04/04/17 2027    Specimen:  Blood Updated:  04/04/17 2030     Glucose 125 mg/dL     Narrative:       Meter: [id number] : [id number] alina weber    CBC & Differential [[id number]] Collected:  04/05/17 0144    Specimen:  Blood Updated:  04/05/17 0212    Narrative:       The following orders were created for panel order CBC & Differential.  Procedure                               Abnormality         Status                     ---------                               -----------         ------                     CBC Auto  Differential[[id number]]         Abnormal            Final result                 Please view results for these tests on the individual orders.    CBC Auto Differential [[id number]]  (Abnormal) Collected:  04/05/17 0144    Specimen:  Blood Updated:  04/05/17 0212     WBC 3.33 (L) 10*3/mm3      RBC 3.99 (L) 10*6/mm3      Hemoglobin 12.0 (L) g/dL      Hematocrit 36.9 (L) %      MCV 92.5 fL      MCH 30.1 pg      MCHC 32.5 (L) g/dL      RDW 16.4 (H) %      RDW-SD 53.8 fl      MPV 10.6 (H) fL      Platelets 67 (L) 10*3/mm3      Neutrophil % 66.7 %      Lymphocyte % 19.2 (L) %      Monocyte % 12.6 (H) %      Eosinophil % 1.2 %      Basophil % 0.3 %      Immature Grans % 0.0 %      Neutrophils, Absolute 2.22 10*3/mm3      Lymphocytes, Absolute 0.64 (L) 10*3/mm3      Monocytes, Absolute 0.42 10*3/mm3      Eosinophils, Absolute 0.04 10*3/mm3      Basophils, Absolute 0.01 10*3/mm3      Immature Grans, Absolute 0.00 10*3/mm3     Protime-INR [[id number]]  (Abnormal) Collected:  04/05/17 0144    Specimen:  Blood Updated:  04/05/17 0224     Protime 15.5 (H) Seconds      INR 1.37 (H)    Narrative:       Patients not on anticoagulant therapy:    INR 0.90-1.10     Suggested INR therapeutic range for stable oral anticoagulant therapy:             Routine therapy                      2.00-3.00           Recurrent MI                         2.50-3.50           Mechanical prosthetic valve          2.50-3.50    Hemoglobin A1c [[id number]]  (Abnormal) Collected:  04/05/17 0144    Specimen:  Blood Updated:  04/05/17 0224     Hemoglobin A1C 6.90 (H) %     Comprehensive Metabolic Panel [[id number]]  (Abnormal) Collected:  04/05/17 0144    Specimen:  Blood Updated:  04/05/17 0235     Glucose 300 (H) mg/dL      BUN 8 mg/dL      Creatinine 0.63 mg/dL      Sodium 140 mmol/L      Potassium 3.8 mmol/L      Chloride 109 mmol/L      CO2 28.4 mmol/L      Calcium 8.3 mg/dL      Total Protein 4.9 (L) g/dL      Albumin 2.80 (L) g/dL      ALT (SGPT) 127 (H) U/L       AST (SGOT) 106 (H) U/L      Alkaline Phosphatase 123 U/L       Note New Reference Ranges        Total Bilirubin 1.1 mg/dL      eGFR Non African Amer 134 mL/min/1.73      Globulin 2.1 gm/dL      A/G Ratio 1.3 (L) g/dL      BUN/Creatinine Ratio 12.7     Anion Gap 2.6 (L) mmol/L     C-reactive Protein [[id number]]  (Abnormal) Collected:  04/05/17 0144    Specimen:  Blood Updated:  04/05/17 0235     C-Reactive Protein 1.86 (H) mg/dL     Magnesium [[id number]]  (Normal) Collected:  04/05/17 0144    Specimen:  Blood Updated:  04/05/17 0235     Magnesium 1.8 mg/dL     Osmolality, Calculated [[id number]]  (Normal) Collected:  04/05/17 0144    Specimen:  Blood Updated:  04/05/17 0235     Osmolality Calc 288.9 mOsm/kg     POC Glucose Fingerstick [[id number]]  (Abnormal) Collected:  04/05/17 0658    Specimen:  Blood Updated:  04/05/17 0702     Glucose 191 (H) mg/dL     Narrative:       Meter: [id number] : [id number] [facility]          Imaging Results (last 72 hours)     Procedure Component Value Units Date/Time    XR Abdomen 3 View [[id number]] Collected:  04/03/17 1301     Updated:  04/03/17 1303    Narrative:       XR ABDOMEN 3 VW-     CLINICAL INDICATION: Abdominal pain          COMPARISON: None available      FINDINGS:  Chest:  The included view of the chest demonstrates the lungs to be well  aerated. There is no focal alveolar infiltrate or pleural effusion. The  cardiac silhouette is normal. No acute osseous abnormality is seen  within the chest.     Abdomen:  Two views of the abdomen show no free air. I do not see abnormal bowel  distention to suggest complete obstruction. The bony structures are  intact. No abnormal soft tissue masses are seen. No suspicious appearing  calcifications are identified on today's exam.       Impression:       1. Moderate to large volume stool in the colon  2. The lungs are clear.  3. No evidence of bowel obstruction.  4. No free air.               This report was finalized on 4/3/2017  1:01 PM by Dr. Jersey Maher MD.       XR Chest 1 View [[id number]] Collected:  04/04/17 0745     Updated:  04/04/17 0812    Narrative:       XR CHEST 1 VW-     CLINICAL INDICATION: subclavian line placement; A41.9-Sepsis,  unspecified organism; F19.10-Other psychoactive substance abuse,  uncomplicated; E10.9-Type 1 diabetes mellitus without complications          COMPARISON: 02/08/2017      TECHNIQUE: Single frontal view of the chest.     FINDINGS:     Right-sided central line has been placed. The tip is in the superior  vena cava. There is no pneumothorax.       There is no evidence of an acute osseous abnormality.   There are no suspicious-appearing parenchymal soft tissue nodules.            Impression:       Central line tip in the superior vena cava.         This report was finalized on 4/4/2017 8:09 AM by Dr. Jersey Maher MD.       US Spleen [[id number]] Collected:  04/04/17 0956     Updated:  04/04/17 1004    Narrative:       EXAMINATION: US SPLEEN-      CLINICAL INDICATION: Thrombocytopenia; A41.9-Sepsis, unspecified  organism; F19.10-Other psychoactive substance abuse, uncomplicated;  E10.9-Type 1 diabetes mellitus without complications          COMPARISON: 02/07/2017     FINDINGS: Sonographic imaging of the spleen demonstrates the spleen to  be enlarged at 18.5 cm. It is homogeneous in echotexture. There is no  focal splenic mass.       Impression:       Splenomegaly.      This report was finalized on 4/4/2017 9:57 AM by Dr. Jersey Maher MD.       US Venous Doppler Lower Extremity Bilateral (duplex) [[id number]] Collected:  04/04/17 1016     Updated:  04/04/17 1018    Narrative:       EXAMINATION: US VENOUS DOPPLER LOWER EXTREMITY BILATERAL (DUPLEX)-      CLINICAL INDICATION:     Leg Pain and Swelling  leg edema; A41.9-Sepsis, unspecified organism; F19.10-Other psychoactive  substance abuse, uncomplicated; E10.9-Type 1 diabetes mellitus without  complications      TECHNIQUE: Multiplanar gray scale and Doppler  "vascular sonographic  imaging of the deep veins  without and with compression.      COMPARISON: NONE      FINDINGS: The visualized deep veins demonstrate flow and are  compressible. No evidence of deep venous thrombosis.             Impression:       No DVT.     This report was finalized on 4/4/2017 10:16 AM by Dr. Brendan Tolentino MD.                Physician Progress Notes (last 72 hours) (Notes from 4/2/2017  8:23 AM through 4/5/2017  8:23 AM)      Memo Quarles MD at 4/3/2017  7:31 PM  Version 1 of 1           I have seen the patient in conjunction with Nurse in er     I have reviewed this case with the nurse practitioner, have interviewed and examined the patient, I concur with her findings in this note constitutes my findings.    History of presenting illness:  Patient was brought emergency room again this morning because he states he was hurting all over.  He states his abdomen was hurting his back was hurting his legs felt heavy he was shaky.  He is uncertain how long this had gone on although he has had some interaction with the people that live in the home he lives in and apparently has been \"kicked out\".  Some of this may have been doing to heavy laundry as it broke the machine he is unsure exactly.  He states his glucoses have been running low.  The intensity of the pain he relates as moderate to high but tends to wax and wane and he really does not know modifying factors.  He denies any associated fever at home.  He really cannot characterize this pain.    Vital Signs  Temp:  [97.1 °F (36.2 °C)-98.9 °F (37.2 °C)] 97.9 °F (36.6 °C)  Heart Rate:  [] 91  Resp:  [14-18] 18  BP: ()/() 120/89  Body mass index is 26.61 kg/(m^2).    No intake or output data in the 24 hours ending 04/03/17 1931  Intake & Output (last 3 days)     None          Physical exam:  Physical Exam   Constitutional: Well-developed, well-nourished.  Overall appears to be comfortable, pleasant, he became somewhat agitated " however when discussing events surrounding where he lives.  Head: Normocephalic and atraumatic.  Hearing is intact, mucous membranes are moist.  Speech is normal  Eyes:  Pupils are equal, round, and reactive to light. No scleral icterus.   Cardiovascular: Mildly tachycardic regular rhythm without murmur rub or gallop  Pulmonary/Chest: Bilateral breath sounds no rhonchus rales or wheezing heard  Abdominal: Soft, rounded mildly distended nontender bowel sounds are active no CVA tenderness  Musculoskeletal: Strength is symmetric, no joint effusions noted.  2+ edema he had an IV in his right foot that is going to be removed  Neurological: Nonfocal, alert.  Reflexes are 2+ in his patellar region strength overall.  Be symmetric  Skin: Skin is warm and dry.   Psychiatric:  Normal mood and affect.     EKG: nonspecific, reviewed      Results Review:  Lab Results   Component Value Date    WBC 1.94 (C) 04/03/2017    HGB 13.2 (L) 04/03/2017    HCT 39.8 (L) 04/03/2017    MCV 89.0 04/03/2017    PLT 61 (L) 04/03/2017     Lab Results   Component Value Date    GLUCOSE 371 (H) 04/03/2017    BUN 12 04/03/2017    CREATININE 0.69 04/03/2017    EGFRIFNONA 120 04/03/2017    EGFRIFAFRI  09/26/2016      Comment:      <15 Indicative of kidney failure.    BCR 17.4 04/03/2017    CO2 27.0 04/03/2017    CALCIUM 9.0 04/03/2017    ALBUMIN 3.40 (L) 04/03/2017    LABIL2 1.5 04/03/2017     (H) 04/03/2017     (H) 04/03/2017       Imaging Results (last 72 hours)     Procedure Component Value Units Date/Time    XR Abdomen 3 View [[id number]] Collected:  04/03/17 1301     Updated:  04/03/17 1303    Narrative:       XR ABDOMEN 3 VW-     CLINICAL INDICATION: Abdominal pain          COMPARISON: None available      FINDINGS:  Chest:  The included view of the chest demonstrates the lungs to be well  aerated. There is no focal alveolar infiltrate or pleural effusion. The  cardiac silhouette is normal. No acute osseous abnormality is seen  within  the chest.     Abdomen:  Two views of the abdomen show no free air. I do not see abnormal bowel  distention to suggest complete obstruction. The bony structures are  intact. No abnormal soft tissue masses are seen. No suspicious appearing  calcifications are identified on today's exam.       Impression:       1. Moderate to large volume stool in the colon  2. The lungs are clear.  3. No evidence of bowel obstruction.  4. No free air.               This report was finalized on 4/3/2017 1:01 PM by Dr. Jersey Maher MD.              Acute abdominal series images reviewed.      Assessment/Plan     Active Problems:    Septic shock by criteria including tachycardia, low white count and elevated lactic acid.  Patient did have the 30 cc/kg bolus and has been started on Zosyn and vancomycin.  Patient however does not appear overall to have an infectious process this may be related to his severe constipation combined with dehydration.  There was a question of pancreatitis.  His amylase lipase were normal last night.  I'm at this time however continuing Zosyn and vancomycin, will follow cultures.  Patient was monitored on telemetry, I've asked infectious disease to evaluate as well.    Diabetes, he relates that he is really not had to take much insulin has been getting some hypoglycemic episodes.  We'll follow his glucoses but of hypoglycemia confirmed may consider cosyntropin test.  No evidence of DKA, I'm starting low-dose basal insulin    Illicit drug use, he admitted to the Suboxone, I was unaware of the amphetamine at the time of my interview however he has a history of methamphetamine use.  I will discuss with him further.    Abdominal discomfort, I feel like this is secondary to constipation, he will be given 2 tap water enemas, will follow.  He has transaminitis probably related to his hepatitis.  This is essentially stable.    DVT prophylaxis, patient is thrombocytopenic therefore will use SCDs only    Edema, check  venous Dopplers, EF normal in September, will repeat echo and check BNP, most likely this is due to his cirrhosis    Presumed possible cirrhosis, he had active hepatitis recently, I will check a pro time in the a.m.    Pancytopenia again probably related to cirrhosis        Memo Quarles MD  04/03/17  7:31 PM       Electronically signed by Memo Quarles MD at 4/3/2017  7:47 PM      Memo Quarles MD at 4/4/2017  3:06 PM  Version 2 of 2           I have seen the patient in conjunction with Jeannie CULLEN      History of presenting illness:  Patient states he has had success with his enemas.  He is having some pain on urination, occasional abdominal pain however is having no chest pain or shortness of breath, no nausea no vomiting and he has tolerated his diet here.  He's had no hypoglycemia.  No chills or fever overnight.  He still states he has some chronic low back pain and leg pain.  He adamantly denies the methamphetamine use.  He has amphetamine screen was positive in his urine.    Vital Signs  Temp:  [97.7 °F (36.5 °C)-98.9 °F (37.2 °C)] 98 °F (36.7 °C)  Heart Rate:  [] 76  Resp:  [14-20] 18  BP: ()/(56-89) 108/74  Body mass index is 28.63 kg/(m^2).      Intake/Output Summary (Last 24 hours) at 04/04/17 1506  Last data filed at 04/04/17 1300   Gross per 24 hour   Intake             1320 ml   Output             1100 ml   Net              220 ml     Intake & Output (last 3 days)       04/01 0701 - 04/02 0700 04/02 0701 - 04/03 0700 04/03 0701 - 04/04 0700 04/04 0701 - 04/05 0700    P.O.   600 720    Total Intake(mL/kg)   600 (7) 720 (8.4)    Urine (mL/kg/hr)   300 800 (1.2)    Total Output     300 800    Net     +300 -80                  Physical exam:  Physical Exam   Constitutional: Well-developed, well-nourished.  Pleasant.  No distress overall appears to feel better today  Head: Normocephalic and atraumatic.  Hearing is intact, mucous membranes are moist.  Speech is normal  Eyes:  Pupils are equal,  round, and reactive to light. No scleral icterus. .   Cardiovascular: Normal rate, regular rhythm and normal heart sounds.  No murmur rub or gallop  Pulmonary/Chest: Bilateral breath sounds no rhonchus rales or wheezing heard  Abdominal: Soft, rounded, nondistended, minimal diffuse tenderness to deep palpation without peritoneal signs.  Musculoskeletal: Strength is symmetric, 1+ edema but overall improved over yesterday.  Neurological: Nonfocal, alert.    Skin: Skin is warm and dry.   Psychiatric:  Normal mood and affect.       Telemetry: sinus, reviewed    Results Review:  Lab Results   Component Value Date    WBC 3.89 (L) 04/04/2017    HGB 11.5 (L) 04/04/2017    HCT 35.1 (L) 04/04/2017    MCV 91.2 04/04/2017    PLT 63 (L) 04/04/2017     Lab Results   Component Value Date    GLUCOSE 313 (H) 04/04/2017    BUN 9 04/04/2017    CREATININE 0.69 04/04/2017    EGFRIFNONA 120 04/04/2017    EGFRIFAFRI  09/26/2016      Comment:      <15 Indicative of kidney failure.    BCR 13.0 04/04/2017    CO2 24.3 04/04/2017    CALCIUM 7.8 04/04/2017    ALBUMIN 2.80 (L) 04/04/2017    LABIL2 1.4 (L) 04/04/2017     (H) 04/04/2017     (H) 04/04/2017       Imaging Results (last 72 hours)     Procedure Component Value Units Date/Time    XR Abdomen 3 View [[id number]] Collected:  04/03/17 1301     Updated:  04/03/17 1303    Narrative:       XR ABDOMEN 3 VW-     CLINICAL INDICATION: Abdominal pain          COMPARISON: None available      FINDINGS:  Chest:  The included view of the chest demonstrates the lungs to be well  aerated. There is no focal alveolar infiltrate or pleural effusion. The  cardiac silhouette is normal. No acute osseous abnormality is seen  within the chest.     Abdomen:  Two views of the abdomen show no free air. I do not see abnormal bowel  distention to suggest complete obstruction. The bony structures are  intact. No abnormal soft tissue masses are seen. No suspicious appearing  calcifications are identified on  today's exam.       Impression:       1. Moderate to large volume stool in the colon  2. The lungs are clear.  3. No evidence of bowel obstruction.  4. No free air.               This report was finalized on 4/3/2017 1:01 PM by Dr. Jersey Maher MD.       XR Chest 1 View [[id number]] Collected:  04/04/17 0745     Updated:  04/04/17 0812    Narrative:       XR CHEST 1 VW-     CLINICAL INDICATION: subclavian line placement; A41.9-Sepsis,  unspecified organism; F19.10-Other psychoactive substance abuse,  uncomplicated; E10.9-Type 1 diabetes mellitus without complications          COMPARISON: 02/08/2017      TECHNIQUE: Single frontal view of the chest.     FINDINGS:     Right-sided central line has been placed. The tip is in the superior  vena cava. There is no pneumothorax.       There is no evidence of an acute osseous abnormality.   There are no suspicious-appearing parenchymal soft tissue nodules.            Impression:       Central line tip in the superior vena cava.         This report was finalized on 4/4/2017 8:09 AM by Dr. Jersey Maher MD.       US Spleen [[id number]] Collected:  04/04/17 0956     Updated:  04/04/17 1004    Narrative:       EXAMINATION: US SPLEEN-      CLINICAL INDICATION: Thrombocytopenia; A41.9-Sepsis, unspecified  organism; F19.10-Other psychoactive substance abuse, uncomplicated;  E10.9-Type 1 diabetes mellitus without complications          COMPARISON: 02/07/2017     FINDINGS: Sonographic imaging of the spleen demonstrates the spleen to  be enlarged at 18.5 cm. It is homogeneous in echotexture. There is no  focal splenic mass.       Impression:       Splenomegaly.      This report was finalized on 4/4/2017 9:57 AM by Dr. Jersey Maher MD.       US Venous Doppler Lower Extremity Bilateral (duplex) [[id number]] Collected:  04/04/17 1016     Updated:  04/04/17 1018    Narrative:       EXAMINATION: US VENOUS DOPPLER LOWER EXTREMITY BILATERAL (DUPLEX)-      CLINICAL INDICATION:     Leg Pain and  Swelling  leg edema; A41.9-Sepsis, unspecified organism; F19.10-Other psychoactive  substance abuse, uncomplicated; E10.9-Type 1 diabetes mellitus without  complications      TECHNIQUE: Multiplanar gray scale and Doppler vascular sonographic  imaging of the deep veins  without and with compression.      COMPARISON: NONE      FINDINGS: The visualized deep veins demonstrate flow and are  compressible. No evidence of deep venous thrombosis.             Impression:       No DVT.     This report was finalized on 4/4/2017 10:16 AM by Dr. Brendan Tolentino MD.            cxr image reviewed, deep line looks good        Assessment/Plan     Active Problems:    Septic shock, ruled out overall cultures are negative.  Antibiotics have been stopped, lactic acid normalized.  His constipation has improved.  I'm continuing IV fluids were decreased these.    Diabetes, A1c will be rechecked in the a.m., glucoses are overall under acceptable control, will follow on current insulin.    Cirrhosis most likely, INR is up, spleen ultrasound is 18.5 cm.  This is consistent with portal hypertension.  Patient has a history of hepatitis.  I've explained this to the patient this is very serious condition.  I am giving a dose of vitamin K to see if INR corrects.  Hepatitis B and C have been positive in the past.    Illicit drug use, patient adamantly denies any methamphetamine use.  He does admit to the Suboxone use.    Pain, he is not a good NSAID or Tylenol candidate because of his cirrhosis and a low counts, I'm going to give a very low-dose of Dilaudid when necessary, he is allergic to Ultram.    Constipation, improved, I have added scheduled lactulose.    Edema, slightly improved, venous Dopplers negative.  This likely is secondary to the cirrhosis as well.     is working on his disposition, patient is wanting potentially nursing home placement.          Memo Quarles MD  04/04/17  3:06 PM       Electronically signed by Memo ELKINS  MD Robina at 4/4/2017  3:16 PM      Memo Quarles MD at 4/4/2017  3:06 PM  Version 1 of 2           I have seen the patient in conjunction with Jeannie CULLEN      History of presenting illness:  Patient states he has had success with his enemas.  He is having some pain on urination, occasional abdominal pain however is having no chest pain or shortness of breath, no nausea no vomiting and he has tolerated his diet here.  He's had no hypoglycemia.  No chills or fever overnight.  He still states he has some chronic low back pain and leg pain.  He adamantly denies the methamphetamine use.  He has amphetamine screen was positive in his urine.    Vital Signs  Temp:  [97.7 °F (36.5 °C)-98.9 °F (37.2 °C)] 98 °F (36.7 °C)  Heart Rate:  [] 76  Resp:  [14-20] 18  BP: ()/(56-89) 108/74  Body mass index is 28.63 kg/(m^2).      Intake/Output Summary (Last 24 hours) at 04/04/17 1506  Last data filed at 04/04/17 1300   Gross per 24 hour   Intake             1320 ml   Output             1100 ml   Net              220 ml     Intake & Output (last 3 days)       04/01 0701 - 04/02 0700 04/02 0701 - 04/03 0700 04/03 0701 - 04/04 0700 04/04 0701 - 04/05 0700    P.O.   600 720    Total Intake(mL/kg)   600 (7) 720 (8.4)    Urine (mL/kg/hr)   300 800 (1.2)    Total Output     300 800    Net     +300 -80                  Physical exam:  Physical Exam   Constitutional: Well-developed, well-nourished.  Pleasant.  No distress overall appears to feel better today  Head: Normocephalic and atraumatic.  Hearing is intact, mucous membranes are moist.  Speech is normal  Eyes:  Pupils are equal, round, and reactive to light. No scleral icterus. .   Cardiovascular: Normal rate, regular rhythm and normal heart sounds.  No murmur rub or gallop  Pulmonary/Chest: Bilateral breath sounds no rhonchus rales or wheezing heard  Abdominal: Soft, rounded, nondistended, minimal diffuse tenderness to deep palpation without peritoneal  signs.  Musculoskeletal: Strength is symmetric, 1+ edema but overall improved over yesterday.  Neurological: Nonfocal, alert.    Skin: Skin is warm and dry.   Psychiatric:  Normal mood and affect.       Telemetry: sinus, reviewed    Results Review:  Lab Results   Component Value Date    WBC 3.89 (L) 04/04/2017    HGB 11.5 (L) 04/04/2017    HCT 35.1 (L) 04/04/2017    MCV 91.2 04/04/2017    PLT 63 (L) 04/04/2017     Lab Results   Component Value Date    GLUCOSE 313 (H) 04/04/2017    BUN 9 04/04/2017    CREATININE 0.69 04/04/2017    EGFRIFNONA 120 04/04/2017    EGFRIFAFRI  09/26/2016      Comment:      <15 Indicative of kidney failure.    BCR 13.0 04/04/2017    CO2 24.3 04/04/2017    CALCIUM 7.8 04/04/2017    ALBUMIN 2.80 (L) 04/04/2017    LABIL2 1.4 (L) 04/04/2017     (H) 04/04/2017     (H) 04/04/2017       Imaging Results (last 72 hours)     Procedure Component Value Units Date/Time    XR Abdomen 3 View [[id number]] Collected:  04/03/17 1301     Updated:  04/03/17 1303    Narrative:       XR ABDOMEN 3 VW-     CLINICAL INDICATION: Abdominal pain          COMPARISON: None available      FINDINGS:  Chest:  The included view of the chest demonstrates the lungs to be well  aerated. There is no focal alveolar infiltrate or pleural effusion. The  cardiac silhouette is normal. No acute osseous abnormality is seen  within the chest.     Abdomen:  Two views of the abdomen show no free air. I do not see abnormal bowel  distention to suggest complete obstruction. The bony structures are  intact. No abnormal soft tissue masses are seen. No suspicious appearing  calcifications are identified on today's exam.       Impression:       1. Moderate to large volume stool in the colon  2. The lungs are clear.  3. No evidence of bowel obstruction.  4. No free air.               This report was finalized on 4/3/2017 1:01 PM by Dr. Jersey Maher MD.       XR Chest 1 View [[id number]] Collected:  04/04/17 0750     Updated:   04/04/17 0812    Narrative:       XR CHEST 1 VW-     CLINICAL INDICATION: subclavian line placement; A41.9-Sepsis,  unspecified organism; F19.10-Other psychoactive substance abuse,  uncomplicated; E10.9-Type 1 diabetes mellitus without complications          COMPARISON: 02/08/2017      TECHNIQUE: Single frontal view of the chest.     FINDINGS:     Right-sided central line has been placed. The tip is in the superior  vena cava. There is no pneumothorax.       There is no evidence of an acute osseous abnormality.   There are no suspicious-appearing parenchymal soft tissue nodules.            Impression:       Central line tip in the superior vena cava.         This report was finalized on 4/4/2017 8:09 AM by Dr. Jersey Maher MD.       US Spleen [[id number]] Collected:  04/04/17 0956     Updated:  04/04/17 1004    Narrative:       EXAMINATION: US SPLEEN-      CLINICAL INDICATION: Thrombocytopenia; A41.9-Sepsis, unspecified  organism; F19.10-Other psychoactive substance abuse, uncomplicated;  E10.9-Type 1 diabetes mellitus without complications          COMPARISON: 02/07/2017     FINDINGS: Sonographic imaging of the spleen demonstrates the spleen to  be enlarged at 18.5 cm. It is homogeneous in echotexture. There is no  focal splenic mass.       Impression:       Splenomegaly.      This report was finalized on 4/4/2017 9:57 AM by Dr. Jersey Maher MD.       US Venous Doppler Lower Extremity Bilateral (duplex) [[id number]] Collected:  04/04/17 1016     Updated:  04/04/17 1018    Narrative:       EXAMINATION: US VENOUS DOPPLER LOWER EXTREMITY BILATERAL (DUPLEX)-      CLINICAL INDICATION:     Leg Pain and Swelling  leg edema; A41.9-Sepsis, unspecified organism; F19.10-Other psychoactive  substance abuse, uncomplicated; E10.9-Type 1 diabetes mellitus without  complications      TECHNIQUE: Multiplanar gray scale and Doppler vascular sonographic  imaging of the deep veins  without and with compression.      COMPARISON: NONE       FINDINGS: The visualized deep veins demonstrate flow and are  compressible. No evidence of deep venous thrombosis.             Impression:       No DVT.     This report was finalized on 4/4/2017 10:16 AM by Dr. Brendan Tolentino MD.            cxr image reviewed        Assessment/Plan     Active Problems:    Septic shock, ruled out overall cultures are negative.  Antibiotics have been stopped, lactic acid normalized.  His constipation has improved.  I'm continuing IV fluids were decreased these.    Diabetes, A1c will be rechecked in the a.m., glucoses are overall under acceptable control, will follow on current insulin.    Cirrhosis most likely, INR is up, spleen ultrasound is 18.5 cm.  This is consistent with portal hypertension.  Patient has a history of hepatitis.  I've explained this to the patient this is very serious condition.  I am giving a dose of vitamin K to see if INR corrects.  Hepatitis B and C have been positive in the past.    Illicit drug use, patient adamantly denies any methamphetamine use.  He does admit to the Suboxone use.    Pain, he is not a good NSAID or Tylenol candidate because of his cirrhosis and a low counts, I'm going to give a very low-dose of Dilaudid when necessary, he is allergic to Ultram.    Constipation, improved, I have added scheduled lactulose.    Edema, slightly improved, venous Dopplers negative.  This likely is secondary to the cirrhosis as well.     is working on his disposition, patient is wanting potentially nursing home placement.          Memo Quarles MD  04/04/17  3:06 PM       Electronically signed by Memo Quarles MD at 4/4/2017  3:15 PM           Consult Notes (last 72 hours) (Notes from 4/2/2017  8:23 AM through 4/5/2017  8:23 AM)      Bethany Piper PA-C at 4/4/2017 11:37 AM  Version 1 of 1     Consult Orders:    1. Inpatient Consult to Infectious Diseases [[id number]] ordered by Memo Quarles MD at 04/03/17 3006                  INFECTIOUS  DISEASE CONSULTATION REPORT      Referring Provider: Dr. Quarles  Reason for Consultation: Severe sepsis      Principal problem: <principal problem not specified>    Subjective .     History of present illness:    As you well know Dr. Quarles, Mr. Isaias Lang is a 52 y.o. years old male who was recently admitted with right hand phlebitis and seen by the infectious disease team.  He presented to the ED on 4/3/2017 complaining of hurting all over and was found to be tachycardic with leukopenia and elevated lactic acid.  Upon exam today the patient was very sedated and is unable to provide history of present illness or review of systems.    Infectious Disease consultation was requested for antimicrobial management.     History taken from: patient chart RN    Case was discussed with patient and nursing staff    Review of Systems-unable to obtain as the patient was very sedated this morning    Past Medical History    Past Medical History:   Diagnosis Date   • Abscess of antecubital fossa 05/2014    Left that required I and D and grew Haemophilus influenza group 1   • Anxiety    • Asthma    • Chronic pancreatitis    • COPD (chronic obstructive pulmonary disease)    • DDD (degenerative disc disease), lumbosacral    • Depression    • Diabetes mellitus    • DVT (deep venous thrombosis)     Right arm   • Essential hypertension    • GERD (gastroesophageal reflux disease)    • Hepatitis-C    • Hypercholesteremia    • Injury of back    • Injury of right Achilles tendon     Complicated by MRSA and Pseudomonas   • IV drug abuse    • Mild CAD     Left heart cath at  2012   • MRSA (methicillin resistant staph aureus) culture positive 09/14/2016    LUE   • Obesity    • Opiate addiction     Suboxone IV   • Self-injurious behavior    • Sleep apnea    • Suicide attempt    • Systolic CHF, chronic     EF 45-50% in 2013, EF 60% 9/2016       Past Surgical History    Past Surgical History:   Procedure Laterality Date   • CHOLECYSTECTOMY   "1990s   • INCISION AND DRAINAGE ABSCESS Left 2016    wrist   • INCISION AND DRAINAGE ARM Left 9/15/2016    Procedure: INCISION AND DRAINAGE UPPER EXTREMITY;  Surgeon: Rico Oseguera MD;  Location: [facility];  Service:    • ORIF ANKLE FRACTURE Right 2014       Family History    Family History   Problem Relation Age of Onset   • Gout Other    • Osteoporosis Other    • Arthritis Other      Rheumatoid and osteoarthritis   • Hypertension Other    • Heart disease Other    • Cancer Other    • Coronary artery disease Mother    • Lung disease Mother    • Gout Sister    • Cancer Maternal Grandmother    • Hypertension Maternal Grandfather    • Gout Maternal Grandfather        Social History    Social History   Substance Use Topics   • Smoking status: Former Smoker     Years: 1.00     Types: Cigarettes   • Smokeless tobacco: Never Used      Comment: quit smoking in my 20's   • Alcohol use No      Comment: denies       Allergies    Robitussin dm [dextromethorphan-guaifenesin]; Tizanidine; Metformin; Sulfa antibiotics; Contrast dye; and Tramadol    Objective     /74 (BP Location: Right arm, Patient Position: Lying)  Pulse 76  Temp 98 °F (36.7 °C) (Oral)   Resp 18  Ht 68\" (172.7 cm)  Wt 188 lb 5 oz (85.4 kg)  SpO2 98%  BMI 28.63 kg/m2    Temp:  [97.7 °F (36.5 °C)-98.9 °F (37.2 °C)] 98 °F (36.7 °C)        Intake/Output Summary (Last 24 hours) at 04/04/17 1138  Last data filed at 04/03/17 2115   Gross per 24 hour   Intake              600 ml   Output                0 ml   Net              600 ml         Physical Exam:     General Appearance:    very sedated    Head:    Normocephalic, without obvious abnormality, atraumatic   Eyes:            Lids and lashes normal, conjunctivae and sclerae normal, no   icterus, no pallor, corneas clear, PERRLA   Ears:    Ears appear intact with no abnormalities noted   Throat:   No oral lesions, no thrush, oral mucosa moist   Neck:   No adenopathy, supple, trachea midline, no " thyromegaly, no   carotid bruit, no JVD   Back:     No tenderness to percussion or palpation, range of motion   normal   Lungs:     Clear to auscultation,respirations regular, even and unlabored. No wheezing, no ronchi and no crackles.    Heart:    Regular rhythm and normal rate, normal S1 and S2, no            murmur, no gallop, no rub, no click   Chest Wall:    No abnormalities observed   Abdomen:     Normal bowel sounds, no masses, no organomegaly, soft        non-tender, non-distended, no guarding, no rebound                tenderness   Rectal:     Deferred   Extremities:   Moves all extremities well, no edema, no cyanosis, no             redness   Pulses:   Pulses palpable and equal bilaterally   Skin:   No bleeding, bruising or rash   Lymph nodes:   No palpable adenopathy   Neurologic:   very sedated        Results:      Results from last 7 days  Lab Units 04/04/17  0201 04/03/17  1236 04/02/17  2318   WBC 10*3/mm3 3.89* 1.94* 4.77     Lab Results   Component Value Date    NEUTROABS 3.22 04/04/2017         Results from last 7 days  Lab Units 04/04/17  0201   CREATININE mg/dL 0.69         Results from last 7 days  Lab Units 04/04/17  0200 04/02/17  2318   CRP mg/dL 2.28* 3.88*       Imaging Results (last 24 hours)     Procedure Component Value Units Date/Time    XR Abdomen 3 View [[id number]] Collected:  04/03/17 1301     Updated:  04/03/17 1303    Narrative:       XR ABDOMEN 3 VW-     CLINICAL INDICATION: Abdominal pain          COMPARISON: None available      FINDINGS:  Chest:  The included view of the chest demonstrates the lungs to be well  aerated. There is no focal alveolar infiltrate or pleural effusion. The  cardiac silhouette is normal. No acute osseous abnormality is seen  within the chest.     Abdomen:  Two views of the abdomen show no free air. I do not see abnormal bowel  distention to suggest complete obstruction. The bony structures are  intact. No abnormal soft tissue masses are seen. No  suspicious appearing  calcifications are identified on today's exam.       Impression:       1. Moderate to large volume stool in the colon  2. The lungs are clear.  3. No evidence of bowel obstruction.  4. No free air.               This report was finalized on 4/3/2017 1:01 PM by Dr. Jersey Maher MD.       XR Chest 1 View [[id number]] Collected:  04/04/17 0745     Updated:  04/04/17 0812    Narrative:       XR CHEST 1 VW-     CLINICAL INDICATION: subclavian line placement; A41.9-Sepsis,  unspecified organism; F19.10-Other psychoactive substance abuse,  uncomplicated; E10.9-Type 1 diabetes mellitus without complications          COMPARISON: 02/08/2017      TECHNIQUE: Single frontal view of the chest.     FINDINGS:     Right-sided central line has been placed. The tip is in the superior  vena cava. There is no pneumothorax.       There is no evidence of an acute osseous abnormality.   There are no suspicious-appearing parenchymal soft tissue nodules.            Impression:       Central line tip in the superior vena cava.         This report was finalized on 4/4/2017 8:09 AM by Dr. Jersey Maher MD.       US Spleen [[id number]] Collected:  04/04/17 0956     Updated:  04/04/17 1004    Narrative:       EXAMINATION: US SPLEEN-      CLINICAL INDICATION: Thrombocytopenia; A41.9-Sepsis, unspecified  organism; F19.10-Other psychoactive substance abuse, uncomplicated;  E10.9-Type 1 diabetes mellitus without complications          COMPARISON: 02/07/2017     FINDINGS: Sonographic imaging of the spleen demonstrates the spleen to  be enlarged at 18.5 cm. It is homogeneous in echotexture. There is no  focal splenic mass.       Impression:       Splenomegaly.      This report was finalized on 4/4/2017 9:57 AM by Dr. Jersey Maher MD.       US Venous Doppler Lower Extremity Bilateral (duplex) [[id number]] Collected:  04/04/17 1016     Updated:  04/04/17 1018    Narrative:       EXAMINATION: US VENOUS DOPPLER LOWER EXTREMITY BILATERAL  (DUPLEX)-      CLINICAL INDICATION:     Leg Pain and Swelling  leg edema; A41.9-Sepsis, unspecified organism; F19.10-Other psychoactive  substance abuse, uncomplicated; E10.9-Type 1 diabetes mellitus without  complications      TECHNIQUE: Multiplanar gray scale and Doppler vascular sonographic  imaging of the deep veins  without and with compression.      COMPARISON: NONE      FINDINGS: The visualized deep veins demonstrate flow and are  compressible. No evidence of deep venous thrombosis.             Impression:       No DVT.     This report was finalized on 4/4/2017 10:16 AM by Dr. Brendan Tolentino MD.               Cultures:    Blood Culture   Date Value Ref Range Status   04/02/2017 No growth at 24 hours  Preliminary     Urine Culture   Date Value Ref Range Status   04/02/2017 No growth at 24 hours  Preliminary       Results Review:    I have personally reviewed laboratory data, culture results, radiology studies and antimicrobial therapy.    Hospital Medications (active)       Dose Frequency Start End    albuterol (PROVENTIL) nebulizer solution 0.083% 2.5 mg/3mL 2.5 mg Once 4/3/2017 4/3/2017    Sig - Route: Take 2.5 mg by nebulization 1 (One) Time. - Nebulization    dextrose (D50W) solution 25 g 25 g Every 15 Minutes PRN 4/3/2017     Sig - Route: Infuse 50 mL into a venous catheter Every 15 (Fifteen) Minutes As Needed for Low Blood Sugar (Blood Sugar Less Than 70, Patient Has IV Access - Unresponsive, NPO or Unable To Safely Swallow). - Intravenous    dextrose (GLUTOSE) oral gel 15 g 15 g Every 15 Minutes PRN 4/3/2017     Sig - Route: Take 15 g by mouth Every 15 (Fifteen) Minutes As Needed for Low Blood Sugar (Blood Sugar Less Than 70, Patient Alert, Is Not NPO & Can Safely Swallow). - Oral    glucagon (GLUCAGEN) injection 1 mg 1 mg Every 15 Minutes PRN 4/3/2017     Sig - Route: Inject 1 mg under the skin Every 15 (Fifteen) Minutes As Needed (Blood Glucose Less Than 70 - Patient Without IV Access - Unresponsive,  "NPO or Unable To Safely Swallow). - Subcutaneous    HYDROmorphone (DILAUDID) injection 1 mg 1 mg Once 4/3/2017 4/3/2017    Sig - Route: Infuse 1 mg into a venous catheter 1 (One) Time. - Intravenous    HYDROmorphone (DILAUDID) injection 1 mg 1 mg Once 4/3/2017 4/3/2017    Sig - Route: Infuse 1 mg into a venous catheter 1 (One) Time. - Intravenous    insulin aspart (novoLOG) injection 0-14 Units 0-14 Units 4 Times Daily Before Meals & Nightly 4/3/2017     Sig - Route: Inject 0-14 Units under the skin 4 (Four) Times a Day Before Meals & at Bedtime. - Subcutaneous    insulin aspart (novoLOG) injection 5 Units 5 Units 3 Times Daily With Meals 4/4/2017     Sig - Route: Inject 5 Units under the skin 3 (Three) Times a Day With Meals. - Subcutaneous    insulin detemir (LEVEMIR) injection 10 Units 10 Units Every Morning 4/4/2017     Sig - Route: Inject 10 Units under the skin Every Morning. - Subcutaneous    Magnesium Sulfate 2 gram Bolus, followed by 8 gram infusion (total Mg dose 10 grams)- Mg less than or equal to 1mg/dL 2 g As Needed 4/3/2017     Sig - Route: Infuse 50 mL into a venous catheter As Needed (Mg less than or equal to 1mg/dL). - Intravenous    Linked Group 1:  \"Or\" Linked Group Details        magnesium sulfate 2g/50 mL (PREMIX) infusion 2 g Every 2 Hours 4/4/2017 4/4/2017    Sig - Route: Infuse 50 mL into a venous catheter Every 2 (Two) Hours. - Intravenous    magnesium sulfate 4 gram infusion- Mg 1.6-1.9 mg/dL 4 g As Needed 4/3/2017     Sig - Route: Infuse 100 mL into a venous catheter As Needed (Mg 1.6-1.9 mg/dL). - Intravenous    Linked Group 1:  \"Or\" Linked Group Details        Magnesium Sulfate 6 gram Infusion (2 gm x 3) -Mg 1.1 -1.5 mg/dL 2 g As Needed 4/3/2017     Sig - Route: Infuse 50 mL into a venous catheter As Needed (Mg 1.1 -1.5 mg/dL). - Intravenous    Linked Group 1:  \"Or\" Linked Group Details        mupirocin (BACTROBAN) 2 % ointment  Every 12 Hours Scheduled 4/4/2017     Sig - Route: Apply  " "topically Every 12 (Twelve) Hours. - Topical    Cosign for Ordering: Required by Memo Quarles MD    nitroglycerin (NITROSTAT) SL tablet 0.4 mg 0.4 mg Every 5 Minutes PRN 4/3/2017     Sig - Route: Place 1 tablet under the tongue Every 5 (Five) Minutes As Needed for Chest Pain (if systolic BP greater than 100 mm/Hg.). - Sublingual    Pharmacy to dose vancomycin  Continuous PRN 4/3/2017     Sig - Route: Continuous As Needed for Consult. - Does not apply    Pharmacy to Dose Zosyn  Continuous PRN 4/3/2017     Sig - Route: Continuous As Needed for Consult. - Does not apply    piperacillin-tazobactam (ZOSYN) 3.375 g/100 mL 0.9% NS IVPB (mbp) 3.375 g Once 4/3/2017 4/3/2017    Sig - Route: Infuse 100 mL into a venous catheter 1 (One) Time. - Intravenous    piperacillin-tazobactam (ZOSYN) 3.375 g/100 mL 0.9% NS IVPB (mbp) 3.375 g Every 6 Hours 4/4/2017     Sig - Route: Infuse 100 mL into a venous catheter Every 6 (Six) Hours. - Intravenous    potassium chloride (KLOR-CON) packet 40 mEq 40 mEq As Needed 4/3/2017     Sig - Route: Take 40 mEq by mouth As Needed (potassium replacement, see admin instructions). - Oral    Linked Group 2:  \"Or\" Linked Group Details        potassium chloride (MICRO-K) CR capsule 40 mEq 40 mEq As Needed 4/3/2017     Sig - Route: Take 4 capsules by mouth As Needed (potassium replacement.  see admin instructions). - Oral    Linked Group 2:  \"Or\" Linked Group Details        potassium chloride 10 mEq in 100 mL IVPB 10 mEq Every 1 Hour PRN 4/3/2017     Sig - Route: Infuse 100 mL into a venous catheter Every 1 (One) Hour As Needed (potassium protocol PERIPHERAL - see admin instructions). - Intravenous    Linked Group 2:  \"Or\" Linked Group Details        potassium chloride 10 mEq in 100 mL IVPB 10 mEq Every 1 Hour 4/4/2017 4/4/2017    Sig - Route: Infuse 100 mL into a venous catheter Every 1 (One) Hour. - Intravenous    sodium chloride 0.9 % bolus 2,382 mL 30 mL/kg × 79.4 kg Once 4/3/2017 4/3/2017    Sig - " Route: Infuse 2,382 mL into a venous catheter 1 (One) Time. - Intravenous    sodium chloride 0.9 % flush 1-10 mL 1-10 mL As Needed 4/3/2017     Sig - Route: Infuse 1-10 mL into a venous catheter As Needed for Line Care. - Intravenous    sodium chloride 0.9 % infusion 125 mL/hr Continuous 4/3/2017     Sig - Route: Infuse 125 mL/hr into a venous catheter Continuous. - Intravenous    vancomycin (VANCOCIN) 1,500 mg in sodium chloride 0.9 % 500 mL IVPB 20 mg/kg × 79.4 kg Once 4/3/2017 4/3/2017    Sig - Route: Infuse 1,500 mg into a venous catheter 1 (One) Time. - Intravenous    vancomycin (VANCOCIN) 1,500 mg in sodium chloride 0.9 % 500 mL IVPB 1,500 mg Every 8 Hours 4/4/2017     Sig - Route: Infuse 1,500 mg into a venous catheter Every 8 (Eight) Hours. - Intravenous    glucagon (human recombinant) (GLUCAGEN DIAGNOSTIC) injection 1 mg (Discontinued) 1 mg Every 15 Minutes PRN 4/3/2017 4/3/2017    Sig - Route: Inject 1 mg under the skin Every 15 (Fifteen) Minutes As Needed (Blood Glucose Less Than 70 - Patient Without IV Access - Unresponsive, NPO or Unable To Safely Swallow). - Subcutaneous    Reason for Discontinue: Reorder    insulin detemir (LEVEMIR) injection 5 Units (Discontinued) 5 Units Nightly 4/4/2017 4/4/2017    Sig - Route: Inject 5 Units under the skin Every Night. - Subcutaneous    sodium chloride 0.9 % infusion (Discontinued) 125 mL/hr Continuous 4/3/2017 4/3/2017    Sig - Route: Infuse 125 mL/hr into a venous catheter Continuous. - Intravenous            PROBLEM LIST:    Patient Active Problem List   Diagnosis   • MDD (major depressive disorder), recurrent episode, moderate   • Type 1 diabetes mellitus   • 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   • Cellulitis of right hand   • Cellulitis of right hand excluding fingers and thumb   • Septic shock       Assessment/Plan     ASSESSMENT:    1.  Severe sepsis with lactic acid over 2    PLAN:    At this time an infectious etiology of the patient's sepsis is not obvious with  chest x-ray showing no evidence of pneumonia, normalized lactic acid with cultures in progress.  We agree that this could be related to constipation as well as dehydration.  Antibiotic therapy will be discontinued at this point and we will continue to observe off of coverage very closely.        Patients findings and recommendations were discussed with patient and nursing staff    Code Status: Full Code    Bethany Piper PA-C  04/04/17  11:38 AM       Electronically signed by Bethany Piper PA-C at 4/4/2017 11:49 AM      Bethany Piper PA-C at 4/4/2017 11:49 AM  Version 1 of 1     Consult Orders:    1. Inpatient Consult to Infectious Diseases [[id number]] ordered by NOHEMI Levine at 04/03/17 1438                Repeat consult. See other consult note     Electronically signed by Bethany Piper PA-C at 4/4/2017 11:49 AM

## 2017-04-05 NOTE — PROGRESS NOTES
Discharge Planning Assessment   Art     Patient Name: Isaias Lang  MRN: [id number]  Today's Date: 4/5/2017    Admit Date: 4/3/2017          Discharge Needs Assessment     None            Discharge Plan       04/05/17 1457    Case Management/Social Work Plan    Plan SS spoke with Kaylah at [facility].  [facility] stated they could not meet pt's needs.  Pt to be discharged back to [facility] [place] in am.  SS will follow.        Discharge Placement     No information found                Demographic Summary     None            Functional Status     None            Psychosocial     None            Abuse/Neglect     None            Legal     None            Substance Abuse     None            Patient Forms     None          Sho Ferro

## 2017-04-05 NOTE — PROGRESS NOTES
I have seen the patient in conjunction with Laura CULLEN      History of presenting illness:  Patient states he is actually feeling better but his bowels are not moving as much is yesterday.  He denies any chest pain, breathing is okay and he is starting to actually tolerating his diet.  He is still feeling weak but stronger he is able to ambulate to the bathroom now.  Last night he was wanting some she does indeed eat these apparently which certainly adversely affects his diabetes.  No further urinary symptoms.  No abdominal pain.    Vital Signs  Temp:  [97.9 °F (36.6 °C)-98 °F (36.7 °C)] 97.9 °F (36.6 °C)  Heart Rate:  [] 92  Resp:  [18-20] 20  BP: ()/(57-70) 97/57  Body mass index is 28.39 kg/(m^2).      Intake/Output Summary (Last 24 hours) at 04/05/17 1214  Last data filed at 04/05/17 1107   Gross per 24 hour   Intake             1620 ml   Output             3650 ml   Net            -2030 ml     Intake & Output (last 3 days)       04/02 0701 - 04/03 0700 04/03 0701 - 04/04 0700 04/04 0701 - 04/05 0700 04/05 0701 - 04/06 0700    P.O.  600 1520 360    I.V. (mL/kg)   100 (1.2)     Total Intake(mL/kg)  600 (7) 1620 (19.1) 360 (4.3)    Urine (mL/kg/hr)  300 3300 (1.6) 350 (0.8)    Stool   0 (0)     Total Output   300 3300 350    Net   +300 -1680 +10            Unmeasured Stool Occurrence   2 x           Physical exam:  Physical Exam   Constitutional: Well-developed, well-nourished.  Pleasant.  No distress overall he appears to feel better  Head: Normocephalic and atraumatic.  Hearing is intact, mucous membranes are moist.   Eyes:  Pupils are equal, round, and reactive to light. No scleral icterus. .   Cardiovascular: Normal rate, regular rhythm and normal heart sounds.  No murmur rub or gallop  Pulmonary/Chest: Bilateral breath sounds no rhonchus rales or wheezing heard  Abdominal: Soft, rounded bowel sounds are active and nontender today  Musculoskeletal: Strength is symmetric,  Trace to 1+ edema  pedal  Neurological: Nonfocal, alert..  Skin: Skin is warm and dry.   Psychiatric:  Mood appears better affect normal    Telemetry: Sinus rhythm, reviewed    Results Review:  Lab Results   Component Value Date    WBC 3.33 (L) 04/05/2017    HGB 12.0 (L) 04/05/2017    HCT 36.9 (L) 04/05/2017    MCV 92.5 04/05/2017    PLT 67 (L) 04/05/2017     Lab Results   Component Value Date    GLUCOSE 300 (H) 04/05/2017    BUN 8 04/05/2017    CREATININE 0.63 04/05/2017    EGFRIFNONA 134 04/05/2017    EGFRIFAFRI  09/26/2016      Comment:      <15 Indicative of kidney failure.    BCR 12.7 04/05/2017    CO2 28.4 04/05/2017    CALCIUM 8.3 04/05/2017    ALBUMIN 2.80 (L) 04/05/2017    LABIL2 1.3 (L) 04/05/2017     (H) 04/05/2017     (H) 04/05/2017       Imaging Results (last 72 hours)     Procedure Component Value Units Date/Time    XR Abdomen 3 View [[id number]] Collected:  04/03/17 1301     Updated:  04/03/17 1303    Narrative:       XR ABDOMEN 3 VW-     CLINICAL INDICATION: Abdominal pain          COMPARISON: None available      FINDINGS:  Chest:  The included view of the chest demonstrates the lungs to be well  aerated. There is no focal alveolar infiltrate or pleural effusion. The  cardiac silhouette is normal. No acute osseous abnormality is seen  within the chest.     Abdomen:  Two views of the abdomen show no free air. I do not see abnormal bowel  distention to suggest complete obstruction. The bony structures are  intact. No abnormal soft tissue masses are seen. No suspicious appearing  calcifications are identified on today's exam.       Impression:       1. Moderate to large volume stool in the colon  2. The lungs are clear.  3. No evidence of bowel obstruction.  4. No free air.               This report was finalized on 4/3/2017 1:01 PM by Dr. Jersey Maher MD.       XR Chest 1 View [[id number]] Collected:  04/04/17 0745     Updated:  04/04/17 0812    Narrative:       XR CHEST 1 VW-     CLINICAL INDICATION:  subclavian line placement; A41.9-Sepsis,  unspecified organism; F19.10-Other psychoactive substance abuse,  uncomplicated; E10.9-Type 1 diabetes mellitus without complications          COMPARISON: 02/08/2017      TECHNIQUE: Single frontal view of the chest.     FINDINGS:     Right-sided central line has been placed. The tip is in the superior  vena cava. There is no pneumothorax.       There is no evidence of an acute osseous abnormality.   There are no suspicious-appearing parenchymal soft tissue nodules.            Impression:       Central line tip in the superior vena cava.         This report was finalized on 4/4/2017 8:09 AM by Dr. Jersey Maher MD.       US Spleen [[id number]] Collected:  04/04/17 0956     Updated:  04/04/17 1004    Narrative:       EXAMINATION: US SPLEEN-      CLINICAL INDICATION: Thrombocytopenia; A41.9-Sepsis, unspecified  organism; F19.10-Other psychoactive substance abuse, uncomplicated;  E10.9-Type 1 diabetes mellitus without complications          COMPARISON: 02/07/2017     FINDINGS: Sonographic imaging of the spleen demonstrates the spleen to  be enlarged at 18.5 cm. It is homogeneous in echotexture. There is no  focal splenic mass.       Impression:       Splenomegaly.      This report was finalized on 4/4/2017 9:57 AM by Dr. Jersey Maher MD.       US Venous Doppler Lower Extremity Bilateral (duplex) [[id number]] Collected:  04/04/17 1016     Updated:  04/04/17 1018    Narrative:       EXAMINATION: US VENOUS DOPPLER LOWER EXTREMITY BILATERAL (DUPLEX)-      CLINICAL INDICATION:     Leg Pain and Swelling  leg edema; A41.9-Sepsis, unspecified organism; F19.10-Other psychoactive  substance abuse, uncomplicated; E10.9-Type 1 diabetes mellitus without  complications      TECHNIQUE: Multiplanar gray scale and Doppler vascular sonographic  imaging of the deep veins  without and with compression.      COMPARISON: NONE      FINDINGS: The visualized deep veins demonstrate flow and  are  compressible. No evidence of deep venous thrombosis.             Impression:       No DVT.     This report was finalized on 4/4/2017 10:16 AM by Dr. Brendan Tolentino MD.          Ejection fraction acceptable small pericardial effusion noted.    Cultures negative      Assessment/Plan     Active Problems:   Initially met septic shock criteria but this was ruled out.    Cirrhosis, he was given vitamin K yesterday and INR is about the same.  Long-term prognosis is guarded.  This is most likely secondary to hepatitis.  Transaminases are improving.  Lipitor was stopped.    Constipation, I have added MiraLAX, will follow.  Will repeat a KUB in the a.m.    Weakness, ambulation team today, overall appears to be improving.    Diabetes, somewhat wide fluctuations but his diet is suspect, continue current insulin, no evidence of DKA.    His chronic pain with an acute exacerbation due to constipation is improving, continue current dose of Dilaudid    Low protein secondary to cirrhosis.    Illicit drug use, again counseled on the need to stay abstinent and the ongoing risk to his liver.    Thrombocytopenia related to splenomegaly related to the portal hypertension from cirrhosis, essentially stable.  Continue to monitor.  Because his stress test was unremarkable, he is at increased risk of bleeding both from cirrhosis and furthermore platelets, I'm stopping his aspirin.      Memo Quarles MD  04/05/17  12:14 PM

## 2017-04-05 NOTE — PROGRESS NOTES
"  I have personally seen and examined the patient today and discussed overnight interval progress and pertinent issues with nursing staff.    Review of Systems-unable to obtain as the patient continues to be very sedated      History taken from: patient chart RN      Vital Signs    BP 97/57 (BP Location: Right arm, Patient Position: Lying)  Pulse 92  Temp 97.9 °F (36.6 °C) (Oral)   Resp 20  Ht 68\" (172.7 cm)  Wt 186 lb 11.2 oz (84.7 kg)  SpO2 98%  BMI 28.39 kg/m2    Temp:  [97.9 °F (36.6 °C)-98 °F (36.7 °C)] 97.9 °F (36.6 °C)      Intake/Output Summary (Last 24 hours) at 04/05/17 1120  Last data filed at 04/05/17 0855   Gross per 24 hour   Intake             1620 ml   Output             3300 ml   Net            -1680 ml     Intake & Output (last 3 days)       04/02 0701 - 04/03 0700 04/03 0701 - 04/04 0700 04/04 0701 - 04/05 0700 04/05 0701 - 04/06 0700    P.O.  600 1520 360    I.V. (mL/kg)   100 (1.2)     Total Intake(mL/kg)  600 (7) 1620 (19.1) 360 (4.3)    Urine (mL/kg/hr)  300 3300 (1.6)     Stool   0 (0)     Total Output   300 3300      Net   +300 -1680 +360            Unmeasured Stool Occurrence   2 x           Physical Exam:      General Appearance:  very sedated    Head:  Normocephalic, without obvious abnormality, atraumatic   Eyes:      Lids and lashes normal, conjunctivae and sclerae normal, no icterus, no pallor, corneas clear, PERRLA   Ears:  Ears appear intact with no abnormalities noted   Throat: No oral lesions, no thrush, oral mucosa moist   Neck: No adenopathy, supple, trachea midline, no thyromegaly, no carotid bruit, no JVD   Back:  No tenderness to percussion or palpation, range of motion normal   Lungs:  Clear to auscultation,respirations regular, even and unlabored. No wheezing, no ronchi and no crackles.   Heart:  Regular rhythm and normal rate, normal S1 and S2, no murmur, no gallop, no rub, no click   Chest Wall:  No abnormalities observed   Abdomen:  Normal bowel sounds, no masses, " no organomegaly, soft non-tender, non-distended, no guarding, no rebound tenderness   Rectal:  Deferred   Extremities: Moves all extremities well, no edema, no cyanosis, no redness   Pulses: Pulses palpable and equal bilaterally   Skin: No bleeding, bruising or rash   Lymph nodes: No palpable adenopathy   Neurologic: very sedated          Results:      Results from last 7 days  Lab Units 04/05/17  0144 04/04/17  0201 04/03/17  1236 04/02/17  2318   WBC 10*3/mm3 3.33* 3.89* 1.94* 4.77     Lab Results   Component Value Date    NEUTROABS 2.22 04/05/2017         Results from last 7 days  Lab Units 04/05/17  0144   CREATININE mg/dL 0.63         Results from last 7 days  Lab Units 04/05/17  0144 04/04/17  0200 04/02/17  2318   CRP mg/dL 1.86* 2.28* 3.88*       Results Review:    I have personally reviewed laboratory data, culture results, radiology studies and antimicrobial therapy.    Hospital Medications (active)       Dose Frequency Start End    albuterol (PROVENTIL) nebulizer solution 0.083% 2.5 mg/3mL 2.5 mg Every 6 Hours PRN 4/4/2017     Sig - Route: Take 2.5 mg by nebulization Every 6 (Six) Hours As Needed for Shortness of Air. - Nebulization    aspirin chewable tablet 81 mg 81 mg Daily 4/5/2017     Sig - Route: Chew 1 tablet Daily. - Oral    Cosign for Ordering: Required by Memo Quarles MD    atorvastatin (LIPITOR) tablet 20 mg 20 mg Daily 4/5/2017     Sig - Route: Take 1 tablet by mouth Daily. - Oral    Cosign for Ordering: Required by Memo Quarles MD    citalopram (CeleXA) tablet 20 mg 20 mg Daily 4/5/2017     Sig - Route: Take 1 tablet by mouth Daily. - Oral    Cosign for Ordering: Required by Memo Quarles MD    dextrose (D50W) solution 25 g 25 g Every 15 Minutes PRN 4/3/2017     Sig - Route: Infuse 50 mL into a venous catheter Every 15 (Fifteen) Minutes As Needed for Low Blood Sugar (Blood Sugar Less Than 70, Patient Has IV Access - Unresponsive, NPO or Unable To Safely Swallow). - Intravenous     "dextrose (GLUTOSE) oral gel 15 g 15 g Every 15 Minutes PRN 4/3/2017     Sig - Route: Take 15 g by mouth Every 15 (Fifteen) Minutes As Needed for Low Blood Sugar (Blood Sugar Less Than 70, Patient Alert, Is Not NPO & Can Safely Swallow). - Oral    gabapentin (NEURONTIN) capsule 300 mg 300 mg 3 Times Daily 4/5/2017     Sig - Route: Take 1 capsule by mouth 3 (Three) Times a Day. - Oral    Cosign for Ordering: Required by Memo Quarles MD    glucagon (GLUCAGEN) injection 1 mg 1 mg Every 15 Minutes PRN 4/3/2017     Sig - Route: Inject 1 mg under the skin Every 15 (Fifteen) Minutes As Needed (Blood Glucose Less Than 70 - Patient Without IV Access - Unresponsive, NPO or Unable To Safely Swallow). - Subcutaneous    HYDROmorphone (DILAUDID) injection 0.25 mg 0.25 mg Every 4 Hours PRN 4/4/2017 4/14/2017    Sig - Route: Infuse 0.25 mg into a venous catheter Every 4 (Four) Hours As Needed for Severe Pain (7-10). - Intravenous    insulin aspart (novoLOG) injection 0-14 Units 0-14 Units 4 Times Daily Before Meals & Nightly 4/3/2017     Sig - Route: Inject 0-14 Units under the skin 4 (Four) Times a Day Before Meals & at Bedtime. - Subcutaneous    insulin aspart (novoLOG) injection 5 Units 5 Units 3 Times Daily With Meals 4/4/2017     Sig - Route: Inject 5 Units under the skin 3 (Three) Times a Day With Meals. - Subcutaneous    insulin detemir (LEVEMIR) injection 10 Units 10 Units Every Morning 4/4/2017     Sig - Route: Inject 10 Units under the skin Every Morning. - Subcutaneous    lactulose (CHRONULAC) 10 GM/15ML solution 20 g 20 g 2 Times Daily 4/4/2017     Sig - Route: Take 30 mL by mouth 2 (Two) Times a Day. - Oral    Magnesium Sulfate 2 gram Bolus, followed by 8 gram infusion (total Mg dose 10 grams)- Mg less than or equal to 1mg/dL 2 g As Needed 4/3/2017     Sig - Route: Infuse 50 mL into a venous catheter As Needed (Mg less than or equal to 1mg/dL). - Intravenous    Linked Group 1:  \"Or\" Linked Group Details        " "magnesium sulfate 4 gram infusion- Mg 1.6-1.9 mg/dL 4 g As Needed 4/3/2017     Sig - Route: Infuse 100 mL into a venous catheter As Needed (Mg 1.6-1.9 mg/dL). - Intravenous    Linked Group 1:  \"Or\" Linked Group Details        magnesium sulfate 4 gram infusion- Mg 1.6-1.9 mg/dL 4 g Once 4/5/2017 4/5/2017    Sig - Route: Infuse 100 mL into a venous catheter 1 (One) Time. - Intravenous    Magnesium Sulfate 6 gram Infusion (2 gm x 3) -Mg 1.1 -1.5 mg/dL 2 g As Needed 4/3/2017     Sig - Route: Infuse 50 mL into a venous catheter As Needed (Mg 1.1 -1.5 mg/dL). - Intravenous    Linked Group 1:  \"Or\" Linked Group Details        mupirocin (BACTROBAN) 2 % ointment  Every 12 Hours Scheduled 4/4/2017     Sig - Route: Apply  topically Every 12 (Twelve) Hours. - Topical    Cosign for Ordering: Required by Memo Quarles MD    nitroglycerin (NITROSTAT) SL tablet 0.4 mg 0.4 mg Every 5 Minutes PRN 4/3/2017     Sig - Route: Place 1 tablet under the tongue Every 5 (Five) Minutes As Needed for Chest Pain (if systolic BP greater than 100 mm/Hg.). - Sublingual    pancrelipase (Lip-Prot-Amyl) (CREON) capsule 24,000 units of lipase 24,000 units of lipase 4 Times Daily With Meals & Nightly 4/5/2017     Sig - Route: Take 2 capsules by mouth 4 (Four) Times a Day With Meals & at Bedtime. - Oral    Cosign for Ordering: Required by Memo Quarles MD    pantoprazole (PROTONIX) EC tablet 40 mg 40 mg 2 Times Daily 4/5/2017     Sig - Route: Take 1 tablet by mouth 2 (Two) Times a Day. - Oral    Cosign for Ordering: Required by Memo Quarles MD    Pharmacy to dose vancomycin  Continuous PRN 4/3/2017     Sig - Route: Continuous As Needed for Consult. - Does not apply    Pharmacy to Dose Zosyn  Continuous PRN 4/3/2017     Sig - Route: Continuous As Needed for Consult. - Does not apply    phytonadione (AQUA-MEPHYTON, VITAMIN K) injection 5 mg 5 mg Once 4/4/2017 4/4/2017    Sig - Route: Take 0.5 mL by mouth 1 (One) Time. - Oral    potassium chloride " "(KLOR-CON) packet 40 mEq 40 mEq As Needed 4/3/2017     Sig - Route: Take 40 mEq by mouth As Needed (potassium replacement, see admin instructions). - Oral    Linked Group 2:  \"Or\" Linked Group Details        potassium chloride (MICRO-K) CR capsule 40 mEq 40 mEq As Needed 4/3/2017     Sig - Route: Take 4 capsules by mouth As Needed (potassium replacement.  see admin instructions). - Oral    Linked Group 2:  \"Or\" Linked Group Details        potassium chloride 10 mEq in 100 mL IVPB 10 mEq Every 1 Hour PRN 4/3/2017     Sig - Route: Infuse 100 mL into a venous catheter Every 1 (One) Hour As Needed (potassium protocol PERIPHERAL - see admin instructions). - Intravenous    Linked Group 2:  \"Or\" Linked Group Details        potassium chloride 10 mEq in 100 mL IVPB 10 mEq Every 1 Hour 4/4/2017 4/4/2017    Sig - Route: Infuse 100 mL into a venous catheter Every 1 (One) Hour. - Intravenous    sodium chloride 0.9 % flush 1-10 mL 1-10 mL As Needed 4/3/2017     Sig - Route: Infuse 1-10 mL into a venous catheter As Needed for Line Care. - Intravenous    sodium chloride 0.9 % infusion 75 mL/hr Continuous 4/3/2017     Sig - Route: Infuse 75 mL/hr into a venous catheter Continuous. - Intravenous    nitroglycerin (NITROSTAT) SL tablet 0.4 mg (Discontinued) 0.4 mg Every 5 Minutes PRN 4/5/2017 4/5/2017    Sig - Route: Place 1 tablet under the tongue Every 5 (Five) Minutes As Needed for Chest Pain. - Sublingual    Cosign for Ordering: Required by Memo Quarles MD    piperacillin-tazobactam (ZOSYN) 3.375 g/100 mL 0.9% NS IVPB (mbp) (Discontinued) 3.375 g Every 6 Hours 4/4/2017 4/4/2017    Sig - Route: Infuse 100 mL into a venous catheter Every 6 (Six) Hours. - Intravenous    vancomycin (VANCOCIN) 1,500 mg in sodium chloride 0.9 % 500 mL IVPB (Discontinued) 1,500 mg Every 8 Hours 4/4/2017 4/4/2017    Sig - Route: Infuse 1,500 mg into a venous catheter Every 8 (Eight) Hours. - Intravenous            Cultures:    Blood Culture   Date Value " Ref Range Status   04/02/2017 No growth at 2 days  Preliminary     Urine Culture   Date Value Ref Range Status   04/02/2017 No growth  Final       PROBLEM LIST:    Patient Active Problem List   Diagnosis   • MDD (major depressive disorder), recurrent episode, moderate   • Type 1 diabetes mellitus   • 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   • Cellulitis of right hand   • Cellulitis of right hand excluding fingers and thumb   • Septic shock   • Sepsis       Assessment/Plan     ASSESSMENT:    1. Severe sepsis with lactic acid over 2     PLAN:     The patient seems to be stable from infectious disease standpoint with an almost normal CRP level normalized lactic acid.  HIV test ordered.    Infectious disease will sign off.  Please call back with any questions.  Thank you.    At this time an infectious etiology of the patient's sepsis is not obvious with chest x-ray showing no evidence of pneumonia, normalized lactic acid and negative cultures.  We agree that this could be related to constipation as well as dehydration.       Patients findings and recommendations were discussed with patient and nursing staff    Code Status: Full Code    Bethany Piper PA-C  04/05/17  11:20 AM

## 2017-04-06 LAB
A-A DO2: 3.8 MMHG (ref 0–300)
ALBUMIN SERPL-MCNC: 2.6 G/DL (ref 3.5–5)
ALBUMIN/GLOB SERPL: 1.2 G/DL (ref 1.5–2.5)
ALP SERPL-CCNC: 140 U/L (ref 40–129)
ALT SERPL W P-5'-P-CCNC: 107 U/L (ref 10–44)
ANION GAP SERPL CALCULATED.3IONS-SCNC: 0.3 MMOL/L (ref 3.6–11.2)
ARTERIAL PATENCY WRIST A: NORMAL
AST SERPL-CCNC: 80 U/L (ref 10–34)
ATMOSPHERIC PRESS: 716 MMHG
BASE EXCESS BLDA CALC-SCNC: 0.7 MMOL/L
BASOPHILS # BLD AUTO: 0.02 10*3/MM3 (ref 0–0.3)
BASOPHILS NFR BLD AUTO: 0.7 % (ref 0–2)
BDY SITE: NORMAL
BILIRUB SERPL-MCNC: 1.3 MG/DL (ref 0.2–1.8)
BODY TEMPERATURE: 98.6 C
BUN BLD-MCNC: 5 MG/DL (ref 7–21)
BUN/CREAT SERPL: 9.4 (ref 7–25)
CALCIUM SPEC-SCNC: 7.9 MG/DL (ref 7.7–10)
CHLORIDE SERPL-SCNC: 105 MMOL/L (ref 99–112)
CO2 SERPL-SCNC: 30.7 MMOL/L (ref 24.3–31.9)
COHGB MFR BLD: 1.4 % (ref 0–5)
CREAT BLD-MCNC: 0.53 MG/DL (ref 0.43–1.29)
CRP SERPL-MCNC: 1.26 MG/DL (ref 0–0.99)
DEPRECATED RDW RBC AUTO: 54 FL (ref 37–54)
EOSINOPHIL # BLD AUTO: 0.1 10*3/MM3 (ref 0–0.7)
EOSINOPHIL NFR BLD AUTO: 3.3 % (ref 0–5)
ERYTHROCYTE [DISTWIDTH] IN BLOOD BY AUTOMATED COUNT: 16.5 % (ref 11.5–14.5)
GFR SERPL CREATININE-BSD FRML MDRD: >150 ML/MIN/1.73
GLOBULIN UR ELPH-MCNC: 2.1 GM/DL
GLUCOSE BLD-MCNC: 240 MG/DL (ref 70–110)
GLUCOSE BLDC GLUCOMTR-MCNC: 135 MG/DL (ref 70–130)
GLUCOSE BLDC GLUCOMTR-MCNC: 141 MG/DL (ref 70–130)
GLUCOSE BLDC GLUCOMTR-MCNC: 190 MG/DL (ref 70–130)
GLUCOSE BLDC GLUCOMTR-MCNC: 205 MG/DL (ref 70–130)
HCO3 BLDA-SCNC: 26 MMOL/L (ref 22–26)
HCT VFR BLD AUTO: 39.1 % (ref 42–52)
HCT VFR BLD CALC: 44 % (ref 42–52)
HGB BLD-MCNC: 12.2 G/DL (ref 14–18)
HGB BLDA-MCNC: 15.1 G/DL (ref 12–16)
HOROWITZ INDEX BLD+IHG-RTO: 21 %
IMM GRANULOCYTES # BLD: 0 10*3/MM3 (ref 0–0.03)
IMM GRANULOCYTES NFR BLD: 0 % (ref 0–0.5)
LYMPHOCYTES # BLD AUTO: 0.74 10*3/MM3 (ref 1–3)
LYMPHOCYTES NFR BLD AUTO: 24.4 % (ref 21–51)
MAGNESIUM SERPL-MCNC: 1.9 MG/DL (ref 1.7–2.6)
MCH RBC QN AUTO: 29.3 PG (ref 27–33)
MCHC RBC AUTO-ENTMCNC: 31.2 G/DL (ref 33–37)
MCV RBC AUTO: 94 FL (ref 80–94)
METHGB BLD QL: 0.2 % (ref 0–3)
MODALITY: NORMAL
MONOCYTES # BLD AUTO: 0.41 10*3/MM3 (ref 0.1–0.9)
MONOCYTES NFR BLD AUTO: 13.5 % (ref 0–10)
NEUTROPHILS # BLD AUTO: 1.76 10*3/MM3 (ref 1.4–6.5)
NEUTROPHILS NFR BLD AUTO: 58.1 % (ref 30–70)
OSMOLALITY SERPL CALC.SUM OF ELEC: 277.1 MOSM/KG (ref 273–305)
OXYHGB MFR BLDV: 94.7 % (ref 85–100)
PCO2 BLDA: 43.8 MM HG (ref 35–45)
PH BLDA: 7.39 PH UNITS (ref 7.35–7.45)
PLATELET # BLD AUTO: 72 10*3/MM3 (ref 130–400)
PMV BLD AUTO: 10.5 FL (ref 6–10)
PO2 BLDA: 84.2 MM HG (ref 80–100)
POTASSIUM BLD-SCNC: 3.8 MMOL/L (ref 3.5–5.3)
PROT SERPL-MCNC: 4.7 G/DL (ref 6–8)
RBC # BLD AUTO: 4.16 10*6/MM3 (ref 4.7–6.1)
SAO2 % BLDCOA: 96.2 % (ref 90–100)
SODIUM BLD-SCNC: 136 MMOL/L (ref 135–153)
TROPONIN I SERPL-MCNC: <0.006 NG/ML
TROPONIN I SERPL-MCNC: <0.006 NG/ML
WBC NRBC COR # BLD: 3.03 10*3/MM3 (ref 4.5–12.5)

## 2017-04-06 PROCEDURE — 83050 HGB METHEMOGLOBIN QUAN: CPT | Performed by: INTERNAL MEDICINE

## 2017-04-06 PROCEDURE — 83735 ASSAY OF MAGNESIUM: CPT | Performed by: INTERNAL MEDICINE

## 2017-04-06 PROCEDURE — 63710000001 INSULIN DETEMIR PER 5 UNITS: Performed by: INTERNAL MEDICINE

## 2017-04-06 PROCEDURE — 36600 WITHDRAWAL OF ARTERIAL BLOOD: CPT | Performed by: INTERNAL MEDICINE

## 2017-04-06 PROCEDURE — 82375 ASSAY CARBOXYHB QUANT: CPT | Performed by: INTERNAL MEDICINE

## 2017-04-06 PROCEDURE — 99233 SBSQ HOSP IP/OBS HIGH 50: CPT | Performed by: INTERNAL MEDICINE

## 2017-04-06 PROCEDURE — 82962 GLUCOSE BLOOD TEST: CPT

## 2017-04-06 PROCEDURE — 80053 COMPREHEN METABOLIC PANEL: CPT | Performed by: INTERNAL MEDICINE

## 2017-04-06 PROCEDURE — 84484 ASSAY OF TROPONIN QUANT: CPT | Performed by: INTERNAL MEDICINE

## 2017-04-06 PROCEDURE — 25010000002 HYDROMORPHONE PER 4 MG: Performed by: INTERNAL MEDICINE

## 2017-04-06 PROCEDURE — 82805 BLOOD GASES W/O2 SATURATION: CPT | Performed by: INTERNAL MEDICINE

## 2017-04-06 PROCEDURE — 94799 UNLISTED PULMONARY SVC/PX: CPT

## 2017-04-06 PROCEDURE — 85025 COMPLETE CBC W/AUTO DIFF WBC: CPT | Performed by: INTERNAL MEDICINE

## 2017-04-06 PROCEDURE — 99254 IP/OBS CNSLTJ NEW/EST MOD 60: CPT | Performed by: INTERNAL MEDICINE

## 2017-04-06 PROCEDURE — 63710000001 INSULIN ASPART PER 5 UNITS: Performed by: NURSE PRACTITIONER

## 2017-04-06 PROCEDURE — 63710000001 INSULIN ASPART PER 5 UNITS: Performed by: INTERNAL MEDICINE

## 2017-04-06 PROCEDURE — G0378 HOSPITAL OBSERVATION PER HR: HCPCS

## 2017-04-06 PROCEDURE — 86140 C-REACTIVE PROTEIN: CPT | Performed by: INTERNAL MEDICINE

## 2017-04-06 RX ORDER — MAGNESIUM SULFATE HEPTAHYDRATE 40 MG/ML
4 INJECTION, SOLUTION INTRAVENOUS ONCE
Status: COMPLETED | OUTPATIENT
Start: 2017-04-06 | End: 2017-04-06

## 2017-04-06 RX ADMIN — MAGNESIUM SULFATE HEPTAHYDRATE 4 G: 40 INJECTION, SOLUTION INTRAVENOUS at 09:49

## 2017-04-06 RX ADMIN — MUPIROCIN: 20 OINTMENT TOPICAL at 21:36

## 2017-04-06 RX ADMIN — INSULIN DETEMIR 10 UNITS: 100 INJECTION, SOLUTION SUBCUTANEOUS at 09:48

## 2017-04-06 RX ADMIN — PANCRELIPASE 24000 UNITS OF LIPASE: 60000; 12000; 38000 CAPSULE, DELAYED RELEASE PELLETS ORAL at 13:13

## 2017-04-06 RX ADMIN — LACTULOSE 20 G: 10 SOLUTION ORAL at 09:48

## 2017-04-06 RX ADMIN — GABAPENTIN 300 MG: 300 CAPSULE ORAL at 20:42

## 2017-04-06 RX ADMIN — PANCRELIPASE 24000 UNITS OF LIPASE: 60000; 12000; 38000 CAPSULE, DELAYED RELEASE PELLETS ORAL at 20:42

## 2017-04-06 RX ADMIN — GABAPENTIN 300 MG: 300 CAPSULE ORAL at 16:14

## 2017-04-06 RX ADMIN — INSULIN ASPART 3 UNITS: 100 INJECTION, SOLUTION INTRAVENOUS; SUBCUTANEOUS at 20:41

## 2017-04-06 RX ADMIN — PANTOPRAZOLE SODIUM 40 MG: 40 TABLET, DELAYED RELEASE ORAL at 17:57

## 2017-04-06 RX ADMIN — MUPIROCIN: 20 OINTMENT TOPICAL at 13:14

## 2017-04-06 RX ADMIN — ALBUTEROL SULFATE 2.5 MG: 2.5 SOLUTION RESPIRATORY (INHALATION) at 18:09

## 2017-04-06 RX ADMIN — INSULIN ASPART 5 UNITS: 100 INJECTION, SOLUTION INTRAVENOUS; SUBCUTANEOUS at 09:49

## 2017-04-06 RX ADMIN — HYDROMORPHONE HYDROCHLORIDE 0.25 MG: 1 INJECTION, SOLUTION INTRAMUSCULAR; INTRAVENOUS; SUBCUTANEOUS at 09:48

## 2017-04-06 RX ADMIN — HYDROMORPHONE HYDROCHLORIDE 0.25 MG: 1 INJECTION, SOLUTION INTRAMUSCULAR; INTRAVENOUS; SUBCUTANEOUS at 16:14

## 2017-04-06 RX ADMIN — CITALOPRAM HYDROBROMIDE 20 MG: 20 TABLET ORAL at 09:47

## 2017-04-06 RX ADMIN — LACTULOSE 20 G: 10 SOLUTION ORAL at 17:57

## 2017-04-06 RX ADMIN — POLYETHYLENE GLYCOL (3350) 17 G: 17 POWDER, FOR SOLUTION ORAL at 09:47

## 2017-04-06 RX ADMIN — SODIUM CHLORIDE 75 ML/HR: 9 INJECTION, SOLUTION INTRAVENOUS at 13:15

## 2017-04-06 RX ADMIN — METOPROLOL TARTRATE 50 MG: 50 TABLET, FILM COATED ORAL at 20:41

## 2017-04-06 RX ADMIN — ALBUTEROL SULFATE 2.5 MG: 2.5 SOLUTION RESPIRATORY (INHALATION) at 07:05

## 2017-04-06 RX ADMIN — PANCRELIPASE 24000 UNITS OF LIPASE: 60000; 12000; 38000 CAPSULE, DELAYED RELEASE PELLETS ORAL at 09:47

## 2017-04-06 RX ADMIN — GABAPENTIN 300 MG: 300 CAPSULE ORAL at 09:48

## 2017-04-06 RX ADMIN — HYDROMORPHONE HYDROCHLORIDE: 1 INJECTION, SOLUTION INTRAMUSCULAR; INTRAVENOUS; SUBCUTANEOUS at 20:40

## 2017-04-06 RX ADMIN — PANTOPRAZOLE SODIUM 40 MG: 40 TABLET, DELAYED RELEASE ORAL at 09:48

## 2017-04-06 RX ADMIN — METOPROLOL TARTRATE 50 MG: 50 TABLET, FILM COATED ORAL at 09:47

## 2017-04-06 RX ADMIN — POLYETHYLENE GLYCOL (3350) 17 G: 17 POWDER, FOR SOLUTION ORAL at 17:57

## 2017-04-06 RX ADMIN — PANCRELIPASE 24000 UNITS OF LIPASE: 60000; 12000; 38000 CAPSULE, DELAYED RELEASE PELLETS ORAL at 17:57

## 2017-04-06 NOTE — PLAN OF CARE
Problem: Sepsis (Adult)  Goal: Signs and Symptoms of Listed Potential Problems Will be Absent or Manageable (Sepsis)  Outcome: Ongoing (interventions implemented as appropriate)    Problem: Diabetes, Type 2 (Adult)  Goal: Signs and Symptoms of Listed Potential Problems Will be Absent or Manageable (Diabetes, Type 2)  Outcome: Ongoing (interventions implemented as appropriate)

## 2017-04-06 NOTE — CONSULTS
Chief complaint:  Atrial fibrillation    History of present illness:   Mr. Lang is an unfortunate 52-year-old gentleman who presented to the hospital with a long history of multiple medical problems.  Notably, he complains of multiple different types of pain and on this hospitalization also complained of nausea and vomiting but no diarrhea.  He does have a history of IV drug abuse and does use Vioxx and amphetamines in the past.  He was admitted and treated for sepsis as well as constipation.  His drug screen was notable for use of amphetamines.  In any event, he had been stabilized and have been related to go home when he had what appears to have been a half-hour episode of atrial fibrillation.  I discussed with him whether or not he had palpitations and he states that he indeed did.  However, he describes relatively vague description of intermittent fluttering sensation but then seems to perseverate on discomfort and pain.  History is notable in that he underwent a cardiac catheterization in 2011 which was notable for mild nonobstructive coronary artery disease and preserved overall LV systolic function.  He apparently also underwent a cardiac catheterization in 2012 at the [facility] which apparently yielded similar results.  He did have a Holter monitor in 2011 which was unremarkable and a stress Cardiolite test in February 2017 which revealed preserved LV function without evidence of inducible ischemia or prior infarct.  He believes he has not had a previous stroke.  He did have a head CT scan in 2012 which was unremarkable for prior stroke.  He does have a history of diabetes.  He does not have a history of hypertension.  He does have a history of being chronically volume overloaded however this is marginally related to his cirrhosis and hypoalbuminemia.    His 14 point review of systems is negative as was otherwise mentioned in history of present illness.    Past medical  history:  Diabetes  History of cirrhosis  History of substance abuse  History of nonobstructive coronary artery disease  History of COPD  History of DVT  History of depression  History of chronic pancreatitis  History of hyperlipidemia  History of hepatitis C  History of obesity    Current medications:  Celexa 20 mg daily  Neurontin 300 mg 3 times a day  Insulin as directed  Lactulose  Metoprolol 50 mg twice daily  Protonix 40 mg daily    He has allergies Robitussin, tizanidine, metformin, sulfa antibiotics, IV dye and tramadol    Current physical examination:  Blood pressure is 116/79 with a heart rate of 92 and an oxygen saturation of 94%.  In general he is a gentleman in no apparent distress alert.  He is oriented to person place and time but not necessarily his situation.  His HEENT exam reveals his oral mucosa to be normal.  He has no significant JVD or hepatojugular reflex.  He has no carotid bruits noted.  Chest is symmetric  Lungs revealed decreased air movement bilaterally with minimal diffuse wheezing noted.  There is a slightly prolonged expiratory phase.  He is not using accessory muscles for review.  Cardiac exam reveals an intact PMI with a regular rate and rhythm.  There is a normal S1 and S2.  There is no S3 or S4.  There are no murmurs rubs or bruits.  His abdominal exam reveals relatively tense abdomen there are normal bowel sounds.  There are no masses noted.  His liver is not easily identified.  Extremities show no clubbing or cyanosis.  There is 2+ edema bilaterally.  His musculoskeletal exam is normal.    Current laboratory data:  Serial cardiac enzymes are unremarkable.(CK-MB is mildly elevated however, I was only on one occurrence the troponins are completely normal.)  CMP is notable for a glucose of 240.  His ALT is 107 with an AST of 80.  His albumin is 2.6  His CBC is notable for white count of 3.03 with an H&H of 12.2 and 39.1 respectively.  His platelet count is 72.  EKG reveals normal  sinus rhythm with normal intervals and durations.  There are no acute or chronic ischemic changes noted.  Review of telemetry data is notable for 1 strip that suggests atrial fibrillation at 958 last night.  His heart rate trend suggests that his heart rate was elevated from approximately 9:45 PM to 10:15 PM.  There are no palpitations of this in the nursing notes.    Final impression plan:  Paroxysmal atrial fibrillation.  Most important in his discussion is whether or not he should be anticoagulated.  From a strictly mathematical point of view he has a CHADS2-Vasc score of 3 for treated hypertension, diabetes, and vascular disease.  This yields a 3.2% chance per year of stroke.  Alternatively he has a HAS-BLED score of 2 for substance abuse and abnormal liver function.  This yields a 1.9% chance per year of bleeding.  However, in addition to this he has thrombocytopenia present and his ability to comply with the potentially dangerous drug such as Coumadin or any of the NOACs would be unlikely.  As such, as part of his HAS-BLED score he should also be considered to have the equivalent of drug therapy (non-functioning platelets) and labile INR's (inability to comply with medical instructions).  This revealed a score of 4 or an approximate 9% chance per year of bleeding.  As such, I would favor not placing him on anticoagulant therapy.  Additionally, an attempt at rhythm control with antiarrhythmic therapy will also be unlikely to be beneficial given his coexistent amphetamine use and liver failure.  I agree with increasing his metoprolol to 50 mg daily as a reasonable attempt at rate control should he go back into atrial fibrillation.  Obviously avoiding amphetamines would be of paramount importance.  Obviously this is not a final impression area I will discuss with Isaias the great importance of medical compliance and if he can demonstrate the ability to make follow-up appointments I will reconsider placing him on  anticoagulant therapy.  I have discussed all this with my colleague Dr. Hebert who agrees that anticoagulant therapy in the face of medical noncompliance and liver disease would have disastrously high risk of a significant bleeding event.

## 2017-04-06 NOTE — CONSULTS
"Diabetes Education  Assessment/Teaching    Patient Name:  Isaias Lang  YOB: 1965  MRN: [id number]  Admit Date:  4/3/2017      Assessment Date:  4/6/2017       Most Recent Value    General Information      Height  5' 8\" (1.727 m)    Height Method  Stated    Weight  186 lb 12.8 oz (84.7 kg)    Weight Method  Bed scale    Pregnancy Assessment     Diabetes History     What type of diabetes do you have?  Type 2    Education Preferences     Nutrition Information     Assessment Topics     DM Goals                 Other Comments:  A1c 6.9 \" do not need anything\"        Electronically signed by:  Cherri Oliver RN  04/06/17 12:09 PM  "

## 2017-04-06 NOTE — PROGRESS NOTES
"  I have seen the patient in conjunction with Maria G CULLEN RN      History of presenting illness:  Patient is complaining of some dysuria.  Patient also developed SVT last p.m.  He had no chest pain with this but was short of breath.  He did have \"childhood asthma\".  He is having no significant cough but does feel short of breath on and off.  He states he is also having some dysuria.  He is uncertain how long this is been going on.  It feels like a sharp pain sometimes when he urinates.  He is tolerating his diet without emesis.    Vital Signs  Temp:  [98 °F (36.7 °C)-98.4 °F (36.9 °C)] 98 °F (36.7 °C)  Heart Rate:  [] 66  Resp:  [18] 18  BP: (105-135)/(65-98) 114/68  Body mass index is 28.4 kg/(m^2).      Intake/Output Summary (Last 24 hours) at 04/06/17 1801  Last data filed at 04/06/17 1337   Gross per 24 hour   Intake             2920 ml   Output                0 ml   Net             2920 ml     Intake & Output (last 3 days)       04/03 0701 - 04/04 0700 04/04 0701 - 04/05 0700 04/05 0701 - 04/06 0700 04/06 0701 - 04/07 0700    P.O. 600 1520 1680 840    I.V. (mL/kg)  100 (1.2) 1000 (11.8)     Total Intake(mL/kg) 600 (7) 1620 (19.1) 2680 (31.6) 840 (9.9)    Urine (mL/kg/hr) 300 3300 (1.6) 350 (0.2)     Stool  0 (0)      Total Output 300 3300 350      Net +300 -1680 +2330 +840            Unmeasured Urine Occurrence   4 x 3 x    Unmeasured Stool Occurrence  2 x            Physical exam:  Physical Exam   Constitutional: Well-developed, well-nourished.  Pleasant.  No respiratory distress.  Initially his saturation was reading 87% but then this quickly changed to 97% and is probably more of a monitor problem.  Head: Normocephalic and atraumatic.  Hearing is intact, mucous membranes are moist.   Eyes:  Pupils are equal, round, and reactive to light. No scleral icterus. .   Cardiovascular: Normal rate, regular rhythm and normal heart sounds.  No murmur rub or gallop  Pulmonary/Chest: Bilateral breath sounds rare " end-expiratory wheeze heard I've asked nursing to get him a breathing treatment his word when necessary  Abdominal: Soft, rounded bowel sounds are active and nontender today  Musculoskeletal: Strength is symmetric,  Trace to 1-2+ edema pedal  Neurological: Nonfocal, alert. Mood is good  Skin: Skin is warm and dry.     Telemetry: Sinus rhythm, reviewed however there was an episode of SVT that I feel like was atrial fibrillation last p.m.    EKG from last p.m. images reviewed and nonspecific    Results Review:  Lab Results   Component Value Date    WBC 3.03 (L) 04/06/2017    HGB 12.2 (L) 04/06/2017    HCT 39.1 (L) 04/06/2017    MCV 94.0 04/06/2017    PLT 72 (L) 04/06/2017     Lab Results   Component Value Date    GLUCOSE 240 (H) 04/06/2017    BUN 5 (L) 04/06/2017    CREATININE 0.53 04/06/2017    EGFRIFNONA >150 04/06/2017    EGFRIFAFRI  09/26/2016      Comment:      <15 Indicative of kidney failure.    BCR 9.4 04/06/2017    CO2 30.7 04/06/2017    CALCIUM 7.9 04/06/2017    ALBUMIN 2.60 (L) 04/06/2017    LABIL2 1.2 (L) 04/06/2017    AST 80 (H) 04/06/2017     (H) 04/06/2017       Imaging Results (last 72 hours)     Procedure Component Value Units Date/Time    XR Abdomen 3 View [[id number]] Collected:  04/03/17 1301     Updated:  04/03/17 1303    Narrative:       XR ABDOMEN 3 VW-     CLINICAL INDICATION: Abdominal pain          COMPARISON: None available      FINDINGS:  Chest:  The included view of the chest demonstrates the lungs to be well  aerated. There is no focal alveolar infiltrate or pleural effusion. The  cardiac silhouette is normal. No acute osseous abnormality is seen  within the chest.     Abdomen:  Two views of the abdomen show no free air. I do not see abnormal bowel  distention to suggest complete obstruction. The bony structures are  intact. No abnormal soft tissue masses are seen. No suspicious appearing  calcifications are identified on today's exam.       Impression:       1. Moderate to large  volume stool in the colon  2. The lungs are clear.  3. No evidence of bowel obstruction.  4. No free air.               This report was finalized on 4/3/2017 1:01 PM by Dr. Jersey Maher MD.       XR Chest 1 View [[id number]] Collected:  04/04/17 0745     Updated:  04/04/17 0812    Narrative:       XR CHEST 1 VW-     CLINICAL INDICATION: subclavian line placement; A41.9-Sepsis,  unspecified organism; F19.10-Other psychoactive substance abuse,  uncomplicated; E10.9-Type 1 diabetes mellitus without complications          COMPARISON: 02/08/2017      TECHNIQUE: Single frontal view of the chest.     FINDINGS:     Right-sided central line has been placed. The tip is in the superior  vena cava. There is no pneumothorax.       There is no evidence of an acute osseous abnormality.   There are no suspicious-appearing parenchymal soft tissue nodules.            Impression:       Central line tip in the superior vena cava.         This report was finalized on 4/4/2017 8:09 AM by Dr. Jersey Maher MD.       US Spleen [[id number]] Collected:  04/04/17 0956     Updated:  04/04/17 1004    Narrative:       EXAMINATION: US SPLEEN-      CLINICAL INDICATION: Thrombocytopenia; A41.9-Sepsis, unspecified  organism; F19.10-Other psychoactive substance abuse, uncomplicated;  E10.9-Type 1 diabetes mellitus without complications          COMPARISON: 02/07/2017     FINDINGS: Sonographic imaging of the spleen demonstrates the spleen to  be enlarged at 18.5 cm. It is homogeneous in echotexture. There is no  focal splenic mass.       Impression:       Splenomegaly.      This report was finalized on 4/4/2017 9:57 AM by Dr. Jersey Maher MD.       US Venous Doppler Lower Extremity Bilateral (duplex) [[id number]] Collected:  04/04/17 1016     Updated:  04/04/17 1018    Narrative:       EXAMINATION: US VENOUS DOPPLER LOWER EXTREMITY BILATERAL (DUPLEX)-      CLINICAL INDICATION:     Leg Pain and Swelling  leg edema; A41.9-Sepsis, unspecified organism;  F19.10-Other psychoactive  substance abuse, uncomplicated; E10.9-Type 1 diabetes mellitus without  complications      TECHNIQUE: Multiplanar gray scale and Doppler vascular sonographic  imaging of the deep veins  without and with compression.      COMPARISON: NONE      FINDINGS: The visualized deep veins demonstrate flow and are  compressible. No evidence of deep venous thrombosis.             Impression:       No DVT.     This report was finalized on 4/4/2017 10:16 AM by Dr. Brendan Tolentino MD.          Ejection fraction acceptable small pericardial effusion noted.    D/W Dr Jessica PAGE image reviewed, still moderate stool        Assessment/Plan     Active Problems:   Initially met septic shock criteria but this was ruled out.    Cirrhosis, most likely related to hepatitis B and C.  Transaminases are coming down.  He does have a coagulopathy, platelet count appears to be recovering.    Constipation, on MiraLAX, I will add one enema.      Shortness of breath, some wheezing, I turned off his IV fluids, will check a chest x-ray in the a.m., ABG is pending, nebs    Atrial fibrillation, rapid rate, he is on a beta blocker, he would be high risk for anticoagulation secondary to thrombocytopenia and some coagulopathy, EF normal, I've asked cardiology for his opinion as well.    Weakness, improving with ambulation    Diabetes, improving control continue current insulin therapy     Low protein secondary to cirrhosis causing his edema    Thrombocytopenia improving, related to splenomegaly related to the portal hypertension from cirrhosis, essentially stable.  Continue to monitor.     Dysuria, will check a UA.      Memo Quarles MD  04/06/17  6:01 PM

## 2017-04-06 NOTE — PROGRESS NOTES
Discharge Planning Assessment  [facility]     Patient Name: Isaias Lang  MRN: [id number]  Today's Date: 4/6/2017    Admit Date: 4/3/2017          Discharge Needs Assessment     None            Discharge Plan       04/06/17 1646    Case Management/Social Work Plan    Plan SS spoke with Suellen at [facility] at [phone] who stated that pt could be discharged back to [facility] at discharge.  [facility] address is [address] [place] [address].  SS will follow and assit         Discharge Placement     No information found                Demographic Summary     None            Functional Status     None            Psychosocial     None            Abuse/Neglect     None            Legal     None            Substance Abuse     None            Patient Forms     None          Sho Ferro

## 2017-04-07 ENCOUNTER — APPOINTMENT (OUTPATIENT)
Dept: GENERAL RADIOLOGY | Facility: HOSPITAL | Age: 52
End: 2017-04-07

## 2017-04-07 VITALS
WEIGHT: 188.06 LBS | OXYGEN SATURATION: 96 % | HEIGHT: 68 IN | BODY MASS INDEX: 28.5 KG/M2 | DIASTOLIC BLOOD PRESSURE: 79 MMHG | TEMPERATURE: 97.5 F | SYSTOLIC BLOOD PRESSURE: 112 MMHG | HEART RATE: 77 BPM | RESPIRATION RATE: 16 BRPM

## 2017-04-07 LAB
ALBUMIN SERPL-MCNC: 2.7 G/DL (ref 3.5–5)
ALBUMIN/GLOB SERPL: 1.3 G/DL (ref 1.5–2.5)
ALP SERPL-CCNC: 135 U/L (ref 40–129)
ALT SERPL W P-5'-P-CCNC: 96 U/L (ref 10–44)
ANION GAP SERPL CALCULATED.3IONS-SCNC: 1.3 MMOL/L (ref 3.6–11.2)
AST SERPL-CCNC: 73 U/L (ref 10–34)
BASOPHILS # BLD AUTO: 0.02 10*3/MM3 (ref 0–0.3)
BASOPHILS NFR BLD AUTO: 0.5 % (ref 0–2)
BILIRUB SERPL-MCNC: 1.4 MG/DL (ref 0.2–1.8)
BNP SERPL-MCNC: 304 PG/ML (ref 0–100)
BUN BLD-MCNC: 7 MG/DL (ref 7–21)
BUN/CREAT SERPL: 12.5 (ref 7–25)
CALCIUM SPEC-SCNC: 8.3 MG/DL (ref 7.7–10)
CHLORIDE SERPL-SCNC: 108 MMOL/L (ref 99–112)
CO2 SERPL-SCNC: 31.7 MMOL/L (ref 24.3–31.9)
CREAT BLD-MCNC: 0.56 MG/DL (ref 0.43–1.29)
DEPRECATED RDW RBC AUTO: 52.9 FL (ref 37–54)
EOSINOPHIL # BLD AUTO: 0.17 10*3/MM3 (ref 0–0.7)
EOSINOPHIL NFR BLD AUTO: 4.7 % (ref 0–5)
ERYTHROCYTE [DISTWIDTH] IN BLOOD BY AUTOMATED COUNT: 16.1 % (ref 11.5–14.5)
GFR SERPL CREATININE-BSD FRML MDRD: >150 ML/MIN/1.73
GLOBULIN UR ELPH-MCNC: 2.1 GM/DL
GLUCOSE BLD-MCNC: 171 MG/DL (ref 70–110)
GLUCOSE BLDC GLUCOMTR-MCNC: 142 MG/DL (ref 70–130)
GLUCOSE BLDC GLUCOMTR-MCNC: 160 MG/DL (ref 70–130)
GLUCOSE BLDC GLUCOMTR-MCNC: 191 MG/DL (ref 70–130)
HCT VFR BLD AUTO: 39.4 % (ref 42–52)
HGB BLD-MCNC: 12.7 G/DL (ref 14–18)
IMM GRANULOCYTES # BLD: 0.01 10*3/MM3 (ref 0–0.03)
IMM GRANULOCYTES NFR BLD: 0.3 % (ref 0–0.5)
INR PPP: 1.37 (ref 0.8–1.1)
LYMPHOCYTES # BLD AUTO: 0.95 10*3/MM3 (ref 1–3)
LYMPHOCYTES NFR BLD AUTO: 26.1 % (ref 21–51)
MAGNESIUM SERPL-MCNC: 2 MG/DL (ref 1.7–2.6)
MCH RBC QN AUTO: 30.2 PG (ref 27–33)
MCHC RBC AUTO-ENTMCNC: 32.2 G/DL (ref 33–37)
MCV RBC AUTO: 93.6 FL (ref 80–94)
MONOCYTES # BLD AUTO: 0.38 10*3/MM3 (ref 0.1–0.9)
MONOCYTES NFR BLD AUTO: 10.4 % (ref 0–10)
NEUTROPHILS # BLD AUTO: 2.11 10*3/MM3 (ref 1.4–6.5)
NEUTROPHILS NFR BLD AUTO: 58 % (ref 30–70)
OSMOLALITY SERPL CALC.SUM OF ELEC: 283.3 MOSM/KG (ref 273–305)
PLATELET # BLD AUTO: 79 10*3/MM3 (ref 130–400)
PMV BLD AUTO: 10.9 FL (ref 6–10)
POTASSIUM BLD-SCNC: 4.1 MMOL/L (ref 3.5–5.3)
PROT SERPL-MCNC: 4.8 G/DL (ref 6–8)
PROTHROMBIN TIME: 15.5 SECONDS (ref 9.8–11.9)
RBC # BLD AUTO: 4.21 10*6/MM3 (ref 4.7–6.1)
SODIUM BLD-SCNC: 141 MMOL/L (ref 135–153)
WBC NRBC COR # BLD: 3.64 10*3/MM3 (ref 4.5–12.5)

## 2017-04-07 PROCEDURE — 25010000002 HYDROMORPHONE PER 4 MG: Performed by: INTERNAL MEDICINE

## 2017-04-07 PROCEDURE — 94799 UNLISTED PULMONARY SVC/PX: CPT

## 2017-04-07 PROCEDURE — 71010 HC CHEST AP: CPT

## 2017-04-07 PROCEDURE — 63710000001 INSULIN ASPART PER 5 UNITS: Performed by: NURSE PRACTITIONER

## 2017-04-07 PROCEDURE — 83880 ASSAY OF NATRIURETIC PEPTIDE: CPT | Performed by: INTERNAL MEDICINE

## 2017-04-07 PROCEDURE — 85025 COMPLETE CBC W/AUTO DIFF WBC: CPT | Performed by: INTERNAL MEDICINE

## 2017-04-07 PROCEDURE — 80053 COMPREHEN METABOLIC PANEL: CPT | Performed by: INTERNAL MEDICINE

## 2017-04-07 PROCEDURE — 63710000001 INSULIN DETEMIR PER 5 UNITS: Performed by: INTERNAL MEDICINE

## 2017-04-07 PROCEDURE — G0378 HOSPITAL OBSERVATION PER HR: HCPCS

## 2017-04-07 PROCEDURE — 85610 PROTHROMBIN TIME: CPT | Performed by: INTERNAL MEDICINE

## 2017-04-07 PROCEDURE — 99239 HOSP IP/OBS DSCHRG MGMT >30: CPT | Performed by: INTERNAL MEDICINE

## 2017-04-07 PROCEDURE — 71010 XR CHEST AP: CPT | Performed by: RADIOLOGY

## 2017-04-07 PROCEDURE — 63710000001 INSULIN ASPART PER 5 UNITS: Performed by: INTERNAL MEDICINE

## 2017-04-07 PROCEDURE — 82962 GLUCOSE BLOOD TEST: CPT

## 2017-04-07 PROCEDURE — 83735 ASSAY OF MAGNESIUM: CPT | Performed by: INTERNAL MEDICINE

## 2017-04-07 RX ORDER — LACTULOSE 10 G/15ML
20 SOLUTION ORAL 2 TIMES DAILY
Qty: 236 ML | Refills: 0 | Status: ON HOLD | OUTPATIENT
Start: 2017-04-07 | End: 2017-07-03

## 2017-04-07 RX ORDER — GABAPENTIN 100 MG/1
200 CAPSULE ORAL 2 TIMES DAILY
Qty: 120 CAPSULE | Refills: 0 | Status: ON HOLD | OUTPATIENT
Start: 2017-04-07 | End: 2017-07-03

## 2017-04-07 RX ADMIN — PANTOPRAZOLE SODIUM 40 MG: 40 TABLET, DELAYED RELEASE ORAL at 17:23

## 2017-04-07 RX ADMIN — INSULIN ASPART 5 UNITS: 100 INJECTION, SOLUTION INTRAVENOUS; SUBCUTANEOUS at 09:30

## 2017-04-07 RX ADMIN — POLYETHYLENE GLYCOL (3350) 17 G: 17 POWDER, FOR SOLUTION ORAL at 17:24

## 2017-04-07 RX ADMIN — POLYETHYLENE GLYCOL (3350) 17 G: 17 POWDER, FOR SOLUTION ORAL at 09:28

## 2017-04-07 RX ADMIN — GABAPENTIN 300 MG: 300 CAPSULE ORAL at 09:29

## 2017-04-07 RX ADMIN — INSULIN ASPART 3 UNITS: 100 INJECTION, SOLUTION INTRAVENOUS; SUBCUTANEOUS at 09:28

## 2017-04-07 RX ADMIN — HYDROMORPHONE HYDROCHLORIDE 0.25 MG: 1 INJECTION, SOLUTION INTRAMUSCULAR; INTRAVENOUS; SUBCUTANEOUS at 08:00

## 2017-04-07 RX ADMIN — HYDROMORPHONE HYDROCHLORIDE 0.25 MG: 1 INJECTION, SOLUTION INTRAMUSCULAR; INTRAVENOUS; SUBCUTANEOUS at 12:05

## 2017-04-07 RX ADMIN — INSULIN ASPART 5 UNITS: 100 INJECTION, SOLUTION INTRAVENOUS; SUBCUTANEOUS at 12:05

## 2017-04-07 RX ADMIN — PANCRELIPASE 24000 UNITS OF LIPASE: 60000; 12000; 38000 CAPSULE, DELAYED RELEASE PELLETS ORAL at 17:23

## 2017-04-07 RX ADMIN — PANCRELIPASE 24000 UNITS OF LIPASE: 60000; 12000; 38000 CAPSULE, DELAYED RELEASE PELLETS ORAL at 12:08

## 2017-04-07 RX ADMIN — PANTOPRAZOLE SODIUM 40 MG: 40 TABLET, DELAYED RELEASE ORAL at 09:29

## 2017-04-07 RX ADMIN — HYDROMORPHONE HYDROCHLORIDE 0.25 MG: 1 INJECTION, SOLUTION INTRAMUSCULAR; INTRAVENOUS; SUBCUTANEOUS at 16:05

## 2017-04-07 RX ADMIN — ALBUTEROL SULFATE 2.5 MG: 2.5 SOLUTION RESPIRATORY (INHALATION) at 11:11

## 2017-04-07 RX ADMIN — MUPIROCIN: 20 OINTMENT TOPICAL at 09:30

## 2017-04-07 RX ADMIN — METOPROLOL TARTRATE 50 MG: 50 TABLET, FILM COATED ORAL at 09:29

## 2017-04-07 RX ADMIN — PANCRELIPASE 24000 UNITS OF LIPASE: 60000; 12000; 38000 CAPSULE, DELAYED RELEASE PELLETS ORAL at 09:29

## 2017-04-07 RX ADMIN — LACTULOSE 20 G: 10 SOLUTION ORAL at 09:28

## 2017-04-07 RX ADMIN — INSULIN ASPART 3 UNITS: 100 INJECTION, SOLUTION INTRAVENOUS; SUBCUTANEOUS at 12:05

## 2017-04-07 RX ADMIN — GABAPENTIN 300 MG: 300 CAPSULE ORAL at 17:23

## 2017-04-07 RX ADMIN — HYDROMORPHONE HYDROCHLORIDE 0.25 MG: 1 INJECTION, SOLUTION INTRAMUSCULAR; INTRAVENOUS; SUBCUTANEOUS at 02:40

## 2017-04-07 RX ADMIN — INSULIN DETEMIR 10 UNITS: 100 INJECTION, SOLUTION SUBCUTANEOUS at 09:29

## 2017-04-07 RX ADMIN — CITALOPRAM HYDROBROMIDE 20 MG: 20 TABLET ORAL at 09:29

## 2017-04-07 NOTE — PROGRESS NOTES
Discharge Planning Assessment   [facility]     Patient Name: Isaias Lang  MRN: [id number]  Today's Date: 4/7/2017    Admit Date: 4/3/2017          Discharge Needs Assessment     None            Discharge Plan       04/07/17 1648    Final Note    Final Note Pt to be discharged home on this date.  [facility] to transport pt home.  No further intervention needed.        Discharge Placement     No information found        Expected Discharge Date and Time     Expected Discharge Date Expected Discharge Time    Apr 7, 2017               Demographic Summary     None            Functional Status     None            Psychosocial     None            Abuse/Neglect     None            Legal     None            Substance Abuse     None            Patient Forms     None          Sho Ferro

## 2017-04-07 NOTE — PLAN OF CARE
Problem: Sepsis (Adult)  Goal: Signs and Symptoms of Listed Potential Problems Will be Absent or Manageable (Sepsis)  Outcome: Ongoing (interventions implemented as appropriate)    04/06/17 2332   Sepsis   Problems Assessed (Sepsis) infection progression;glycemic control impaired   Problems Present (Sepsis) glycemic control, impaired;progression of infection         Problem: Patient Care Overview (Adult)  Goal: Plan of Care Review  Outcome: Ongoing (interventions implemented as appropriate)  Goal: Adult Individualization and Mutuality  Outcome: Ongoing (interventions implemented as appropriate)    Problem: Diabetes, Type 2 (Adult)  Goal: Signs and Symptoms of Listed Potential Problems Will be Absent or Manageable (Diabetes, Type 2)  Outcome: Ongoing (interventions implemented as appropriate)

## 2017-04-07 NOTE — PLAN OF CARE
Problem: Sepsis (Adult)  Goal: Signs and Symptoms of Listed Potential Problems Will be Absent or Manageable (Sepsis)  Outcome: Ongoing (interventions implemented as appropriate)    Problem: Patient Care Overview (Adult)  Goal: Plan of Care Review  Outcome: Ongoing (interventions implemented as appropriate)    Problem: Diabetes, Type 2 (Adult)  Goal: Signs and Symptoms of Listed Potential Problems Will be Absent or Manageable (Diabetes, Type 2)  Outcome: Ongoing (interventions implemented as appropriate)

## 2017-04-07 NOTE — DISCHARGE SUMMARY
DATE OF ADMISSION: 04/03/2017  DATE OF DISCHARGE: 04/07/2017    DISCHARGE DIAGNOSIS: Systemic inflammatory response syndrome.     SECONDARY DIAGNOSES:  1. Constipation.   2. Cirrhosis.   3. Coagulopathy.   4. Thrombocytopenia with splenomegaly.   5. History of hepatitis C and B positivity.   6. Illicit drug use.   7. Atrial fibrillation with a rapid ventricular rate.   8. Diabetes.   9. Chronic pain syndrome.   10. Transaminitis related to his hepatitis, but improving.   11. Bilateral lower extremity edema with negative Dopplers and echocardiogram, most likely related to his Neurontin therapy and cirrhosis.     CONSULTANTS:  1. Vipul Lopez MD, Cardiology  2. Tamara Bobby MD, Infectious Disease     PROCEDURE: None.     PHYSICAL EXAMINATION:  VITAL SIGNS: Blood pressure 104/63, respiratory rate 18, pulse 70, temperature 98.1.   GENERAL: Exam on the day of discharge was assisted by ALYSE Green, on [address]. The patient was resting comfortably. He was easily awakened. He had a bowel movement yesterday and refused his enema last night. He has tolerated his food today and, in fact, had spaghetti for lunch and he has done well with this.   LUNGS: Clear, present in all fields. No rhonchi, rales, or wheezing heard.   HEART: Regular rate and rhythm without murmur, rub, or gallop.   ABDOMEN: Soft, rounded, but benign and nontender.   EXTREMITIES: Still has 1+ edema, but improved over admission. Strength is symmetric.   PSYCHIATRIC: Mood is good.     ADMISSION DIAGNOSIS: He was initially thought to have septic shock. However, no infectious etiology was found; therefore, this was ruled out.     HOSPITAL COURSE: The patient was admitted to the telemetry unit. He remained in normal sinus rhythm except for he did have a run of atrial fibrillation with a rapid ventricular rate. His cardiac enzymes were negative. This being the case, Dr. Lopez was consulted as the patient had already had an echocardiogram this admission showing  an ejection fraction of 51% to 55%. There was a very small pericardial effusion, but no tamponade. Because of the patient's thrombocytopenia and coagulopathy and high risk of bleeding it was thought that he was not an anticoagulant candidate, although his CHADS VASc score was about 3. The patient has overall been maintaining sinus rhythm and he will be continued on metoprolol on discharge. Dr. Lopez did discuss this also with Dr. Elian Hebert, electrophysiologist in [place]. The patient was constipated on admission. He was given enemas and cathartics and his bowels moved and he felt better. He has had widely fluctuating glucose at home, but now his glucoses are excellently controlled with his current diet and insulin and he will be followed up in 1 week with his primary care provider, NOHEMI Gu. It is noted that he does have most likely cirrhosis. He has splenomegaly. He has elevated transaminases and a history of hepatitis B and C positivity and coagulopathy. This being the case, I have explained that he needs to stay away from illicit drugs and hopefully he can follow up at the [facility] at some point in the future (I will leave referral of this to his primary care provider as they see fit). I have explained the implications of cirrhosis. The patient was on high-dose Neurontin which I think was contributing to his edema. I have decreased this significantly and given him a new prescription for this and recommend that he stays on the lower dose. The patient did have venous Dopplers of his lower extremities that showed no evidence of DVT.     CONDITION: Stable and much improved.     DIAGNOSTIC DATA: His ALT has come down from 183 to 96. His AST has come down from 305 to 73. Phosphorus 5.2. Hemoglobin A1c was 6.9. Iron profile was normal. Folic acid normal. Back in 10/2016 INR was 1.48. I did give vitamin K and this only came down to 1.37. On discharge white count 3.64,  hemoglobin 12.7, hematocrit 39, platelet count 79,000. Blood culture remains negative. Toxicology screen was positive for amphetamines and opiates. Suboxone screen was also positive. HIV negative. CT of the abdomen and pelvis equivocally mild pancreatitis. Amylase and lipase normal. Ultrasound spleen showed the spleen to be about 18.5 cm.     DISCHARGE MEDICATIONS (I have tried to simplify):  1. Gabapentin 200 mg b.i.d.   2. Lactulose 30 mL b.i.d.   3. Levemir 10 units every night.   4. Lispro 5 units t.i.d.   5. Protonix 40 mg daily.   6. MiraLAX 17 g daily.   7. Home dose of albuterol.   8. Home dose of aspirin.   9. Home dose of citalopram.   10. Home dose of magnesium.   11. Home dose of Lopressor.   12. Home dose of sublingual nitroglycerin.   13. Home dose of Pancrease.     I have discussed this case with Dr. Lopez today. This has been a greater than 30-minute discharge.     D: HOANG 04/07/2017 16:04:15 ET  T: ab / 04/07/2017 16:19:58 ET  Revision Count: 0  Job ID: [id number]  [id number]  cc: Vipul Lopez MD     Dictator Signature:___________________________     Memo Quarles MD

## 2017-04-08 LAB — BACTERIA SPEC AEROBE CULT: NORMAL

## 2017-04-08 NOTE — PAYOR COMM NOTE
"[facility]   VERNON JAIN  PHONE  [phone]  [phone]    PATIENT D/C 4/7/17    Isaias Avendano (52 y.o. Male)     Date of Birth Social Security Number Address Home Phone MRN    1965  [address] [facility] [address] [phone] [id number]    Alevism Marital Status          Restoration        Admission Date Admission Type Admitting Provider Attending Provider Department, Room/Bed    4/3/17 Emergency Memo Quarles MD  [address], 3308/1S    Discharge Date Discharge Disposition Discharge Destination        4/7/2017 Home or Self Care             Attending Provider: (none)    Allergies:  Robitussin Dm [Dextromethorphan-guaifenesin], Tizanidine, Metformin, Sulfa Antibiotics, Contrast Dye, Tramadol    Isolation:  None   Infection:  None   Code Status:  Prior    Ht:  68\" (172.7 cm)   Wt:  188 lb 1 oz (85.3 kg)    Admission Cmt:  None   Principal Problem:  None                Active Insurance as of 4/3/2017     Primary Coverage     Payor Plan Insurance Group Employer/Plan Group    [facility] [employer] [facility]      Payor Plan Address Payor Plan Phone Number Effective From Effective To    [address] [address]  2/1/2016     [place] [address]       Subscriber Name Subscriber Birth Date Member ID       ISAIAS AVENDANO 1965 [id number]                 Emergency Contacts      (Rel.) Home Phone Work Phone Mobile Phone    Dr Robina (Other) [phone] -- --              "

## 2017-04-10 ENCOUNTER — TELEPHONE (OUTPATIENT)
Dept: TELEMETRY | Facility: HOSPITAL | Age: 52
End: 2017-04-10

## 2017-07-03 ENCOUNTER — HOSPITAL ENCOUNTER (EMERGENCY)
Facility: HOSPITAL | Age: 52
Discharge: STILL ADMITTED OR ADMINISTRATIVE | End: 2017-07-03
Attending: EMERGENCY MEDICINE

## 2017-07-03 ENCOUNTER — HOSPITAL ENCOUNTER (INPATIENT)
Facility: HOSPITAL | Age: 52
LOS: 8 days | Discharge: HOME | End: 2017-07-11
Attending: PSYCHIATRY & NEUROLOGY

## 2017-07-03 VITALS
DIASTOLIC BLOOD PRESSURE: 72 MMHG | WEIGHT: 177 LBS | OXYGEN SATURATION: 99 % | HEIGHT: 68 IN | BODY MASS INDEX: 26.83 KG/M2 | SYSTOLIC BLOOD PRESSURE: 116 MMHG | TEMPERATURE: 98 F | RESPIRATION RATE: 18 BRPM | HEART RATE: 72 BPM

## 2017-07-03 DIAGNOSIS — R45.851 SUICIDAL IDEATION: Primary | ICD-10-CM

## 2017-07-03 PROBLEM — F32.A DEPRESSION WITH SUICIDAL IDEATION: Status: ACTIVE | Noted: 2017-07-03

## 2017-07-03 LAB
6-ACETYL MORPHINE: NEGATIVE
ALBUMIN SERPL-MCNC: 2.8 G/DL (ref 3.5–5)
ALBUMIN/GLOB SERPL: 1.1 G/DL (ref 1.5–2.5)
ALP SERPL-CCNC: 139 U/L (ref 40–129)
ALT SERPL W P-5'-P-CCNC: 81 U/L (ref 10–44)
AMPHET+METHAMPHET UR QL: POSITIVE
ANION GAP SERPL CALCULATED.3IONS-SCNC: 1.1 MMOL/L (ref 3.6–11.2)
AST SERPL-CCNC: 141 U/L (ref 10–34)
BARBITURATES UR QL SCN: NEGATIVE
BASOPHILS # BLD AUTO: 0.02 10*3/MM3 (ref 0–0.3)
BASOPHILS NFR BLD AUTO: 0.6 % (ref 0–2)
BENZODIAZ UR QL SCN: NEGATIVE
BILIRUB SERPL-MCNC: 1.1 MG/DL (ref 0.2–1.8)
BILIRUB UR QL STRIP: ABNORMAL
BUN BLD-MCNC: 9 MG/DL (ref 7–21)
BUN/CREAT SERPL: 17 (ref 7–25)
BUPRENORPHINE SERPL-MCNC: POSITIVE NG/ML
CALCIUM SPEC-SCNC: 8.9 MG/DL (ref 7.7–10)
CANNABINOIDS SERPL QL: NEGATIVE
CHLORIDE SERPL-SCNC: 109 MMOL/L (ref 99–112)
CLARITY UR: CLEAR
CO2 SERPL-SCNC: 31.9 MMOL/L (ref 24.3–31.9)
COCAINE UR QL: NEGATIVE
COLOR UR: ABNORMAL
CREAT BLD-MCNC: 0.53 MG/DL (ref 0.43–1.29)
DEPRECATED RDW RBC AUTO: 52.5 FL (ref 37–54)
EOSINOPHIL # BLD AUTO: 0.13 10*3/MM3 (ref 0–0.7)
EOSINOPHIL NFR BLD AUTO: 3.7 % (ref 0–5)
ERYTHROCYTE [DISTWIDTH] IN BLOOD BY AUTOMATED COUNT: 15.9 % (ref 11.5–14.5)
ETHANOL BLD-MCNC: <10 MG/DL
ETHANOL UR QL: <0.01 %
GFR SERPL CREATININE-BSD FRML MDRD: >150 ML/MIN/1.73
GLOBULIN UR ELPH-MCNC: 2.5 GM/DL
GLUCOSE BLD-MCNC: 120 MG/DL (ref 70–110)
GLUCOSE BLDC GLUCOMTR-MCNC: 134 MG/DL (ref 70–130)
GLUCOSE BLDC GLUCOMTR-MCNC: 74 MG/DL (ref 70–130)
GLUCOSE UR STRIP-MCNC: NEGATIVE MG/DL
HBA1C MFR BLD: 6.4 % (ref 4.5–5.7)
HCT VFR BLD AUTO: 38.6 % (ref 42–52)
HGB BLD-MCNC: 12.9 G/DL (ref 14–18)
HGB UR QL STRIP.AUTO: NEGATIVE
IMM GRANULOCYTES # BLD: 0.01 10*3/MM3 (ref 0–0.03)
IMM GRANULOCYTES NFR BLD: 0.3 % (ref 0–0.5)
KETONES UR QL STRIP: NEGATIVE
LEUKOCYTE ESTERASE UR QL STRIP.AUTO: NEGATIVE
LIPASE SERPL-CCNC: 26 U/L (ref 13–60)
LYMPHOCYTES # BLD AUTO: 0.86 10*3/MM3 (ref 1–3)
LYMPHOCYTES NFR BLD AUTO: 24.2 % (ref 21–51)
MCH RBC QN AUTO: 31 PG (ref 27–33)
MCHC RBC AUTO-ENTMCNC: 33.4 G/DL (ref 33–37)
MCV RBC AUTO: 92.8 FL (ref 80–94)
MDMA UR QL SCN: NEGATIVE
METHADONE UR QL SCN: NEGATIVE
MONOCYTES # BLD AUTO: 0.41 10*3/MM3 (ref 0.1–0.9)
MONOCYTES NFR BLD AUTO: 11.5 % (ref 0–10)
NEUTROPHILS # BLD AUTO: 2.12 10*3/MM3 (ref 1.4–6.5)
NEUTROPHILS NFR BLD AUTO: 59.7 % (ref 30–70)
NITRITE UR QL STRIP: NEGATIVE
OPIATES UR QL: NEGATIVE
OSMOLALITY SERPL CALC.SUM OF ELEC: 283 MOSM/KG (ref 273–305)
OXYCODONE UR QL SCN: NEGATIVE
PCP UR QL SCN: NEGATIVE
PH UR STRIP.AUTO: 7 [PH] (ref 5–8)
PLATELET # BLD AUTO: 66 10*3/MM3 (ref 130–400)
PMV BLD AUTO: 10.5 FL (ref 6–10)
POTASSIUM BLD-SCNC: 3.8 MMOL/L (ref 3.5–5.3)
PROT SERPL-MCNC: 5.3 G/DL (ref 6–8)
PROT UR QL STRIP: NEGATIVE
RBC # BLD AUTO: 4.16 10*6/MM3 (ref 4.7–6.1)
SODIUM BLD-SCNC: 142 MMOL/L (ref 135–153)
SP GR UR STRIP: 1.02 (ref 1–1.03)
UROBILINOGEN UR QL STRIP: ABNORMAL
WBC NRBC COR # BLD: 3.55 10*3/MM3 (ref 4.5–12.5)

## 2017-07-03 PROCEDURE — 83036 HEMOGLOBIN GLYCOSYLATED A1C: CPT | Performed by: PSYCHIATRY & NEUROLOGY

## 2017-07-03 PROCEDURE — 63710000001 INSULIN DETEMIR PER 5 UNITS: Performed by: PSYCHIATRY & NEUROLOGY

## 2017-07-03 PROCEDURE — 94799 UNLISTED PULMONARY SVC/PX: CPT

## 2017-07-03 PROCEDURE — 82962 GLUCOSE BLOOD TEST: CPT

## 2017-07-03 PROCEDURE — 94640 AIRWAY INHALATION TREATMENT: CPT

## 2017-07-03 RX ORDER — CLONIDINE HYDROCHLORIDE 0.1 MG/1
0.1 TABLET ORAL 4 TIMES DAILY PRN
Status: ACTIVE | OUTPATIENT
Start: 2017-07-03 | End: 2017-07-04

## 2017-07-03 RX ORDER — NICOTINE POLACRILEX 4 MG
15 LOZENGE BUCCAL
Status: DISCONTINUED | OUTPATIENT
Start: 2017-07-03 | End: 2017-07-11 | Stop reason: HOSPADM

## 2017-07-03 RX ORDER — DEXTROSE MONOHYDRATE 25 G/50ML
25 INJECTION, SOLUTION INTRAVENOUS
Status: DISCONTINUED | OUTPATIENT
Start: 2017-07-03 | End: 2017-07-11 | Stop reason: HOSPADM

## 2017-07-03 RX ORDER — CLONIDINE HYDROCHLORIDE 0.1 MG/1
0.1 TABLET ORAL ONCE AS NEEDED
Status: DISPENSED | OUTPATIENT
Start: 2017-07-07 | End: 2017-07-08

## 2017-07-03 RX ORDER — CLONIDINE HYDROCHLORIDE 0.1 MG/1
0.1 TABLET ORAL 3 TIMES DAILY PRN
Status: ACTIVE | OUTPATIENT
Start: 2017-07-05 | End: 2017-07-06

## 2017-07-03 RX ORDER — METOPROLOL TARTRATE 50 MG/1
50 TABLET, FILM COATED ORAL 2 TIMES DAILY
Status: ON HOLD | COMMUNITY
End: 2017-07-03

## 2017-07-03 RX ORDER — ASPIRIN 81 MG/1
81 TABLET ORAL DAILY
COMMUNITY
End: 2017-07-11 | Stop reason: HOSPADM

## 2017-07-03 RX ORDER — POLYETHYLENE GLYCOL 3350 17 G/17G
17 POWDER, FOR SOLUTION ORAL 2 TIMES DAILY
Status: DISCONTINUED | OUTPATIENT
Start: 2017-07-03 | End: 2017-07-11 | Stop reason: HOSPADM

## 2017-07-03 RX ORDER — POTASSIUM CHLORIDE 20 MEQ/1
20 TABLET, EXTENDED RELEASE ORAL DAILY
Status: DISCONTINUED | OUTPATIENT
Start: 2017-07-03 | End: 2017-07-11 | Stop reason: HOSPADM

## 2017-07-03 RX ORDER — POTASSIUM CHLORIDE 750 MG/1
20 TABLET, EXTENDED RELEASE ORAL DAILY
COMMUNITY
End: 2017-07-11 | Stop reason: HOSPADM

## 2017-07-03 RX ORDER — LOPERAMIDE HYDROCHLORIDE 2 MG/1
2 CAPSULE ORAL 4 TIMES DAILY PRN
Status: DISCONTINUED | OUTPATIENT
Start: 2017-07-03 | End: 2017-07-11 | Stop reason: HOSPADM

## 2017-07-03 RX ORDER — CYCLOBENZAPRINE HCL 10 MG
10 TABLET ORAL 3 TIMES DAILY PRN
Status: ACTIVE | OUTPATIENT
Start: 2017-07-03 | End: 2017-07-08

## 2017-07-03 RX ORDER — BENZONATATE 100 MG/1
100 CAPSULE ORAL 3 TIMES DAILY PRN
Status: DISCONTINUED | OUTPATIENT
Start: 2017-07-03 | End: 2017-07-11 | Stop reason: HOSPADM

## 2017-07-03 RX ORDER — TRAZODONE HYDROCHLORIDE 50 MG/1
50 TABLET ORAL NIGHTLY PRN
Status: DISCONTINUED | OUTPATIENT
Start: 2017-07-03 | End: 2017-07-05

## 2017-07-03 RX ORDER — ASPIRIN 81 MG/1
81 TABLET ORAL ONCE
Status: DISCONTINUED | OUTPATIENT
Start: 2017-07-03 | End: 2017-07-11 | Stop reason: HOSPADM

## 2017-07-03 RX ORDER — IPRATROPIUM BROMIDE AND ALBUTEROL SULFATE 2.5; .5 MG/3ML; MG/3ML
3 SOLUTION RESPIRATORY (INHALATION)
Status: DISCONTINUED | OUTPATIENT
Start: 2017-07-03 | End: 2017-07-04

## 2017-07-03 RX ORDER — FUROSEMIDE 20 MG/1
20 TABLET ORAL DAILY
COMMUNITY
End: 2017-07-11 | Stop reason: HOSPADM

## 2017-07-03 RX ORDER — DICYCLOMINE HYDROCHLORIDE 10 MG/1
10 CAPSULE ORAL 3 TIMES DAILY PRN
Status: ACTIVE | OUTPATIENT
Start: 2017-07-03 | End: 2017-07-08

## 2017-07-03 RX ORDER — ALUMINA, MAGNESIA, AND SIMETHICONE 2400; 2400; 240 MG/30ML; MG/30ML; MG/30ML
15 SUSPENSION ORAL EVERY 6 HOURS PRN
Status: DISCONTINUED | OUTPATIENT
Start: 2017-07-03 | End: 2017-07-11 | Stop reason: HOSPADM

## 2017-07-03 RX ORDER — FUROSEMIDE 20 MG/1
20 TABLET ORAL DAILY
Status: DISCONTINUED | OUTPATIENT
Start: 2017-07-04 | End: 2017-07-11 | Stop reason: HOSPADM

## 2017-07-03 RX ORDER — KETOROLAC TROMETHAMINE 30 MG/ML
60 INJECTION, SOLUTION INTRAMUSCULAR; INTRAVENOUS ONCE
Status: COMPLETED | OUTPATIENT
Start: 2017-07-03 | End: 2017-07-03

## 2017-07-03 RX ORDER — PANTOPRAZOLE SODIUM 40 MG/1
40 TABLET, DELAYED RELEASE ORAL 2 TIMES DAILY
Status: DISCONTINUED | OUTPATIENT
Start: 2017-07-03 | End: 2017-07-11 | Stop reason: HOSPADM

## 2017-07-03 RX ORDER — FUROSEMIDE 20 MG/1
20 TABLET ORAL ONCE
Status: DISCONTINUED | OUTPATIENT
Start: 2017-07-03 | End: 2017-07-11 | Stop reason: HOSPADM

## 2017-07-03 RX ORDER — METOPROLOL TARTRATE 50 MG/1
50 TABLET, FILM COATED ORAL ONCE
Status: DISCONTINUED | OUTPATIENT
Start: 2017-07-03 | End: 2017-07-11 | Stop reason: HOSPADM

## 2017-07-03 RX ORDER — METOCLOPRAMIDE 10 MG/1
10 TABLET ORAL 2 TIMES DAILY
Status: DISCONTINUED | OUTPATIENT
Start: 2017-07-03 | End: 2017-07-11 | Stop reason: HOSPADM

## 2017-07-03 RX ORDER — METOPROLOL TARTRATE 50 MG/1
50 TABLET, FILM COATED ORAL DAILY
Status: DISCONTINUED | OUTPATIENT
Start: 2017-07-04 | End: 2017-07-11 | Stop reason: HOSPADM

## 2017-07-03 RX ORDER — IBUPROFEN 400 MG/1
600 TABLET ORAL EVERY 6 HOURS PRN
Status: DISCONTINUED | OUTPATIENT
Start: 2017-07-03 | End: 2017-07-11 | Stop reason: HOSPADM

## 2017-07-03 RX ORDER — METOCLOPRAMIDE 10 MG/1
10 TABLET ORAL 2 TIMES DAILY
COMMUNITY
End: 2017-07-11 | Stop reason: HOSPADM

## 2017-07-03 RX ORDER — ASPIRIN 81 MG/1
81 TABLET ORAL DAILY
Status: DISCONTINUED | OUTPATIENT
Start: 2017-07-04 | End: 2017-07-11 | Stop reason: HOSPADM

## 2017-07-03 RX ORDER — POTASSIUM CITRATE 5 MEQ/1
TABLET, EXTENDED RELEASE ORAL DAILY
Status: ON HOLD | COMMUNITY
End: 2017-07-03

## 2017-07-03 RX ORDER — ONDANSETRON 4 MG/1
4 TABLET, FILM COATED ORAL EVERY 6 HOURS PRN
Status: DISCONTINUED | OUTPATIENT
Start: 2017-07-03 | End: 2017-07-11 | Stop reason: HOSPADM

## 2017-07-03 RX ORDER — CLONIDINE HYDROCHLORIDE 0.1 MG/1
0.1 TABLET ORAL 2 TIMES DAILY PRN
Status: ACTIVE | OUTPATIENT
Start: 2017-07-06 | End: 2017-07-07

## 2017-07-03 RX ORDER — DICYCLOMINE HYDROCHLORIDE 10 MG/1
10 CAPSULE ORAL 2 TIMES DAILY
Status: ON HOLD | COMMUNITY
End: 2017-07-03

## 2017-07-03 RX ORDER — ALBUTEROL SULFATE 90 UG/1
2 AEROSOL, METERED RESPIRATORY (INHALATION) EVERY 6 HOURS PRN
Status: DISCONTINUED | OUTPATIENT
Start: 2017-07-03 | End: 2017-07-11 | Stop reason: HOSPADM

## 2017-07-03 RX ORDER — FAMOTIDINE 20 MG/1
20 TABLET, FILM COATED ORAL 2 TIMES DAILY PRN
Status: DISCONTINUED | OUTPATIENT
Start: 2017-07-03 | End: 2017-07-11 | Stop reason: HOSPADM

## 2017-07-03 RX ORDER — CLONIDINE HYDROCHLORIDE 0.1 MG/1
0.1 TABLET ORAL 4 TIMES DAILY PRN
Status: ACTIVE | OUTPATIENT
Start: 2017-07-04 | End: 2017-07-05

## 2017-07-03 RX ORDER — NICOTINE 21 MG/24HR
1 PATCH, TRANSDERMAL 24 HOURS TRANSDERMAL
Status: DISCONTINUED | OUTPATIENT
Start: 2017-07-03 | End: 2017-07-04

## 2017-07-03 RX ORDER — IPRATROPIUM BROMIDE AND ALBUTEROL SULFATE 2.5; .5 MG/3ML; MG/3ML
3 SOLUTION RESPIRATORY (INHALATION)
COMMUNITY
End: 2017-07-11 | Stop reason: HOSPADM

## 2017-07-03 RX ORDER — HYDROXYZINE 50 MG/1
50 TABLET, FILM COATED ORAL EVERY 6 HOURS PRN
Status: DISCONTINUED | OUTPATIENT
Start: 2017-07-03 | End: 2017-07-11 | Stop reason: HOSPADM

## 2017-07-03 RX ORDER — ECHINACEA PURPUREA EXTRACT 125 MG
2 TABLET ORAL AS NEEDED
Status: DISCONTINUED | OUTPATIENT
Start: 2017-07-03 | End: 2017-07-11 | Stop reason: HOSPADM

## 2017-07-03 RX ORDER — NITROGLYCERIN 0.4 MG/1
0.4 TABLET SUBLINGUAL
Status: DISCONTINUED | OUTPATIENT
Start: 2017-07-03 | End: 2017-07-11 | Stop reason: HOSPADM

## 2017-07-03 RX ADMIN — KETOROLAC TROMETHAMINE 60 MG: 60 INJECTION, SOLUTION INTRAMUSCULAR at 17:28

## 2017-07-03 RX ADMIN — POLYETHYLENE GLYCOL 3350 17 G: 1 POWDER ORAL at 21:38

## 2017-07-03 RX ADMIN — INSULIN DETEMIR 10 UNITS: 100 INJECTION, SOLUTION SUBCUTANEOUS at 21:39

## 2017-07-03 RX ADMIN — IPRATROPIUM BROMIDE AND ALBUTEROL SULFATE 3 ML: .5; 3 SOLUTION RESPIRATORY (INHALATION) at 19:46

## 2017-07-03 RX ADMIN — PANCRELIPASE 24000 UNITS OF LIPASE: 60000; 12000; 38000 CAPSULE, DELAYED RELEASE PELLETS ORAL at 21:38

## 2017-07-03 RX ADMIN — METOCLOPRAMIDE 10 MG: 10 TABLET ORAL at 21:38

## 2017-07-03 RX ADMIN — PANTOPRAZOLE SODIUM 40 MG: 40 TABLET, DELAYED RELEASE ORAL at 21:38

## 2017-07-03 NOTE — ED PROVIDER NOTES
Subjective   Patient is a 52 y.o. male presenting with mental health disorder.   History provided by:  Patient   used: No    Mental Health Problem   Presenting symptoms: depression, self-mutilation, suicidal thoughts and suicidal threats    Presenting symptoms: no agitation and no suicide attempt    Degree of incapacity (severity):  Moderate  Onset quality:  Sudden  Duration:  1 day  Timing:  Constant  Progression:  Worsening  Chronicity:  New  Context: not alcohol use, not drug abuse, not noncompliant and not recent medication change    Time since last psychoactive medication taken:  1 day  Relieved by:  Nothing  Worsened by:  Nothing  Ineffective treatments:  None tried  Associated symptoms: no abdominal pain, no anhedonia, no anxiety, no chest pain, no decreased need for sleep, not distractible, no euphoric mood, no feelings of worthlessness, no headaches, no hypersomnia, no insomnia, no irritability, no school problems and no weight change    Risk factors: no family hx of mental illness, no family violence, no hx of suicide attempts and no neurological disease        Review of Systems   Constitutional: Negative for irritability.   Cardiovascular: Negative for chest pain.   Gastrointestinal: Negative for abdominal pain.   Skin: Negative for pallor and rash.   Neurological: Negative for headaches.   Psychiatric/Behavioral: Positive for self-injury and suicidal ideas. Negative for agitation. The patient is not nervous/anxious and does not have insomnia.    All other systems reviewed and are negative.      Past Medical History:   Diagnosis Date   • Abscess of antecubital fossa 05/2014    Left that required I and D and grew Haemophilus influenza group 1   • Anxiety    • Asthma    • Chronic pancreatitis    • COPD (chronic obstructive pulmonary disease)    • DDD (degenerative disc disease), lumbosacral    • Depression    • Diabetes mellitus    • DVT (deep venous thrombosis)     Right arm   •  "Essential hypertension    • GERD (gastroesophageal reflux disease)    • Hepatitis-C    • Hypercholesteremia    • Injury of back    • Injury of right Achilles tendon     Complicated by MRSA and Pseudomonas   • IV drug abuse    • Mild CAD     Left heart cath at  2012   • MRSA (methicillin resistant staph aureus) culture positive 09/14/2016    LUE   • Obesity    • Opiate addiction     Suboxone IV   • Self-injurious behavior    • Sleep apnea    • Suicide attempt    • Systolic CHF, chronic     EF 45-50% in 2013, EF 60% 9/2016       Allergies   Allergen Reactions   • Robitussin Dm [Dextromethorphan-Guaifenesin] Shortness Of Breath   • Tizanidine Shortness Of Breath   • Metformin Nausea And Vomiting   • Sulfa Antibiotics Other (See Comments)     \"I cannot take them, they do something to me.\"   • Contrast Dye Rash   • Tramadol Rash       Past Surgical History:   Procedure Laterality Date   • CHOLECYSTECTOMY  1990s   • INCISION AND DRAINAGE ABSCESS Left 2016    wrist   • INCISION AND DRAINAGE ARM Left 9/15/2016    Procedure: INCISION AND DRAINAGE UPPER EXTREMITY;  Surgeon: Rico Oseguera MD;  Location[facility];  Service:    • ORIF ANKLE FRACTURE Right 2014       Family History   Problem Relation Age of Onset   • Gout Other    • Osteoporosis Other    • Arthritis Other      Rheumatoid and osteoarthritis   • Hypertension Other    • Heart disease Other    • Cancer Other    • Coronary artery disease Mother    • Lung disease Mother    • Gout Sister    • Cancer Maternal Grandmother    • Hypertension Maternal Grandfather    • Gout Maternal Grandfather        Social History     Social History   • Marital status:      Spouse name: N/A   • Number of children: N/A   • Years of education: N/A     Social History Main Topics   • Smoking status: Former Smoker     Years: 1.00     Types: Cigarettes   • Smokeless tobacco: Never Used      Comment: quit smoking in my 20's   • Alcohol use No      Comment: denies   • Drug use: Yes " "    Special: IV      Comment: PT STATES THAT HE USED SUBOXONE IN LEFT AC YESTERDAY   • Sexual activity: Defer      Comment: not currently active.      Other Topics Concern   • None     Social History Narrative           Objective   Physical Exam   Constitutional: He is oriented to person, place, and time. He appears well-developed and well-nourished.   HENT:   Head: Normocephalic and atraumatic.   Right Ear: External ear normal.   Left Ear: External ear normal.   Nose: Nose normal.   Mouth/Throat: Oropharynx is clear and moist.   Eyes: Conjunctivae and EOM are normal. Pupils are equal, round, and reactive to light.   Neck: Normal range of motion. Neck supple.   Cardiovascular: Normal rate, regular rhythm and normal heart sounds.    Pulmonary/Chest: Effort normal and breath sounds normal.   Abdominal: Soft. Normal appearance and bowel sounds are normal. He exhibits no mass. There is no tenderness. There is no guarding.   Musculoskeletal: Normal range of motion.   Neurological: He is alert and oriented to person, place, and time. He displays normal reflexes. No cranial nerve deficit. Coordination normal.   Skin: Skin is warm and dry.   Psychiatric: He has a normal mood and affect. His behavior is normal. Judgment and thought content normal.   Nursing note and vitals reviewed.      Procedures         ED Course  ED Course   Comment By Time   52-year-old male comes in complaining of suicidal ideation for the past 2 days.  Patient does have previous history of suicide attempts with cutting himself.  Patient does state that he has a specific plan with \"usually not cut my wrists\".  Patient also states that he's had history of drug abuse.  He reports recently using times one week ago. Miles Metz PA-C 07/03 8929   Medically cleared for psych.   This case was discussed with psychiatry. Patient will be admitted to psych for further evaluation. Miles Metz PA-C 07/03 0020                  [facility]  Number of " Diagnoses or Management Options  Suicidal ideation: new and requires workup     Amount and/or Complexity of Data Reviewed  Clinical lab tests: reviewed and ordered  Tests in the radiology section of CPT®: ordered and reviewed    Risk of Complications, Morbidity, and/or Mortality  Presenting problems: moderate  Diagnostic procedures: moderate  Management options: moderate    Patient Progress  Patient progress: stable      Final diagnoses:   Suicidal ideation            Miles Metz PA-C  07/03/17 1806

## 2017-07-04 LAB
GLUCOSE BLDC GLUCOMTR-MCNC: 119 MG/DL (ref 70–130)
GLUCOSE BLDC GLUCOMTR-MCNC: 121 MG/DL (ref 70–130)
GLUCOSE BLDC GLUCOMTR-MCNC: 129 MG/DL (ref 70–130)
GLUCOSE BLDC GLUCOMTR-MCNC: 156 MG/DL (ref 70–130)

## 2017-07-04 PROCEDURE — 94799 UNLISTED PULMONARY SVC/PX: CPT

## 2017-07-04 PROCEDURE — 63710000001 INSULIN ASPART PER 5 UNITS: Performed by: PSYCHIATRY & NEUROLOGY

## 2017-07-04 PROCEDURE — 99223 1ST HOSP IP/OBS HIGH 75: CPT | Performed by: PSYCHIATRY & NEUROLOGY

## 2017-07-04 PROCEDURE — 82962 GLUCOSE BLOOD TEST: CPT

## 2017-07-04 PROCEDURE — 63710000001 INSULIN DETEMIR PER 5 UNITS: Performed by: PSYCHIATRY & NEUROLOGY

## 2017-07-04 RX ORDER — GABAPENTIN 400 MG/1
400 CAPSULE ORAL 3 TIMES DAILY
Status: DISCONTINUED | OUTPATIENT
Start: 2017-07-04 | End: 2017-07-11 | Stop reason: HOSPADM

## 2017-07-04 RX ORDER — IPRATROPIUM BROMIDE AND ALBUTEROL SULFATE 2.5; .5 MG/3ML; MG/3ML
3 SOLUTION RESPIRATORY (INHALATION) EVERY 6 HOURS PRN
Status: DISCONTINUED | OUTPATIENT
Start: 2017-07-04 | End: 2017-07-11 | Stop reason: HOSPADM

## 2017-07-04 RX ORDER — BUPROPION HYDROCHLORIDE 150 MG/1
150 TABLET, EXTENDED RELEASE ORAL EVERY 12 HOURS SCHEDULED
Status: DISCONTINUED | OUTPATIENT
Start: 2017-07-04 | End: 2017-07-11 | Stop reason: HOSPADM

## 2017-07-04 RX ADMIN — INSULIN ASPART 2 UNITS: 100 INJECTION, SOLUTION INTRAVENOUS; SUBCUTANEOUS at 12:07

## 2017-07-04 RX ADMIN — BUPROPION HYDROCHLORIDE 150 MG: 150 TABLET, EXTENDED RELEASE ORAL at 21:20

## 2017-07-04 RX ADMIN — ASPIRIN 81 MG: 81 TABLET ORAL at 10:36

## 2017-07-04 RX ADMIN — BUPROPION HYDROCHLORIDE 150 MG: 150 TABLET, EXTENDED RELEASE ORAL at 13:43

## 2017-07-04 RX ADMIN — METOPROLOL TARTRATE 50 MG: 50 TABLET, FILM COATED ORAL at 10:36

## 2017-07-04 RX ADMIN — POLYETHYLENE GLYCOL 3350 17 G: 1 POWDER ORAL at 21:17

## 2017-07-04 RX ADMIN — PANTOPRAZOLE SODIUM 40 MG: 40 TABLET, DELAYED RELEASE ORAL at 21:18

## 2017-07-04 RX ADMIN — PANCRELIPASE 24000 UNITS OF LIPASE: 60000; 12000; 38000 CAPSULE, DELAYED RELEASE PELLETS ORAL at 21:17

## 2017-07-04 RX ADMIN — ALBUTEROL SULFATE 2 PUFF: 90 AEROSOL, METERED RESPIRATORY (INHALATION) at 22:44

## 2017-07-04 RX ADMIN — METOCLOPRAMIDE 10 MG: 10 TABLET ORAL at 10:36

## 2017-07-04 RX ADMIN — ALBUTEROL SULFATE 2 PUFF: 90 AEROSOL, METERED RESPIRATORY (INHALATION) at 16:43

## 2017-07-04 RX ADMIN — MAGNESIUM GLUCONATE 500 MG ORAL TABLET 200 MG: 500 TABLET ORAL at 10:36

## 2017-07-04 RX ADMIN — MAGNESIUM GLUCONATE 500 MG ORAL TABLET 200 MG: 500 TABLET ORAL at 21:17

## 2017-07-04 RX ADMIN — PANCRELIPASE 24000 UNITS OF LIPASE: 60000; 12000; 38000 CAPSULE, DELAYED RELEASE PELLETS ORAL at 17:09

## 2017-07-04 RX ADMIN — FUROSEMIDE 20 MG: 20 TABLET ORAL at 10:36

## 2017-07-04 RX ADMIN — POLYETHYLENE GLYCOL 3350 17 G: 1 POWDER ORAL at 10:39

## 2017-07-04 RX ADMIN — METOCLOPRAMIDE 10 MG: 10 TABLET ORAL at 21:18

## 2017-07-04 RX ADMIN — INSULIN DETEMIR 10 UNITS: 100 INJECTION, SOLUTION SUBCUTANEOUS at 20:14

## 2017-07-04 RX ADMIN — GABAPENTIN 400 MG: 400 CAPSULE ORAL at 21:19

## 2017-07-04 RX ADMIN — PANTOPRAZOLE SODIUM 40 MG: 40 TABLET, DELAYED RELEASE ORAL at 10:36

## 2017-07-04 RX ADMIN — PANCRELIPASE 24000 UNITS OF LIPASE: 60000; 12000; 38000 CAPSULE, DELAYED RELEASE PELLETS ORAL at 10:37

## 2017-07-04 RX ADMIN — GABAPENTIN 400 MG: 400 CAPSULE ORAL at 16:19

## 2017-07-04 NOTE — PLAN OF CARE
Problem:  Patient Care Overview (Adult)  Goal: Plan of Care Review  Outcome: Ongoing (interventions implemented as appropriate)    07/04/17 1415   Coping/Psychosocial Response Interventions   Plan Of Care Reviewed With patient   Coping/Psychosocial   Patient Agreement with Plan of Care agrees   Patient Care Overview   Progress no change   Outcome Evaluation   Outcome Summary/Follow up Plan Reports having suicidal thoughta on and off. Denies withdrawals and craving. Out to dayroom and watched tv . Calm and cooperative.         Problem:  Overarching Goals  Goal: Adheres to Safety Considerations for Self and Others  Outcome: Ongoing (interventions implemented as appropriate)  Goal: Optimized Coping Skills in Response to Life Stressors  Outcome: Ongoing (interventions implemented as appropriate)  Goal: Develops/Participates in Therapeutic [place] to Support Successful Transition  Outcome: Ongoing (interventions implemented as appropriate)

## 2017-07-04 NOTE — H&P
"Clinician:  Iván Chan   Admission Date: 7/3/2017  12:22 PM 07/04/17      Subjective     Chief Complaint:  \"Suicidal\"    HPI:  Isaias Lang is a 52 y.o. white male who is 11th grade educated as a unemployed however he receives SSI.  Patient was  twice and  twice.  Has 4 adult children.  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  He is homeless at this time.  He has history of previous psychiatric admissions at the [facility].  Patient presented to the emergency department the [facility] and he had self-inflicted lacerations to the right forearm and then he used IV Suboxone in the place.  Patient says that he was attempting suicide.  He is assisted suicide and today.  He says for 5 months he was doing very good and was living in [facility] and was a stable then he says he went to [place] to visit family members and he was 2 days late to pay his monthly rent and he claims that he called from [place] and told the management that he was going to be late however day terminated him from the house and they threw away his belongings that is what he says.  Patient says for the past month he has been homeless again and has been sleeping here and there.  Patient rates depression and anxiety 10 on a scale of 1-10 and he feels hopeless, helpless and worthless.  He admits to thoughts of suicide and plan however denies homicidal thoughts.  Patient urine drug screening is positive for methamphetamine and Suboxone he admits that he has been using IV Suboxone but he says that he was not using methamphetamine and claims that he was using some cold medication Sudafed and asked why he is positive for methamphetamine however day history of the past documentation indicates that patient was a drug user methamphetamine was unwilling to drugs that he used in the past.  Patient's sleep is poor with initial, intermittent and terminal insomnia.  Appetite has been poor.  Patient says that he has " been hearing voices that they tell him to kill himself.  Patient is admitted for crisis intervention, stabilization and securing his safety.      Past Psych History:   Patient has history of previous psychiatric admissions and diagnoses of major depressive disorder as well as alcohol and drug use.    Substance Abuse:   Patient says when he was younger he was a drinker but then he started using narcotic analgesics and then he went to a Suboxone clinic and now he has been shooting Suboxone.  He has had history of methamphetamine use in the past and his urine drug screening is positive for them stimulants.  He has smokes.    Family History:  family history includes Arthritis in his other; Cancer in his maternal grandmother and other; Coronary artery disease in his mother; Gout in his maternal grandfather, other, and sister; Heart disease in his other; Hypertension in his maternal grandfather and other; Lung disease in his mother; Osteoporosis in his other.    Personal and social history:  Born and raised in [place].  His parents were .  Patient quit at the 11th grade he says he has problem with his spending but he can read.  He says in his younger days he worked some but has been on SSI for a long time the.  Patient has 4 grownup children but he says they don't do anything with him.  He has a son who is a part-time  in [employer] and he says among his children he is a 10 might help him a little bit however he says he is remarried now and they are expecting a child and he does not have time for father.  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  Medical/Surgical History:  Past Medical History:   Diagnosis Date   • Abscess of antecubital fossa 05/2014    Left that required I and D and grew Haemophilus influenza group 1   • Anxiety    • Asthma    • Chronic pancreatitis    • COPD (chronic obstructive pulmonary disease)    • DDD (degenerative  "disc disease), lumbosacral    • Depression    • Diabetes mellitus    • DVT (deep venous thrombosis)     Right arm   • Essential hypertension    • GERD (gastroesophageal reflux disease)    • Hepatitis-C    • Hypercholesteremia    • Injury of back    • Injury of right Achilles tendon     Complicated by MRSA and Pseudomonas   • IV drug abuse    • Mild CAD     Left heart cath at  2012   • MRSA (methicillin resistant staph aureus) culture positive 09/14/2016    LUE   • Obesity    • Opiate addiction     Suboxone IV   • Self-injurious behavior    • Sleep apnea    • Suicide attempt    • Systolic CHF, chronic     EF 45-50% in 2013, EF 60% 9/2016     Past Surgical History:   Procedure Laterality Date   • CHOLECYSTECTOMY  1990s   • INCISION AND DRAINAGE ABSCESS Left 2016    wrist   • INCISION AND DRAINAGE ARM Left 9/15/2016    Procedure: INCISION AND DRAINAGE UPPER EXTREMITY;  Surgeon: Rico Oseguera MD;  Location: [facility];  Service:    • ORIF ANKLE FRACTURE Right 2014       Allergies   Allergen Reactions   • Robitussin Dm [Dextromethorphan-Guaifenesin] Shortness Of Breath   • Tizanidine Shortness Of Breath   • Metformin Nausea And Vomiting   • Sulfa Antibiotics Other (See Comments)     \"I cannot take them, they do something to me.\"   • Contrast Dye Rash   • Tramadol Rash     Social History   Substance Use Topics   • Smoking status: Former Smoker     Years: 1.00     Types: Cigarettes   • Smokeless tobacco: Never Used      Comment: quit smoking in my 20's   • Alcohol use No      Comment: denies     Current Medications:   Current Facility-Administered Medications   Medication Dose Route Frequency Provider Last Rate Last Dose   • albuterol (PROVENTIL HFA;VENTOLIN HFA) inhaler 2 puff  2 puff Inhalation Q6H PRN Nate Kelley MD       • aluminum-magnesium hydroxide-simethicone (MAALOX MAX) 400-400-40 MG/5ML suspension 15 mL  15 mL Oral Q6H PRN Nate Kelley MD       • aspirin EC tablet 81 mg  81 mg Oral Daily " Nate Kelley MD   81 mg at 07/04/17 1036   • aspirin EC tablet 81 mg  81 mg Oral Once Nate Kelley MD       • benzonatate (TESSALON) capsule 100 mg  100 mg Oral TID PRN Nate Kelley MD       • buPROPion SR (WELLBUTRIN SR) 12 hr tablet 150 mg  150 mg Oral Q12H Ankit Barnes MD       • CloNIDine (CATAPRES) tablet 0.1 mg  0.1 mg Oral 4x Daily PRN Nate Kelley MD        Followed by   • [START ON 7/5/2017] CloNIDine (CATAPRES) tablet 0.1 mg  0.1 mg Oral TID PRN Nate Kelley MD        Followed by   • [START ON 7/6/2017] CloNIDine (CATAPRES) tablet 0.1 mg  0.1 mg Oral BID PRN Nate Kelley MD        Followed by   • [START ON 7/7/2017] CloNIDine (CATAPRES) tablet 0.1 mg  0.1 mg Oral Once PRN Nate Kelley MD       • cyclobenzaprine (FLEXERIL) tablet 10 mg  10 mg Oral TID PRN Nate Kelley MD       • dextrose (D50W) solution 25 g  25 g Intravenous Q15 Min PRN Nate Kelley MD       • dextrose (GLUTOSE) oral gel 15 g  15 g Oral Q15 Min PRN Nate Kelley MD       • dicyclomine (BENTYL) capsule 10 mg  10 mg Oral TID PRN Nate Kelley MD       • famotidine (PEPCID) tablet 20 mg  20 mg Oral BID PRN Nate Kelley MD       • furosemide (LASIX) tablet 20 mg  20 mg Oral Daily Nate Kelley MD   20 mg at 07/04/17 1036   • furosemide (LASIX) tablet 20 mg  20 mg Oral Once Nate Kelley MD       • gabapentin (NEURONTIN) capsule 400 mg  400 mg Oral TID Ankit Barnes MD       • glucagon (human recombinant) (GLUCAGEN DIAGNOSTIC) injection 1 mg  1 mg Subcutaneous Q15 Min PRN Nate Kelley MD       • hydrOXYzine (ATARAX) tablet 50 mg  50 mg Oral Q6H PRN Nate Kelley MD       • ibuprofen (ADVIL,MOTRIN) tablet 600 mg  600 mg Oral Q6H PRN Nate Kelley MD       • insulin aspart (novoLOG) injection 2-5 Units  2-5 Units Subcutaneous TID With Meals Nate Kelley MD   2 Units at  07/04/17 1207   • insulin detemir (LEVEMIR) injection 10 Units  10 Units Subcutaneous Nightly Nate Kelley MD   10 Units at 07/03/17 2139   • ipratropium-albuterol (DUO-NEB) nebulizer solution 3 mL  3 mL Nebulization 4x Daily - RT Nate Kelley MD   3 mL at 07/03/17 1946   • loperamide (IMODIUM) capsule 2 mg  2 mg Oral 4x Daily PRN Nate Kelley MD       • magnesium hydroxide (MILK OF MAGNESIA) suspension 2400 mg/10mL 10 mL  10 mL Oral Daily PRN Nate Kelley MD       • magnesium oxide (MAGOX) tablet 200 mg  200 mg Oral BID Nate Kelley MD   200 mg at 07/04/17 1036   • metoclopramide (REGLAN) tablet 10 mg  10 mg Oral BID Nate Kelley MD   10 mg at 07/04/17 1036   • metoprolol tartrate (LOPRESSOR) tablet 50 mg  50 mg Oral Daily Nate Kelley MD   50 mg at 07/04/17 1036   • metoprolol tartrate (LOPRESSOR) tablet 50 mg  50 mg Oral Once Nate Kelley MD       • nitroglycerin (NITROSTAT) SL tablet 0.4 mg  0.4 mg Sublingual Q5 Min PRN Nate Kelley MD       • ondansetron (ZOFRAN) tablet 4 mg  4 mg Oral Q6H PRN Nate Kelley MD       • pancrelipase (Lip-Prot-Amyl) (CREON) capsule 24,000 units of lipase  24,000 units of lipase Oral 4x Daily With Meals & Nightly Nate Kelley MD   24,000 units of lipase at 07/04/17 1037   • pantoprazole (PROTONIX) EC tablet 40 mg  40 mg Oral BID Nate Kelley MD   40 mg at 07/04/17 1036   • polyethylene glycol (MIRALAX) powder 17 g  17 g Oral BID Nate Kelley MD   17 g at 07/04/17 1039   • potassium chloride (K-DUR,KLOR-CON) CR tablet 20 mEq  20 mEq Oral Daily Nate Kelley MD       • sodium chloride (OCEAN) nasal spray 2 spray  2 spray Each Nare PRN Nate Kelley MD       • traZODone (DESYREL) tablet 50 mg  50 mg Oral Nightly PRN Nate Kelley MD           Review of Systems    Review of Systems - General ROS: negative for - chills, fever or malaise  Ophthalmic  ROS: negative for - loss of vision  ENT ROS: negative for - hearing change  Allergy and Immunology ROS: negative for - hives  Hematological and Lymphatic ROS: negative for - bleeding problems  Endocrine ROS: negative for - skin changes  Respiratory ROS: no cough, shortness of breath, or wheezing  Cardiovascular ROS: no chest pain or dyspnea on exertion  Gastrointestinal ROS: no abdominal pain, change in bowel habits, or black or bloody stools  Genito-Urinary ROS: no dysuria, trouble voiding, or hematuria  Musculoskeletal ROS: negative for - gait disturbance  Neurological ROS: no TIA or stroke symptoms  Dermatological ROS: negative for rash    Objective       General Appearance:    Alert, cooperative, in no acute distress   Head:    Normocephalic, without obvious abnormality, atraumatic   Eyes:            Lids and lashes normal, conjunctivae and sclerae normal, no   icterus, no pallor, corneas clear, PERRLA   Ears:    Ears appear intact with no abnormalities noted   Throat:   No oral lesions, no thrush, oral mucosa moist   Neck:   No adenopathy, supple, trachea midline, no thyromegaly, no   carotid bruit, no JVD   Back:     No kyphosis present, no scoliosis present, no skin lesions,      erythema or scars, no tenderness to percussion or                   palpation,   range of motion normal   Lungs:     Clear to auscultation,respirations regular, even and                  unlabored    Heart:    Regular rhythm and normal rate, normal S1 and S2, no            murmur, no gallop, no rub, no click   Chest Wall:    No abnormalities observed   Abdomen:     Normal bowel sounds, no masses, no organomegaly, soft        non-tender, non-distended, no guarding, no rebound                tenderness   Rectal:     Deferred   Extremities:   Moves all extremities well, no edema, no cyanosis, no             redness   Pulses:   Pulses palpable and equal bilaterally   Skin:   No bleeding, bruising or rash   Lymph nodes:   No palpable  "adenopathy   Neurologic:   Cranial nerves 2 - 12 grossly intact, sensation intact, DTR       present and equal bilaterally       Blood pressure 114/71, pulse 74, temperature 97.8 °F (36.6 °C), temperature source Temporal Artery , resp. rate 18, height 68\" (172.7 cm), weight 177 lb (80.3 kg), SpO2 91 %.    Mental Status Exam:   Hygiene:   poor  Cooperation:  Cooperative  Eye Contact:  Fair  Psychomotor Behavior:  Aggitated  Affect:  Restricted  Hopelessness: 8  Speech:  Rapid circumstantial to a point tangential   Thought Process:  Linear  Thought Content:  Mood congurent  Suicidal:  Suicidal Ideation and Suicidal plan  Homicidal:  None  Hallucinations:  Auditory  Delusion:  None  Memory:  Intact  Orientation:  Person, Place, Time and Situation  Reliability:  poor  Insight:  Poor  Judgement:  Poor  Impulse Control:  Poor  Physical/Medical Issues:  No     Medical Decision Making:   Labs:     Lab Results (last 24 hours)     Procedure Component Value Units Date/Time    Hemoglobin A1c [[id number]]  (Abnormal) Collected:  07/03/17 1906    Specimen:  Blood Updated:  07/03/17 1919     Hemoglobin A1C 6.40 (H) %     POC Glucose Fingerstick [[id number]]  (Normal) Collected:  07/03/17 1945    Specimen:  Blood Updated:  07/03/17 1952     Glucose 74 mg/dL     Narrative:       Meter: [id number] : [id number] Britt Lesile    POC Glucose Fingerstick [[id number]]  (Abnormal) Collected:  07/03/17 2136    Specimen:  Blood Updated:  07/03/17 2201     Glucose 134 (H) mg/dL     Narrative:       Meter: [id number] : [id number] Britt Leslie    POC Glucose Fingerstick [[id number]]  (Normal) Collected:  07/04/17 0651    Specimen:  Blood Updated:  07/04/17 0702     Glucose 119 mg/dL     Narrative:       Meter: [id number] : [id number] Britt Leslie    POC Glucose Fingerstick [[id number]]  (Abnormal) Collected:  07/04/17 1135    Specimen:  Blood Updated:  07/04/17 1149     Glucose 156 (H) mg/dL     Narrative:       Meter: [id number] " : [id number] Zheng Lois                          Lab Results   Component Value Date    WBC 3.55 (L) 07/03/2017    HGB 12.9 (L) 07/03/2017    HCT 38.6 (L) 07/03/2017    MCV 92.8 07/03/2017    PLT 66 (L) 07/03/2017     Lab Results   Component Value Date    GLUCOSE 120 (H) 07/03/2017    BUN 9 07/03/2017    CREATININE 0.53 07/03/2017    EGFRIFNONA >150 07/03/2017    EGFRIFAFRI  09/26/2016      Comment:      <15 Indicative of kidney failure.    BCR 17.0 07/03/2017    CO2 31.9 07/03/2017    CALCIUM 8.9 07/03/2017    ALBUMIN 2.80 (L) 07/03/2017    LABIL2 1.1 (L) 07/03/2017     (H) 07/03/2017    ALT 81 (H) 07/03/2017        Radiology:     Imaging Results (last 24 hours)     ** No results found for the last 24 hours. **            EKG:     ECG/EMG Results (most recent)     None           Medications:     aspirin 81 mg Oral Daily   aspirin 81 mg Oral Once   buPROPion  mg Oral Q12H   furosemide 20 mg Oral Daily   furosemide 20 mg Oral Once   gabapentin 400 mg Oral TID   insulin aspart 2-5 Units Subcutaneous TID With Meals   insulin detemir 10 Units Subcutaneous Nightly   ipratropium-albuterol 3 mL Nebulization 4x Daily - RT   magnesium oxide 200 mg Oral BID   metoclopramide 10 mg Oral BID   metoprolol tartrate 50 mg Oral Daily   metoprolol tartrate 50 mg Oral Once   pancrelipase (Lip-Prot-Amyl) 24,000 units of lipase Oral 4x Daily With Meals & Nightly   pantoprazole 40 mg Oral BID   polyethylene glycol 17 g Oral BID   potassium chloride 20 mEq Oral Daily       •  albuterol  •  aluminum-magnesium hydroxide-simethicone  •  benzonatate  •  CloNIDine **FOLLOWED BY** [START ON 7/5/2017] CloNIDine **FOLLOWED BY** [START ON 7/6/2017] CloNIDine **FOLLOWED BY** [START ON 7/7/2017] CloNIDine  •  cyclobenzaprine  •  dextrose  •  dextrose  •  dicyclomine  •  famotidine  •  glucagon (human recombinant)  •  hydrOXYzine  •  ibuprofen  •  loperamide  •  magnesium hydroxide  •  nitroglycerin  •  ondansetron  •  sodium  chloride  •  traZODone   All medications reviewed.    Special Precautions: Continue current level of Special Precautions.            Assessment/Plan     Patient Active Problem List   Diagnosis Code   • MDD (major depressive disorder), recurrent episode, moderate F33.1   • Type 1 diabetes mellitus E10.9   • 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 G89.21   • Cellulitis of right hand L03.113   • Cellulitis of right hand excluding fingers and thumb L03.113   • Septic shock A41.9, R65.21   • Sepsis A41.9   • Depression with suicidal ideation F32.9, R45.851     Patient is admitted to adult psychiatry unit on routine orders and precautions 3 to secure his safety.  He is on the scheduled and when necessary medications.  He is assigned to a master level counselor working with him in individual, group and family sessions and helping him with disposition planning.  He will be followed with daily clinical evaluations.  Any further treatment will be done per course.      We discussed risks, benefits, and side effects of the above medication and the patient was agreeable with the plan.   H&P was generated from Dr. Archibald evaluation, documentation, verbal discussion and instruction.         Attestation:   Physician Attestation: I attest that the above note accurately reflects work and decisions made by me.    NIKA CADENA MD

## 2017-07-04 NOTE — PLAN OF CARE
Problem: BH Patient Care Overview (Adult)  Goal: Interdisciplinary Rounds/Family Conference  Outcome: Ongoing (interventions implemented as appropriate)    07/04/17 1132   Interdisciplinary Rounds/Family Conf   Summary Met with patient and treatment team for initial assessment and treatment planning   Participants psychiatrist;social work  (Scribe for Dr. Barnes )       Goal: Individualization and Mutuality  Outcome: Ongoing (interventions implemented as appropriate)    07/03/17 1923 07/04/17 1132   Behavioral Health Screens   Patient Personal Strengths --  expressive of emotions;expressive of needs;insight into illness/situation;motivated for recovery;motivated for treatment;spiritual/[place] support   Patient Personal Strengths Comment --  willing to seek help    Patient Vulnerabilities --  lack of social support    Individualization   Patient Specific Preferences --  to get help    Patient Specific Goals --  kmood stabilization    Patient Specific Interventions --  Individual and group therapy to focus on healthy coping and follow up care    Mutuality/Individual Preferences   What Anxieties, Fears or Concerns Do You Have About Your Health or Care? anxiety about where he will go after treatment- how he's going to recover belonging lost --    What Questions Do You Have About Your Health or Care? no questions at this time --    What Information Would Help Us Give You More Personalized Care? none verbalized --        Goal: Discharge Needs Assessment  Outcome: Ongoing (interventions implemented as appropriate)    07/03/17 1923 07/04/17 1132   Discharge Needs Assessment   Concerns To Be Addressed --  homelessness;discharge planning concerns;coping/stress concerns;basic needs concerns   Readmission Within The Last 30 Days --  no previous admission in last 30 days   Community Agency Name(S) --  [facility] to be determined    Current Discharge Risk --  psychiatric illness;substance abuse;homeless   Discharge Planning  Comments --  pt is able to obtain his medications    Discharge Needs Assessment   Outpatient/Agency/Support Group Needs --  outpatient medication management;outpatient psychiatric care (specify);outpatient substance abuse treatment (specify)   Anticipated Discharge Disposition --  homeless shelter   Discharge Disposition --  homeless shelter   Living Environment   Transportation Available family or friend will provide --       Met with patient and treatment team for initial assessment and treatment planning. Introduced self as assigned therapist he was agreeable. Completed PSA treatment plan and integrated summary. Discussed treatment objectives and briefly discussed disposition plans. He is considering his follow up care options.     The Patient presented to the ER having suicidal thoughts, he reports increased depression and anxiety for the past 1 month since being homeless. He is having thoughts to harm himself possible to jump off a abdirizak, overdose or to hang himself. Patient reports he has been doing really well for the past 5 months until he was in [place] and was 2 days late paying his rent at [facility] and he was evicted and all his belongings thrown away. He has had increased depression since that time and feeling hopeless. Patient reports he is homeless with 11th grade education and is disabled and says his adult children wont help him.   He reports Dr. Strange has helped him and brought him things that he needs and bought him clothes. Patient has a history of previous admission the last was over 6 months ago.      Treatment team to stabilize patients symptoms, provide 24 hr nursing supervision and provide individual and group therapy to focus on healthy coping and follow up care. Follow up care to be determined.

## 2017-07-05 ENCOUNTER — TRANSCRIBE ORDERS (OUTPATIENT)
Dept: ENDOCRINOLOGY | Facility: CLINIC | Age: 52
End: 2017-07-05

## 2017-07-05 DIAGNOSIS — E11.9 TYPE 2 DIABETES MELLITUS WITHOUT COMPLICATION, UNSPECIFIED LONG TERM INSULIN USE STATUS: Primary | ICD-10-CM

## 2017-07-05 LAB
GLUCOSE BLDC GLUCOMTR-MCNC: 118 MG/DL (ref 70–130)
GLUCOSE BLDC GLUCOMTR-MCNC: 122 MG/DL (ref 70–130)
GLUCOSE BLDC GLUCOMTR-MCNC: 125 MG/DL (ref 70–130)
GLUCOSE BLDC GLUCOMTR-MCNC: 142 MG/DL (ref 70–130)

## 2017-07-05 PROCEDURE — 82962 GLUCOSE BLOOD TEST: CPT

## 2017-07-05 PROCEDURE — 94799 UNLISTED PULMONARY SVC/PX: CPT

## 2017-07-05 PROCEDURE — 99232 SBSQ HOSP IP/OBS MODERATE 35: CPT | Performed by: PSYCHIATRY & NEUROLOGY

## 2017-07-05 RX ORDER — DOXEPIN HYDROCHLORIDE 25 MG/1
50 CAPSULE ORAL NIGHTLY
Status: DISCONTINUED | OUTPATIENT
Start: 2017-07-05 | End: 2017-07-11 | Stop reason: HOSPADM

## 2017-07-05 RX ADMIN — METOPROLOL TARTRATE 50 MG: 50 TABLET, FILM COATED ORAL at 08:48

## 2017-07-05 RX ADMIN — PANTOPRAZOLE SODIUM 40 MG: 40 TABLET, DELAYED RELEASE ORAL at 08:49

## 2017-07-05 RX ADMIN — PANCRELIPASE 24000 UNITS OF LIPASE: 60000; 12000; 38000 CAPSULE, DELAYED RELEASE PELLETS ORAL at 08:49

## 2017-07-05 RX ADMIN — BUPROPION HYDROCHLORIDE 150 MG: 150 TABLET, EXTENDED RELEASE ORAL at 21:45

## 2017-07-05 RX ADMIN — PANTOPRAZOLE SODIUM 40 MG: 40 TABLET, DELAYED RELEASE ORAL at 17:08

## 2017-07-05 RX ADMIN — PANCRELIPASE 24000 UNITS OF LIPASE: 60000; 12000; 38000 CAPSULE, DELAYED RELEASE PELLETS ORAL at 21:45

## 2017-07-05 RX ADMIN — METOCLOPRAMIDE 10 MG: 10 TABLET ORAL at 08:50

## 2017-07-05 RX ADMIN — GABAPENTIN 400 MG: 400 CAPSULE ORAL at 16:30

## 2017-07-05 RX ADMIN — METOCLOPRAMIDE 10 MG: 10 TABLET ORAL at 17:08

## 2017-07-05 RX ADMIN — GABAPENTIN 400 MG: 400 CAPSULE ORAL at 21:45

## 2017-07-05 RX ADMIN — MAGNESIUM GLUCONATE 500 MG ORAL TABLET 200 MG: 500 TABLET ORAL at 17:08

## 2017-07-05 RX ADMIN — ASPIRIN 81 MG: 81 TABLET ORAL at 08:49

## 2017-07-05 RX ADMIN — POLYETHYLENE GLYCOL 3350 17 G: 1 POWDER ORAL at 17:08

## 2017-07-05 RX ADMIN — PANCRELIPASE 24000 UNITS OF LIPASE: 60000; 12000; 38000 CAPSULE, DELAYED RELEASE PELLETS ORAL at 17:08

## 2017-07-05 RX ADMIN — BUPROPION HYDROCHLORIDE 150 MG: 150 TABLET, EXTENDED RELEASE ORAL at 08:49

## 2017-07-05 RX ADMIN — POLYETHYLENE GLYCOL 3350 17 G: 1 POWDER ORAL at 08:48

## 2017-07-05 RX ADMIN — IPRATROPIUM BROMIDE AND ALBUTEROL SULFATE 3 ML: .5; 3 SOLUTION RESPIRATORY (INHALATION) at 08:34

## 2017-07-05 RX ADMIN — MAGNESIUM GLUCONATE 500 MG ORAL TABLET 200 MG: 500 TABLET ORAL at 08:48

## 2017-07-05 RX ADMIN — PANCRELIPASE 24000 UNITS OF LIPASE: 60000; 12000; 38000 CAPSULE, DELAYED RELEASE PELLETS ORAL at 11:56

## 2017-07-05 RX ADMIN — DOXEPIN HYDROCHLORIDE 50 MG: 25 CAPSULE ORAL at 21:45

## 2017-07-05 RX ADMIN — IPRATROPIUM BROMIDE AND ALBUTEROL SULFATE 3 ML: .5; 3 SOLUTION RESPIRATORY (INHALATION) at 19:30

## 2017-07-05 RX ADMIN — POTASSIUM CHLORIDE 20 MEQ: 1500 TABLET, EXTENDED RELEASE ORAL at 08:49

## 2017-07-05 RX ADMIN — GABAPENTIN 400 MG: 400 CAPSULE ORAL at 08:48

## 2017-07-05 RX ADMIN — FUROSEMIDE 20 MG: 20 TABLET ORAL at 08:49

## 2017-07-05 NOTE — PLAN OF CARE
Problem: BH Patient Care Overview (Adult)  Goal: Plan of Care Review  Outcome: Ongoing (interventions implemented as appropriate)    07/04/17 3395   Coping/Psychosocial Response Interventions   Plan Of Care Reviewed With patient   Coping/Psychosocial   Patient Agreement with Plan of Care agrees   Patient Care Overview   Progress no change   Outcome Evaluation   Outcome Summary/Follow up Plan PT REPORTED FEELING IRRITABLE, ANXIETY & DEPRESSION BOTH 8, HAVING SUICIDAL THOUGHTS NO PLAN, FEELING A LITTLE SHAKY, CRAVING 7 FOR COCAINE, COWS 3.         Problem:  Overarching Goals  Goal: Adheres to Safety Considerations for Self and Others  Outcome: Ongoing (interventions implemented as appropriate)  Goal: Optimized Coping Skills in Response to Life Stressors  Outcome: Ongoing (interventions implemented as appropriate)  Goal: Develops/Participates in Therapeutic Felt to Support Successful Transition  Outcome: Ongoing (interventions implemented as appropriate)

## 2017-07-05 NOTE — DISCHARGE PLACEMENT REQUEST
"Isaias Avendano (52 y.o. Male)     Date of Birth Social Security Number Address Home Phone MRN    1965  [id number] [address] [phone] [id number]    Presybeterian Marital Status          Mandaeism        Admission Date Admission Type Admitting Provider Attending Provider Department, Room/Bed    7/3/17 Emergency Nate Kelley MD Vora, Chintamani Raju, MD [facility] ADULT PSYCHIATRIC, 1015/1S    Discharge Date Discharge Disposition Discharge Destination                      Attending Provider: Nate Kelley MD     Allergies:  Robitussin Dm [Dextromethorphan-guaifenesin], Tizanidine, Metformin, Sulfa Antibiotics, Contrast Dye, Tramadol    Isolation:  None   Infection:  None   Code Status:  FULL    Ht:  68\" (172.7 cm)   Wt:  177 lb (80.3 kg)    Admission Cmt:  None   Principal Problem:  None                Active Insurance as of 7/3/2017     Primary Coverage     Payor Plan Insurance Group Employer/Plan Group    [facility] [employer] [facility]      Payor Plan Address Payor Plan Phone Number Effective From Effective To    [address] [address]  2/1/2016     [id number] [address]       Subscriber Name Subscriber Birth Date Member ID       ISAIAS AVENDANO 1965 [id number]                 Emergency Contacts      (Rel.) Home Phone Work Phone Mobile Phone    Domenico Mckinney (Father) [phone] -- --               History & Physical      Ankit Barnes MD at 7/4/2017 12:21 PM          Clinician:  Iván Chan   Admission Date: 7/3/2017  12:22 PM 07/04/17      Subjective     Chief Complaint:  \"Suicidal\"    HPI:  Isaias Avendano is a 52 y.o. white male who is 11th grade educated as a unemployed however he receives SSI.  Patient was  twice and  twice.  Has 4 adult children.  He is a Mandaeism and goes to [employer] occasionally.  He is homeless at this time.  He has history of previous psychiatric admissions at the [facility] " [place].  Patient presented to the emergency department the [facility] and he had self-inflicted lacerations to the right forearm and then he used IV Suboxone in the place.  Patient says that he was attempting suicide.  He is assisted suicide and today.  He says for 5 months he was doing very good and was living in [facility] [place] and was a stable then he says he went to [place] to visit family members and he was 2 days late to pay his monthly rent and he claims that he called from [place] and told the management that he was going to be late however day terminated him from the house and they threw away his belongings that is what he says.  Patient says for the past month he has been homeless again and has been sleeping here and there.  Patient rates depression and anxiety 10 on a scale of 1-10 and he feels hopeless, helpless and worthless.  He admits to thoughts of suicide and plan however denies homicidal thoughts.  Patient urine drug screening is positive for methamphetamine and Suboxone he admits that he has been using IV Suboxone but he says that he was not using methamphetamine and claims that he was using some cold medication Sudafed and asked why he is positive for methamphetamine however day history of the past documentation indicates that patient was a drug user methamphetamine was unwilling to drugs that he used in the past.  Patient's sleep is poor with initial, intermittent and terminal insomnia.  Appetite has been poor.  Patient says that he has been hearing voices that they tell him to kill himself.  Patient is admitted for crisis intervention, stabilization and securing his safety.      Past Psych History:   Patient has history of previous psychiatric admissions and diagnoses of major depressive disorder as well as alcohol and drug use.    Substance Abuse:   Patient says when he was younger he was a drinker but then he started using narcotic analgesics and then he went to a Suboxone clinic  and now he has been shooting Suboxone.  He has had history of methamphetamine use in the past and his urine drug screening is positive for them stimulants.  He has smokes.    Family History:  family history includes Arthritis in his other; Cancer in his maternal grandmother and other; Coronary artery disease in his mother; Gout in his maternal grandfather, other, and sister; Heart disease in his other; Hypertension in his maternal grandfather and other; Lung disease in his mother; Osteoporosis in his other.    Personal and social history:  Born and raised in [place].  His parents were .  Patient quit at the 11th grade he says he has problem with his spending but he can read.  He says in his younger days he worked some but has been on SSI for a long time the.  Patient has 4 grownup children but he says they don't do anything with him.  He has a son who is a part-time  in [employer] and he says among his children he is a 10 might help him a little bit however he says he is remarried now and they are expecting a child and he does not have time for father.  Patient says he is a Buddhist and was going to Jehovah's witness regularly when he was living in [place] [facility].  Medical/Surgical History:  Past Medical History:   Diagnosis Date   • Abscess of antecubital fossa 05/2014    Left that required I and D and grew Haemophilus influenza group 1   • Anxiety    • Asthma    • Chronic pancreatitis    • COPD (chronic obstructive pulmonary disease)    • DDD (degenerative disc disease), lumbosacral    • Depression    • Diabetes mellitus    • DVT (deep venous thrombosis)     Right arm   • Essential hypertension    • GERD (gastroesophageal reflux disease)    • Hepatitis-C    • Hypercholesteremia    • Injury of back    • Injury of right Achilles tendon     Complicated by MRSA and Pseudomonas   • IV drug abuse    • Mild CAD     Left heart cath at  2012   • MRSA (methicillin resistant staph aureus) culture positive  "09/14/2016    LUE   • Obesity    • Opiate addiction     Suboxone IV   • Self-injurious behavior    • Sleep apnea    • Suicide attempt    • Systolic CHF, chronic     EF 45-50% in 2013, EF 60% 9/2016     Past Surgical History:   Procedure Laterality Date   • CHOLECYSTECTOMY  1990s   • INCISION AND DRAINAGE ABSCESS Left 2016    wrist   • INCISION AND DRAINAGE ARM Left 9/15/2016    Procedure: INCISION AND DRAINAGE UPPER EXTREMITY;  Surgeon: Rico Oseguera MD;  Location: [facility] OR;  Service:    • ORIF ANKLE FRACTURE Right 2014       Allergies   Allergen Reactions   • Robitussin Dm [Dextromethorphan-Guaifenesin] Shortness Of Breath   • Tizanidine Shortness Of Breath   • Metformin Nausea And Vomiting   • Sulfa Antibiotics Other (See Comments)     \"I cannot take them, they do something to me.\"   • Contrast Dye Rash   • Tramadol Rash     Social History   Substance Use Topics   • Smoking status: Former Smoker     Years: 1.00     Types: Cigarettes   • Smokeless tobacco: Never Used      Comment: quit smoking in my 20's   • Alcohol use No      Comment: denies     Current Medications:   Current Facility-Administered Medications   Medication Dose Route Frequency Provider Last Rate Last Dose   • albuterol (PROVENTIL HFA;VENTOLIN HFA) inhaler 2 puff  2 puff Inhalation Q6H PRN Nate Kelley MD       • aluminum-magnesium hydroxide-simethicone (MAALOX MAX) 400-400-40 MG/5ML suspension 15 mL  15 mL Oral Q6H PRN Nate Kelley MD       • aspirin EC tablet 81 mg  81 mg Oral Daily Nate Kelley MD   81 mg at 07/04/17 1036   • aspirin EC tablet 81 mg  81 mg Oral Once Nate Kelley MD       • benzonatate (TESSALON) capsule 100 mg  100 mg Oral TID PRN Nate Kelley MD       • buPROPion SR (WELLBUTRIN SR) 12 hr tablet 150 mg  150 mg Oral Q12H Ankit Barnes MD       • CloNIDine (CATAPRES) tablet 0.1 mg  0.1 mg Oral 4x Daily PRN Nate Kelley MD        Followed by   • [START ON " 7/5/2017] CloNIDine (CATAPRES) tablet 0.1 mg  0.1 mg Oral TID PRN Nate Kelley MD        Followed by   • [START ON 7/6/2017] CloNIDine (CATAPRES) tablet 0.1 mg  0.1 mg Oral BID PRN Nate Kelley MD        Followed by   • [START ON 7/7/2017] CloNIDine (CATAPRES) tablet 0.1 mg  0.1 mg Oral Once PRN Nate Kelley MD       • cyclobenzaprine (FLEXERIL) tablet 10 mg  10 mg Oral TID PRN Nate Kelley MD       • dextrose (D50W) solution 25 g  25 g Intravenous Q15 Min PRN Nate Kelley MD       • dextrose (GLUTOSE) oral gel 15 g  15 g Oral Q15 Min PRN Nate Kelley MD       • dicyclomine (BENTYL) capsule 10 mg  10 mg Oral TID PRN Nate Kelley MD       • famotidine (PEPCID) tablet 20 mg  20 mg Oral BID PRN Nate Kelley MD       • furosemide (LASIX) tablet 20 mg  20 mg Oral Daily Nate Kelley MD   20 mg at 07/04/17 1036   • furosemide (LASIX) tablet 20 mg  20 mg Oral Once Nate Kelley MD       • gabapentin (NEURONTIN) capsule 400 mg  400 mg Oral TID Ankit Barnes MD       • glucagon (human recombinant) (GLUCAGEN DIAGNOSTIC) injection 1 mg  1 mg Subcutaneous Q15 Min PRN Nate Kelley MD       • hydrOXYzine (ATARAX) tablet 50 mg  50 mg Oral Q6H PRN Nate Kelley MD       • ibuprofen (ADVIL,MOTRIN) tablet 600 mg  600 mg Oral Q6H PRN Nate Kelley MD       • insulin aspart (novoLOG) injection 2-5 Units  2-5 Units Subcutaneous TID With Meals Nate Kelley MD   2 Units at 07/04/17 1207   • insulin detemir (LEVEMIR) injection 10 Units  10 Units Subcutaneous Nightly Nate Kelley MD   10 Units at 07/03/17 2139   • ipratropium-albuterol (DUO-NEB) nebulizer solution 3 mL  3 mL Nebulization 4x Daily - RT Nate Kelley MD   3 mL at 07/03/17 1946   • loperamide (IMODIUM) capsule 2 mg  2 mg Oral 4x Daily PRN Nate Kelley MD       • magnesium hydroxide (MILK OF MAGNESIA) suspension 2400  mg/10mL 10 mL  10 mL Oral Daily PRN Nate Kelley MD       • magnesium oxide (MAGOX) tablet 200 mg  200 mg Oral BID Nate Kelley MD   200 mg at 07/04/17 1036   • metoclopramide (REGLAN) tablet 10 mg  10 mg Oral BID Nate Kelley MD   10 mg at 07/04/17 1036   • metoprolol tartrate (LOPRESSOR) tablet 50 mg  50 mg Oral Daily Nate Kelley MD   50 mg at 07/04/17 1036   • metoprolol tartrate (LOPRESSOR) tablet 50 mg  50 mg Oral Once Nate Kelley MD       • nitroglycerin (NITROSTAT) SL tablet 0.4 mg  0.4 mg Sublingual Q5 Min PRN Nate Kelley MD       • ondansetron (ZOFRAN) tablet 4 mg  4 mg Oral Q6H PRN Nate Kelley MD       • pancrelipase (Lip-Prot-Amyl) (CREON) capsule 24,000 units of lipase  24,000 units of lipase Oral 4x Daily With Meals & Nightly Nate Kelley MD   24,000 units of lipase at 07/04/17 1037   • pantoprazole (PROTONIX) EC tablet 40 mg  40 mg Oral BID Nate Kelley MD   40 mg at 07/04/17 1036   • polyethylene glycol (MIRALAX) powder 17 g  17 g Oral BID Nate Kelley MD   17 g at 07/04/17 1039   • potassium chloride (K-DUR,KLOR-CON) CR tablet 20 mEq  20 mEq Oral Daily Nate Kelley MD       • sodium chloride (OCEAN) nasal spray 2 spray  2 spray Each Nare PRN Nate Kelley MD       • traZODone (DESYREL) tablet 50 mg  50 mg Oral Nightly PRN Nate Kelley MD           Review of Systems    Review of Systems - General ROS: negative for - chills, fever or malaise  Ophthalmic ROS: negative for - loss of vision  ENT ROS: negative for - hearing change  Allergy and Immunology ROS: negative for - hives  Hematological and Lymphatic ROS: negative for - bleeding problems  Endocrine ROS: negative for - skin changes  Respiratory ROS: no cough, shortness of breath, or wheezing  Cardiovascular ROS: no chest pain or dyspnea on exertion  Gastrointestinal ROS: no abdominal pain, change in bowel habits, or black or bloody  "stools  Genito-Urinary ROS: no dysuria, trouble voiding, or hematuria  Musculoskeletal ROS: negative for - gait disturbance  Neurological ROS: no TIA or stroke symptoms  Dermatological ROS: negative for rash    Objective       General Appearance:    Alert, cooperative, in no acute distress   Head:    Normocephalic, without obvious abnormality, atraumatic   Eyes:            Lids and lashes normal, conjunctivae and sclerae normal, no   icterus, no pallor, corneas clear, PERRLA   Ears:    Ears appear intact with no abnormalities noted   Throat:   No oral lesions, no thrush, oral mucosa moist   Neck:   No adenopathy, supple, trachea midline, no thyromegaly, no   carotid bruit, no JVD   Back:     No kyphosis present, no scoliosis present, no skin lesions,      erythema or scars, no tenderness to percussion or                   palpation,   range of motion normal   Lungs:     Clear to auscultation,respirations regular, even and                  unlabored    Heart:    Regular rhythm and normal rate, normal S1 and S2, no            murmur, no gallop, no rub, no click   Chest Wall:    No abnormalities observed   Abdomen:     Normal bowel sounds, no masses, no organomegaly, soft        non-tender, non-distended, no guarding, no rebound                tenderness   Rectal:     Deferred   Extremities:   Moves all extremities well, no edema, no cyanosis, no             redness   Pulses:   Pulses palpable and equal bilaterally   Skin:   No bleeding, bruising or rash   Lymph nodes:   No palpable adenopathy   Neurologic:   Cranial nerves 2 - 12 grossly intact, sensation intact, DTR       present and equal bilaterally       Blood pressure 114/71, pulse 74, temperature 97.8 °F (36.6 °C), temperature source Temporal Artery , resp. rate 18, height 68\" (172.7 cm), weight 177 lb (80.3 kg), SpO2 91 %.    Mental Status Exam:   Hygiene:   poor  Cooperation:  Cooperative  Eye Contact:  Fair  Psychomotor Behavior:  Aggitated  Affect:  " Restricted  Hopelessness: 8  Speech:  Rapid circumstantial to a point tangential   Thought Process:  Linear  Thought Content:  Mood congurent  Suicidal:  Suicidal Ideation and Suicidal plan  Homicidal:  None  Hallucinations:  Auditory  Delusion:  None  Memory:  Intact  Orientation:  Person, Place, Time and Situation  Reliability:  poor  Insight:  Poor  Judgement:  Poor  Impulse Control:  Poor  Physical/Medical Issues:  No     Medical Decision Making:   Labs:     Lab Results (last 24 hours)     Procedure Component Value Units Date/Time    Hemoglobin A1c [[id number]]  (Abnormal) Collected:  07/03/17 1906    Specimen:  Blood Updated:  07/03/17 1919     Hemoglobin A1C 6.40 (H) %     POC Glucose Fingerstick [[id number]]  (Normal) Collected:  07/03/17 1945    Specimen:  Blood Updated:  07/03/17 1952     Glucose 74 mg/dL     Narrative:       Meter: [id number] : [id number] Britt Leslie    POC Glucose Fingerstick [[id number]]  (Abnormal) Collected:  07/03/17 2136    Specimen:  Blood Updated:  07/03/17 2201     Glucose 134 (H) mg/dL     Narrative:       Meter: [id number] : [id number] Britt Leslie    POC Glucose Fingerstick [[id number]]  (Normal) Collected:  07/04/17 0651    Specimen:  Blood Updated:  07/04/17 0702     Glucose 119 mg/dL     Narrative:       Meter: [id number] : [id number] Britt Leslie    POC Glucose Fingerstick [[id number]]  (Abnormal) Collected:  07/04/17 1135    Specimen:  Blood Updated:  07/04/17 1149     Glucose 156 (H) mg/dL     Narrative:       Meter: [id number] : [id number] Zehng Lois                          Lab Results   Component Value Date    WBC 3.55 (L) 07/03/2017    HGB 12.9 (L) 07/03/2017    HCT 38.6 (L) 07/03/2017    MCV 92.8 07/03/2017    PLT 66 (L) 07/03/2017     Lab Results   Component Value Date    GLUCOSE 120 (H) 07/03/2017    BUN 9 07/03/2017    CREATININE 0.53 07/03/2017    EGFRIFNONA >150 07/03/2017    EGFRIFAFRI  09/26/2016      Comment:      <15 Indicative of kidney  failure.    BCR 17.0 07/03/2017    CO2 31.9 07/03/2017    CALCIUM 8.9 07/03/2017    ALBUMIN 2.80 (L) 07/03/2017    LABIL2 1.1 (L) 07/03/2017     (H) 07/03/2017    ALT 81 (H) 07/03/2017        Radiology:     Imaging Results (last 24 hours)     ** No results found for the last 24 hours. **            EKG:     ECG/EMG Results (most recent)     None           Medications:     aspirin 81 mg Oral Daily   aspirin 81 mg Oral Once   buPROPion  mg Oral Q12H   furosemide 20 mg Oral Daily   furosemide 20 mg Oral Once   gabapentin 400 mg Oral TID   insulin aspart 2-5 Units Subcutaneous TID With Meals   insulin detemir 10 Units Subcutaneous Nightly   ipratropium-albuterol 3 mL Nebulization 4x Daily - RT   magnesium oxide 200 mg Oral BID   metoclopramide 10 mg Oral BID   metoprolol tartrate 50 mg Oral Daily   metoprolol tartrate 50 mg Oral Once   pancrelipase (Lip-Prot-Amyl) 24,000 units of lipase Oral 4x Daily With Meals & Nightly   pantoprazole 40 mg Oral BID   polyethylene glycol 17 g Oral BID   potassium chloride 20 mEq Oral Daily       •  albuterol  •  aluminum-magnesium hydroxide-simethicone  •  benzonatate  •  CloNIDine **FOLLOWED BY** [START ON 7/5/2017] CloNIDine **FOLLOWED BY** [START ON 7/6/2017] CloNIDine **FOLLOWED BY** [START ON 7/7/2017] CloNIDine  •  cyclobenzaprine  •  dextrose  •  dextrose  •  dicyclomine  •  famotidine  •  glucagon (human recombinant)  •  hydrOXYzine  •  ibuprofen  •  loperamide  •  magnesium hydroxide  •  nitroglycerin  •  ondansetron  •  sodium chloride  •  traZODone   All medications reviewed.    Special Precautions: Continue current level of Special Precautions.            Assessment/Plan     Patient Active Problem List   Diagnosis Code   • MDD (major depressive disorder), recurrent episode, moderate F33.1   • Type 1 diabetes mellitus E10.9   • 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 G89.21   • Cellulitis of right hand L03.113   • Cellulitis of right hand excluding fingers and thumb L03.113    • Septic shock A41.9, R65.21   • Sepsis A41.9   • Depression with suicidal ideation F32.9, R45.851     Patient is admitted to adult psychiatry unit on routine orders and precautions 3 to secure his safety.  He is on the scheduled and when necessary medications.  He is assigned to a master level counselor working with him in individual, group and family sessions and helping him with disposition planning.  He will be followed with daily clinical evaluations.  Any further treatment will be done per course.      We discussed risks, benefits, and side effects of the above medication and the patient was agreeable with the plan.   H&P was generated from Dr. Archibald evaluation, documentation, verbal discussion and instruction.         Attestation:   Physician Attestation: I attest that the above note accurately reflects work and decisions made by me.    NIKA CADENA MD     Electronically signed by Ankit Cadena MD at 7/5/2017  8:32 AM        Hospital Medications (active)       Dose Frequency Start End    albuterol (PROVENTIL HFA;VENTOLIN HFA) inhaler 2 puff 2 puff Every 6 Hours PRN 7/3/2017     Sig - Route: Inhale 2 puffs Every 6 (Six) Hours As Needed for Wheezing. - Inhalation    aluminum-magnesium hydroxide-simethicone (MAALOX MAX) 400-400-40 MG/5ML suspension 15 mL 15 mL Every 6 Hours PRN 7/3/2017     Sig - Route: Take 15 mL by mouth Every 6 (Six) Hours As Needed for Indigestion or Heartburn. - Oral    aspirin EC tablet 81 mg 81 mg Daily 7/4/2017     Sig - Route: Take 1 tablet by mouth Daily. - Oral    aspirin EC tablet 81 mg 81 mg Once 7/3/2017     Sig - Route: Take 1 tablet by mouth 1 (One) Time. - Oral    benzonatate (TESSALON) capsule 100 mg 100 mg 3 Times Daily PRN 7/3/2017     Sig - Route: Take 1 capsule by mouth 3 (Three) Times a Day As Needed for Cough. - Oral    buPROPion SR (WELLBUTRIN SR) 12 hr tablet 150 mg 150 mg Every 12 Hours Scheduled 7/4/2017     Sig - Route: Take 1 tablet by mouth  "Every 12 (Twelve) Hours. - Oral    CloNIDine (CATAPRES) tablet 0.1 mg 0.1 mg 4 Times Daily PRN 7/4/2017 7/5/2017    Sig - Route: Take 1 tablet by mouth 4 (Four) Times a Day As Needed for Other (See Administration Instructions). - Oral    Linked Group 1:  \"Followed by\" Linked Group Details        CloNIDine (CATAPRES) tablet 0.1 mg 0.1 mg 3 Times Daily PRN 7/5/2017 7/6/2017    Sig - Route: Take 1 tablet by mouth 3 (Three) Times a Day As Needed for Other (See Administration Instructions). - Oral    Linked Group 1:  \"Followed by\" Linked Group Details        CloNIDine (CATAPRES) tablet 0.1 mg 0.1 mg 2 Times Daily PRN 7/6/2017 7/7/2017    Sig - Route: Take 1 tablet by mouth 2 (Two) Times a Day As Needed for Other (See Administration Instructions). - Oral    Linked Group 1:  \"Followed by\" Linked Group Details        CloNIDine (CATAPRES) tablet 0.1 mg 0.1 mg Once As Needed 7/7/2017 7/8/2017    Sig - Route: Take 1 tablet by mouth 1 (One) Time As Needed for Other (See Administration Instructions). - Oral    Linked Group 1:  \"Followed by\" Linked Group Details        cyclobenzaprine (FLEXERIL) tablet 10 mg 10 mg 3 Times Daily PRN 7/3/2017 7/8/2017    Sig - Route: Take 1 tablet by mouth 3 (Three) Times a Day As Needed for Muscle Spasms. - Oral    dextrose (D50W) solution 25 g 25 g Every 15 Minutes PRN 7/3/2017     Sig - Route: Infuse 50 mL into a venous catheter Every 15 (Fifteen) Minutes As Needed for Low Blood Sugar (Blood Sugar Less Than 70, Patient Has IV Access - Unresponsive, NPO or Unable To Safely Swallow). - Intravenous    dextrose (GLUTOSE) oral gel 15 g 15 g Every 15 Minutes PRN 7/3/2017     Sig - Route: Take 15 g by mouth Every 15 (Fifteen) Minutes As Needed for Low Blood Sugar (Blood Sugar Less Than 70, Patient Alert, Is Not NPO & Can Safely Swallow). - Oral    dicyclomine (BENTYL) capsule 10 mg 10 mg 3 Times Daily PRN 7/3/2017 7/8/2017    Sig - Route: Take 1 capsule by mouth 3 (Three) Times a Day As Needed " (Abdominal Cramps). - Oral    doxepin (SINEquan) capsule 50 mg 50 mg Nightly 7/5/2017     Sig - Route: Take 2 capsules by mouth Every Night. - Oral    famotidine (PEPCID) tablet 20 mg 20 mg 2 Times Daily PRN 7/3/2017     Sig - Route: Take 1 tablet by mouth 2 (Two) Times a Day As Needed for Heartburn. - Oral    furosemide (LASIX) tablet 20 mg 20 mg Daily 7/4/2017     Sig - Route: Take 1 tablet by mouth Daily. - Oral    furosemide (LASIX) tablet 20 mg 20 mg Once 7/3/2017     Sig - Route: Take 1 tablet by mouth 1 (One) Time. - Oral    gabapentin (NEURONTIN) capsule 400 mg 400 mg 3 Times Daily 7/4/2017     Sig - Route: Take 1 capsule by mouth 3 (Three) Times a Day. - Oral    glucagon (human recombinant) (GLUCAGEN DIAGNOSTIC) injection 1 mg 1 mg Every 15 Minutes PRN 7/3/2017     Sig - Route: Inject 1 mg under the skin Every 15 (Fifteen) Minutes As Needed (Blood Glucose Less Than 70 - Patient Without IV Access - Unresponsive, NPO or Unable To Safely Swallow). - Subcutaneous    hydrOXYzine (ATARAX) tablet 50 mg 50 mg Every 6 Hours PRN 7/3/2017     Sig - Route: Take 1 tablet by mouth Every 6 (Six) Hours As Needed for Anxiety. - Oral    ibuprofen (ADVIL,MOTRIN) tablet 600 mg 600 mg Every 6 Hours PRN 7/3/2017     Sig - Route: Take 1.5 tablets by mouth Every 6 (Six) Hours As Needed for Mild Pain (1-3) or Moderate Pain (4-6) (severe pain (7-10)). - Oral    insulin aspart (novoLOG) injection 2-5 Units 2-5 Units 3 Times Daily With Meals 7/3/2017     Sig - Route: Inject 2-5 Units under the skin 3 (Three) Times a Day With Meals. - Subcutaneous    Notes to Pharmacy: 150-200= 2 units   200-250= 5 units    insulin detemir (LEVEMIR) injection 10 Units 10 Units Nightly 7/3/2017     Sig - Route: Inject 10 Units under the skin Every Night. - Subcutaneous    ipratropium-albuterol (DUO-NEB) nebulizer solution 3 mL 3 mL Every 6 Hours PRN 7/4/2017     Sig - Route: Take 3 mL by nebulization Every 6 (Six) Hours As Needed for Shortness of Air. -  Nebulization    loperamide (IMODIUM) capsule 2 mg 2 mg 4 Times Daily PRN 7/3/2017     Sig - Route: Take 1 capsule by mouth 4 (Four) Times a Day As Needed for Diarrhea. - Oral    magnesium hydroxide (MILK OF MAGNESIA) suspension 2400 mg/10mL 10 mL 10 mL Daily PRN 7/3/2017     Sig - Route: Take 10 mL by mouth Daily As Needed for Constipation. - Oral    magnesium oxide (MAGOX) tablet 200 mg 200 mg 2 Times Daily 7/3/2017     Sig - Route: Take 0.5 tablets by mouth 2 (Two) Times a Day. - Oral    metoclopramide (REGLAN) tablet 10 mg 10 mg 2 Times Daily 7/3/2017     Sig - Route: Take 1 tablet by mouth 2 (Two) Times a Day. - Oral    metoprolol tartrate (LOPRESSOR) tablet 50 mg 50 mg Daily 7/4/2017     Sig - Route: Take 1 tablet by mouth Daily. - Oral    metoprolol tartrate (LOPRESSOR) tablet 50 mg 50 mg Once 7/3/2017     Sig - Route: Take 1 tablet by mouth 1 (One) Time. - Oral    nitroglycerin (NITROSTAT) SL tablet 0.4 mg 0.4 mg Every 5 Minutes PRN 7/3/2017     Sig - Route: Place 1 tablet under the tongue Every 5 (Five) Minutes As Needed for Chest Pain. - Sublingual    ondansetron (ZOFRAN) tablet 4 mg 4 mg Every 6 Hours PRN 7/3/2017     Sig - Route: Take 1 tablet by mouth Every 6 (Six) Hours As Needed for Nausea or Vomiting. - Oral    pancrelipase (Lip-Prot-Amyl) (CREON) capsule 24,000 units of lipase 24,000 units of lipase 4 Times Daily With Meals & Nightly 7/3/2017     Sig - Route: Take 2 capsules by mouth 4 (Four) Times a Day With Meals & at Bedtime. - Oral    pantoprazole (PROTONIX) EC tablet 40 mg 40 mg 2 Times Daily 7/3/2017     Sig - Route: Take 1 tablet by mouth 2 (Two) Times a Day. - Oral    polyethylene glycol (MIRALAX) powder 17 g 17 g 2 Times Daily 7/3/2017     Sig - Route: Take 17 g by mouth 2 (Two) Times a Day. - Oral    potassium chloride (K-DUR,KLOR-CON) CR tablet 20 mEq 20 mEq Daily 7/3/2017     Sig - Route: Take 1 tablet by mouth Daily. - Oral    sodium chloride (OCEAN) nasal spray 2 spray 2 spray As Needed  "7/3/2017     Sig - Route: 2 sprays by Each Nare route As Needed for Congestion. - Each Nare    traZODone (DESYREL) tablet 50 mg (Discontinued) 50 mg Nightly PRN 7/3/2017 7/5/2017    Sig - Route: Take 1 tablet by mouth At Night As Needed for Sleep. - Oral             Physician Progress Notes (last 72 hours) (Notes from 7/2/2017  4:38 PM through 7/5/2017  4:38 PM)      Ankit Barnes MD at 7/5/2017  8:36 AM  Version 1 of 1               PROGRESS NOTE  Clinician: NIKA Barnes MD  Admission Date: 7/3/2017  8:36 AM 07/05/17    Behavioral Health Treatment Plan and Problem List: I have reviewed and approved the Behavioral Health Treatment Plan and Problem list.    Allergies  Allergies   Allergen Reactions   • Robitussin Dm [Dextromethorphan-Guaifenesin] Shortness Of Breath   • Tizanidine Shortness Of Breath   • Metformin Nausea And Vomiting   • Sulfa Antibiotics Other (See Comments)     \"I cannot take them, they do something to me.\"   • Contrast Dye Rash   • Tramadol Rash       Hospital Day: 2 days      Assessment completed within view of staff    History    CC: \"Bad depressed today\"    Followed for: Depression/ Polysubstance Dependency.     Interval HPI: Patient seen and evaluated by me.  Chart reviewed.  Patient remains depressed berating the [facility]'s owner for forcing him to leave there for late (none?) payment of rent 4-6 weeks PTA. Feeling hopeless and helpless. The mother of the friend where he had been staying this past month dropped him off at the ER day before yesterday.       The patient has already spent all of his July SSI check, so, presents down and out, depressed voicing SI,  homeless, without any money and no visible supportive, interested third party. Dr. Quarles and his Sikh has helped him in the past, not at all sure they will re-engage?       Will restart the Wellbutrin that was associated with improvement last admission.     Interval Review of Systems:   General ROS: negative for - " "fever or malaise  Endocrine ROS: negative for - palpitations  Respiratory ROS: no cough, shortness of breath, or wheezing  Cardiovascular ROS: no chest pain or dyspnea on exertion  Gastrointestinal ROS: no abdominal pain,no black or bloody stools    BP 93/62 (BP Location: Right arm, Patient Position: Sitting)  Pulse 74  Temp 98.6 °F (37 °C) (Temporal Artery )   Resp 18  Ht 68\" (172.7 cm)  Wt 177 lb (80.3 kg)  SpO2 97%  BMI 26.91 kg/m2    Mental Status Exam    Mood/Affect: depressed and blunted  Thought Processes:  associations intact  Thought Content:  Negativistic  Hallucination(s): none  Hopelessness: yes  Optimistic:No  Suicidal Thoughts:  slight  Suicidal Plan/Intent: none  Homicidal Thoughts:  absent      Medical Decision Making:   All recent completed LAB results reviewed and discussed with the patient.        Radiology:     Imaging Results (last 24 hours)     ** No results found for the last 24 hours. **            EKG:     ECG/EMG Results (most recent)     None           Medications:     aspirin 81 mg Oral Daily   aspirin 81 mg Oral Once   buPROPion  mg Oral Q12H   furosemide 20 mg Oral Daily   furosemide 20 mg Oral Once   gabapentin 400 mg Oral TID   insulin aspart 2-5 Units Subcutaneous TID With Meals   insulin detemir 10 Units Subcutaneous Nightly   magnesium oxide 200 mg Oral BID   metoclopramide 10 mg Oral BID   metoprolol tartrate 50 mg Oral Daily   metoprolol tartrate 50 mg Oral Once   pancrelipase (Lip-Prot-Amyl) 24,000 units of lipase Oral 4x Daily With Meals & Nightly   pantoprazole 40 mg Oral BID   polyethylene glycol 17 g Oral BID   potassium chloride 20 mEq Oral Daily       •  albuterol  •  aluminum-magnesium hydroxide-simethicone  •  benzonatate  •  [] CloNIDine **FOLLOWED BY** CloNIDine **FOLLOWED BY** [START ON 2017] CloNIDine **FOLLOWED BY** [START ON 2017] CloNIDine  •  cyclobenzaprine  •  dextrose  •  dextrose  •  dicyclomine  •  famotidine  •  glucagon (human " recombinant)  •  hydrOXYzine  •  ibuprofen  •  ipratropium-albuterol  •  loperamide  •  magnesium hydroxide  •  nitroglycerin  •  ondansetron  •  sodium chloride  •  traZODone   All medications reviewed.    Special Precautions: Continue current level of Special Precautions.    Assessment/Diagnosis: Active Problems:    Depression with suicidal ideation      Treatment Plan: Continue current treatment plan.    Attestation:   Physician Attestation: I attest that the above note accurately reflects work and decisions made by me.               Electronically signed by Ankit Barnes MD at 7/5/2017  8:53 AM

## 2017-07-05 NOTE — PROGRESS NOTES
1530  Met with patient for individual, he continues to report feeling depressed and hopeless at times. He reports the depression makes him have thoughts to harm self. He reoports getting discouraged at his lack of support from family. Consents for Personal Care Facility are signed for [facility]. We discussed the [facility] in [place] and also calling [facility] to discuss him returning there, he declined to sign the consent for Ms. Delgado and refuses the [facility]. He says he cant return to the [facility] or the [facility]. He says he is too sick for the shelters.     Patient continues to report feeling depressed and  having suicidal thoughts, he rates anxiety and depression at 7. He reports feeling hopeless at times and discouraged about his future.     Continue stabilization, individual and group therapy to focus on healthy coping and disposition planning. Patient is aware that he will be seen by a covering therapist.

## 2017-07-05 NOTE — PROGRESS NOTES
"      PROGRESS NOTE  Clinician: NIKA Barnes MD  Admission Date: 7/3/2017  8:36 AM 07/05/17    Behavioral Health Treatment Plan and Problem List: I have reviewed and approved the Behavioral Health Treatment Plan and Problem list.    Allergies  Allergies   Allergen Reactions   • Robitussin Dm [Dextromethorphan-Guaifenesin] Shortness Of Breath   • Tizanidine Shortness Of Breath   • Metformin Nausea And Vomiting   • Sulfa Antibiotics Other (See Comments)     \"I cannot take them, they do something to me.\"   • Contrast Dye Rash   • Tramadol Rash       Hospital Day: 2 days      Assessment completed within view of staff    History    CC: \"Bad depressed today\"    Followed for: Depression/ Polysubstance Dependency.     Interval HPI: Patient seen and evaluated by me.  Chart reviewed.  Patient remains depressed berating the [facility]'s owner for forcing him to leave there for late (none?) payment of rent 4-6 weeks PTA. Feeling hopeless and helpless. The mother of the friend where he had been staying this past month dropped him off at the ER day before yesterday.       The patient has already spent all of his July SSI check, so, presents down and out, depressed voicing SI,  homeless, without any money and no visible supportive, interested third party. Dr. Quarles and his Yazidism has helped him in the past, not at all sure they will re-engage?       Will restart the Wellbutrin that was associated with improvement last admission.     Interval Review of Systems:   General ROS: negative for - fever or malaise  Endocrine ROS: negative for - palpitations  Respiratory ROS: no cough, shortness of breath, or wheezing  Cardiovascular ROS: no chest pain or dyspnea on exertion  Gastrointestinal ROS: no abdominal pain,no black or bloody stools    BP 93/62 (BP Location: Right arm, Patient Position: Sitting)  Pulse 74  Temp 98.6 °F (37 °C) (Temporal Artery )   Resp 18  Ht 68\" (172.7 cm)  Wt 177 lb (80.3 kg)  SpO2 97%  BMI 26.91 " kg/m2    Mental Status Exam    Mood/Affect: depressed and blunted  Thought Processes:  associations intact  Thought Content:  Negativistic  Hallucination(s): none  Hopelessness: yes  Optimistic:No  Suicidal Thoughts:  slight  Suicidal Plan/Intent: none  Homicidal Thoughts:  absent      Medical Decision Making:   All recent completed LAB results reviewed and discussed with the patient.        Radiology:     Imaging Results (last 24 hours)     ** No results found for the last 24 hours. **            EKG:     ECG/EMG Results (most recent)     None           Medications:     aspirin 81 mg Oral Daily   aspirin 81 mg Oral Once   buPROPion  mg Oral Q12H   furosemide 20 mg Oral Daily   furosemide 20 mg Oral Once   gabapentin 400 mg Oral TID   insulin aspart 2-5 Units Subcutaneous TID With Meals   insulin detemir 10 Units Subcutaneous Nightly   magnesium oxide 200 mg Oral BID   metoclopramide 10 mg Oral BID   metoprolol tartrate 50 mg Oral Daily   metoprolol tartrate 50 mg Oral Once   pancrelipase (Lip-Prot-Amyl) 24,000 units of lipase Oral 4x Daily With Meals & Nightly   pantoprazole 40 mg Oral BID   polyethylene glycol 17 g Oral BID   potassium chloride 20 mEq Oral Daily       •  albuterol  •  aluminum-magnesium hydroxide-simethicone  •  benzonatate  •  [] CloNIDine **FOLLOWED BY** CloNIDine **FOLLOWED BY** [START ON 2017] CloNIDine **FOLLOWED BY** [START ON 2017] CloNIDine  •  cyclobenzaprine  •  dextrose  •  dextrose  •  dicyclomine  •  famotidine  •  glucagon (human recombinant)  •  hydrOXYzine  •  ibuprofen  •  ipratropium-albuterol  •  loperamide  •  magnesium hydroxide  •  nitroglycerin  •  ondansetron  •  sodium chloride  •  traZODone   All medications reviewed.    Special Precautions: Continue current level of Special Precautions.    Assessment/Diagnosis: Active Problems:    Depression with suicidal ideation      Treatment Plan: Continue current treatment plan.    Attestation:   Physician  Attestation: I attest that the above note accurately reflects work and decisions made by me.

## 2017-07-05 NOTE — PLAN OF CARE
Problem:  Patient Care Overview (Adult)  Goal: Plan of Care Review  Outcome: Ongoing (interventions implemented as appropriate)    07/05/17 1630   Coping/Psychosocial Response Interventions   Plan Of Care Reviewed With patient   Coping/Psychosocial   Patient Agreement with Plan of Care agrees   Patient Care Overview   Progress no change   Outcome Evaluation   Outcome Summary/Follow up Plan Rates anxiety and depression both 8 and continues to have suicidal thoughts..Denies withdrawals. Cooperative and quiet.         Problem:  Overarching Goals  Goal: Adheres to Safety Considerations for Self and Others  Outcome: Ongoing (interventions implemented as appropriate)  Goal: Optimized Coping Skills in Response to Life Stressors  Outcome: Ongoing (interventions implemented as appropriate)  Goal: Develops/Participates in Therapeutic [place] to Support Successful Transition  Outcome: Ongoing (interventions implemented as appropriate)

## 2017-07-05 NOTE — PROGRESS NOTES
Navigator is helping Primary Therapist with discharge planning for patient. Zinaator sent the following referrals on this day:    [facility] - [phone]  Sent 7/5    [facility] [phone]  Sent 7/5    [facility] - [phone]  Sent 7/5

## 2017-07-06 ENCOUNTER — APPOINTMENT (OUTPATIENT)
Dept: GENERAL RADIOLOGY | Facility: HOSPITAL | Age: 52
End: 2017-07-06

## 2017-07-06 LAB
GLUCOSE BLDC GLUCOMTR-MCNC: 108 MG/DL (ref 70–130)
GLUCOSE BLDC GLUCOMTR-MCNC: 144 MG/DL (ref 70–130)
GLUCOSE BLDC GLUCOMTR-MCNC: 146 MG/DL (ref 70–130)
GLUCOSE BLDC GLUCOMTR-MCNC: 171 MG/DL (ref 70–130)

## 2017-07-06 PROCEDURE — 94799 UNLISTED PULMONARY SVC/PX: CPT

## 2017-07-06 PROCEDURE — 82962 GLUCOSE BLOOD TEST: CPT

## 2017-07-06 PROCEDURE — 63710000001 INSULIN ASPART PER 5 UNITS: Performed by: PSYCHIATRY & NEUROLOGY

## 2017-07-06 PROCEDURE — 73130 X-RAY EXAM OF HAND: CPT

## 2017-07-06 PROCEDURE — 99232 SBSQ HOSP IP/OBS MODERATE 35: CPT | Performed by: PSYCHIATRY & NEUROLOGY

## 2017-07-06 PROCEDURE — 73130 X-RAY EXAM OF HAND: CPT | Performed by: RADIOLOGY

## 2017-07-06 RX ADMIN — PANCRELIPASE 24000 UNITS OF LIPASE: 60000; 12000; 38000 CAPSULE, DELAYED RELEASE PELLETS ORAL at 12:08

## 2017-07-06 RX ADMIN — BUPROPION HYDROCHLORIDE 150 MG: 150 TABLET, EXTENDED RELEASE ORAL at 08:46

## 2017-07-06 RX ADMIN — MAGNESIUM GLUCONATE 500 MG ORAL TABLET 200 MG: 500 TABLET ORAL at 18:02

## 2017-07-06 RX ADMIN — PANCRELIPASE 24000 UNITS OF LIPASE: 60000; 12000; 38000 CAPSULE, DELAYED RELEASE PELLETS ORAL at 08:47

## 2017-07-06 RX ADMIN — INSULIN ASPART 2 UNITS: 100 INJECTION, SOLUTION INTRAVENOUS; SUBCUTANEOUS at 07:14

## 2017-07-06 RX ADMIN — PANCRELIPASE 24000 UNITS OF LIPASE: 60000; 12000; 38000 CAPSULE, DELAYED RELEASE PELLETS ORAL at 20:52

## 2017-07-06 RX ADMIN — ALBUTEROL SULFATE 2 PUFF: 90 AEROSOL, METERED RESPIRATORY (INHALATION) at 16:41

## 2017-07-06 RX ADMIN — ASPIRIN 81 MG: 81 TABLET ORAL at 08:47

## 2017-07-06 RX ADMIN — METOCLOPRAMIDE 10 MG: 10 TABLET ORAL at 08:46

## 2017-07-06 RX ADMIN — GABAPENTIN 400 MG: 400 CAPSULE ORAL at 15:55

## 2017-07-06 RX ADMIN — PANTOPRAZOLE SODIUM 40 MG: 40 TABLET, DELAYED RELEASE ORAL at 18:02

## 2017-07-06 RX ADMIN — PANCRELIPASE 24000 UNITS OF LIPASE: 60000; 12000; 38000 CAPSULE, DELAYED RELEASE PELLETS ORAL at 18:02

## 2017-07-06 RX ADMIN — IPRATROPIUM BROMIDE AND ALBUTEROL SULFATE 3 ML: .5; 3 SOLUTION RESPIRATORY (INHALATION) at 20:15

## 2017-07-06 RX ADMIN — METOCLOPRAMIDE 10 MG: 10 TABLET ORAL at 18:02

## 2017-07-06 RX ADMIN — DOXEPIN HYDROCHLORIDE 50 MG: 25 CAPSULE ORAL at 20:52

## 2017-07-06 RX ADMIN — POTASSIUM CHLORIDE 20 MEQ: 1500 TABLET, EXTENDED RELEASE ORAL at 08:47

## 2017-07-06 RX ADMIN — POLYETHYLENE GLYCOL 3350 17 G: 1 POWDER ORAL at 08:46

## 2017-07-06 RX ADMIN — GABAPENTIN 400 MG: 400 CAPSULE ORAL at 20:53

## 2017-07-06 RX ADMIN — PANTOPRAZOLE SODIUM 40 MG: 40 TABLET, DELAYED RELEASE ORAL at 08:47

## 2017-07-06 RX ADMIN — BUPROPION HYDROCHLORIDE 150 MG: 150 TABLET, EXTENDED RELEASE ORAL at 20:52

## 2017-07-06 RX ADMIN — MAGNESIUM GLUCONATE 500 MG ORAL TABLET 200 MG: 500 TABLET ORAL at 08:46

## 2017-07-06 RX ADMIN — GABAPENTIN 400 MG: 400 CAPSULE ORAL at 08:49

## 2017-07-06 NOTE — PLAN OF CARE
"Problem:  Patient Care Overview (Adult)  Goal: Interdisciplinary Rounds/Family Conference  Outcome: Ongoing (interventions implemented as appropriate)    07/06/17 1030   Interdisciplinary Rounds/Family Conf   Summary Individual Session    Participants patient;social work      D: Therapist met with patient on this date to discuss patient needs treatment progress. Therapist introduced as covering therapist and patient was agreeable.  Patient reports doing \"fair\".  Discussed completing referrals at more personal care homes than the 3 referrals that were sent to yesterday.  Therapist of obtained consent from patient for [facility], [facility], and [facility].  Patient continues to isolate himself in his room and stay in the bed.     A: Patient appeared lethargic and reports mood as \"fair\". Patient endorsed continued suicidal thoughts.  Patient denies HI/AVH.     P: Patient remains hospitalized for safety stabilization.  Therapist will continue to work with patient and treatment team to establish appropriate disposition plan.  Assigned therapist will follow-up with discharge navigator on personal care home referrals.      "

## 2017-07-06 NOTE — PLAN OF CARE
Problem:  Patient Care Overview (Adult)  Goal: Plan of Care Review  Outcome: Ongoing (interventions implemented as appropriate)    07/06/17 0047   Coping/Psychosocial Response Interventions   Plan Of Care Reviewed With patient   Coping/Psychosocial   Patient Agreement with Plan of Care agrees   Outcome Evaluation   Outcome Summary/Follow up Plan Pt. reports appetite and sleep is still poor. Rates anxiety 5/10 and depression 8/10. Pt. states he still has some suicidal thoughts at times but not at this moment. Patient rates cravings 5/10 and c/o stomach cramps, sweats, restlessness, and slight tremor.          Problem:  Overarching Goals  Goal: Adheres to Safety Considerations for Self and Others  Outcome: Ongoing (interventions implemented as appropriate)  Goal: Optimized Coping Skills in Response to Life Stressors  Outcome: Ongoing (interventions implemented as appropriate)  Goal: Develops/Participates in Therapeutic [place] to Support Successful Transition  Outcome: Ongoing (interventions implemented as appropriate)

## 2017-07-06 NOTE — PROGRESS NOTES
"      PROGRESS NOTE  Clinician: NIKA Barnes MD  Admission Date: 7/3/2017  9:06 AM 07/06/17    Behavioral Health Treatment Plan and Problem List: I have reviewed and approved the Behavioral Health Treatment Plan and Problem list.    Allergies  Allergies   Allergen Reactions   • Robitussin Dm [Dextromethorphan-Guaifenesin] Shortness Of Breath   • Tizanidine Shortness Of Breath   • Metformin Nausea And Vomiting   • Sulfa Antibiotics Other (See Comments)     \"I cannot take them, they do something to me.\"   • Contrast Dye Rash   • Tramadol Rash       Hospital Day: 3 days      Assessment completed within view of staff    History    CC: \"Fair\"     Followed for: depression/ substance abuse.     Interval HPI: Patient seen and evaluated by me.  Chart reviewed.  Rates his depression @ 5-6/10, \"a little\" suicidal.   Lethargic this AM. Slept well. Actively working on a disposition plan: considerations include a personal Care home. Has swelling right hand for the past 3-4 weeks, no signs of inflammation, no history of trauma. Also dependent edema LE's.     Interval Review of Systems:   General ROS: negative for - fever or malaise  Endocrine ROS: negative for - palpitations  Respiratory ROS: no cough, shortness of breath, or wheezing  Cardiovascular ROS: no chest pain or dyspnea on exertion  Gastrointestinal ROS: no abdominal pain,no black or bloody stools    BP 93/60 (BP Location: Right arm, Patient Position: Sitting)  Pulse 82  Temp 98.2 °F (36.8 °C) (Temporal Artery )   Resp 18  Ht 68\" (172.7 cm)  Wt 177 lb (80.3 kg)  SpO2 97%  BMI 26.91 kg/m2    Mental Status Exam    Mood/Affect: depressed and flat  Thought Processes:  associations intact  Thought Content:  Negativistic  Hallucination(s): none  Hopelessness: yes  Optimistic:No  Suicidal Thoughts:  \"a little\".   Suicidal Plan/Intent: none  Homicidal Thoughts:  absent      Medical Decision Making:   All recent completed LAB results reviewed and discussed with the " patient.        Radiology:     Imaging Results (last 24 hours)     ** No results found for the last 24 hours. **            EKG:     ECG/EMG Results (most recent)     None           Medications:     aspirin 81 mg Oral Daily   aspirin 81 mg Oral Once   buPROPion  mg Oral Q12H   doxepin 50 mg Oral Nightly   furosemide 20 mg Oral Daily   furosemide 20 mg Oral Once   gabapentin 400 mg Oral TID   insulin aspart 2-5 Units Subcutaneous TID With Meals   insulin detemir 10 Units Subcutaneous Nightly   magnesium oxide 200 mg Oral BID   metoclopramide 10 mg Oral BID   metoprolol tartrate 50 mg Oral Daily   metoprolol tartrate 50 mg Oral Once   pancrelipase (Lip-Prot-Amyl) 24,000 units of lipase Oral 4x Daily With Meals & Nightly   pantoprazole 40 mg Oral BID   polyethylene glycol 17 g Oral BID   potassium chloride 20 mEq Oral Daily       •  albuterol  •  aluminum-magnesium hydroxide-simethicone  •  benzonatate  •  [] CloNIDine **FOLLOWED BY** [] CloNIDine **FOLLOWED BY** CloNIDine **FOLLOWED BY** [START ON 2017] CloNIDine  •  cyclobenzaprine  •  dextrose  •  dextrose  •  dicyclomine  •  famotidine  •  glucagon (human recombinant)  •  hydrOXYzine  •  ibuprofen  •  ipratropium-albuterol  •  loperamide  •  magnesium hydroxide  •  nitroglycerin  •  ondansetron  •  sodium chloride   All medications reviewed.    Special Precautions: Continue current level of Special Precautions.    Assessment/Diagnosis: Active Problems:    Depression with suicidal ideation      Treatment Plan: Continue current treatment plan.    Attestation:   Physician Attestation: I attest that the above note accurately reflects work and decisions made by me.

## 2017-07-06 NOTE — PLAN OF CARE
Problem:  Patient Care Overview (Adult)  Goal: Plan of Care Review  Outcome: Ongoing (interventions implemented as appropriate)  PT. VERBALIZES SLEPT 6 HOURS RATES DEP./ANX. A 7 HE VERBALIZES  HAS THOUGHTS TO HURT SELF DENIES A PLAN DENIES A/V HALLUCINATIONS XRAY DONE of rt. Hand for edema  CONSISTENT WITH CELLULITIS DR. Abarca was consulted added to team left message for him to call pt. Has been calm and cooperative     Problem:  Overarching Goals  Goal: Adheres to Safety Considerations for Self and Others  Outcome: Ongoing (interventions implemented as appropriate)  Goal: Optimized Coping Skills in Response to Life Stressors  Outcome: Ongoing (interventions implemented as appropriate)  Goal: Develops/Participates in Therapeutic [place] to Support Successful Transition  Outcome: Ongoing (interventions implemented as appropriate)

## 2017-07-06 NOTE — PROGRESS NOTES
Navigator is helping Primary Therapist with discharge planning for patient. Navigator sent the following referrals on this day:     [facility] - [phone]  Sent 7/5. Still in Review 7/6.     [facility] - [phone]  Sent 7/5. Mena will try to come tomorrow for a face to face assessment. She will call in the morning to let us know if she is going to be available.      [facility] - [phone]  Sent 7/5. No Bed Available 7/6.    [facility] - [phone]  Sent 7/6    [facility] - [phone]  Sent 7/6    [phone]  Sent 7/6.

## 2017-07-06 NOTE — DISCHARGE PLACEMENT REQUEST
"Isaias Avendano (52 y.o. Male)     Date of Birth Social Security Number Address Home Phone MRN    1965  [address] [facility] [phone] [id number]    Protestant Marital Status          Buddhism        Admission Date Admission Type Admitting Provider Attending Provider Department, Room/Bed    7/3/17 Emergency Nate Kelley MD Vora, Chintamani Raju, MD [facility] ADULT PSYCHIATRIC, 1015/1S    Discharge Date Discharge Disposition Discharge Destination                      Attending Provider: Nate Kelley MD     Allergies:  Robitussin Dm [Dextromethorphan-guaifenesin], Tizanidine, Metformin, Sulfa Antibiotics, Contrast Dye, Tramadol    Isolation:  None   Infection:  None   Code Status:  FULL    Ht:  68\" (172.7 cm)   Wt:  177 lb (80.3 kg)    Admission Cmt:  None   Principal Problem:  None                Active Insurance as of 7/3/2017     Primary Coverage     Payor Plan Insurance Group Employer/Plan Group    Formerly [facility] [employer] [facility] [employer] [facility]      Payor Plan Address Payor Plan Phone Number Effective From Effective To    [address] [address]  2/1/2016     [place] [address]       Subscriber Name Subscriber Birth Date Member ID       ISAIAS AVENDANO 1965 [id number]                 Emergency Contacts      (Rel.) Home Phone Work Phone Mobile Phone    Domenico Mckinney (Father) [phone] -- --               History & Physical      Ankit Barnes MD at 7/4/2017 12:21 PM          Clinician:  Iván Chan   Admission Date: 7/3/2017  12:22 PM 07/04/17      Subjective     Chief Complaint:  \"Suicidal\"    HPI:  Isaias Avendano is a 52 y.o. white male who is 11th grade educated as a unemployed however he receives SSI.  Patient was  twice and  twice.  Has 4 adult children.  He is a Buddhism and goes to [employer] occasionally.  He is homeless at this time.  He has history of previous psychiatric admissions at the [facility] " [place].  Patient presented to the emergency department the [facility] and he had self-inflicted lacerations to the right forearm and then he used IV Suboxone in the place.  Patient says that he was attempting suicide.  He is assisted suicide and today.  He says for 5 months he was doing very good and was living in [facility] and was a stable then he says he went to [place] to visit family members and he was 2 days late to pay his monthly rent and he claims that he called from [place] and told the management that he was going to be late however day terminated him from the house and they threw away his belongings that is what he says.  Patient says for the past month he has been homeless again and has been sleeping here and there.  Patient rates depression and anxiety 10 on a scale of 1-10 and he feels hopeless, helpless and worthless.  He admits to thoughts of suicide and plan however denies homicidal thoughts.  Patient urine drug screening is positive for methamphetamine and Suboxone he admits that he has been using IV Suboxone but he says that he was not using methamphetamine and claims that he was using some cold medication Sudafed and asked why he is positive for methamphetamine however day history of the past documentation indicates that patient was a drug user methamphetamine was unwilling to drugs that he used in the past.  Patient's sleep is poor with initial, intermittent and terminal insomnia.  Appetite has been poor.  Patient says that he has been hearing voices that they tell him to kill himself.  Patient is admitted for crisis intervention, stabilization and securing his safety.      Past Psych History:   Patient has history of previous psychiatric admissions and diagnoses of major depressive disorder as well as alcohol and drug use.    Substance Abuse:   Patient says when he was younger he was a drinker but then he started using narcotic analgesics and then he went to a Suboxone clinic  and now he has been shooting Suboxone.  He has had history of methamphetamine use in the past and his urine drug screening is positive for them stimulants.  He has smokes.    Family History:  family history includes Arthritis in his other; Cancer in his maternal grandmother and other; Coronary artery disease in his mother; Gout in his maternal grandfather, other, and sister; Heart disease in his other; Hypertension in his maternal grandfather and other; Lung disease in his mother; Osteoporosis in his other.    Personal and social history:  Born and raised in [place].  His parents were .  Patient quit at the 11th grade he says he has problem with his spending but he can read.  He says in his younger days he worked some but has been on SSI for a long time the.  Patient has 4 grownup children but he says they don't do anything with him.  He has a son who is a part-time  in [employer] and he says among his children he is a 10 might help him a little bit however he says he is remarried now and they are expecting a child and he does not have time for father.  Patient says he is a Evangelical and was going to [employer] regularly when he was living in [facility].  Medical/Surgical History:  Past Medical History:   Diagnosis Date   • Abscess of antecubital fossa 05/2014    Left that required I and D and grew Haemophilus influenza group 1   • Anxiety    • Asthma    • Chronic pancreatitis    • COPD (chronic obstructive pulmonary disease)    • DDD (degenerative disc disease), lumbosacral    • Depression    • Diabetes mellitus    • DVT (deep venous thrombosis)     Right arm   • Essential hypertension    • GERD (gastroesophageal reflux disease)    • Hepatitis-C    • Hypercholesteremia    • Injury of back    • Injury of right Achilles tendon     Complicated by MRSA and Pseudomonas   • IV drug abuse    • Mild CAD     Left heart cath at  2012   • MRSA (methicillin resistant staph aureus) culture positive  "09/14/2016    LUE   • Obesity    • Opiate addiction     Suboxone IV   • Self-injurious behavior    • Sleep apnea    • Suicide attempt    • Systolic CHF, chronic     EF 45-50% in 2013, EF 60% 9/2016     Past Surgical History:   Procedure Laterality Date   • CHOLECYSTECTOMY  1990s   • INCISION AND DRAINAGE ABSCESS Left 2016    wrist   • INCISION AND DRAINAGE ARM Left 9/15/2016    Procedure: INCISION AND DRAINAGE UPPER EXTREMITY;  Surgeon: Rico Oseguera MD;  Location: [facility] OR;  Service:    • ORIF ANKLE FRACTURE Right 2014       Allergies   Allergen Reactions   • Robitussin Dm [Dextromethorphan-Guaifenesin] Shortness Of Breath   • Tizanidine Shortness Of Breath   • Metformin Nausea And Vomiting   • Sulfa Antibiotics Other (See Comments)     \"I cannot take them, they do something to me.\"   • Contrast Dye Rash   • Tramadol Rash     Social History   Substance Use Topics   • Smoking status: Former Smoker     Years: 1.00     Types: Cigarettes   • Smokeless tobacco: Never Used      Comment: quit smoking in my 20's   • Alcohol use No      Comment: denies     Current Medications:   Current Facility-Administered Medications   Medication Dose Route Frequency Provider Last Rate Last Dose   • albuterol (PROVENTIL HFA;VENTOLIN HFA) inhaler 2 puff  2 puff Inhalation Q6H PRN Nate Kelley MD       • aluminum-magnesium hydroxide-simethicone (MAALOX MAX) 400-400-40 MG/5ML suspension 15 mL  15 mL Oral Q6H PRN Nate Kelley MD       • aspirin EC tablet 81 mg  81 mg Oral Daily Nate Kelley MD   81 mg at 07/04/17 1036   • aspirin EC tablet 81 mg  81 mg Oral Once Nate Kelley MD       • benzonatate (TESSALON) capsule 100 mg  100 mg Oral TID PRN Nate Kelley MD       • buPROPion SR (WELLBUTRIN SR) 12 hr tablet 150 mg  150 mg Oral Q12H Ankit Barnes MD       • CloNIDine (CATAPRES) tablet 0.1 mg  0.1 mg Oral 4x Daily PRN Nate Kelley MD        Followed by   • [START ON " 7/5/2017] CloNIDine (CATAPRES) tablet 0.1 mg  0.1 mg Oral TID PRN Nate Kelley MD        Followed by   • [START ON 7/6/2017] CloNIDine (CATAPRES) tablet 0.1 mg  0.1 mg Oral BID PRN Nate Kelley MD        Followed by   • [START ON 7/7/2017] CloNIDine (CATAPRES) tablet 0.1 mg  0.1 mg Oral Once PRN Nate Kelley MD       • cyclobenzaprine (FLEXERIL) tablet 10 mg  10 mg Oral TID PRN Nate Kelley MD       • dextrose (D50W) solution 25 g  25 g Intravenous Q15 Min PRN Nate Kelley MD       • dextrose (GLUTOSE) oral gel 15 g  15 g Oral Q15 Min PRN Nate Kelley MD       • dicyclomine (BENTYL) capsule 10 mg  10 mg Oral TID PRN Nate Kelley MD       • famotidine (PEPCID) tablet 20 mg  20 mg Oral BID PRN Nate Kelley MD       • furosemide (LASIX) tablet 20 mg  20 mg Oral Daily Nate Kelley MD   20 mg at 07/04/17 1036   • furosemide (LASIX) tablet 20 mg  20 mg Oral Once Nate Kelley MD       • gabapentin (NEURONTIN) capsule 400 mg  400 mg Oral TID Ankit Barnes MD       • glucagon (human recombinant) (GLUCAGEN DIAGNOSTIC) injection 1 mg  1 mg Subcutaneous Q15 Min PRN Nate Kelley MD       • hydrOXYzine (ATARAX) tablet 50 mg  50 mg Oral Q6H PRN Nate Kelley MD       • ibuprofen (ADVIL,MOTRIN) tablet 600 mg  600 mg Oral Q6H PRN Nate Kelley MD       • insulin aspart (novoLOG) injection 2-5 Units  2-5 Units Subcutaneous TID With Meals Nate Kelley MD   2 Units at 07/04/17 1207   • insulin detemir (LEVEMIR) injection 10 Units  10 Units Subcutaneous Nightly Nate Kelley MD   10 Units at 07/03/17 2139   • ipratropium-albuterol (DUO-NEB) nebulizer solution 3 mL  3 mL Nebulization 4x Daily - RT Nate Kelley MD   3 mL at 07/03/17 1946   • loperamide (IMODIUM) capsule 2 mg  2 mg Oral 4x Daily PRN Nate Kelley MD       • magnesium hydroxide (MILK OF MAGNESIA) suspension 2400  mg/10mL 10 mL  10 mL Oral Daily PRN Nate Kelley MD       • magnesium oxide (MAGOX) tablet 200 mg  200 mg Oral BID Nate Kelley MD   200 mg at 07/04/17 1036   • metoclopramide (REGLAN) tablet 10 mg  10 mg Oral BID Nate Kelley MD   10 mg at 07/04/17 1036   • metoprolol tartrate (LOPRESSOR) tablet 50 mg  50 mg Oral Daily Nate Kelley MD   50 mg at 07/04/17 1036   • metoprolol tartrate (LOPRESSOR) tablet 50 mg  50 mg Oral Once Nate Kelley MD       • nitroglycerin (NITROSTAT) SL tablet 0.4 mg  0.4 mg Sublingual Q5 Min PRN Nate Kelley MD       • ondansetron (ZOFRAN) tablet 4 mg  4 mg Oral Q6H PRN Nate Kelley MD       • pancrelipase (Lip-Prot-Amyl) (CREON) capsule 24,000 units of lipase  24,000 units of lipase Oral 4x Daily With Meals & Nightly Nate Kelley MD   24,000 units of lipase at 07/04/17 1037   • pantoprazole (PROTONIX) EC tablet 40 mg  40 mg Oral BID Nate Kelley MD   40 mg at 07/04/17 1036   • polyethylene glycol (MIRALAX) powder 17 g  17 g Oral BID Nate Kelley MD   17 g at 07/04/17 1039   • potassium chloride (K-DUR,KLOR-CON) CR tablet 20 mEq  20 mEq Oral Daily Nate Kelley MD       • sodium chloride (OCEAN) nasal spray 2 spray  2 spray Each Nare PRN Nate Kelley MD       • traZODone (DESYREL) tablet 50 mg  50 mg Oral Nightly PRN Nate Kelley MD           Review of Systems    Review of Systems - General ROS: negative for - chills, fever or malaise  Ophthalmic ROS: negative for - loss of vision  ENT ROS: negative for - hearing change  Allergy and Immunology ROS: negative for - hives  Hematological and Lymphatic ROS: negative for - bleeding problems  Endocrine ROS: negative for - skin changes  Respiratory ROS: no cough, shortness of breath, or wheezing  Cardiovascular ROS: no chest pain or dyspnea on exertion  Gastrointestinal ROS: no abdominal pain, change in bowel habits, or black or bloody  "stools  Genito-Urinary ROS: no dysuria, trouble voiding, or hematuria  Musculoskeletal ROS: negative for - gait disturbance  Neurological ROS: no TIA or stroke symptoms  Dermatological ROS: negative for rash    Objective       General Appearance:    Alert, cooperative, in no acute distress   Head:    Normocephalic, without obvious abnormality, atraumatic   Eyes:            Lids and lashes normal, conjunctivae and sclerae normal, no   icterus, no pallor, corneas clear, PERRLA   Ears:    Ears appear intact with no abnormalities noted   Throat:   No oral lesions, no thrush, oral mucosa moist   Neck:   No adenopathy, supple, trachea midline, no thyromegaly, no   carotid bruit, no JVD   Back:     No kyphosis present, no scoliosis present, no skin lesions,      erythema or scars, no tenderness to percussion or                   palpation,   range of motion normal   Lungs:     Clear to auscultation,respirations regular, even and                  unlabored    Heart:    Regular rhythm and normal rate, normal S1 and S2, no            murmur, no gallop, no rub, no click   Chest Wall:    No abnormalities observed   Abdomen:     Normal bowel sounds, no masses, no organomegaly, soft        non-tender, non-distended, no guarding, no rebound                tenderness   Rectal:     Deferred   Extremities:   Moves all extremities well, no edema, no cyanosis, no             redness   Pulses:   Pulses palpable and equal bilaterally   Skin:   No bleeding, bruising or rash   Lymph nodes:   No palpable adenopathy   Neurologic:   Cranial nerves 2 - 12 grossly intact, sensation intact, DTR       present and equal bilaterally       Blood pressure 114/71, pulse 74, temperature 97.8 °F (36.6 °C), temperature source Temporal Artery , resp. rate 18, height 68\" (172.7 cm), weight 177 lb (80.3 kg), SpO2 91 %.    Mental Status Exam:   Hygiene:   poor  Cooperation:  Cooperative  Eye Contact:  Fair  Psychomotor Behavior:  Aggitated  Affect:  " Restricted  Hopelessness: 8  Speech:  Rapid circumstantial to a point tangential   Thought Process:  Linear  Thought Content:  Mood congurent  Suicidal:  Suicidal Ideation and Suicidal plan  Homicidal:  None  Hallucinations:  Auditory  Delusion:  None  Memory:  Intact  Orientation:  Person, Place, Time and Situation  Reliability:  poor  Insight:  Poor  Judgement:  Poor  Impulse Control:  Poor  Physical/Medical Issues:  No     Medical Decision Making:   Labs:     Lab Results (last 24 hours)     Procedure Component Value Units Date/Time    Hemoglobin A1c [[id number]]  (Abnormal) Collected:  07/03/17 1906    Specimen:  Blood Updated:  07/03/17 1919     Hemoglobin A1C 6.40 (H) %     POC Glucose Fingerstick [[id number]]  (Normal) Collected:  07/03/17 1945    Specimen:  Blood Updated:  07/03/17 1952     Glucose 74 mg/dL     Narrative:       Meter: [id number] : [id number] Britt Leslie    POC Glucose Fingerstick [[id number]]  (Abnormal) Collected:  07/03/17 2136    Specimen:  Blood Updated:  07/03/17 2201     Glucose 134 (H) mg/dL     Narrative:       Meter: [id number] : [id number] Britt Leslie    POC Glucose Fingerstick [[id number]]  (Normal) Collected:  07/04/17 0651    Specimen:  Blood Updated:  07/04/17 0702     Glucose 119 mg/dL     Narrative:       Meter: [id number] : [id number] Britt Leslie    POC Glucose Fingerstick [[id number]]  (Abnormal) Collected:  07/04/17 1135    Specimen:  Blood Updated:  07/04/17 1149     Glucose 156 (H) mg/dL     Narrative:       Meter: [id number] : [id number] Zheng Lois                          Lab Results   Component Value Date    WBC 3.55 (L) 07/03/2017    HGB 12.9 (L) 07/03/2017    HCT 38.6 (L) 07/03/2017    MCV 92.8 07/03/2017    PLT 66 (L) 07/03/2017     Lab Results   Component Value Date    GLUCOSE 120 (H) 07/03/2017    BUN 9 07/03/2017    CREATININE 0.53 07/03/2017    EGFRIFNONA >150 07/03/2017    EGFRIFAFRI  09/26/2016      Comment:      <15 Indicative of kidney  failure.    BCR 17.0 07/03/2017    CO2 31.9 07/03/2017    CALCIUM 8.9 07/03/2017    ALBUMIN 2.80 (L) 07/03/2017    LABIL2 1.1 (L) 07/03/2017     (H) 07/03/2017    ALT 81 (H) 07/03/2017        Radiology:     Imaging Results (last 24 hours)     ** No results found for the last 24 hours. **            EKG:     ECG/EMG Results (most recent)     None           Medications:     aspirin 81 mg Oral Daily   aspirin 81 mg Oral Once   buPROPion  mg Oral Q12H   furosemide 20 mg Oral Daily   furosemide 20 mg Oral Once   gabapentin 400 mg Oral TID   insulin aspart 2-5 Units Subcutaneous TID With Meals   insulin detemir 10 Units Subcutaneous Nightly   ipratropium-albuterol 3 mL Nebulization 4x Daily - RT   magnesium oxide 200 mg Oral BID   metoclopramide 10 mg Oral BID   metoprolol tartrate 50 mg Oral Daily   metoprolol tartrate 50 mg Oral Once   pancrelipase (Lip-Prot-Amyl) 24,000 units of lipase Oral 4x Daily With Meals & Nightly   pantoprazole 40 mg Oral BID   polyethylene glycol 17 g Oral BID   potassium chloride 20 mEq Oral Daily       •  albuterol  •  aluminum-magnesium hydroxide-simethicone  •  benzonatate  •  CloNIDine **FOLLOWED BY** [START ON 7/5/2017] CloNIDine **FOLLOWED BY** [START ON 7/6/2017] CloNIDine **FOLLOWED BY** [START ON 7/7/2017] CloNIDine  •  cyclobenzaprine  •  dextrose  •  dextrose  •  dicyclomine  •  famotidine  •  glucagon (human recombinant)  •  hydrOXYzine  •  ibuprofen  •  loperamide  •  magnesium hydroxide  •  nitroglycerin  •  ondansetron  •  sodium chloride  •  traZODone   All medications reviewed.    Special Precautions: Continue current level of Special Precautions.            Assessment/Plan     Patient Active Problem List   Diagnosis Code   • MDD (major depressive disorder), recurrent episode, moderate F33.1   • Type 1 diabetes mellitus E10.9   • 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 G89.21   • Cellulitis of right hand L03.113   • Cellulitis of right hand excluding fingers and thumb L03.113    • Septic shock A41.9, R65.21   • Sepsis A41.9   • Depression with suicidal ideation F32.9, R45.851     Patient is admitted to adult psychiatry unit on routine orders and precautions 3 to secure his safety.  He is on the scheduled and when necessary medications.  He is assigned to a master level counselor working with him in individual, group and family sessions and helping him with disposition planning.  He will be followed with daily clinical evaluations.  Any further treatment will be done per course.      We discussed risks, benefits, and side effects of the above medication and the patient was agreeable with the plan.   H&P was generated from Dr. Archibald evaluation, documentation, verbal discussion and instruction.         Attestation:   Physician Attestation: I attest that the above note accurately reflects work and decisions made by me.    NIKA CADENA MD     Electronically signed by Ankit Cadena MD at 7/5/2017  8:32 AM        Hospital Medications (active)       Dose Frequency Start End    albuterol (PROVENTIL HFA;VENTOLIN HFA) inhaler 2 puff 2 puff Every 6 Hours PRN 7/3/2017     Sig - Route: Inhale 2 puffs Every 6 (Six) Hours As Needed for Wheezing. - Inhalation    aluminum-magnesium hydroxide-simethicone (MAALOX MAX) 400-400-40 MG/5ML suspension 15 mL 15 mL Every 6 Hours PRN 7/3/2017     Sig - Route: Take 15 mL by mouth Every 6 (Six) Hours As Needed for Indigestion or Heartburn. - Oral    aspirin EC tablet 81 mg 81 mg Daily 7/4/2017     Sig - Route: Take 1 tablet by mouth Daily. - Oral    aspirin EC tablet 81 mg 81 mg Once 7/3/2017     Sig - Route: Take 1 tablet by mouth 1 (One) Time. - Oral    benzonatate (TESSALON) capsule 100 mg 100 mg 3 Times Daily PRN 7/3/2017     Sig - Route: Take 1 capsule by mouth 3 (Three) Times a Day As Needed for Cough. - Oral    buPROPion SR (WELLBUTRIN SR) 12 hr tablet 150 mg 150 mg Every 12 Hours Scheduled 7/4/2017     Sig - Route: Take 1 tablet by mouth  "Every 12 (Twelve) Hours. - Oral    CloNIDine (CATAPRES) tablet 0.1 mg 0.1 mg 3 Times Daily PRN 7/5/2017 7/6/2017    Sig - Route: Take 1 tablet by mouth 3 (Three) Times a Day As Needed for Other (See Administration Instructions). - Oral    Linked Group 1:  \"Followed by\" Linked Group Details        CloNIDine (CATAPRES) tablet 0.1 mg 0.1 mg 2 Times Daily PRN 7/6/2017 7/7/2017    Sig - Route: Take 1 tablet by mouth 2 (Two) Times a Day As Needed for Other (See Administration Instructions). - Oral    Linked Group 1:  \"Followed by\" Linked Group Details        CloNIDine (CATAPRES) tablet 0.1 mg 0.1 mg Once As Needed 7/7/2017 7/8/2017    Sig - Route: Take 1 tablet by mouth 1 (One) Time As Needed for Other (See Administration Instructions). - Oral    Linked Group 1:  \"Followed by\" Linked Group Details        cyclobenzaprine (FLEXERIL) tablet 10 mg 10 mg 3 Times Daily PRN 7/3/2017 7/8/2017    Sig - Route: Take 1 tablet by mouth 3 (Three) Times a Day As Needed for Muscle Spasms. - Oral    dextrose (D50W) solution 25 g 25 g Every 15 Minutes PRN 7/3/2017     Sig - Route: Infuse 50 mL into a venous catheter Every 15 (Fifteen) Minutes As Needed for Low Blood Sugar (Blood Sugar Less Than 70, Patient Has IV Access - Unresponsive, NPO or Unable To Safely Swallow). - Intravenous    dextrose (GLUTOSE) oral gel 15 g 15 g Every 15 Minutes PRN 7/3/2017     Sig - Route: Take 15 g by mouth Every 15 (Fifteen) Minutes As Needed for Low Blood Sugar (Blood Sugar Less Than 70, Patient Alert, Is Not NPO & Can Safely Swallow). - Oral    dicyclomine (BENTYL) capsule 10 mg 10 mg 3 Times Daily PRN 7/3/2017 7/8/2017    Sig - Route: Take 1 capsule by mouth 3 (Three) Times a Day As Needed (Abdominal Cramps). - Oral    doxepin (SINEquan) capsule 50 mg 50 mg Nightly 7/5/2017     Sig - Route: Take 2 capsules by mouth Every Night. - Oral    famotidine (PEPCID) tablet 20 mg 20 mg 2 Times Daily PRN 7/3/2017     Sig - Route: Take 1 tablet by mouth 2 (Two) Times " a Day As Needed for Heartburn. - Oral    furosemide (LASIX) tablet 20 mg 20 mg Daily 7/4/2017     Sig - Route: Take 1 tablet by mouth Daily. - Oral    furosemide (LASIX) tablet 20 mg 20 mg Once 7/3/2017     Sig - Route: Take 1 tablet by mouth 1 (One) Time. - Oral    gabapentin (NEURONTIN) capsule 400 mg 400 mg 3 Times Daily 7/4/2017     Sig - Route: Take 1 capsule by mouth 3 (Three) Times a Day. - Oral    glucagon (human recombinant) (GLUCAGEN DIAGNOSTIC) injection 1 mg 1 mg Every 15 Minutes PRN 7/3/2017     Sig - Route: Inject 1 mg under the skin Every 15 (Fifteen) Minutes As Needed (Blood Glucose Less Than 70 - Patient Without IV Access - Unresponsive, NPO or Unable To Safely Swallow). - Subcutaneous    hydrOXYzine (ATARAX) tablet 50 mg 50 mg Every 6 Hours PRN 7/3/2017     Sig - Route: Take 1 tablet by mouth Every 6 (Six) Hours As Needed for Anxiety. - Oral    ibuprofen (ADVIL,MOTRIN) tablet 600 mg 600 mg Every 6 Hours PRN 7/3/2017     Sig - Route: Take 1.5 tablets by mouth Every 6 (Six) Hours As Needed for Mild Pain (1-3) or Moderate Pain (4-6) (severe pain (7-10)). - Oral    insulin aspart (novoLOG) injection 2-5 Units 2-5 Units 3 Times Daily With Meals 7/3/2017     Sig - Route: Inject 2-5 Units under the skin 3 (Three) Times a Day With Meals. - Subcutaneous    Notes to Pharmacy: 150-200= 2 units   200-250= 5 units    insulin detemir (LEVEMIR) injection 10 Units 10 Units Nightly 7/3/2017     Sig - Route: Inject 10 Units under the skin Every Night. - Subcutaneous    ipratropium-albuterol (DUO-NEB) nebulizer solution 3 mL 3 mL Every 6 Hours PRN 7/4/2017     Sig - Route: Take 3 mL by nebulization Every 6 (Six) Hours As Needed for Shortness of Air. - Nebulization    loperamide (IMODIUM) capsule 2 mg 2 mg 4 Times Daily PRN 7/3/2017     Sig - Route: Take 1 capsule by mouth 4 (Four) Times a Day As Needed for Diarrhea. - Oral    magnesium hydroxide (MILK OF MAGNESIA) suspension 2400 mg/10mL 10 mL 10 mL Daily PRN  7/3/2017     Sig - Route: Take 10 mL by mouth Daily As Needed for Constipation. - Oral    magnesium oxide (MAGOX) tablet 200 mg 200 mg 2 Times Daily 7/3/2017     Sig - Route: Take 0.5 tablets by mouth 2 (Two) Times a Day. - Oral    metoclopramide (REGLAN) tablet 10 mg 10 mg 2 Times Daily 7/3/2017     Sig - Route: Take 1 tablet by mouth 2 (Two) Times a Day. - Oral    metoprolol tartrate (LOPRESSOR) tablet 50 mg 50 mg Daily 7/4/2017     Sig - Route: Take 1 tablet by mouth Daily. - Oral    metoprolol tartrate (LOPRESSOR) tablet 50 mg 50 mg Once 7/3/2017     Sig - Route: Take 1 tablet by mouth 1 (One) Time. - Oral    nitroglycerin (NITROSTAT) SL tablet 0.4 mg 0.4 mg Every 5 Minutes PRN 7/3/2017     Sig - Route: Place 1 tablet under the tongue Every 5 (Five) Minutes As Needed for Chest Pain. - Sublingual    ondansetron (ZOFRAN) tablet 4 mg 4 mg Every 6 Hours PRN 7/3/2017     Sig - Route: Take 1 tablet by mouth Every 6 (Six) Hours As Needed for Nausea or Vomiting. - Oral    pancrelipase (Lip-Prot-Amyl) (CREON) capsule 24,000 units of lipase 24,000 units of lipase 4 Times Daily With Meals & Nightly 7/3/2017     Sig - Route: Take 2 capsules by mouth 4 (Four) Times a Day With Meals & at Bedtime. - Oral    pantoprazole (PROTONIX) EC tablet 40 mg 40 mg 2 Times Daily 7/3/2017     Sig - Route: Take 1 tablet by mouth 2 (Two) Times a Day. - Oral    polyethylene glycol (MIRALAX) powder 17 g 17 g 2 Times Daily 7/3/2017     Sig - Route: Take 17 g by mouth 2 (Two) Times a Day. - Oral    potassium chloride (K-DUR,KLOR-CON) CR tablet 20 mEq 20 mEq Daily 7/3/2017     Sig - Route: Take 1 tablet by mouth Daily. - Oral    sodium chloride (OCEAN) nasal spray 2 spray 2 spray As Needed 7/3/2017     Sig - Route: 2 sprays by Each Nare route As Needed for Congestion. - Each Nare             Physician Progress Notes (last 72 hours) (Notes from 7/3/2017 11:30 AM through 7/6/2017 11:30 AM)      Ankit Barnes MD at 7/5/2017  8:36 AM   "Version 1 of 1               PROGRESS NOTE  Clinician: NIKA Barnes MD  Admission Date: 7/3/2017  8:36 AM 07/05/17    Behavioral Health Treatment Plan and Problem List: I have reviewed and approved the Behavioral Health Treatment Plan and Problem list.    Allergies  Allergies   Allergen Reactions   • Robitussin Dm [Dextromethorphan-Guaifenesin] Shortness Of Breath   • Tizanidine Shortness Of Breath   • Metformin Nausea And Vomiting   • Sulfa Antibiotics Other (See Comments)     \"I cannot take them, they do something to me.\"   • Contrast Dye Rash   • Tramadol Rash       Hospital Day: 2 days      Assessment completed within view of staff    History    CC: \"Bad depressed today\"    Followed for: Depression/ Polysubstance Dependency.     Interval HPI: Patient seen and evaluated by me.  Chart reviewed.  Patient remains depressed berating the [employer] owner for forcing him to leave there for late (none?) payment of rent 4-6 weeks PTA. Feeling hopeless and helpless. The mother of the friend where he had been staying this past month dropped him off at the ER day before yesterday.       The patient has already spent all of his July SSI check, so, presents down and out, depressed voicing SI,  homeless, without any money and no visible supportive, interested third party. Dr. Quarles and his [employer] has helped him in the past, not at all sure they will re-engage?       Will restart the Wellbutrin that was associated with improvement last admission.     Interval Review of Systems:   General ROS: negative for - fever or malaise  Endocrine ROS: negative for - palpitations  Respiratory ROS: no cough, shortness of breath, or wheezing  Cardiovascular ROS: no chest pain or dyspnea on exertion  Gastrointestinal ROS: no abdominal pain,no black or bloody stools    BP 93/62 (BP Location: Right arm, Patient Position: Sitting)  Pulse 74  Temp 98.6 °F (37 °C) (Temporal Artery )   Resp 18  Ht 68\" (172.7 cm)  Wt 177 lb (80.3 kg)  " SpO2 97%  BMI 26.91 kg/m2    Mental Status Exam    Mood/Affect: depressed and blunted  Thought Processes:  associations intact  Thought Content:  Negativistic  Hallucination(s): none  Hopelessness: yes  Optimistic:No  Suicidal Thoughts:  slight  Suicidal Plan/Intent: none  Homicidal Thoughts:  absent      Medical Decision Making:   All recent completed LAB results reviewed and discussed with the patient.        Radiology:     Imaging Results (last 24 hours)     ** No results found for the last 24 hours. **            EKG:     ECG/EMG Results (most recent)     None           Medications:     aspirin 81 mg Oral Daily   aspirin 81 mg Oral Once   buPROPion  mg Oral Q12H   furosemide 20 mg Oral Daily   furosemide 20 mg Oral Once   gabapentin 400 mg Oral TID   insulin aspart 2-5 Units Subcutaneous TID With Meals   insulin detemir 10 Units Subcutaneous Nightly   magnesium oxide 200 mg Oral BID   metoclopramide 10 mg Oral BID   metoprolol tartrate 50 mg Oral Daily   metoprolol tartrate 50 mg Oral Once   pancrelipase (Lip-Prot-Amyl) 24,000 units of lipase Oral 4x Daily With Meals & Nightly   pantoprazole 40 mg Oral BID   polyethylene glycol 17 g Oral BID   potassium chloride 20 mEq Oral Daily       •  albuterol  •  aluminum-magnesium hydroxide-simethicone  •  benzonatate  •  [] CloNIDine **FOLLOWED BY** CloNIDine **FOLLOWED BY** [START ON 2017] CloNIDine **FOLLOWED BY** [START ON 2017] CloNIDine  •  cyclobenzaprine  •  dextrose  •  dextrose  •  dicyclomine  •  famotidine  •  glucagon (human recombinant)  •  hydrOXYzine  •  ibuprofen  •  ipratropium-albuterol  •  loperamide  •  magnesium hydroxide  •  nitroglycerin  •  ondansetron  •  sodium chloride  •  traZODone   All medications reviewed.    Special Precautions: Continue current level of Special Precautions.    Assessment/Diagnosis: Active Problems:    Depression with suicidal ideation      Treatment Plan: Continue current treatment  "plan.    Attestation:   Physician Attestation: I attest that the above note accurately reflects work and decisions made by me.               Electronically signed by Ankit Barnes MD at 7/5/2017  8:53 AM      Ankit Barnes MD at 7/6/2017  9:06 AM  Version 2 of 2               PROGRESS NOTE  Clinician: NIKA Barnes MD  Admission Date: 7/3/2017  9:06 AM 07/06/17    Behavioral Health Treatment Plan and Problem List: I have reviewed and approved the Behavioral Health Treatment Plan and Problem list.    Allergies  Allergies   Allergen Reactions   • Robitussin Dm [Dextromethorphan-Guaifenesin] Shortness Of Breath   • Tizanidine Shortness Of Breath   • Metformin Nausea And Vomiting   • Sulfa Antibiotics Other (See Comments)     \"I cannot take them, they do something to me.\"   • Contrast Dye Rash   • Tramadol Rash       Hospital Day: 3 days      Assessment completed within view of staff    History    CC: \"Fair\"     Followed for: depression/ substance abuse.     Interval HPI: Patient seen and evaluated by me.  Chart reviewed.  Rates his depression @ 5-6/10, \"a little\" suicidal.   Lethargic this AM. Slept well. Actively working on a disposition plan: considerations include a personal Care home. Has swelling right hand for the past 3-4 weeks, no signs of inflammation, no history of trauma. Also dependent edema LE's.     Interval Review of Systems:   General ROS: negative for - fever or malaise  Endocrine ROS: negative for - palpitations  Respiratory ROS: no cough, shortness of breath, or wheezing  Cardiovascular ROS: no chest pain or dyspnea on exertion  Gastrointestinal ROS: no abdominal pain,no black or bloody stools    BP 93/60 (BP Location: Right arm, Patient Position: Sitting)  Pulse 82  Temp 98.2 °F (36.8 °C) (Temporal Artery )   Resp 18  Ht 68\" (172.7 cm)  Wt 177 lb (80.3 kg)  SpO2 97%  BMI 26.91 kg/m2    Mental Status Exam    Mood/Affect: depressed and flat  Thought Processes:  " "associations intact  Thought Content:  Negativistic  Hallucination(s): none  Hopelessness: yes  Optimistic:No  Suicidal Thoughts:  \"a little\".   Suicidal Plan/Intent: none  Homicidal Thoughts:  absent      Medical Decision Making:   All recent completed LAB results reviewed and discussed with the patient.        Radiology:     Imaging Results (last 24 hours)     ** No results found for the last 24 hours. **            EKG:     ECG/EMG Results (most recent)     None           Medications:     aspirin 81 mg Oral Daily   aspirin 81 mg Oral Once   buPROPion  mg Oral Q12H   doxepin 50 mg Oral Nightly   furosemide 20 mg Oral Daily   furosemide 20 mg Oral Once   gabapentin 400 mg Oral TID   insulin aspart 2-5 Units Subcutaneous TID With Meals   insulin detemir 10 Units Subcutaneous Nightly   magnesium oxide 200 mg Oral BID   metoclopramide 10 mg Oral BID   metoprolol tartrate 50 mg Oral Daily   metoprolol tartrate 50 mg Oral Once   pancrelipase (Lip-Prot-Amyl) 24,000 units of lipase Oral 4x Daily With Meals & Nightly   pantoprazole 40 mg Oral BID   polyethylene glycol 17 g Oral BID   potassium chloride 20 mEq Oral Daily       •  albuterol  •  aluminum-magnesium hydroxide-simethicone  •  benzonatate  •  [] CloNIDine **FOLLOWED BY** [] CloNIDine **FOLLOWED BY** CloNIDine **FOLLOWED BY** [START ON 2017] CloNIDine  •  cyclobenzaprine  •  dextrose  •  dextrose  •  dicyclomine  •  famotidine  •  glucagon (human recombinant)  •  hydrOXYzine  •  ibuprofen  •  ipratropium-albuterol  •  loperamide  •  magnesium hydroxide  •  nitroglycerin  •  ondansetron  •  sodium chloride   All medications reviewed.    Special Precautions: Continue current level of Special Precautions.    Assessment/Diagnosis: Active Problems:    Depression with suicidal ideation      Treatment Plan: Continue current treatment plan.    Attestation:   Physician Attestation: I attest that the above note accurately reflects work and decisions " "made by me.               Electronically signed by Ankit Barnes MD at 7/6/2017  9:18 AM      Ankit Barnes MD at 7/6/2017  9:06 AM  Version 1 of 2               PROGRESS NOTE  Clinician: NIKA Barnes MD  Admission Date: 7/3/2017  9:06 AM 07/06/17    Behavioral Health Treatment Plan and Problem List: I have reviewed and approved the Behavioral Health Treatment Plan and Problem list.    Allergies  Allergies   Allergen Reactions   • Robitussin Dm [Dextromethorphan-Guaifenesin] Shortness Of Breath   • Tizanidine Shortness Of Breath   • Metformin Nausea And Vomiting   • Sulfa Antibiotics Other (See Comments)     \"I cannot take them, they do something to me.\"   • Contrast Dye Rash   • Tramadol Rash       Hospital Day: 3 days      Assessment completed within view of staff    History    CC: \"Fair\"     Followed for: depression/ substance abuse.     Interval HPI: Patient seen and evaluated by me.  Chart reviewed.  Rates his depression @ 5-6/10, \"a little\" suicidal.   Lethargic this AM. Slept well. Actively working on a disposition plan: considerations include a personal Care home. Has swelling right hand for the past 3-4 weeks, no signs of inflammation, no history of trauma. Also dependent edema LE's.     Interval Review of Systems:   General ROS: negative for - fever or malaise  Endocrine ROS: negative for - palpitations  Respiratory ROS: no cough, shortness of breath, or wheezing  Cardiovascular ROS: no chest pain or dyspnea on exertion  Gastrointestinal ROS: no abdominal pain,no black or bloody stools    BP 93/60 (BP Location: Right arm, Patient Position: Sitting)  Pulse 82  Temp 98.2 °F (36.8 °C) (Temporal Artery )   Resp 18  Ht 68\" (172.7 cm)  Wt 177 lb (80.3 kg)  SpO2 97%  BMI 26.91 kg/m2    Mental Status Exam    Mood/Affect: depressed and flat  Thought Processes:  associations intact  Thought Content:  Negativistic  Hallucination(s): none  Hopelessness: yes  Optimistic:No  Suicidal " "Thoughts:  \"a little\".   Suicidal Plan/Intent: none  Homicidal Thoughts:  absent      Medical Decision Making:   All recent completed LAB results reviewed and discussed with the patient.        Radiology:     Imaging Results (last 24 hours)     ** No results found for the last 24 hours. **            EKG:     ECG/EMG Results (most recent)     None           Medications:     aspirin 81 mg Oral Daily   aspirin 81 mg Oral Once   buPROPion  mg Oral Q12H   doxepin 50 mg Oral Nightly   furosemide 20 mg Oral Daily   furosemide 20 mg Oral Once   gabapentin 400 mg Oral TID   insulin aspart 2-5 Units Subcutaneous TID With Meals   insulin detemir 10 Units Subcutaneous Nightly   magnesium oxide 200 mg Oral BID   metoclopramide 10 mg Oral BID   metoprolol tartrate 50 mg Oral Daily   metoprolol tartrate 50 mg Oral Once   pancrelipase (Lip-Prot-Amyl) 24,000 units of lipase Oral 4x Daily With Meals & Nightly   pantoprazole 40 mg Oral BID   polyethylene glycol 17 g Oral BID   potassium chloride 20 mEq Oral Daily       •  albuterol  •  aluminum-magnesium hydroxide-simethicone  •  benzonatate  •  [] CloNIDine **FOLLOWED BY** [] CloNIDine **FOLLOWED BY** CloNIDine **FOLLOWED BY** [START ON 2017] CloNIDine  •  cyclobenzaprine  •  dextrose  •  dextrose  •  dicyclomine  •  famotidine  •  glucagon (human recombinant)  •  hydrOXYzine  •  ibuprofen  •  ipratropium-albuterol  •  loperamide  •  magnesium hydroxide  •  nitroglycerin  •  ondansetron  •  sodium chloride   All medications reviewed.    Special Precautions: Continue current level of Special Precautions.    Assessment/Diagnosis: Active Problems:    Depression with suicidal ideation      Treatment Plan: Continue current treatment plan.    Attestation:   Physician Attestation: I attest that the above note accurately reflects work and decisions made by me.               Electronically signed by Ankit Barnes MD at 2017  9:17 AM        Consult Notes " (last 72 hours) (Notes from 7/3/2017 11:30 AM through 7/6/2017 11:30 AM)     No notes of this type exist for this encounter.

## 2017-07-07 LAB
BASOPHILS # BLD AUTO: 0.01 10*3/MM3 (ref 0–0.3)
BASOPHILS NFR BLD AUTO: 0.3 % (ref 0–2)
DEPRECATED RDW RBC AUTO: 52.9 FL (ref 37–54)
EOSINOPHIL # BLD AUTO: 0.05 10*3/MM3 (ref 0–0.7)
EOSINOPHIL NFR BLD AUTO: 1.4 % (ref 0–5)
ERYTHROCYTE [DISTWIDTH] IN BLOOD BY AUTOMATED COUNT: 16.1 % (ref 11.5–14.5)
GLUCOSE BLDC GLUCOMTR-MCNC: 123 MG/DL (ref 70–130)
GLUCOSE BLDC GLUCOMTR-MCNC: 142 MG/DL (ref 70–130)
GLUCOSE BLDC GLUCOMTR-MCNC: 162 MG/DL (ref 70–130)
GLUCOSE BLDC GLUCOMTR-MCNC: 168 MG/DL (ref 70–130)
GLUCOSE BLDC GLUCOMTR-MCNC: 175 MG/DL (ref 70–130)
HCT VFR BLD AUTO: 40.4 % (ref 42–52)
HGB BLD-MCNC: 13.5 G/DL (ref 14–18)
IMM GRANULOCYTES # BLD: 0 10*3/MM3 (ref 0–0.03)
IMM GRANULOCYTES NFR BLD: 0 % (ref 0–0.5)
LYMPHOCYTES # BLD AUTO: 0.72 10*3/MM3 (ref 1–3)
LYMPHOCYTES NFR BLD AUTO: 20.7 % (ref 21–51)
MCH RBC QN AUTO: 31.1 PG (ref 27–33)
MCHC RBC AUTO-ENTMCNC: 33.4 G/DL (ref 33–37)
MCV RBC AUTO: 93.1 FL (ref 80–94)
MONOCYTES # BLD AUTO: 0.32 10*3/MM3 (ref 0.1–0.9)
MONOCYTES NFR BLD AUTO: 9.2 % (ref 0–10)
NEUTROPHILS # BLD AUTO: 2.38 10*3/MM3 (ref 1.4–6.5)
NEUTROPHILS NFR BLD AUTO: 68.4 % (ref 30–70)
PLATELET # BLD AUTO: 65 10*3/MM3 (ref 130–400)
PMV BLD AUTO: 10.9 FL (ref 6–10)
RBC # BLD AUTO: 4.34 10*6/MM3 (ref 4.7–6.1)
WBC NRBC COR # BLD: 3.48 10*3/MM3 (ref 4.5–12.5)

## 2017-07-07 PROCEDURE — 82962 GLUCOSE BLOOD TEST: CPT

## 2017-07-07 PROCEDURE — 63710000001 INSULIN ASPART PER 5 UNITS: Performed by: PSYCHIATRY & NEUROLOGY

## 2017-07-07 PROCEDURE — 99232 SBSQ HOSP IP/OBS MODERATE 35: CPT | Performed by: PSYCHIATRY & NEUROLOGY

## 2017-07-07 PROCEDURE — 94799 UNLISTED PULMONARY SVC/PX: CPT

## 2017-07-07 PROCEDURE — 63710000001 INSULIN DETEMIR PER 5 UNITS: Performed by: PSYCHIATRY & NEUROLOGY

## 2017-07-07 PROCEDURE — 85025 COMPLETE CBC W/AUTO DIFF WBC: CPT | Performed by: PSYCHIATRY & NEUROLOGY

## 2017-07-07 RX ADMIN — INSULIN ASPART 2 UNITS: 100 INJECTION, SOLUTION INTRAVENOUS; SUBCUTANEOUS at 11:35

## 2017-07-07 RX ADMIN — GABAPENTIN 400 MG: 400 CAPSULE ORAL at 16:03

## 2017-07-07 RX ADMIN — MAGNESIUM GLUCONATE 500 MG ORAL TABLET 200 MG: 500 TABLET ORAL at 08:40

## 2017-07-07 RX ADMIN — GABAPENTIN 400 MG: 400 CAPSULE ORAL at 08:42

## 2017-07-07 RX ADMIN — FUROSEMIDE 20 MG: 20 TABLET ORAL at 08:41

## 2017-07-07 RX ADMIN — MAGNESIUM GLUCONATE 500 MG ORAL TABLET 200 MG: 500 TABLET ORAL at 17:50

## 2017-07-07 RX ADMIN — PANTOPRAZOLE SODIUM 40 MG: 40 TABLET, DELAYED RELEASE ORAL at 17:50

## 2017-07-07 RX ADMIN — CLONIDINE HYDROCHLORIDE 0.1 MG: 0.1 TABLET ORAL at 16:12

## 2017-07-07 RX ADMIN — TUBERCULIN PURIFIED PROTEIN DERIVATIVE 5 UNITS: 5 INJECTION, SOLUTION INTRADERMAL at 15:30

## 2017-07-07 RX ADMIN — PANCRELIPASE 24000 UNITS OF LIPASE: 60000; 12000; 38000 CAPSULE, DELAYED RELEASE PELLETS ORAL at 11:35

## 2017-07-07 RX ADMIN — PANTOPRAZOLE SODIUM 40 MG: 40 TABLET, DELAYED RELEASE ORAL at 08:41

## 2017-07-07 RX ADMIN — ALBUTEROL SULFATE 2 PUFF: 90 AEROSOL, METERED RESPIRATORY (INHALATION) at 08:40

## 2017-07-07 RX ADMIN — DOXEPIN HYDROCHLORIDE 50 MG: 25 CAPSULE ORAL at 21:25

## 2017-07-07 RX ADMIN — PANCRELIPASE 24000 UNITS OF LIPASE: 60000; 12000; 38000 CAPSULE, DELAYED RELEASE PELLETS ORAL at 17:49

## 2017-07-07 RX ADMIN — GABAPENTIN 400 MG: 400 CAPSULE ORAL at 21:24

## 2017-07-07 RX ADMIN — BUPROPION HYDROCHLORIDE 150 MG: 150 TABLET, EXTENDED RELEASE ORAL at 21:24

## 2017-07-07 RX ADMIN — METOCLOPRAMIDE 10 MG: 10 TABLET ORAL at 17:50

## 2017-07-07 RX ADMIN — PANCRELIPASE 24000 UNITS OF LIPASE: 60000; 12000; 38000 CAPSULE, DELAYED RELEASE PELLETS ORAL at 08:41

## 2017-07-07 RX ADMIN — ONDANSETRON 4 MG: 4 TABLET, FILM COATED ORAL at 08:51

## 2017-07-07 RX ADMIN — POTASSIUM CHLORIDE 20 MEQ: 1500 TABLET, EXTENDED RELEASE ORAL at 08:41

## 2017-07-07 RX ADMIN — POLYETHYLENE GLYCOL 3350 17 G: 1 POWDER ORAL at 08:41

## 2017-07-07 RX ADMIN — PANCRELIPASE 24000 UNITS OF LIPASE: 60000; 12000; 38000 CAPSULE, DELAYED RELEASE PELLETS ORAL at 21:24

## 2017-07-07 RX ADMIN — ASPIRIN 81 MG: 81 TABLET ORAL at 08:41

## 2017-07-07 RX ADMIN — BUPROPION HYDROCHLORIDE 150 MG: 150 TABLET, EXTENDED RELEASE ORAL at 08:41

## 2017-07-07 RX ADMIN — METOCLOPRAMIDE 10 MG: 10 TABLET ORAL at 08:41

## 2017-07-07 RX ADMIN — INSULIN DETEMIR 10 UNITS: 100 INJECTION, SOLUTION SUBCUTANEOUS at 21:26

## 2017-07-07 NOTE — PLAN OF CARE
Problem:  Patient Care Overview (Adult)  Goal: Plan of Care Review  Outcome: Ongoing (interventions implemented as appropriate)    07/07/17 0252   Coping/Psychosocial Response Interventions   Plan Of Care Reviewed With patient   Coping/Psychosocial   Patient Agreement with Plan of Care agrees   Patient Care Overview   Progress improving   Outcome Evaluation   Outcome Summary/Follow up Plan Pt. reports appetite is fair and sleep is good. Rates anxiety 4/10 and depression 7/10. Denies SI/HI, terrell, or cravings, but c/o stomach cramps, and tremors.          Problem:  Overarching Goals  Goal: Adheres to Safety Considerations for Self and Others  Outcome: Ongoing (interventions implemented as appropriate)  Goal: Optimized Coping Skills in Response to Life Stressors  Outcome: Ongoing (interventions implemented as appropriate)  Goal: Develops/Participates in Therapeutic [place] to Support Successful Transition  Outcome: Ongoing (interventions implemented as appropriate)

## 2017-07-07 NOTE — PROGRESS NOTES
"      PROGRESS NOTE  Clinician: NIKA Barnes MD  Admission Date: 7/3/2017  9:29 AM 07/07/17    Behavioral Health Treatment Plan and Problem List: I have reviewed and approved the Behavioral Health Treatment Plan and Problem list.    Allergies  Allergies   Allergen Reactions   • Robitussin Dm [Dextromethorphan-Guaifenesin] Shortness Of Breath   • Tizanidine Shortness Of Breath   • Metformin Nausea And Vomiting   • Sulfa Antibiotics Other (See Comments)     \"I cannot take them, they do something to me.\"   • Contrast Dye Rash   • Tramadol Rash       Hospital Day: 4 days      Assessment completed within view of staff    History    CC:\"Been sick, chills, shakes, vomited this morning\"    Followed for: depression.     Interval HPI: Patient seen and evaluated by me.  Chart reviewed.  Depressed with feeling hopeless \"on and off\". Denies active suicidal intent but might be a concern if had to be transferred to the medical floor at this time. Awaiting results of consult with infectious disease regarding possible cellulitis right hand.     Appreciate disposition planning efforts - has contact several personal Care facilities.     Have restarted Wellbutrin for the depression.     Interval Review of Systems:   General ROS: negative for - fever or malaise  Endocrine ROS: negative for - palpitations  Respiratory ROS: no cough, shortness of breath, or wheezing  Cardiovascular ROS: no chest pain or dyspnea on exertion  Gastrointestinal ROS: no abdominal pain,no black or bloody stools    BP 99/64 (BP Location: Right arm, Patient Position: Standing)  Pulse 93  Temp 97.2 °F (36.2 °C) (Temporal Artery )   Resp 18  Ht 68\" (172.7 cm)  Wt 177 lb (80.3 kg)  SpO2 96%  BMI 26.91 kg/m2    Mental Status Exam    Mood/Affect: depressed  Thought Processes:  linear  Thought Content:  Negativistic and distorted  Hallucination(s): hears someone call his name out occasionally.   Hopelessness: yes  Optimistic:No  Suicidal Thoughts:  Slight, " intermittent without plan.   Suicidal Plan/Intent: none  Homicidal Thoughts:  absent      Medical Decision Making:   All recent completed LAB results reviewed and discussed with the patient.        Radiology:     Imaging Results (last 24 hours)     Procedure Component Value Units Date/Time    XR Hand 3+ View Right [[id number]] Collected:  17 1038     Updated:  17 1041    Narrative:       EXAMINATION: XR HAND 2 VW RIGHT-      CLINICAL INDICATION:swelling        COMPARISON: None      TECHNIQUE: 3 views right hand     FINDINGS:   Diffuse soft tissue edema is noted predominantly involving the first  through third digits. No underlying fracture is identified. No  dislocation. No radiopaque foreign body. Please correlate for  cellulitis.       Impression:       Diffuse soft tissue edema of the right hand which can be seen with  cellulitis. No underlying fracture is identified.     This report was finalized on 2017 10:38 AM by Dr. Alexsander Son MD.               EKG:     ECG/EMG Results (most recent)     None           Medications:     aspirin 81 mg Oral Daily   aspirin 81 mg Oral Once   buPROPion  mg Oral Q12H   doxepin 50 mg Oral Nightly   furosemide 20 mg Oral Daily   furosemide 20 mg Oral Once   gabapentin 400 mg Oral TID   insulin aspart 2-5 Units Subcutaneous TID With Meals   insulin detemir 10 Units Subcutaneous Nightly   magnesium oxide 200 mg Oral BID   metoclopramide 10 mg Oral BID   metoprolol tartrate 50 mg Oral Daily   metoprolol tartrate 50 mg Oral Once   pancrelipase (Lip-Prot-Amyl) 24,000 units of lipase Oral 4x Daily With Meals & Nightly   pantoprazole 40 mg Oral BID   polyethylene glycol 17 g Oral BID   potassium chloride 20 mEq Oral Daily       •  albuterol  •  aluminum-magnesium hydroxide-simethicone  •  benzonatate  •  [] CloNIDine **FOLLOWED BY** [] CloNIDine **FOLLOWED BY** [] CloNIDine **FOLLOWED BY** CloNIDine  •  cyclobenzaprine  •  dextrose  •   dextrose  •  dicyclomine  •  famotidine  •  glucagon (human recombinant)  •  hydrOXYzine  •  ibuprofen  •  ipratropium-albuterol  •  loperamide  •  magnesium hydroxide  •  nitroglycerin  •  ondansetron  •  sodium chloride   All medications reviewed.    Special Precautions: Continue current level of Special Precautions.    Assessment/Diagnosis: Active Problems:    Depression with suicidal ideation      Treatment Plan: Continue current treatment plan.    Attestation:   Physician Attestation: I attest that the above note accurately reflects work and decisions made by me.

## 2017-07-07 NOTE — DISCHARGE INSTR - APPOINTMENTS
[address], [place] [address]   [phone]    July 11 2017.   Facility will transport patient.      [address] [address] [phone]    July 13 2017 at 9:00am with Pema.  July 25 2017 at 8:00am for Med Management.    DR. FENG OFFICE TO SEE JUANPABLO NGUYEN  7/18/17 0945  [address]  [phone]

## 2017-07-07 NOTE — CONSULTS
Case discussed with Dr. Barnes and the patients nurse as the patient has swelling to the left upper extremity. Dr. Bobby is out of town and will follow up with patient on Monday.

## 2017-07-07 NOTE — PROGRESS NOTES
Mena cui [facility] came to the unit and met with patient about placement in the facility. She reported that she felt patient was appropriate for admission in the facility. She reported that they could admit on Monday and can provide transportation for patient to facility. She reports he will need a TB skin test completed before admission if possible.

## 2017-07-07 NOTE — PROGRESS NOTES
Navigator is helping Primary Therapist with discharge planning for patient. Navigator sent the following referrals on this day:      [facility] - [phone]  Sent 7/5. Inna will call with answer later today.      [facility] - [phone]  Mena is willing to accept patient on Monday, July 10.      [facility] - [phone]  Sent 7/5. No Bed Available 7/6.     [facility] [phone]  Declined Patient.     [facility] - [phone]  Sent 7/6     [facility] - [phone]  Sent 7/6.

## 2017-07-07 NOTE — PLAN OF CARE
Problem: BH Patient Care Overview (Adult)  Goal: Plan of Care Review  Outcome: Ongoing (interventions implemented as appropriate)  Rates anxiety 7 and depression 8. Denies suicidal thoughts.   Goal: Interdisciplinary Rounds/Family Conference  Outcome: Ongoing (interventions implemented as appropriate)  Goal: Individualization and Mutuality  Outcome: Ongoing (interventions implemented as appropriate)  Goal: Discharge Needs Assessment  Outcome: Ongoing (interventions implemented as appropriate)    Problem:  Overarching Goals  Goal: Adheres to Safety Considerations for Self and Others  Outcome: Ongoing (interventions implemented as appropriate)  Goal: Optimized Coping Skills in Response to Life Stressors  Outcome: Ongoing (interventions implemented as appropriate)  Goal: Develops/Participates in Therapeutic [place] to Support Successful Transition  Outcome: Ongoing (interventions implemented as appropriate)    Problem: Risk for Self Injury/Neglect  Goal: Treatment Goal: Remain safe during length of stay, learn and adopt new coping skills, and be free of self-injurious ideation, impulses and acts at the time of discharge  Outcome: Ongoing (interventions implemented as appropriate)  Goal: Refrain from harming self  Outcome: Ongoing (interventions implemented as appropriate)    Problem: SUBSTANCE USE/ABUSE  Goal: By day 5 will complete medical detox and be discharged with an appropriate treatment plan in place.  Outcome: Ongoing (interventions implemented as appropriate)

## 2017-07-08 LAB
GLUCOSE BLDC GLUCOMTR-MCNC: 100 MG/DL (ref 70–130)
GLUCOSE BLDC GLUCOMTR-MCNC: 113 MG/DL (ref 70–130)
GLUCOSE BLDC GLUCOMTR-MCNC: 127 MG/DL (ref 70–130)
GLUCOSE BLDC GLUCOMTR-MCNC: 127 MG/DL (ref 70–130)
GLUCOSE BLDC GLUCOMTR-MCNC: 162 MG/DL (ref 70–130)

## 2017-07-08 PROCEDURE — 63710000001 INSULIN DETEMIR PER 5 UNITS: Performed by: PSYCHIATRY & NEUROLOGY

## 2017-07-08 PROCEDURE — 99231 SBSQ HOSP IP/OBS SF/LOW 25: CPT | Performed by: PSYCHIATRY & NEUROLOGY

## 2017-07-08 PROCEDURE — 94799 UNLISTED PULMONARY SVC/PX: CPT

## 2017-07-08 PROCEDURE — 82962 GLUCOSE BLOOD TEST: CPT

## 2017-07-08 RX ADMIN — METOCLOPRAMIDE 10 MG: 10 TABLET ORAL at 17:05

## 2017-07-08 RX ADMIN — GABAPENTIN 400 MG: 400 CAPSULE ORAL at 08:45

## 2017-07-08 RX ADMIN — ASPIRIN 81 MG: 81 TABLET ORAL at 08:43

## 2017-07-08 RX ADMIN — PANCRELIPASE 24000 UNITS OF LIPASE: 60000; 12000; 38000 CAPSULE, DELAYED RELEASE PELLETS ORAL at 22:00

## 2017-07-08 RX ADMIN — GABAPENTIN 400 MG: 400 CAPSULE ORAL at 16:56

## 2017-07-08 RX ADMIN — METOCLOPRAMIDE 10 MG: 10 TABLET ORAL at 08:44

## 2017-07-08 RX ADMIN — PANTOPRAZOLE SODIUM 40 MG: 40 TABLET, DELAYED RELEASE ORAL at 17:01

## 2017-07-08 RX ADMIN — MAGNESIUM GLUCONATE 500 MG ORAL TABLET 200 MG: 500 TABLET ORAL at 17:01

## 2017-07-08 RX ADMIN — METOPROLOL TARTRATE 50 MG: 50 TABLET, FILM COATED ORAL at 08:44

## 2017-07-08 RX ADMIN — POLYETHYLENE GLYCOL 3350 17 G: 1 POWDER ORAL at 08:46

## 2017-07-08 RX ADMIN — PANTOPRAZOLE SODIUM 40 MG: 40 TABLET, DELAYED RELEASE ORAL at 08:44

## 2017-07-08 RX ADMIN — POLYETHYLENE GLYCOL 3350 17 G: 1 POWDER ORAL at 17:02

## 2017-07-08 RX ADMIN — GABAPENTIN 400 MG: 400 CAPSULE ORAL at 22:00

## 2017-07-08 RX ADMIN — BUPROPION HYDROCHLORIDE 150 MG: 150 TABLET, EXTENDED RELEASE ORAL at 08:44

## 2017-07-08 RX ADMIN — IPRATROPIUM BROMIDE AND ALBUTEROL SULFATE 3 ML: .5; 3 SOLUTION RESPIRATORY (INHALATION) at 20:21

## 2017-07-08 RX ADMIN — IPRATROPIUM BROMIDE AND ALBUTEROL SULFATE 3 ML: .5; 3 SOLUTION RESPIRATORY (INHALATION) at 07:42

## 2017-07-08 RX ADMIN — INSULIN DETEMIR 10 UNITS: 100 INJECTION, SOLUTION SUBCUTANEOUS at 22:00

## 2017-07-08 RX ADMIN — FUROSEMIDE 20 MG: 20 TABLET ORAL at 08:43

## 2017-07-08 RX ADMIN — PANCRELIPASE 24000 UNITS OF LIPASE: 60000; 12000; 38000 CAPSULE, DELAYED RELEASE PELLETS ORAL at 08:43

## 2017-07-08 RX ADMIN — PANCRELIPASE 24000 UNITS OF LIPASE: 60000; 12000; 38000 CAPSULE, DELAYED RELEASE PELLETS ORAL at 11:45

## 2017-07-08 RX ADMIN — MAGNESIUM GLUCONATE 500 MG ORAL TABLET 200 MG: 500 TABLET ORAL at 08:43

## 2017-07-08 RX ADMIN — POTASSIUM CHLORIDE 20 MEQ: 1500 TABLET, EXTENDED RELEASE ORAL at 08:44

## 2017-07-08 RX ADMIN — DOXEPIN HYDROCHLORIDE 50 MG: 25 CAPSULE ORAL at 22:00

## 2017-07-08 RX ADMIN — BUPROPION HYDROCHLORIDE 150 MG: 150 TABLET, EXTENDED RELEASE ORAL at 22:00

## 2017-07-08 RX ADMIN — PANCRELIPASE 24000 UNITS OF LIPASE: 60000; 12000; 38000 CAPSULE, DELAYED RELEASE PELLETS ORAL at 17:01

## 2017-07-08 NOTE — PLAN OF CARE
Problem: BH Patient Care Overview (Adult)  Goal: Plan of Care Review  Outcome: Ongoing (interventions implemented as appropriate)  Rates anxiety 5 and depression 7. Denies suicidal thoughts.  Goal: Interdisciplinary Rounds/Family Conference  Outcome: Ongoing (interventions implemented as appropriate)  Goal: Individualization and Mutuality  Outcome: Ongoing (interventions implemented as appropriate)  Goal: Discharge Needs Assessment  Outcome: Ongoing (interventions implemented as appropriate)    Problem:  Overarching Goals  Goal: Adheres to Safety Considerations for Self and Others  Outcome: Ongoing (interventions implemented as appropriate)  Goal: Optimized Coping Skills in Response to Life Stressors  Outcome: Ongoing (interventions implemented as appropriate)  Goal: Develops/Participates in Therapeutic [place] to Support Successful Transition  Outcome: Ongoing (interventions implemented as appropriate)    Problem: Risk for Self Injury/Neglect  Goal: Treatment Goal: Remain safe during length of stay, learn and adopt new coping skills, and be free of self-injurious ideation, impulses and acts at the time of discharge  Outcome: Ongoing (interventions implemented as appropriate)  Goal: Refrain from harming self  Outcome: Ongoing (interventions implemented as appropriate)    Problem: SUBSTANCE USE/ABUSE  Goal: By day 5 will complete medical detox and be discharged with an appropriate treatment plan in place.  Outcome: Ongoing (interventions implemented as appropriate)

## 2017-07-08 NOTE — PLAN OF CARE
Problem:  Patient Care Overview (Adult)  Goal: Plan of Care Review  Outcome: Ongoing (interventions implemented as appropriate)  Pt rates anxiety 5/10 and depression 7/10. Denies SI/HI. Denies craving/withdrawals. States his medicine is working and he is able to sleep better. He slept well through the night.    07/08/17 0405   Coping/Psychosocial Response Interventions   Plan Of Care Reviewed With patient   Coping/Psychosocial   Patient Agreement with Plan of Care agrees   Patient Care Overview   Progress no change

## 2017-07-08 NOTE — PROGRESS NOTES
"    PROGRESS NOTE    Behavioral Health Treatment Plan and Problem List: I have reviewed and approved the Behavioral Health Treatment Plan and Problem list.    ID:Isaias Lang is a 52 y.o. male    Admission Date: 7/3/2017  Hospital Day: 5 days    CC: Depression and SI     Subjective: This morning he stated that he feels he has had poor sleep. He stated that he rates depression at 5/10 and anxiety is a 5/10. He stated his appetite is good. He is hopeful about getting into placement.     Interval Review of Systems:   CONSTITUTIONAL:  No fever, chills, weakness or fatigue.  CARDIOVASCULAR:  No chest pain. No palpitations or edema.  RESPIRATORY:  +SOB, no cough  GASTROINTESTINAL:  No nausea, vomiting or diarrhea. No abdominal pain or blood.   NEUROLOGICAL:  No headache, dizziness, ataxia, numbness or tingling in the extremities.   MUSCULOSKELETAL:  +LLE pain from prior surgery and RUE swelling and pain, +back pain  PSYCHIATRIC:  See above    Temp:  [97 °F (36.1 °C)-97.4 °F (36.3 °C)] 97 °F (36.1 °C)  Heart Rate:  [] 101  Resp:  [18] 18  BP: (100-159)/(58-92) 124/82    MENTAL STATUS EXAM:  Appearance:Neatly, casually dressed, fair hygeine. Appears tired  Cooperation:Cooperative  Orientation: Ox4  Gait and station stable.   Psychomotor: No psychomotor agitation/retardation, No EPS, No motor tics  Speech-normal rate, amount.  Mood \"good\"   Affect-congruent/appropriate.  Thought Content-goal directed, no delusional material present  Thought process-linear, organized.  Suicidality: No SI  Homicidality: No HI  Perception: No AH/VH  Memory is intact for recent and remote events  Concentration: good  Impulse control-good  Insight-fair   Judgement-fair    Lab Results (last 24 hours)     Procedure Component Value Units Date/Time    POC Glucose Fingerstick [[id number]]  (Abnormal) Collected:  07/07/17 1121    Specimen:  Blood Updated:  07/07/17 1130     Glucose 175 (H) mg/dL     Narrative:       Meter: [id number] : " [id number] [place] Carolyne    POC Glucose Fingerstick [[id number]]  (Abnormal) Collected:  07/07/17 1651    Specimen:  Blood Updated:  07/07/17 1700     Glucose 142 (H) mg/dL     Narrative:       Meter: [id number] : [id number] Alexey Spnecera    POC Glucose Fingerstick [[id number]]  (Abnormal) Collected:  07/07/17 1950    Specimen:  Blood Updated:  07/07/17 1957     Glucose 162 (H) mg/dL     Narrative:       Meter: [id number] : [id number] Andrés Flowers    POC Glucose Fingerstick [[id number]]  (Normal) Collected:  07/08/17 0708    Specimen:  Blood Updated:  07/08/17 0716     Glucose 127 mg/dL     Narrative:       Meter: [id number] : [id number] John Montejo          Allergies: Robitussin dm [dextromethorphan-guaifenesin]; Tizanidine; Metformin; Sulfa antibiotics; Contrast dye; and Tramadol      Current Facility-Administered Medications:   •  albuterol (PROVENTIL HFA;VENTOLIN HFA) inhaler 2 puff, 2 puff, Inhalation, Q6H PRN, Nate Kelley MD, 2 puff at 07/07/17 0840  •  aluminum-magnesium hydroxide-simethicone (MAALOX MAX) 400-400-40 MG/5ML suspension 15 mL, 15 mL, Oral, Q6H PRN, Nate Kelley MD  •  aspirin EC tablet 81 mg, 81 mg, Oral, Daily, Nate Kelley MD, 81 mg at 07/08/17 0843  •  aspirin EC tablet 81 mg, 81 mg, Oral, Once, Nate Kelley MD  •  benzonatate (TESSALON) capsule 100 mg, 100 mg, Oral, TID PRN, Nate Kelley MD  •  buPROPion SR (WELLBUTRIN SR) 12 hr tablet 150 mg, 150 mg, Oral, Q12H, Ankit Barnes MD, 150 mg at 07/08/17 0844  •  cyclobenzaprine (FLEXERIL) tablet 10 mg, 10 mg, Oral, TID PRN, Nate Kelley MD  •  dextrose (D50W) solution 25 g, 25 g, Intravenous, Q15 Min PRN, Nate Kelley MD  •  dextrose (GLUTOSE) oral gel 15 g, 15 g, Oral, Q15 Min PRN, Nate Kelley MD  •  dicyclomine (BENTYL) capsule 10 mg, 10 mg, Oral, TID PRN, Nate Kelley MD  •  doxepin (SINEquan) capsule 50 mg, 50 mg, Oral, Nightly, Ankit Pugh  MD Molly, 50 mg at 07/07/17 2125  •  famotidine (PEPCID) tablet 20 mg, 20 mg, Oral, BID PRN, Nate Kelley MD  •  furosemide (LASIX) tablet 20 mg, 20 mg, Oral, Daily, Nate Kelley MD, 20 mg at 07/08/17 0843  •  furosemide (LASIX) tablet 20 mg, 20 mg, Oral, Once, Nate Kelley MD  •  gabapentin (NEURONTIN) capsule 400 mg, 400 mg, Oral, TID, Ankit Barnes MD, 400 mg at 07/08/17 0845  •  glucagon (human recombinant) (GLUCAGEN DIAGNOSTIC) injection 1 mg, 1 mg, Subcutaneous, Q15 Min PRN, Nate Kelley MD  •  hydrOXYzine (ATARAX) tablet 50 mg, 50 mg, Oral, Q6H PRN, Nate Kelley MD  •  ibuprofen (ADVIL,MOTRIN) tablet 600 mg, 600 mg, Oral, Q6H PRN, Nate Kelley MD  •  insulin aspart (novoLOG) injection 2-5 Units, 2-5 Units, Subcutaneous, TID With Meals, Nate Kelley MD, 2 Units at 07/07/17 1135  •  insulin detemir (LEVEMIR) injection 10 Units, 10 Units, Subcutaneous, Nightly, Nate Kelley MD, 10 Units at 07/07/17 2126  •  ipratropium-albuterol (DUO-NEB) nebulizer solution 3 mL, 3 mL, Nebulization, Q6H PRN, Nate Kelley MD, 3 mL at 07/08/17 0742  •  loperamide (IMODIUM) capsule 2 mg, 2 mg, Oral, 4x Daily PRN, Nate Kelley MD  •  magnesium hydroxide (MILK OF MAGNESIA) suspension 2400 mg/10mL 10 mL, 10 mL, Oral, Daily PRN, Nate Kelley MD  •  magnesium oxide (MAGOX) tablet 200 mg, 200 mg, Oral, BID, Nate Kelley MD, 200 mg at 07/08/17 0843  •  metoclopramide (REGLAN) tablet 10 mg, 10 mg, Oral, BID, Nate Kelley MD, 10 mg at 07/08/17 0844  •  metoprolol tartrate (LOPRESSOR) tablet 50 mg, 50 mg, Oral, Daily, Nate Kelley MD, 50 mg at 07/08/17 0844  •  metoprolol tartrate (LOPRESSOR) tablet 50 mg, 50 mg, Oral, Once, Nate Kelley MD  •  nitroglycerin (NITROSTAT) SL tablet 0.4 mg, 0.4 mg, Sublingual, Q5 Min PRN, Nate Kelley MD  •  ondansetron (ZOFRAN) tablet 4 mg, 4 mg, Oral,  Q6H PRN, Nate eKlley MD, 4 mg at 07/07/17 0851  •  pancrelipase (Lip-Prot-Amyl) (CREON) capsule 24,000 units of lipase, 24,000 units of lipase, Oral, 4x Daily With Meals & Nightly, Nate Kelley MD, 24,000 units of lipase at 07/08/17 0843  •  pantoprazole (PROTONIX) EC tablet 40 mg, 40 mg, Oral, BID, Nate Kelley MD, 40 mg at 07/08/17 0844  •  polyethylene glycol (MIRALAX) powder 17 g, 17 g, Oral, BID, Nate Kelley MD, 17 g at 07/08/17 0846  •  potassium chloride (K-DUR,KLOR-CON) CR tablet 20 mEq, 20 mEq, Oral, Daily, Nate Kelley MD, 20 mEq at 07/08/17 0844  •  sodium chloride (OCEAN) nasal spray 2 spray, 2 spray, Each Nare, PRN, Nate Kelley MD  •  albuterol  •  aluminum-magnesium hydroxide-simethicone  •  benzonatate  •  cyclobenzaprine  •  dextrose  •  dextrose  •  dicyclomine  •  famotidine  •  glucagon (human recombinant)  •  hydrOXYzine  •  ibuprofen  •  ipratropium-albuterol  •  loperamide  •  magnesium hydroxide  •  nitroglycerin  •  ondansetron  •  sodium chloride    Safety Precautions: SP LEVEL III    Assessment/Diagnosis: Active Problems:    Depression with suicidal ideation      Treatment Plan: Continue current treatment plan.    Attestation:   Physician Attestation: I attest that the above note accurately reflects work and decisions made by me.      Clinician: Tameka Jacob MD  10:54 AM 07/08/17

## 2017-07-09 LAB
GLUCOSE BLDC GLUCOMTR-MCNC: 103 MG/DL (ref 70–130)
GLUCOSE BLDC GLUCOMTR-MCNC: 104 MG/DL (ref 70–130)
GLUCOSE BLDC GLUCOMTR-MCNC: 114 MG/DL (ref 70–130)
GLUCOSE BLDC GLUCOMTR-MCNC: 183 MG/DL (ref 70–130)
GLUCOSE BLDC GLUCOMTR-MCNC: 92 MG/DL (ref 70–130)

## 2017-07-09 PROCEDURE — 99231 SBSQ HOSP IP/OBS SF/LOW 25: CPT | Performed by: PSYCHIATRY & NEUROLOGY

## 2017-07-09 PROCEDURE — 94799 UNLISTED PULMONARY SVC/PX: CPT

## 2017-07-09 PROCEDURE — 63710000001 INSULIN ASPART PER 5 UNITS: Performed by: PSYCHIATRY & NEUROLOGY

## 2017-07-09 PROCEDURE — 82962 GLUCOSE BLOOD TEST: CPT

## 2017-07-09 RX ADMIN — METOCLOPRAMIDE 10 MG: 10 TABLET ORAL at 09:20

## 2017-07-09 RX ADMIN — POLYETHYLENE GLYCOL 3350 17 G: 1 POWDER ORAL at 17:06

## 2017-07-09 RX ADMIN — MAGNESIUM GLUCONATE 500 MG ORAL TABLET 200 MG: 500 TABLET ORAL at 17:04

## 2017-07-09 RX ADMIN — FUROSEMIDE 20 MG: 20 TABLET ORAL at 09:20

## 2017-07-09 RX ADMIN — INSULIN ASPART 2 UNITS: 100 INJECTION, SOLUTION INTRAVENOUS; SUBCUTANEOUS at 17:05

## 2017-07-09 RX ADMIN — PANTOPRAZOLE SODIUM 40 MG: 40 TABLET, DELAYED RELEASE ORAL at 09:19

## 2017-07-09 RX ADMIN — PANCRELIPASE 24000 UNITS OF LIPASE: 60000; 12000; 38000 CAPSULE, DELAYED RELEASE PELLETS ORAL at 11:47

## 2017-07-09 RX ADMIN — GABAPENTIN 400 MG: 400 CAPSULE ORAL at 09:21

## 2017-07-09 RX ADMIN — ALBUTEROL SULFATE 2 PUFF: 90 AEROSOL, METERED RESPIRATORY (INHALATION) at 01:12

## 2017-07-09 RX ADMIN — DOXEPIN HYDROCHLORIDE 50 MG: 25 CAPSULE ORAL at 20:56

## 2017-07-09 RX ADMIN — MAGNESIUM GLUCONATE 500 MG ORAL TABLET 200 MG: 500 TABLET ORAL at 09:20

## 2017-07-09 RX ADMIN — ASPIRIN 81 MG: 81 TABLET ORAL at 09:20

## 2017-07-09 RX ADMIN — PANCRELIPASE 24000 UNITS OF LIPASE: 60000; 12000; 38000 CAPSULE, DELAYED RELEASE PELLETS ORAL at 09:19

## 2017-07-09 RX ADMIN — PANCRELIPASE 24000 UNITS OF LIPASE: 60000; 12000; 38000 CAPSULE, DELAYED RELEASE PELLETS ORAL at 17:05

## 2017-07-09 RX ADMIN — METOCLOPRAMIDE 10 MG: 10 TABLET ORAL at 17:05

## 2017-07-09 RX ADMIN — PANTOPRAZOLE SODIUM 40 MG: 40 TABLET, DELAYED RELEASE ORAL at 17:04

## 2017-07-09 RX ADMIN — BUPROPION HYDROCHLORIDE 150 MG: 150 TABLET, EXTENDED RELEASE ORAL at 20:56

## 2017-07-09 RX ADMIN — ALUMINUM HYDROXIDE, MAGNESIUM HYDROXIDE, AND DIMETHICONE 15 ML: 400; 400; 40 SUSPENSION ORAL at 01:11

## 2017-07-09 RX ADMIN — BUPROPION HYDROCHLORIDE 150 MG: 150 TABLET, EXTENDED RELEASE ORAL at 09:20

## 2017-07-09 RX ADMIN — POTASSIUM CHLORIDE 20 MEQ: 1500 TABLET, EXTENDED RELEASE ORAL at 09:20

## 2017-07-09 RX ADMIN — GABAPENTIN 400 MG: 400 CAPSULE ORAL at 20:58

## 2017-07-09 RX ADMIN — IPRATROPIUM BROMIDE AND ALBUTEROL SULFATE 3 ML: .5; 3 SOLUTION RESPIRATORY (INHALATION) at 19:42

## 2017-07-09 RX ADMIN — PANCRELIPASE 24000 UNITS OF LIPASE: 60000; 12000; 38000 CAPSULE, DELAYED RELEASE PELLETS ORAL at 20:56

## 2017-07-09 RX ADMIN — GABAPENTIN 400 MG: 400 CAPSULE ORAL at 15:07

## 2017-07-09 RX ADMIN — POLYETHYLENE GLYCOL 3350 17 G: 1 POWDER ORAL at 09:21

## 2017-07-09 NOTE — PLAN OF CARE
Problem: BH Patient Care Overview (Adult)  Goal: Plan of Care Review  Outcome: Ongoing (interventions implemented as appropriate)  Rates anxiety 7 and depression 7. Denies suicidal thoughts.   Goal: Interdisciplinary Rounds/Family Conference  Outcome: Ongoing (interventions implemented as appropriate)  Goal: Individualization and Mutuality  Outcome: Ongoing (interventions implemented as appropriate)  Goal: Discharge Needs Assessment  Outcome: Ongoing (interventions implemented as appropriate)    Problem:  Overarching Goals  Goal: Adheres to Safety Considerations for Self and Others  Outcome: Ongoing (interventions implemented as appropriate)  Goal: Optimized Coping Skills in Response to Life Stressors  Outcome: Ongoing (interventions implemented as appropriate)  Goal: Develops/Participates in Therapeutic [place] to Support Successful Transition  Outcome: Ongoing (interventions implemented as appropriate)    Problem: Risk for Self Injury/Neglect  Goal: Treatment Goal: Remain safe during length of stay, learn and adopt new coping skills, and be free of self-injurious ideation, impulses and acts at the time of discharge  Outcome: Ongoing (interventions implemented as appropriate)  Goal: Refrain from harming self  Outcome: Ongoing (interventions implemented as appropriate)

## 2017-07-09 NOTE — PROGRESS NOTES
"    PROGRESS NOTE    Behavioral Health Treatment Plan and Problem List: I have reviewed and approved the Behavioral Health Treatment Plan and Problem list.    ID:Isaias Lang is a 52 y.o. male    Admission Date: 7/3/2017  Hospital Day: 6 days    CC: Depression and SI     Subjective: Today he was found to be in his room laying in bed. He stated that his mood \"ain't no good\" and explained that his is why he is isolating himself in the room. He is upset about his diabetic diet. He continues to report poor sleep. He denied SI/HI/AVH.     Depression - 7/10  Anxiety 7/10     Interval Review of Systems:   CONSTITUTIONAL:  No fever, chills, weakness or fatigue.  CARDIOVASCULAR:  No chest pain. No palpitations or edema.  RESPIRATORY:  +SOB, no cough  GASTROINTESTINAL:  No nausea, vomiting or diarrhea. No abdominal pain or blood.   NEUROLOGICAL:  No headache, dizziness, ataxia, numbness or tingling in the extremities.   MUSCULOSKELETAL:  +LLE pain from prior surgery and RUE swelling and pain, +back pain  PSYCHIATRIC:  See above    Temp:  [96.7 °F (35.9 °C)-97 °F (36.1 °C)] 96.7 °F (35.9 °C)  Heart Rate:  [76-88] 88  Resp:  [18] 18  BP: ()/(65-81) 94/65    MENTAL STATUS EXAM:  Appearance:Neatly, casually dressed, fair hygeine. Appears tired  Cooperation:Cooperative  Orientation: Ox4  Gait and station stable.   Psychomotor: No psychomotor agitation/retardation, No EPS, No motor tics  Speech-normal rate, amount.  Mood \"ain't no good\"   Affect-congruent  Thought Content-goal directed, no delusional material present  Thought process-linear, organized.  Suicidality: No SI  Homicidality: No HI  Perception: No AH/VH  Insight-fair   Judgement-fair    Lab Results (last 24 hours)     Procedure Component Value Units Date/Time    POC Glucose Fingerstick [[id number]]  (Normal) Collected:  07/08/17 1626    Specimen:  Blood Updated:  07/08/17 1640     Glucose 127 mg/dL     Narrative:       Meter: [id number] : [id number] Lopez " Nikia    POC Glucose Fingerstick [[id number]]  (Normal) Collected:  07/08/17 1948    Specimen:  Blood Updated:  07/08/17 1956     Glucose 113 mg/dL     Narrative:       Meter: [id number] : [id number] Lorenza Nelson    POC Glucose Fingerstick [[id number]]  (Abnormal) Collected:  07/08/17 2158    Specimen:  Blood Updated:  07/08/17 2206     Glucose 162 (H) mg/dL     Narrative:       Meter: [id number] : [id number] Lorenza Nelson    POC Glucose Fingerstick [[id number]]  (Normal) Collected:  07/09/17 0657    Specimen:  Blood Updated:  07/09/17 0708     Glucose 104 mg/dL     Narrative:       Meter: [id number] : [id number] Lorenza Nelson          Allergies: Robitussin dm [dextromethorphan-guaifenesin]; Tizanidine; Metformin; Sulfa antibiotics; Contrast dye; and Tramadol      Current Facility-Administered Medications:   •  albuterol (PROVENTIL HFA;VENTOLIN HFA) inhaler 2 puff, 2 puff, Inhalation, Q6H PRN, Nate Kelley MD, 2 puff at 07/09/17 0112  •  aluminum-magnesium hydroxide-simethicone (MAALOX MAX) 400-400-40 MG/5ML suspension 15 mL, 15 mL, Oral, Q6H PRN, Nate Kelley MD, 15 mL at 07/09/17 0111  •  aspirin EC tablet 81 mg, 81 mg, Oral, Daily, Nate Kelley MD, 81 mg at 07/09/17 0920  •  aspirin EC tablet 81 mg, 81 mg, Oral, Once, Nate Kelley MD  •  benzonatate (TESSALON) capsule 100 mg, 100 mg, Oral, TID PRN, Nate Kelley MD  •  buPROPion SR (WELLBUTRIN SR) 12 hr tablet 150 mg, 150 mg, Oral, Q12H, Ankit Barnes MD, 150 mg at 07/09/17 0920  •  dextrose (D50W) solution 25 g, 25 g, Intravenous, Q15 Min PRN, Nate Kelley MD  •  dextrose (GLUTOSE) oral gel 15 g, 15 g, Oral, Q15 Min PRN, Nate Kelley MD  •  doxepin (SINEquan) capsule 50 mg, 50 mg, Oral, Nightly, Ankit Barnes MD, 50 mg at 07/08/17 2200  •  famotidine (PEPCID) tablet 20 mg, 20 mg, Oral, BID PRN, Nate Kelley MD  •  furosemide (LASIX) tablet 20 mg, 20 mg, Oral,  Daily, Nate Kelley MD, 20 mg at 07/09/17 0920  •  furosemide (LASIX) tablet 20 mg, 20 mg, Oral, Once, Nate Kelley MD  •  gabapentin (NEURONTIN) capsule 400 mg, 400 mg, Oral, TID, Ankit Barnes MD, 400 mg at 07/09/17 0921  •  glucagon (human recombinant) (GLUCAGEN DIAGNOSTIC) injection 1 mg, 1 mg, Subcutaneous, Q15 Min PRN, Nate Kelley MD  •  hydrOXYzine (ATARAX) tablet 50 mg, 50 mg, Oral, Q6H PRN, Nate Kelley MD  •  ibuprofen (ADVIL,MOTRIN) tablet 600 mg, 600 mg, Oral, Q6H PRN, Nate Kelley MD  •  insulin aspart (novoLOG) injection 2-5 Units, 2-5 Units, Subcutaneous, TID With Meals, Nate Kelley MD, 2 Units at 07/07/17 1135  •  insulin detemir (LEVEMIR) injection 10 Units, 10 Units, Subcutaneous, Nightly, Nate Kelley MD, 10 Units at 07/08/17 2200  •  ipratropium-albuterol (DUO-NEB) nebulizer solution 3 mL, 3 mL, Nebulization, Q6H PRN, Nate Kelley MD, 3 mL at 07/08/17 2021  •  loperamide (IMODIUM) capsule 2 mg, 2 mg, Oral, 4x Daily PRN, Nate Kelley MD  •  magnesium hydroxide (MILK OF MAGNESIA) suspension 2400 mg/10mL 10 mL, 10 mL, Oral, Daily PRN, Nate Kelley MD  •  magnesium oxide (MAGOX) tablet 200 mg, 200 mg, Oral, BID, Nate Kelley MD, 200 mg at 07/09/17 0920  •  metoclopramide (REGLAN) tablet 10 mg, 10 mg, Oral, BID, Nate Kelley MD, 10 mg at 07/09/17 0920  •  metoprolol tartrate (LOPRESSOR) tablet 50 mg, 50 mg, Oral, Daily, Nate Kelley MD, 50 mg at 07/08/17 0844  •  metoprolol tartrate (LOPRESSOR) tablet 50 mg, 50 mg, Oral, Once, Nate Kelley MD  •  nitroglycerin (NITROSTAT) SL tablet 0.4 mg, 0.4 mg, Sublingual, Q5 Min PRN, Nate Kelley MD  •  ondansetron (ZOFRAN) tablet 4 mg, 4 mg, Oral, Q6H PRN, Nate Kelley MD, 4 mg at 07/07/17 0851  •  pancrelipase (Lip-Prot-Amyl) (CREON) capsule 24,000 units of lipase, 24,000 units of lipase, Oral, 4x Daily With  Meals & Nightly, Nate Kelley MD, 24,000 units of lipase at 07/09/17 0919  •  pantoprazole (PROTONIX) EC tablet 40 mg, 40 mg, Oral, BID, Nate Kelley MD, 40 mg at 07/09/17 0919  •  polyethylene glycol (MIRALAX) powder 17 g, 17 g, Oral, BID, Nate Kelley MD, 17 g at 07/09/17 0921  •  potassium chloride (K-DUR,KLOR-CON) CR tablet 20 mEq, 20 mEq, Oral, Daily, Nate Kelley MD, 20 mEq at 07/09/17 0920  •  sodium chloride (OCEAN) nasal spray 2 spray, 2 spray, Each Nare, PRN, Nate Kelley MD  •  albuterol  •  aluminum-magnesium hydroxide-simethicone  •  benzonatate  •  dextrose  •  dextrose  •  famotidine  •  glucagon (human recombinant)  •  hydrOXYzine  •  ibuprofen  •  ipratropium-albuterol  •  loperamide  •  magnesium hydroxide  •  nitroglycerin  •  ondansetron  •  sodium chloride    Safety Precautions: SP LEVEL III    Assessment/Diagnosis: Active Problems:    Depression with suicidal ideation      Treatment Plan: Continue current treatment plan.    Attestation:   Physician Attestation: I attest that the above note accurately reflects work and decisions made by me.      Clinician: Tameka Jacob MD  10:54 AM 07/09/17

## 2017-07-10 LAB
GLUCOSE BLDC GLUCOMTR-MCNC: 107 MG/DL (ref 70–130)
GLUCOSE BLDC GLUCOMTR-MCNC: 108 MG/DL (ref 70–130)
GLUCOSE BLDC GLUCOMTR-MCNC: 133 MG/DL (ref 70–130)
GLUCOSE BLDC GLUCOMTR-MCNC: 84 MG/DL (ref 70–130)

## 2017-07-10 PROCEDURE — 94799 UNLISTED PULMONARY SVC/PX: CPT

## 2017-07-10 PROCEDURE — 63710000001 INSULIN DETEMIR PER 5 UNITS: Performed by: PSYCHIATRY & NEUROLOGY

## 2017-07-10 PROCEDURE — 99232 SBSQ HOSP IP/OBS MODERATE 35: CPT | Performed by: PSYCHIATRY & NEUROLOGY

## 2017-07-10 PROCEDURE — 82962 GLUCOSE BLOOD TEST: CPT

## 2017-07-10 RX ADMIN — PANCRELIPASE 24000 UNITS OF LIPASE: 60000; 12000; 38000 CAPSULE, DELAYED RELEASE PELLETS ORAL at 17:00

## 2017-07-10 RX ADMIN — GABAPENTIN 400 MG: 400 CAPSULE ORAL at 08:44

## 2017-07-10 RX ADMIN — MAGNESIUM GLUCONATE 500 MG ORAL TABLET 200 MG: 500 TABLET ORAL at 17:00

## 2017-07-10 RX ADMIN — PANCRELIPASE 24000 UNITS OF LIPASE: 60000; 12000; 38000 CAPSULE, DELAYED RELEASE PELLETS ORAL at 11:27

## 2017-07-10 RX ADMIN — METOCLOPRAMIDE 10 MG: 10 TABLET ORAL at 08:24

## 2017-07-10 RX ADMIN — ALBUTEROL SULFATE 2 PUFF: 90 AEROSOL, METERED RESPIRATORY (INHALATION) at 23:40

## 2017-07-10 RX ADMIN — MAGNESIUM GLUCONATE 500 MG ORAL TABLET 200 MG: 500 TABLET ORAL at 08:24

## 2017-07-10 RX ADMIN — DOXEPIN HYDROCHLORIDE 50 MG: 25 CAPSULE ORAL at 21:24

## 2017-07-10 RX ADMIN — METOCLOPRAMIDE 10 MG: 10 TABLET ORAL at 17:00

## 2017-07-10 RX ADMIN — POLYETHYLENE GLYCOL 3350 17 G: 1 POWDER ORAL at 17:03

## 2017-07-10 RX ADMIN — PANTOPRAZOLE SODIUM 40 MG: 40 TABLET, DELAYED RELEASE ORAL at 17:00

## 2017-07-10 RX ADMIN — PANTOPRAZOLE SODIUM 40 MG: 40 TABLET, DELAYED RELEASE ORAL at 08:24

## 2017-07-10 RX ADMIN — POTASSIUM CHLORIDE 20 MEQ: 1500 TABLET, EXTENDED RELEASE ORAL at 08:24

## 2017-07-10 RX ADMIN — INSULIN DETEMIR 10 UNITS: 100 INJECTION, SOLUTION SUBCUTANEOUS at 20:00

## 2017-07-10 RX ADMIN — ASPIRIN 81 MG: 81 TABLET ORAL at 08:24

## 2017-07-10 RX ADMIN — GABAPENTIN 400 MG: 400 CAPSULE ORAL at 15:09

## 2017-07-10 RX ADMIN — BUPROPION HYDROCHLORIDE 150 MG: 150 TABLET, EXTENDED RELEASE ORAL at 08:24

## 2017-07-10 RX ADMIN — GABAPENTIN 400 MG: 400 CAPSULE ORAL at 20:44

## 2017-07-10 RX ADMIN — METOPROLOL TARTRATE 50 MG: 50 TABLET, FILM COATED ORAL at 08:24

## 2017-07-10 RX ADMIN — PANCRELIPASE 24000 UNITS OF LIPASE: 60000; 12000; 38000 CAPSULE, DELAYED RELEASE PELLETS ORAL at 20:44

## 2017-07-10 RX ADMIN — POLYETHYLENE GLYCOL 3350 17 G: 1 POWDER ORAL at 08:25

## 2017-07-10 RX ADMIN — PANCRELIPASE 24000 UNITS OF LIPASE: 60000; 12000; 38000 CAPSULE, DELAYED RELEASE PELLETS ORAL at 08:24

## 2017-07-10 RX ADMIN — BUPROPION HYDROCHLORIDE 150 MG: 150 TABLET, EXTENDED RELEASE ORAL at 20:44

## 2017-07-10 RX ADMIN — FUROSEMIDE 20 MG: 20 TABLET ORAL at 08:24

## 2017-07-10 NOTE — PLAN OF CARE
Problem:  Patient Care Overview (Adult)  Goal: Plan of Care Review  Outcome: Ongoing (interventions implemented as appropriate)  Patient has been up out of room a lot.  Anxiety 7, depression 5, denies S/I, H/I and A/VH.  Mood good and discussing going somewhere in [place] for further tx.    07/10/17 4757   Coping/Psychosocial Response Interventions   Plan Of Care Reviewed With patient   Coping/Psychosocial   Patient Agreement with Plan of Care agrees   Patient Care Overview   Progress improving

## 2017-07-10 NOTE — PLAN OF CARE
Problem: BH Patient Care Overview (Adult)  Goal: Plan of Care Review  Outcome: Ongoing (interventions implemented as appropriate)  NO PROBLEMS OR NEW ORDERS NOTED AT THIS TIME    07/09/17 1455   Coping/Psychosocial Response Interventions   Plan Of Care Reviewed With patient   Coping/Psychosocial   Patient Agreement with Plan of Care agrees   Patient Care Overview   Progress no change       Goal: Interdisciplinary Rounds/Family Conference  Outcome: Ongoing (interventions implemented as appropriate)  Goal: Individualization and Mutuality  Outcome: Ongoing (interventions implemented as appropriate)  Goal: Discharge Needs Assessment  Outcome: Ongoing (interventions implemented as appropriate)    Problem: BH Overarching Goals  Goal: Adheres to Safety Considerations for Self and Others  Outcome: Ongoing (interventions implemented as appropriate)  Goal: Optimized Coping Skills in Response to Life Stressors  Outcome: Ongoing (interventions implemented as appropriate)  Goal: Develops/Participates in Therapeutic [place] to Support Successful Transition  Outcome: Ongoing (interventions implemented as appropriate)    Problem: SUBSTANCE USE/ABUSE  Goal: By day 5 will complete medical detox and be discharged with an appropriate treatment plan in place.  Outcome: Ongoing (interventions implemented as appropriate)  Goal: By discharge, will develop insight into their chemical dependency and sustain motivation to continue in recovery.  Outcome: Ongoing (interventions implemented as appropriate)  Goal: By discharge, will have in place an ongoing treatment plan in collaboration with assigned CDP.  Outcome: Ongoing (interventions implemented as appropriate)

## 2017-07-10 NOTE — PROGRESS NOTES
1030  Met with patient for individual he reports feeling better today and after meeting with Dr. Barnes says he anticipates discharging him tomorrow. Contacted Dot at [facility] to discuss medications/prescriptions and asked that we send 3 days meds and then the prescriptions for them to fill with their pharmacy after discussing this she asked that we fill his prescriptions here and then call them for transport when he is ready.   Will inform Navigator staff Elaine and ALYSE today of plans for medications and transport.

## 2017-07-10 NOTE — PROGRESS NOTES
"      PROGRESS NOTE  Clinician: NIKA Barnes MD  Admission Date: 7/3/2017  10:12 AM 07/10/17    Behavioral Health Treatment Plan and Problem List: I have reviewed and approved the Behavioral Health Treatment Plan and Problem list.    Allergies  Allergies   Allergen Reactions   • Robitussin Dm [Dextromethorphan-Guaifenesin] Shortness Of Breath   • Tizanidine Shortness Of Breath   • Metformin Nausea And Vomiting   • Sulfa Antibiotics Other (See Comments)     \"I cannot take them, they do something to me.\"   • Contrast Dye Rash   • Tramadol Rash       Hospital Day: 7 days      Assessment completed within view of staff    History    CC:\"Feel better today\"     Followed for: Depression.     Interval HPI: Patient seen and evaluated by me.  Chart reviewed.  Patient is certainly less depressed and is somewhat optimistic regarding disposition plan for him to be admitted to the [facility] in [place].  Infectious diseases signed off and essentially cleared him also for discharge.  We will make arrangements for the patient's discharge and admission to the [facility] tomorrow morning.    Interval Review of Systems:   General ROS: negative for - fever or malaise  Endocrine ROS: negative for - palpitations  Respiratory ROS: no cough, shortness of breath, or wheezing  Cardiovascular ROS: no chest pain or dyspnea on exertion  Gastrointestinal ROS: no abdominal pain,no black or bloody stools    /68  Pulse 88  Temp 97 °F (36.1 °C) (Temporal Artery )   Resp 18  Ht 68\" (172.7 cm)  Wt 177 lb (80.3 kg)  SpO2 96%  BMI 26.91 kg/m2    Mental Status Exam    Mood/Affect: depressed  Thought Processes:  linear, logical, and goal directed  Thought Content:  Normal  Hallucination(s): none  Hopelessness: yes  Optimistic:minimally  Suicidal Thoughts:  none  Suicidal Plan/Intent: none  Homicidal Thoughts:  absent      Medical Decision Making:   All recent completed LAB results reviewed and discussed with " the patient.        Radiology:     Imaging Results (last 24 hours)     ** No results found for the last 24 hours. **            EKG:     ECG/EMG Results (most recent)     None           Medications:     aspirin 81 mg Oral Daily   aspirin 81 mg Oral Once   buPROPion  mg Oral Q12H   doxepin 50 mg Oral Nightly   furosemide 20 mg Oral Daily   furosemide 20 mg Oral Once   gabapentin 400 mg Oral TID   insulin aspart 2-5 Units Subcutaneous TID With Meals   insulin detemir 10 Units Subcutaneous Nightly   magnesium oxide 200 mg Oral BID   metoclopramide 10 mg Oral BID   metoprolol tartrate 50 mg Oral Daily   metoprolol tartrate 50 mg Oral Once   pancrelipase (Lip-Prot-Amyl) 24,000 units of lipase Oral 4x Daily With Meals & Nightly   pantoprazole 40 mg Oral BID   polyethylene glycol 17 g Oral BID   potassium chloride 20 mEq Oral Daily       •  albuterol  •  aluminum-magnesium hydroxide-simethicone  •  benzonatate  •  dextrose  •  dextrose  •  famotidine  •  glucagon (human recombinant)  •  hydrOXYzine  •  ibuprofen  •  ipratropium-albuterol  •  loperamide  •  magnesium hydroxide  •  nitroglycerin  •  ondansetron  •  sodium chloride   All medications reviewed.    Special Precautions: Continue current level of Special Precautions.    Assessment/Diagnosis: Active Problems:    Depression with suicidal ideation      Treatment Plan: Continue current treatment plan.    Attestation:   Physician Attestation: I attest that the above note accurately reflects work and decisions made by me.

## 2017-07-10 NOTE — PLAN OF CARE
Problem: BH Overarching Goals  Goal: Adheres to Safety Considerations for Self and Others  Outcome: Ongoing (interventions implemented as appropriate)  Goal: Optimized Coping Skills in Response to Life Stressors  Outcome: Ongoing (interventions implemented as appropriate)  Goal: Develops/Participates in Therapeutic [place] to Support Successful Transition  Outcome: Ongoing (interventions implemented as appropriate)

## 2017-07-10 NOTE — CONSULTS
INFECTIOUS DISEASE CONSULTATION REPORT      Referring Provider: Dr. Barnes  Reason for Consultation: Right hand cellulitis      Principal problem: <principal problem not specified>    Subjective .     History of present illness:    As you well know Dr. Barnes, Mr. Isaias Lang is a 52 y.o. years old male with past medical history significant for multiple skin abscesses and phlebitis due to IV drug use the patient was admitted to adult [facility] infectious disease consulted for right hand cellulitis.    Infectious Disease consultation was requested for antimicrobial management.     History taken from: patient chart RN    Case was discussed with patient and nursing staff    Review of Systems     Constitutional: no fever, chills and night sweats. No appetite change or unexpected weight change. No fatigue.  Eyes: no eye drainage, itching or redness.  HEENT: no mouth sores, dysphagia or nose bleed.  Respiratory: no for shortness of breath, cough or production of sputum.  Cardiovascular: no chest pain, no palpitations, no orthopnea.  Gastrointestinal: no nausea, vomiting or diarrhea. No abdominal pain, hematemesis or rectal bleeding.  Genitourinary: no dysuria or polyuria.  Hematologic/lymphatic: no lymph node abnormalities, no easy bruising or easy bleeding.  Musculoskeletal: no muscle or joint pain.  Skin: No rash and no itching.  Neurological: no loss of consciousness, no seizure, no headache.  Behavioral/Psych: See history of present illness  Endocrine: no hot flashes.  Immunologic: negative.    Past Medical History    Past Medical History:   Diagnosis Date   • Abscess of antecubital fossa 05/2014    Left that required I and D and grew Haemophilus influenza group 1   • Anxiety    • Asthma    • Chronic pancreatitis    • COPD (chronic obstructive pulmonary disease)    • DDD (degenerative disc disease), lumbosacral    • Depression    • Diabetes mellitus    • DVT (deep venous thrombosis)     Right arm   • Essential  "hypertension    • GERD (gastroesophageal reflux disease)    • Hepatitis-C    • Hypercholesteremia    • Injury of back    • Injury of right Achilles tendon     Complicated by MRSA and Pseudomonas   • IV drug abuse    • Mild CAD     Left heart cath at  2012   • MRSA (methicillin resistant staph aureus) culture positive 09/14/2016    LUE   • Obesity    • Opiate addiction     Suboxone IV   • Self-injurious behavior    • Sleep apnea    • Suicide attempt    • Systolic CHF, chronic     EF 45-50% in 2013, EF 60% 9/2016       Past Surgical History    Past Surgical History:   Procedure Laterality Date   • CHOLECYSTECTOMY  1990s   • INCISION AND DRAINAGE ABSCESS Left 2016    wrist   • INCISION AND DRAINAGE ARM Left 9/15/2016    Procedure: INCISION AND DRAINAGE UPPER EXTREMITY;  Surgeon: Rico Oseguera MD;  Location: [facility] OR;  Service:    • ORIF ANKLE FRACTURE Right 2014       Family History    Family History   Problem Relation Age of Onset   • Gout Other    • Osteoporosis Other    • Arthritis Other      Rheumatoid and osteoarthritis   • Hypertension Other    • Heart disease Other    • Cancer Other    • Coronary artery disease Mother    • Lung disease Mother    • Gout Sister    • Cancer Maternal Grandmother    • Hypertension Maternal Grandfather    • Gout Maternal Grandfather        Social History    Social History   Substance Use Topics   • Smoking status: Former Smoker     Years: 1.00     Types: Cigarettes   • Smokeless tobacco: Never Used      Comment: quit smoking in my 20's   • Alcohol use No      Comment: denies       Allergies    Robitussin dm [dextromethorphan-guaifenesin]; Tizanidine; Metformin; Sulfa antibiotics; Contrast dye; and Tramadol    Objective     /68  Pulse 88  Temp 97 °F (36.1 °C) (Temporal Artery )   Resp 18  Ht 68\" (172.7 cm)  Wt 177 lb (80.3 kg)  SpO2 96%  BMI 26.91 kg/m2    Temp:  [97 °F (36.1 °C)-97.5 °F (36.4 °C)] 97 °F (36.1 °C)      No intake or output data in the 24 hours ending " 07/10/17 0926      Physical Exam:      General Appearance:    Alert, cooperative, in no acute distress,Depressed mood    Head:    Normocephalic, without obvious abnormality, atraumatic   Eyes:            Lids and lashes normal, conjunctivae and sclerae normal, no   icterus, no pallor, corneas clear, PERRLA   Ears:    Ears appear intact with no abnormalities noted   Throat:   No oral lesions, no thrush, oral mucosa moist   Neck:   No adenopathy, supple, trachea midline, no thyromegaly, no   carotid bruit, no JVD   Back:     No tenderness to percussion or palpation, range of motion   normal   Lungs:     Clear to auscultation,respirations regular, even and unlabored. No wheezing, no ronchi and no crackles.    Heart:    Regular rhythm and normal rate, normal S1 and S2, no            murmur, no gallop, no rub, no click   Chest Wall:    No abnormalities observed   Abdomen:     Normal bowel sounds, no masses, no organomegaly, soft        non-tender, non-distended, no guarding, no rebound                tenderness   Rectal:     Deferred   Extremities:   Moves all extremities well, no edema, no cyanosis, no             redness   Pulses:   Pulses palpable and equal bilaterally   Skin:   No bleeding, bruising or rash   Lymph nodes:   No palpable adenopathy   Neurologic:   Awake, alert and oriented x 3. Following commands.Depressed mood        Results:      Results from last 7 days  Lab Units 07/07/17  1014 07/03/17  1627   WBC 10*3/mm3 3.48* 3.55*     Lab Results   Component Value Date    NEUTROABS 2.38 07/07/2017         Results from last 7 days  Lab Units 07/03/17  1627   CREATININE mg/dL 0.53       Results Review:    I have personally reviewed laboratory data, culture results, radiology studies and antimicrobial therapy.    Hospital Medications (active)       Dose Frequency Start End    albuterol (PROVENTIL HFA;VENTOLIN HFA) inhaler 2 puff 2 puff Every 6 Hours PRN 7/3/2017     Sig - Route: Inhale 2 puffs Every 6 (Six) Hours  As Needed for Wheezing. - Inhalation    aluminum-magnesium hydroxide-simethicone (MAALOX MAX) 400-400-40 MG/5ML suspension 15 mL 15 mL Every 6 Hours PRN 7/3/2017     Sig - Route: Take 15 mL by mouth Every 6 (Six) Hours As Needed for Indigestion or Heartburn. - Oral    aspirin EC tablet 81 mg 81 mg Daily 7/4/2017     Sig - Route: Take 1 tablet by mouth Daily. - Oral    aspirin EC tablet 81 mg 81 mg Once 7/3/2017     Sig - Route: Take 1 tablet by mouth 1 (One) Time. - Oral    benzonatate (TESSALON) capsule 100 mg 100 mg 3 Times Daily PRN 7/3/2017     Sig - Route: Take 1 capsule by mouth 3 (Three) Times a Day As Needed for Cough. - Oral    buPROPion SR (WELLBUTRIN SR) 12 hr tablet 150 mg 150 mg Every 12 Hours Scheduled 7/4/2017     Sig - Route: Take 1 tablet by mouth Every 12 (Twelve) Hours. - Oral    dextrose (D50W) solution 25 g 25 g Every 15 Minutes PRN 7/3/2017     Sig - Route: Infuse 50 mL into a venous catheter Every 15 (Fifteen) Minutes As Needed for Low Blood Sugar (Blood Sugar Less Than 70, Patient Has IV Access - Unresponsive, NPO or Unable To Safely Swallow). - Intravenous    dextrose (GLUTOSE) oral gel 15 g 15 g Every 15 Minutes PRN 7/3/2017     Sig - Route: Take 15 g by mouth Every 15 (Fifteen) Minutes As Needed for Low Blood Sugar (Blood Sugar Less Than 70, Patient Alert, Is Not NPO & Can Safely Swallow). - Oral    doxepin (SINEquan) capsule 50 mg 50 mg Nightly 7/5/2017     Sig - Route: Take 2 capsules by mouth Every Night. - Oral    famotidine (PEPCID) tablet 20 mg 20 mg 2 Times Daily PRN 7/3/2017     Sig - Route: Take 1 tablet by mouth 2 (Two) Times a Day As Needed for Heartburn. - Oral    furosemide (LASIX) tablet 20 mg 20 mg Daily 7/4/2017     Sig - Route: Take 1 tablet by mouth Daily. - Oral    furosemide (LASIX) tablet 20 mg 20 mg Once 7/3/2017     Sig - Route: Take 1 tablet by mouth 1 (One) Time. - Oral    gabapentin (NEURONTIN) capsule 400 mg 400 mg 3 Times Daily 7/4/2017     Sig - Route: Take 1  capsule by mouth 3 (Three) Times a Day. - Oral    glucagon (human recombinant) (GLUCAGEN DIAGNOSTIC) injection 1 mg 1 mg Every 15 Minutes PRN 7/3/2017     Sig - Route: Inject 1 mg under the skin Every 15 (Fifteen) Minutes As Needed (Blood Glucose Less Than 70 - Patient Without IV Access - Unresponsive, NPO or Unable To Safely Swallow). - Subcutaneous    hydrOXYzine (ATARAX) tablet 50 mg 50 mg Every 6 Hours PRN 7/3/2017     Sig - Route: Take 1 tablet by mouth Every 6 (Six) Hours As Needed for Anxiety. - Oral    ibuprofen (ADVIL,MOTRIN) tablet 600 mg 600 mg Every 6 Hours PRN 7/3/2017     Sig - Route: Take 1.5 tablets by mouth Every 6 (Six) Hours As Needed for Mild Pain  or Moderate Pain (4-6) (severe pain (7-10)). - Oral    insulin aspart (novoLOG) injection 2-5 Units 2-5 Units 3 Times Daily With Meals 7/3/2017     Sig - Route: Inject 2-5 Units under the skin 3 (Three) Times a Day With Meals. - Subcutaneous    Notes to Pharmacy: 150-200= 2 units   200-250= 5 units    insulin detemir (LEVEMIR) injection 10 Units 10 Units Nightly 7/3/2017     Sig - Route: Inject 10 Units under the skin Every Night. - Subcutaneous    ipratropium-albuterol (DUO-NEB) nebulizer solution 3 mL 3 mL Every 6 Hours PRN 7/4/2017     Sig - Route: Take 3 mL by nebulization Every 6 (Six) Hours As Needed for Shortness of Air. - Nebulization    loperamide (IMODIUM) capsule 2 mg 2 mg 4 Times Daily PRN 7/3/2017     Sig - Route: Take 1 capsule by mouth 4 (Four) Times a Day As Needed for Diarrhea. - Oral    magnesium hydroxide (MILK OF MAGNESIA) suspension 2400 mg/10mL 10 mL 10 mL Daily PRN 7/3/2017     Sig - Route: Take 10 mL by mouth Daily As Needed for Constipation. - Oral    magnesium oxide (MAGOX) tablet 200 mg 200 mg 2 Times Daily 7/3/2017     Sig - Route: Take 0.5 tablets by mouth 2 (Two) Times a Day. - Oral    metoclopramide (REGLAN) tablet 10 mg 10 mg 2 Times Daily 7/3/2017     Sig - Route: Take 1 tablet by mouth 2 (Two) Times a Day. - Oral     metoprolol tartrate (LOPRESSOR) tablet 50 mg 50 mg Daily 7/4/2017     Sig - Route: Take 1 tablet by mouth Daily. - Oral    metoprolol tartrate (LOPRESSOR) tablet 50 mg 50 mg Once 7/3/2017     Sig - Route: Take 1 tablet by mouth 1 (One) Time. - Oral    nitroglycerin (NITROSTAT) SL tablet 0.4 mg 0.4 mg Every 5 Minutes PRN 7/3/2017     Sig - Route: Place 1 tablet under the tongue Every 5 (Five) Minutes As Needed for Chest Pain. - Sublingual    ondansetron (ZOFRAN) tablet 4 mg 4 mg Every 6 Hours PRN 7/3/2017     Sig - Route: Take 1 tablet by mouth Every 6 (Six) Hours As Needed for Nausea or Vomiting. - Oral    pancrelipase (Lip-Prot-Amyl) (CREON) capsule 24,000 units of lipase 24,000 units of lipase 4 Times Daily With Meals & Nightly 7/3/2017     Sig - Route: Take 2 capsules by mouth 4 (Four) Times a Day With Meals & at Bedtime. - Oral    pantoprazole (PROTONIX) EC tablet 40 mg 40 mg 2 Times Daily 7/3/2017     Sig - Route: Take 1 tablet by mouth 2 (Two) Times a Day. - Oral    polyethylene glycol (MIRALAX) powder 17 g 17 g 2 Times Daily 7/3/2017     Sig - Route: Take 17 g by mouth 2 (Two) Times a Day. - Oral    potassium chloride (K-DUR,KLOR-CON) CR tablet 20 mEq 20 mEq Daily 7/3/2017     Sig - Route: Take 1 tablet by mouth Daily. - Oral    sodium chloride (OCEAN) nasal spray 2 spray 2 spray As Needed 7/3/2017     Sig - Route: 2 sprays by Each Nare route As Needed for Congestion. - Each Nare            PROBLEM LIST:    Patient Active Problem List   Diagnosis   • MDD (major depressive disorder), recurrent episode, moderate   • Type 1 diabetes mellitus   • 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   • Cellulitis of right hand   • Cellulitis of right hand excluding fingers and thumb   • Septic shock   • Sepsis   • Depression with suicidal ideation       Assessment/Plan     ASSESSMENT:    1.  Right hand cellulitis    PLAN:    At this time the patient has completed a course of antibiotic therapy for right hand cellulitis.  At this time  showing no evidence of any remaining infection.  Unfortunately he'll be at risk for recurrent cellulitis and phlebitis due to continuous IV drug use.  For now infectious disease will sign off.  Please back with any questions.  Thank you!      Patients findings and recommendations were discussed with patient and nursing staff    Code Status: Full Code     Scribed for Dr. Tamara Bobby by Bethany Piper PA-C.       Bethany Piper PA-C  07/10/17  9:26 AM    Physician Attestation:    I have personally seen and examined the patient. I reviewed the patient's data including history of present illness, review of systems, physical examination, assessment and treatment plan and agree with findings above. The assessment and plan are my own.  I have reviewed and edited the note above after discussing the findings with Bethany Piper PA-C.    Tamara Bobby MD  Infectious Diseases  07/10/17  9:32 AM

## 2017-07-10 NOTE — PROGRESS NOTES
1500: Patient will not be discharged on this day. Plan for discharge tomorrow. Olivia from [facility] notified. Olivia stated for someone to call when d/c order is in and they will come  patient.     930: Navigator spoke with April from [facility] on this day. April states for someone to call when patient is discharged and the transport team would come pick patient up.    [facility] - [phone]

## 2017-07-11 VITALS
RESPIRATION RATE: 18 BRPM | SYSTOLIC BLOOD PRESSURE: 114 MMHG | BODY MASS INDEX: 26.83 KG/M2 | DIASTOLIC BLOOD PRESSURE: 72 MMHG | HEIGHT: 68 IN | TEMPERATURE: 97.2 F | WEIGHT: 177 LBS | OXYGEN SATURATION: 93 % | HEART RATE: 96 BPM

## 2017-07-11 PROBLEM — F32.A DEPRESSION WITH SUICIDAL IDEATION: Status: RESOLVED | Noted: 2017-07-03 | Resolved: 2017-07-11

## 2017-07-11 PROBLEM — R45.851 DEPRESSION WITH SUICIDAL IDEATION: Status: RESOLVED | Noted: 2017-07-03 | Resolved: 2017-07-11

## 2017-07-11 LAB — GLUCOSE BLDC GLUCOMTR-MCNC: 123 MG/DL (ref 70–130)

## 2017-07-11 PROCEDURE — 82962 GLUCOSE BLOOD TEST: CPT

## 2017-07-11 PROCEDURE — 94799 UNLISTED PULMONARY SVC/PX: CPT

## 2017-07-11 PROCEDURE — 99238 HOSP IP/OBS DSCHRG MGMT 30/<: CPT | Performed by: PSYCHIATRY & NEUROLOGY

## 2017-07-11 RX ORDER — FUROSEMIDE 20 MG/1
20 TABLET ORAL ONCE
Qty: 1 TABLET | Refills: 0 | Status: SHIPPED | OUTPATIENT
Start: 2017-07-11 | End: 2017-07-11

## 2017-07-11 RX ORDER — POLYETHYLENE GLYCOL 3350 17 G/17G
17 POWDER, FOR SOLUTION ORAL 2 TIMES DAILY
Qty: 60 EACH | Refills: 0 | Status: SHIPPED | OUTPATIENT
Start: 2017-07-11 | End: 2017-07-11

## 2017-07-11 RX ORDER — NITROGLYCERIN 0.4 MG/1
0.4 TABLET SUBLINGUAL
Qty: 30 TABLET | Refills: 0 | Status: ON HOLD | OUTPATIENT
Start: 2017-07-11 | End: 2018-08-01

## 2017-07-11 RX ORDER — METOPROLOL TARTRATE 50 MG/1
50 TABLET, FILM COATED ORAL DAILY
Qty: 30 TABLET | Refills: 0 | Status: ON HOLD | OUTPATIENT
Start: 2017-07-11 | End: 2018-06-05

## 2017-07-11 RX ORDER — DOXEPIN HYDROCHLORIDE 50 MG/1
50 CAPSULE ORAL NIGHTLY
Qty: 30 CAPSULE | Refills: 0 | Status: ON HOLD | OUTPATIENT
Start: 2017-07-11 | End: 2018-06-05

## 2017-07-11 RX ORDER — LANOLIN ALCOHOL/MO/W.PET/CERES
200 CREAM (GRAM) TOPICAL 2 TIMES DAILY
Qty: 30 TABLET | Refills: 1 | Status: ON HOLD | OUTPATIENT
Start: 2017-07-11 | End: 2017-09-20

## 2017-07-11 RX ORDER — GABAPENTIN 400 MG/1
400 CAPSULE ORAL 3 TIMES DAILY
Qty: 90 CAPSULE | Refills: 0 | Status: ON HOLD | OUTPATIENT
Start: 2017-07-11 | End: 2018-06-05

## 2017-07-11 RX ORDER — POTASSIUM CHLORIDE 20 MEQ/1
20 TABLET, EXTENDED RELEASE ORAL DAILY
Qty: 30 TABLET | Refills: 1 | Status: ON HOLD | OUTPATIENT
Start: 2017-07-11 | End: 2018-08-01

## 2017-07-11 RX ORDER — ASPIRIN 81 MG/1
81 TABLET ORAL DAILY
Qty: 100 TABLET | Refills: 0 | Status: ON HOLD | OUTPATIENT
Start: 2017-07-11 | End: 2018-11-02

## 2017-07-11 RX ORDER — FUROSEMIDE 20 MG/1
20 TABLET ORAL DAILY
Qty: 30 TABLET | Refills: 0 | Status: SHIPPED | OUTPATIENT
Start: 2017-07-11 | End: 2017-09-22

## 2017-07-11 RX ORDER — PANTOPRAZOLE SODIUM 40 MG/1
40 TABLET, DELAYED RELEASE ORAL 2 TIMES DAILY
Qty: 30 TABLET | Refills: 1 | Status: SHIPPED | OUTPATIENT
Start: 2017-07-11 | End: 2017-07-11

## 2017-07-11 RX ORDER — IPRATROPIUM BROMIDE AND ALBUTEROL SULFATE 2.5; .5 MG/3ML; MG/3ML
3 SOLUTION RESPIRATORY (INHALATION) EVERY 6 HOURS PRN
Qty: 360 ML | Refills: 2 | Status: ON HOLD | OUTPATIENT
Start: 2017-07-11 | End: 2018-06-05

## 2017-07-11 RX ORDER — PANTOPRAZOLE SODIUM 40 MG/1
40 TABLET, DELAYED RELEASE ORAL DAILY
Qty: 30 TABLET | Refills: 1 | Status: ON HOLD | OUTPATIENT
Start: 2017-07-11 | End: 2018-08-01

## 2017-07-11 RX ORDER — BUPROPION HYDROCHLORIDE 150 MG/1
150 TABLET, EXTENDED RELEASE ORAL EVERY 12 HOURS SCHEDULED
Qty: 60 TABLET | Refills: 0 | Status: SHIPPED | OUTPATIENT
Start: 2017-07-11 | End: 2017-09-13

## 2017-07-11 RX ORDER — ALBUTEROL SULFATE 90 UG/1
2 AEROSOL, METERED RESPIRATORY (INHALATION) EVERY 6 HOURS PRN
Qty: 18 G | Refills: 2 | Status: ON HOLD | OUTPATIENT
Start: 2017-07-11 | End: 2018-08-01

## 2017-07-11 RX ORDER — METOCLOPRAMIDE 10 MG/1
10 TABLET ORAL 2 TIMES DAILY
Qty: 60 TABLET | Refills: 1 | Status: SHIPPED | OUTPATIENT
Start: 2017-07-11 | End: 2017-09-20 | Stop reason: HOSPADM

## 2017-07-11 RX ORDER — POLYETHYLENE GLYCOL 3350 17 G/17G
17 POWDER, FOR SOLUTION ORAL DAILY
Qty: 30 EACH | Refills: 1 | Status: ON HOLD | OUTPATIENT
Start: 2017-07-11 | End: 2018-06-05

## 2017-07-11 RX ADMIN — MAGNESIUM GLUCONATE 500 MG ORAL TABLET 200 MG: 500 TABLET ORAL at 08:53

## 2017-07-11 RX ADMIN — GABAPENTIN 400 MG: 400 CAPSULE ORAL at 08:51

## 2017-07-11 RX ADMIN — ASPIRIN 81 MG: 81 TABLET ORAL at 08:51

## 2017-07-11 RX ADMIN — FUROSEMIDE 20 MG: 20 TABLET ORAL at 08:51

## 2017-07-11 RX ADMIN — POLYETHYLENE GLYCOL 3350 17 G: 1 POWDER ORAL at 08:53

## 2017-07-11 RX ADMIN — METOCLOPRAMIDE 10 MG: 10 TABLET ORAL at 08:51

## 2017-07-11 RX ADMIN — ALBUTEROL SULFATE 2 PUFF: 90 AEROSOL, METERED RESPIRATORY (INHALATION) at 08:56

## 2017-07-11 RX ADMIN — POTASSIUM CHLORIDE 20 MEQ: 1500 TABLET, EXTENDED RELEASE ORAL at 08:51

## 2017-07-11 RX ADMIN — PANCRELIPASE 24000 UNITS OF LIPASE: 60000; 12000; 38000 CAPSULE, DELAYED RELEASE PELLETS ORAL at 08:51

## 2017-07-11 RX ADMIN — PANTOPRAZOLE SODIUM 40 MG: 40 TABLET, DELAYED RELEASE ORAL at 08:51

## 2017-07-11 RX ADMIN — BUPROPION HYDROCHLORIDE 150 MG: 150 TABLET, EXTENDED RELEASE ORAL at 08:51

## 2017-07-11 NOTE — PLAN OF CARE
Problem: BH Patient Care Overview (Adult)  Goal: Plan of Care Review  Outcome: Ongoing (interventions implemented as appropriate)  PT reported he had only had hours of sleep last night

## 2017-07-11 NOTE — PLAN OF CARE
Problem: BH Overarching Goals  Goal: Develops/Participates in Therapeutic [place] to Support Successful Transition  Outcome: Ongoing (interventions implemented as appropriate)

## 2017-07-11 NOTE — DISCHARGE SUMMARY
Date of Discharge:  7/11/2017    Discharge Diagnosis:Active Problems:    MDD (major depressive disorder), recurrent episode, moderate    1. Constipation.   2. Cirrhosis.   3. Coagulopathy.   4. Thrombocytopenia with splenomegaly.   5. History of hepatitis C and B positivity.   6. Illicit drug use.   7. Atrial fibrillation with a rapid ventricular rate.   8. Diabetes.   9. Chronic pain syndrome.   10. Transaminitis related to his hepatitis, but improving.   11. Bilateral lower extremity edema with negative Dopplers and echocardiogram, most likely related to his Neurontin therapy and cirrhosis.          Presenting Problem/History of Present Illness  Depression with suicidal ideation [F32.9, R45.851]     Hospital Course  Patient is a 52 y.o. male presented depressed with suicidal ideation having superficially cut his left wrist.  Patient also relapse with his IV opiate use (Suboxone) and possibly methamphetamine.  He been homeless for the month prior to admission having been evicted from [facility] rooming [facility].  He was admitted placed on special precautions and assigned a master level therapist who assisted in a collaborative treatment effort.  Patient seen on a daily basis for evaluation as well as supportive therapy.  Patient was continued on his medication regimen, was also seen by infectious disease consult regarding possible cellulitis in his right hand (resolved).  Patient's depression steadily improved during his stay, psychotropic medications Limited to Wellbutrin  mg twice a day and doxepin 50 mg at bedtime.  Patient pleased with the prospect of placement in a personal care [facility], and was discharged with new prescriptions for all his medications as well as follow-up appointment at the [facility] clinic.  Patient was free of any thoughts of self-harm or harming others, no psychotic symptoms and should be safe for outpatient treatment format.       Procedures  Performed         Consults:   Consults     No orders found from 6/4/2017 to 7/4/2017.          Pertinent Test Results:   Lab Results (last 7 days)     Procedure Component Value Units Date/Time    POC Glucose Fingerstick [[id number]]  (Abnormal) Collected:  07/04/17 1135    Specimen:  Blood Updated:  07/04/17 1149     Glucose 156 (H) mg/dL     Narrative:       Meter: [id number] : [id number] Zheng Lois    POC Glucose Fingerstick [[id number]]  (Normal) Collected:  07/04/17 1620    Specimen:  Blood Updated:  07/04/17 1628     Glucose 129 mg/dL     Narrative:       Meter: [id number] : [id number] Zheng Lois    POC Glucose Fingerstick [[id number]]  (Normal) Collected:  07/04/17 1953    Specimen:  Blood Updated:  07/04/17 2001     Glucose 121 mg/dL     Narrative:       Meter: [id number] : [id number] Britt Leslie    POC Glucose Fingerstick [[id number]]  (Normal) Collected:  07/05/17 0701    Specimen:  Blood Updated:  07/05/17 0709     Glucose 118 mg/dL     Narrative:       Meter: [id number] : [id number] Britt Leslie    POC Glucose Fingerstick [[id number]]  (Normal) Collected:  07/05/17 1143    Specimen:  Blood Updated:  07/05/17 1208     Glucose 125 mg/dL     Narrative:       Meter: [id number] : [id number] Bays Carolyne    POC Glucose Fingerstick [[id number]]  (Abnormal) Collected:  07/05/17 1628    Specimen:  Blood Updated:  07/05/17 1646     Glucose 142 (H) mg/dL     Narrative:       Meter: [id number] : [id number] Bays Carolyne    POC Glucose Fingerstick [[id number]]  (Normal) Collected:  07/05/17 2144    Specimen:  Blood Updated:  07/05/17 2151     Glucose 122 mg/dL     Narrative:       Meter: [id number] : [id number] Ever Bach    POC Glucose Fingerstick [[id number]]  (Abnormal) Collected:  07/06/17 0656    Specimen:  Blood Updated:  07/06/17 0706     Glucose 171 (H) mg/dL     Narrative:       Meter: [id number] : [id number] Bays Carolyne    POC Glucose Fingerstick [[id number]]  (Normal) Collected:   07/06/17 1122    Specimen:  Blood Updated:  07/06/17 1147     Glucose 108 mg/dL     Narrative:       Meter: [id number] : [id number] Alexey Spencera    POC Glucose Fingerstick [[id number]]  (Abnormal) Collected:  07/06/17 1615    Specimen:  Blood Updated:  07/06/17 1640     Glucose 146 (H) mg/dL     Narrative:       Meter: [id number] : [id number] Alexey Spencera    POC Glucose Fingerstick [[id number]]  (Abnormal) Collected:  07/06/17 2051    Specimen:  Blood Updated:  07/06/17 2057     Glucose 144 (H) mg/dL     Narrative:       Meter: [id number] : [id number] Andrés Flowers    POC Glucose Fingerstick [[id number]]  (Abnormal) Collected:  07/07/17 0517    Specimen:  Blood Updated:  07/07/17 0524     Glucose 168 (H) mg/dL     Narrative:       Meter: [id number] : [id number] Ever Bach    POC Glucose Fingerstick [[id number]]  (Normal) Collected:  07/07/17 0655    Specimen:  Blood Updated:  07/07/17 0703     Glucose 123 mg/dL     Narrative:       Meter: [id number] : [id number] Alexey Barfield    CBC & Differential [[id number]] Collected:  07/07/17 1014    Specimen:  Blood Updated:  07/07/17 1046    Narrative:       The following orders were created for panel order CBC & Differential.  Procedure                               Abnormality         Status                     ---------                               -----------         ------                     CBC Auto Differential[[id number]]        Abnormal            Final result                 Please view results for these tests on the individual orders.    CBC Auto Differential [[id number]]  (Abnormal) Collected:  07/07/17 1014    Specimen:  Blood Updated:  07/07/17 1046     WBC 3.48 (L) 10*3/mm3      RBC 4.34 (L) 10*6/mm3      Hemoglobin 13.5 (L) g/dL      Hematocrit 40.4 (L) %      MCV 93.1 fL      MCH 31.1 pg      MCHC 33.4 g/dL      RDW 16.1 (H) %      RDW-SD 52.9 fl      MPV 10.9 (H) fL      Platelets 65 (L) 10*3/mm3      Neutrophil % 68.4 %      Lymphocyte % 20.7  (L) %      Monocyte % 9.2 %      Eosinophil % 1.4 %      Basophil % 0.3 %      Immature Grans % 0.0 %      Neutrophils, Absolute 2.38 10*3/mm3      Lymphocytes, Absolute 0.72 (L) 10*3/mm3      Monocytes, Absolute 0.32 10*3/mm3      Eosinophils, Absolute 0.05 10*3/mm3      Basophils, Absolute 0.01 10*3/mm3      Immature Grans, Absolute 0.00 10*3/mm3     POC Glucose Fingerstick [[id number]]  (Abnormal) Collected:  07/07/17 1121    Specimen:  Blood Updated:  07/07/17 1130     Glucose 175 (H) mg/dL     Narrative:       Meter: [id number] : [id number] Bays Carolyne    POC Glucose Fingerstick [[id number]]  (Abnormal) Collected:  07/07/17 1651    Specimen:  Blood Updated:  07/07/17 1700     Glucose 142 (H) mg/dL     Narrative:       Meter: [id number] : [id number] Bays Carolyne    POC Glucose Fingerstick [[id number]]  (Abnormal) Collected:  07/07/17 1950    Specimen:  Blood Updated:  07/07/17 1957     Glucose 162 (H) mg/dL     Narrative:       Meter: [id number] : [id number] Andrés Flowers    POC Glucose Fingerstick [[id number]]  (Normal) Collected:  07/08/17 0708    Specimen:  Blood Updated:  07/08/17 0716     Glucose 127 mg/dL     Narrative:       Meter: [id number] : [id number] Lopez Nikia    POC Glucose Fingerstick [[id number]]  (Normal) Collected:  07/08/17 1120    Specimen:  Blood Updated:  07/08/17 1141     Glucose 100 mg/dL     Narrative:       Meter: [id number] : [id number] Lopez Nikia    POC Glucose Fingerstick [[id number]]  (Normal) Collected:  07/08/17 1626    Specimen:  Blood Updated:  07/08/17 1640     Glucose 127 mg/dL     Narrative:       Meter: [id number] : [id number] Lopez Nikia    POC Glucose Fingerstick [[id number]]  (Normal) Collected:  07/08/17 1948    Specimen:  Blood Updated:  07/08/17 1956     Glucose 113 mg/dL     Narrative:       Meter: [id number] : [id number] Lorenza Nelson    POC Glucose Fingerstick [[id number]]  (Abnormal) Collected:  07/08/17 8876    Specimen:  Blood  Updated:  07/08/17 2206     Glucose 162 (H) mg/dL     Narrative:       Meter: [id number] : [id number] Britt Leslie    POC Glucose Fingerstick [[id number]]  (Normal) Collected:  07/09/17 0657    Specimen:  Blood Updated:  07/09/17 0708     Glucose 104 mg/dL     Narrative:       Meter: [id number] : [id number] Britt Leslie    POC Glucose Fingerstick [[id number]]  (Normal) Collected:  07/09/17 1149    Specimen:  Blood Updated:  07/09/17 1156     Glucose 103 mg/dL     Narrative:       Meter: [id number] : [id number] Naqvi Ofe Jennifer    POC Glucose Fingerstick [[id number]]  (Abnormal) Collected:  07/09/17 1625    Specimen:  Blood Updated:  07/09/17 1634     Glucose 183 (H) mg/dL     Narrative:       Meter: [id number] : [id number] Naqvi Ofe Jennifer    POC Glucose Fingerstick [[id number]]  (Normal) Collected:  07/09/17 1927    Specimen:  Blood Updated:  07/09/17 1939     Glucose 92 mg/dL     Narrative:       Meter: [id number] : [id number] Britt Leslie    POC Glucose Fingerstick [[id number]]  (Normal) Collected:  07/09/17 2100    Specimen:  Blood Updated:  07/09/17 2107     Glucose 114 mg/dL     Narrative:       Meter: [id number] : [id number] Britt Leslie    POC Glucose Fingerstick [[id number]]  (Normal) Collected:  07/10/17 0656    Specimen:  Blood Updated:  07/10/17 0705     Glucose 107 mg/dL     Narrative:       Meter: [id number] : [id number] Britt Leslie    POC Glucose Fingerstick [[id number]]  (Normal) Collected:  07/10/17 1037    Specimen:  Blood Updated:  07/10/17 1049     Glucose 84 mg/dL     Narrative:       Meter: [id number] : [id number] Naqvi Ofe Jennifer    POC Glucose Fingerstick [[id number]]  (Normal) Collected:  07/10/17 1610    Specimen:  Blood Updated:  07/10/17 1617     Glucose 108 mg/dL     Narrative:       Meter: [id number] : [id number] Naqvi Ofe Jennifer    POC Glucose Fingerstick [[id number]]  (Abnormal) Collected:  07/10/17 1948    Specimen:  Blood  Updated:  07/10/17 2013     Glucose 133 (H) mg/dL     Narrative:       Meter: [id number] : [id number] Lorenza Nelson    POC Glucose Fingerstick [[id number]]  (Normal) Collected:  07/11/17 0701    Specimen:  Blood Updated:  07/11/17 0708     Glucose 123 mg/dL     Narrative:       Meter: [id number] : [id number] BAYLEE GARCIA            Condition on Discharge:  improved    Vital Signs  Temp:  [97.2 °F (36.2 °C)] 97.2 °F (36.2 °C)  Heart Rate:  [83-86] 83  Resp:  [18] 18  BP: (115-120)/(64-75) 120/75      Discharge Disposition  Home or Self Care    Discharge Medications   Isaias Lang   Home Medication Instructions HIRA:[id number]    Printed on:07/11/17 0842   Medication Information                      albuterol (PROVENTIL HFA;VENTOLIN HFA) 108 (90 BASE) MCG/ACT inhaler  Inhale 2 puffs Every 6 (Six) Hours As Needed for Wheezing.             aspirin 81 MG EC tablet  Take 1 tablet by mouth Daily.             buPROPion SR (WELLBUTRIN SR) 150 MG 12 hr tablet  Take 1 tablet by mouth Every 12 (Twelve) Hours.             doxepin (SINEquan) 50 MG capsule  Take 1 capsule by mouth Every Night.             furosemide (LASIX) 20 MG tablet  Take 1 tablet by mouth once for 1 dose.             gabapentin (NEURONTIN) 400 MG capsule  Take 1 capsule by mouth 3 (Three) Times a Day.             insulin detemir (LEVEMIR) 100 UNIT/ML injection  Inject 10 Units under the skin Every Night.             ipratropium-albuterol (DUO-NEB) 0.5-2.5 mg/mL nebulizer  Take 3 mL by nebulization Every 6 (Six) Hours As Needed for Shortness of Air.             magnesium oxide 200 MG tablet  Take 1 tablet by mouth 2 (Two) Times a Day.             metoclopramide (REGLAN) 10 MG tablet  Take 1 tablet by mouth 2 (Two) Times a Day.             metoprolol tartrate (LOPRESSOR) 50 MG tablet  Take 1 tablet by mouth Daily.             nitroglycerin (NITROSTAT) 0.4 MG SL tablet  Place 1 tablet under the tongue Every 5 (Five) Minutes As Needed for Chest Pain.  Take no more than 3 doses in 15 minutes.             pancrelipase, Lip-Prot-Amyl, (CREON) 93851 UNITS capsule delayed-release particles capsule  Take 2 capsules by mouth 4 (Four) Times a Day With Meals & at Bedtime.             pantoprazole (PROTONIX) 40 MG EC tablet  Take 1 tablet by mouth 2 (Two) Times a Day.             polyethylene glycol (MIRALAX) powder  Take 17 g by mouth 2 (Two) Times a Day.             potassium chloride (K-DUR,KLOR-CON) 20 MEQ CR tablet  Take 1 tablet by mouth Daily.                 Discharge Diet: diabetic    Activity at Discharge:  no restrictions    Follow-up Appointments: [facility] clinic, will also need an appointment with his PCP..      Test Results Pending at Discharge none       Ankit Barnes MD  07/11/17  8:42 AM

## 2017-07-11 NOTE — PROGRESS NOTES
Navigator contacted Olivia Barnes on this day. Someone from the facility will be to transport patient around 11:00am.

## 2017-07-11 NOTE — PROGRESS NOTES
Bridge Session  Date:  7/11/17  Time: 0945    Data:  Reason for Inpatient Admission: Depression with S.I.    Follow up: Patient will be entering [facility] on this date.  He will be seen at [facility] in [place], [place] on 7/13/17 at 9am by therapist Pema and for medication management on 7/25/17 at 8am.     Coping Skills to Utilize: TV has been the primary method for coping and relaxation.  He will be offered recreational activities at [facility] day program that will include arts/crafts, music, drawing, coloring, and talking to supportive staff and residents.     Crisis Safety Plan:  • Support System to utilize and contact numbers: [facility] staff    • Educated on crisis hotline numbers (yes/no): yes    • Was the Patient made aware of contact information for the following: community mental health centers, crisis stabilization programs, residential programs, , etc (yes/no): yes    • Will transportation be a barrier (yes/no): no- [facility] staff will be providing transportation.     • If so, explain solution(s) to resolve barrier: n/a    • How and where will the patient obtain prescribed medications: [facility] with insurance.  Medications will be filled prior to patient leaving with [facility] staff.     Assessment: The patient is denying thoughts to harm self or others.  He is agreeable with the plan to enter [facility] and follow up with [facility] in [place], [place].     Plan:    Discussed the importance of follow up treatment for continuity of care. The Patient was able to verbalize understanding and commitment to the individualized aftercare and crisis safety plan.

## 2017-09-13 ENCOUNTER — HOSPITAL ENCOUNTER (INPATIENT)
Facility: HOSPITAL | Age: 52
LOS: 7 days | Discharge: HOME HEALTH | End: 2017-09-20
Attending: EMERGENCY MEDICINE | Admitting: INTERNAL MEDICINE

## 2017-09-13 DIAGNOSIS — L02.512 ABSCESS OF LEFT THUMB: Primary | ICD-10-CM

## 2017-09-13 DIAGNOSIS — L02.512: ICD-10-CM

## 2017-09-13 LAB
6-ACETYL MORPHINE: NEGATIVE
ALBUMIN SERPL-MCNC: 3.1 G/DL (ref 3.5–5)
ALBUMIN/GLOB SERPL: 0.9 G/DL (ref 1.5–2.5)
ALP SERPL-CCNC: 110 U/L (ref 40–129)
ALT SERPL W P-5'-P-CCNC: 85 U/L (ref 10–44)
AMPHET+METHAMPHET UR QL: NEGATIVE
ANION GAP SERPL CALCULATED.3IONS-SCNC: 5.7 MMOL/L (ref 3.6–11.2)
AST SERPL-CCNC: 123 U/L (ref 10–34)
BACTERIA UR QL AUTO: ABNORMAL /HPF
BARBITURATES UR QL SCN: NEGATIVE
BASOPHILS # BLD AUTO: 0.02 10*3/MM3 (ref 0–0.3)
BASOPHILS NFR BLD AUTO: 0.4 % (ref 0–2)
BENZODIAZ UR QL SCN: NEGATIVE
BILIRUB SERPL-MCNC: 1.6 MG/DL (ref 0.2–1.8)
BILIRUB UR QL STRIP: ABNORMAL
BUN BLD-MCNC: <5 MG/DL (ref 7–21)
BUN/CREAT SERPL: ABNORMAL (ref 7–25)
BUPRENORPHINE SERPL-MCNC: POSITIVE NG/ML
CALCIUM SPEC-SCNC: 9 MG/DL (ref 7.7–10)
CANNABINOIDS SERPL QL: NEGATIVE
CHLORIDE SERPL-SCNC: 105 MMOL/L (ref 99–112)
CLARITY UR: CLEAR
CO2 SERPL-SCNC: 24.3 MMOL/L (ref 24.3–31.9)
COCAINE UR QL: NEGATIVE
COLOR UR: ABNORMAL
CREAT BLD-MCNC: 0.57 MG/DL (ref 0.43–1.29)
CRP SERPL-MCNC: 2.4 MG/DL (ref 0–0.99)
D-LACTATE SERPL-SCNC: 1.6 MMOL/L (ref 0.5–2)
DEPRECATED RDW RBC AUTO: 49.1 FL (ref 37–54)
EOSINOPHIL # BLD AUTO: 0.13 10*3/MM3 (ref 0–0.7)
EOSINOPHIL NFR BLD AUTO: 2.9 % (ref 0–5)
ERYTHROCYTE [DISTWIDTH] IN BLOOD BY AUTOMATED COUNT: 14.8 % (ref 11.5–14.5)
ERYTHROCYTE [SEDIMENTATION RATE] IN BLOOD: 9 MM/HR (ref 0–20)
GFR SERPL CREATININE-BSD FRML MDRD: 150 ML/MIN/1.73
GLOBULIN UR ELPH-MCNC: 3.3 GM/DL
GLUCOSE BLD-MCNC: 228 MG/DL (ref 70–110)
GLUCOSE UR STRIP-MCNC: ABNORMAL MG/DL
HCT VFR BLD AUTO: 43.1 % (ref 42–52)
HGB BLD-MCNC: 14 G/DL (ref 14–18)
HGB UR QL STRIP.AUTO: NEGATIVE
HYALINE CASTS UR QL AUTO: ABNORMAL /LPF
IMM GRANULOCYTES # BLD: 0.01 10*3/MM3 (ref 0–0.03)
IMM GRANULOCYTES NFR BLD: 0.2 % (ref 0–0.5)
KETONES UR QL STRIP: ABNORMAL
LEUKOCYTE ESTERASE UR QL STRIP.AUTO: ABNORMAL
LYMPHOCYTES # BLD AUTO: 0.65 10*3/MM3 (ref 1–3)
LYMPHOCYTES NFR BLD AUTO: 14.4 % (ref 21–51)
MCH RBC QN AUTO: 30.8 PG (ref 27–33)
MCHC RBC AUTO-ENTMCNC: 32.5 G/DL (ref 33–37)
MCV RBC AUTO: 94.7 FL (ref 80–94)
METHADONE UR QL SCN: NEGATIVE
MONOCYTES # BLD AUTO: 0.35 10*3/MM3 (ref 0.1–0.9)
MONOCYTES NFR BLD AUTO: 7.7 % (ref 0–10)
NEUTROPHILS # BLD AUTO: 3.36 10*3/MM3 (ref 1.4–6.5)
NEUTROPHILS NFR BLD AUTO: 74.4 % (ref 30–70)
NITRITE UR QL STRIP: POSITIVE
OPIATES UR QL: NEGATIVE
OSMOLALITY SERPL CALC.SUM OF ELEC: NORMAL MOSM/KG (ref 273–305)
OXYCODONE UR QL SCN: NEGATIVE
PCP UR QL SCN: NEGATIVE
PH UR STRIP.AUTO: 6.5 [PH] (ref 5–8)
PLATELET # BLD AUTO: 71 10*3/MM3 (ref 130–400)
PMV BLD AUTO: 10.7 FL (ref 6–10)
POTASSIUM BLD-SCNC: 5.3 MMOL/L (ref 3.5–5.3)
PROT SERPL-MCNC: 6.4 G/DL (ref 6–8)
PROT UR QL STRIP: ABNORMAL
RBC # BLD AUTO: 4.55 10*6/MM3 (ref 4.7–6.1)
RBC # UR: ABNORMAL /HPF
REF LAB TEST METHOD: ABNORMAL
SODIUM BLD-SCNC: 135 MMOL/L (ref 135–153)
SP GR UR STRIP: 1.03 (ref 1–1.03)
SQUAMOUS #/AREA URNS HPF: ABNORMAL /HPF
UROBILINOGEN UR QL STRIP: ABNORMAL
WBC NRBC COR # BLD: 4.52 10*3/MM3 (ref 4.5–12.5)
WBC UR QL AUTO: ABNORMAL /HPF

## 2017-09-13 PROCEDURE — 25010000002 HEPARIN (PORCINE) PER 1000 UNITS: Performed by: INTERNAL MEDICINE

## 2017-09-13 PROCEDURE — 80307 DRUG TEST PRSMV CHEM ANLYZR: CPT | Performed by: PHYSICIAN ASSISTANT

## 2017-09-13 PROCEDURE — 87077 CULTURE AEROBIC IDENTIFY: CPT | Performed by: INTERNAL MEDICINE

## 2017-09-13 PROCEDURE — 36415 COLL VENOUS BLD VENIPUNCTURE: CPT

## 2017-09-13 PROCEDURE — 25010000002 VANCOMYCIN PER 500 MG: Performed by: PHYSICIAN ASSISTANT

## 2017-09-13 PROCEDURE — 25010000002 KETOROLAC TROMETHAMINE PER 15 MG: Performed by: PHYSICIAN ASSISTANT

## 2017-09-13 PROCEDURE — 87070 CULTURE OTHR SPECIMN AEROBIC: CPT | Performed by: INTERNAL MEDICINE

## 2017-09-13 PROCEDURE — 93005 ELECTROCARDIOGRAM TRACING: CPT | Performed by: INTERNAL MEDICINE

## 2017-09-13 PROCEDURE — 87205 SMEAR GRAM STAIN: CPT | Performed by: INTERNAL MEDICINE

## 2017-09-13 PROCEDURE — 86140 C-REACTIVE PROTEIN: CPT | Performed by: PHYSICIAN ASSISTANT

## 2017-09-13 PROCEDURE — 80053 COMPREHEN METABOLIC PANEL: CPT | Performed by: PHYSICIAN ASSISTANT

## 2017-09-13 PROCEDURE — 25010000002 PIPERACILLIN-TAZOBACTAM: Performed by: INTERNAL MEDICINE

## 2017-09-13 PROCEDURE — 85025 COMPLETE CBC W/AUTO DIFF WBC: CPT | Performed by: PHYSICIAN ASSISTANT

## 2017-09-13 PROCEDURE — 87040 BLOOD CULTURE FOR BACTERIA: CPT | Performed by: PHYSICIAN ASSISTANT

## 2017-09-13 PROCEDURE — 83605 ASSAY OF LACTIC ACID: CPT | Performed by: PHYSICIAN ASSISTANT

## 2017-09-13 PROCEDURE — 99223 1ST HOSP IP/OBS HIGH 75: CPT | Performed by: INTERNAL MEDICINE

## 2017-09-13 PROCEDURE — 87086 URINE CULTURE/COLONY COUNT: CPT | Performed by: PHYSICIAN ASSISTANT

## 2017-09-13 PROCEDURE — 25010000002 HYDROMORPHONE PER 4 MG: Performed by: INTERNAL MEDICINE

## 2017-09-13 PROCEDURE — 87186 SC STD MICRODIL/AGAR DIL: CPT | Performed by: INTERNAL MEDICINE

## 2017-09-13 PROCEDURE — 87147 CULTURE TYPE IMMUNOLOGIC: CPT | Performed by: INTERNAL MEDICINE

## 2017-09-13 PROCEDURE — 93010 ELECTROCARDIOGRAM REPORT: CPT | Performed by: INTERNAL MEDICINE

## 2017-09-13 PROCEDURE — 99284 EMERGENCY DEPT VISIT MOD MDM: CPT

## 2017-09-13 PROCEDURE — 81001 URINALYSIS AUTO W/SCOPE: CPT | Performed by: PHYSICIAN ASSISTANT

## 2017-09-13 PROCEDURE — 85652 RBC SED RATE AUTOMATED: CPT | Performed by: PHYSICIAN ASSISTANT

## 2017-09-13 RX ORDER — ATORVASTATIN CALCIUM 20 MG/1
20 TABLET, FILM COATED ORAL NIGHTLY
Status: DISCONTINUED | OUTPATIENT
Start: 2017-09-14 | End: 2017-09-20 | Stop reason: HOSPADM

## 2017-09-13 RX ORDER — ATORVASTATIN CALCIUM 20 MG/1
20 TABLET, FILM COATED ORAL DAILY
Status: ON HOLD | COMMUNITY
End: 2018-08-01

## 2017-09-13 RX ORDER — DICYCLOMINE HYDROCHLORIDE 10 MG/1
10 CAPSULE ORAL 2 TIMES DAILY
Status: ON HOLD | COMMUNITY
End: 2018-07-27

## 2017-09-13 RX ORDER — DICYCLOMINE HYDROCHLORIDE 10 MG/1
10 CAPSULE ORAL 2 TIMES DAILY
Status: DISCONTINUED | OUTPATIENT
Start: 2017-09-14 | End: 2017-09-20 | Stop reason: HOSPADM

## 2017-09-13 RX ORDER — METOCLOPRAMIDE 10 MG/1
10 TABLET ORAL
Status: DISCONTINUED | OUTPATIENT
Start: 2017-09-14 | End: 2017-09-18

## 2017-09-13 RX ORDER — GABAPENTIN 400 MG/1
400 CAPSULE ORAL EVERY 8 HOURS SCHEDULED
Status: DISCONTINUED | OUTPATIENT
Start: 2017-09-14 | End: 2017-09-20 | Stop reason: HOSPADM

## 2017-09-13 RX ORDER — SODIUM CHLORIDE 0.9 % (FLUSH) 0.9 %
1-10 SYRINGE (ML) INJECTION AS NEEDED
Status: DISCONTINUED | OUTPATIENT
Start: 2017-09-13 | End: 2017-09-20 | Stop reason: HOSPADM

## 2017-09-13 RX ORDER — KETOROLAC TROMETHAMINE 30 MG/ML
30 INJECTION, SOLUTION INTRAMUSCULAR; INTRAVENOUS ONCE
Status: COMPLETED | OUTPATIENT
Start: 2017-09-13 | End: 2017-09-13

## 2017-09-13 RX ORDER — NICOTINE POLACRILEX 4 MG
15 LOZENGE BUCCAL
Status: DISCONTINUED | OUTPATIENT
Start: 2017-09-13 | End: 2017-09-20 | Stop reason: HOSPADM

## 2017-09-13 RX ORDER — DOXEPIN HYDROCHLORIDE 25 MG/1
50 CAPSULE ORAL NIGHTLY
Status: DISCONTINUED | OUTPATIENT
Start: 2017-09-14 | End: 2017-09-20 | Stop reason: HOSPADM

## 2017-09-13 RX ORDER — ASPIRIN 81 MG/1
81 TABLET ORAL DAILY
Status: CANCELLED | OUTPATIENT
Start: 2017-09-14

## 2017-09-13 RX ORDER — POLYETHYLENE GLYCOL 3350 17 G/17G
17 POWDER, FOR SOLUTION ORAL 2 TIMES DAILY
Status: DISCONTINUED | OUTPATIENT
Start: 2017-09-14 | End: 2017-09-20 | Stop reason: HOSPADM

## 2017-09-13 RX ORDER — HYDROCODONE BITARTRATE AND ACETAMINOPHEN 5; 325 MG/1; MG/1
1 TABLET ORAL DAILY PRN
Status: ON HOLD | COMMUNITY
End: 2018-06-05

## 2017-09-13 RX ORDER — METOPROLOL TARTRATE 50 MG/1
50 TABLET, FILM COATED ORAL DAILY
Status: DISCONTINUED | OUTPATIENT
Start: 2017-09-14 | End: 2017-09-20 | Stop reason: HOSPADM

## 2017-09-13 RX ORDER — HYDROCODONE BITARTRATE AND ACETAMINOPHEN 5; 325 MG/1; MG/1
1 TABLET ORAL 3 TIMES DAILY
Status: CANCELLED | OUTPATIENT
Start: 2017-09-13

## 2017-09-13 RX ORDER — PANTOPRAZOLE SODIUM 40 MG/1
40 TABLET, DELAYED RELEASE ORAL DAILY
Status: DISCONTINUED | OUTPATIENT
Start: 2017-09-14 | End: 2017-09-20 | Stop reason: HOSPADM

## 2017-09-13 RX ORDER — HEPARIN SODIUM 5000 [USP'U]/ML
5000 INJECTION, SOLUTION INTRAVENOUS; SUBCUTANEOUS EVERY 12 HOURS SCHEDULED
Status: DISCONTINUED | OUTPATIENT
Start: 2017-09-13 | End: 2017-09-13

## 2017-09-13 RX ORDER — SODIUM CHLORIDE 0.9 % (FLUSH) 0.9 %
10 SYRINGE (ML) INJECTION AS NEEDED
Status: DISCONTINUED | OUTPATIENT
Start: 2017-09-13 | End: 2017-09-20 | Stop reason: HOSPADM

## 2017-09-13 RX ORDER — IPRATROPIUM BROMIDE AND ALBUTEROL SULFATE 2.5; .5 MG/3ML; MG/3ML
3 SOLUTION RESPIRATORY (INHALATION) EVERY 6 HOURS PRN
Status: DISCONTINUED | OUTPATIENT
Start: 2017-09-13 | End: 2017-09-20 | Stop reason: HOSPADM

## 2017-09-13 RX ORDER — DEXTROSE MONOHYDRATE 25 G/50ML
25 INJECTION, SOLUTION INTRAVENOUS
Status: DISCONTINUED | OUTPATIENT
Start: 2017-09-13 | End: 2017-09-20 | Stop reason: HOSPADM

## 2017-09-13 RX ORDER — ALBUTEROL SULFATE 2.5 MG/3ML
2.5 SOLUTION RESPIRATORY (INHALATION) EVERY 6 HOURS PRN
Status: CANCELLED | OUTPATIENT
Start: 2017-09-13

## 2017-09-13 RX ORDER — HYDROCODONE BITARTRATE AND ACETAMINOPHEN 5; 325 MG/1; MG/1
1 TABLET ORAL DAILY PRN
Status: DISCONTINUED | OUTPATIENT
Start: 2017-09-13 | End: 2017-09-17

## 2017-09-13 RX ORDER — POTASSIUM CHLORIDE 20 MEQ/1
20 TABLET, EXTENDED RELEASE ORAL DAILY
Status: CANCELLED | OUTPATIENT
Start: 2017-09-14

## 2017-09-13 RX ORDER — FUROSEMIDE 20 MG/1
20 TABLET ORAL DAILY
Status: CANCELLED | OUTPATIENT
Start: 2017-09-14

## 2017-09-13 RX ORDER — NITROGLYCERIN 0.4 MG/1
0.4 TABLET SUBLINGUAL
Status: DISCONTINUED | OUTPATIENT
Start: 2017-09-13 | End: 2017-09-20 | Stop reason: HOSPADM

## 2017-09-13 RX ADMIN — HYDROMORPHONE HYDROCHLORIDE 1 MG: 1 INJECTION, SOLUTION INTRAMUSCULAR; INTRAVENOUS; SUBCUTANEOUS at 22:15

## 2017-09-13 RX ADMIN — PIPERACILLIN SODIUM,TAZOBACTAM SODIUM 3.38 G: 3; .375 INJECTION, POWDER, FOR SOLUTION INTRAVENOUS at 23:49

## 2017-09-13 RX ADMIN — MAGNESIUM GLUCONATE 500 MG ORAL TABLET 200 MG: 500 TABLET ORAL at 23:48

## 2017-09-13 RX ADMIN — HEPARIN SODIUM 5000 UNITS: 5000 INJECTION, SOLUTION INTRAVENOUS; SUBCUTANEOUS at 23:50

## 2017-09-13 RX ADMIN — KETOROLAC TROMETHAMINE 30 MG: 30 INJECTION, SOLUTION INTRAMUSCULAR at 18:52

## 2017-09-13 RX ADMIN — DICYCLOMINE HYDROCHLORIDE 10 MG: 10 CAPSULE ORAL at 23:48

## 2017-09-13 RX ADMIN — VANCOMYCIN HYDROCHLORIDE 1750 MG: 5 INJECTION, POWDER, LYOPHILIZED, FOR SOLUTION INTRAVENOUS at 19:05

## 2017-09-13 RX ADMIN — PANCRELIPASE 24000 UNITS OF LIPASE: 60000; 12000; 38000 CAPSULE, DELAYED RELEASE PELLETS ORAL at 23:48

## 2017-09-13 RX ADMIN — ATORVASTATIN CALCIUM 20 MG: 20 TABLET, FILM COATED ORAL at 23:49

## 2017-09-13 RX ADMIN — DOXEPIN HYDROCHLORIDE 50 MG: 25 CAPSULE ORAL at 23:49

## 2017-09-13 RX ADMIN — GABAPENTIN 400 MG: 400 CAPSULE ORAL at 23:53

## 2017-09-13 RX ADMIN — HYDROCODONE BITARTRATE AND ACETAMINOPHEN 1 TABLET: 5; 325 TABLET ORAL at 23:54

## 2017-09-13 NOTE — ED NOTES
Abscessed area to left thumb. Started approx. 3 days ago after shooting up Suboxone and missing. Patient reports a history of MRSA     Francia Chavira RN  09/13/17 3461

## 2017-09-13 NOTE — ED PROVIDER NOTES
Subjective   Patient is a 52 y.o. male presenting with abscess.   History provided by:  Patient   used: No    Abscess   Location:  Shoulder/arm and finger  Finger abscess location:  L thumb  Abscess quality: draining, fluctuance, induration, painful, redness and warmth    Red streaking: yes    Duration:  4 days  Progression:  Worsening  Pain details:     Quality:  Throbbing    Severity:  Moderate    Duration:  4 days    Timing:  Constant    Progression:  Worsening  Chronicity:  New  Context: injected drug use    Relieved by:  Nothing  Worsened by:  Draining/squeezing  Ineffective treatments:  None tried  Associated symptoms: no fever, no headaches, no nausea and no vomiting    Risk factors: hx of MRSA and prior abscess        Review of Systems   Constitutional: Negative for activity change and fever.   HENT: Negative for congestion and sore throat.    Eyes: Negative for pain.   Respiratory: Negative for cough, shortness of breath and wheezing.    Cardiovascular: Negative for chest pain.   Gastrointestinal: Negative for abdominal distention, diarrhea, nausea and vomiting.   Genitourinary: Negative for difficulty urinating and dysuria.   Musculoskeletal: Negative for arthralgias and myalgias.   Skin: Positive for wound. Negative for rash.   Neurological: Negative for dizziness and headaches.   Psychiatric/Behavioral: Negative for agitation.   All other systems reviewed and are negative.      Past Medical History:   Diagnosis Date   • Abscess of antecubital fossa 05/2014    Left that required I and D and grew Haemophilus influenza group 1   • Anxiety    • Asthma    • Chronic pancreatitis    • COPD (chronic obstructive pulmonary disease)    • DDD (degenerative disc disease), lumbosacral    • Depression    • Diabetes mellitus    • DVT (deep venous thrombosis)     Right arm   • Essential hypertension    • GERD (gastroesophageal reflux disease)    • Hepatitis-C    • Hypercholesteremia    • Injury of  "back    • Injury of right Achilles tendon     Complicated by MRSA and Pseudomonas   • IV drug abuse    • Mild CAD     Left heart cath at  2012   • MRSA (methicillin resistant staph aureus) culture positive 09/14/2016    LUE   • Obesity    • Opiate addiction     Suboxone IV   • Self-injurious behavior    • Sleep apnea    • Suicide attempt    • Systolic CHF, chronic     EF 45-50% in 2013, EF 60% 9/2016       Allergies   Allergen Reactions   • Robitussin Dm [Dextromethorphan-Guaifenesin] Shortness Of Breath   • Tizanidine Shortness Of Breath   • Metformin Nausea And Vomiting   • Sulfa Antibiotics Other (See Comments)     \"I cannot take them, they do something to me.\"   • Contrast Dye Rash   • Tramadol Rash       Past Surgical History:   Procedure Laterality Date   • CHOLECYSTECTOMY  1990s   • INCISION AND DRAINAGE ABSCESS Left 2016    wrist   • INCISION AND DRAINAGE ARM Left 9/15/2016    Procedure: INCISION AND DRAINAGE UPPER EXTREMITY;  Surgeon: Rico Osegeura MD;  Location: [facility];  Service:    • ORIF ANKLE FRACTURE Right 2014       Family History   Problem Relation Age of Onset   • Gout Other    • Osteoporosis Other    • Arthritis Other      Rheumatoid and osteoarthritis   • Hypertension Other    • Heart disease Other    • Cancer Other    • Coronary artery disease Mother    • Lung disease Mother    • Gout Sister    • Cancer Maternal Grandmother    • Hypertension Maternal Grandfather    • Gout Maternal Grandfather        Social History     Social History   • Marital status:      Spouse name: N/A   • Number of children: N/A   • Years of education: N/A     Social History Main Topics   • Smoking status: Former Smoker     Years: 1.00     Types: Cigarettes   • Smokeless tobacco: Never Used      Comment: quit smoking in my 20's   • Alcohol use No      Comment: denies   • Drug use: Yes     Special: IV      Comment: PT STATES THAT HE USED SUBOXONE IN LEFT AC YESTERDAY   • Sexual activity: Defer      Comment: " not currently active.      Other Topics Concern   • None     Social History Narrative           Objective   Physical Exam   Constitutional: He is oriented to person, place, and time. He appears well-developed and well-nourished.   HENT:   Head: Normocephalic and atraumatic.   Eyes: EOM are normal. Pupils are equal, round, and reactive to light.   Neck: Normal range of motion. Neck supple.   Cardiovascular: Normal rate, regular rhythm and normal heart sounds.    Pulmonary/Chest: Effort normal and breath sounds normal.   Abdominal: Soft. Bowel sounds are normal.   Musculoskeletal: Normal range of motion.   Neurological: He is alert and oriented to person, place, and time.   Skin: Skin is warm and dry. There is erythema.        Psychiatric: He has a normal mood and affect. His behavior is normal. Judgment and thought content normal.   Nursing note and vitals reviewed.      Procedures         ED Course  ED Course   Comment By Time   D/w Dr. Dorado. NPO, admit to him. PATRICK Majano 09/13 1818   Rediscussed with Dr. Dorado and he recommends consulting orthopoedics as it may have tendon involvement. PATRICK Majano 09/13 1922   D/w Dr. Shankar Requests possible admission to hospitalist, with him consulting. PATRICK Majano 09/13 1934   PAged Dr. Hdz. PATRICK Majano 09/13 1936   D/w Dr. Hdz, will admit pt. PATRICK Majano 09/13 1950                  MDM  Number of Diagnoses or Management Options     Amount and/or Complexity of Data Reviewed  Clinical lab tests: ordered and reviewed  Tests in the radiology section of CPT®: ordered and reviewed  Tests in the medicine section of CPT®: ordered and reviewed    Patient Progress  Patient progress: stable      Final diagnoses:   Abscess of left thumb            PATRICK Majano  09/13/17 4851

## 2017-09-14 ENCOUNTER — APPOINTMENT (OUTPATIENT)
Dept: CARDIOLOGY | Facility: HOSPITAL | Age: 52
End: 2017-09-14
Attending: INTERNAL MEDICINE

## 2017-09-14 ENCOUNTER — APPOINTMENT (OUTPATIENT)
Dept: GENERAL RADIOLOGY | Facility: HOSPITAL | Age: 52
End: 2017-09-14

## 2017-09-14 ENCOUNTER — ANESTHESIA (OUTPATIENT)
Dept: PERIOP | Facility: HOSPITAL | Age: 52
End: 2017-09-14

## 2017-09-14 ENCOUNTER — APPOINTMENT (OUTPATIENT)
Dept: CARDIOLOGY | Facility: HOSPITAL | Age: 52
End: 2017-09-14

## 2017-09-14 ENCOUNTER — ANESTHESIA EVENT (OUTPATIENT)
Dept: PERIOP | Facility: HOSPITAL | Age: 52
End: 2017-09-14

## 2017-09-14 PROBLEM — K21.00 GASTROESOPHAGEAL REFLUX DISEASE WITH ESOPHAGITIS: Chronic | Status: ACTIVE | Noted: 2017-09-14

## 2017-09-14 PROBLEM — B18.1 CHRONIC VIRAL HEPATITIS B WITHOUT DELTA AGENT AND WITHOUT COMA (HCC): Chronic | Status: ACTIVE | Noted: 2017-09-14

## 2017-09-14 PROBLEM — F19.10 IV DRUG ABUSE (HCC): Chronic | Status: ACTIVE | Noted: 2017-09-14

## 2017-09-14 PROBLEM — B18.2 CHRONIC HEPATITIS C WITHOUT HEPATIC COMA (HCC): Chronic | Status: ACTIVE | Noted: 2017-09-14

## 2017-09-14 LAB
ALBUMIN SERPL-MCNC: 2.5 G/DL (ref 3.5–5)
ALBUMIN/GLOB SERPL: 1 G/DL (ref 1.5–2.5)
ALP SERPL-CCNC: 99 U/L (ref 40–129)
ALT SERPL W P-5'-P-CCNC: 69 U/L (ref 10–44)
ANION GAP SERPL CALCULATED.3IONS-SCNC: 6.3 MMOL/L (ref 3.6–11.2)
AST SERPL-CCNC: 78 U/L (ref 10–34)
BASOPHILS # BLD AUTO: 0.01 10*3/MM3 (ref 0–0.3)
BASOPHILS NFR BLD AUTO: 0.3 % (ref 0–2)
BH CV ECHO MEAS - ACS: 1.6 CM
BH CV ECHO MEAS - AO MAX PG (FULL): 3.5 MMHG
BH CV ECHO MEAS - AO MAX PG: 4.9 MMHG
BH CV ECHO MEAS - AO MEAN PG (FULL): 1.9 MMHG
BH CV ECHO MEAS - AO MEAN PG: 2.7 MMHG
BH CV ECHO MEAS - AO ROOT AREA (BSA CORRECTED): 1.5
BH CV ECHO MEAS - AO ROOT AREA: 6.6 CM^2
BH CV ECHO MEAS - AO ROOT DIAM: 2.9 CM
BH CV ECHO MEAS - AO V2 MAX: 111 CM/SEC
BH CV ECHO MEAS - AO V2 MEAN: 78.3 CM/SEC
BH CV ECHO MEAS - AO V2 VTI: 23.6 CM
BH CV ECHO MEAS - BSA(HAYCOCK): 2 M^2
BH CV ECHO MEAS - BSA: 1.9 M^2
BH CV ECHO MEAS - BZI_BMI: 26.4 KILOGRAMS/M^2
BH CV ECHO MEAS - BZI_METRIC_HEIGHT: 173 CM
BH CV ECHO MEAS - BZI_METRIC_WEIGHT: 79 KG
BH CV ECHO MEAS - EDV(CUBED): 114.2 ML
BH CV ECHO MEAS - EDV(MOD-SP4): 68.8 ML
BH CV ECHO MEAS - EDV(TEICH): 110.3 ML
BH CV ECHO MEAS - EF(CUBED): 49.2 %
BH CV ECHO MEAS - EF(TEICH): 41.2 %
BH CV ECHO MEAS - ESV(CUBED): 58.1 ML
BH CV ECHO MEAS - ESV(TEICH): 64.8 ML
BH CV ECHO MEAS - FS: 20.2 %
BH CV ECHO MEAS - IVS/LVPW: 1.3
BH CV ECHO MEAS - IVSD: 0.85 CM
BH CV ECHO MEAS - LA DIMENSION: 3.9 CM
BH CV ECHO MEAS - LA/AO: 1.3
BH CV ECHO MEAS - LV DIASTOLIC VOL/BSA (35-75): 35.7 ML/M^2
BH CV ECHO MEAS - LV MASS(C)D: 121.9 GRAMS
BH CV ECHO MEAS - LV MASS(C)DI: 63.2 GRAMS/M^2
BH CV ECHO MEAS - LV MAX PG: 1.4 MMHG
BH CV ECHO MEAS - LV MEAN PG: 0.78 MMHG
BH CV ECHO MEAS - LV V1 MAX: 59.4 CM/SEC
BH CV ECHO MEAS - LV V1 MEAN: 42.3 CM/SEC
BH CV ECHO MEAS - LV V1 VTI: 13.1 CM
BH CV ECHO MEAS - LVIDD: 4.9 CM
BH CV ECHO MEAS - LVIDS: 3.9 CM
BH CV ECHO MEAS - LVPWD: 0.68 CM
BH CV ECHO MEAS - MV A MAX VEL: 56.9 CM/SEC
BH CV ECHO MEAS - MV DEC TIME: 0.19 SEC
BH CV ECHO MEAS - MV E MAX VEL: 100.1 CM/SEC
BH CV ECHO MEAS - MV E/A: 1.8
BH CV ECHO MEAS - MV MAX PG: 4.2 MMHG
BH CV ECHO MEAS - MV MEAN PG: 1.5 MMHG
BH CV ECHO MEAS - MV V2 MAX: 102 CM/SEC
BH CV ECHO MEAS - MV V2 MEAN: 55.1 CM/SEC
BH CV ECHO MEAS - MV V2 VTI: 24 CM
BH CV ECHO MEAS - PA ACC TIME: 0.18 SEC
BH CV ECHO MEAS - PA MAX PG: 1.4 MMHG
BH CV ECHO MEAS - PA MEAN PG: 0.82 MMHG
BH CV ECHO MEAS - PA PR(ACCEL): -2 MMHG
BH CV ECHO MEAS - PA V2 MAX: 59.6 CM/SEC
BH CV ECHO MEAS - PA V2 MEAN: 44.1 CM/SEC
BH CV ECHO MEAS - PA V2 VTI: 11 CM
BH CV ECHO MEAS - RAP SYSTOLE: 10 MMHG
BH CV ECHO MEAS - RVSP: 24.8 MMHG
BH CV ECHO MEAS - SI(AO): 80.9 ML/M^2
BH CV ECHO MEAS - SI(CUBED): 29.1 ML/M^2
BH CV ECHO MEAS - SI(TEICH): 23.5 ML/M^2
BH CV ECHO MEAS - SV(AO): 156 ML
BH CV ECHO MEAS - SV(CUBED): 56.1 ML
BH CV ECHO MEAS - SV(TEICH): 45.4 ML
BH CV ECHO MEAS - TR MAX VEL: 192.4 CM/SEC
BILIRUB SERPL-MCNC: 0.9 MG/DL (ref 0.2–1.8)
BUN BLD-MCNC: 9 MG/DL (ref 7–21)
BUN/CREAT SERPL: 12.7 (ref 7–25)
CALCIUM SPEC-SCNC: 8.1 MG/DL (ref 7.7–10)
CHLORIDE SERPL-SCNC: 104 MMOL/L (ref 99–112)
CO2 SERPL-SCNC: 24.7 MMOL/L (ref 24.3–31.9)
CREAT BLD-MCNC: 0.71 MG/DL (ref 0.43–1.29)
CRP SERPL-MCNC: 2.09 MG/DL (ref 0–0.99)
DEPRECATED RDW RBC AUTO: 47.4 FL (ref 37–54)
EOSINOPHIL # BLD AUTO: 0.07 10*3/MM3 (ref 0–0.7)
EOSINOPHIL NFR BLD AUTO: 1.9 % (ref 0–5)
ERYTHROCYTE [DISTWIDTH] IN BLOOD BY AUTOMATED COUNT: 14.5 % (ref 11.5–14.5)
GFR SERPL CREATININE-BSD FRML MDRD: 117 ML/MIN/1.73
GLOBULIN UR ELPH-MCNC: 2.4 GM/DL
GLUCOSE BLD-MCNC: 422 MG/DL (ref 70–110)
GLUCOSE BLDC GLUCOMTR-MCNC: 126 MG/DL (ref 70–130)
GLUCOSE BLDC GLUCOMTR-MCNC: 227 MG/DL (ref 70–130)
GLUCOSE BLDC GLUCOMTR-MCNC: 281 MG/DL (ref 70–130)
GLUCOSE BLDC GLUCOMTR-MCNC: 97 MG/DL (ref 70–130)
GLUCOSE BLDC GLUCOMTR-MCNC: 97 MG/DL (ref 70–130)
HCT VFR BLD AUTO: 36.7 % (ref 42–52)
HGB BLD-MCNC: 12 G/DL (ref 14–18)
IMM GRANULOCYTES # BLD: 0 10*3/MM3 (ref 0–0.03)
IMM GRANULOCYTES NFR BLD: 0 % (ref 0–0.5)
LV EF 2D ECHO EST: 50 %
LYMPHOCYTES # BLD AUTO: 0.43 10*3/MM3 (ref 1–3)
LYMPHOCYTES NFR BLD AUTO: 11.9 % (ref 21–51)
MAGNESIUM SERPL-MCNC: 1.5 MG/DL (ref 1.7–2.6)
MCH RBC QN AUTO: 30.8 PG (ref 27–33)
MCHC RBC AUTO-ENTMCNC: 32.7 G/DL (ref 33–37)
MCV RBC AUTO: 94.1 FL (ref 80–94)
MONOCYTES # BLD AUTO: 0.38 10*3/MM3 (ref 0.1–0.9)
MONOCYTES NFR BLD AUTO: 10.6 % (ref 0–10)
NEUTROPHILS # BLD AUTO: 2.71 10*3/MM3 (ref 1.4–6.5)
NEUTROPHILS NFR BLD AUTO: 75.3 % (ref 30–70)
OSMOLALITY SERPL CALC.SUM OF ELEC: 286.8 MOSM/KG (ref 273–305)
PHOSPHATE SERPL-MCNC: 2.7 MG/DL (ref 2.7–4.5)
PLATELET # BLD AUTO: 42 10*3/MM3 (ref 130–400)
PMV BLD AUTO: 11.8 FL (ref 6–10)
POTASSIUM BLD-SCNC: 4.2 MMOL/L (ref 3.5–5.3)
PROT SERPL-MCNC: 4.9 G/DL (ref 6–8)
RBC # BLD AUTO: 3.9 10*6/MM3 (ref 4.7–6.1)
RBC MORPH BLD: NORMAL
SMALL PLATELETS BLD QL SMEAR: NORMAL
SODIUM BLD-SCNC: 135 MMOL/L (ref 135–153)
WBC NRBC COR # BLD: 3.6 10*3/MM3 (ref 4.5–12.5)

## 2017-09-14 PROCEDURE — 80053 COMPREHEN METABOLIC PANEL: CPT | Performed by: INTERNAL MEDICINE

## 2017-09-14 PROCEDURE — 93306 TTE W/DOPPLER COMPLETE: CPT

## 2017-09-14 PROCEDURE — 25010000002 MAGNESIUM SULFATE 2 GM/50ML SOLUTION: Performed by: INTERNAL MEDICINE

## 2017-09-14 PROCEDURE — 0J9K0ZZ DRAINAGE OF LEFT HAND SUBCUTANEOUS TISSUE AND FASCIA, OPEN APPROACH: ICD-10-PCS | Performed by: ORTHOPAEDIC SURGERY

## 2017-09-14 PROCEDURE — C1751 CATH, INF, PER/CENT/MIDLINE: HCPCS

## 2017-09-14 PROCEDURE — 25010000002 PROPOFOL 10 MG/ML EMULSION: Performed by: ANESTHESIOLOGY

## 2017-09-14 PROCEDURE — 82962 GLUCOSE BLOOD TEST: CPT

## 2017-09-14 PROCEDURE — 25010000002 PIPERACILLIN-TAZOBACTAM: Performed by: INTERNAL MEDICINE

## 2017-09-14 PROCEDURE — 87147 CULTURE TYPE IMMUNOLOGIC: CPT | Performed by: ORTHOPAEDIC SURGERY

## 2017-09-14 PROCEDURE — 94799 UNLISTED PULMONARY SVC/PX: CPT

## 2017-09-14 PROCEDURE — 73130 X-RAY EXAM OF HAND: CPT

## 2017-09-14 PROCEDURE — 71010 HC CHEST AP: CPT

## 2017-09-14 PROCEDURE — 85025 COMPLETE CBC W/AUTO DIFF WBC: CPT | Performed by: INTERNAL MEDICINE

## 2017-09-14 PROCEDURE — 73130 X-RAY EXAM OF HAND: CPT | Performed by: RADIOLOGY

## 2017-09-14 PROCEDURE — 87186 SC STD MICRODIL/AGAR DIL: CPT | Performed by: ORTHOPAEDIC SURGERY

## 2017-09-14 PROCEDURE — 25010000002 VANCOMYCIN PER 500 MG: Performed by: INTERNAL MEDICINE

## 2017-09-14 PROCEDURE — 25010000002 FENTANYL CITRATE (PF) 100 MCG/2ML SOLUTION: Performed by: ANESTHESIOLOGY

## 2017-09-14 PROCEDURE — 86140 C-REACTIVE PROTEIN: CPT | Performed by: INTERNAL MEDICINE

## 2017-09-14 PROCEDURE — 25010000002 HYDROMORPHONE PER 4 MG: Performed by: ANESTHESIOLOGY

## 2017-09-14 PROCEDURE — 94640 AIRWAY INHALATION TREATMENT: CPT

## 2017-09-14 PROCEDURE — 85007 BL SMEAR W/DIFF WBC COUNT: CPT | Performed by: INTERNAL MEDICINE

## 2017-09-14 PROCEDURE — 93306 TTE W/DOPPLER COMPLETE: CPT | Performed by: INTERNAL MEDICINE

## 2017-09-14 PROCEDURE — 83735 ASSAY OF MAGNESIUM: CPT | Performed by: INTERNAL MEDICINE

## 2017-09-14 PROCEDURE — 71010 XR CHEST AP: CPT | Performed by: RADIOLOGY

## 2017-09-14 PROCEDURE — 87077 CULTURE AEROBIC IDENTIFY: CPT | Performed by: ORTHOPAEDIC SURGERY

## 2017-09-14 PROCEDURE — 84100 ASSAY OF PHOSPHORUS: CPT | Performed by: INTERNAL MEDICINE

## 2017-09-14 PROCEDURE — 87205 SMEAR GRAM STAIN: CPT | Performed by: ORTHOPAEDIC SURGERY

## 2017-09-14 PROCEDURE — 99233 SBSQ HOSP IP/OBS HIGH 50: CPT | Performed by: FAMILY MEDICINE

## 2017-09-14 PROCEDURE — 25010000002 VANCOMYCIN PER 500 MG

## 2017-09-14 PROCEDURE — 87070 CULTURE OTHR SPECIMN AEROBIC: CPT | Performed by: ORTHOPAEDIC SURGERY

## 2017-09-14 RX ORDER — MAGNESIUM SULFATE HEPTAHYDRATE 40 MG/ML
4 INJECTION, SOLUTION INTRAVENOUS AS NEEDED
Status: DISCONTINUED | OUTPATIENT
Start: 2017-09-14 | End: 2017-09-20 | Stop reason: HOSPADM

## 2017-09-14 RX ORDER — MEPERIDINE HYDROCHLORIDE 25 MG/ML
12.5 INJECTION INTRAMUSCULAR; INTRAVENOUS; SUBCUTANEOUS
Status: DISCONTINUED | OUTPATIENT
Start: 2017-09-14 | End: 2017-09-14 | Stop reason: HOSPADM

## 2017-09-14 RX ORDER — SODIUM CHLORIDE, SODIUM LACTATE, POTASSIUM CHLORIDE, CALCIUM CHLORIDE 600; 310; 30; 20 MG/100ML; MG/100ML; MG/100ML; MG/100ML
125 INJECTION, SOLUTION INTRAVENOUS CONTINUOUS
Status: DISCONTINUED | OUTPATIENT
Start: 2017-09-14 | End: 2017-09-14

## 2017-09-14 RX ORDER — BUPIVACAINE HYDROCHLORIDE 5 MG/ML
INJECTION, SOLUTION PERINEURAL AS NEEDED
Status: DISCONTINUED | OUTPATIENT
Start: 2017-09-14 | End: 2017-09-14 | Stop reason: HOSPADM

## 2017-09-14 RX ORDER — ONDANSETRON 2 MG/ML
4 INJECTION INTRAMUSCULAR; INTRAVENOUS ONCE AS NEEDED
Status: DISCONTINUED | OUTPATIENT
Start: 2017-09-14 | End: 2017-09-14 | Stop reason: HOSPADM

## 2017-09-14 RX ORDER — FENTANYL CITRATE 50 UG/ML
INJECTION, SOLUTION INTRAMUSCULAR; INTRAVENOUS AS NEEDED
Status: DISCONTINUED | OUTPATIENT
Start: 2017-09-14 | End: 2017-09-14 | Stop reason: SURG

## 2017-09-14 RX ORDER — SODIUM CHLORIDE 9 MG/ML
INJECTION, SOLUTION INTRAVENOUS CONTINUOUS PRN
Status: DISCONTINUED | OUTPATIENT
Start: 2017-09-14 | End: 2017-09-14 | Stop reason: SURG

## 2017-09-14 RX ORDER — NICOTINE POLACRILEX 4 MG
15 LOZENGE BUCCAL
Status: DISCONTINUED | OUTPATIENT
Start: 2017-09-14 | End: 2017-09-20 | Stop reason: HOSPADM

## 2017-09-14 RX ORDER — IPRATROPIUM BROMIDE AND ALBUTEROL SULFATE 2.5; .5 MG/3ML; MG/3ML
3 SOLUTION RESPIRATORY (INHALATION) ONCE AS NEEDED
Status: DISCONTINUED | OUTPATIENT
Start: 2017-09-14 | End: 2017-09-14 | Stop reason: HOSPADM

## 2017-09-14 RX ORDER — INSULIN GLARGINE 100 [IU]/ML
20 INJECTION, SOLUTION SUBCUTANEOUS NIGHTLY
COMMUNITY
End: 2018-08-03 | Stop reason: HOSPADM

## 2017-09-14 RX ORDER — MAGNESIUM SULFATE HEPTAHYDRATE 40 MG/ML
2 INJECTION, SOLUTION INTRAVENOUS
Status: DISPENSED | OUTPATIENT
Start: 2017-09-14 | End: 2017-09-14

## 2017-09-14 RX ORDER — MAGNESIUM SULFATE HEPTAHYDRATE 40 MG/ML
2 INJECTION, SOLUTION INTRAVENOUS AS NEEDED
Status: DISCONTINUED | OUTPATIENT
Start: 2017-09-14 | End: 2017-09-20 | Stop reason: HOSPADM

## 2017-09-14 RX ORDER — PROPOFOL 10 MG/ML
VIAL (ML) INTRAVENOUS AS NEEDED
Status: DISCONTINUED | OUTPATIENT
Start: 2017-09-14 | End: 2017-09-14 | Stop reason: SURG

## 2017-09-14 RX ORDER — SODIUM CHLORIDE 0.9 % (FLUSH) 0.9 %
1-10 SYRINGE (ML) INJECTION AS NEEDED
Status: DISCONTINUED | OUTPATIENT
Start: 2017-09-14 | End: 2017-09-14 | Stop reason: HOSPADM

## 2017-09-14 RX ORDER — HYDROMORPHONE HCL 110MG/55ML
PATIENT CONTROLLED ANALGESIA SYRINGE INTRAVENOUS AS NEEDED
Status: DISCONTINUED | OUTPATIENT
Start: 2017-09-14 | End: 2017-09-14 | Stop reason: SURG

## 2017-09-14 RX ORDER — FENTANYL CITRATE 50 UG/ML
50 INJECTION, SOLUTION INTRAMUSCULAR; INTRAVENOUS
Status: COMPLETED | OUTPATIENT
Start: 2017-09-14 | End: 2017-09-14

## 2017-09-14 RX ORDER — DEXTROSE MONOHYDRATE 25 G/50ML
25 INJECTION, SOLUTION INTRAVENOUS
Status: DISCONTINUED | OUTPATIENT
Start: 2017-09-14 | End: 2017-09-20 | Stop reason: HOSPADM

## 2017-09-14 RX ADMIN — IPRATROPIUM BROMIDE AND ALBUTEROL SULFATE 3 ML: .5; 3 SOLUTION RESPIRATORY (INHALATION) at 18:35

## 2017-09-14 RX ADMIN — FENTANYL CITRATE 200 MCG: 50 INJECTION INTRAMUSCULAR; INTRAVENOUS at 19:39

## 2017-09-14 RX ADMIN — FENTANYL CITRATE 50 MCG: 50 INJECTION INTRAMUSCULAR; INTRAVENOUS at 20:30

## 2017-09-14 RX ADMIN — EPHEDRINE SULFATE 10 MG: 50 INJECTION INTRAMUSCULAR; INTRAVENOUS; SUBCUTANEOUS at 19:45

## 2017-09-14 RX ADMIN — METOCLOPRAMIDE 10 MG: 10 TABLET ORAL at 09:38

## 2017-09-14 RX ADMIN — EPHEDRINE SULFATE 10 MG: 50 INJECTION INTRAMUSCULAR; INTRAVENOUS; SUBCUTANEOUS at 19:39

## 2017-09-14 RX ADMIN — PIPERACILLIN SODIUM,TAZOBACTAM SODIUM 3.38 G: 3; .375 INJECTION, POWDER, FOR SOLUTION INTRAVENOUS at 06:30

## 2017-09-14 RX ADMIN — GABAPENTIN 400 MG: 400 CAPSULE ORAL at 05:50

## 2017-09-14 RX ADMIN — MAGNESIUM SULFATE IN WATER 2 G: 40 INJECTION, SOLUTION INTRAVENOUS at 17:00

## 2017-09-14 RX ADMIN — MAGNESIUM GLUCONATE 500 MG ORAL TABLET 200 MG: 500 TABLET ORAL at 09:44

## 2017-09-14 RX ADMIN — MAGNESIUM SULFATE IN WATER 2 G: 40 INJECTION, SOLUTION INTRAVENOUS at 09:44

## 2017-09-14 RX ADMIN — IPRATROPIUM BROMIDE AND ALBUTEROL SULFATE 3 ML: .5; 3 SOLUTION RESPIRATORY (INHALATION) at 02:31

## 2017-09-14 RX ADMIN — VANCOMYCIN HYDROCHLORIDE 1500 G: 5 INJECTION, POWDER, LYOPHILIZED, FOR SOLUTION INTRAVENOUS at 19:34

## 2017-09-14 RX ADMIN — HYDROMORPHONE HYDROCHLORIDE 2 MG: 2 INJECTION, SOLUTION INTRAMUSCULAR; INTRAVENOUS; SUBCUTANEOUS at 19:39

## 2017-09-14 RX ADMIN — PANTOPRAZOLE SODIUM 40 MG: 40 TABLET, DELAYED RELEASE ORAL at 09:44

## 2017-09-14 RX ADMIN — PIPERACILLIN SODIUM,TAZOBACTAM SODIUM 3.38 G: 3; .375 INJECTION, POWDER, FOR SOLUTION INTRAVENOUS at 22:32

## 2017-09-14 RX ADMIN — DICYCLOMINE HYDROCHLORIDE 10 MG: 10 CAPSULE ORAL at 09:44

## 2017-09-14 RX ADMIN — FENTANYL CITRATE 100 MCG: 50 INJECTION INTRAMUSCULAR; INTRAVENOUS at 19:34

## 2017-09-14 RX ADMIN — VANCOMYCIN HYDROCHLORIDE 1500 MG: 5 INJECTION, POWDER, LYOPHILIZED, FOR SOLUTION INTRAVENOUS at 16:59

## 2017-09-14 RX ADMIN — MAGNESIUM SULFATE IN WATER 2 G: 40 INJECTION, SOLUTION INTRAVENOUS at 11:51

## 2017-09-14 RX ADMIN — VANCOMYCIN HYDROCHLORIDE 1500 MG: 5 INJECTION, POWDER, LYOPHILIZED, FOR SOLUTION INTRAVENOUS at 03:39

## 2017-09-14 RX ADMIN — FENTANYL CITRATE 100 MCG: 50 INJECTION INTRAMUSCULAR; INTRAVENOUS at 19:36

## 2017-09-14 RX ADMIN — HYDROMORPHONE HYDROCHLORIDE 2 MG: 2 INJECTION, SOLUTION INTRAMUSCULAR; INTRAVENOUS; SUBCUTANEOUS at 19:20

## 2017-09-14 RX ADMIN — FENTANYL CITRATE 100 MCG: 50 INJECTION INTRAMUSCULAR; INTRAVENOUS at 19:38

## 2017-09-14 RX ADMIN — IPRATROPIUM BROMIDE AND ALBUTEROL SULFATE 3 ML: .5; 3 SOLUTION RESPIRATORY (INHALATION) at 07:09

## 2017-09-14 RX ADMIN — PIPERACILLIN SODIUM,TAZOBACTAM SODIUM 3.38 G: 3; .375 INJECTION, POWDER, FOR SOLUTION INTRAVENOUS at 15:29

## 2017-09-14 RX ADMIN — SODIUM CHLORIDE: 9 INJECTION, SOLUTION INTRAVENOUS at 19:34

## 2017-09-14 RX ADMIN — PANCRELIPASE 24000 UNITS OF LIPASE: 60000; 12000; 38000 CAPSULE, DELAYED RELEASE PELLETS ORAL at 09:38

## 2017-09-14 RX ADMIN — FENTANYL CITRATE 50 MCG: 50 INJECTION INTRAMUSCULAR; INTRAVENOUS at 20:35

## 2017-09-14 RX ADMIN — HYDROCODONE BITARTRATE AND ACETAMINOPHEN 1 TABLET: 5; 325 TABLET ORAL at 22:33

## 2017-09-14 RX ADMIN — GABAPENTIN 400 MG: 400 CAPSULE ORAL at 22:08

## 2017-09-14 RX ADMIN — PROPOFOL 100 MG: 10 INJECTION, EMULSION INTRAVENOUS at 19:15

## 2017-09-14 NOTE — H&P
[facility] HOSPITALIST HISTORY AND PHYSICAL    Patient Identification:  Name:  Isaias Lang  Age:  52 y.o.  Sex:  male  :  1965  MRN:  [id number]   Visit Number:  [id number]  Primary Care Physician:  Ankit Jade MD     Chief complaint:  Left thumb lesion    History of presenting illness:  52 y.o. male who presented to [facility] emergency department with a four-day history of left thumb swelling and redness.  The patient is an IV drug user and he had his nephew inject the dorsal base on his left thumb 4 days ago.  The patient thought that only suboxone was injected but he thinks that it was laced with methamphetamine.  He also had fevers, chills, and fatigue.  He fell in the shower about one week ago and has an abrasion on his lateral left elbow with no drainage and no erythema.  ---------------------------------------------------------------------------------------------------------------------   Review of Systems   Constitutional: Positive for chills, fatigue and fever.   HENT: Negative for postnasal drip, rhinorrhea, sneezing and sore throat.    Eyes: Negative for discharge and redness.   Respiratory: Positive for cough (nonproductive) and shortness of breath (x 1 month). Negative for wheezing.    Cardiovascular: Negative for chest pain, palpitations and leg swelling.   Gastrointestinal: Negative for diarrhea, nausea and vomiting.   Genitourinary: Negative for decreased urine volume, dysuria and hematuria.   Musculoskeletal: Positive for joint swelling (left thumb). Negative for arthralgias.   Skin: Positive for wound (left thumb). Negative for pallor and rash.   Neurological: Negative for seizures, speech difficulty and headaches.   Hematological: Does not bruise/bleed easily.   Psychiatric/Behavioral: Negative for confusion, self-injury and suicidal ideas. The patient is not nervous/anxious.      ---------------------------------------------------------------------------------------------------------------------   Past Medical History:   Diagnosis Date   • Abscess of antecubital fossa 05/2014    Left that required I and D and grew Haemophilus influenza group 1   • Anxiety    • Asthma    • Chronic pancreatitis    • COPD (chronic obstructive pulmonary disease)    • DDD (degenerative disc disease), lumbosacral    • Depression    • Diabetes mellitus    • DVT (deep venous thrombosis)     Right arm   • Essential hypertension    • GERD (gastroesophageal reflux disease)    • Hepatitis-C    • Hypercholesteremia    • Injury of back    • Injury of right Achilles tendon     Complicated by MRSA and Pseudomonas   • IV drug abuse    • Mild CAD     Left heart cath at  2012   • MRSA (methicillin resistant staph aureus) culture positive 09/14/2016    LUE   • Obesity    • Opiate addiction     Suboxone IV   • Self-injurious behavior    • Sleep apnea    • Suicide attempt    • Systolic CHF, chronic     EF 45-50% in 2013, EF 60% 9/2016     Past Surgical History:   Procedure Laterality Date   • CHOLECYSTECTOMY  1990s   • INCISION AND DRAINAGE ABSCESS Left 2016    wrist   • INCISION AND DRAINAGE ARM Left 9/15/2016    Procedure: INCISION AND DRAINAGE UPPER EXTREMITY;  Surgeon: Rico Oseguera MD;  Location: [facility];  Service:    • ORIF ANKLE FRACTURE Right 2014     Family History   Problem Relation Age of Onset   • Gout Other    • Osteoporosis Other    • Arthritis Other      Rheumatoid and osteoarthritis   • Hypertension Other    • Heart disease Other    • Cancer Other    • Coronary artery disease Mother    • Lung disease Mother    • Gout Sister    • Cancer Maternal Grandmother    • Hypertension Maternal Grandfather    • Gout Maternal Grandfather      Social History   • Marital status:      Social History Main Topics   • Smoking status: Former Smoker     Years: 1.00     Types: Cigarettes   • Smokeless tobacco: Never  Used      Comment: quit smoking in my 20's   • Alcohol use No      Comment: denies   • Drug use: Yes     Special: IV      Comment: PT STATES THAT HE USED SUBOXONE IN LEFT AC YESTERDAY   • Sexual activity: Defer      Comment: not currently active.    ---------------------------------------------------------------------------------------------------------------------   Allergies:  Robitussin dm [dextromethorphan-guaifenesin]; Tizanidine; Metformin; Sulfa antibiotics; Contrast dye; and Tramadol  ---------------------------------------------------------------------------------------------------------------------   Prior to Admission Medications     Prescriptions Last Dose Informant Patient Reported? Taking?    albuterol (PROVENTIL HFA;VENTOLIN HFA) 108 (90 BASE) MCG/ACT inhaler 9/13/2017 Self No Yes    Inhale 2 puffs Every 6 (Six) Hours As Needed for Wheezing.    aspirin 81 MG EC tablet 9/12/2017 Self No Yes    Take 1 tablet by mouth Daily.    atorvastatin (LIPITOR) 20 MG tablet 9/12/2017 Self Yes Yes    Take 20 mg by mouth Daily.    dicyclomine (BENTYL) 10 MG capsule 9/12/2017 Self Yes Yes    Take 10 mg by mouth 2 (Two) Times a Day.    furosemide (LASIX) 20 MG tablet 9/12/2017 Self No Yes    Take 1 tablet by mouth Daily.    gabapentin (NEURONTIN) 400 MG capsule 9/13/2017 Self No Yes    Take 1 capsule by mouth 3 (Three) Times a Day.    HYDROcodone-acetaminophen (NORCO) 5-325 MG per tablet 9/13/2017 VI Yes Yes    Take 1 tablet by mouth Daily As Needed. Pt states he takes TID. Per VI, #30 for a 30 day supply on 8/8/17    insulin aspart (novoLOG) 100 UNIT/ML injection 9/13/2017 Self Yes Yes    Inject  under the skin 3 (Three) Times a Day Before Meals. Patient does this per sliding scale    insulin detemir (LEVEMIR) 100 UNIT/ML injection 9/12/2017 Self No Yes    Inject 10 Units under the skin Every Night.    ipratropium-albuterol (DUO-NEB) 0.5-2.5 mg/mL nebulizer 9/12/2017 Self No Yes    Take 3 mL by nebulization  Every 6 (Six) Hours As Needed for Shortness of Air.    Magnesium Oxide 400 (240 MG) MG tablet 9/12/2017 Self No Yes    Take ½ tablet by mouth 2 Times a Day.    metoclopramide (REGLAN) 10 MG tablet 9/12/2017 Self No Yes    Take 1 tablet by mouth 2 (Two) Times a Day.    metoprolol tartrate (LOPRESSOR) 50 MG tablet 9/13/2017 Self No Yes    Take 1 tablet by mouth Daily.    nitroglycerin (NITROSTAT) 0.4 MG SL tablet Past Month  No Yes    Place 1 tablet under the tongue Every 5 (Five) Minutes As Needed for Chest Pain. Take no more than 3 doses in 15 minutes.    pancrelipase, Lip-Prot-Amyl, (CREON) 07862 UNITS capsule delayed-release particles capsule 9/12/2017 Self No Yes    Take 2 capsules by mouth 4 (Four) Times a Day With Meals & at Bedtime.    pantoprazole (PROTONIX) 40 MG EC tablet 9/12/2017 Self No Yes    Take 1 tablet by mouth Daily.    polyethylene glycol (MIRALAX) packet 9/13/2017 Self No Yes    MIX 17 GRAM PACKET IN 4 TO 8 OUNCES OF LIQUID AND DRINK ONCE DAILY.    Patient taking differently:  Take 17 g by mouth 2 (Two) Times a Day.    potassium chloride (K-DUR,KLOR-CON) 20 MEQ CR tablet 9/12/2017 Self No Yes    Take 1 tablet by mouth Daily.    doxepin (SINEquan) 50 MG capsule 9/11/2017 Self No No    Take 1 capsule by mouth Every Night.        Hospital Scheduled Meds:  [START ON 9/14/2017] atorvastatin 20 mg Oral Daily   [START ON 9/14/2017] dicyclomine 10 mg Oral BID   doxepin 50 mg Oral Nightly   gabapentin 400 mg Oral Q8H   heparin (porcine) 5,000 Units Subcutaneous Q12H   insulin detemir 5 Units Subcutaneous Nightly   [START ON 9/14/2017] magnesium oxide 200 mg Oral BID   [START ON 9/14/2017] metoclopramide 10 mg Oral BID   [START ON 9/14/2017] metoprolol tartrate 50 mg Oral Daily   [START ON 9/14/2017] pancrelipase (Lip-Prot-Amyl) 24,000 units of lipase Oral 4x Daily With Meals & Nightly   [START ON 9/14/2017] pantoprazole 40 mg Oral Daily   piperacillin-tazobactam 3.375 g Intravenous Q6H   [START ON  9/14/2017] polyethylene glycol 17 g Oral BID   ---------------------------------------------------------------------------------------------------------------------   Vital Signs:  Temp:  [98 °F (36.7 °C)-98.9 °F (37.2 °C)] 98.9 °F (37.2 °C)  Heart Rate:  [81-88] 85  Resp:  [17-20] 20  BP: (112-131)/(75-84) 131/84  Last 3 weights    09/13/17  1800 09/13/17 2057   Weight: 180 lb (81.6 kg) 173 lb 4.8 oz (78.6 kg)     Body mass index is 26.35 kg/(m^2).  ---------------------------------------------------------------------------------------------------------------------   Physical Exam:  Constitutional:  Well-developed and well-nourished.  No respiratory distress.      HENT:  Head: Normocephalic and atraumatic.  Mouth:  Moist mucous membranes.    Eyes:  Conjunctivae and EOM are normal.  Pupils are equal, round, and reactive to light.  No scleral icterus.  Neck:  Neck supple.  No JVD present.    Cardiovascular:  Normal rate, regular rhythm and normal heart sounds with no murmur.  Pulmonary/Chest:  No respiratory distress, no wheezes, no crackles, with normal breath sounds and good air movement.  Abdominal:  Soft.  Bowel sounds are normal.  No distension and no tenderness.   Musculoskeletal:  No edema, no tenderness, and no deformity.  No red or swollen joints anywhere.    Neurological:  Alert and oriented to person, place, and time.  No cranial nerve deficit.  No tongue deviation.  No facial droop.  No slurred speech.   Skin:  Left thumb with swelling and a round area that is 1/2 the diameter of a dime with yellow pus coming out of the thumb; there is erythema of the entire thumb with a good pulse and good capillary refill.  Psychiatric:  Normal mood and affect.  Behavior is normal.  Judgment and thought content normal.   Peripheral vascular:  No edema and strong pulses on all 4 extremities.  Genitourinary:  No mccann catheter in  place.  ---------------------------------------------------------------------------------------------------------------------  EKG:  Ordered; NS with heart rate of 91 and QTc of 440 ms.  Telemetry:  Ordered    ECHO:    ---------------------------------------------------------------------------------------------------------------------  Results from last 7 days  Lab Units 09/13/17  1902   CRP mg/dL 2.40*   LACTATE mmol/L 1.6   WBC 10*3/mm3 4.52   HEMOGLOBIN g/dL 14.0   HEMATOCRIT % 43.1   MCV fL 94.7*   MCHC g/dL 32.5*   PLATELETS 10*3/mm3 71*     Results from last 7 days  Lab Units 09/13/17  1902   SODIUM mmol/L 135   POTASSIUM mmol/L 5.3   CHLORIDE mmol/L 105   CO2 mmol/L 24.3   BUN mg/dL <5*   CREATININE mg/dL 0.57   EGFR IF NONAFRICN AM mL/min/1.73 150   CALCIUM mg/dL 9.0   GLUCOSE mg/dL 228*   ALBUMIN g/dL 3.10*   BILIRUBIN mg/dL 1.6   ALK PHOS U/L 110   AST (SGOT) U/L 123*   ALT (SGPT) U/L 85*   Estimated Creatinine Clearance: 168.5 mL/min (by C-G formula based on Cr of 0.57).      Lab Results   Component Value Date    HGBA1C 6.40 (H) 07/03/2017     Lab Results   Component Value Date    TSH 0.284 (L) 02/04/2017    FREET4 1.47 02/04/2017          I have personally looked at the labs and they are stated above.  ---------------------------------------------------------------------------------------------------------------------  Imaging Results (last 7 days)     Xrays of chest and left hand have been ordered.   ---------------------------------------------------------------------------------------------------------------------  Assessment and Plan:  -Left thumb abscess and cellulitis  -Hepatitis C causing transaminitis and thrombocytopenia  -Type II diabetes mellitus, insulin dependent with hyperglycemia   -History of AFib, currently in normal sinus rhythm  -COPD without acute exacerbation   -Essential hypertension, controlled   -Hyperlipidemia   -History of coronary artery disease, S/P heart catheterization in  2012  -History of systolic congestive heart failure with EF of 51-55% on 4/4/2017  -IV drug abuse with history of amphetamine, suboxone, cocaine, and opioid abuse in the past  -History of MRSA of the achilles tendon and LUE  -Depression   -Anxiety   -Obstructive sleep apnea without use of BiPAP  -History of chronic pancreatitis   -Morbid obesity, now with lower BMI than in the past and now he is only overweight  -Tobacco smoking addiction     I have placed him in contact isolation as he has a history of MRSA; will give zosyn and vancomycin for now.  Consult orthopedic surgery for debridement.  Will give some pain meds as I am sure that he is in pain from the abscess and will request a VI.  Will obtain bedside glucose monitoring and give half of his home Levemir dose.  Will obtain ECHO as he has injected drugs recently; blood cultures are pending.  I sent pus from the wound off for culture as well.  Will repeat labs in the morning.  SCDS for DVT prophylaxis as he has thrombocytopenia and cannot have heparin.  Will monitor on telemetry due to the history of AFib and the current infection.    Elva Hdz MD  09/13/17  10:36 PM

## 2017-09-14 NOTE — PROGRESS NOTES
Discharge Planning Assessment   Art     Patient Name: Isaias Lang  MRN: [id number]  Today's Date: 9/14/2017    Admit Date: 9/13/2017          Discharge Needs Assessment       09/14/17 1120    Living Environment    Lives With other relative(s) (specify)    Living Arrangements apartment    Home Accessibility no concerns    Type of Financial/Environmental Concern none    Transportation Available other (see comments)    Living Environment    Provides Primary Care For no one    Able to Return to Prior Living Arrangements yes            Discharge Plan       09/14/17 1122    Case Management/Social Work Plan    Plan Pt admitted on 9/13/17.  SS received consult per Case Management for discharge planning.  SS spoke with pt on this date.  Pt lives at home with Step Father and plans to return home at discharge.  Pt had previously resided at [facility].  Pt stated he left [facility] approx 3 months ago.  Pt currently does not utilize home health services.  Pt currently utilizes cane as needed.  Pt states no POA or Living Will and states no need for any additional information.  Pt states PCP is Dr. Jade in [place].  SS spoke with pt regarding substance abuse.  Pt refuses assistance or information regarding assistance with substance abuse at this time.  SS will follow and assist with discharge needs.        Discharge Placement     No information found                Demographic Summary       09/14/17 1119    Referral Information    Admission Type inpatient    Arrived From admitted as an inpatient    Referral Source nursing    Reason For Consult discharge planning            Functional Status     None            Psychosocial     None            Abuse/Neglect     None            Legal     None            Substance Abuse     None            Patient Forms     None          Sho Ferro

## 2017-09-14 NOTE — CONSULTS
Consults    Patient Identification:  Name:  Isaias Lang  Age:  52 y.o.  Sex:  male  :  1965  MRN:  [id number]  Visit Number:  [id number]  Primary care provider:  Ankit Jade MD    History of presenting illness: 52 year old male consulted for a left thumb abscess with a 5 day history of left thumb swelling and redness. The patient is an IV drug user and he had his nephew inject the dorsal base on his left thumb 4 days ago.  The patient thought that only suboxone was injected but he thinks that it was laced with methamphetamine.  He also had fevers, chills, and fatigue. Pt also states having chronic left shoulder pain and decrease range of motion.  Pt states he is not able to make fist with the left hand.  ---------------------------------------------------------------------------------------------------------------------  Review of Systems     Constitutional: Positive for chills, fatigue and fever.   HENT: Negative for postnasal drip, rhinorrhea, sneezing and sore throat.    Eyes: Negative for discharge and redness.   Respiratory: Positive for cough (nonproductive) and shortness of breath (x 1 month). Negative for wheezing.    Cardiovascular: Negative for chest pain, palpitations and leg swelling.   Gastrointestinal: Negative for diarrhea, nausea and vomiting.   Genitourinary: Negative for decreased urine volume, dysuria and hematuria.   Musculoskeletal: Positive for joint swelling (left thumb). Negative for arthralgias.   Skin: Positive for wound (left thumb). Negative for pallor and rash.   Neurological: Negative for seizures, speech difficulty and headaches.   Hematological: Does not bruise/bleed easily.   Psychiatric/Behavioral: Negative for confusion, self-injury and suicidal ideas. The patient is not nervous/anxious.   ---------------------------------------------------------------------------------------------------------------------   Past History:  Family History   Problem Relation Age  of Onset   • Gout Other    • Osteoporosis Other    • Arthritis Other      Rheumatoid and osteoarthritis   • Hypertension Other    • Heart disease Other    • Cancer Other    • Coronary artery disease Mother    • Lung disease Mother    • Gout Sister    • Cancer Maternal Grandmother    • Hypertension Maternal Grandfather    • Gout Maternal Grandfather      Past Medical History:   Diagnosis Date   • Abscess of antecubital fossa 05/2014    Left that required I and D and grew Haemophilus influenza group 1   • Anxiety    • Asthma    • Chronic pancreatitis    • COPD (chronic obstructive pulmonary disease)    • DDD (degenerative disc disease), lumbosacral    • Depression    • Diabetes mellitus    • DVT (deep venous thrombosis)     Right arm   • Essential hypertension    • GERD (gastroesophageal reflux disease)    • Hepatitis-C    • Hypercholesteremia    • Injury of back    • Injury of right Achilles tendon     Complicated by MRSA and Pseudomonas   • IV drug abuse    • Mild CAD     Left heart cath at  2012   • MRSA (methicillin resistant staph aureus) culture positive 09/14/2016    LUE   • Obesity    • Opiate addiction     Suboxone IV   • Self-injurious behavior    • Sleep apnea    • Suicide attempt    • Systolic CHF, chronic     EF 45-50% in 2013, EF 60% 9/2016     Past Surgical History:   Procedure Laterality Date   • CHOLECYSTECTOMY  1990s   • INCISION AND DRAINAGE ABSCESS Left 2016    wrist   • INCISION AND DRAINAGE ARM Left 9/15/2016    Procedure: INCISION AND DRAINAGE UPPER EXTREMITY;  Surgeon: Rico Oseguera MD;  [facility];  Service:    • ORIF ANKLE FRACTURE Right 2014     Social History     Social History   • Marital status:      Spouse name: N/A   • Number of children: N/A   • Years of education: N/A     Social History Main Topics   • Smoking status: Former Smoker     Years: 1.00     Types: Cigarettes   • Smokeless tobacco: Never Used      Comment: quit smoking in my 20's   • Alcohol use No       Comment: denies   • Drug use: Yes     Special: IV      Comment: PT STATES THAT HE USED SUBOXONE IN LEFT AC YESTERDAY   • Sexual activity: Defer      Comment: not currently active.      Other Topics Concern   • None     Social History Narrative     ---------------------------------------------------------------------------------------------------------------------   Allergies:  Robitussin dm [dextromethorphan-guaifenesin]; Tizanidine; Metformin; Sulfa antibiotics; Contrast dye; and Tramadol  ---------------------------------------------------------------------------------------------------------------------   Prior to Admission Medications     Prescriptions Last Dose Informant Patient Reported? Taking?    albuterol (PROVENTIL HFA;VENTOLIN HFA) 108 (90 BASE) MCG/ACT inhaler 9/13/2017 Self No Yes    Inhale 2 puffs Every 6 (Six) Hours As Needed for Wheezing.    aspirin 81 MG EC tablet 9/12/2017 Self No Yes    Take 1 tablet by mouth Daily.    atorvastatin (LIPITOR) 20 MG tablet 9/12/2017 Self Yes Yes    Take 20 mg by mouth Daily.    dicyclomine (BENTYL) 10 MG capsule 9/12/2017 Self Yes Yes    Take 10 mg by mouth 2 (Two) Times a Day.    doxepin (SINEquan) 50 MG capsule 9/11/2017 Self No No    Take 1 capsule by mouth Every Night.    furosemide (LASIX) 20 MG tablet 9/12/2017 Self No Yes    Take 1 tablet by mouth Daily.    gabapentin (NEURONTIN) 400 MG capsule 9/13/2017 Self No Yes    Take 1 capsule by mouth 3 (Three) Times a Day.    HYDROcodone-acetaminophen (NORCO) 5-325 MG per tablet 9/13/2017 VI Yes Yes    Take 1 tablet by mouth Daily As Needed. Pt states he takes TID. Per VI, #30 for a 30 day supply on 8/8/17    insulin aspart (novoLOG) 100 UNIT/ML injection 9/13/2017 Self Yes Yes    Inject  under the skin 3 (Three) Times a Day Before Meals. Patient does this per sliding scale    insulin detemir (LEVEMIR) 100 UNIT/ML injection 9/12/2017 Self No Yes    Inject 10 Units under the skin Every Night.     ipratropium-albuterol (DUO-NEB) 0.5-2.5 mg/mL nebulizer 9/12/2017 Self No Yes    Take 3 mL by nebulization Every 6 (Six) Hours As Needed for Shortness of Air.    Magnesium Oxide 400 (240 MG) MG tablet 9/12/2017 Self No Yes    Take ½ tablet by mouth 2 Times a Day.    metoclopramide (REGLAN) 10 MG tablet 9/12/2017 Self No Yes    Take 1 tablet by mouth 2 (Two) Times a Day.    metoprolol tartrate (LOPRESSOR) 50 MG tablet 9/13/2017 Self No Yes    Take 1 tablet by mouth Daily.    nitroglycerin (NITROSTAT) 0.4 MG SL tablet Past Month  No Yes    Place 1 tablet under the tongue Every 5 (Five) Minutes As Needed for Chest Pain. Take no more than 3 doses in 15 minutes.    pancrelipase, Lip-Prot-Amyl, (CREON) 39912 UNITS capsule delayed-release particles capsule 9/12/2017 Self No Yes    Take 2 capsules by mouth 4 (Four) Times a Day With Meals & at Bedtime.    pantoprazole (PROTONIX) 40 MG EC tablet 9/12/2017 Self No Yes    Take 1 tablet by mouth Daily.    polyethylene glycol (MIRALAX) packet 9/13/2017 Self No Yes    MIX 17 GRAM PACKET IN 4 TO 8 OUNCES OF LIQUID AND DRINK ONCE DAILY.    Patient taking differently:  Take 17 g by mouth 2 (Two) Times a Day.    potassium chloride (K-DUR,KLOR-CON) 20 MEQ CR tablet 9/12/2017 Self No Yes    Take 1 tablet by mouth Daily.        Hospital Meds:    atorvastatin 20 mg Oral Nightly   dicyclomine 10 mg Oral BID   doxepin 50 mg Oral Nightly   gabapentin 400 mg Oral Q8H   HYDROmorphone 1 mg Intravenous Once   insulin detemir 5 Units Subcutaneous Nightly   magnesium oxide 200 mg Oral BID   magnesium sulfate 2 g Intravenous Q2H   metoclopramide 10 mg Oral BID AC   metoprolol tartrate 50 mg Oral Daily   pancrelipase (Lip-Prot-Amyl) 24,000 units of lipase Oral 4x Daily With Meals & Nightly   pantoprazole 40 mg Oral Daily   piperacillin-tazobactam 3.375 g Intravenous Q8H   polyethylene glycol 17 g Oral BID   vancomycin 1,500 mg Intravenous Q8H       Pharmacy Consult       ---------------------------------------------------------------------------------------------------------------------   Vital Signs:  Temp:  [98 °F (36.7 °C)-98.9 °F (37.2 °C)] 98.1 °F (36.7 °C)  Heart Rate:  [81-88] 87  Resp:  [16-20] 16  BP: ()/(50-84) 102/62  Last 3 weights    09/13/17  1800 09/13/17 2057   Weight: 180 lb (81.6 kg) 173 lb 4.8 oz (78.6 kg)     Body mass index is 26.35 kg/(m^2).  ---------------------------------------------------------------------------------------------------------------------   Physical exam:  Constitutional:  Well-developed and well-nourished.  No respiratory distress.      HENT:  Head: Normocephalic and atraumatic.  Mouth:  Moist mucous membranes.    Eyes:  Conjunctivae and EOM are normal.  Pupils are equal, round, and reactive to light.  No scleral icterus.  Neck:  Neck supple.  No JVD present.    Cardiovascular:  Normal rate, regular rhythm and normal heart sounds with no murmur.  Pulmonary/Chest:  No respiratory distress, no wheezes, no crackles, with normal breath sounds and good air movement.  Abdominal:  Soft.  Bowel sounds are normal.  No distension and no tenderness.   Musculoskeletal:  Pt unable to make fist with left hand.  Pt has decreased rom of left shoulder and has to use right hand to hold left arm up.l  Neurological:  Alert and oriented to person, place, and time.  No cranial nerve deficit.  No tongue deviation.  No facial droop.  No slurred speech.   Skin:  Left thumb with swelling and a round area that is 1/2 the diameter of a dime with yellow pus coming out of the thumb; there is erythema of the entire thumb with a good pulse and good capillary refill. .   Psychiatric:  Normal mood and affect.  Behavior is normal.  Judgment and thought content normal.   Peripheral vascular:  No edema and strong pulses on all 4 extremities.    ---------------------------------------------------------------------------------------------------------------------    .  ---------------------------------------------------------------------------------------------------------------------     Results from last 7 days  Lab Units 09/14/17 0040 09/13/17  1902   CRP mg/dL 2.09* 2.40*   LACTATE mmol/L  --  1.6   WBC 10*3/mm3 3.60* 4.52   HEMOGLOBIN g/dL 12.0* 14.0   HEMATOCRIT % 36.7* 43.1   MCV fL 94.1* 94.7*   MCHC g/dL 32.7* 32.5*   PLATELETS 10*3/mm3 42* 71*           Results from last 7 days  Lab Units 09/14/17 0040 09/13/17  1902   SODIUM mmol/L 135 135   POTASSIUM mmol/L 4.2 5.3   MAGNESIUM mg/dL 1.5*  --    CHLORIDE mmol/L 104 105   CO2 mmol/L 24.7 24.3   BUN mg/dL 9 <5*   CREATININE mg/dL 0.71 0.57   EGFR IF NONAFRICN AM mL/min/1.73 117 150   CALCIUM mg/dL 8.1 9.0   GLUCOSE mg/dL 422* 228*   ALBUMIN g/dL 2.50* 3.10*   BILIRUBIN mg/dL 0.9 1.6   ALK PHOS U/L 99 110   AST (SGOT) U/L 78* 123*   ALT (SGPT) U/L 69* 85*   Estimated Creatinine Clearance: 135.3 mL/min (by C-G formula based on Cr of 0.71).  No results found for: AMMONIA          Lab Results   Component Value Date    HGBA1C 6.40 (H) 07/03/2017     Lab Results   Component Value Date    TSH 0.284 (L) 02/04/2017    FREET4 1.47 02/04/2017     No results found for: PREGTESTUR, PREGSERUM, HCG, HCGQUANT  Pain Management Panel     Pain Management Panel Latest Ref Rng & Units 9/13/2017 7/3/2017    AMPHETAMINES SCREEN, URINE Negative Negative Positive(A)    BARBITURATES SCREEN Negative Negative Negative    BENZODIAZEPINE SCREEN, URINE Negative Negative Negative    BUPRENORPHINE Negative Positive(A) Positive(A)    COCAINE SCREEN, URINE Negative Negative Negative    METHADONE SCREEN, URINE Negative Negative Negative        Blood Culture   Date Value Ref Range Status   09/13/2017 No growth at less than 24 hours  Preliminary   09/13/2017 No growth at less than 24 hours  Preliminary                  ---------------------------------------------------------------------------------------------------------------------   Imaging Results  (last 7 days)     Procedure Component Value Units Date/Time    XR Hand 3+ View Left [[id number]] Collected:  09/14/17 0738     Updated:  09/14/17 0740    Narrative:       EXAMINATION: XR HAND 3+ VW LEFT-      TECHNIQUE: XR HAND 3+ VW LEFT-        INDICATION: left thumb abscess; L02.512-Cutaneous abscess of left hand      COMPARISON: NONE     FINDINGS:          BONES: No acute fracture. No blastic or lytic lesions.          JOINT: No dislocation.          SOFT TISSUES:  SIGNIFICANT SOFT TISSUE SWELLING OF THE 1ST DIGIT.       Impression:       No acute or destructive bony abnormality.     COMMUNICATION: Per this written report.     This report was finalized on 9/14/2017 7:38 AM by Dr. Brendan Tolentino MD.       XR Chest AP [[id number]] Collected:  09/14/17 0738     Updated:  09/14/17 0740    Narrative:       EXAMINATION: XR CHEST AP-      CLINICAL INDICATION:     SEDATION IN INTUBATED PATIENTS     TECHNIQUE:  XR CHEST AP-      COMPARISON: NONE      FINDINGS:   The lungs remain aerated.  Heart and mediastinum contours are unremarkable.  No pleural effusion.  No pneumothorax.   Bony and soft tissue structures are unremarkable.       Impression:       No radiographic evidence of acute cardiac or pulmonary  disease.     This report was finalized on 9/14/2017 7:38 AM by Dr. Brendan Tolentino MD.           ----------------------------------------------------------------------------------------------------------------------  Assessment and Plan: Left thumb abscess - Pt is to continue current treatment and remain NPO.  Pt is to have left thumb incision and drainage by Dr. Harrington today.  Dr. Harrington will eval pt prior to surgery.    Thank you for the consult.    Giovany Quinn Margaret, APRN  09/14/17  9:51 AM     I agree with the above note as I saw patient myself.  He has an abscess in his left thumb from self injection with meth and suboxone which may involve extensor and flexor sides of thumb.  Will need incision and drainage.  Right hand  having some early cellulitis too but not an abscess formation.  Will do surgery tonight and will continue abx.  R/b/a discussed with patient and consent obtained.    Adin Harrington M.D.

## 2017-09-14 NOTE — NURSING NOTE
Cleansed wound to left thumb with normal saline, placed sterile guaze, and wrapped in cling. Pt tolerated well.

## 2017-09-14 NOTE — PLAN OF CARE
Problem: Infection, Risk/Actual (Adult)  Goal: Identify Related Risk Factors and Signs and Symptoms  Outcome: Ongoing (interventions implemented as appropriate)  Goal: Infection Prevention/Resolution  Outcome: Ongoing (interventions implemented as appropriate)    Problem: Pain, Acute (Adult)  Goal: Identify Related Risk Factors and Signs and Symptoms  Outcome: Ongoing (interventions implemented as appropriate)  Goal: Acceptable Pain Control/Comfort Level  Outcome: Ongoing (interventions implemented as appropriate)    Problem: Patient Care Overview (Adult)  Goal: Plan of Care Review  Outcome: Ongoing (interventions implemented as appropriate)  Goal: Adult Individualization and Mutuality  Outcome: Ongoing (interventions implemented as appropriate)  Goal: Discharge Needs Assessment  Outcome: Ongoing (interventions implemented as appropriate)

## 2017-09-14 NOTE — PROGRESS NOTES
Pharmacokinetics Service Note:    Mr. Lang has been initiated on vancomycin at a dosage of 1.5 gm q8hrs overnight for the treatment of his L hand abscess.  He is known to our service from previous admissions and has similar demographics and renal function during this admission.  The goal trough range is 15-20 mg/L.  Will obtain a steady state trough level tomorrow AM to determine if any adjustments are needed.      Thank you.  Tanna Rainey, Pharm.D.  9/14/2017  8:14 AM

## 2017-09-14 NOTE — ANESTHESIA PREPROCEDURE EVALUATION
Anesthesia Evaluation     Patient summary reviewed and Nursing notes reviewed   no history of anesthetic complications:  NPO Solid Status: > 8 hours  NPO Liquid Status: > 8 hours     Airway   Mallampati: II  TM distance: >3 FB  Neck ROM: full  possible difficult intubation and small opening  Dental - normal exam   (+) poor dentation    Pulmonary - normal exam    breath sounds clear to auscultation  (+) COPD ( for 15 yrs ), asthma, sleep apnea,   (-) not a smoker  Cardiovascular - normal exam  Exercise tolerance: good (4-7 METS)    NYHA Classification: II  ECG reviewed  Rhythm: regular  Rate: normal    (+) hypertension well controlled, past MI (1994)  >12 months, CAD, CHF, RICK, DVT, hyperlipidemia  (-) dysrhythmias, angina      Neuro/Psych  (+) psychiatric history Anxiety and Depression,    (-) seizures, CVA  GI/Hepatic/Renal/Endo    (+)  GERD well controlled, hepatitis C, liver disease, diabetes mellitus type 2 poorly controlled using insulin,     Musculoskeletal     (+) arthralgias, back pain, joint swelling,   Abdominal  - normal exam    Abdomen: soft.  Bowel sounds: normal.   Substance History - negative use     OB/GYN negative ob/gyn ROS         Other   (+) arthritis                                     Anesthesia Plan    ASA 3     general     intravenous induction   Anesthetic plan and risks discussed with patient.  Use of blood products discussed with patient  Consented to blood products.

## 2017-09-14 NOTE — PROGRESS NOTES
[facility] HOSPITALIST    PROGRESS NOTE    Name:  Isaias Lang   Age:  52 y.o.  Sex:  male  :  1965  MRN:  [id number]   Visit Number:  [id number]  Admission Date:  2017  Date Of Service:  17  Primary Care Physician:  Ankit Jade MD     LOS: 1 day :  Patient Care Team:  Ankit Jade MD as PCP - General (Internal Medicine):    Chief Complaint:      Abscess of left thumb /Cellulitis of left hand    Subjective / Interval History:     I have reviewed labs/imaging/records from this hospitalization, including ER staff and admitting/attending physicians H/P's and progress notes to establish a comprehensive understanding of this patient's clinical hospital course, as well as to establish a transition of care appropriately.    52 y.o. male who presented to [facility] emergency department with a four-day history of left thumb swelling and redness.  The patient is an IV drug user and he had his nephew inject the dorsal base on his left thumb 4 days ago.  The patient thought that only suboxone was injected but he thinks that it was laced with methamphetamine.  He also had fevers, chills, and fatigue.     Pt has been evaluated by orthopedic surgery, with planned I/D today; difficulty with IV access at this time, several lost attempts or access points.  Pt drowsy this am; denies N/V/D; NPO at present for pending surgery; denies CP/SOA/Palp/vertigo; good UOP; pain well controlled at present.  No F/C.    Review of Systems:     General ROS: Patient denies any fevers, chills or loss of consciousness.  Respiratory ROS: Denies cough or shortness of breath.  Cardiovascular ROS: Denies chest pain or palpitations. No history of exertional chest pain.  Gastrointestinal ROS: Denies nausea and vomiting. Denies any abdominal pain. No diarrhea.  Neurological ROS: Denies any focal weakness. No loss of consciousness. Denies any numbness. Denies neck pain.  Dermatological ROS:  Redness/swelling left hand.    Vital Signs:    Temp:  [98 °F (36.7 °C)-98.9 °F (37.2 °C)] 98.8 °F (37.1 °C)  Heart Rate:  [81-88] 86  Resp:  [16-20] 18  BP: ()/(50-84) 99/58    Intake and output:    I/O last 3 completed shifts:  In: 800 [P.O.:800]  Out: 400 [Urine:400]       Physical Examination:    General Appearance:  Alert and cooperative, but drowsy, not in any acute distress.  Disheveled appearance.   Head:  Atraumatic and normocephalic, without obvious abnormality.   Eyes:          PERRLA, conjunctivae and sclerae normal, no Icterus. No pallor. Extra-occular movements are within normal limits.   Neck: Supple, trachea midline, no thyromegaly, no carotid bruit.   Lungs:   Chest shape is normal. Breath sounds heard bilaterally equally.  No crackles or wheezing. No Pleural rub or bronchial breathing.   Heart:  Normal S1 and S2, no murmur, no gallop, no rub. No JVD   Abdomen:   Normal bowel sounds, no masses, no organomegaly. Soft       non-tender, non-distended, no guarding, no rebound tenderness.   Extremities: Moves all extremities; edema noted to L hand, involving thumb predominantly, but also phalanx 2-5; diffuse erythema to thumb scant drainage noted, fluctuant; extends from prior to 1st MCP joint to tip of thumb; no cyanosis, no congenital clubbing.   Skin: As per above to left hand.  Numerous injection sites noted to ext.   Neurologic: Awake, alert and oriented times 3. Moves all 4 extremities, but painful to ROM testing of left hand, mostly to thumb.   Laboratory results:      Results from last 7 days  Lab Units 09/14/17  0040 09/13/17  1902   SODIUM mmol/L 135 135   POTASSIUM mmol/L 4.2 5.3   CHLORIDE mmol/L 104 105   CO2 mmol/L 24.7 24.3   BUN mg/dL 9 <5*   CREATININE mg/dL 0.71 0.57   CALCIUM mg/dL 8.1 9.0   BILIRUBIN mg/dL 0.9 1.6   ALK PHOS U/L 99 110   ALT (SGPT) U/L 69* 85*   AST (SGOT) U/L 78* 123*   GLUCOSE mg/dL 422* 228*       Results from last 7 days  Lab Units 09/14/17  0040  09/13/17  1902   WBC 10*3/mm3 3.60* 4.52   HEMOGLOBIN g/dL 12.0* 14.0   HEMATOCRIT % 36.7* 43.1   PLATELETS 10*3/mm3 42* 71*               Results from last 7 days  Lab Units 09/13/17  1950 09/13/17 1902   BLOODCX  No growth at less than 24 hours No growth at less than 24 hours           I have reviewed the patient's laboratory results.    Radiology results:    Imaging Results (last 24 hours)     Procedure Component Value Units Date/Time    XR Hand 3+ View Left [[id number]] Collected:  09/14/17 0738     Updated:  09/14/17 0740    Narrative:       EXAMINATION: XR HAND 3+ VW LEFT-      TECHNIQUE: XR HAND 3+ VW LEFT-        INDICATION: left thumb abscess; L02.512-Cutaneous abscess of left hand      COMPARISON: NONE     FINDINGS:          BONES: No acute fracture. No blastic or lytic lesions.          JOINT: No dislocation.          SOFT TISSUES:  SIGNIFICANT SOFT TISSUE SWELLING OF THE 1ST DIGIT.       Impression:       No acute or destructive bony abnormality.     COMMUNICATION: Per this written report.     This report was finalized on 9/14/2017 7:38 AM by Dr. Brendan Tolentino MD.       XR Chest AP [[id number]] Collected:  09/14/17 0738     Updated:  09/14/17 0740    Narrative:       EXAMINATION: XR CHEST AP-      CLINICAL INDICATION:     SEDATION IN INTUBATED PATIENTS     TECHNIQUE:  XR CHEST AP-      COMPARISON: NONE      FINDINGS:   The lungs remain aerated.  Heart and mediastinum contours are unremarkable.  No pleural effusion.  No pneumothorax.   Bony and soft tissue structures are unremarkable.       Impression:       No radiographic evidence of acute cardiac or pulmonary  disease.     This report was finalized on 9/14/2017 7:38 AM by Dr. Brnedan Tolentino MD.             I have reviewed the patient's radiology reports.    Medication Review:     I have reviewed the patients active and prn medications.         Assessment:  Principal Problem:    Abscess of left thumb  Active Problems:    Abscess of left hand including  fingers    IV drug abuse    Type 1 diabetes mellitus    MDD (major depressive disorder), recurrent episode, moderate    Gastroesophageal reflux disease with esophagitis    Chronic viral hepatitis B without delta agent and without coma    Chronic hepatitis C without hepatic coma        Plan:  Will ask PICC line team for assistance with IV access; pt will need for surgery planned today, as well as IV abx until cx results known; quickly transition to oral formulation of abx once able, given pt extensive IV drug abuse hx; planned ortho intervention today with I/D and lavage; will follow clinically, but aggressive SSI coverage to aid in wound healing; labs daily; follow HD status; monitor for withdrawal.  Discussed POC in detail with pt today, he verb understanding and agrees.    I have spent a total of 35 mins in direct pt care today.    Jose Anne,   09/14/17  1:49 PM

## 2017-09-14 NOTE — PAYOR COMM NOTE
"Rachelle  [phone] fax [phone]    Isaias Avendano (52 y.o. Male)     Date of Birth Social Security Number Address Home Phone MRN    1965  PO   [facility] [id number] [phone] [id number]    Islam Marital Status          [employer]        Admission Date Admission Type Admitting Provider Attending Provider Department, Room/Bed    17 Emergency Elva Hdz MD Perkins, Jimmye S, MD [address], 3305/2S    Discharge Date Discharge Disposition Discharge Destination                      Attending Provider: Elva Hdz MD     Allergies:  Robitussin Dm [Dextromethorphan-guaifenesin], Tizanidine, Metformin, Sulfa Antibiotics, Contrast Dye, Tramadol    Isolation:  Contact   Infection:  None   Code Status:  FULL    Ht:  68\" (172.7 cm)   Wt:  173 lb 4.8 oz (78.6 kg)    Admission Cmt:  None   Principal Problem:  None                Active Insurance as of 2017     Primary Coverage     Payor Plan Insurance Group Employer/Plan Group    [facility] [employer] [place] [facility] [employer] [facility]      Payor Plan Address Payor Plan Phone Number Effective From Effective To    [address] [address]  [address]     [place] [address]       Subscriber Name Subscriber Birth Date Member ID       ISAIAS AVENDANO 1965 [id number]                 Emergency Contacts      (Rel.) Home Phone Work Phone Mobile Phone    Domenico Mckinney (Father) [phone] -- --               History & Physical      Elva Hdz MD at 2017 10:36 PM              [facility] HOSPITALIST HISTORY AND PHYSICAL    Patient Identification:  Name:  Isaias Avendano  Age:  52 y.o.  Sex:  male  :  1965  MRN:  [id number]   Visit Number:  [id number]  Primary Care Physician:  Ankit Jade MD     Chief complaint:  Left thumb lesion    History of presenting illness:  52 y.o. male who presented to [facility] emergency department with a four-day history of left thumb " swelling and redness.  The patient is an IV drug user and he had his nephew inject the dorsal base on his left thumb 4 days ago.  The patient thought that only suboxone was injected but he thinks that it was laced with methamphetamine.  He also had fevers, chills, and fatigue.  He fell in the shower about one week ago and has an abrasion on his lateral left elbow with no drainage and no erythema.  ---------------------------------------------------------------------------------------------------------------------   Review of Systems   Constitutional: Positive for chills, fatigue and fever.   HENT: Negative for postnasal drip, rhinorrhea, sneezing and sore throat.    Eyes: Negative for discharge and redness.   Respiratory: Positive for cough (nonproductive) and shortness of breath (x 1 month). Negative for wheezing.    Cardiovascular: Negative for chest pain, palpitations and leg swelling.   Gastrointestinal: Negative for diarrhea, nausea and vomiting.   Genitourinary: Negative for decreased urine volume, dysuria and hematuria.   Musculoskeletal: Positive for joint swelling (left thumb). Negative for arthralgias.   Skin: Positive for wound (left thumb). Negative for pallor and rash.   Neurological: Negative for seizures, speech difficulty and headaches.   Hematological: Does not bruise/bleed easily.   Psychiatric/Behavioral: Negative for confusion, self-injury and suicidal ideas. The patient is not nervous/anxious.     ---------------------------------------------------------------------------------------------------------------------   Past Medical History:   Diagnosis Date   • Abscess of antecubital fossa 05/2014    Left that required I and D and grew Haemophilus influenza group 1   • Anxiety    • Asthma    • Chronic pancreatitis    • COPD (chronic obstructive pulmonary disease)    • DDD (degenerative disc disease), lumbosacral    • Depression    • Diabetes mellitus    • DVT (deep venous thrombosis)     Right arm    • Essential hypertension    • GERD (gastroesophageal reflux disease)    • Hepatitis-C    • Hypercholesteremia    • Injury of back    • Injury of right Achilles tendon     Complicated by MRSA and Pseudomonas   • IV drug abuse    • Mild CAD     Left heart cath at  2012   • MRSA (methicillin resistant staph aureus) culture positive 09/14/2016    LUE   • Obesity    • Opiate addiction     Suboxone IV   • Self-injurious behavior    • Sleep apnea    • Suicide attempt    • Systolic CHF, chronic     EF 45-50% in 2013, EF 60% 9/2016     Past Surgical History:   Procedure Laterality Date   • CHOLECYSTECTOMY  1990s   • INCISION AND DRAINAGE ABSCESS Left 2016    wrist   • INCISION AND DRAINAGE ARM Left 9/15/2016    Procedure: INCISION AND DRAINAGE UPPER EXTREMITY;  Surgeon: Rico Oseguera MD;  Location: [facility] OR;  Service:    • ORIF ANKLE FRACTURE Right 2014     Family History   Problem Relation Age of Onset   • Gout Other    • Osteoporosis Other    • Arthritis Other      Rheumatoid and osteoarthritis   • Hypertension Other    • Heart disease Other    • Cancer Other    • Coronary artery disease Mother    • Lung disease Mother    • Gout Sister    • Cancer Maternal Grandmother    • Hypertension Maternal Grandfather    • Gout Maternal Grandfather      Social History   • Marital status:      Social History Main Topics   • Smoking status: Former Smoker     Years: 1.00     Types: Cigarettes   • Smokeless tobacco: Never Used      Comment: quit smoking in my 20's   • Alcohol use No      Comment: denies   • Drug use: Yes     Special: IV      Comment: PT STATES THAT HE USED SUBOXONE IN LEFT AC YESTERDAY   • Sexual activity: Defer      Comment: not currently active.    ---------------------------------------------------------------------------------------------------------------------   Allergies:  Robitussin dm [dextromethorphan-guaifenesin]; Tizanidine; Metformin; Sulfa antibiotics; Contrast dye; and  Tramadol  ---------------------------------------------------------------------------------------------------------------------   Prior to Admission Medications     Prescriptions Last Dose Informant Patient Reported? Taking?    albuterol (PROVENTIL HFA;VENTOLIN HFA) 108 (90 BASE) MCG/ACT inhaler 9/13/2017 Self No Yes    Inhale 2 puffs Every 6 (Six) Hours As Needed for Wheezing.    aspirin 81 MG EC tablet 9/12/2017 Self No Yes    Take 1 tablet by mouth Daily.    atorvastatin (LIPITOR) 20 MG tablet 9/12/2017 Self Yes Yes    Take 20 mg by mouth Daily.    dicyclomine (BENTYL) 10 MG capsule 9/12/2017 Self Yes Yes    Take 10 mg by mouth 2 (Two) Times a Day.    furosemide (LASIX) 20 MG tablet 9/12/2017 Self No Yes    Take 1 tablet by mouth Daily.    gabapentin (NEURONTIN) 400 MG capsule 9/13/2017 Self No Yes    Take 1 capsule by mouth 3 (Three) Times a Day.    HYDROcodone-acetaminophen (NORCO) 5-325 MG per tablet 9/13/2017 VI Yes Yes    Take 1 tablet by mouth Daily As Needed. Pt states he takes TID. Per VI, #30 for a 30 day supply on 8/8/17    insulin aspart (novoLOG) 100 UNIT/ML injection 9/13/2017 Self Yes Yes    Inject  under the skin 3 (Three) Times a Day Before Meals. Patient does this per sliding scale    insulin detemir (LEVEMIR) 100 UNIT/ML injection 9/12/2017 Self No Yes    Inject 10 Units under the skin Every Night.    ipratropium-albuterol (DUO-NEB) 0.5-2.5 mg/mL nebulizer 9/12/2017 Self No Yes    Take 3 mL by nebulization Every 6 (Six) Hours As Needed for Shortness of Air.    Magnesium Oxide 400 (240 MG) MG tablet 9/12/2017 Self No Yes    Take ½ tablet by mouth 2 Times a Day.    metoclopramide (REGLAN) 10 MG tablet 9/12/2017 Self No Yes    Take 1 tablet by mouth 2 (Two) Times a Day.    metoprolol tartrate (LOPRESSOR) 50 MG tablet 9/13/2017 Self No Yes    Take 1 tablet by mouth Daily.    nitroglycerin (NITROSTAT) 0.4 MG SL tablet Past Month  No Yes    Place 1 tablet under the tongue Every 5 (Five)  Minutes As Needed for Chest Pain. Take no more than 3 doses in 15 minutes.    pancrelipase, Lip-Prot-Amyl, (CREON) 06453 UNITS capsule delayed-release particles capsule 9/12/2017 Self No Yes    Take 2 capsules by mouth 4 (Four) Times a Day With Meals & at Bedtime.    pantoprazole (PROTONIX) 40 MG EC tablet 9/12/2017 Self No Yes    Take 1 tablet by mouth Daily.    polyethylene glycol (MIRALAX) packet 9/13/2017 Self No Yes    MIX 17 GRAM PACKET IN 4 TO 8 OUNCES OF LIQUID AND DRINK ONCE DAILY.    Patient taking differently:  Take 17 g by mouth 2 (Two) Times a Day.    potassium chloride (K-DUR,KLOR-CON) 20 MEQ CR tablet 9/12/2017 Self No Yes    Take 1 tablet by mouth Daily.    doxepin (SINEquan) 50 MG capsule 9/11/2017 Self No No    Take 1 capsule by mouth Every Night.        Hospital Scheduled Meds:  [START ON 9/14/2017] atorvastatin 20 mg Oral Daily   [START ON 9/14/2017] dicyclomine 10 mg Oral BID   doxepin 50 mg Oral Nightly   gabapentin 400 mg Oral Q8H   heparin (porcine) 5,000 Units Subcutaneous Q12H   insulin detemir 5 Units Subcutaneous Nightly   [START ON 9/14/2017] magnesium oxide 200 mg Oral BID   [START ON 9/14/2017] metoclopramide 10 mg Oral BID   [START ON 9/14/2017] metoprolol tartrate 50 mg Oral Daily   [START ON 9/14/2017] pancrelipase (Lip-Prot-Amyl) 24,000 units of lipase Oral 4x Daily With Meals & Nightly   [START ON 9/14/2017] pantoprazole 40 mg Oral Daily   piperacillin-tazobactam 3.375 g Intravenous Q6H   [START ON 9/14/2017] polyethylene glycol 17 g Oral BID   ---------------------------------------------------------------------------------------------------------------------   Vital Signs:  Temp:  [98 °F (36.7 °C)-98.9 °F (37.2 °C)] 98.9 °F (37.2 °C)  Heart Rate:  [81-88] 85  Resp:  [17-20] 20  BP: (112-131)/(75-84) 131/84  Last 3 weights    09/13/17  1800 09/13/17 2057   Weight: 180 lb (81.6 kg) 173 lb 4.8 oz (78.6 kg)     Body mass index is 26.35  kg/(m^2).  ---------------------------------------------------------------------------------------------------------------------   Physical Exam:  Constitutional:  Well-developed and well-nourished.  No respiratory distress.      HENT:  Head: Normocephalic and atraumatic.  Mouth:  Moist mucous membranes.    Eyes:  Conjunctivae and EOM are normal.  Pupils are equal, round, and reactive to light.  No scleral icterus.  Neck:  Neck supple.  No JVD present.    Cardiovascular:  Normal rate, regular rhythm and normal heart sounds with no murmur.  Pulmonary/Chest:  No respiratory distress, no wheezes, no crackles, with normal breath sounds and good air movement.  Abdominal:  Soft.  Bowel sounds are normal.  No distension and no tenderness.   Musculoskeletal:  No edema, no tenderness, and no deformity.  No red or swollen joints anywhere.    Neurological:  Alert and oriented to person, place, and time.  No cranial nerve deficit.  No tongue deviation.  No facial droop.  No slurred speech.   Skin:  Left thumb with swelling and a round area that is 1/2 the diameter of a dime with yellow pus coming out of the thumb; there is erythema of the entire thumb with a good pulse and good capillary refill.  Psychiatric:  Normal mood and affect.  Behavior is normal.  Judgment and thought content normal.   Peripheral vascular:  No edema and strong pulses on all 4 extremities.  Genitourinary:  No mccann catheter in place.  ---------------------------------------------------------------------------------------------------------------------  EKG:  Ordered; NS with heart rate of 91 and QTc of 440 ms.  Telemetry:  Ordered    ECHO:    ---------------------------------------------------------------------------------------------------------------------  Results from last 7 days  Lab Units 09/13/17  1902   CRP mg/dL 2.40*   LACTATE mmol/L 1.6   WBC 10*3/mm3 4.52   HEMOGLOBIN g/dL 14.0   HEMATOCRIT % 43.1   MCV fL 94.7*   MCHC g/dL 32.5*   PLATELETS  10*3/mm3 71*     Results from last 7 days  Lab Units 09/13/17  1902   SODIUM mmol/L 135   POTASSIUM mmol/L 5.3   CHLORIDE mmol/L 105   CO2 mmol/L 24.3   BUN mg/dL <5*   CREATININE mg/dL 0.57   EGFR IF NONAFRICN AM mL/min/1.73 150   CALCIUM mg/dL 9.0   GLUCOSE mg/dL 228*   ALBUMIN g/dL 3.10*   BILIRUBIN mg/dL 1.6   ALK PHOS U/L 110   AST (SGOT) U/L 123*   ALT (SGPT) U/L 85*   Estimated Creatinine Clearance: 168.5 mL/min (by C-G formula based on Cr of 0.57).      Lab Results   Component Value Date    HGBA1C 6.40 (H) 07/03/2017     Lab Results   Component Value Date    TSH 0.284 (L) 02/04/2017    FREET4 1.47 02/04/2017          I have personally looked at the labs and they are stated above.  ---------------------------------------------------------------------------------------------------------------------  Imaging Results (last 7 days)     Xrays of chest and left hand have been ordered.   ---------------------------------------------------------------------------------------------------------------------  Assessment and Plan:  -Left thumb abscess and cellulitis  -Hepatitis C causing transaminitis and thrombocytopenia  -Type II diabetes mellitus, insulin dependent with hyperglycemia   -History of AFib, currently in normal sinus rhythm  -COPD without acute exacerbation   -Essential hypertension, controlled   -Hyperlipidemia   -History of coronary artery disease, S/P heart catheterization in 2012  -History of systolic congestive heart failure with EF of 51-55% on 4/4/2017  -IV drug abuse with history of amphetamine, suboxone, cocaine, and opioid abuse in the past  -History of MRSA of the achilles tendon and LUE  -Depression   -Anxiety   -Obstructive sleep apnea without use of BiPAP  -History of chronic pancreatitis   -Morbid obesity, now with lower BMI than in the past and now he is only overweight  -Tobacco smoking addiction     I have placed him in contact isolation as he has a history of MRSA; will give zosyn and  vancomycin for now.  Consult orthopedic surgery for debridement.  Will give some pain meds as I am sure that he is in pain from the abscess and will request a VI.  Will obtain bedside glucose monitoring and give half of his home Levemir dose.  Will obtain ECHO as he has injected drugs recently; blood cultures are pending.  I sent pus from the wound off for culture as well.  Will repeat labs in the morning.  SCDS for DVT prophylaxis as he has thrombocytopenia and cannot have heparin.  Will monitor on telemetry due to the history of AFib and the current infection.    Elva Hdz MD  09/13/17  10:36 PM       Electronically signed by Elva Hdz MD at 9/14/2017 12:00 AM           Emergency Department Notes      Francia Chavira RN at 9/13/2017  6:03 PM          Abscessed area to left thumb. Started approx. 3 days ago after shooting up Suboxone and missing. Patient reports a history of MRSA     Francia Chavira RN  09/13/17 1804       Electronically signed by Francia Chavira RN at 9/13/2017  6:04 PM      PATRICK Majano at 9/13/2017  6:20 PM     Attestation signed by Elgin Daniels MD at 9/14/2017  7:17 AM              For this patient encounter, I reviewed the NP or PA documentation, treatment plan, and medical decision making. Elgin Daniels MD 9/14/2017 7:17 AM                               Subjective   Patient is a 52 y.o. male presenting with abscess.   History provided by:  Patient   used: No    Abscess   Location:  Shoulder/arm and finger  Finger abscess location:  L thumb  Abscess quality: draining, fluctuance, induration, painful, redness and warmth    Red streaking: yes    Duration:  4 days  Progression:  Worsening  Pain details:     Quality:  Throbbing    Severity:  Moderate    Duration:  4 days    Timing:  Constant    Progression:  Worsening  Chronicity:  New  Context: injected drug use    Relieved by:  Nothing  Worsened by:  Draining/squeezing  Ineffective treatments:   None tried  Associated symptoms: no fever, no headaches, no nausea and no vomiting    Risk factors: hx of MRSA and prior abscess        Review of Systems   Constitutional: Negative for activity change and fever.   HENT: Negative for congestion and sore throat.    Eyes: Negative for pain.   Respiratory: Negative for cough, shortness of breath and wheezing.    Cardiovascular: Negative for chest pain.   Gastrointestinal: Negative for abdominal distention, diarrhea, nausea and vomiting.   Genitourinary: Negative for difficulty urinating and dysuria.   Musculoskeletal: Negative for arthralgias and myalgias.   Skin: Positive for wound. Negative for rash.   Neurological: Negative for dizziness and headaches.   Psychiatric/Behavioral: Negative for agitation.   All other systems reviewed and are negative.      Past Medical History:   Diagnosis Date   • Abscess of antecubital fossa 05/2014    Left that required I and D and grew Haemophilus influenza group 1   • Anxiety    • Asthma    • Chronic pancreatitis    • COPD (chronic obstructive pulmonary disease)    • DDD (degenerative disc disease), lumbosacral    • Depression    • Diabetes mellitus    • DVT (deep venous thrombosis)     Right arm   • Essential hypertension    • GERD (gastroesophageal reflux disease)    • Hepatitis-C    • Hypercholesteremia    • Injury of back    • Injury of right Achilles tendon     Complicated by MRSA and Pseudomonas   • IV drug abuse    • Mild CAD     Left heart cath at  2012   • MRSA (methicillin resistant staph aureus) culture positive 09/14/2016    LUE   • Obesity    • Opiate addiction     Suboxone IV   • Self-injurious behavior    • Sleep apnea    • Suicide attempt    • Systolic CHF, chronic     EF 45-50% in 2013, EF 60% 9/2016       Allergies   Allergen Reactions   • Robitussin Dm [Dextromethorphan-Guaifenesin] Shortness Of Breath   • Tizanidine Shortness Of Breath   • Metformin Nausea And Vomiting   • Sulfa Antibiotics Other (See Comments)  "    \"I cannot take them, they do something to me.\"   • Contrast Dye Rash   • Tramadol Rash       Past Surgical History:   Procedure Laterality Date   • CHOLECYSTECTOMY  1990s   • INCISION AND DRAINAGE ABSCESS Left 2016    wrist   • INCISION AND DRAINAGE ARM Left 9/15/2016    Procedure: INCISION AND DRAINAGE UPPER EXTREMITY;  Surgeon: Rico Oseguera MD;  Location: [facility] OR;  Service:    • ORIF ANKLE FRACTURE Right 2014       Family History   Problem Relation Age of Onset   • Gout Other    • Osteoporosis Other    • Arthritis Other      Rheumatoid and osteoarthritis   • Hypertension Other    • Heart disease Other    • Cancer Other    • Coronary artery disease Mother    • Lung disease Mother    • Gout Sister    • Cancer Maternal Grandmother    • Hypertension Maternal Grandfather    • Gout Maternal Grandfather        Social History     Social History   • Marital status:      Spouse name: N/A   • Number of children: N/A   • Years of education: N/A     Social History Main Topics   • Smoking status: Former Smoker     Years: 1.00     Types: Cigarettes   • Smokeless tobacco: Never Used      Comment: quit smoking in my 20's   • Alcohol use No      Comment: denies   • Drug use: Yes     Special: IV      Comment: PT STATES THAT HE USED SUBOXONE IN LEFT AC YESTERDAY   • Sexual activity: Defer      Comment: not currently active.      Other Topics Concern   • None     Social History Narrative           Objective   Physical Exam   Constitutional: He is oriented to person, place, and time. He appears well-developed and well-nourished.   HENT:   Head: Normocephalic and atraumatic.   Eyes: EOM are normal. Pupils are equal, round, and reactive to light.   Neck: Normal range of motion. Neck supple.   Cardiovascular: Normal rate, regular rhythm and normal heart sounds.    Pulmonary/Chest: Effort normal and breath sounds normal.   Abdominal: Soft. Bowel sounds are normal.   Musculoskeletal: Normal range of motion. "   Neurological: He is alert and oriented to person, place, and time.   Skin: Skin is warm and dry. There is erythema.        Psychiatric: He has a normal mood and affect. His behavior is normal. Judgment and thought content normal.   Nursing note and vitals reviewed.      Procedures        ED Course  ED Course   Comment By Time   D/w Dr. Dorado. NPO, admit to him. PATRICK Majano 09/13 1818   Rediscussed with Dr. Dorado and he recommends consulting orthopoedics as it may have tendon involvement. PATRICK Majano 09/13 1922   D/w Dr. Shankar Requests possible admission to hospitalist, with him consulting. PATRICK Majano 09/13 1934   PAged Dr. Hdz. PATRICK Majano 09/13 1936   D/w Dr. Hdz, will admit pt. PATRICK Majano 09/13 1950                  MDM  Number of Diagnoses or Management Options     Amount and/or Complexity of Data Reviewed  Clinical lab tests: ordered and reviewed  Tests in the radiology section of CPT®:  ordered and reviewed  Tests in the medicine section of CPT®:  ordered and reviewed    Patient Progress  Patient progress: stable      Final diagnoses:   Abscess of left thumb            PATRICK Majano  09/13/17 2107       Electronically signed by Elgin Daniels MD at 9/14/2017  7:17 AM      Francia Chavira RN at 9/13/2017  8:45 PM          Report given to Alfie CULLEN on [address]. Patient transported per wheelchair     Francia Chavira RN  09/13/17 2049       Electronically signed by Francia Chavira RN at 9/13/2017  8:49 PM        Hospital Medications (all)       Dose Frequency Start End    atorvastatin (LIPITOR) tablet 20 mg 20 mg Nightly 9/14/2017     Sig - Route: Take 1 tablet by mouth Every Night. - Oral    dextrose (D50W) solution 25 g 25 g Every 15 Minutes PRN 9/13/2017     Sig - Route: Infuse 50 mL into a venous catheter Every 15 (Fifteen) Minutes As Needed for Low Blood Sugar (Blood Sugar Less Than 70, Patient Has IV Access - Unresponsive, NPO or Unable To Safely Swallow). -  Intravenous    dextrose (GLUTOSE) oral gel 15 g 15 g Every 15 Minutes PRN 9/13/2017     Sig - Route: Take 15 g by mouth Every 15 (Fifteen) Minutes As Needed for Low Blood Sugar (Blood Sugar Less Than 70, Patient Alert, Is Not NPO & Can Safely Swallow). - Oral    dicyclomine (BENTYL) capsule 10 mg 10 mg 2 Times Daily 9/14/2017     Sig - Route: Take 1 capsule by mouth 2 (Two) Times a Day. - Oral    doxepin (SINEquan) capsule 50 mg 50 mg Nightly 9/14/2017     Sig - Route: Take 2 capsules by mouth Every Night. - Oral    gabapentin (NEURONTIN) capsule 400 mg 400 mg Every 8 Hours Scheduled 9/14/2017     Sig - Route: Take 1 capsule by mouth Every 8 (Eight) Hours. - Oral    glucagon (GLUCAGEN) injection 1 mg 1 mg Every 15 Minutes PRN 9/13/2017     Sig - Route: Inject 1 mg under the skin Every 15 (Fifteen) Minutes As Needed (Blood Glucose Less Than 70 - Patient Without IV Access - Unresponsive, NPO or Unable To Safely Swallow). - Subcutaneous    HYDROcodone-acetaminophen (NORCO) 5-325 MG per tablet 1 tablet 1 tablet Daily PRN 9/13/2017     Sig - Route: Take 1 tablet by mouth Daily As Needed for Moderate Pain . - Oral    HYDROmorphone (DILAUDID) injection 1 mg 1 mg Once 9/13/2017 9/13/2017    Sig - Route: Infuse 1 mL into a venous catheter 1 (One) Time. - Intravenous    HYDROmorphone (DILAUDID) injection 1 mg 1 mg Once 9/14/2017     Sig - Route: Infuse 1 mL into a venous catheter 1 (One) Time. - Intravenous    insulin detemir (LEVEMIR) injection 5 Units 5 Units Nightly 9/14/2017     Sig - Route: Inject 5 Units under the skin Every Night. - Subcutaneous    ipratropium-albuterol (DUO-NEB) nebulizer solution 3 mL 3 mL Every 6 Hours PRN 9/13/2017     Sig - Route: Take 3 mL by nebulization Every 6 (Six) Hours As Needed for Shortness of Air. - Nebulization    ketorolac (TORADOL) injection 30 mg 30 mg Once 9/13/2017 9/13/2017    Sig - Route: Infuse 30 mg into a venous catheter 1 (One) Time. - Intravenous    Cosign for Ordering:  "Accepted by Elgin Daniels MD on 9/13/2017  6:33 PM    magnesium oxide (MAGOX) tablet 200 mg 200 mg 2 Times Daily 9/14/2017     Sig - Route: Take 0.5 tablets by mouth 2 (Two) Times a Day. - Oral    Magnesium Sulfate 2 gram Bolus, followed by 8 gram infusion (total Mg dose 10 grams)- Mg less than or equal to 1mg/dL 2 g As Needed 9/14/2017     Sig - Route: Infuse 50 mL into a venous catheter As Needed (Mg less than or equal to 1mg/dL). - Intravenous    Linked Group 1:  \"Or\" Linked Group Details        magnesium sulfate 4 gram infusion- Mg 1.6-1.9 mg/dL 4 g As Needed 9/14/2017     Sig - Route: Infuse 100 mL into a venous catheter As Needed (Mg 1.6-1.9 mg/dL). - Intravenous    Linked Group 1:  \"Or\" Linked Group Details        Magnesium Sulfate 6 gram Infusion (2 gm x 3) -Mg 1.1 -1.5 mg/dL 2 g As Needed 9/14/2017     Sig - Route: Infuse 50 mL into a venous catheter As Needed (Mg 1.1 -1.5 mg/dL). - Intravenous    Linked Group 1:  \"Or\" Linked Group Details        Magnesium Sulfate 6 gram Infusion (2 gm x 3) -Mg 1.1 -1.5 mg/dL 2 g Every 2 Hours 9/14/2017 9/14/2017    Sig - Route: Infuse 50 mL into a venous catheter Every 2 (Two) Hours. - Intravenous    metoclopramide (REGLAN) tablet 10 mg 10 mg 2 Times Daily Before Meals 9/14/2017     Sig - Route: Take 1 tablet by mouth 2 (Two) Times a Day Before Meals. - Oral    metoprolol tartrate (LOPRESSOR) tablet 50 mg 50 mg Daily 9/14/2017     Sig - Route: Take 1 tablet by mouth Daily. - Oral    nitroglycerin (NITROSTAT) SL tablet 0.4 mg 0.4 mg Every 5 Minutes PRN 9/13/2017     Sig - Route: Place 1 tablet under the tongue Every 5 (Five) Minutes As Needed for Chest Pain. - Sublingual    pancrelipase (Lip-Prot-Amyl) (CREON) capsule 24,000 units of lipase 24,000 units of lipase 4 Times Daily With Meals & Nightly 9/14/2017     Sig - Route: Take 2 capsules by mouth 4 (Four) Times a Day With Meals & at Bedtime. - Oral    pantoprazole (PROTONIX) EC tablet 40 mg 40 mg Daily 9/14/2017  " "   Sig - Route: Take 1 tablet by mouth Daily. - Oral    Pharmacy Consult  Continuous PRN 9/13/2017     Sig - Route: Continuous As Needed for Consult. - Does not apply    piperacillin-tazobactam (ZOSYN) 3.375 g/100 mL 0.9% NS IVPB (mbp) 3.375 g Every 8 Hours 9/14/2017     Sig - Route: Infuse 100 mL into a venous catheter Every 8 (Eight) Hours. - Intravenous    Linked Group 2:  \"And\" Linked Group Details        polyethylene glycol (MIRALAX) powder 17 g 17 g 2 Times Daily 9/14/2017     Sig - Route: Take 17 g by mouth 2 (Two) Times a Day. - Oral    sodium chloride 0.9 % flush 1-10 mL 1-10 mL As Needed 9/13/2017     Sig - Route: Infuse 1-10 mL into a venous catheter As Needed for Line Care. - Intravenous    sodium chloride 0.9 % flush 1-10 mL 1-10 mL As Needed 9/13/2017     Sig - Route: Infuse 1-10 mL into a venous catheter As Needed for Line Care. - Intravenous    sodium chloride 0.9 % flush 10 mL 10 mL As Needed 9/13/2017     Sig - Route: Infuse 10 mL into a venous catheter As Needed for Line Care. - Intravenous    Cosign for Ordering: Accepted by Elgin Daniels MD on 9/13/2017  6:33 PM    Linked Group 3:  \"And\" Linked Group Details        vancomycin (VANCOCIN) 1,500 mg in sodium chloride 0.9 % 500 mL IVPB 1,500 mg Every 8 Hours 9/14/2017     Sig - Route: Infuse 1,500 mg into a venous catheter Every 8 (Eight) Hours. - Intravenous    vancomycin (VANCOCIN) 1,750 mg in sodium chloride 0.9 % 500 mL IVPB 20 mg/kg × 81.6 kg Once 9/13/2017 9/13/2017    Sig - Route: Infuse 1,750 mg into a venous catheter 1 (One) Time. - Intravenous    Cosign for Ordering: Accepted by Elgin Daniels MD on 9/13/2017  6:33 PM    glucagon (human recombinant) (GLUCAGEN DIAGNOSTIC) injection 1 mg (Discontinued) 1 mg Every 15 Minutes PRN 9/13/2017 9/13/2017    Sig - Route: Inject 1 mg under the skin Every 15 (Fifteen) Minutes As Needed (Blood Glucose Less Than 70 - Patient Without IV Access - Unresponsive, NPO or Unable To Safely " "Swallow). - Subcutaneous    heparin (porcine) 5000 UNIT/ML injection 5,000 Units (Discontinued) 5,000 Units Every 12 Hours Scheduled 9/13/2017 9/13/2017    Sig - Route: Inject 1 mL under the skin Every 12 (Twelve) Hours. - Subcutaneous    Pharmacy to dose vancomycin (Discontinued)  Continuous PRN 9/13/2017 9/14/2017    Sig - Route: Continuous As Needed for Consult. - Does not apply    Linked Group 2:  \"And\" Linked Group Details        piperacillin-tazobactam (ZOSYN) 3.375 g/100 mL 0.9% NS IVPB (mbp) (Discontinued) 3.375 g Every 6 Hours Scheduled 9/13/2017 9/14/2017    Sig - Route: Infuse 100 mL into a venous catheter Every 6 (Six) Hours. - Intravenous    Linked Group 2:  \"And\" Linked Group Details        vancomycin (VANCOCIN) 1,500 mg in sodium chloride 0.9 % 500 mL IVPB (Discontinued) 20 mg/kg × 78.6 kg Once 9/13/2017 9/13/2017    Sig - Route: Infuse 1,500 mg into a venous catheter 1 (One) Time. - Intravenous    Reason for Discontinue: Dose adjustment    Linked Group 2:  \"And\" Linked Group Details                Lab Results (last 24 hours)     Procedure Component Value Units Date/Time    CBC & Differential [[id number]] Collected:  09/13/17 1902    Specimen:  Blood Updated:  09/13/17 1909    Narrative:       The following orders were created for panel order CBC & Differential.  Procedure                               Abnormality         Status                     ---------                               -----------         ------                     CBC Auto Differential[[id number]]        Abnormal            Final result                 Please view results for these tests on the individual orders.    CBC Auto Differential [[id number]]  (Abnormal) Collected:  09/13/17 1902    Specimen:  Blood Updated:  09/13/17 1909     WBC 4.52 10*3/mm3      RBC 4.55 (L) 10*6/mm3      Hemoglobin 14.0 g/dL      Hematocrit 43.1 %      MCV 94.7 (H) fL      MCH 30.8 pg      MCHC 32.5 (L) g/dL      RDW 14.8 (H) %      RDW-SD 49.1 fl      " MPV 10.7 (H) fL      Platelets 71 (L) 10*3/mm3      Neutrophil % 74.4 (H) %      Lymphocyte % 14.4 (L) %      Monocyte % 7.7 %      Eosinophil % 2.9 %      Basophil % 0.4 %      Immature Grans % 0.2 %      Neutrophils, Absolute 3.36 10*3/mm3      Lymphocytes, Absolute 0.65 (L) 10*3/mm3      Monocytes, Absolute 0.35 10*3/mm3      Eosinophils, Absolute 0.13 10*3/mm3      Basophils, Absolute 0.02 10*3/mm3      Immature Grans, Absolute 0.01 10*3/mm3     Urine Culture [[id number]] Collected:  09/13/17 1828    Specimen:  Urine from Urine, Clean Catch Updated:  09/13/17 1912    Urinalysis With / Culture If Indicated [[id number]]  (Abnormal) Collected:  09/13/17 1828    Specimen:  Urine from Urine, Clean Catch Updated:  09/13/17 1915     Color, UA Orange (A)     Appearance, UA Clear     pH, UA 6.5     Specific Gravity, UA 1.026     Glucose,  mg/dL (Trace) (A)     Ketones, UA Trace (A)     Bilirubin, UA Small (1+) (A)     Blood, UA Negative     Protein, UA Trace (A)     Leuk Esterase, UA Small (1+) (A)     Nitrite, UA Positive (A)     Urobilinogen, UA >=8.0 E.U./dL (A)    Narrative:       Dipstick results may be inaccurate due to color interference.    Urinalysis, Microscopic Only [[id number]]  (Abnormal) Collected:  09/13/17 1828    Specimen:  Urine from Urine, Clean Catch Updated:  09/13/17 1915     RBC, UA 0-2 (A) /HPF      WBC, UA 0-2 (A) /HPF      Bacteria, UA None Seen /HPF      Squamous Epithelial Cells, UA None Seen /HPF      Hyaline Casts, UA 3-6 /LPF      Methodology Automated Microscopy    Sedimentation Rate [[id number]]  (Normal) Collected:  09/13/17 1902    Specimen:  Blood Updated:  09/13/17 1916     Sed Rate 9 mm/hr     Urine Drug Screen [[id number]]  (Abnormal) Collected:  09/13/17 1828    Specimen:  Urine from Urine, Clean Catch Updated:  09/13/17 1936     Amphetamine Screen, Urine Negative     Barbiturates Screen, Urine Negative     Benzodiazepine Screen, Urine Negative     Cocaine Screen, Urine Negative      Methadone Screen, Urine Negative     Opiate Screen Negative     Phencyclidine (PCP), Urine Negative     THC, Screen, Urine Negative     6-ACETYL MORPHINE Negative     Buprenorphine, Screen, Urine Positive (A)     Oxycodone Screen, Urine Negative    Narrative:       Negative Thresholds For Drugs Screened:                  Amphetamines              1000 ng/ml               Barbiturates               200 ng/ml               Benzodiazepines            200 ng/ml              Cocaine                    300 ng/ml              Methadone                  300 ng/ml              Opiates                    300 ng/ml               Phencyclidine               25 ng/ml               THC                         50 ng/ml              6-Acetyl Morphine           10 ng/ml              Buprenorphine                5 ng/ml              Oxycodone                  300 ng/ml    The reference range for all drugs tested is negative. This report includes final unconfirmed qualitative results to be used for medical treatment purposes only. Unconfirmed results must not be used for non-medical purposes such as employment or legal testing. Clinical consideration should be applied to any drug of abuse test, especially when unconfirmed quantitative results are used.      Lactic Acid, Plasma [[id number]]  (Normal) Collected:  09/13/17 1902    Specimen:  Blood Updated:  09/13/17 1939     Lactate 1.6 mmol/L     C-reactive Protein [[id number]]  (Abnormal) Collected:  09/13/17 1902    Specimen:  Blood Updated:  09/13/17 1939     C-Reactive Protein 2.40 (H) mg/dL     Comprehensive Metabolic Panel [[id number]]  (Abnormal) Collected:  09/13/17 1902    Specimen:  Blood Updated:  09/13/17 1944     Glucose 228 (H) mg/dL      BUN <5 (L) mg/dL      Creatinine 0.57 mg/dL      Sodium 135 mmol/L      Potassium 5.3 mmol/L       2+ Hemolysis  1+ Icteric         Chloride 105 mmol/L      CO2 24.3 mmol/L      Calcium 9.0 mg/dL      Total Protein 6.4 g/dL      Albumin  3.10 (L) g/dL      ALT (SGPT) 85 (H) U/L      AST (SGOT) 123 (H) U/L      Alkaline Phosphatase 110 U/L       Note New Reference Ranges        Total Bilirubin 1.6 mg/dL      eGFR Non African Amer 150 mL/min/1.73      Globulin 3.3 gm/dL      A/G Ratio 0.9 (L) g/dL      BUN/Creatinine Ratio --      Unable to calculate Bun/Crea Ratio.        Anion Gap 5.7 mmol/L     Osmolality, Calculated [[id number]] Collected:  09/13/17 1902    Specimen:  Blood Updated:  09/13/17 1944     Osmolality Calc -- mOsm/kg       Unable to calculate.       Wound Culture [[id number]] Collected:  09/13/17 2252    Specimen:  Wound from Finger Updated:  09/13/17 2809     Gram Stain Result Moderate (3+) WBCs seen      Few (2+) Gram positive cocci, intracellular and extracellular, in pairs and clusters      Few (2+) Gram negative bacilli, intracellular and extracellular    Magnesium [[id number]]  (Abnormal) Collected:  09/14/17 0040    Specimen:  Blood Updated:  09/14/17 0139     Magnesium 1.5 (L) mg/dL     Phosphorus [[id number]]  (Normal) Collected:  09/14/17 0040    Specimen:  Blood Updated:  09/14/17 0139     Phosphorus 2.7 mg/dL     CBC Auto Differential [[id number]]  (Abnormal) Collected:  09/14/17 0040    Specimen:  Blood Updated:  09/14/17 0142     WBC 3.60 (L) 10*3/mm3      RBC 3.90 (L) 10*6/mm3      Hemoglobin 12.0 (L) g/dL      Hematocrit 36.7 (L) %      MCV 94.1 (H) fL      MCH 30.8 pg      MCHC 32.7 (L) g/dL      RDW 14.5 %      RDW-SD 47.4 fl      MPV 11.8 (H) fL      Platelets 42 (C) 10*3/mm3      Neutrophil % 75.3 (H) %      Lymphocyte % 11.9 (L) %      Monocyte % 10.6 (H) %      Eosinophil % 1.9 %      Basophil % 0.3 %      Immature Grans % 0.0 %      Neutrophils, Absolute 2.71 10*3/mm3      Lymphocytes, Absolute 0.43 (L) 10*3/mm3      Monocytes, Absolute 0.38 10*3/mm3      Eosinophils, Absolute 0.07 10*3/mm3      Basophils, Absolute 0.01 10*3/mm3      Immature Grans, Absolute 0.00 10*3/mm3     CBC & Differential [[id number]] Collected:   09/14/17 0040    Specimen:  Blood Updated:  09/14/17 0143    Narrative:       The following orders were created for panel order CBC & Differential.  Procedure                               Abnormality         Status                     ---------                               -----------         ------                     Scan Slide[[id number]]                                       Final result               CBC Auto Differential[[id number]]        Abnormal            Final result                 Please view results for these tests on the individual orders.    Scan Slide [[id number]] Collected:  09/14/17 0040    Specimen:  Blood Updated:  09/14/17 0143     RBC Morphology Normal     Platelet Estimate Decreased    C-reactive Protein [[id number]]  (Abnormal) Collected:  09/14/17 0040    Specimen:  Blood Updated:  09/14/17 0153     C-Reactive Protein 2.09 (H) mg/dL     Comprehensive Metabolic Panel [[id number]]  (Abnormal) Collected:  09/14/17 0040    Specimen:  Blood Updated:  09/14/17 0154     Glucose 422 (H) mg/dL      BUN 9 mg/dL      Creatinine 0.71 mg/dL      Sodium 135 mmol/L      Potassium 4.2 mmol/L       1+ Hemolysis         Chloride 104 mmol/L      CO2 24.7 mmol/L      Calcium 8.1 mg/dL      Total Protein 4.9 (L) g/dL      Albumin 2.50 (L) g/dL      ALT (SGPT) 69 (H) U/L      AST (SGOT) 78 (H) U/L      Alkaline Phosphatase 99 U/L       Note New Reference Ranges        Total Bilirubin 0.9 mg/dL      eGFR Non African Amer 117 mL/min/1.73      Globulin 2.4 gm/dL      A/G Ratio 1.0 (L) g/dL      BUN/Creatinine Ratio 12.7     Anion Gap 6.3 mmol/L     Osmolality, Calculated [[id number]]  (Normal) Collected:  09/14/17 0040    Specimen:  Blood Updated:  09/14/17 0156     Osmolality Calc 286.8 mOsm/kg     POC Glucose Fingerstick [[id number]]  (Abnormal) Collected:  09/14/17 0746    Specimen:  Blood Updated:  09/14/17 0757     Glucose 281 (H) mg/dL     Narrative:       Meter: [id number] : [id number] COREA    [facility]  Culture [[id number]]  (Normal) Collected:  09/13/17 1902    Specimen:  Blood from Hand, Right Updated:  09/14/17 0801     Blood Culture No growth at less than 24 hours    Blood Culture [[id number]]  (Normal) Collected:  09/13/17 1950    Specimen:  Blood from Wrist, Right Updated:  09/14/17 0901     Blood Culture No growth at less than 24 hours    POC Glucose Fingerstick [[id number]]  (Abnormal) Collected:  09/14/17 1148    Specimen:  Blood Updated:  09/14/17 1156     Glucose 227 (H) mg/dL     Narrative:       Meter: [id number] : [id number] ISA COREA        Imaging Results (last 24 hours)     Procedure Component Value Units Date/Time    XR Hand 3+ View Left [[id number]] Collected:  09/14/17 0738     Updated:  09/14/17 0740    Narrative:       EXAMINATION: XR HAND 3+ VW LEFT-      TECHNIQUE: XR HAND 3+ VW LEFT-        INDICATION: left thumb abscess; L02.512-Cutaneous abscess of left hand      COMPARISON: NONE     FINDINGS:          BONES: No acute fracture. No blastic or lytic lesions.          JOINT: No dislocation.          SOFT TISSUES:  SIGNIFICANT SOFT TISSUE SWELLING OF THE 1ST DIGIT.       Impression:       No acute or destructive bony abnormality.     COMMUNICATION: Per this written report.     This report was finalized on 9/14/2017 7:38 AM by Dr. Brendan Tolentino MD.       XR Chest AP [[id number]] Collected:  09/14/17 0738     Updated:  09/14/17 0740    Narrative:       EXAMINATION: XR CHEST AP-      CLINICAL INDICATION:     SEDATION IN INTUBATED PATIENTS     TECHNIQUE:  XR CHEST AP-      COMPARISON: NONE      FINDINGS:   The lungs remain aerated.  Heart and mediastinum contours are unremarkable.  No pleural effusion.  No pneumothorax.   Bony and soft tissue structures are unremarkable.       Impression:       No radiographic evidence of acute cardiac or pulmonary  disease.     This report was finalized on 9/14/2017 7:38 AM by Dr. Brendan Tolentino MD.           Operative/Procedure Notes (last 24 hours) (Notes  from 2017 12:13 PM through 2017 12:13 PM)     No notes of this type exist for this encounter.        Physician Progress Notes (last 24 hours) (Notes from 2017 12:13 PM through 2017 12:13 PM)     No notes of this type exist for this encounter.           Consult Notes (last 24 hours) (Notes from 2017 12:13 PM through 2017 12:13 PM)      NOHEMI Rader at 2017  9:51 AM  Version 1 of 1           Consults    Patient Identification:  Name:  Isaias Lang  Age:  52 y.o.  Sex:  male  :  1965  MRN:  [id number]  Visit Number:  [id number]  Primary care provider:  Ankit Jade MD    History of presenting illness: 52 year old male consulted for a left thumb abscess with a 5 day history of left thumb swelling and redness. The patient is an IV drug user and he had his nephew inject the dorsal base on his left thumb 4 days ago.  The patient thought that only suboxone was injected but he thinks that it was laced with methamphetamine.  He also had fevers, chills, and fatigue. Pt also states having chronic left shoulder pain and decrease range of motion.  Pt states he is not able to make fist with the left hand.  ---------------------------------------------------------------------------------------------------------------------  Review of Systems     Constitutional: Positive for chills, fatigue and fever.   HENT: Negative for postnasal drip, rhinorrhea, sneezing and sore throat.    Eyes: Negative for discharge and redness.   Respiratory: Positive for cough (nonproductive) and shortness of breath (x 1 month). Negative for wheezing.    Cardiovascular: Negative for chest pain, palpitations and leg swelling.   Gastrointestinal: Negative for diarrhea, nausea and vomiting.   Genitourinary: Negative for decreased urine volume, dysuria and hematuria.   Musculoskeletal: Positive for joint swelling (left thumb). Negative for arthralgias.   Skin: Positive for wound (left thumb).  Negative for pallor and rash.   Neurological: Negative for seizures, speech difficulty and headaches.   Hematological: Does not bruise/bleed easily.   Psychiatric/Behavioral: Negative for confusion, self-injury and suicidal ideas. The patient is not nervous/anxious.   ---------------------------------------------------------------------------------------------------------------------   Past History:  Family History   Problem Relation Age of Onset   • Gout Other    • Osteoporosis Other    • Arthritis Other      Rheumatoid and osteoarthritis   • Hypertension Other    • Heart disease Other    • Cancer Other    • Coronary artery disease Mother    • Lung disease Mother    • Gout Sister    • Cancer Maternal Grandmother    • Hypertension Maternal Grandfather    • Gout Maternal Grandfather      Past Medical History:   Diagnosis Date   • Abscess of antecubital fossa 05/2014    Left that required I and D and grew Haemophilus influenza group 1   • Anxiety    • Asthma    • Chronic pancreatitis    • COPD (chronic obstructive pulmonary disease)    • DDD (degenerative disc disease), lumbosacral    • Depression    • Diabetes mellitus    • DVT (deep venous thrombosis)     Right arm   • Essential hypertension    • GERD (gastroesophageal reflux disease)    • Hepatitis-C    • Hypercholesteremia    • Injury of back    • Injury of right Achilles tendon     Complicated by MRSA and Pseudomonas   • IV drug abuse    • Mild CAD     Left heart cath at  2012   • MRSA (methicillin resistant staph aureus) culture positive 09/14/2016    LUE   • Obesity    • Opiate addiction     Suboxone IV   • Self-injurious behavior    • Sleep apnea    • Suicide attempt    • Systolic CHF, chronic     EF 45-50% in 2013, EF 60% 9/2016     Past Surgical History:   Procedure Laterality Date   • CHOLECYSTECTOMY  1990s   • INCISION AND DRAINAGE ABSCESS Left 2016    wrist   • INCISION AND DRAINAGE ARM Left 9/15/2016    Procedure: INCISION AND DRAINAGE UPPER EXTREMITY;   Surgeon: Rico Oseguera MD;  [facility]: [facility];  Service:    • ORIF ANKLE FRACTURE Right 2014     Social History     Social History   • Marital status:      Spouse name: N/A   • Number of children: N/A   • Years of education: N/A     Social History Main Topics   • Smoking status: Former Smoker     Years: 1.00     Types: Cigarettes   • Smokeless tobacco: Never Used      Comment: quit smoking in my 20's   • Alcohol use No      Comment: denies   • Drug use: Yes     Special: IV      Comment: PT STATES THAT HE USED SUBOXONE IN LEFT AC YESTERDAY   • Sexual activity: Defer      Comment: not currently active.      Other Topics Concern   • None     Social History Narrative     ---------------------------------------------------------------------------------------------------------------------   Allergies:  Robitussin dm [dextromethorphan-guaifenesin]; Tizanidine; Metformin; Sulfa antibiotics; Contrast dye; and Tramadol  ---------------------------------------------------------------------------------------------------------------------   Prior to Admission Medications     Prescriptions Last Dose Informant Patient Reported? Taking?    albuterol (PROVENTIL HFA;VENTOLIN HFA) 108 (90 BASE) MCG/ACT inhaler 9/13/2017 Self No Yes    Inhale 2 puffs Every 6 (Six) Hours As Needed for Wheezing.    aspirin 81 MG EC tablet 9/12/2017 Self No Yes    Take 1 tablet by mouth Daily.    atorvastatin (LIPITOR) 20 MG tablet 9/12/2017 Self Yes Yes    Take 20 mg by mouth Daily.    dicyclomine (BENTYL) 10 MG capsule 9/12/2017 Self Yes Yes    Take 10 mg by mouth 2 (Two) Times a Day.    doxepin (SINEquan) 50 MG capsule 9/11/2017 Self No No    Take 1 capsule by mouth Every Night.    furosemide (LASIX) 20 MG tablet 9/12/2017 Self No Yes    Take 1 tablet by mouth Daily.    gabapentin (NEURONTIN) 400 MG capsule 9/13/2017 Self No Yes    Take 1 capsule by mouth 3 (Three) Times a Day.    HYDROcodone-acetaminophen (NORCO) 5-325 MG per tablet  9/13/2017 VI Yes Yes    Take 1 tablet by mouth Daily As Needed. Pt states he takes TID. Per VI, #30 for a 30 day supply on 8/8/17    insulin aspart (novoLOG) 100 UNIT/ML injection 9/13/2017 Self Yes Yes    Inject  under the skin 3 (Three) Times a Day Before Meals. Patient does this per sliding scale    insulin detemir (LEVEMIR) 100 UNIT/ML injection 9/12/2017 Self No Yes    Inject 10 Units under the skin Every Night.    ipratropium-albuterol (DUO-NEB) 0.5-2.5 mg/mL nebulizer 9/12/2017 Self No Yes    Take 3 mL by nebulization Every 6 (Six) Hours As Needed for Shortness of Air.    Magnesium Oxide 400 (240 MG) MG tablet 9/12/2017 Self No Yes    Take ½ tablet by mouth 2 Times a Day.    metoclopramide (REGLAN) 10 MG tablet 9/12/2017 Self No Yes    Take 1 tablet by mouth 2 (Two) Times a Day.    metoprolol tartrate (LOPRESSOR) 50 MG tablet 9/13/2017 Self No Yes    Take 1 tablet by mouth Daily.    nitroglycerin (NITROSTAT) 0.4 MG SL tablet Past Month  No Yes    Place 1 tablet under the tongue Every 5 (Five) Minutes As Needed for Chest Pain. Take no more than 3 doses in 15 minutes.    pancrelipase, Lip-Prot-Amyl, (CREON) 59606 UNITS capsule delayed-release particles capsule 9/12/2017 Self No Yes    Take 2 capsules by mouth 4 (Four) Times a Day With Meals & at Bedtime.    pantoprazole (PROTONIX) 40 MG EC tablet 9/12/2017 Self No Yes    Take 1 tablet by mouth Daily.    polyethylene glycol (MIRALAX) packet 9/13/2017 Self No Yes    MIX 17 GRAM PACKET IN 4 TO 8 OUNCES OF LIQUID AND DRINK ONCE DAILY.    Patient taking differently:  Take 17 g by mouth 2 (Two) Times a Day.    potassium chloride (K-DUR,KLOR-CON) 20 MEQ CR tablet 9/12/2017 Self No Yes    Take 1 tablet by mouth Daily.        Hospital Meds:    atorvastatin 20 mg Oral Nightly   dicyclomine 10 mg Oral BID   doxepin 50 mg Oral Nightly   gabapentin 400 mg Oral Q8H   HYDROmorphone 1 mg Intravenous Once   insulin detemir 5 Units Subcutaneous Nightly   magnesium oxide  200 mg Oral BID   magnesium sulfate 2 g Intravenous Q2H   metoclopramide 10 mg Oral BID AC   metoprolol tartrate 50 mg Oral Daily   pancrelipase (Lip-Prot-Amyl) 24,000 units of lipase Oral 4x Daily With Meals & Nightly   pantoprazole 40 mg Oral Daily   piperacillin-tazobactam 3.375 g Intravenous Q8H   polyethylene glycol 17 g Oral BID   vancomycin 1,500 mg Intravenous Q8H       Pharmacy Consult      ---------------------------------------------------------------------------------------------------------------------   Vital Signs:  Temp:  [98 °F (36.7 °C)-98.9 °F (37.2 °C)] 98.1 °F (36.7 °C)  Heart Rate:  [81-88] 87  Resp:  [16-20] 16  BP: ()/(50-84) 102/62  Last 3 weights    09/13/17  1800 09/13/17 2057   Weight: 180 lb (81.6 kg) 173 lb 4.8 oz (78.6 kg)     Body mass index is 26.35 kg/(m^2).  ---------------------------------------------------------------------------------------------------------------------   Physical exam:  Constitutional:  Well-developed and well-nourished.  No respiratory distress.      HENT:  Head: Normocephalic and atraumatic.  Mouth:  Moist mucous membranes.    Eyes:  Conjunctivae and EOM are normal.  Pupils are equal, round, and reactive to light.  No scleral icterus.  Neck:  Neck supple.  No JVD present.    Cardiovascular:  Normal rate, regular rhythm and normal heart sounds with no murmur.  Pulmonary/Chest:  No respiratory distress, no wheezes, no crackles, with normal breath sounds and good air movement.  Abdominal:  Soft.  Bowel sounds are normal.  No distension and no tenderness.   Musculoskeletal:  Pt unable to make fist with left hand.  Pt has decreased rom of left shoulder and has to use right hand to hold left arm up.l  Neurological:  Alert and oriented to person, place, and time.  No cranial nerve deficit.  No tongue deviation.  No facial droop.  No slurred speech.   Skin:  Left thumb with swelling and a round area that is 1/2 the diameter of a dime with yellow pus coming  out of the thumb; there is erythema of the entire thumb with a good pulse and good capillary refill. .   Psychiatric:  Normal mood and affect.  Behavior is normal.  Judgment and thought content normal.   Peripheral vascular:  No edema and strong pulses on all 4 extremities.    ---------------------------------------------------------------------------------------------------------------------   .  ---------------------------------------------------------------------------------------------------------------------     Results from last 7 days  Lab Units 09/14/17  0040 09/13/17  1902   CRP mg/dL 2.09* 2.40*   LACTATE mmol/L  --  1.6   WBC 10*3/mm3 3.60* 4.52   HEMOGLOBIN g/dL 12.0* 14.0   HEMATOCRIT % 36.7* 43.1   MCV fL 94.1* 94.7*   MCHC g/dL 32.7* 32.5*   PLATELETS 10*3/mm3 42* 71*           Results from last 7 days  Lab Units 09/14/17 0040 09/13/17  1902   SODIUM mmol/L 135 135   POTASSIUM mmol/L 4.2 5.3   MAGNESIUM mg/dL 1.5*  --    CHLORIDE mmol/L 104 105   CO2 mmol/L 24.7 24.3   BUN mg/dL 9 <5*   CREATININE mg/dL 0.71 0.57   EGFR IF NONAFRICN AM mL/min/1.73 117 150   CALCIUM mg/dL 8.1 9.0   GLUCOSE mg/dL 422* 228*   ALBUMIN g/dL 2.50* 3.10*   BILIRUBIN mg/dL 0.9 1.6   ALK PHOS U/L 99 110   AST (SGOT) U/L 78* 123*   ALT (SGPT) U/L 69* 85*   Estimated Creatinine Clearance: 135.3 mL/min (by C-G formula based on Cr of 0.71).  No results found for: AMMONIA          Lab Results   Component Value Date    HGBA1C 6.40 (H) 07/03/2017     Lab Results   Component Value Date    TSH 0.284 (L) 02/04/2017    FREET4 1.47 02/04/2017     No results found for: PREGTESTUR, PREGSERUM, HCG, HCGQUANT  Pain Management Panel     Pain Management Panel Latest Ref Rng & Units 9/13/2017 7/3/2017    AMPHETAMINES SCREEN, URINE Negative Negative Positive(A)    BARBITURATES SCREEN Negative Negative Negative    BENZODIAZEPINE SCREEN, URINE Negative Negative Negative    BUPRENORPHINE Negative Positive(A) Positive(A)    COCAINE SCREEN, URINE  Negative Negative Negative    METHADONE SCREEN, URINE Negative Negative Negative        Blood Culture   Date Value Ref Range Status   09/13/2017 No growth at less than 24 hours  Preliminary   09/13/2017 No growth at less than 24 hours  Preliminary                  ---------------------------------------------------------------------------------------------------------------------   Imaging Results (last 7 days)     Procedure Component Value Units Date/Time    XR Hand 3+ View Left [[id number]] Collected:  09/14/17 0738     Updated:  09/14/17 0740    Narrative:       EXAMINATION: XR HAND 3+ VW LEFT-      TECHNIQUE: XR HAND 3+ VW LEFT-        INDICATION: left thumb abscess; L02.512-Cutaneous abscess of left hand      COMPARISON: NONE     FINDINGS:          BONES: No acute fracture. No blastic or lytic lesions.          JOINT: No dislocation.          SOFT TISSUES:  SIGNIFICANT SOFT TISSUE SWELLING OF THE 1ST DIGIT.       Impression:       No acute or destructive bony abnormality.     COMMUNICATION: Per this written report.     This report was finalized on 9/14/2017 7:38 AM by Dr. Brendan Tolentino MD.       XR Chest AP [[id number]] Collected:  09/14/17 0738     Updated:  09/14/17 0740    Narrative:       EXAMINATION: XR CHEST AP-      CLINICAL INDICATION:     SEDATION IN INTUBATED PATIENTS     TECHNIQUE:  XR CHEST AP-      COMPARISON: NONE      FINDINGS:   The lungs remain aerated.  Heart and mediastinum contours are unremarkable.  No pleural effusion.  No pneumothorax.   Bony and soft tissue structures are unremarkable.       Impression:       No radiographic evidence of acute cardiac or pulmonary  disease.     This report was finalized on 9/14/2017 7:38 AM by Dr. Brendan Tolentino MD.           ----------------------------------------------------------------------------------------------------------------------  Assessment and Plan: Left thumb abscess - Pt is to continue current treatment and remain NPO.  Pt is to have left  thumb incision and drainage by Dr. Harrington today.  Dr. Harrington will eval pt prior to surgery.    Thank you for the consult.    NOHEMI Rader  09/14/17  9:51 AM     Electronically signed by NOHEMI Rader at 9/14/2017  9:58 AM

## 2017-09-15 LAB
ALBUMIN SERPL-MCNC: 2.5 G/DL (ref 3.5–5)
ALBUMIN/GLOB SERPL: 1 G/DL (ref 1.5–2.5)
ALP SERPL-CCNC: 105 U/L (ref 40–129)
ALT SERPL W P-5'-P-CCNC: 67 U/L (ref 10–44)
ANION GAP SERPL CALCULATED.3IONS-SCNC: 6.7 MMOL/L (ref 3.6–11.2)
AST SERPL-CCNC: 82 U/L (ref 10–34)
BASOPHILS # BLD AUTO: 0.01 10*3/MM3 (ref 0–0.3)
BASOPHILS NFR BLD AUTO: 0.4 % (ref 0–2)
BILIRUB SERPL-MCNC: 0.7 MG/DL (ref 0.2–1.8)
BUN BLD-MCNC: 7 MG/DL (ref 7–21)
BUN/CREAT SERPL: 10.3 (ref 7–25)
CALCIUM SPEC-SCNC: 7.9 MG/DL (ref 7.7–10)
CHLORIDE SERPL-SCNC: 105 MMOL/L (ref 99–112)
CO2 SERPL-SCNC: 23.3 MMOL/L (ref 24.3–31.9)
CREAT BLD-MCNC: 0.68 MG/DL (ref 0.43–1.29)
CRP SERPL-MCNC: 1.91 MG/DL (ref 0–0.99)
DEPRECATED RDW RBC AUTO: 47.7 FL (ref 37–54)
EOSINOPHIL # BLD AUTO: 0.07 10*3/MM3 (ref 0–0.7)
EOSINOPHIL NFR BLD AUTO: 2.6 % (ref 0–5)
ERYTHROCYTE [DISTWIDTH] IN BLOOD BY AUTOMATED COUNT: 14.7 % (ref 11.5–14.5)
GFR SERPL CREATININE-BSD FRML MDRD: 122 ML/MIN/1.73
GLOBULIN UR ELPH-MCNC: 2.5 GM/DL
GLUCOSE BLD-MCNC: 458 MG/DL (ref 70–110)
GLUCOSE BLDC GLUCOMTR-MCNC: 172 MG/DL (ref 70–130)
GLUCOSE BLDC GLUCOMTR-MCNC: 187 MG/DL (ref 70–130)
GLUCOSE BLDC GLUCOMTR-MCNC: 220 MG/DL (ref 70–130)
GLUCOSE BLDC GLUCOMTR-MCNC: 227 MG/DL (ref 70–130)
GLUCOSE BLDC GLUCOMTR-MCNC: 398 MG/DL (ref 70–130)
HCT VFR BLD AUTO: 35.9 % (ref 42–52)
HGB BLD-MCNC: 11.8 G/DL (ref 14–18)
IMM GRANULOCYTES # BLD: 0.01 10*3/MM3 (ref 0–0.03)
IMM GRANULOCYTES NFR BLD: 0.4 % (ref 0–0.5)
LYMPHOCYTES # BLD AUTO: 0.46 10*3/MM3 (ref 1–3)
LYMPHOCYTES NFR BLD AUTO: 16.8 % (ref 21–51)
MAGNESIUM SERPL-MCNC: 1.7 MG/DL (ref 1.7–2.6)
MCH RBC QN AUTO: 30.9 PG (ref 27–33)
MCHC RBC AUTO-ENTMCNC: 32.9 G/DL (ref 33–37)
MCV RBC AUTO: 94 FL (ref 80–94)
MONOCYTES # BLD AUTO: 0.26 10*3/MM3 (ref 0.1–0.9)
MONOCYTES NFR BLD AUTO: 9.5 % (ref 0–10)
NEUTROPHILS # BLD AUTO: 1.93 10*3/MM3 (ref 1.4–6.5)
NEUTROPHILS NFR BLD AUTO: 70.3 % (ref 30–70)
OSMOLALITY SERPL CALC.SUM OF ELEC: 288 MOSM/KG (ref 273–305)
PHOSPHATE SERPL-MCNC: 2.9 MG/DL (ref 2.7–4.5)
PLATELET # BLD AUTO: 73 10*3/MM3 (ref 130–400)
PMV BLD AUTO: 11 FL (ref 6–10)
POTASSIUM BLD-SCNC: 4.3 MMOL/L (ref 3.5–5.3)
PROT SERPL-MCNC: 5 G/DL (ref 6–8)
RBC # BLD AUTO: 3.82 10*6/MM3 (ref 4.7–6.1)
SODIUM BLD-SCNC: 135 MMOL/L (ref 135–153)
VANCOMYCIN TROUGH SERPL-MCNC: 11.2 MCG/ML (ref 5–15)
WBC NRBC COR # BLD: 2.74 10*3/MM3 (ref 4.5–12.5)

## 2017-09-15 PROCEDURE — 84100 ASSAY OF PHOSPHORUS: CPT | Performed by: INTERNAL MEDICINE

## 2017-09-15 PROCEDURE — 94799 UNLISTED PULMONARY SVC/PX: CPT

## 2017-09-15 PROCEDURE — 82962 GLUCOSE BLOOD TEST: CPT

## 2017-09-15 PROCEDURE — 80202 ASSAY OF VANCOMYCIN: CPT

## 2017-09-15 PROCEDURE — 86140 C-REACTIVE PROTEIN: CPT | Performed by: INTERNAL MEDICINE

## 2017-09-15 PROCEDURE — 25010000002 HYDROMORPHONE PER 4 MG: Performed by: INTERNAL MEDICINE

## 2017-09-15 PROCEDURE — 63710000001 INSULIN ASPART PER 5 UNITS: Performed by: FAMILY MEDICINE

## 2017-09-15 PROCEDURE — 85025 COMPLETE CBC W/AUTO DIFF WBC: CPT | Performed by: INTERNAL MEDICINE

## 2017-09-15 PROCEDURE — 80053 COMPREHEN METABOLIC PANEL: CPT | Performed by: INTERNAL MEDICINE

## 2017-09-15 PROCEDURE — 25010000002 PIPERACILLIN-TAZOBACTAM: Performed by: INTERNAL MEDICINE

## 2017-09-15 PROCEDURE — 83735 ASSAY OF MAGNESIUM: CPT | Performed by: INTERNAL MEDICINE

## 2017-09-15 PROCEDURE — 63710000001 INSULIN DETEMIR PER 5 UNITS: Performed by: INTERNAL MEDICINE

## 2017-09-15 PROCEDURE — 99233 SBSQ HOSP IP/OBS HIGH 50: CPT | Performed by: INTERNAL MEDICINE

## 2017-09-15 PROCEDURE — 25010000002 VANCOMYCIN PER 500 MG: Performed by: INTERNAL MEDICINE

## 2017-09-15 RX ORDER — MAGNESIUM SULFATE HEPTAHYDRATE 40 MG/ML
4 INJECTION, SOLUTION INTRAVENOUS ONCE
Status: COMPLETED | OUTPATIENT
Start: 2017-09-15 | End: 2017-09-15

## 2017-09-15 RX ORDER — HYDROCODONE BITARTRATE AND ACETAMINOPHEN 5; 325 MG/1; MG/1
1 TABLET ORAL ONCE
Status: COMPLETED | OUTPATIENT
Start: 2017-09-15 | End: 2017-09-15

## 2017-09-15 RX ADMIN — PANTOPRAZOLE SODIUM 40 MG: 40 TABLET, DELAYED RELEASE ORAL at 09:34

## 2017-09-15 RX ADMIN — VANCOMYCIN HYDROCHLORIDE 1500 MG: 5 INJECTION, POWDER, LYOPHILIZED, FOR SOLUTION INTRAVENOUS at 17:14

## 2017-09-15 RX ADMIN — MAGNESIUM SULFATE HEPTAHYDRATE 4 G: 40 INJECTION, SOLUTION INTRAVENOUS at 05:56

## 2017-09-15 RX ADMIN — GABAPENTIN 400 MG: 400 CAPSULE ORAL at 05:59

## 2017-09-15 RX ADMIN — METOCLOPRAMIDE 10 MG: 10 TABLET ORAL at 17:14

## 2017-09-15 RX ADMIN — MAGNESIUM GLUCONATE 500 MG ORAL TABLET 200 MG: 500 TABLET ORAL at 09:35

## 2017-09-15 RX ADMIN — INSULIN ASPART 5 UNITS: 100 INJECTION, SOLUTION INTRAVENOUS; SUBCUTANEOUS at 17:14

## 2017-09-15 RX ADMIN — METOCLOPRAMIDE 10 MG: 10 TABLET ORAL at 05:59

## 2017-09-15 RX ADMIN — GABAPENTIN 400 MG: 400 CAPSULE ORAL at 14:43

## 2017-09-15 RX ADMIN — INSULIN DETEMIR 5 UNITS: 100 INJECTION, SOLUTION SUBCUTANEOUS at 21:09

## 2017-09-15 RX ADMIN — PANCRELIPASE 24000 UNITS OF LIPASE: 60000; 12000; 38000 CAPSULE, DELAYED RELEASE PELLETS ORAL at 11:55

## 2017-09-15 RX ADMIN — VANCOMYCIN HYDROCHLORIDE 1500 MG: 5 INJECTION, POWDER, LYOPHILIZED, FOR SOLUTION INTRAVENOUS at 03:20

## 2017-09-15 RX ADMIN — HYDROCODONE BITARTRATE AND ACETAMINOPHEN 1 TABLET: 5; 325 TABLET ORAL at 22:24

## 2017-09-15 RX ADMIN — HYDROMORPHONE HYDROCHLORIDE 1 MG: 1 INJECTION, SOLUTION INTRAMUSCULAR; INTRAVENOUS; SUBCUTANEOUS at 00:23

## 2017-09-15 RX ADMIN — IPRATROPIUM BROMIDE AND ALBUTEROL SULFATE 3 ML: .5; 3 SOLUTION RESPIRATORY (INHALATION) at 11:26

## 2017-09-15 RX ADMIN — DICYCLOMINE HYDROCHLORIDE 10 MG: 10 CAPSULE ORAL at 09:34

## 2017-09-15 RX ADMIN — IPRATROPIUM BROMIDE AND ALBUTEROL SULFATE 3 ML: .5; 3 SOLUTION RESPIRATORY (INHALATION) at 23:26

## 2017-09-15 RX ADMIN — POLYETHYLENE GLYCOL 3350 17 G: 17 POWDER, FOR SOLUTION ORAL at 09:34

## 2017-09-15 RX ADMIN — PANCRELIPASE 24000 UNITS OF LIPASE: 60000; 12000; 38000 CAPSULE, DELAYED RELEASE PELLETS ORAL at 09:34

## 2017-09-15 RX ADMIN — PANCRELIPASE 24000 UNITS OF LIPASE: 60000; 12000; 38000 CAPSULE, DELAYED RELEASE PELLETS ORAL at 21:09

## 2017-09-15 RX ADMIN — PANCRELIPASE 24000 UNITS OF LIPASE: 60000; 12000; 38000 CAPSULE, DELAYED RELEASE PELLETS ORAL at 17:14

## 2017-09-15 RX ADMIN — PIPERACILLIN SODIUM,TAZOBACTAM SODIUM 3.38 G: 3; .375 INJECTION, POWDER, FOR SOLUTION INTRAVENOUS at 05:56

## 2017-09-15 RX ADMIN — ATORVASTATIN CALCIUM 20 MG: 20 TABLET, FILM COATED ORAL at 21:09

## 2017-09-15 RX ADMIN — GABAPENTIN 400 MG: 400 CAPSULE ORAL at 21:09

## 2017-09-15 RX ADMIN — INSULIN ASPART 12 UNITS: 100 INJECTION, SOLUTION INTRAVENOUS; SUBCUTANEOUS at 09:34

## 2017-09-15 RX ADMIN — INSULIN ASPART 5 UNITS: 100 INJECTION, SOLUTION INTRAVENOUS; SUBCUTANEOUS at 21:08

## 2017-09-15 RX ADMIN — DICYCLOMINE HYDROCHLORIDE 10 MG: 10 CAPSULE ORAL at 17:14

## 2017-09-15 RX ADMIN — DOXEPIN HYDROCHLORIDE 50 MG: 25 CAPSULE ORAL at 21:09

## 2017-09-15 RX ADMIN — POLYETHYLENE GLYCOL 3350 17 G: 17 POWDER, FOR SOLUTION ORAL at 17:14

## 2017-09-15 RX ADMIN — MAGNESIUM GLUCONATE 500 MG ORAL TABLET 200 MG: 500 TABLET ORAL at 17:14

## 2017-09-15 RX ADMIN — INSULIN ASPART 3 UNITS: 100 INJECTION, SOLUTION INTRAVENOUS; SUBCUTANEOUS at 11:56

## 2017-09-15 RX ADMIN — PIPERACILLIN SODIUM,TAZOBACTAM SODIUM 3.38 G: 3; .375 INJECTION, POWDER, FOR SOLUTION INTRAVENOUS at 14:43

## 2017-09-15 RX ADMIN — HYDROCODONE BITARTRATE AND ACETAMINOPHEN 1 TABLET: 5; 325 TABLET ORAL at 11:55

## 2017-09-15 RX ADMIN — PIPERACILLIN SODIUM,TAZOBACTAM SODIUM 3.38 G: 3; .375 INJECTION, POWDER, FOR SOLUTION INTRAVENOUS at 22:22

## 2017-09-15 RX ADMIN — VANCOMYCIN HYDROCHLORIDE 1500 MG: 5 INJECTION, POWDER, LYOPHILIZED, FOR SOLUTION INTRAVENOUS at 09:35

## 2017-09-15 NOTE — CONSULTS
"Diabetes Education  Assessment/Teaching    Patient Name:  Isaias Lang  YOB: 1965  MRN: [id number]  Admit Date:  9/13/2017      Assessment Date:  9/15/2017       Most Recent Value    General Information      Height  5' 8\" (1.727 m)    Height Method  Stated    Weight  186 lb 4 oz (84.5 kg)    Weight Method  Bed scale    Pregnancy Assessment     Diabetes History     What type of diabetes do you have?  Type 2    Length of Diabetes Diagnosis  -- [\"long time\"]    What makes it difficult for you to take care of your diabetes or yourself?  client admits to IV drug abuse well known to me from previous extensive admits    Do you have any diabetes complications?  other (comment)    Education Preferences     Nutrition Information     Assessment Topics     DM Goals                Most Recent Value    DM Education Needs     Motivation  Other (comment) [drowsy]    Teaching Method  Explanation, Discussion    Patient Response  Verbalized understanding [denies any questions r/t diabetes]            Other Comments:          Electronically signed by:  Cherri Oliver RN  09/15/17 5:13 PM  "

## 2017-09-15 NOTE — ANESTHESIA POSTPROCEDURE EVALUATION
Patient: Isaias Guanburn    Procedure Summary     Date Anesthesia Start Anesthesia Stop Room / Location    09/14/17 1934 2020  [facility] 01 / [facility]       Procedure Diagnosis Surgeon Provider    INCISION AND DRAINAGE LEFT THUMB (Left Arm Upper) Abscess of left thumb  (Abscess of left thumb [L02.512]) MD Rosalio Thomas MD          Anesthesia Type: general  Last vitals  BP     105/75     Temp     97.6   Pulse    93   Resp     18   SpO2 100%         Post Anesthesia Care and Evaluation    Patient location during evaluation: PACU  Patient participation: complete - patient participated  Level of consciousness: awake and alert  Pain score: 1  Pain management: adequate  Airway patency: patent  Anesthetic complications: No anesthetic complications  PONV Status: controlled  Cardiovascular status: acceptable  Respiratory status: acceptable  Hydration status: acceptable

## 2017-09-15 NOTE — PROGRESS NOTES
Pharmacokinetics Service Note:    Day 3 Zosyn  Day 3 vancomycin 1.5 gm q8hrs    Mr. Lang's non steady state vancomycin trough level was reported as 11.2 mg/L this AM.  Dosing times were off schedule yesterday due to loss of IV access and his surgical procedure, so this level is not indicative of steady state.  However, clearance appears to be adequate at this time.  Will continue the present regimen for now and will obtain a steady state trough level tomorrow to determine if any adjustments are needed.    Thank you.  Tanna Rainey, Pharm.D.  9/15/2017  8:22 AM

## 2017-09-15 NOTE — PROGRESS NOTES
Discharge Planning Assessment   Art     Patient Name: Isaias Lang  MRN: [id number]  Today's Date: 9/15/2017    Admit Date: 9/13/2017          Discharge Needs Assessment     None            Discharge Plan       09/15/17 1222    Case Management/Social Work Plan    Plan Pt admitted on 9/13/17.  Pt lives at home with step father and plans to return home at discharge.  Pt currently does not utilize home health services.  SS will follow and assist with discharge needs.     Patient/Family In Agreement With Plan yes        Discharge Placement     No information found        Expected Discharge Date and Time     Expected Discharge Date Expected Discharge Time    Sep 17, 2017               Demographic Summary     None            Functional Status     None            Psychosocial     None            Abuse/Neglect     None            Legal     None            Substance Abuse     None            Patient Forms     None          Sho Ferro

## 2017-09-15 NOTE — OP NOTE
preop diagnosis: left thumb abscess     Post op abscess: same    Procedure: incision and drainage left thumb abscess.    Surgeon: Juany WOLF    Asst: Hermelindo DIAZ    Anesthesia: general    Complications: none    Specimens: swab sent for culture    EBL: 5 cc    Indications for procedure: large thumb abscess with loss of ability to move thumb secondary to pressure.  Needs drainage to evacuate abscess and ensure proper healing.  R/B/A discussed with patient and consent obtained.    Description of the procedure: after adequate anesthesia was induced left hand prepped and draped in standard surgical fashion.  Incision made over dorsum of thum through skin and subcutaneous tissue and large collection of pus immediately drained and was suctioned from area and swab sent for specimen.  There was some induration also on the flexor side of thumb and a small incision made over base of thumb and hemostat spread down to the tnedon sheath with slight infection drained.  Wounds irrigated with saline and packed on the dorsal surface with betadyne soaked packing.  On the volar side the incision was left open to drain.  Local anesthetic injected as a digital block.  Sterile dressings applied. Patient awakened and taken to the recovery room in stable condition.    Adin Harrington M.D.

## 2017-09-15 NOTE — PROGRESS NOTES
[facility] HOSPITALIST PROGRESS NOTE     Patient Identification:  Name:  Isaias Lang  Age:  52 y.o.  Sex:  male  :  1965  MRN:  [id number]  Visit Number:  [id number]  Primary Care Provider:  Ankit Jade MD    Length of stay:  2    Chief complaint:  Left thumb lesion    Subjective:  52-year-old who was admitted on 2017 with a left thumb abscess after injection of suboxone and methamphetamine.  He had incision and drainage of the abscess on 2017.  Today, he is having post op pain and is requesting IV pain meds.  He denies chest pain, denies shortness of air and denies coughing.  He is not eating well yet.  ----------------------------------------------------------------------------------------------------------------------  Current Hospital Meds:  atorvastatin 20 mg Oral Nightly   dicyclomine 10 mg Oral BID   doxepin 50 mg Oral Nightly   gabapentin 400 mg Oral Q8H   HYDROmorphone 1 mg Intravenous Once   insulin aspart 0-14 Units Subcutaneous 4x Daily AC & at Bedtime   insulin detemir 5 Units Subcutaneous Nightly   magnesium oxide 200 mg Oral BID   magnesium sulfate 4 g Intravenous Once   metoclopramide 10 mg Oral BID AC   metoprolol tartrate 50 mg Oral Daily   pancrelipase (Lip-Prot-Amyl) 24,000 units of lipase Oral 4x Daily With Meals & Nightly   pantoprazole 40 mg Oral Daily   piperacillin-tazobactam 3.375 g Intravenous Q8H   polyethylene glycol 17 g Oral BID   vancomycin 1,500 mg Intravenous Q8H   ----------------------------------------------------------------------------------------------------------------------  Vital Signs:  Temp:  [96.4 °F (35.8 °C)-98.8 °F (37.1 °C)] 98.1 °F (36.7 °C)  Heart Rate:  [] 102  Resp:  [16-20] 16  BP: ()/(54-95) 140/72  Last 3 weights    17  1800 177 09/15/17  0345   Weight: 180 lb (81.6 kg) 173 lb 4.8 oz (78.6 kg) 186 lb 4 oz (84.5 kg)     Body mass index is 28.32 kg/(m^2).        Diet Regular; Thin; Cardiac,  Consistent Carbohydrate  ----------------------------------------------------------------------------------------------------------------------  Physical exam:  Constitutional:  Well-developed and well-nourished.  No respiratory distress.      HENT:  Head: Normocephalic and atraumatic.  Mouth:  Moist mucous membranes.    Eyes:  Conjunctivae and EOM are normal.  Pupils are equal, round, and reactive to light.  No scleral icterus.  Neck:  Neck supple.  No JVD present.    Cardiovascular:  Normal rate, regular rhythm and normal heart sounds with no murmur.  Pulmonary/Chest:  No respiratory distress, no wheezes, no crackles, with normal breath sounds and good air movement.  Abdominal:  Soft.  Bowel sounds are normal.  No distension and no tenderness.   Musculoskeletal:  No edema, no tenderness, and no deformity.  No red or swollen joints anywhere.    Neurological:  Alert and oriented to person, place, and time.  No cranial nerve deficit.  No tongue deviation.  No facial droop.  No slurred speech.   Skin:  Left thumb with swelling but the incision was covered with an occlusive dressing with no drainage on it.  There are new skin lesions.  Psychiatric:  Normal mood and affect.  Behavior is normal.  Judgment and thought content normal.   Peripheral vascular:  No edema and strong pulses on all 4 extremities.  Genitourinary:  No mccann catheter in place.  ----------------------------------------------------------------------------------------------------------------------  Tele:  NS : I have personally reviewed/looked at the telemetry strips.  ----------------------------------------------------------------------------------------------------------------------  Results from last 7 days  Lab Units 09/15/17  0415 09/14/17  0040 09/13/17  1902   CRP mg/dL  --  2.09* 2.40*   LACTATE mmol/L  --   --  1.6   WBC 10*3/mm3 2.74* 3.60* 4.52   HEMOGLOBIN g/dL 11.8* 12.0* 14.0   HEMATOCRIT % 35.9* 36.7* 43.1   MCV fL 94.0 94.1*  94.7*   MCHC g/dL 32.9* 32.7* 32.5*   PLATELETS 10*3/mm3 73* 42* 71*     Results from last 7 days  Lab Units 09/15/17  0207 09/14/17  0040 09/13/17  1902   SODIUM mmol/L  --  135 135   POTASSIUM mmol/L  --  4.2 5.3   MAGNESIUM mg/dL 1.7 1.5*  --    CHLORIDE mmol/L  --  104 105   CO2 mmol/L  --  24.7 24.3   BUN mg/dL  --  9 <5*   CREATININE mg/dL  --  0.71 0.57   EGFR IF NONAFRICN AM mL/min/1.73  --  117 150   CALCIUM mg/dL  --  8.1 9.0   GLUCOSE mg/dL  --  422* 228*   ALBUMIN g/dL  --  2.50* 3.10*   BILIRUBIN mg/dL  --  0.9 1.6   ALK PHOS U/L  --  99 110   AST (SGOT) U/L  --  78* 123*   ALT (SGPT) U/L  --  69* 85*   Estimated Creatinine Clearance: 128.8 mL/min (by C-G formula based on Cr of 0.71).      Blood Culture   Date Value Ref Range Status   09/13/2017 No growth at 24 hours  Preliminary   09/13/2017 No growth at 24 hours  Preliminary     Wound culture 9/13/2017:       I have personally looked at the labs and they are summarized above.  ----------------------------------------------------------------------------------------------------------------------  Imaging Results (last 24 hours)     Procedure Component Value Units Date/Time    XR Hand 3+ View Left [[id number]] Collected:  09/14/17 0738     Updated:  09/14/17 0740    COMPARISON: NONE  FINDINGS:       BONES: No acute fracture. No blastic or lytic lesions.       JOINT: No dislocation.       SOFT TISSUES:  SIGNIFICANT SOFT TISSUE SWELLING OF THE 1ST DIGIT.    Impression:       No acute or destructive bony abnormality.  COMMUNICATION: Per this written report.  This report was finalized on 9/14/2017 7:38 AM by Dr. Brendan Tolentino MD.         XR Chest AP [[id number]] Collected:  09/14/17 0738     Updated:  09/14/17 0740    FINDINGS:   The lungs remain aerated.  Heart and mediastinum contours are unremarkable.  No pleural effusion.  No pneumothorax.   Bony and soft tissue structures are unremarkable.    Impression:       No radiographic evidence of acute cardiac or  pulmonary disease.  This report was finalized on 9/14/2017 7:38 AM by Dr. Brendan Tolentino MD.      I have personally looked at the radiology images and read the final radiology report.  ----------------------------------------------------------------------------------------------------------------------  Assessment and Plan:  -Left thumb abscess and cellulitis causing sepsis that was present on admission  -Hepatitis C causing transaminitis and thrombocytopenia  -Type II diabetes mellitus, insulin dependent with hyperglycemia   -History of AFib, currently in normal sinus rhythm  -COPD without acute exacerbation   -Essential hypertension, controlled   -Acute hypomagnesemia  -Hyperlipidemia   -History of coronary artery disease, S/P heart catheterization in 2012  -History of systolic congestive heart failure with EF of 51-55% on 4/4/2017  -IV drug abuse with history of amphetamine, suboxone, cocaine, and opioid abuse in the past  -History of MRSA of the achilles tendon and LUE  -Depression   -Anxiety   -Obstructive sleep apnea without use of BiPAP  -History of chronic pancreatitis   -Morbid obesity, now with lower BMI than in the past and now he is only overweight  -Tobacco smoking addiction    Will continue the vancomycin and Zosyn for now while the cultures are pending.  We will repeat the blood work in the morning to see how the sepsis is progressing; so far, it appears to be improving.  He has been in normal sinus rhythm during his entire stay here; we will continue to monitor him on telemetry.  I gave him a one-time dose of the allotted for some postop pain but now he needs to take oral medication for his pain control due to the addictive potential of IV pain medication in a patient with diarrhea and drug addict.  I await the [facility]vin report.  We will continue to monitor the magnesium level and replace if it is low.    The patient is high risk due to the following diagnoses/reasons:  sepsis    Elva Hdz,  MD  09/15/17  4:50 AM

## 2017-09-15 NOTE — PAYOR COMM NOTE
"Isaias Lang (52 y.o. Male)     Date of Birth Social Security Number Address Home Phone MRN    1965  PO   [facility] [address] [phone] [id number]    Christian Marital Status          Spiritism        Admission Date Admission Type Admitting Provider Attending Provider Department, Room/Bed    9/13/17 Emergency Elva Hdz MD Perkins, Jimmye S, MD [address], 3305/2S    Discharge Date Discharge Disposition Discharge Destination                      Attending Provider: Elva Hdz MD     Allergies:  Robitussin Dm [Dextromethorphan-guaifenesin], Tizanidine, Metformin, Sulfa Antibiotics, Contrast Dye, Tramadol    Isolation:  Contact   Infection:  None   Code Status:  FULL    Ht:  68\" (172.7 cm)   Wt:  186 lb 4 oz (84.5 kg)    Admission Cmt:  None   Principal Problem:  Abscess of left thumb [L02.512]                 Active Insurance as of 9/13/2017     Primary Coverage     Payor Plan Insurance Group Employer/Plan Group    [facility]      Payor Plan Address Payor Plan Phone Number Effective From Effective To    [address] [address]  2/1/2016     [place] [address]       Subscriber Name Subscriber Birth Date Member ID       ISAIAS LANG 1965 [id number]                 Emergency Contacts      (Rel.) Home Phone Work Phone Mobile Phone    Domenico Mckinney (Father) [phone] -- --            Hospital Medications (all)       Dose Frequency Start End    atorvastatin (LIPITOR) tablet 20 mg 20 mg Nightly 9/14/2017     Sig - Route: Take 1 tablet by mouth Every Night. - Oral    dextrose (D50W) solution 25 g 25 g Every 15 Minutes PRN 9/13/2017     Sig - Route: Infuse 50 mL into a venous catheter Every 15 (Fifteen) Minutes As Needed for Low Blood Sugar (Blood Sugar Less Than 70, Patient Has IV Access - Unresponsive, NPO or Unable To Safely Swallow). - Intravenous    dextrose (D50W) solution 25 g 25 g Every 15 Minutes " PRN 9/14/2017     Sig - Route: Infuse 50 mL into a venous catheter Every 15 (Fifteen) Minutes As Needed for Low Blood Sugar (Blood Sugar Less Than 70, Patient Has IV Access - Unresponsive, NPO or Unable To Safely Swallow). - Intravenous    dextrose (GLUTOSE) oral gel 15 g 15 g Every 15 Minutes PRN 9/13/2017     Sig - Route: Take 15 g by mouth Every 15 (Fifteen) Minutes As Needed for Low Blood Sugar (Blood Sugar Less Than 70, Patient Alert, Is Not NPO & Can Safely Swallow). - Oral    dextrose (GLUTOSE) oral gel 15 g 15 g Every 15 Minutes PRN 9/14/2017     Sig - Route: Take 15 g by mouth Every 15 (Fifteen) Minutes As Needed for Low Blood Sugar (Blood Sugar Less Than 70, Patient Alert, Is Not NPO & Can Safely Swallow). - Oral    dicyclomine (BENTYL) capsule 10 mg 10 mg 2 Times Daily 9/14/2017     Sig - Route: Take 1 capsule by mouth 2 (Two) Times a Day. - Oral    doxepin (SINEquan) capsule 50 mg 50 mg Nightly 9/14/2017     Sig - Route: Take 2 capsules by mouth Every Night. - Oral    fentaNYL citrate (PF) (SUBLIMAZE) injection 50 mcg 50 mcg Every 5 Minutes PRN 9/14/2017 9/14/2017    Sig - Route: Infuse 1 mL into a venous catheter Every 5 (Five) Minutes As Needed for Moderate Pain  or Severe Pain . - Intravenous    gabapentin (NEURONTIN) capsule 400 mg 400 mg Every 8 Hours Scheduled 9/14/2017     Sig - Route: Take 1 capsule by mouth Every 8 (Eight) Hours. - Oral    glucagon (GLUCAGEN) injection 1 mg 1 mg Every 15 Minutes PRN 9/13/2017     Sig - Route: Inject 1 mg under the skin Every 15 (Fifteen) Minutes As Needed (Blood Glucose Less Than 70 - Patient Without IV Access - Unresponsive, NPO or Unable To Safely Swallow). - Subcutaneous    glucagon (human recombinant) (GLUCAGEN DIAGNOSTIC) injection 1 mg 1 mg Every 15 Minutes PRN 9/14/2017     Sig - Route: Inject 1 mg under the skin Every 15 (Fifteen) Minutes As Needed (Blood Glucose Less Than 70 - Patient Without IV Access - Unresponsive, NPO or Unable To Safely Swallow). -  Subcutaneous    HYDROcodone-acetaminophen (NORCO) 5-325 MG per tablet 1 tablet 1 tablet Daily PRN 9/13/2017     Sig - Route: Take 1 tablet by mouth Daily As Needed for Moderate Pain . - Oral    HYDROcodone-acetaminophen (NORCO) 5-325 MG per tablet 1 tablet 1 tablet Once 9/15/2017     Sig - Route: Take 1 tablet by mouth 1 (One) Time. - Oral    HYDROmorphone (DILAUDID) injection 1 mg 1 mg Once 9/13/2017 9/13/2017    Sig - Route: Infuse 1 mL into a venous catheter 1 (One) Time. - Intravenous    HYDROmorphone (DILAUDID) injection 1 mg 1 mg Once 9/14/2017     Sig - Route: Infuse 1 mL into a venous catheter 1 (One) Time. - Intravenous    HYDROmorphone (DILAUDID) injection 1 mg 1 mg Once 9/15/2017 9/15/2017    Sig - Route: Infuse 1 mL into a venous catheter 1 (One) Time. - Intravenous    insulin aspart (novoLOG) injection 0-14 Units 0-14 Units 4 Times Daily Before Meals & Nightly 9/14/2017     Sig - Route: Inject 0-14 Units under the skin 4 (Four) Times a Day Before Meals & at Bedtime. - Subcutaneous    insulin detemir (LEVEMIR) injection 5 Units 5 Units Nightly 9/14/2017     Sig - Route: Inject 5 Units under the skin Every Night. - Subcutaneous    ipratropium-albuterol (DUO-NEB) nebulizer solution 3 mL 3 mL Every 6 Hours PRN 9/13/2017     Sig - Route: Take 3 mL by nebulization Every 6 (Six) Hours As Needed for Shortness of Air. - Nebulization    ketorolac (TORADOL) injection 30 mg 30 mg Once 9/13/2017 9/13/2017    Sig - Route: Infuse 30 mg into a venous catheter 1 (One) Time. - Intravenous    Cosign for Ordering: Accepted by Elgin Daniels MD on 9/13/2017  6:33 PM    magnesium oxide (MAGOX) tablet 200 mg 200 mg 2 Times Daily 9/14/2017     Sig - Route: Take 0.5 tablets by mouth 2 (Two) Times a Day. - Oral    Magnesium Sulfate 2 gram Bolus, followed by 8 gram infusion (total Mg dose 10 grams)- Mg less than or equal to 1mg/dL 2 g As Needed 9/14/2017     Sig - Route: Infuse 50 mL into a venous catheter As Needed (Mg  "less than or equal to 1mg/dL). - Intravenous    Linked Group 1:  \"Or\" Linked Group Details        magnesium sulfate 4 gram infusion- Mg 1.6-1.9 mg/dL 4 g As Needed 9/14/2017     Sig - Route: Infuse 100 mL into a venous catheter As Needed (Mg 1.6-1.9 mg/dL). - Intravenous    Linked Group 1:  \"Or\" Linked Group Details        magnesium sulfate 4 gram infusion- Mg 1.6-1.9 mg/dL 4 g Once 9/15/2017 9/15/2017    Sig - Route: Infuse 100 mL into a venous catheter 1 (One) Time. - Intravenous    Magnesium Sulfate 6 gram Infusion (2 gm x 3) -Mg 1.1 -1.5 mg/dL 2 g As Needed 9/14/2017     Sig - Route: Infuse 50 mL into a venous catheter As Needed (Mg 1.1 -1.5 mg/dL). - Intravenous    Linked Group 1:  \"Or\" Linked Group Details        Magnesium Sulfate 6 gram Infusion (2 gm x 3) -Mg 1.1 -1.5 mg/dL 2 g Every 2 Hours 9/14/2017 9/14/2017    Sig - Route: Infuse 50 mL into a venous catheter Every 2 (Two) Hours. - Intravenous    metoclopramide (REGLAN) tablet 10 mg 10 mg 2 Times Daily Before Meals 9/14/2017     Sig - Route: Take 1 tablet by mouth 2 (Two) Times a Day Before Meals. - Oral    metoprolol tartrate (LOPRESSOR) tablet 50 mg 50 mg Daily 9/14/2017     Sig - Route: Take 1 tablet by mouth Daily. - Oral    nitroglycerin (NITROSTAT) SL tablet 0.4 mg 0.4 mg Every 5 Minutes PRN 9/13/2017     Sig - Route: Place 1 tablet under the tongue Every 5 (Five) Minutes As Needed for Chest Pain. - Sublingual    pancrelipase (Lip-Prot-Amyl) (CREON) capsule 24,000 units of lipase 24,000 units of lipase 4 Times Daily With Meals & Nightly 9/14/2017     Sig - Route: Take 2 capsules by mouth 4 (Four) Times a Day With Meals & at Bedtime. - Oral    pantoprazole (PROTONIX) EC tablet 40 mg 40 mg Daily 9/14/2017     Sig - Route: Take 1 tablet by mouth Daily. - Oral    Pharmacy Consult  Continuous PRN 9/13/2017     Sig - Route: Continuous As Needed for Consult. - Does not apply    piperacillin-tazobactam (ZOSYN) 3.375 g/100 mL 0.9% NS IVPB (mbp) 3.375 g Every " "8 Hours 9/14/2017     Sig - Route: Infuse 100 mL into a venous catheter Every 8 (Eight) Hours. - Intravenous    Linked Group 2:  \"And\" Linked Group Details        polyethylene glycol (MIRALAX) powder 17 g 17 g 2 Times Daily 9/14/2017     Sig - Route: Take 17 g by mouth 2 (Two) Times a Day. - Oral    sodium chloride 0.9 % flush 1-10 mL 1-10 mL As Needed 9/13/2017     Sig - Route: Infuse 1-10 mL into a venous catheter As Needed for Line Care. - Intravenous    sodium chloride 0.9 % flush 1-10 mL 1-10 mL As Needed 9/13/2017     Sig - Route: Infuse 1-10 mL into a venous catheter As Needed for Line Care. - Intravenous    sodium chloride 0.9 % flush 10 mL 10 mL As Needed 9/13/2017     Sig - Route: Infuse 10 mL into a venous catheter As Needed for Line Care. - Intravenous    Cosign for Ordering: Accepted by Elgin Daniels MD on 9/13/2017  6:33 PM    Linked Group 3:  \"And\" Linked Group Details        vancomycin (VANCOCIN) 1,500 mg in sodium chloride 0.9 % 500 mL IVPB 1,500 mg Every 8 Hours 9/14/2017     Sig - Route: Infuse 1,500 mg into a venous catheter Every 8 (Eight) Hours. - Intravenous    Notes to Pharmacy: RN started to hang bag at 1151, but patient lost IV access.  RN called at approx 1500 and said patient also had Zosyn and Magnesium IVs due.  Patient did not receive 1151 dose of Vanco, so rescheduled dose to start at 1600.    vancomycin (VANCOCIN) 1,750 mg in sodium chloride 0.9 % 500 mL IVPB 20 mg/kg × 81.6 kg Once 9/13/2017 9/13/2017    Sig - Route: Infuse 1,750 mg into a venous catheter 1 (One) Time. - Intravenous    Cosign for Ordering: Accepted by Elgin Daniels MD on 9/13/2017  6:33 PM    bacitracin 50,000 Units, polymyxin B 500,000 Units in sodium chloride (NS) 1,000 mL irrigation (Discontinued)  As Needed 9/14/2017 9/14/2017    Sig: As Needed.    Reason for Discontinue: Patient Discharge    bupivacaine (MARCAINE) injection (Discontinued)  As Needed 9/14/2017 9/14/2017    Sig: As Needed.    " "Reason for Discontinue: Patient Discharge    glucagon (human recombinant) (GLUCAGEN DIAGNOSTIC) injection 1 mg (Discontinued) 1 mg Every 15 Minutes PRN 9/13/2017 9/13/2017    Sig - Route: Inject 1 mg under the skin Every 15 (Fifteen) Minutes As Needed (Blood Glucose Less Than 70 - Patient Without IV Access - Unresponsive, NPO or Unable To Safely Swallow). - Subcutaneous    heparin (porcine) 5000 UNIT/ML injection 5,000 Units (Discontinued) 5,000 Units Every 12 Hours Scheduled 9/13/2017 9/13/2017    Sig - Route: Inject 1 mL under the skin Every 12 (Twelve) Hours. - Subcutaneous    ipratropium-albuterol (DUO-NEB) nebulizer solution 3 mL (Discontinued) 3 mL Once As Needed 9/14/2017 9/14/2017    Sig - Route: Take 3 mL by nebulization 1 (One) Time As Needed for Wheezing or Shortness of Air (bronchospasm). - Nebulization    Reason for Discontinue: Patient Transfer    lactated ringers infusion (Discontinued) 125 mL/hr Continuous 9/14/2017 9/14/2017    Sig - Route: Infuse 125 mL/hr into a venous catheter Continuous. - Intravenous    meperidine (DEMEROL) injection 12.5 mg (Discontinued) 12.5 mg Every 5 Minutes PRN 9/14/2017 9/14/2017    Sig - Route: Infuse 0.5 mL into a venous catheter Every 5 (Five) Minutes As Needed for Shivering (May repeat x 1). - Intravenous    Reason for Discontinue: Patient Transfer    ondansetron (ZOFRAN) injection 4 mg (Discontinued) 4 mg Once As Needed 9/14/2017 9/14/2017    Sig - Route: Infuse 2 mL into a venous catheter 1 (One) Time As Needed for Nausea or Vomiting (may repeat times one dose). - Intravenous    Reason for Discontinue: Patient Transfer    Pharmacy to dose vancomycin (Discontinued)  Continuous PRN 9/13/2017 9/14/2017    Sig - Route: Continuous As Needed for Consult. - Does not apply    Linked Group 2:  \"And\" Linked Group Details        piperacillin-tazobactam (ZOSYN) 3.375 g/100 mL 0.9% NS IVPB (mbp) (Discontinued) 3.375 g Every 6 Hours Scheduled 9/13/2017 9/14/2017    Sig - Route: " "Infuse 100 mL into a venous catheter Every 6 (Six) Hours. - Intravenous    Linked Group 2:  \"And\" Linked Group Details        sodium chloride 0.9 % flush 1-10 mL (Discontinued) 1-10 mL As Needed 9/14/2017 9/14/2017    Sig - Route: Infuse 1-10 mL into a venous catheter As Needed for Line Care. - Intravenous    Reason for Discontinue: Patient Transfer    vancomycin (VANCOCIN) 1,500 mg in sodium chloride 0.9 % 500 mL IVPB (Discontinued) 20 mg/kg × 78.6 kg Once 9/13/2017 9/13/2017    Sig - Route: Infuse 1,500 mg into a venous catheter 1 (One) Time. - Intravenous    Reason for Discontinue: Dose adjustment    Linked Group 2:  \"And\" Linked Group Details        vancomycin (VANCOCIN) 1,500 mg in sodium chloride 0.9 % 500 mL IVPB (Discontinued) 1,500 mg Every 8 Hours 9/14/2017 9/14/2017    Sig - Route: Infuse 1,500 mg into a venous catheter Every 8 (Eight) Hours. - Intravenous    Reason for Discontinue: Error          Orders (last 24 hrs)     Start     Ordered    09/16/17 1000  Vancomycin, Trough  Timed      09/15/17 0818    09/16/17 1000  Nursing Communication A vancomycin trough level has been ordered prior to the 1100 dose Saturday. Draw level at 1000 on 9/16.  Hold dose if trough level is greater than 20 mg/L.  Once     Comments:  A vancomycin trough level has been ordered prior to the 1100 dose Saturday. Draw level at 1000 on 9/16.  Hold dose if trough level is greater than 20 mg/L.    09/15/17 0818    09/16/17 0600  CBC & Differential  Daily      09/15/17 0521    09/16/17 0600  Comprehensive Metabolic Panel  Daily      09/15/17 0521    09/16/17 0600  C-reactive Protein  Daily      09/15/17 0521    09/16/17 0600  Magnesium  Daily      09/15/17 0521    09/16/17 0600  Phosphorus  Daily      09/15/17 0521    09/15/17 1531  POC Glucose Fingerstick  Once      09/15/17 1530    09/15/17 1300  HYDROcodone-acetaminophen (NORCO) 5-325 MG per tablet 1 tablet  Once      09/15/17 1219    09/15/17 1132  POC Glucose Fingerstick  Once   "    09/15/17 1131    09/15/17 1000  Change Dressing  Every Shift     Comments:  Pull packing and do wet to dry dressing daily with normal saline to dorsum of thumb.    09/14/17 2153    09/15/17 0659  POC Glucose Fingerstick  Once      09/15/17 0658    09/15/17 0600  Magnesium  Morning Draw      09/14/17 1731    09/15/17 0505  Osmolality, Calculated  Once      09/15/17 0504    09/15/17 0500  magnesium sulfate 4 gram infusion- Mg 1.6-1.9 mg/dL  Once      09/15/17 0338    09/15/17 0353  CBC & Differential  STAT      09/15/17 0352    09/15/17 0353  Comprehensive Metabolic Panel  STAT      09/15/17 0352    09/15/17 0353  Phosphorus  STAT      09/15/17 0352    09/15/17 0353  C-reactive Protein  STAT      09/15/17 0352    09/15/17 0353  CBC Auto Differential  PROCEDURE ONCE      09/15/17 0352    09/15/17 0200  Vancomycin, Trough  Timed      09/14/17 0810    09/15/17 0200  Nursing Communication A vancomycin trough level has been ordered prior to the 0300 dose Friday. Draw level at 0200 on 9/15.  Hold dose if trough level is greater than 20 mg/L.  Once     Comments:  A vancomycin trough level has been ordered prior to the 0300 dose Friday. Draw level at 0200 on 9/15.  Hold dose if trough level is greater than 20 mg/L.    09/14/17 0810    09/15/17 0030  HYDROmorphone (DILAUDID) injection 1 mg  Once      09/14/17 2357    09/14/17 2153  POC Glucose Fingerstick  Once      09/14/17 2152 09/14/17 2150  Diet Regular; Thin; Cardiac, Consistent Carbohydrate  Diet Effective Now      09/14/17 2149 09/14/17 2040  Oxygen Therapy- Blow by - Humidified; Titrate for SPO2: equal to or greater than, 96%, per policy  Continuous      09/14/17 2039 09/14/17 2040  Pulse Oximetry, Continuous  Continuous      09/14/17 2039 09/14/17 2024  Vital signs every 5 minutes for 15 minutes, every 15 minutes thereafter.  Once,   Status:  Canceled      09/14/17 2023 09/14/17 2024  Continue OR fluids  Until Discontinued,   Status:  Canceled       09/14/17 2023 09/14/17 2024  Call Anesthesiologist for additional IV Fluid bolus for Hypotension/Tachycardia  Until Discontinued,   Status:  Canceled      09/14/17 2023 09/14/17 2024  Notify Anesthesia of Any Acute Changes in Patient Condition  Until Discontinued,   Status:  Canceled      09/14/17 2023 09/14/17 2024  Notify Anesthesia for Unrelieved Pain  Until Discontinued,   Status:  Canceled      09/14/17 2023 09/14/17 2024  POC Glucose Fingerstick  STAT,   Status:  Canceled     Comments:  Notify Anesthesia if blood sugar is less than 80 mg/dL or greater than 180mg/dL    09/14/17 2023 09/14/17 2024  Once DC criteria to floor met, follow surgeon's orders.  Until Discontinued,   Status:  Canceled      09/14/17 2023 09/14/17 2024  Discharge patient from PACU when discharge criteria is met.  Until Discontinued,   Status:  Canceled      09/14/17 2023 09/14/17 2023  fentaNYL citrate (PF) (SUBLIMAZE) injection 50 mcg  Every 5 Minutes PRN      09/14/17 2023 09/14/17 2023  ipratropium-albuterol (DUO-NEB) nebulizer solution 3 mL  Once As Needed,   Status:  Discontinued      09/14/17 2023 09/14/17 2023  ondansetron (ZOFRAN) injection 4 mg  Once As Needed,   Status:  Discontinued      09/14/17 2023 09/14/17 2023  meperidine (DEMEROL) injection 12.5 mg  Every 5 Minutes PRN,   Status:  Discontinued      09/14/17 2023 09/14/17 2001  Culture, Routine     Comments:  Specimen 1: Obtained during surgery    09/14/17 2001 09/14/17 2000  lactated ringers infusion  Continuous,   Status:  Discontinued      09/14/17 1925 09/14/17 1957  bacitracin 50,000 Units, polymyxin B 500,000 Units in sodium chloride (NS) 1,000 mL irrigation  As Needed,   Status:  Discontinued      09/14/17 1957 09/14/17 1957  bupivacaine (MARCAINE) injection  As Needed,   Status:  Discontinued      09/14/17 1957 09/14/17 1935  POC Glucose Fingerstick  Once      09/14/17 1934 09/14/17 1926  Oxygen Therapy- Nasal  Cannula; Titrate for SPO2: equal to or greater than, 90%  Continuous,   Status:  Canceled      09/14/17 1925 09/14/17 1926  POC Glucose Fingerstick  Once,   Status:  Canceled     Comments:  For all diabetic patients and all patients who are to be admitted. Notify Anesthesiologist for blood sugar > 180.    09/14/17 1925 09/14/17 1926  Insert Peripheral IV  Once,   Status:  Canceled      09/14/17 1925 09/14/17 1926  Saline Lock & Maintain IV Access  Continuous,   Status:  Canceled      09/14/17 1925 09/14/17 1925  Vital Signs - Per Anesthesia Protocol  As Needed,   Status:  Canceled      09/14/17 1925 09/14/17 1925  Pulse Oximetry, Continuous  As Needed,   Status:  Canceled      09/14/17 1925 09/14/17 1925  sodium chloride 0.9 % flush 1-10 mL  As Needed,   Status:  Discontinued      09/14/17 1925 09/14/17 1913  Verify NPO Status  Continuous,   Status:  Canceled      09/14/17 1912 09/14/17 1913  Obtain informed consent (if not collected inpatient or PAT)  Once,   Status:  Canceled      09/14/17 1912 09/14/17 1730  insulin aspart (novoLOG) injection 0-14 Units  4 Times Daily Before Meals & Nightly      09/14/17 1401    09/14/17 1700  POC Glucose Fingerstick  4 Times Daily Before Meals & at Bedtime      09/14/17 1401    09/14/17 1631  POC Glucose Fingerstick  Once      09/14/17 1630    09/14/17 1600  vancomycin (VANCOCIN) 1,500 mg in sodium chloride 0.9 % 500 mL IVPB  Every 8 Hours     Comments:  RN started to hang bag at 1151, but patient lost IV access.  RN called at approx 1500 and said patient also had Zosyn and Magnesium IVs due.  Patient did not receive 1151 dose of Vanco, so rescheduled dose to start at 1600.    09/14/17 1511    09/14/17 1402  POC Glucose Fingerstick  Every 6 Hours,   Status:  Canceled     Comments:  While NPO    09/14/17 1401    09/14/17 1400  dextrose (GLUTOSE) oral gel 15 g  Every 15 Minutes PRN      09/14/17 1401    09/14/17 1400  dextrose (D50W) solution 25 g  Every  15 Minutes PRN      09/14/17 1401    09/14/17 1400  glucagon (human recombinant) (GLUCAGEN DIAGNOSTIC) injection 1 mg  Every 15 Minutes PRN      09/14/17 1401    09/14/17 0900  metoprolol tartrate (LOPRESSOR) tablet 50 mg  Daily      09/13/17 2233    09/14/17 0900  pantoprazole (PROTONIX) EC tablet 40 mg  Daily      09/13/17 2233    09/14/17 0900  polyethylene glycol (MIRALAX) powder 17 g  2 Times Daily      09/13/17 2233 09/14/17 0900  Magnesium Sulfate 6 gram Infusion (2 gm x 3) -Mg 1.1 -1.5 mg/dL  Every 2 Hours      09/14/17 0704    09/14/17 0800  HYDROmorphone (DILAUDID) injection 1 mg  Once      09/14/17 0716    09/14/17 0730  metoclopramide (REGLAN) tablet 10 mg  2 Times Daily Before Meals      09/13/17 2233 09/14/17 0702  Magnesium Sulfate 2 gram Bolus, followed by 8 gram infusion (total Mg dose 10 grams)- Mg less than or equal to 1mg/dL  As Needed      09/14/17 0702    09/14/17 0702  Magnesium Sulfate 6 gram Infusion (2 gm x 3) -Mg 1.1 -1.5 mg/dL  As Needed      09/14/17 0702    09/14/17 0702  magnesium sulfate 4 gram infusion- Mg 1.6-1.9 mg/dL  As Needed      09/14/17 0702    09/14/17 0700  POC Glucose Fingerstick  4 Times Daily Before Meals & at Bedtime,   Status:  Canceled      09/13/17 2230 09/14/17 0600  piperacillin-tazobactam (ZOSYN) 3.375 g/100 mL 0.9% NS IVPB (mbp)  Every 8 Hours      09/14/17 0451    09/14/17 0000  atorvastatin (LIPITOR) tablet 20 mg  Nightly      09/13/17 2233 09/14/17 0000  dicyclomine (BENTYL) capsule 10 mg  2 Times Daily      09/13/17 2233 09/14/17 0000  doxepin (SINEquan) capsule 50 mg  Nightly      09/13/17 2233 09/14/17 0000  gabapentin (NEURONTIN) capsule 400 mg  Every 8 Hours Scheduled      09/13/17 2233 09/14/17 0000  magnesium oxide (MAGOX) tablet 200 mg  2 Times Daily      09/13/17 2233 09/14/17 0000  pancrelipase (Lip-Prot-Amyl) (CREON) capsule 24,000 units of lipase  4 Times Daily With Meals & Nightly      09/13/17 2233 09/14/17 0000   insulin detemir (LEVEMIR) injection 5 Units  Nightly      09/13/17 2233 09/14/17 0000  POC Glucose Fingerstick  Every 6 Hours,   Status:  Canceled      09/13/17 2306    09/13/17 2328  sodium chloride 0.9 % flush 1-10 mL  As Needed      09/13/17 2329 09/13/17 2233  Pharmacy Consult  Continuous PRN      09/13/17 2233 09/13/17 2231  HYDROcodone-acetaminophen (NORCO) 5-325 MG per tablet 1 tablet  Daily PRN      09/13/17 2233 09/13/17 2231  ipratropium-albuterol (DUO-NEB) nebulizer solution 3 mL  Every 6 Hours PRN      09/13/17 2233 09/13/17 2231  nitroglycerin (NITROSTAT) SL tablet 0.4 mg  Every 5 Minutes PRN      09/13/17 2233 09/13/17 2231  glucagon (GLUCAGEN) injection 1 mg  Every 15 Minutes PRN      09/13/17 2231 09/13/17 2228  dextrose (GLUTOSE) oral gel 15 g  Every 15 Minutes PRN      09/13/17 2230 09/13/17 2228  dextrose (D50W) solution 25 g  Every 15 Minutes PRN      09/13/17 2230 09/13/17 2122  sodium chloride 0.9 % flush 1-10 mL  As Needed      09/13/17 2122 09/13/17 1812  sodium chloride 0.9 % flush 10 mL  As Needed      09/13/17 1813    Unscheduled  Potassium  As Needed     Comments:  For Ventricular Arrhythmias    09/13/17 2236    Unscheduled  Magnesium  As Needed     Comments:  For Ventricular Arrhythmias    09/13/17 2236    Unscheduled  Troponin  As Needed     Comments:  For Chest Pain    09/13/17 2236    Unscheduled  Digoxin Level  As Needed     Comments:  For Atrial Arrhythmias    09/13/17 2236    Unscheduled  Blood Gas, Arterial  As Needed     Comments:  Per O2 Policy  Notify Physician    09/13/17 2236    --  dicyclomine (BENTYL) 10 MG capsule  2 Times Daily      09/13/17 1920    --  HYDROcodone-acetaminophen (NORCO) 5-325 MG per tablet  Daily PRN      09/13/17 1920    --  atorvastatin (LIPITOR) 20 MG tablet  Daily      09/13/17 1920    --  insulin lispro (humaLOG) 100 UNIT/ML injection  4 Times Daily Before Meals & Nightly PRN      09/14/17 1034    --  Insulin Glargine  (BASAGLAR KWIKPEN) 100 UNIT/ML injection pen  Nightly      09/14/17 1034    Pending  insulin aspart (novoLOG) injection 0-9 Units  4 Times Daily Before Meals & Nightly      Pending             Operative/Procedure Notes (last 24 hours) (Notes from 9/14/2017  3:33 PM through 9/15/2017  3:33 PM)      Adin Harrington MD at 9/14/2017  8:02 PM  Version 1 of 1         preop diagnosis: left thumb abscess     Post op abscess: same    Procedure: incision and drainage left thumb abscess.    Surgeon: Juany WOLF    Asst: Hermelindo DIAZ    Anesthesia: general    Complications: none    Specimens: swab sent for culture    EBL: 5 cc    Indications for procedure: large thumb abscess with loss of ability to move thumb secondary to pressure.  Needs drainage to evacuate abscess and ensure proper healing.  R/B/A discussed with patient and consent obtained.    Description of the procedure: after adequate anesthesia was induced left hand prepped and draped in standard surgical fashion.  Incision made over dorsum of thum through skin and subcutaneous tissue and large collection of pus immediately drained and was suctioned from area and swab sent for specimen.  There was some induration also on the flexor side of thumb and a small incision made over base of thumb and hemostat spread down to the tnedon sheath with slight infection drained.  Wounds irrigated with saline and packed on the dorsal surface with betadyne soaked packing.  On the volar side the incision was left open to drain.  Local anesthetic injected as a digital block.  Sterile dressings applied. Patient awakened and taken to the recovery room in stable condition.    Adin Harrintgon M.D.       Electronically signed by Adin Harrington MD at 9/14/2017  8:09 PM           Physician Progress Notes (last 72 hours) (Notes from 9/12/2017  3:33 PM through 9/15/2017  3:33 PM)      Jose Anne DO at 9/14/2017  1:35 PM  Version 1 of 1               [facility]    PROGRESS  NOTE    Name:  Isaias Lang   Age:  52 y.o.  Sex:  male  :  1965  MRN:  [id number]   Visit Number:  [id number]  Admission Date:  2017  Date Of Service:  17  Primary Care Physician:  Ankit Jade MD     LOS: 1 day :  Patient Care Team:  Ankit Jade MD as PCP - General (Internal Medicine):    Chief Complaint:      Abscess of left thumb /Cellulitis of left hand    Subjective / Interval History:     I have reviewed labs/imaging/records from this hospitalization, including ER staff and admitting/attending physicians H/P's and progress notes to establish a comprehensive understanding of this patient's clinical hospital course, as well as to establish a transition of care appropriately.    52 y.o. male who presented to [facility] emergency department with a four-day history of left thumb swelling and redness.  The patient is an IV drug user and he had his nephew inject the dorsal base on his left thumb 4 days ago.  The patient thought that only suboxone was injected but he thinks that it was laced with methamphetamine.  He also had fevers, chills, and fatigue.     Pt has been evaluated by orthopedic surgery, with planned I/D today; difficulty with IV access at this time, several lost attempts or access points.  Pt drowsy this am; denies N/V/D; NPO at present for pending surgery; denies CP/SOA/Palp/vertigo; good UOP; pain well controlled at present.  No F/C.    Review of Systems:     General ROS: Patient denies any fevers, chills or loss of consciousness.  Respiratory ROS: Denies cough or shortness of breath.  Cardiovascular ROS: Denies chest pain or palpitations. No history of exertional chest pain.  Gastrointestinal ROS: Denies nausea and vomiting. Denies any abdominal pain. No diarrhea.  Neurological ROS: Denies any focal weakness. No loss of consciousness. Denies any numbness. Denies neck pain.  Dermatological ROS: Redness/swelling left hand.    Vital Signs:    Temp:  [98 °F  (36.7 °C)-98.9 °F (37.2 °C)] 98.8 °F (37.1 °C)  Heart Rate:  [81-88] 86  Resp:  [16-20] 18  BP: ()/(50-84) 99/58    Intake and output:    I/O last 3 completed shifts:  In: 800 [P.O.:800]  Out: 400 [Urine:400]       Physical Examination:    General Appearance:  Alert and cooperative, but drowsy, not in any acute distress.  Disheveled appearance.   Head:  Atraumatic and normocephalic, without obvious abnormality.   Eyes:          PERRLA, conjunctivae and sclerae normal, no Icterus. No pallor. Extra-occular movements are within normal limits.   Neck: Supple, trachea midline, no thyromegaly, no carotid bruit.   Lungs:   Chest shape is normal. Breath sounds heard bilaterally equally.  No crackles or wheezing. No Pleural rub or bronchial breathing.   Heart:  Normal S1 and S2, no murmur, no gallop, no rub. No JVD   Abdomen:   Normal bowel sounds, no masses, no organomegaly. Soft       non-tender, non-distended, no guarding, no rebound tenderness.   Extremities: Moves all extremities; edema noted to L hand, involving thumb predominantly, but also phalanx 2-5; diffuse erythema to thumb scant drainage noted, fluctuant; extends from prior to 1st MCP joint to tip of thumb; no cyanosis, no congenital clubbing.   Skin: As per above to left hand.  Numerous injection sites noted to ext.   Neurologic: Awake, alert and oriented times 3. Moves all 4 extremities, but painful to ROM testing of left hand, mostly to thumb.   Laboratory results:      Results from last 7 days  Lab Units 09/14/17  0040 09/13/17  1902   SODIUM mmol/L 135 135   POTASSIUM mmol/L 4.2 5.3   CHLORIDE mmol/L 104 105   CO2 mmol/L 24.7 24.3   BUN mg/dL 9 <5*   CREATININE mg/dL 0.71 0.57   CALCIUM mg/dL 8.1 9.0   BILIRUBIN mg/dL 0.9 1.6   ALK PHOS U/L 99 110   ALT (SGPT) U/L 69* 85*   AST (SGOT) U/L 78* 123*   GLUCOSE mg/dL 422* 228*       Results from last 7 days  Lab Units 09/14/17  0040 09/13/17  1902   WBC 10*3/mm3 3.60* 4.52   HEMOGLOBIN g/dL 12.0* 14.0    HEMATOCRIT % 36.7* 43.1   PLATELETS 10*3/mm3 42* 71*               Results from last 7 days  Lab Units 09/13/17  1950 09/13/17  1902   BLOODCX  No growth at less than 24 hours No growth at less than 24 hours           I have reviewed the patient's laboratory results.    Radiology results:    Imaging Results (last 24 hours)     Procedure Component Value Units Date/Time    XR Hand 3+ View Left [[id number]] Collected:  09/14/17 0738     Updated:  09/14/17 0740    Narrative:       EXAMINATION: XR HAND 3+ VW LEFT-      TECHNIQUE: XR HAND 3+ VW LEFT-        INDICATION: left thumb abscess; L02.512-Cutaneous abscess of left hand      COMPARISON: NONE     FINDINGS:          BONES: No acute fracture. No blastic or lytic lesions.          JOINT: No dislocation.          SOFT TISSUES:  SIGNIFICANT SOFT TISSUE SWELLING OF THE 1ST DIGIT.       Impression:       No acute or destructive bony abnormality.     COMMUNICATION: Per this written report.     This report was finalized on 9/14/2017 7:38 AM by Dr. Brendan Tolentino MD.       XR Chest AP [[id number]] Collected:  09/14/17 0738     Updated:  09/14/17 0740    Narrative:       EXAMINATION: XR CHEST AP-      CLINICAL INDICATION:     SEDATION IN INTUBATED PATIENTS     TECHNIQUE:  XR CHEST AP-      COMPARISON: NONE      FINDINGS:   The lungs remain aerated.  Heart and mediastinum contours are unremarkable.  No pleural effusion.  No pneumothorax.   Bony and soft tissue structures are unremarkable.       Impression:       No radiographic evidence of acute cardiac or pulmonary  disease.     This report was finalized on 9/14/2017 7:38 AM by Dr. Brendan Tolentino MD.             I have reviewed the patient's radiology reports.    Medication Review:     I have reviewed the patients active and prn medications.         Assessment:  Principal Problem:    Abscess of left thumb  Active Problems:    Abscess of left hand including fingers    IV drug abuse    Type 1 diabetes mellitus    MDD (major  depressive disorder), recurrent episode, moderate    Gastroesophageal reflux disease with esophagitis    Chronic viral hepatitis B without delta agent and without coma    Chronic hepatitis C without hepatic coma        Plan:  Will ask PICC line team for assistance with IV access; pt will need for surgery planned today, as well as IV abx until cx results known; quickly transition to oral formulation of abx once able, given pt extensive IV drug abuse hx; planned ortho intervention today with I/D and lavage; will follow clinically, but aggressive SSI coverage to aid in wound healing; labs daily; follow HD status; monitor for withdrawal.  Discussed POC in detail with pt today, he verb understanding and agrees.    I have spent a total of 35 mins in direct pt care today.    Jose Anne DO  17  1:49 PM               Electronically signed by Jose Anne DO at 2017  2:00 PM      Elva Hdz MD at 9/15/2017  4:50 AM  Version 1 of 1             [facility] PROGRESS NOTE     Patient Identification:  Name:  Isaias Lang  Age:  52 y.o.  Sex:  male  :  1965  MRN:  [id number]  Visit Number:  [id number]  Primary Care Provider:  Ankit Jade MD    Length of stay:  2    Chief complaint:  Left thumb lesion    Subjective:  52-year-old who was admitted on 2017 with a left thumb abscess after injection of suboxone and methamphetamine.  He had incision and drainage of the abscess on 2017.  Today, he is having post op pain and is requesting IV pain meds.  He denies chest pain, denies shortness of air and denies coughing.  He is not eating well yet.  ----------------------------------------------------------------------------------------------------------------------  Current Hospital Meds:  atorvastatin 20 mg Oral Nightly   dicyclomine 10 mg Oral BID   doxepin 50 mg Oral Nightly   gabapentin 400 mg Oral Q8H   HYDROmorphone 1 mg Intravenous Once   insulin aspart  0-14 Units Subcutaneous 4x Daily AC & at Bedtime   insulin detemir 5 Units Subcutaneous Nightly   magnesium oxide 200 mg Oral BID   magnesium sulfate 4 g Intravenous Once   metoclopramide 10 mg Oral BID AC   metoprolol tartrate 50 mg Oral Daily   pancrelipase (Lip-Prot-Amyl) 24,000 units of lipase Oral 4x Daily With Meals & Nightly   pantoprazole 40 mg Oral Daily   piperacillin-tazobactam 3.375 g Intravenous Q8H   polyethylene glycol 17 g Oral BID   vancomycin 1,500 mg Intravenous Q8H   ----------------------------------------------------------------------------------------------------------------------  Vital Signs:  Temp:  [96.4 °F (35.8 °C)-98.8 °F (37.1 °C)] 98.1 °F (36.7 °C)  Heart Rate:  [] 102  Resp:  [16-20] 16  BP: ()/(54-95) 140/72  Last 3 weights    09/13/17  1800 09/13/17  2057 09/15/17  0345   Weight: 180 lb (81.6 kg) 173 lb 4.8 oz (78.6 kg) 186 lb 4 oz (84.5 kg)     Body mass index is 28.32 kg/(m^2).        Diet Regular; Thin; Cardiac, Consistent Carbohydrate  ----------------------------------------------------------------------------------------------------------------------  Physical exam:  Constitutional:  Well-developed and well-nourished.  No respiratory distress.      HENT:  Head: Normocephalic and atraumatic.  Mouth:  Moist mucous membranes.    Eyes:  Conjunctivae and EOM are normal.  Pupils are equal, round, and reactive to light.  No scleral icterus.  Neck:  Neck supple.  No JVD present.    Cardiovascular:  Normal rate, regular rhythm and normal heart sounds with no murmur.  Pulmonary/Chest:  No respiratory distress, no wheezes, no crackles, with normal breath sounds and good air movement.  Abdominal:  Soft.  Bowel sounds are normal.  No distension and no tenderness.   Musculoskeletal:  No edema, no tenderness, and no deformity.  No red or swollen joints anywhere.    Neurological:  Alert and oriented to person, place, and time.  No cranial nerve deficit.  No tongue deviation.  No  facial droop.  No slurred speech.   Skin:  Left thumb with swelling but the incision was covered with an occlusive dressing with no drainage on it.  There are new skin lesions.  Psychiatric:  Normal mood and affect.  Behavior is normal.  Judgment and thought content normal.   Peripheral vascular:  No edema and strong pulses on all 4 extremities.  Genitourinary:  No mccann catheter in place.  ----------------------------------------------------------------------------------------------------------------------  Tele:  NS : I have personally reviewed/looked at the telemetry strips.  ----------------------------------------------------------------------------------------------------------------------  Results from last 7 days  Lab Units 09/15/17  0415 09/14/17  0040 09/13/17  1902   CRP mg/dL  --  2.09* 2.40*   LACTATE mmol/L  --   --  1.6   WBC 10*3/mm3 2.74* 3.60* 4.52   HEMOGLOBIN g/dL 11.8* 12.0* 14.0   HEMATOCRIT % 35.9* 36.7* 43.1   MCV fL 94.0 94.1* 94.7*   MCHC g/dL 32.9* 32.7* 32.5*   PLATELETS 10*3/mm3 73* 42* 71*     Results from last 7 days  Lab Units 09/15/17  0207 09/14/17 0040 09/13/17  1902   SODIUM mmol/L  --  135 135   POTASSIUM mmol/L  --  4.2 5.3   MAGNESIUM mg/dL 1.7 1.5*  --    CHLORIDE mmol/L  --  104 105   CO2 mmol/L  --  24.7 24.3   BUN mg/dL  --  9 <5*   CREATININE mg/dL  --  0.71 0.57   EGFR IF NONAFRICN AM mL/min/1.73  --  117 150   CALCIUM mg/dL  --  8.1 9.0   GLUCOSE mg/dL  --  422* 228*   ALBUMIN g/dL  --  2.50* 3.10*   BILIRUBIN mg/dL  --  0.9 1.6   ALK PHOS U/L  --  99 110   AST (SGOT) U/L  --  78* 123*   ALT (SGPT) U/L  --  69* 85*   Estimated Creatinine Clearance: 128.8 mL/min (by C-G formula based on Cr of 0.71).      Blood Culture   Date Value Ref Range Status   09/13/2017 No growth at 24 hours  Preliminary   09/13/2017 No growth at 24 hours  Preliminary     Wound culture 9/13/2017:       I have personally looked at the labs and they are summarized  above.  ----------------------------------------------------------------------------------------------------------------------  Imaging Results (last 24 hours)     Procedure Component Value Units Date/Time    XR Hand 3+ View Left [[id number]] Collected:  09/14/17 0738     Updated:  09/14/17 0740    COMPARISON: NONE  FINDINGS:       BONES: No acute fracture. No blastic or lytic lesions.       JOINT: No dislocation.       SOFT TISSUES:  SIGNIFICANT SOFT TISSUE SWELLING OF THE 1ST DIGIT.    Impression:       No acute or destructive bony abnormality.  COMMUNICATION: Per this written report.  This report was finalized on 9/14/2017 7:38 AM by Dr. Brendan Tolentino MD.         XR Chest AP [[id number]] Collected:  09/14/17 0738     Updated:  09/14/17 0740    FINDINGS:   The lungs remain aerated.  Heart and mediastinum contours are unremarkable.  No pleural effusion.  No pneumothorax.   Bony and soft tissue structures are unremarkable.    Impression:       No radiographic evidence of acute cardiac or pulmonary disease.  This report was finalized on 9/14/2017 7:38 AM by Dr. Brendan Tolentino MD.      I have personally looked at the radiology images and read the final radiology report.  ----------------------------------------------------------------------------------------------------------------------  Assessment and Plan:  -Left thumb abscess and cellulitis causing sepsis that was present on admission  -Hepatitis C causing transaminitis and thrombocytopenia  -Type II diabetes mellitus, insulin dependent with hyperglycemia   -History of AFib, currently in normal sinus rhythm  -COPD without acute exacerbation   -Essential hypertension, controlled   -Acute hypomagnesemia  -Hyperlipidemia   -History of coronary artery disease, S/P heart catheterization in 2012  -History of systolic congestive heart failure with EF of 51-55% on 4/4/2017  -IV drug abuse with history of amphetamine, suboxone, cocaine, and opioid abuse in the past  -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  We will repeat the blood work in the morning to see how the sepsis is progressing; so far, it appears to be improving.  He has been in normal sinus rhythm during his entire stay here; we will continue to monitor him on telemetry.  I gave him a one-time dose of the allotted for some postop pain but now he needs to take oral medication for his pain control due to the addictive potential of IV pain medication in a patient with diarrhea and drug addict.  I await the Elvin report.  We will continue to monitor the magnesium level and replace if it is low.    The patient is high risk due to the following diagnoses/reasons:  sepsis    Elva Hdz MD  09/15/17  4:50 AM       Electronically signed by Elva Hdz MD at 9/15/2017  5:22 AM      Adin Harrington MD at 9/15/2017  1:58 PM  Version 2 of 2         Inpatient Progress Note  Isaias Lang  Date: 09/15/17  MRN: [id number]      Subjective: Pt was seen today for follow up on left thumb I&D pod 1.  Pt states he is still having a lot of pain in his left thumb area and requesting more pain medications.  Pt states he is getting better rom of the left hand but it is not completely back to normal.  Pt states that the swelling in his right hand is getting better too.  He denies chest pain or shortness of breath nausea or diarrhea.        Objective:    Vitals:    09/15/17 0345 09/15/17 0702 09/15/17 1108 09/15/17 1126   BP: 140/72 110/76 112/78    BP Location: Right arm Right arm Right arm    Patient Position: Lying Lying Lying    Pulse: 102 92 88 98   Resp: 16 18 20 18   Temp: 98.1 °F (36.7 °C) 98.4 °F (36.9 °C) 98.8 °F (37.1 °C)     TempSrc: Oral Oral Oral    SpO2: 98% 98% 97% 95%   Weight: 186 lb 4 oz (84.5 kg)      Height:              Physical Exam:  Constitutional:  Well-developed and well-nourished.  No respiratory distress.      HENT:  Head: Normocephalic and atraumatic.  Mouth:  Moist mucous membranes.    Eyes:  Conjunctivae and EOM are normal.  Pupils are equal, round, and reactive to light.  No scleral icterus.  Neck:  Neck supple.  No JVD present.    Cardiovascular:  Normal rate, regular rhythm and normal heart sounds with no murmur.  Pulmonary/Chest:  No respiratory distress, no wheezes, no crackles, with normal breath sounds and good air movement.  Abdominal:  Soft.  Bowel sounds are normal.  No distension and no tenderness.   Musculoskeletal:  Left hand thumb decrease rom but improved from pre-surgical aspect. Pt unable to flex any digit to the left hand fully. Right hand has no erythema and mild edema.  Full rom of right hand and digits but feels taught per pt with rom.    Neurological:  Alert and oriented to person, place, and time.  No cranial nerve deficit.  No tongue deviation.  No facial droop.  No slurred speech.   Skin:  Left thumb erythremic swollen with packing.  Psychiatric:  Normal mood and affect.  Behavior is normal.  Judgment and thought content normal.   Peripheral vascular:  No edema and strong pulses on all 4 extremities.      Labs:      Results from last 7 days  Lab Units 09/15/17  0415 09/14/17  0040 09/13/17  1902   CRP mg/dL 1.91* 2.09* 2.40*   LACTATE mmol/L  --   --  1.6   WBC 10*3/mm3 2.74* 3.60* 4.52   HEMOGLOBIN g/dL 11.8* 12.0* 14.0   HEMATOCRIT % 35.9* 36.7* 43.1   MCV fL 94.0 94.1* 94.7*   MCHC g/dL 32.9* 32.7* 32.5*   PLATELETS 10*3/mm3 73* 42* 71*           Results from last 7 days  Lab Units 09/15/17  0415 09/15/17  0207 09/14/17 0040 09/13/17  1902   SODIUM mmol/L 135  --  135 135   POTASSIUM mmol/L 4.3  --  4.2 5.3   MAGNESIUM mg/dL  --  1.7 1.5*  --    CHLORIDE mmol/L 105  --  104 105   CO2  mmol/L 23.3*  --  24.7 24.3   BUN mg/dL 7  --  9 <5*   CREATININE mg/dL 0.68  --  0.71 0.57   EGFR IF NONAFRICN AM mL/min/1.73 122  --  117 150   CALCIUM mg/dL 7.9  --  8.1 9.0   GLUCOSE mg/dL 458*  --  422* 228*   ALBUMIN g/dL 2.50*  --  2.50* 3.10*   BILIRUBIN mg/dL 0.7  --  0.9 1.6   ALK PHOS U/L 105  --  99 110   AST (SGOT) U/L 82*  --  78* 123*   ALT (SGPT) U/L 67*  --  69* 85*   Estimated Creatinine Clearance: 134.4 mL/min (by C-G formula based on Cr of 0.68).  No results found for: AMMONIA          No results found for: HGBA1C  Lab Results   Component Value Date    TSH 0.284 (L) 02/04/2017    FREET4 1.47 02/04/2017         Pain Management Panel     Pain Management Panel Latest Ref Rng & Units 9/13/2017 7/3/2017    AMPHETAMINES SCREEN, URINE Negative Negative Positive(A)    BARBITURATES SCREEN Negative Negative Negative    BENZODIAZEPINE SCREEN, URINE Negative Negative Negative    BUPRENORPHINE Negative Positive(A) Positive(A)    COCAINE SCREEN, URINE Negative Negative Negative    METHADONE SCREEN, URINE Negative Negative Negative          Blood Culture   Date Value Ref Range Status   09/13/2017 No growth at 24 hours  Preliminary   09/13/2017 No growth at 24 hours  Preliminary     Urine Culture   Date Value Ref Range Status   09/13/2017 Normal Urogenital Mercedes  Preliminary     Wound Culture   Date Value Ref Range Status   09/13/2017 Culture in progress  Preliminary                    Radiology:  Imaging Results (last 72 hours)     Procedure Component Value Units Date/Time    XR Hand 3+ View Left [[id number]] Collected:  09/14/17 0738     Updated:  09/14/17 0740    Narrative:       EXAMINATION: XR HAND 3+ VW LEFT-      TECHNIQUE: XR HAND 3+ VW LEFT-        INDICATION: left thumb abscess; L02.512-Cutaneous abscess of left hand      COMPARISON: NONE     FINDINGS:          BONES: No acute fracture. No blastic or lytic lesions.          JOINT: No dislocation.          SOFT TISSUES:  SIGNIFICANT SOFT TISSUE SWELLING  OF THE 1ST DIGIT.       Impression:       No acute or destructive bony abnormality.     COMMUNICATION: Per this written report.     This report was finalized on 9/14/2017 7:38 AM by Dr. Brendan Tolentino MD.       XR Chest AP [[id number]] Collected:  09/14/17 0738     Updated:  09/14/17 0740    Narrative:       EXAMINATION: XR CHEST AP-      CLINICAL INDICATION:     SEDATION IN INTUBATED PATIENTS     TECHNIQUE:  XR CHEST AP-      COMPARISON: NONE      FINDINGS:   The lungs remain aerated.  Heart and mediastinum contours are unremarkable.  No pleural effusion.  No pneumothorax.   Bony and soft tissue structures are unremarkable.       Impression:       No radiographic evidence of acute cardiac or pulmonary  disease.     This report was finalized on 9/14/2017 7:38 AM by Dr. Brendan Tolentino MD.               Assessment: S/P Left thumb I&D          Plan: Pt is to continue current treatment and daily packing changes.  Pt is to follow up with Dr. Harrington in office in 2 weeks.  Explained to patient that I could not increase pain medications and it was not recommended secondary to abuse history.        NOHEMI Rader September 15, 2017 1:58 PM     I agree with the above.    Adin Harrington M.D.       Electronically signed by Adin Harrington MD at 9/15/2017  2:14 PM      NOHEMI Rader at 9/15/2017  1:58 PM  Version 1 of 2         Inpatient Progress Note  Isaias Lang  Date: 09/15/17  MRN: [id number]      Subjective: Pt was seen today for follow up on left thumb I&D pod 1.  Pt states he is still having a lot of pain in his left thumb area and requesting more pain medications.  Pt states he is getting better rom of the left hand but it is not completely back to normal.  Pt states that the swelling in his right hand is getting better too.  He denies chest pain or shortness of breath nausea or diarrhea.        Objective:    Vitals:    09/15/17 0345 09/15/17 0702 09/15/17 1108 09/15/17 1126   BP: 140/72 110/76 112/78     BP Location: Right arm Right arm Right arm    Patient Position: Lying Lying Lying    Pulse: 102 92 88 98   Resp: 16 18 20 18   Temp: 98.1 °F (36.7 °C) 98.4 °F (36.9 °C) 98.8 °F (37.1 °C)    TempSrc: Oral Oral Oral    SpO2: 98% 98% 97% 95%   Weight: 186 lb 4 oz (84.5 kg)      Height:              Physical Exam:  Constitutional:  Well-developed and well-nourished.  No respiratory distress.      HENT:  Head: Normocephalic and atraumatic.  Mouth:  Moist mucous membranes.    Eyes:  Conjunctivae and EOM are normal.  Pupils are equal, round, and reactive to light.  No scleral icterus.  Neck:  Neck supple.  No JVD present.    Cardiovascular:  Normal rate, regular rhythm and normal heart sounds with no murmur.  Pulmonary/Chest:  No respiratory distress, no wheezes, no crackles, with normal breath sounds and good air movement.  Abdominal:  Soft.  Bowel sounds are normal.  No distension and no tenderness.   Musculoskeletal:  Left hand thumb decrease rom but improved from pre-surgical aspect. Pt unable to flex any digit to the left hand fully. Right hand has no erythema and mild edema.  Full rom of right hand and digits but feels taught per pt with rom.    Neurological:  Alert and oriented to person, place, and time.  No cranial nerve deficit.  No tongue deviation.  No facial droop.  No slurred speech.   Skin:  Left thumb erythremic swollen with packing.  Psychiatric:  Normal mood and affect.  Behavior is normal.  Judgment and thought content normal.   Peripheral vascular:  No edema and strong pulses on all 4 extremities.      Labs:      Results from last 7 days  Lab Units 09/15/17  0415 09/14/17  0040 09/13/17  1902   CRP mg/dL 1.91* 2.09* 2.40*   LACTATE mmol/L  --   --  1.6   WBC 10*3/mm3 2.74* 3.60* 4.52   HEMOGLOBIN g/dL 11.8* 12.0* 14.0   HEMATOCRIT % 35.9* 36.7* 43.1   MCV fL 94.0 94.1* 94.7*   MCHC g/dL 32.9* 32.7* 32.5*   PLATELETS 10*3/mm3 73* 42* 71*           Results from last 7 days  Lab Units 09/15/17  7513  09/15/17  0207 09/14/17  0040 09/13/17  1902   SODIUM mmol/L 135  --  135 135   POTASSIUM mmol/L 4.3  --  4.2 5.3   MAGNESIUM mg/dL  --  1.7 1.5*  --    CHLORIDE mmol/L 105  --  104 105   CO2 mmol/L 23.3*  --  24.7 24.3   BUN mg/dL 7  --  9 <5*   CREATININE mg/dL 0.68  --  0.71 0.57   EGFR IF NONAFRICN AM mL/min/1.73 122  --  117 150   CALCIUM mg/dL 7.9  --  8.1 9.0   GLUCOSE mg/dL 458*  --  422* 228*   ALBUMIN g/dL 2.50*  --  2.50* 3.10*   BILIRUBIN mg/dL 0.7  --  0.9 1.6   ALK PHOS U/L 105  --  99 110   AST (SGOT) U/L 82*  --  78* 123*   ALT (SGPT) U/L 67*  --  69* 85*   Estimated Creatinine Clearance: 134.4 mL/min (by C-G formula based on Cr of 0.68).  No results found for: AMMONIA          No results found for: HGBA1C  Lab Results   Component Value Date    TSH 0.284 (L) 02/04/2017    FREET4 1.47 02/04/2017         Pain Management Panel     Pain Management Panel Latest Ref Rng & Units 9/13/2017 7/3/2017    AMPHETAMINES SCREEN, URINE Negative Negative Positive(A)    BARBITURATES SCREEN Negative Negative Negative    BENZODIAZEPINE SCREEN, URINE Negative Negative Negative    BUPRENORPHINE Negative Positive(A) Positive(A)    COCAINE SCREEN, URINE Negative Negative Negative    METHADONE SCREEN, URINE Negative Negative Negative          Blood Culture   Date Value Ref Range Status   09/13/2017 No growth at 24 hours  Preliminary   09/13/2017 No growth at 24 hours  Preliminary     Urine Culture   Date Value Ref Range Status   09/13/2017 Normal Urogenital Mercedes  Preliminary     Wound Culture   Date Value Ref Range Status   09/13/2017 Culture in progress  Preliminary                    Radiology:  Imaging Results (last 72 hours)     Procedure Component Value Units Date/Time    XR Hand 3+ View Left [[id number]] Collected:  09/14/17 0738     Updated:  09/14/17 0740    Narrative:       EXAMINATION: XR HAND 3+ VW LEFT-      TECHNIQUE: XR HAND 3+ VW LEFT-        INDICATION: left thumb abscess; L02.512-Cutaneous abscess of left  hand      COMPARISON: NONE     FINDINGS:          BONES: No acute fracture. No blastic or lytic lesions.          JOINT: No dislocation.          SOFT TISSUES:  SIGNIFICANT SOFT TISSUE SWELLING OF THE 1ST DIGIT.       Impression:       No acute or destructive bony abnormality.     COMMUNICATION: Per this written report.     This report was finalized on 2017 7:38 AM by Dr. Brendan Tolentino MD.       XR Chest AP [[id number]] Collected:  17     Updated:  17    Narrative:       EXAMINATION: XR CHEST AP-      CLINICAL INDICATION:     SEDATION IN INTUBATED PATIENTS     TECHNIQUE:  XR CHEST AP-      COMPARISON: NONE      FINDINGS:   The lungs remain aerated.  Heart and mediastinum contours are unremarkable.  No pleural effusion.  No pneumothorax.   Bony and soft tissue structures are unremarkable.       Impression:       No radiographic evidence of acute cardiac or pulmonary  disease.     This report was finalized on 2017 7:38 AM by Dr. Brendan Tolentino MD.               Assessment: S/P Left thumb I&D          Plan: Pt is to continue current treatment and daily packing changes.  Pt is to follow up with Dr. Harrington in office in 2 weeks.  Explained to patient that I could not increase pain medications and it was not recommended secondary to abuse history.        NOHEMI Rader September 15, 2017 1:58 PM     Electronically signed by NOHEMI Rader at 9/15/2017  2:10 PM           Consult Notes (last 72 hours) (Notes from 2017  3:33 PM through 9/15/2017  3:33 PM)      Adin Harrington MD at 2017  9:51 AM  Version 2 of 2           Consults    Patient Identification:  Name:  Isaias Lang  Age:  52 y.o.  Sex:  male  :  1965  MRN:  [id number]  Visit Number:  [id number]  Primary care provider:  Ankit Jade MD    History of presenting illness: 52 year old male consulted for a left thumb abscess with a 5 day history of left thumb swelling and redness. The patient is  an IV drug user and he had his nephew inject the dorsal base on his left thumb 4 days ago.  The patient thought that only suboxone was injected but he thinks that it was laced with methamphetamine.  He also had fevers, chills, and fatigue. Pt also states having chronic left shoulder pain and decrease range of motion.  Pt states he is not able to make fist with the left hand.  ---------------------------------------------------------------------------------------------------------------------  Review of Systems     Constitutional: Positive for chills, fatigue and fever.   HENT: Negative for postnasal drip, rhinorrhea, sneezing and sore throat.    Eyes: Negative for discharge and redness.   Respiratory: Positive for cough (nonproductive) and shortness of breath (x 1 month). Negative for wheezing.    Cardiovascular: Negative for chest pain, palpitations and leg swelling.   Gastrointestinal: Negative for diarrhea, nausea and vomiting.   Genitourinary: Negative for decreased urine volume, dysuria and hematuria.   Musculoskeletal: Positive for joint swelling (left thumb). Negative for arthralgias.   Skin: Positive for wound (left thumb). Negative for pallor and rash.   Neurological: Negative for seizures, speech difficulty and headaches.   Hematological: Does not bruise/bleed easily.   Psychiatric/Behavioral: Negative for confusion, self-injury and suicidal ideas. The patient is not nervous/anxious.   ---------------------------------------------------------------------------------------------------------------------   Past History:  Family History   Problem Relation Age of Onset   • Gout Other    • Osteoporosis Other    • Arthritis Other      Rheumatoid and osteoarthritis   • Hypertension Other    • Heart disease Other    • Cancer Other    • Coronary artery disease Mother    • Lung disease Mother    • Gout Sister    • Cancer Maternal Grandmother    • Hypertension Maternal Grandfather    • Gout Maternal Grandfather       Past Medical History:   Diagnosis Date   • Abscess of antecubital fossa 05/2014    Left that required I and D and grew Haemophilus influenza group 1   • Anxiety    • Asthma    • Chronic pancreatitis    • COPD (chronic obstructive pulmonary disease)    • DDD (degenerative disc disease), lumbosacral    • Depression    • Diabetes mellitus    • DVT (deep venous thrombosis)     Right arm   • Essential hypertension    • GERD (gastroesophageal reflux disease)    • Hepatitis-C    • Hypercholesteremia    • Injury of back    • Injury of right Achilles tendon     Complicated by MRSA and Pseudomonas   • IV drug abuse    • Mild CAD     Left heart cath at  2012   • MRSA (methicillin resistant staph aureus) culture positive 09/14/2016    LUE   • Obesity    • Opiate addiction     Suboxone IV   • Self-injurious behavior    • Sleep apnea    • Suicide attempt    • Systolic CHF, chronic     EF 45-50% in 2013, EF 60% 9/2016     Past Surgical History:   Procedure Laterality Date   • CHOLECYSTECTOMY  1990s   • INCISION AND DRAINAGE ABSCESS Left 2016    wrist   • INCISION AND DRAINAGE ARM Left 9/15/2016    Procedure: INCISION AND DRAINAGE UPPER EXTREMITY;  Surgeon: Rico Oseguera MD;  Location: [facility];  Service:    • ORIF ANKLE FRACTURE Right 2014     Social History     Social History   • Marital status:      Spouse name: N/A   • Number of children: N/A   • Years of education: N/A     Social History Main Topics   • Smoking status: Former Smoker     Years: 1.00     Types: Cigarettes   • Smokeless tobacco: Never Used      Comment: quit smoking in my 20's   • Alcohol use No      Comment: denies   • Drug use: Yes     Special: IV      Comment: PT STATES THAT HE USED SUBOXONE IN LEFT AC YESTERDAY   • Sexual activity: Defer      Comment: not currently active.      Other Topics Concern   • None     Social History Narrative      ---------------------------------------------------------------------------------------------------------------------   Allergies:  Robitussin dm [dextromethorphan-guaifenesin]; Tizanidine; Metformin; Sulfa antibiotics; Contrast dye; and Tramadol  ---------------------------------------------------------------------------------------------------------------------   Prior to Admission Medications     Prescriptions Last Dose Informant Patient Reported? Taking?    albuterol (PROVENTIL HFA;VENTOLIN HFA) 108 (90 BASE) MCG/ACT inhaler 9/13/2017 Self No Yes    Inhale 2 puffs Every 6 (Six) Hours As Needed for Wheezing.    aspirin 81 MG EC tablet 9/12/2017 Self No Yes    Take 1 tablet by mouth Daily.    atorvastatin (LIPITOR) 20 MG tablet 9/12/2017 Self Yes Yes    Take 20 mg by mouth Daily.    dicyclomine (BENTYL) 10 MG capsule 9/12/2017 Self Yes Yes    Take 10 mg by mouth 2 (Two) Times a Day.    doxepin (SINEquan) 50 MG capsule 9/11/2017 Self No No    Take 1 capsule by mouth Every Night.    furosemide (LASIX) 20 MG tablet 9/12/2017 Self No Yes    Take 1 tablet by mouth Daily.    gabapentin (NEURONTIN) 400 MG capsule 9/13/2017 Self No Yes    Take 1 capsule by mouth 3 (Three) Times a Day.    HYDROcodone-acetaminophen (NORCO) 5-325 MG per tablet 9/13/2017 VI Yes Yes    Take 1 tablet by mouth Daily As Needed. Pt states he takes TID. Per VI, #30 for a 30 day supply on 8/8/17    insulin aspart (novoLOG) 100 UNIT/ML injection 9/13/2017 Self Yes Yes    Inject  under the skin 3 (Three) Times a Day Before Meals. Patient does this per sliding scale    insulin detemir (LEVEMIR) 100 UNIT/ML injection 9/12/2017 Self No Yes    Inject 10 Units under the skin Every Night.    ipratropium-albuterol (DUO-NEB) 0.5-2.5 mg/mL nebulizer 9/12/2017 Self No Yes    Take 3 mL by nebulization Every 6 (Six) Hours As Needed for Shortness of Air.    Magnesium Oxide 400 (240 MG) MG tablet 9/12/2017 Self No Yes    Take ½ tablet by mouth 2 Times a  Day.    metoclopramide (REGLAN) 10 MG tablet 9/12/2017 Self No Yes    Take 1 tablet by mouth 2 (Two) Times a Day.    metoprolol tartrate (LOPRESSOR) 50 MG tablet 9/13/2017 Self No Yes    Take 1 tablet by mouth Daily.    nitroglycerin (NITROSTAT) 0.4 MG SL tablet Past Month  No Yes    Place 1 tablet under the tongue Every 5 (Five) Minutes As Needed for Chest Pain. Take no more than 3 doses in 15 minutes.    pancrelipase, Lip-Prot-Amyl, (CREON) 58036 UNITS capsule delayed-release particles capsule 9/12/2017 Self No Yes    Take 2 capsules by mouth 4 (Four) Times a Day With Meals & at Bedtime.    pantoprazole (PROTONIX) 40 MG EC tablet 9/12/2017 Self No Yes    Take 1 tablet by mouth Daily.    polyethylene glycol (MIRALAX) packet 9/13/2017 Self No Yes    MIX 17 GRAM PACKET IN 4 TO 8 OUNCES OF LIQUID AND DRINK ONCE DAILY.    Patient taking differently:  Take 17 g by mouth 2 (Two) Times a Day.    potassium chloride (K-DUR,KLOR-CON) 20 MEQ CR tablet 9/12/2017 Self No Yes    Take 1 tablet by mouth Daily.        Hospital Meds:    atorvastatin 20 mg Oral Nightly   dicyclomine 10 mg Oral BID   doxepin 50 mg Oral Nightly   gabapentin 400 mg Oral Q8H   HYDROmorphone 1 mg Intravenous Once   insulin detemir 5 Units Subcutaneous Nightly   magnesium oxide 200 mg Oral BID   magnesium sulfate 2 g Intravenous Q2H   metoclopramide 10 mg Oral BID AC   metoprolol tartrate 50 mg Oral Daily   pancrelipase (Lip-Prot-Amyl) 24,000 units of lipase Oral 4x Daily With Meals & Nightly   pantoprazole 40 mg Oral Daily   piperacillin-tazobactam 3.375 g Intravenous Q8H   polyethylene glycol 17 g Oral BID   vancomycin 1,500 mg Intravenous Q8H       Pharmacy Consult      ---------------------------------------------------------------------------------------------------------------------   Vital Signs:  Temp:  [98 °F (36.7 °C)-98.9 °F (37.2 °C)] 98.1 °F (36.7 °C)  Heart Rate:  [81-88] 87  Resp:  [16-20] 16  BP: ()/(50-84) 102/62  Last 3 weights     09/13/17  1800 09/13/17 2057   Weight: 180 lb (81.6 kg) 173 lb 4.8 oz (78.6 kg)     Body mass index is 26.35 kg/(m^2).  ---------------------------------------------------------------------------------------------------------------------   Physical exam:  Constitutional:  Well-developed and well-nourished.  No respiratory distress.      HENT:  Head: Normocephalic and atraumatic.  Mouth:  Moist mucous membranes.    Eyes:  Conjunctivae and EOM are normal.  Pupils are equal, round, and reactive to light.  No scleral icterus.  Neck:  Neck supple.  No JVD present.    Cardiovascular:  Normal rate, regular rhythm and normal heart sounds with no murmur.  Pulmonary/Chest:  No respiratory distress, no wheezes, no crackles, with normal breath sounds and good air movement.  Abdominal:  Soft.  Bowel sounds are normal.  No distension and no tenderness.   Musculoskeletal:  Pt unable to make fist with left hand.  Pt has decreased rom of left shoulder and has to use right hand to hold left arm up.l  Neurological:  Alert and oriented to person, place, and time.  No cranial nerve deficit.  No tongue deviation.  No facial droop.  No slurred speech.   Skin:  Left thumb with swelling and a round area that is 1/2 the diameter of a dime with yellow pus coming out of the thumb; there is erythema of the entire thumb with a good pulse and good capillary refill. .   Psychiatric:  Normal mood and affect.  Behavior is normal.  Judgment and thought content normal.   Peripheral vascular:  No edema and strong pulses on all 4 extremities.    ---------------------------------------------------------------------------------------------------------------------   .  ---------------------------------------------------------------------------------------------------------------------     Results from last 7 days  Lab Units 09/14/17  0040 09/13/17  4881   CRP mg/dL 2.09* 2.40*   LACTATE mmol/L  --  1.6   WBC 10*3/mm3 3.60* 4.52   HEMOGLOBIN g/dL 12.0*  14.0   HEMATOCRIT % 36.7* 43.1   MCV fL 94.1* 94.7*   MCHC g/dL 32.7* 32.5*   PLATELETS 10*3/mm3 42* 71*           Results from last 7 days  Lab Units 09/14/17  0040 09/13/17  1902   SODIUM mmol/L 135 135   POTASSIUM mmol/L 4.2 5.3   MAGNESIUM mg/dL 1.5*  --    CHLORIDE mmol/L 104 105   CO2 mmol/L 24.7 24.3   BUN mg/dL 9 <5*   CREATININE mg/dL 0.71 0.57   EGFR IF NONAFRICN AM mL/min/1.73 117 150   CALCIUM mg/dL 8.1 9.0   GLUCOSE mg/dL 422* 228*   ALBUMIN g/dL 2.50* 3.10*   BILIRUBIN mg/dL 0.9 1.6   ALK PHOS U/L 99 110   AST (SGOT) U/L 78* 123*   ALT (SGPT) U/L 69* 85*   Estimated Creatinine Clearance: 135.3 mL/min (by C-G formula based on Cr of 0.71).  No results found for: AMMONIA          Lab Results   Component Value Date    HGBA1C 6.40 (H) 07/03/2017     Lab Results   Component Value Date    TSH 0.284 (L) 02/04/2017    FREET4 1.47 02/04/2017     No results found for: PREGTESTUR, PREGSERUM, HCG, HCGQUANT  Pain Management Panel     Pain Management Panel Latest Ref Rng & Units 9/13/2017 7/3/2017    AMPHETAMINES SCREEN, URINE Negative Negative Positive(A)    BARBITURATES SCREEN Negative Negative Negative    BENZODIAZEPINE SCREEN, URINE Negative Negative Negative    BUPRENORPHINE Negative Positive(A) Positive(A)    COCAINE SCREEN, URINE Negative Negative Negative    METHADONE SCREEN, URINE Negative Negative Negative        Blood Culture   Date Value Ref Range Status   09/13/2017 No growth at less than 24 hours  Preliminary   09/13/2017 No growth at less than 24 hours  Preliminary                  ---------------------------------------------------------------------------------------------------------------------   Imaging Results (last 7 days)     Procedure Component Value Units Date/Time    XR Hand 3+ View Left [[id number]] Collected:  09/14/17 0738     Updated:  09/14/17 0740    Narrative:       EXAMINATION: XR HAND 3+ VW LEFT-      TECHNIQUE: XR HAND 3+ VW LEFT-        INDICATION: left thumb abscess;  L02.512-Cutaneous abscess of left hand      COMPARISON: NONE     FINDINGS:          BONES: No acute fracture. No blastic or lytic lesions.          JOINT: No dislocation.          SOFT TISSUES:  SIGNIFICANT SOFT TISSUE SWELLING OF THE 1ST DIGIT.       Impression:       No acute or destructive bony abnormality.     COMMUNICATION: Per this written report.     This report was finalized on 9/14/2017 7:38 AM by Dr. Brendan Tolentino MD.       XR Chest AP [[id number]] Collected:  09/14/17 0738     Updated:  09/14/17 0740    Narrative:       EXAMINATION: XR CHEST AP-      CLINICAL INDICATION:     SEDATION IN INTUBATED PATIENTS     TECHNIQUE:  XR CHEST AP-      COMPARISON: NONE      FINDINGS:   The lungs remain aerated.  Heart and mediastinum contours are unremarkable.  No pleural effusion.  No pneumothorax.   Bony and soft tissue structures are unremarkable.       Impression:       No radiographic evidence of acute cardiac or pulmonary  disease.     This report was finalized on 9/14/2017 7:38 AM by Dr. Brendan Tolentino MD.           ----------------------------------------------------------------------------------------------------------------------  Assessment and Plan: Left thumb abscess - Pt is to continue current treatment and remain NPO.  Pt is to have left thumb incision and drainage by Dr. Harrington today.  Dr. Harrington will eval pt prior to surgery.    Thank you for the consult.    Giovany Robles, APRN  09/14/17  9:51 AM     I agree with the above note as I saw patient myself.  He has an abscess in his left thumb from self injection with meth and suboxone which may involve extensor and flexor sides of thumb.  Will need incision and drainage.  Right hand having some early cellulitis too but not an abscess formation.  Will do surgery tonight and will continue abx.  R/b/a discussed with patient and consent obtained.    Adin Harrington M.D.       Electronically signed by Adin Harrington MD at 9/14/2017  7:10 PM      Giovany Quinn  NOHEMI Robles at 2017  9:51 AM  Version 1 of 2           Consults    Patient Identification:  Name:  Isaias Lang  Age:  52 y.o.  Sex:  male  :  1965  MRN:  [id number]  Visit Number:  [id number]  Primary care provider:  Ankit Jade MD    History of presenting illness: 52 year old male consulted for a left thumb abscess with a 5 day history of left thumb swelling and redness. The patient is an IV drug user and he had his nephew inject the dorsal base on his left thumb 4 days ago.  The patient thought that only suboxone was injected but he thinks that it was laced with methamphetamine.  He also had fevers, chills, and fatigue. Pt also states having chronic left shoulder pain and decrease range of motion.  Pt states he is not able to make fist with the left hand.  ---------------------------------------------------------------------------------------------------------------------  Review of Systems     Constitutional: Positive for chills, fatigue and fever.   HENT: Negative for postnasal drip, rhinorrhea, sneezing and sore throat.    Eyes: Negative for discharge and redness.   Respiratory: Positive for cough (nonproductive) and shortness of breath (x 1 month). Negative for wheezing.    Cardiovascular: Negative for chest pain, palpitations and leg swelling.   Gastrointestinal: Negative for diarrhea, nausea and vomiting.   Genitourinary: Negative for decreased urine volume, dysuria and hematuria.   Musculoskeletal: Positive for joint swelling (left thumb). Negative for arthralgias.   Skin: Positive for wound (left thumb). Negative for pallor and rash.   Neurological: Negative for seizures, speech difficulty and headaches.   Hematological: Does not bruise/bleed easily.   Psychiatric/Behavioral: Negative for confusion, self-injury and suicidal ideas. The patient is not nervous/anxious.    ---------------------------------------------------------------------------------------------------------------------   Past History:  Family History   Problem Relation Age of Onset   • Gout Other    • Osteoporosis Other    • Arthritis Other      Rheumatoid and osteoarthritis   • Hypertension Other    • Heart disease Other    • Cancer Other    • Coronary artery disease Mother    • Lung disease Mother    • Gout Sister    • Cancer Maternal Grandmother    • Hypertension Maternal Grandfather    • Gout Maternal Grandfather      Past Medical History:   Diagnosis Date   • Abscess of antecubital fossa 05/2014    Left that required I and D and grew Haemophilus influenza group 1   • Anxiety    • Asthma    • Chronic pancreatitis    • COPD (chronic obstructive pulmonary disease)    • DDD (degenerative disc disease), lumbosacral    • Depression    • Diabetes mellitus    • DVT (deep venous thrombosis)     Right arm   • Essential hypertension    • GERD (gastroesophageal reflux disease)    • Hepatitis-C    • Hypercholesteremia    • Injury of back    • Injury of right Achilles tendon     Complicated by MRSA and Pseudomonas   • IV drug abuse    • Mild CAD     Left heart cath at  2012   • MRSA (methicillin resistant staph aureus) culture positive 09/14/2016    LUE   • Obesity    • Opiate addiction     Suboxone IV   • Self-injurious behavior    • Sleep apnea    • Suicide attempt    • Systolic CHF, chronic     EF 45-50% in 2013, EF 60% 9/2016     Past Surgical History:   Procedure Laterality Date   • CHOLECYSTECTOMY  1990s   • INCISION AND DRAINAGE ABSCESS Left 2016    wrist   • INCISION AND DRAINAGE ARM Left 9/15/2016    Procedure: INCISION AND DRAINAGE UPPER EXTREMITY;  Surgeon: Rico Oseguera MD;  Location: [facility];  Service:    • ORIF ANKLE FRACTURE Right 2014     Social History     Social History   • Marital status:      Spouse name: N/A   • Number of children: N/A   • Years of education: N/A     Social History  Main Topics   • Smoking status: Former Smoker     Years: 1.00     Types: Cigarettes   • Smokeless tobacco: Never Used      Comment: quit smoking in my 20's   • Alcohol use No      Comment: denies   • Drug use: Yes     Special: IV      Comment: PT STATES THAT HE USED SUBOXONE IN LEFT AC YESTERDAY   • Sexual activity: Defer      Comment: not currently active.      Other Topics Concern   • None     Social History Narrative     ---------------------------------------------------------------------------------------------------------------------   Allergies:  Robitussin dm [dextromethorphan-guaifenesin]; Tizanidine; Metformin; Sulfa antibiotics; Contrast dye; and Tramadol  ---------------------------------------------------------------------------------------------------------------------   Prior to Admission Medications     Prescriptions Last Dose Informant Patient Reported? Taking?    albuterol (PROVENTIL HFA;VENTOLIN HFA) 108 (90 BASE) MCG/ACT inhaler 9/13/2017 Self No Yes    Inhale 2 puffs Every 6 (Six) Hours As Needed for Wheezing.    aspirin 81 MG EC tablet 9/12/2017 Self No Yes    Take 1 tablet by mouth Daily.    atorvastatin (LIPITOR) 20 MG tablet 9/12/2017 Self Yes Yes    Take 20 mg by mouth Daily.    dicyclomine (BENTYL) 10 MG capsule 9/12/2017 Self Yes Yes    Take 10 mg by mouth 2 (Two) Times a Day.    doxepin (SINEquan) 50 MG capsule 9/11/2017 Self No No    Take 1 capsule by mouth Every Night.    furosemide (LASIX) 20 MG tablet 9/12/2017 Self No Yes    Take 1 tablet by mouth Daily.    gabapentin (NEURONTIN) 400 MG capsule 9/13/2017 Self No Yes    Take 1 capsule by mouth 3 (Three) Times a Day.    HYDROcodone-acetaminophen (NORCO) 5-325 MG per tablet 9/13/2017 VI Yes Yes    Take 1 tablet by mouth Daily As Needed. Pt states he takes TID. Per VI, #30 for a 30 day supply on 8/8/17    insulin aspart (novoLOG) 100 UNIT/ML injection 9/13/2017 Self Yes Yes    Inject  under the skin 3 (Three) Times a Day Before  Meals. Patient does this per sliding scale    insulin detemir (LEVEMIR) 100 UNIT/ML injection 9/12/2017 Self No Yes    Inject 10 Units under the skin Every Night.    ipratropium-albuterol (DUO-NEB) 0.5-2.5 mg/mL nebulizer 9/12/2017 Self No Yes    Take 3 mL by nebulization Every 6 (Six) Hours As Needed for Shortness of Air.    Magnesium Oxide 400 (240 MG) MG tablet 9/12/2017 Self No Yes    Take ½ tablet by mouth 2 Times a Day.    metoclopramide (REGLAN) 10 MG tablet 9/12/2017 Self No Yes    Take 1 tablet by mouth 2 (Two) Times a Day.    metoprolol tartrate (LOPRESSOR) 50 MG tablet 9/13/2017 Self No Yes    Take 1 tablet by mouth Daily.    nitroglycerin (NITROSTAT) 0.4 MG SL tablet Past Month  No Yes    Place 1 tablet under the tongue Every 5 (Five) Minutes As Needed for Chest Pain. Take no more than 3 doses in 15 minutes.    pancrelipase, Lip-Prot-Amyl, (CREON) 28851 UNITS capsule delayed-release particles capsule 9/12/2017 Self No Yes    Take 2 capsules by mouth 4 (Four) Times a Day With Meals & at Bedtime.    pantoprazole (PROTONIX) 40 MG EC tablet 9/12/2017 Self No Yes    Take 1 tablet by mouth Daily.    polyethylene glycol (MIRALAX) packet 9/13/2017 Self No Yes    MIX 17 GRAM PACKET IN 4 TO 8 OUNCES OF LIQUID AND DRINK ONCE DAILY.    Patient taking differently:  Take 17 g by mouth 2 (Two) Times a Day.    potassium chloride (K-DUR,KLOR-CON) 20 MEQ CR tablet 9/12/2017 Self No Yes    Take 1 tablet by mouth Daily.        Hospital Meds:    atorvastatin 20 mg Oral Nightly   dicyclomine 10 mg Oral BID   doxepin 50 mg Oral Nightly   gabapentin 400 mg Oral Q8H   HYDROmorphone 1 mg Intravenous Once   insulin detemir 5 Units Subcutaneous Nightly   magnesium oxide 200 mg Oral BID   magnesium sulfate 2 g Intravenous Q2H   metoclopramide 10 mg Oral BID AC   metoprolol tartrate 50 mg Oral Daily   pancrelipase (Lip-Prot-Amyl) 24,000 units of lipase Oral 4x Daily With Meals & Nightly   pantoprazole 40 mg Oral Daily    piperacillin-tazobactam 3.375 g Intravenous Q8H   polyethylene glycol 17 g Oral BID   vancomycin 1,500 mg Intravenous Q8H       Pharmacy Consult      ---------------------------------------------------------------------------------------------------------------------   Vital Signs:  Temp:  [98 °F (36.7 °C)-98.9 °F (37.2 °C)] 98.1 °F (36.7 °C)  Heart Rate:  [81-88] 87  Resp:  [16-20] 16  BP: ()/(50-84) 102/62  Last 3 weights    09/13/17  1800 09/13/17 2057   Weight: 180 lb (81.6 kg) 173 lb 4.8 oz (78.6 kg)     Body mass index is 26.35 kg/(m^2).  ---------------------------------------------------------------------------------------------------------------------   Physical exam:  Constitutional:  Well-developed and well-nourished.  No respiratory distress.      HENT:  Head: Normocephalic and atraumatic.  Mouth:  Moist mucous membranes.    Eyes:  Conjunctivae and EOM are normal.  Pupils are equal, round, and reactive to light.  No scleral icterus.  Neck:  Neck supple.  No JVD present.    Cardiovascular:  Normal rate, regular rhythm and normal heart sounds with no murmur.  Pulmonary/Chest:  No respiratory distress, no wheezes, no crackles, with normal breath sounds and good air movement.  Abdominal:  Soft.  Bowel sounds are normal.  No distension and no tenderness.   Musculoskeletal:  Pt unable to make fist with left hand.  Pt has decreased rom of left shoulder and has to use right hand to hold left arm up.l  Neurological:  Alert and oriented to person, place, and time.  No cranial nerve deficit.  No tongue deviation.  No facial droop.  No slurred speech.   Skin:  Left thumb with swelling and a round area that is 1/2 the diameter of a dime with yellow pus coming out of the thumb; there is erythema of the entire thumb with a good pulse and good capillary refill. .   Psychiatric:  Normal mood and affect.  Behavior is normal.  Judgment and thought content normal.   Peripheral vascular:  No edema and strong pulses  on all 4 extremities.    ---------------------------------------------------------------------------------------------------------------------   .  ---------------------------------------------------------------------------------------------------------------------     Results from last 7 days  Lab Units 09/14/17  0040 09/13/17  1902   CRP mg/dL 2.09* 2.40*   LACTATE mmol/L  --  1.6   WBC 10*3/mm3 3.60* 4.52   HEMOGLOBIN g/dL 12.0* 14.0   HEMATOCRIT % 36.7* 43.1   MCV fL 94.1* 94.7*   MCHC g/dL 32.7* 32.5*   PLATELETS 10*3/mm3 42* 71*           Results from last 7 days  Lab Units 09/14/17 0040 09/13/17  1902   SODIUM mmol/L 135 135   POTASSIUM mmol/L 4.2 5.3   MAGNESIUM mg/dL 1.5*  --    CHLORIDE mmol/L 104 105   CO2 mmol/L 24.7 24.3   BUN mg/dL 9 <5*   CREATININE mg/dL 0.71 0.57   EGFR IF NONAFRICN AM mL/min/1.73 117 150   CALCIUM mg/dL 8.1 9.0   GLUCOSE mg/dL 422* 228*   ALBUMIN g/dL 2.50* 3.10*   BILIRUBIN mg/dL 0.9 1.6   ALK PHOS U/L 99 110   AST (SGOT) U/L 78* 123*   ALT (SGPT) U/L 69* 85*   Estimated Creatinine Clearance: 135.3 mL/min (by C-G formula based on Cr of 0.71).  No results found for: AMMONIA          Lab Results   Component Value Date    HGBA1C 6.40 (H) 07/03/2017     Lab Results   Component Value Date    TSH 0.284 (L) 02/04/2017    FREET4 1.47 02/04/2017     No results found for: PREGTESTUR, PREGSERUM, HCG, HCGQUANT  Pain Management Panel     Pain Management Panel Latest Ref Rng & Units 9/13/2017 7/3/2017    AMPHETAMINES SCREEN, URINE Negative Negative Positive(A)    BARBITURATES SCREEN Negative Negative Negative    BENZODIAZEPINE SCREEN, URINE Negative Negative Negative    BUPRENORPHINE Negative Positive(A) Positive(A)    COCAINE SCREEN, URINE Negative Negative Negative    METHADONE SCREEN, URINE Negative Negative Negative        Blood Culture   Date Value Ref Range Status   09/13/2017 No growth at less than 24 hours  Preliminary   09/13/2017 No growth at less than 24 hours  Preliminary                   ---------------------------------------------------------------------------------------------------------------------   Imaging Results (last 7 days)     Procedure Component Value Units Date/Time    XR Hand 3+ View Left [[id number]] Collected:  09/14/17 0738     Updated:  09/14/17 0740    Narrative:       EXAMINATION: XR HAND 3+ VW LEFT-      TECHNIQUE: XR HAND 3+ VW LEFT-        INDICATION: left thumb abscess; L02.512-Cutaneous abscess of left hand      COMPARISON: NONE     FINDINGS:          BONES: No acute fracture. No blastic or lytic lesions.          JOINT: No dislocation.          SOFT TISSUES:  SIGNIFICANT SOFT TISSUE SWELLING OF THE 1ST DIGIT.       Impression:       No acute or destructive bony abnormality.     COMMUNICATION: Per this written report.     This report was finalized on 9/14/2017 7:38 AM by Dr. Brendan Tolentino MD.       XR Chest AP [[id number]] Collected:  09/14/17 0738     Updated:  09/14/17 0740    Narrative:       EXAMINATION: XR CHEST AP-      CLINICAL INDICATION:     SEDATION IN INTUBATED PATIENTS     TECHNIQUE:  XR CHEST AP-      COMPARISON: NONE      FINDINGS:   The lungs remain aerated.  Heart and mediastinum contours are unremarkable.  No pleural effusion.  No pneumothorax.   Bony and soft tissue structures are unremarkable.       Impression:       No radiographic evidence of acute cardiac or pulmonary  disease.     This report was finalized on 9/14/2017 7:38 AM by Dr. Brendan Tolentino MD.           ----------------------------------------------------------------------------------------------------------------------  Assessment and Plan: Left thumb abscess - Pt is to continue current treatment and remain NPO.  Pt is to have left thumb incision and drainage by Dr. Harrington today.  Dr. Harrington will eval pt prior to surgery.    Thank you for the consult.    NOHEMI Rader  09/14/17  9:51 AM     Electronically signed by NOHEMI Rader at 9/14/2017  9:58 AM

## 2017-09-15 NOTE — NURSING NOTE
Patient requesting help getting into a drug rehab, asked about [facility] and if I could get the number for them. Asking for help sitting that up when able to be discharged.

## 2017-09-15 NOTE — PROGRESS NOTES
Inpatient Progress Note  Isaias Lang  Date: 09/15/17  MRN: [id number]      Subjective: Pt was seen today for follow up on left thumb I&D pod 1.  Pt states he is still having a lot of pain in his left thumb area and requesting more pain medications.  Pt states he is getting better rom of the left hand but it is not completely back to normal.  Pt states that the swelling in his right hand is getting better too.  He denies chest pain or shortness of breath nausea or diarrhea.        Objective:    Vitals:    09/15/17 0345 09/15/17 0702 09/15/17 1108 09/15/17 1126   BP: 140/72 110/76 112/78    BP Location: Right arm Right arm Right arm    Patient Position: Lying Lying Lying    Pulse: 102 92 88 98   Resp: 16 18 20 18   Temp: 98.1 °F (36.7 °C) 98.4 °F (36.9 °C) 98.8 °F (37.1 °C)    TempSrc: Oral Oral Oral    SpO2: 98% 98% 97% 95%   Weight: 186 lb 4 oz (84.5 kg)      Height:              Physical Exam:  Constitutional:  Well-developed and well-nourished.  No respiratory distress.      HENT:  Head: Normocephalic and atraumatic.  Mouth:  Moist mucous membranes.    Eyes:  Conjunctivae and EOM are normal.  Pupils are equal, round, and reactive to light.  No scleral icterus.  Neck:  Neck supple.  No JVD present.    Cardiovascular:  Normal rate, regular rhythm and normal heart sounds with no murmur.  Pulmonary/Chest:  No respiratory distress, no wheezes, no crackles, with normal breath sounds and good air movement.  Abdominal:  Soft.  Bowel sounds are normal.  No distension and no tenderness.   Musculoskeletal:  Left hand thumb decrease rom but improved from pre-surgical aspect. Pt unable to flex any digit to the left hand fully. Right hand has no erythema and mild edema.  Full rom of right hand and digits but feels taught per pt with rom.    Neurological:  Alert and oriented to person, place, and time.  No cranial nerve deficit.  No tongue deviation.  No facial droop.  No slurred speech.   Skin:  Left thumb erythremic  swollen with packing.  Psychiatric:  Normal mood and affect.  Behavior is normal.  Judgment and thought content normal.   Peripheral vascular:  No edema and strong pulses on all 4 extremities.      Labs:      Results from last 7 days  Lab Units 09/15/17  0415 09/14/17  0040 09/13/17  1902   CRP mg/dL 1.91* 2.09* 2.40*   LACTATE mmol/L  --   --  1.6   WBC 10*3/mm3 2.74* 3.60* 4.52   HEMOGLOBIN g/dL 11.8* 12.0* 14.0   HEMATOCRIT % 35.9* 36.7* 43.1   MCV fL 94.0 94.1* 94.7*   MCHC g/dL 32.9* 32.7* 32.5*   PLATELETS 10*3/mm3 73* 42* 71*           Results from last 7 days  Lab Units 09/15/17  0415 09/15/17  0207 09/14/17  0040 09/13/17  1902   SODIUM mmol/L 135  --  135 135   POTASSIUM mmol/L 4.3  --  4.2 5.3   MAGNESIUM mg/dL  --  1.7 1.5*  --    CHLORIDE mmol/L 105  --  104 105   CO2 mmol/L 23.3*  --  24.7 24.3   BUN mg/dL 7  --  9 <5*   CREATININE mg/dL 0.68  --  0.71 0.57   EGFR IF NONAFRICN AM mL/min/1.73 122  --  117 150   CALCIUM mg/dL 7.9  --  8.1 9.0   GLUCOSE mg/dL 458*  --  422* 228*   ALBUMIN g/dL 2.50*  --  2.50* 3.10*   BILIRUBIN mg/dL 0.7  --  0.9 1.6   ALK PHOS U/L 105  --  99 110   AST (SGOT) U/L 82*  --  78* 123*   ALT (SGPT) U/L 67*  --  69* 85*   Estimated Creatinine Clearance: 134.4 mL/min (by C-G formula based on Cr of 0.68).  No results found for: AMMONIA          No results found for: HGBA1C  Lab Results   Component Value Date    TSH 0.284 (L) 02/04/2017    FREET4 1.47 02/04/2017         Pain Management Panel     Pain Management Panel Latest Ref Rng & Units 9/13/2017 7/3/2017    AMPHETAMINES SCREEN, URINE Negative Negative Positive(A)    BARBITURATES SCREEN Negative Negative Negative    BENZODIAZEPINE SCREEN, URINE Negative Negative Negative    BUPRENORPHINE Negative Positive(A) Positive(A)    COCAINE SCREEN, URINE Negative Negative Negative    METHADONE SCREEN, URINE Negative Negative Negative          Blood Culture   Date Value Ref Range Status   09/13/2017 No growth at 24 hours  Preliminary    09/13/2017 No growth at 24 hours  Preliminary     Urine Culture   Date Value Ref Range Status   09/13/2017 Normal Urogenital Mercedes  Preliminary     Wound Culture   Date Value Ref Range Status   09/13/2017 Culture in progress  Preliminary                    Radiology:  Imaging Results (last 72 hours)     Procedure Component Value Units Date/Time    XR Hand 3+ View Left [[id number]] Collected:  09/14/17 0738     Updated:  09/14/17 0740    Narrative:       EXAMINATION: XR HAND 3+ VW LEFT-      TECHNIQUE: XR HAND 3+ VW LEFT-        INDICATION: left thumb abscess; L02.512-Cutaneous abscess of left hand      COMPARISON: NONE     FINDINGS:          BONES: No acute fracture. No blastic or lytic lesions.          JOINT: No dislocation.          SOFT TISSUES:  SIGNIFICANT SOFT TISSUE SWELLING OF THE 1ST DIGIT.       Impression:       No acute or destructive bony abnormality.     COMMUNICATION: Per this written report.     This report was finalized on 9/14/2017 7:38 AM by Dr. Brendan Tolentino MD.       XR Chest AP [[id number]] Collected:  09/14/17 0738     Updated:  09/14/17 0740    Narrative:       EXAMINATION: XR CHEST AP-      CLINICAL INDICATION:     SEDATION IN INTUBATED PATIENTS     TECHNIQUE:  XR CHEST AP-      COMPARISON: NONE      FINDINGS:   The lungs remain aerated.  Heart and mediastinum contours are unremarkable.  No pleural effusion.  No pneumothorax.   Bony and soft tissue structures are unremarkable.       Impression:       No radiographic evidence of acute cardiac or pulmonary  disease.     This report was finalized on 9/14/2017 7:38 AM by Dr. Brendan Tolentino MD.               Assessment: S/P Left thumb I&D          Plan: Pt is to continue current treatment and daily packing changes.  Pt is to follow up with Dr. Harrington in office in 2 weeks.  Explained to patient that I could not increase pain medications and it was not recommended secondary to abuse history.        Giovany Robles, APRN September 15, 2017 1:58 PM      I agree with the above.    Adin Harrington M.D.

## 2017-09-15 NOTE — PLAN OF CARE
Problem: Infection, Risk/Actual (Adult)  Goal: Identify Related Risk Factors and Signs and Symptoms  Outcome: Ongoing (interventions implemented as appropriate)  Goal: Infection Prevention/Resolution  Outcome: Ongoing (interventions implemented as appropriate)    Problem: Pain, Acute (Adult)  Goal: Identify Related Risk Factors and Signs and Symptoms  Outcome: Ongoing (interventions implemented as appropriate)  Goal: Acceptable Pain Control/Comfort Level  Outcome: Ongoing (interventions implemented as appropriate)    Problem: Patient Care Overview (Adult)  Goal: Plan of Care Review  Outcome: Ongoing (interventions implemented as appropriate)  Goal: Adult Individualization and Mutuality  Outcome: Ongoing (interventions implemented as appropriate)    Problem: Anesthesia, General (Adult)  Goal: Signs and Symptoms of Listed Potential Problems Will be Absent or Manageable (Anesthesia, General)  Outcome: Ongoing (interventions implemented as appropriate)    Problem: Fall Risk (Adult)  Goal: Identify Related Risk Factors and Signs and Symptoms  Outcome: Ongoing (interventions implemented as appropriate)  Goal: Absence of Falls  Outcome: Ongoing (interventions implemented as appropriate)

## 2017-09-16 LAB
ALBUMIN SERPL-MCNC: 2.5 G/DL (ref 3.5–5)
ALBUMIN/GLOB SERPL: 1 G/DL (ref 1.5–2.5)
ALP SERPL-CCNC: 137 U/L (ref 40–129)
ALT SERPL W P-5'-P-CCNC: 63 U/L (ref 10–44)
ANION GAP SERPL CALCULATED.3IONS-SCNC: 5.1 MMOL/L (ref 3.6–11.2)
AST SERPL-CCNC: 70 U/L (ref 10–34)
BACTERIA SPEC AEROBE CULT: NORMAL
BASOPHILS # BLD AUTO: 0.01 10*3/MM3 (ref 0–0.3)
BASOPHILS NFR BLD AUTO: 0.4 % (ref 0–2)
BILIRUB SERPL-MCNC: 0.6 MG/DL (ref 0.2–1.8)
BUN BLD-MCNC: 6 MG/DL (ref 7–21)
BUN/CREAT SERPL: 12.2 (ref 7–25)
CALCIUM SPEC-SCNC: 8.1 MG/DL (ref 7.7–10)
CHLORIDE SERPL-SCNC: 107 MMOL/L (ref 99–112)
CO2 SERPL-SCNC: 26.9 MMOL/L (ref 24.3–31.9)
CREAT BLD-MCNC: 0.49 MG/DL (ref 0.43–1.29)
CRP SERPL-MCNC: 1.87 MG/DL (ref 0–0.99)
DEPRECATED RDW RBC AUTO: 49.5 FL (ref 37–54)
EOSINOPHIL # BLD AUTO: 0.1 10*3/MM3 (ref 0–0.7)
EOSINOPHIL NFR BLD AUTO: 4.3 % (ref 0–5)
ERYTHROCYTE [DISTWIDTH] IN BLOOD BY AUTOMATED COUNT: 14.6 % (ref 11.5–14.5)
GFR SERPL CREATININE-BSD FRML MDRD: >150 ML/MIN/1.73
GLOBULIN UR ELPH-MCNC: 2.6 GM/DL
GLUCOSE BLD-MCNC: 220 MG/DL (ref 70–110)
GLUCOSE BLDC GLUCOMTR-MCNC: 180 MG/DL (ref 70–130)
GLUCOSE BLDC GLUCOMTR-MCNC: 184 MG/DL (ref 70–130)
GLUCOSE BLDC GLUCOMTR-MCNC: 195 MG/DL (ref 70–130)
GLUCOSE BLDC GLUCOMTR-MCNC: 226 MG/DL (ref 70–130)
HCT VFR BLD AUTO: 36 % (ref 42–52)
HGB BLD-MCNC: 11.8 G/DL (ref 14–18)
HYPOCHROMIA BLD QL: NORMAL
IMM GRANULOCYTES # BLD: 0.01 10*3/MM3 (ref 0–0.03)
IMM GRANULOCYTES NFR BLD: 0.4 % (ref 0–0.5)
LYMPHOCYTES # BLD AUTO: 0.7 10*3/MM3 (ref 1–3)
LYMPHOCYTES NFR BLD AUTO: 30 % (ref 21–51)
MAGNESIUM SERPL-MCNC: 1.6 MG/DL (ref 1.7–2.6)
MAGNESIUM SERPL-MCNC: 1.7 MG/DL (ref 1.7–2.6)
MCH RBC QN AUTO: 30.6 PG (ref 27–33)
MCHC RBC AUTO-ENTMCNC: 32.8 G/DL (ref 33–37)
MCV RBC AUTO: 93.3 FL (ref 80–94)
MONOCYTES # BLD AUTO: 0.27 10*3/MM3 (ref 0.1–0.9)
MONOCYTES NFR BLD AUTO: 11.6 % (ref 0–10)
NEUTROPHILS # BLD AUTO: 1.24 10*3/MM3 (ref 1.4–6.5)
NEUTROPHILS NFR BLD AUTO: 53.3 % (ref 30–70)
NRBC BLD MANUAL-RTO: 0 /100 WBC (ref 0–0)
OSMOLALITY SERPL CALC.SUM OF ELEC: 281.9 MOSM/KG (ref 273–305)
PHOSPHATE SERPL-MCNC: 2.8 MG/DL (ref 2.7–4.5)
PLATELET # BLD AUTO: 43 10*3/MM3 (ref 130–400)
PMV BLD AUTO: 11.9 FL (ref 6–10)
POTASSIUM BLD-SCNC: 4.4 MMOL/L (ref 3.5–5.3)
PROT SERPL-MCNC: 5.1 G/DL (ref 6–8)
RBC # BLD AUTO: 3.86 10*6/MM3 (ref 4.7–6.1)
SMALL PLATELETS BLD QL SMEAR: NORMAL
SODIUM BLD-SCNC: 139 MMOL/L (ref 135–153)
VANCOMYCIN TROUGH SERPL-MCNC: 15.8 MCG/ML (ref 5–15)
WBC NRBC COR # BLD: 2.33 10*3/MM3 (ref 4.5–12.5)

## 2017-09-16 PROCEDURE — 86140 C-REACTIVE PROTEIN: CPT | Performed by: INTERNAL MEDICINE

## 2017-09-16 PROCEDURE — 80202 ASSAY OF VANCOMYCIN: CPT

## 2017-09-16 PROCEDURE — 25010000002 VANCOMYCIN PER 500 MG: Performed by: INTERNAL MEDICINE

## 2017-09-16 PROCEDURE — 82962 GLUCOSE BLOOD TEST: CPT

## 2017-09-16 PROCEDURE — 94799 UNLISTED PULMONARY SVC/PX: CPT

## 2017-09-16 PROCEDURE — 25010000002 PIPERACILLIN-TAZOBACTAM: Performed by: INTERNAL MEDICINE

## 2017-09-16 PROCEDURE — 63710000001 INSULIN DETEMIR PER 5 UNITS: Performed by: INTERNAL MEDICINE

## 2017-09-16 PROCEDURE — 85025 COMPLETE CBC W/AUTO DIFF WBC: CPT | Performed by: INTERNAL MEDICINE

## 2017-09-16 PROCEDURE — 83735 ASSAY OF MAGNESIUM: CPT | Performed by: INTERNAL MEDICINE

## 2017-09-16 PROCEDURE — 63710000001 INSULIN ASPART PER 5 UNITS: Performed by: FAMILY MEDICINE

## 2017-09-16 PROCEDURE — 85007 BL SMEAR W/DIFF WBC COUNT: CPT | Performed by: INTERNAL MEDICINE

## 2017-09-16 PROCEDURE — 84100 ASSAY OF PHOSPHORUS: CPT | Performed by: INTERNAL MEDICINE

## 2017-09-16 PROCEDURE — 80053 COMPREHEN METABOLIC PANEL: CPT | Performed by: INTERNAL MEDICINE

## 2017-09-16 RX ORDER — MAGNESIUM SULFATE HEPTAHYDRATE 40 MG/ML
4 INJECTION, SOLUTION INTRAVENOUS ONCE
Status: COMPLETED | OUTPATIENT
Start: 2017-09-16 | End: 2017-09-16

## 2017-09-16 RX ADMIN — INSULIN ASPART 5 UNITS: 100 INJECTION, SOLUTION INTRAVENOUS; SUBCUTANEOUS at 08:32

## 2017-09-16 RX ADMIN — PIPERACILLIN SODIUM,TAZOBACTAM SODIUM 3.38 G: 3; .375 INJECTION, POWDER, FOR SOLUTION INTRAVENOUS at 14:35

## 2017-09-16 RX ADMIN — INSULIN ASPART 3 UNITS: 100 INJECTION, SOLUTION INTRAVENOUS; SUBCUTANEOUS at 21:18

## 2017-09-16 RX ADMIN — GABAPENTIN 400 MG: 400 CAPSULE ORAL at 14:35

## 2017-09-16 RX ADMIN — DICYCLOMINE HYDROCHLORIDE 10 MG: 10 CAPSULE ORAL at 08:31

## 2017-09-16 RX ADMIN — PANCRELIPASE 24000 UNITS OF LIPASE: 60000; 12000; 38000 CAPSULE, DELAYED RELEASE PELLETS ORAL at 16:36

## 2017-09-16 RX ADMIN — INSULIN DETEMIR 5 UNITS: 100 INJECTION, SOLUTION SUBCUTANEOUS at 21:17

## 2017-09-16 RX ADMIN — INSULIN ASPART 3 UNITS: 100 INJECTION, SOLUTION INTRAVENOUS; SUBCUTANEOUS at 16:54

## 2017-09-16 RX ADMIN — MAGNESIUM GLUCONATE 500 MG ORAL TABLET 200 MG: 500 TABLET ORAL at 16:37

## 2017-09-16 RX ADMIN — VANCOMYCIN HYDROCHLORIDE 1500 MG: 5 INJECTION, POWDER, LYOPHILIZED, FOR SOLUTION INTRAVENOUS at 11:55

## 2017-09-16 RX ADMIN — PANTOPRAZOLE SODIUM 40 MG: 40 TABLET, DELAYED RELEASE ORAL at 08:31

## 2017-09-16 RX ADMIN — ATORVASTATIN CALCIUM 20 MG: 20 TABLET, FILM COATED ORAL at 21:17

## 2017-09-16 RX ADMIN — DOXEPIN HYDROCHLORIDE 50 MG: 25 CAPSULE ORAL at 21:17

## 2017-09-16 RX ADMIN — MAGNESIUM GLUCONATE 500 MG ORAL TABLET 200 MG: 500 TABLET ORAL at 08:32

## 2017-09-16 RX ADMIN — GABAPENTIN 400 MG: 400 CAPSULE ORAL at 21:18

## 2017-09-16 RX ADMIN — PANCRELIPASE 24000 UNITS OF LIPASE: 60000; 12000; 38000 CAPSULE, DELAYED RELEASE PELLETS ORAL at 08:32

## 2017-09-16 RX ADMIN — GABAPENTIN 400 MG: 400 CAPSULE ORAL at 05:47

## 2017-09-16 RX ADMIN — PIPERACILLIN SODIUM,TAZOBACTAM SODIUM 3.38 G: 3; .375 INJECTION, POWDER, FOR SOLUTION INTRAVENOUS at 21:18

## 2017-09-16 RX ADMIN — MAGNESIUM SULFATE HEPTAHYDRATE 4 G: 40 INJECTION, SOLUTION INTRAVENOUS at 05:47

## 2017-09-16 RX ADMIN — POLYETHYLENE GLYCOL 3350 17 G: 17 POWDER, FOR SOLUTION ORAL at 16:37

## 2017-09-16 RX ADMIN — PANCRELIPASE 24000 UNITS OF LIPASE: 60000; 12000; 38000 CAPSULE, DELAYED RELEASE PELLETS ORAL at 11:54

## 2017-09-16 RX ADMIN — METOCLOPRAMIDE 10 MG: 10 TABLET ORAL at 08:31

## 2017-09-16 RX ADMIN — PIPERACILLIN SODIUM,TAZOBACTAM SODIUM 3.38 G: 3; .375 INJECTION, POWDER, FOR SOLUTION INTRAVENOUS at 05:06

## 2017-09-16 RX ADMIN — METOCLOPRAMIDE 10 MG: 10 TABLET ORAL at 16:37

## 2017-09-16 RX ADMIN — PANCRELIPASE 24000 UNITS OF LIPASE: 60000; 12000; 38000 CAPSULE, DELAYED RELEASE PELLETS ORAL at 21:17

## 2017-09-16 RX ADMIN — DICYCLOMINE HYDROCHLORIDE 10 MG: 10 CAPSULE ORAL at 16:36

## 2017-09-16 RX ADMIN — METOPROLOL TARTRATE 50 MG: 50 TABLET, FILM COATED ORAL at 08:31

## 2017-09-16 RX ADMIN — POLYETHYLENE GLYCOL 3350 17 G: 17 POWDER, FOR SOLUTION ORAL at 08:38

## 2017-09-16 RX ADMIN — VANCOMYCIN HYDROCHLORIDE 1500 MG: 5 INJECTION, POWDER, LYOPHILIZED, FOR SOLUTION INTRAVENOUS at 02:57

## 2017-09-16 RX ADMIN — INSULIN ASPART 3 UNITS: 100 INJECTION, SOLUTION INTRAVENOUS; SUBCUTANEOUS at 11:55

## 2017-09-16 RX ADMIN — HYDROCODONE BITARTRATE AND ACETAMINOPHEN 1 TABLET: 5; 325 TABLET ORAL at 08:38

## 2017-09-16 NOTE — PROGRESS NOTES
"                                                                                                                                        Hospitalist Progress Note    Patient Identification  Name: Isaias Lang  Age/Sex: 52 y.o. male  :  1965        MRN: [id number]  Visit Number: [id number]  PCP: Ankit Jade MD       LOS: 3 days       Chief complaint:  Left thumb lesion     Subjective:  52-year-old who was admitted on 2017 with a left thumb abscess after injection of suboxone and methamphetamine.  He had incision and drainage of the abscess on 2017.  Today, patient is sitting at bedside and has recently finished eating breakfast.  He appears to be in no acute distress.  However, he continues to complain of post-op pain and requests IV  pain medication.  He denies fever, dyspnea, cough, chest pain, nausea or vomiting.  He tells me that he has experienced some mild constipation and notes his last bowel movement was yesterday.         Objective     Vital Signs  Temp:  [97.6 °F (36.4 °C)-98.9 °F (37.2 °C)] 98.8 °F (37.1 °C)  Heart Rate:  [80-98] 80  Resp:  [16-20] 18  BP: (110-128)/(78-86) 114/78  Last 2 weights    09/15/17  0345 17  0354   Weight: 186 lb 4 oz (84.5 kg) 198 lb 11.2 oz (90.1 kg)     Body mass index is 30.21 kg/(m^2).    Intake/Output    Intake/Output Summary (Last 24 hours) at 17 0855  Last data filed at 17 0354   Gross per 24 hour   Intake              960 ml   Output             1775 ml   Net             -815 ml       Physical Exam:      Objective:  General Appearance:  Comfortable and in distress.    Vital signs: (most recent): Blood pressure 114/78, pulse 80, temperature 98.8 °F (37.1 °C), temperature source Axillary, resp. rate 18, height 68\" (172.7 cm), weight 198 lb 11.2 oz (90.1 kg), SpO2 96 %.  Vital signs are normal.  No fever.    Output: Producing urine.    HEENT: Normal HEENT exam.    Lungs:  Normal respiratory rate and normal effort.  He is not in " respiratory distress.  (Breath sounds diminished in bilateral lung bases, otherwise clear.)  Heart: Normal rate.  Regular rhythm.    Chest: No chest wall tenderness.  Symmetric chest wall expansion.   Abdomen: Abdomen is soft and non-distended.  Bowel sounds are normal.   There is no abdominal tenderness.     Extremities: Normal range of motion.  There is no dependent edema.    Pulses: Distal pulses are intact.    Neurological: Patient is alert and oriented to person, place and time.    Pupils:  (Pupils round and equal.).    Skin:  Warm and dry.  (Left thumb wrapped in Kerlix.  No drainage or foul odor noted.)             Results Review:      LABS     Results from last 7 days  Lab Units 09/16/17  0126 09/15/17  0415 09/14/17  0040 09/13/17  1902   WBC 10*3/mm3 2.33* 2.74* 3.60* 4.52   HEMOGLOBIN g/dL 11.8* 11.8* 12.0* 14.0   PLATELETS 10*3/mm3 43* 73* 42* 71*       Results from last 7 days  Lab Units 09/16/17  0126 09/15/17  0415 09/14/17  0040 09/13/17  1902   CRP mg/dL 1.87* 1.91* 2.09* 2.40*       Results from last 7 days  Lab Units 09/16/17  0126 09/15/17  0415 09/14/17  0040 09/13/17  1902   SODIUM mmol/L 139 135 135 135   POTASSIUM mmol/L 4.4 4.3 4.2 5.3   CHLORIDE mmol/L 107 105 104 105   CO2 mmol/L 26.9 23.3* 24.7 24.3   BUN mg/dL 6* 7 9 <5*   CREATININE mg/dL 0.49 0.68 0.71 0.57   CALCIUM mg/dL 8.1 7.9 8.1 9.0   GLUCOSE mg/dL 220* 458* 422* 228*       Results from last 7 days  Lab Units 09/16/17  0126 09/15/17  0207 09/14/17  0040   MAGNESIUM mg/dL 1.7 1.7 1.5*     No results found for: HGBA1C    Results from last 7 days  Lab Units 09/16/17  0126 09/15/17  0415 09/14/17  0040 09/13/17  1902   BILIRUBIN mg/dL 0.6 0.7 0.9 1.6   ALK PHOS U/L 137* 105 99 110   AST (SGOT) U/L 70* 82* 78* 123*   ALT (SGPT) U/L 63* 67* 69* 85*                   Telemetry:  Sinus rhythm in the 90s.  I personally reviewed telemetry strip.      I have reviewed the patient's laboratory results.    IMAGING  Imaging Results (last 72  hours)     Procedure Component Value Units Date/Time    XR Hand 3+ View Left [[id number]] Collected:  09/14/17 0738     Updated:  09/14/17 0740    Narrative:       EXAMINATION: XR HAND 3+ VW LEFT-      TECHNIQUE: XR HAND 3+ VW LEFT-        INDICATION: left thumb abscess; L02.512-Cutaneous abscess of left hand      COMPARISON: NONE     FINDINGS:          BONES: No acute fracture. No blastic or lytic lesions.          JOINT: No dislocation.          SOFT TISSUES:  SIGNIFICANT SOFT TISSUE SWELLING OF THE 1ST DIGIT.       Impression:       No acute or destructive bony abnormality.     COMMUNICATION: Per this written report.     This report was finalized on 9/14/2017 7:38 AM by Dr. Brendan Tolentino MD.       XR Chest AP [[id number]] Collected:  09/14/17 0738     Updated:  09/14/17 0740    Narrative:       EXAMINATION: XR CHEST AP-      CLINICAL INDICATION:     SEDATION IN INTUBATED PATIENTS     TECHNIQUE:  XR CHEST AP-      COMPARISON: NONE      FINDINGS:   The lungs remain aerated.  Heart and mediastinum contours are unremarkable.  No pleural effusion.  No pneumothorax.   Bony and soft tissue structures are unremarkable.       Impression:       No radiographic evidence of acute cardiac or pulmonary  disease.     This report was finalized on 9/14/2017 7:38 AM by Dr. Brendan Tolentino MD.             I have personally reviewed the above radiologic imaging.    Medications    atorvastatin 20 mg Oral Nightly   dicyclomine 10 mg Oral BID   doxepin 50 mg Oral Nightly   gabapentin 400 mg Oral Q8H   HYDROmorphone 1 mg Intravenous Once   insulin aspart 0-14 Units Subcutaneous 4x Daily AC & at Bedtime   insulin detemir 5 Units Subcutaneous Nightly   magnesium oxide 200 mg Oral BID   magnesium sulfate 4 g Intravenous Once   metoclopramide 10 mg Oral BID AC   metoprolol tartrate 50 mg Oral Daily   pancrelipase (Lip-Prot-Amyl) 24,000 units of lipase Oral 4x Daily With Meals & Nightly   pantoprazole 40 mg Oral Daily   piperacillin-tazobactam  3.375 g Intravenous Q8H   polyethylene glycol 17 g Oral BID   vancomycin 1,500 mg Intravenous Q8H       Pharmacy Consult      Assessment and Plan:  -Left thumb abscess and cellulitis causing sepsis that was present on admission  -Hepatitis C causing transaminitis and thrombocytopenia  -Type II diabetes mellitus, insulin dependent with hyperglycemia   -History of AFib, currently in normal sinus rhythm  -COPD without acute exacerbation   -Essential hypertension, controlled   -Acute hypomagnesemia  -Hyperlipidemia   -History of coronary artery disease, S/P heart catheterization in 2012  -History of systolic congestive heart failure with EF of 51-55% on 4/4/2017  -IV drug abuse with history of amphetamine, suboxone, cocaine, and opioid abuse in the past  -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  CRP is now only mildly elevated.  Finalized blood cultures are still pending at this point.  We will continue vancomycin and Zosyn.  As for pain control, we will opt to attempt to achieve acceptable pain level with oral pain medication at this time due to previous history of IV drug use.  I will add milk of magnesia daily when necessary for constipation.  We will continue to monitor patient and lab work closely.     The patient is high risk due to the following diagnoses/reasons:  sepsis           I have discussed the patient's assessment and plan with patient.    NOHEMI Baeza  09/16/17  8:55 AM

## 2017-09-16 NOTE — PROGRESS NOTES
Vancomycin trough level obtained today was reported at 15.8 mg/L.  This is within the desired therapeutic trough range.  Recommend continuing current dose of 1500 mg iv q8hrs.  Pharmacy will continue to follow.  Thank you.

## 2017-09-16 NOTE — PLAN OF CARE
Problem: Infection, Risk/Actual (Adult)  Goal: Identify Related Risk Factors and Signs and Symptoms  Outcome: Ongoing (interventions implemented as appropriate)  Goal: Infection Prevention/Resolution  Outcome: Ongoing (interventions implemented as appropriate)    Problem: Pain, Acute (Adult)  Goal: Identify Related Risk Factors and Signs and Symptoms  Outcome: Ongoing (interventions implemented as appropriate)  Goal: Acceptable Pain Control/Comfort Level  Outcome: Ongoing (interventions implemented as appropriate)    Problem: Patient Care Overview (Adult)  Goal: Plan of Care Review  Outcome: Ongoing (interventions implemented as appropriate)  Goal: Adult Individualization and Mutuality  Outcome: Ongoing (interventions implemented as appropriate)    Problem: Fall Risk (Adult)  Goal: Identify Related Risk Factors and Signs and Symptoms  Outcome: Ongoing (interventions implemented as appropriate)  Goal: Absence of Falls  Outcome: Ongoing (interventions implemented as appropriate)    Problem: Diabetes, Type 2 (Adult)  Goal: Signs and Symptoms of Listed Potential Problems Will be Absent or Manageable (Diabetes, Type 2)  Outcome: Ongoing (interventions implemented as appropriate)    Problem: Pressure Ulcer Risk (Kael Scale) (Adult,Obstetrics,Pediatric)  Goal: Identify Related Risk Factors and Signs and Symptoms  Outcome: Ongoing (interventions implemented as appropriate)

## 2017-09-17 LAB
ALBUMIN SERPL-MCNC: 2.5 G/DL (ref 3.5–5)
ALBUMIN/GLOB SERPL: 0.9 G/DL (ref 1.5–2.5)
ALP SERPL-CCNC: 148 U/L (ref 40–129)
ALT SERPL W P-5'-P-CCNC: 55 U/L (ref 10–44)
ANION GAP SERPL CALCULATED.3IONS-SCNC: 3.7 MMOL/L (ref 3.6–11.2)
AST SERPL-CCNC: 68 U/L (ref 10–34)
BACTERIA SPEC AEROBE CULT: ABNORMAL
BACTERIA SPEC AEROBE CULT: ABNORMAL
BASOPHILS # BLD AUTO: 0.02 10*3/MM3 (ref 0–0.3)
BASOPHILS NFR BLD AUTO: 0.8 % (ref 0–2)
BILIRUB SERPL-MCNC: 0.6 MG/DL (ref 0.2–1.8)
BUN BLD-MCNC: 9 MG/DL (ref 7–21)
BUN/CREAT SERPL: 17 (ref 7–25)
CALCIUM SPEC-SCNC: 8.5 MG/DL (ref 7.7–10)
CHLORIDE SERPL-SCNC: 107 MMOL/L (ref 99–112)
CO2 SERPL-SCNC: 26.3 MMOL/L (ref 24.3–31.9)
CREAT BLD-MCNC: 0.53 MG/DL (ref 0.43–1.29)
CRP SERPL-MCNC: 1.35 MG/DL (ref 0–0.99)
DEPRECATED RDW RBC AUTO: 47.7 FL (ref 37–54)
EOSINOPHIL # BLD AUTO: 0.11 10*3/MM3 (ref 0–0.7)
EOSINOPHIL NFR BLD AUTO: 4.3 % (ref 0–5)
ERYTHROCYTE [DISTWIDTH] IN BLOOD BY AUTOMATED COUNT: 14.5 % (ref 11.5–14.5)
GFR SERPL CREATININE-BSD FRML MDRD: >150 ML/MIN/1.73
GLOBULIN UR ELPH-MCNC: 2.7 GM/DL
GLUCOSE BLD-MCNC: 257 MG/DL (ref 70–110)
GLUCOSE BLDC GLUCOMTR-MCNC: 154 MG/DL (ref 70–130)
GLUCOSE BLDC GLUCOMTR-MCNC: 170 MG/DL (ref 70–130)
GLUCOSE BLDC GLUCOMTR-MCNC: 210 MG/DL (ref 70–130)
GLUCOSE BLDC GLUCOMTR-MCNC: 222 MG/DL (ref 70–130)
GRAM STN SPEC: ABNORMAL
HCT VFR BLD AUTO: 37.2 % (ref 42–52)
HGB BLD-MCNC: 12.2 G/DL (ref 14–18)
IMM GRANULOCYTES # BLD: 0.01 10*3/MM3 (ref 0–0.03)
IMM GRANULOCYTES NFR BLD: 0.4 % (ref 0–0.5)
LYMPHOCYTES # BLD AUTO: 0.62 10*3/MM3 (ref 1–3)
LYMPHOCYTES NFR BLD AUTO: 24.1 % (ref 21–51)
MAGNESIUM SERPL-MCNC: 1.6 MG/DL (ref 1.7–2.6)
MCH RBC QN AUTO: 30.7 PG (ref 27–33)
MCHC RBC AUTO-ENTMCNC: 32.8 G/DL (ref 33–37)
MCV RBC AUTO: 93.5 FL (ref 80–94)
MONOCYTES # BLD AUTO: 0.27 10*3/MM3 (ref 0.1–0.9)
MONOCYTES NFR BLD AUTO: 10.5 % (ref 0–10)
NEUTROPHILS # BLD AUTO: 1.54 10*3/MM3 (ref 1.4–6.5)
NEUTROPHILS NFR BLD AUTO: 59.9 % (ref 30–70)
OSMOLALITY SERPL CALC.SUM OF ELEC: 281.3 MOSM/KG (ref 273–305)
PHOSPHATE SERPL-MCNC: 3 MG/DL (ref 2.7–4.5)
PLATELET # BLD AUTO: 81 10*3/MM3 (ref 130–400)
PMV BLD AUTO: 10.9 FL (ref 6–10)
POTASSIUM BLD-SCNC: 4.3 MMOL/L (ref 3.5–5.3)
PROT SERPL-MCNC: 5.2 G/DL (ref 6–8)
RBC # BLD AUTO: 3.98 10*6/MM3 (ref 4.7–6.1)
SODIUM BLD-SCNC: 137 MMOL/L (ref 135–153)
WBC NRBC COR # BLD: 2.57 10*3/MM3 (ref 4.5–12.5)

## 2017-09-17 PROCEDURE — 99232 SBSQ HOSP IP/OBS MODERATE 35: CPT | Performed by: INTERNAL MEDICINE

## 2017-09-17 PROCEDURE — 82962 GLUCOSE BLOOD TEST: CPT

## 2017-09-17 PROCEDURE — 86140 C-REACTIVE PROTEIN: CPT | Performed by: INTERNAL MEDICINE

## 2017-09-17 PROCEDURE — 84100 ASSAY OF PHOSPHORUS: CPT | Performed by: INTERNAL MEDICINE

## 2017-09-17 PROCEDURE — 25010000002 PIPERACILLIN-TAZOBACTAM: Performed by: INTERNAL MEDICINE

## 2017-09-17 PROCEDURE — 85025 COMPLETE CBC W/AUTO DIFF WBC: CPT | Performed by: INTERNAL MEDICINE

## 2017-09-17 PROCEDURE — 25010000002 VANCOMYCIN PER 500 MG: Performed by: INTERNAL MEDICINE

## 2017-09-17 PROCEDURE — 94799 UNLISTED PULMONARY SVC/PX: CPT

## 2017-09-17 PROCEDURE — 80053 COMPREHEN METABOLIC PANEL: CPT | Performed by: INTERNAL MEDICINE

## 2017-09-17 PROCEDURE — 83735 ASSAY OF MAGNESIUM: CPT | Performed by: INTERNAL MEDICINE

## 2017-09-17 PROCEDURE — 63710000001 INSULIN DETEMIR PER 5 UNITS: Performed by: INTERNAL MEDICINE

## 2017-09-17 PROCEDURE — 63710000001 INSULIN ASPART PER 5 UNITS: Performed by: FAMILY MEDICINE

## 2017-09-17 RX ORDER — HYDROCODONE BITARTRATE AND ACETAMINOPHEN 5; 325 MG/1; MG/1
1 TABLET ORAL EVERY 8 HOURS PRN
Status: DISCONTINUED | OUTPATIENT
Start: 2017-09-18 | End: 2017-09-20

## 2017-09-17 RX ORDER — MAGNESIUM SULFATE HEPTAHYDRATE 40 MG/ML
4 INJECTION, SOLUTION INTRAVENOUS ONCE
Status: COMPLETED | OUTPATIENT
Start: 2017-09-17 | End: 2017-09-17

## 2017-09-17 RX ADMIN — VANCOMYCIN HYDROCHLORIDE 1500 MG: 5 INJECTION, POWDER, LYOPHILIZED, FOR SOLUTION INTRAVENOUS at 12:17

## 2017-09-17 RX ADMIN — INSULIN DETEMIR 5 UNITS: 100 INJECTION, SOLUTION SUBCUTANEOUS at 21:41

## 2017-09-17 RX ADMIN — INSULIN ASPART 5 UNITS: 100 INJECTION, SOLUTION INTRAVENOUS; SUBCUTANEOUS at 12:18

## 2017-09-17 RX ADMIN — PIPERACILLIN SODIUM,TAZOBACTAM SODIUM 3.38 G: 3; .375 INJECTION, POWDER, FOR SOLUTION INTRAVENOUS at 14:08

## 2017-09-17 RX ADMIN — ATORVASTATIN CALCIUM 20 MG: 20 TABLET, FILM COATED ORAL at 21:41

## 2017-09-17 RX ADMIN — INSULIN ASPART 5 UNITS: 100 INJECTION, SOLUTION INTRAVENOUS; SUBCUTANEOUS at 17:14

## 2017-09-17 RX ADMIN — MAGNESIUM SULFATE HEPTAHYDRATE 4 G: 40 INJECTION, SOLUTION INTRAVENOUS at 05:34

## 2017-09-17 RX ADMIN — PANTOPRAZOLE SODIUM 40 MG: 40 TABLET, DELAYED RELEASE ORAL at 09:23

## 2017-09-17 RX ADMIN — INSULIN ASPART 3 UNITS: 100 INJECTION, SOLUTION INTRAVENOUS; SUBCUTANEOUS at 21:40

## 2017-09-17 RX ADMIN — DICYCLOMINE HYDROCHLORIDE 10 MG: 10 CAPSULE ORAL at 09:24

## 2017-09-17 RX ADMIN — PANCRELIPASE 24000 UNITS OF LIPASE: 60000; 12000; 38000 CAPSULE, DELAYED RELEASE PELLETS ORAL at 12:17

## 2017-09-17 RX ADMIN — PANCRELIPASE 24000 UNITS OF LIPASE: 60000; 12000; 38000 CAPSULE, DELAYED RELEASE PELLETS ORAL at 21:41

## 2017-09-17 RX ADMIN — GABAPENTIN 400 MG: 400 CAPSULE ORAL at 14:07

## 2017-09-17 RX ADMIN — PIPERACILLIN SODIUM,TAZOBACTAM SODIUM 3.38 G: 3; .375 INJECTION, POWDER, FOR SOLUTION INTRAVENOUS at 05:34

## 2017-09-17 RX ADMIN — METOCLOPRAMIDE 10 MG: 10 TABLET ORAL at 17:14

## 2017-09-17 RX ADMIN — METOPROLOL TARTRATE 50 MG: 50 TABLET, FILM COATED ORAL at 09:23

## 2017-09-17 RX ADMIN — DICYCLOMINE HYDROCHLORIDE 10 MG: 10 CAPSULE ORAL at 17:14

## 2017-09-17 RX ADMIN — PANCRELIPASE 24000 UNITS OF LIPASE: 60000; 12000; 38000 CAPSULE, DELAYED RELEASE PELLETS ORAL at 17:14

## 2017-09-17 RX ADMIN — HYDROCODONE BITARTRATE AND ACETAMINOPHEN 1 TABLET: 5; 325 TABLET ORAL at 09:24

## 2017-09-17 RX ADMIN — METOCLOPRAMIDE 10 MG: 10 TABLET ORAL at 09:23

## 2017-09-17 RX ADMIN — POLYETHYLENE GLYCOL 3350 17 G: 17 POWDER, FOR SOLUTION ORAL at 17:14

## 2017-09-17 RX ADMIN — VANCOMYCIN HYDROCHLORIDE 1500 MG: 5 INJECTION, POWDER, LYOPHILIZED, FOR SOLUTION INTRAVENOUS at 17:15

## 2017-09-17 RX ADMIN — VANCOMYCIN HYDROCHLORIDE 1500 MG: 5 INJECTION, POWDER, LYOPHILIZED, FOR SOLUTION INTRAVENOUS at 03:22

## 2017-09-17 RX ADMIN — GABAPENTIN 400 MG: 400 CAPSULE ORAL at 05:34

## 2017-09-17 RX ADMIN — POLYETHYLENE GLYCOL 3350 17 G: 17 POWDER, FOR SOLUTION ORAL at 09:24

## 2017-09-17 RX ADMIN — INSULIN ASPART 3 UNITS: 100 INJECTION, SOLUTION INTRAVENOUS; SUBCUTANEOUS at 09:24

## 2017-09-17 RX ADMIN — DOXEPIN HYDROCHLORIDE 50 MG: 25 CAPSULE ORAL at 21:41

## 2017-09-17 RX ADMIN — MAGNESIUM GLUCONATE 500 MG ORAL TABLET 200 MG: 500 TABLET ORAL at 09:24

## 2017-09-17 RX ADMIN — GABAPENTIN 400 MG: 400 CAPSULE ORAL at 21:41

## 2017-09-17 RX ADMIN — MAGNESIUM GLUCONATE 500 MG ORAL TABLET 200 MG: 500 TABLET ORAL at 17:14

## 2017-09-17 RX ADMIN — PANCRELIPASE 24000 UNITS OF LIPASE: 60000; 12000; 38000 CAPSULE, DELAYED RELEASE PELLETS ORAL at 09:23

## 2017-09-17 NOTE — PROGRESS NOTES
"                                                                                                                                        Hospitalist Progress Note    Patient Identification  Name: Isaias Lang  Age/Sex: 52 y.o. male  :  1965        MRN: [id number]  Visit Number: [id number]  PCP: Ankit Jade MD       LOS: 4 days       Chief complaint:  Left thumb lesion     Subjective:  52-year-old who was admitted on 2017 with a left thumb abscess after injection of suboxone and methamphetamine.  He had incision and drainage of the abscess on 2017.  Today, patient is resting in bed with eye open.  He appears to be in no acute distress.  However, he continues to complain of post-op pain and states that his pain is very severe.  He denies fever, dyspnea, cough, chest pain, nausea or vomiting.         Objective     Vital Signs  Temp:  [97.7 °F (36.5 °C)-98.5 °F (36.9 °C)] 98.3 °F (36.8 °C)  Heart Rate:  [72-84] 72  Resp:  [16-18] 16  BP: ()/(54-70) 114/68  Last 2 weights    17  0354 17  0300   Weight: 198 lb 11.2 oz (90.1 kg) 205 lb 8 oz (93.2 kg)     Body mass index is 31.25 kg/(m^2).    Intake/Output    Intake/Output Summary (Last 24 hours) at 17 0839  Last data filed at 17 0825   Gross per 24 hour   Intake             3050 ml   Output             2850 ml   Net              200 ml       Physical Exam:      Objective:  General Appearance:  Comfortable and in distress.    Vital signs: (most recent): Blood pressure 114/68, pulse 72, temperature 98.3 °F (36.8 °C), temperature source Axillary, resp. rate 16, height 68\" (172.7 cm), weight 205 lb 8 oz (93.2 kg), SpO2 92 %.  Vital signs are normal.  No fever.    Output: Producing urine.  Stool output assessment: States BM yesterday.    HEENT: Normal HEENT exam.    Lungs:  Normal respiratory rate and normal effort.  He is not in respiratory distress.  (Breath sounds diminished in bilateral lung bases, otherwise " clear.)  Heart: Normal rate.  Regular rhythm.    Chest: No chest wall tenderness.  Symmetric chest wall expansion.   Abdomen: Abdomen is soft and non-distended.  Bowel sounds are normal.   There is no abdominal tenderness.     Extremities: Normal range of motion.  There is local extremity swelling (right & left hand with mild/moderate edema).  There is no dependent edema.    Pulses: Distal pulses are intact.    Neurological: Patient is alert and oriented to person, place and time.  Normal strength.    Pupils:  (Pupils round and equal.).    Skin:  Warm and dry.  (Left thumb wrapped in Kerlix.  No drainage or foul odor noted.)           Results Review:      LABS     Results from last 7 days  Lab Units 09/17/17  0107 09/16/17  0126 09/15/17  0415 09/14/17  0040 09/13/17  1902   WBC 10*3/mm3 2.57* 2.33* 2.74* 3.60* 4.52   HEMOGLOBIN g/dL 12.2* 11.8* 11.8* 12.0* 14.0   PLATELETS 10*3/mm3 81* 43* 73* 42* 71*       Results from last 7 days  Lab Units 09/17/17  0108 09/16/17  0126 09/15/17  0415 09/14/17  0040 09/13/17  1902   CRP mg/dL 1.35* 1.87* 1.91* 2.09* 2.40*       Results from last 7 days  Lab Units 09/17/17  0107 09/16/17  0126 09/15/17  0415 09/14/17  0040 09/13/17  1902   SODIUM mmol/L 137 139 135 135 135   POTASSIUM mmol/L 4.3 4.4 4.3 4.2 5.3   CHLORIDE mmol/L 107 107 105 104 105   CO2 mmol/L 26.3 26.9 23.3* 24.7 24.3   BUN mg/dL 9 6* 7 9 <5*   CREATININE mg/dL 0.53 0.49 0.68 0.71 0.57   CALCIUM mg/dL 8.5 8.1 7.9 8.1 9.0   GLUCOSE mg/dL 257* 220* 458* 422* 228*       Results from last 7 days  Lab Units 09/17/17  0107 09/16/17  2309 09/16/17  0126 09/15/17  0207 09/14/17  0040   MAGNESIUM mg/dL 1.6* 1.6* 1.7 1.7 1.5*     No results found for: HGBA1C    Results from last 7 days  Lab Units 09/17/17  0107 09/16/17  0126 09/15/17  0415 09/14/17  0040 09/13/17  1902   BILIRUBIN mg/dL 0.6 0.6 0.7 0.9 1.6   ALK PHOS U/L 148* 137* 105 99 110   AST (SGOT) U/L 68* 70* 82* 78* 123*   ALT (SGPT) U/L 55* 63* 67* 69* 85*                    Telemetry:  Sinus rhythm in the 90s.  I personally reviewed telemetry strip.      I have reviewed the patient's laboratory results.    IMAGING  Imaging Results (last 72 hours)     Procedure Component Value Units Date/Time    XR Hand 3+ View Left [[id number]] Collected:  09/14/17 0738     Updated:  09/14/17 0740    Narrative:       EXAMINATION: XR HAND 3+ VW LEFT-      TECHNIQUE: XR HAND 3+ VW LEFT-        INDICATION: left thumb abscess; L02.512-Cutaneous abscess of left hand      COMPARISON: NONE     FINDINGS:          BONES: No acute fracture. No blastic or lytic lesions.          JOINT: No dislocation.          SOFT TISSUES:  SIGNIFICANT SOFT TISSUE SWELLING OF THE 1ST DIGIT.       Impression:       No acute or destructive bony abnormality.     COMMUNICATION: Per this written report.     This report was finalized on 9/14/2017 7:38 AM by Dr. Brendan Tolentino MD.       XR Chest AP [[id number]] Collected:  09/14/17 0738     Updated:  09/14/17 0740    Narrative:       EXAMINATION: XR CHEST AP-      CLINICAL INDICATION:     SEDATION IN INTUBATED PATIENTS     TECHNIQUE:  XR CHEST AP-      COMPARISON: NONE      FINDINGS:   The lungs remain aerated.  Heart and mediastinum contours are unremarkable.  No pleural effusion.  No pneumothorax.   Bony and soft tissue structures are unremarkable.       Impression:       No radiographic evidence of acute cardiac or pulmonary  disease.     This report was finalized on 9/14/2017 7:38 AM by Dr. Brendan Tolentino MD.             I have personally reviewed the above radiologic imaging.    Medications    atorvastatin 20 mg Oral Nightly   dicyclomine 10 mg Oral BID   doxepin 50 mg Oral Nightly   gabapentin 400 mg Oral Q8H   HYDROmorphone 1 mg Intravenous Once   insulin aspart 0-14 Units Subcutaneous 4x Daily AC & at Bedtime   insulin detemir 5 Units Subcutaneous Nightly   magnesium oxide 200 mg Oral BID   magnesium sulfate 4 g Intravenous Once   metoclopramide 10 mg Oral BID AC    metoprolol tartrate 50 mg Oral Daily   pancrelipase (Lip-Prot-Amyl) 24,000 units of lipase Oral 4x Daily With Meals & Nightly   pantoprazole 40 mg Oral Daily   piperacillin-tazobactam 3.375 g Intravenous Q8H   polyethylene glycol 17 g Oral BID   vancomycin 1,500 mg Intravenous Q8H       Pharmacy Consult      Assessment and Plan:  -Left thumb abscess and cellulitis causing sepsis that was present on admission  -Hepatitis C causing transaminitis and thrombocytopenia  -Type II diabetes mellitus, insulin dependent with hyperglycemia   -History of AFib, currently in normal sinus rhythm  -COPD without acute exacerbation   -Essential hypertension, controlled   -Acute hypomagnesemia  -Hyperlipidemia   -History of coronary artery disease, S/P heart catheterization in 2012  -History of systolic congestive heart failure with EF of 51-55% on 4/4/2017  -IV drug abuse with history of amphetamine, suboxone, cocaine, and opioid abuse in the past  -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  CRP continues to decrease. Finalized blood cultures are still pending at this point but growing gram-positive cocci.  We will continue vancomycin and discontinue Zosyn at this time.  We will continue to monitor patient and lab work closely.  Continue daily packing per orthopedic surgery recommendations.  Patient likely can be discharged in the morning once final culture results are available and the plan is made for daily packing of his wound.  Patient states that he is not able to drive to come to the infusion center.   needs to arrange for daily packing and need recommendations from orthopedics regarding duration of daily packing.     The patient is high risk  due to the following diagnoses/reasons:  sepsis           I have discussed the patient's assessment and plan with patient.    NOHEMI Baeza  09/17/17  8:39 AM    I have seen and examined the patient. I agree with the assessment and plan of NOHEMI Baeza. I have amended the above note to reflect my findings in history, physical examination, decision making and management plan.

## 2017-09-17 NOTE — PLAN OF CARE
Problem: Infection, Risk/Actual (Adult)  Goal: Identify Related Risk Factors and Signs and Symptoms  Outcome: Ongoing (interventions implemented as appropriate)  Goal: Infection Prevention/Resolution  Outcome: Ongoing (interventions implemented as appropriate)    Problem: Pain, Acute (Adult)  Goal: Identify Related Risk Factors and Signs and Symptoms  Outcome: Ongoing (interventions implemented as appropriate)  Goal: Acceptable Pain Control/Comfort Level  Outcome: Ongoing (interventions implemented as appropriate)    Problem: Patient Care Overview (Adult)  Goal: Plan of Care Review  Outcome: Ongoing (interventions implemented as appropriate)  Goal: Adult Individualization and Mutuality  Outcome: Ongoing (interventions implemented as appropriate)  Goal: Discharge Needs Assessment  Outcome: Ongoing (interventions implemented as appropriate)    Problem: Anesthesia, General (Adult)  Goal: Signs and Symptoms of Listed Potential Problems Will be Absent or Manageable (Anesthesia, General)  Outcome: Ongoing (interventions implemented as appropriate)    Problem: Fall Risk (Adult)  Goal: Identify Related Risk Factors and Signs and Symptoms  Outcome: Ongoing (interventions implemented as appropriate)  Goal: Absence of Falls  Outcome: Ongoing (interventions implemented as appropriate)    Problem: Diabetes, Type 2 (Adult)  Goal: Signs and Symptoms of Listed Potential Problems Will be Absent or Manageable (Diabetes, Type 2)  Outcome: Ongoing (interventions implemented as appropriate)    Problem: Pressure Ulcer Risk (Kael Scale) (Adult,Obstetrics,Pediatric)  Goal: Identify Related Risk Factors and Signs and Symptoms  Outcome: Ongoing (interventions implemented as appropriate)

## 2017-09-18 LAB
ALBUMIN SERPL-MCNC: 2.4 G/DL (ref 3.5–5)
ALBUMIN/GLOB SERPL: 0.9 G/DL (ref 1.5–2.5)
ALP SERPL-CCNC: 146 U/L (ref 40–129)
ALT SERPL W P-5'-P-CCNC: 54 U/L (ref 10–44)
ANION GAP SERPL CALCULATED.3IONS-SCNC: 1.8 MMOL/L (ref 3.6–11.2)
AST SERPL-CCNC: 78 U/L (ref 10–34)
BACTERIA SPEC AEROBE CULT: NORMAL
BACTERIA SPEC AEROBE CULT: NORMAL
BASOPHILS # BLD AUTO: 0.02 10*3/MM3 (ref 0–0.3)
BASOPHILS NFR BLD AUTO: 0.7 % (ref 0–2)
BILIRUB SERPL-MCNC: 0.5 MG/DL (ref 0.2–1.8)
BUN BLD-MCNC: 7 MG/DL (ref 7–21)
BUN/CREAT SERPL: 11.7 (ref 7–25)
CALCIUM SPEC-SCNC: 8.6 MG/DL (ref 7.7–10)
CHLORIDE SERPL-SCNC: 110 MMOL/L (ref 99–112)
CO2 SERPL-SCNC: 25.2 MMOL/L (ref 24.3–31.9)
CREAT BLD-MCNC: 0.6 MG/DL (ref 0.43–1.29)
CRP SERPL-MCNC: 0.95 MG/DL (ref 0–0.99)
DEPRECATED RDW RBC AUTO: 49.5 FL (ref 37–54)
EOSINOPHIL # BLD AUTO: 0.09 10*3/MM3 (ref 0–0.7)
EOSINOPHIL NFR BLD AUTO: 3 % (ref 0–5)
ERYTHROCYTE [DISTWIDTH] IN BLOOD BY AUTOMATED COUNT: 14.5 % (ref 11.5–14.5)
GFR SERPL CREATININE-BSD FRML MDRD: 141 ML/MIN/1.73
GLOBULIN UR ELPH-MCNC: 2.8 GM/DL
GLUCOSE BLD-MCNC: 193 MG/DL (ref 70–110)
GLUCOSE BLDC GLUCOMTR-MCNC: 181 MG/DL (ref 70–130)
GLUCOSE BLDC GLUCOMTR-MCNC: 199 MG/DL (ref 70–130)
GLUCOSE BLDC GLUCOMTR-MCNC: 216 MG/DL (ref 70–130)
GLUCOSE BLDC GLUCOMTR-MCNC: 236 MG/DL (ref 70–130)
HCT VFR BLD AUTO: 37.3 % (ref 42–52)
HGB BLD-MCNC: 12.3 G/DL (ref 14–18)
IMM GRANULOCYTES # BLD: 0 10*3/MM3 (ref 0–0.03)
IMM GRANULOCYTES NFR BLD: 0 % (ref 0–0.5)
LYMPHOCYTES # BLD AUTO: 0.65 10*3/MM3 (ref 1–3)
LYMPHOCYTES NFR BLD AUTO: 21.9 % (ref 21–51)
MAGNESIUM SERPL-MCNC: 2 MG/DL (ref 1.7–2.6)
MCH RBC QN AUTO: 30.8 PG (ref 27–33)
MCHC RBC AUTO-ENTMCNC: 33 G/DL (ref 33–37)
MCV RBC AUTO: 93.3 FL (ref 80–94)
MONOCYTES # BLD AUTO: 0.31 10*3/MM3 (ref 0.1–0.9)
MONOCYTES NFR BLD AUTO: 10.4 % (ref 0–10)
NEUTROPHILS # BLD AUTO: 1.9 10*3/MM3 (ref 1.4–6.5)
NEUTROPHILS NFR BLD AUTO: 64 % (ref 30–70)
OSMOLALITY SERPL CALC.SUM OF ELEC: 277 MOSM/KG (ref 273–305)
PHOSPHATE SERPL-MCNC: 3.2 MG/DL (ref 2.7–4.5)
PLATELET # BLD AUTO: 86 10*3/MM3 (ref 130–400)
PMV BLD AUTO: 11.1 FL (ref 6–10)
POTASSIUM BLD-SCNC: 4.2 MMOL/L (ref 3.5–5.3)
PROT SERPL-MCNC: 5.2 G/DL (ref 6–8)
RBC # BLD AUTO: 4 10*6/MM3 (ref 4.7–6.1)
SODIUM BLD-SCNC: 137 MMOL/L (ref 135–153)
WBC NRBC COR # BLD: 2.97 10*3/MM3 (ref 4.5–12.5)

## 2017-09-18 PROCEDURE — 80053 COMPREHEN METABOLIC PANEL: CPT | Performed by: INTERNAL MEDICINE

## 2017-09-18 PROCEDURE — 84100 ASSAY OF PHOSPHORUS: CPT | Performed by: INTERNAL MEDICINE

## 2017-09-18 PROCEDURE — 63710000001 INSULIN DETEMIR PER 5 UNITS: Performed by: INTERNAL MEDICINE

## 2017-09-18 PROCEDURE — 83735 ASSAY OF MAGNESIUM: CPT | Performed by: INTERNAL MEDICINE

## 2017-09-18 PROCEDURE — 25010000002 VANCOMYCIN PER 500 MG: Performed by: INTERNAL MEDICINE

## 2017-09-18 PROCEDURE — 63710000001 INSULIN ASPART PER 5 UNITS: Performed by: FAMILY MEDICINE

## 2017-09-18 PROCEDURE — 86140 C-REACTIVE PROTEIN: CPT | Performed by: INTERNAL MEDICINE

## 2017-09-18 PROCEDURE — 85025 COMPLETE CBC W/AUTO DIFF WBC: CPT | Performed by: INTERNAL MEDICINE

## 2017-09-18 PROCEDURE — 82962 GLUCOSE BLOOD TEST: CPT

## 2017-09-18 PROCEDURE — 99232 SBSQ HOSP IP/OBS MODERATE 35: CPT | Performed by: INTERNAL MEDICINE

## 2017-09-18 PROCEDURE — 94799 UNLISTED PULMONARY SVC/PX: CPT

## 2017-09-18 RX ADMIN — HYDROCODONE BITARTRATE AND ACETAMINOPHEN 1 TABLET: 5; 325 TABLET ORAL at 05:24

## 2017-09-18 RX ADMIN — INSULIN ASPART 3 UNITS: 100 INJECTION, SOLUTION INTRAVENOUS; SUBCUTANEOUS at 21:30

## 2017-09-18 RX ADMIN — GABAPENTIN 400 MG: 400 CAPSULE ORAL at 13:38

## 2017-09-18 RX ADMIN — PANCRELIPASE 24000 UNITS OF LIPASE: 60000; 12000; 38000 CAPSULE, DELAYED RELEASE PELLETS ORAL at 13:38

## 2017-09-18 RX ADMIN — HYDROCODONE BITARTRATE AND ACETAMINOPHEN 1 TABLET: 5; 325 TABLET ORAL at 21:34

## 2017-09-18 RX ADMIN — MAGNESIUM GLUCONATE 500 MG ORAL TABLET 200 MG: 500 TABLET ORAL at 08:33

## 2017-09-18 RX ADMIN — GABAPENTIN 400 MG: 400 CAPSULE ORAL at 05:24

## 2017-09-18 RX ADMIN — ATORVASTATIN CALCIUM 20 MG: 20 TABLET, FILM COATED ORAL at 21:31

## 2017-09-18 RX ADMIN — PANCRELIPASE 24000 UNITS OF LIPASE: 60000; 12000; 38000 CAPSULE, DELAYED RELEASE PELLETS ORAL at 08:33

## 2017-09-18 RX ADMIN — DICYCLOMINE HYDROCHLORIDE 10 MG: 10 CAPSULE ORAL at 18:01

## 2017-09-18 RX ADMIN — VANCOMYCIN HYDROCHLORIDE 1500 MG: 5 INJECTION, POWDER, LYOPHILIZED, FOR SOLUTION INTRAVENOUS at 13:39

## 2017-09-18 RX ADMIN — DOXEPIN HYDROCHLORIDE 50 MG: 25 CAPSULE ORAL at 21:31

## 2017-09-18 RX ADMIN — INSULIN ASPART 3 UNITS: 100 INJECTION, SOLUTION INTRAVENOUS; SUBCUTANEOUS at 18:00

## 2017-09-18 RX ADMIN — POLYETHYLENE GLYCOL 3350 17 G: 17 POWDER, FOR SOLUTION ORAL at 18:01

## 2017-09-18 RX ADMIN — HYDROCODONE BITARTRATE AND ACETAMINOPHEN 1 TABLET: 5; 325 TABLET ORAL at 13:38

## 2017-09-18 RX ADMIN — DICYCLOMINE HYDROCHLORIDE 10 MG: 10 CAPSULE ORAL at 08:33

## 2017-09-18 RX ADMIN — INSULIN DETEMIR 5 UNITS: 100 INJECTION, SOLUTION SUBCUTANEOUS at 21:35

## 2017-09-18 RX ADMIN — PANCRELIPASE 24000 UNITS OF LIPASE: 60000; 12000; 38000 CAPSULE, DELAYED RELEASE PELLETS ORAL at 18:01

## 2017-09-18 RX ADMIN — INSULIN ASPART 5 UNITS: 100 INJECTION, SOLUTION INTRAVENOUS; SUBCUTANEOUS at 18:01

## 2017-09-18 RX ADMIN — GABAPENTIN 400 MG: 400 CAPSULE ORAL at 21:31

## 2017-09-18 RX ADMIN — PANTOPRAZOLE SODIUM 40 MG: 40 TABLET, DELAYED RELEASE ORAL at 08:33

## 2017-09-18 RX ADMIN — METOPROLOL TARTRATE 50 MG: 50 TABLET, FILM COATED ORAL at 08:32

## 2017-09-18 RX ADMIN — METOCLOPRAMIDE 10 MG: 10 TABLET ORAL at 08:32

## 2017-09-18 RX ADMIN — MAGNESIUM GLUCONATE 500 MG ORAL TABLET 200 MG: 500 TABLET ORAL at 18:01

## 2017-09-18 RX ADMIN — VANCOMYCIN HYDROCHLORIDE 1500 MG: 5 INJECTION, POWDER, LYOPHILIZED, FOR SOLUTION INTRAVENOUS at 03:38

## 2017-09-18 RX ADMIN — VANCOMYCIN HYDROCHLORIDE 1500 MG: 5 INJECTION, POWDER, LYOPHILIZED, FOR SOLUTION INTRAVENOUS at 18:02

## 2017-09-18 RX ADMIN — POLYETHYLENE GLYCOL 3350 17 G: 17 POWDER, FOR SOLUTION ORAL at 08:33

## 2017-09-18 NOTE — PLAN OF CARE
Problem: Infection, Risk/Actual (Adult)  Goal: Infection Prevention/Resolution  Outcome: Ongoing (interventions implemented as appropriate)    Problem: Pain, Acute (Adult)  Goal: Acceptable Pain Control/Comfort Level  Outcome: Ongoing (interventions implemented as appropriate)    Problem: Patient Care Overview (Adult)  Goal: Plan of Care Review  Outcome: Ongoing (interventions implemented as appropriate)    Problem: Fall Risk (Adult)  Goal: Absence of Falls  Outcome: Ongoing (interventions implemented as appropriate)    Problem: Diabetes, Type 2 (Adult)  Goal: Signs and Symptoms of Listed Potential Problems Will be Absent or Manageable (Diabetes, Type 2)  Outcome: Ongoing (interventions implemented as appropriate)    Problem: Pressure Ulcer Risk (Kael Scale) (Adult,Obstetrics,Pediatric)  Goal: Skin Integrity  Outcome: Ongoing (interventions implemented as appropriate)

## 2017-09-18 NOTE — DISCHARGE PLACEMENT REQUEST
"Isaias Avendano (52 y.o. Male)     Date of Birth Social Security Number Address Home Phone MRN    1965  PO   [facility] [phone] [id number]    Roman Catholic Marital Status          Jehovah's witness        Admission Date Admission Type Admitting Provider Attending Provider Department, Room/Bed    9/13/17 Emergency Elva Hdz MD Perkins, Jimmye S, MD [address], 3305/2S    Discharge Date Discharge Disposition Discharge Destination                      Attending Provider: Elva Hdz MD     Allergies:  Robitussin Dm [Dextromethorphan-guaifenesin], Tizanidine, Metformin, Sulfa Antibiotics, Contrast Dye, Tramadol    Isolation:  Contact   Infection:  MRSA (09/17/17)   Code Status:  FULL    Ht:  68\" (172.7 cm)   Wt:  208 lb 14.4 oz (94.8 kg)    Admission Cmt:  None   Principal Problem:  Abscess of left thumb [L02.512]                 Active Insurance as of 9/13/2017     Primary Coverage     Payor Plan Insurance Group Employer/Plan Group    [place]      Payor Plan Address Payor Plan Phone Number Effective From Effective To    [address] [address]  2/1/2016     [place] [address]       Subscriber Name Subscriber Birth Date Member ID       ISAIAS AVENDANO 1965 [id number]                 Emergency Contacts      (Rel.) Home Phone Work Phone Mobile Phone    Domenico Mckinney (Father) [phone] -- --            Emergency Contact Information     Name Relation Home Work Mobile    Domenico Mckinney Father [phone]            Insurance Information                [place] Phone:     Subscriber: Isaias Avendano Subscriber#: [id number]    Group#:  Precert#:           Treatment Team     Provider Relationship Specialty Contact    Elva Hdz MD Attending --  [phone]    Adin Harrington MD Consulting Physician, Surgeon Orthopedic Surgery  [phone]    NOHEMI Aguilar " Nurse Practitioner Nurse Practitioner  [phone]    Sandra Sevilla, RN Case Manager --  [phone]    Deyanira Mcconnell, RRT Respiratory Therapist --  [phone]    Bibi Armas, RN Registered Nurse --      Abiola Zuniga Patient Care Technician --      Opal Mike, RRT Respiratory Therapist --            Problem List           Codes Noted - Resolved       Hospital    IV drug abuse (Chronic) ICD-10-CM: F19.10  ICD-9-CM: 305.90 2017 - Present    Gastroesophageal reflux disease with esophagitis (Chronic) ICD-10-CM: K21.0  ICD-9-CM: 530.11 2017 - Present    Chronic viral hepatitis B without delta agent and without coma (Chronic) ICD-10-CM: B18.1  ICD-9-CM: 070.32 2017 - Present    Chronic hepatitis C without hepatic coma (Chronic) ICD-10-CM: B18.2  ICD-9-CM: 070.54 2017 - Present    Abscess of left hand including fingers ICD-10-CM: L02.512  ICD-9-CM: 682.4, 681.00 2017 - Present    * (Principal)Abscess of left thumb ICD-10-CM: L02.512  ICD-9-CM: 681.00 2017 - Present    Type 1 diabetes mellitus ICD-10-CM: E10.9  ICD-9-CM: 250.01 2016 - Present    MDD (major depressive disorder), recurrent episode, moderate ICD-10-CM: F33.1  ICD-9-CM: 296.32 2016 - Present       Non-Hospital    Sepsis ICD-10-CM: A41.9  ICD-9-CM: 038.9, 995.91 2017 - Present    Septic shock ICD-10-CM: A41.9, R65.21  ICD-9-CM: 038.9, 785.52, 995.92 4/3/2017 - Present    Cellulitis of right hand excluding fingers and thumb ICD-10-CM: L03.113  ICD-9-CM: 682.4 2017 - Present    Cellulitis of right hand ICD-10-CM: L03.113  ICD-9-CM: 682.4 2017 - Present    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 ICD-10-CM: G89.21  ICD-9-CM: 338.21 2016 - Present             History & Physical      Elva Hdz MD at 2017 10:36 PM              [facility]    Patient Identification:  Name:  Isaias Lang  Age:  52 y.o.  Sex:  male  :   1965  MRN:  [id number]   Visit Number:  [id number]  Primary Care Physician:  Ankit Jade MD     Chief complaint:  Left thumb lesion    History of presenting illness:  52 y.o. male who presented to [facility] emergency department with a four-day history of left thumb swelling and redness.  The patient is an IV drug user and he had his nephew inject the dorsal base on his left thumb 4 days ago.  The patient thought that only suboxone was injected but he thinks that it was laced with methamphetamine.  He also had fevers, chills, and fatigue.  He fell in the shower about one week ago and has an abrasion on his lateral left elbow with no drainage and no erythema.  ---------------------------------------------------------------------------------------------------------------------   Review of Systems   Constitutional: Positive for chills, fatigue and fever.   HENT: Negative for postnasal drip, rhinorrhea, sneezing and sore throat.    Eyes: Negative for discharge and redness.   Respiratory: Positive for cough (nonproductive) and shortness of breath (x 1 month). Negative for wheezing.    Cardiovascular: Negative for chest pain, palpitations and leg swelling.   Gastrointestinal: Negative for diarrhea, nausea and vomiting.   Genitourinary: Negative for decreased urine volume, dysuria and hematuria.   Musculoskeletal: Positive for joint swelling (left thumb). Negative for arthralgias.   Skin: Positive for wound (left thumb). Negative for pallor and rash.   Neurological: Negative for seizures, speech difficulty and headaches.   Hematological: Does not bruise/bleed easily.   Psychiatric/Behavioral: Negative for confusion, self-injury and suicidal ideas. The patient is not nervous/anxious.     ---------------------------------------------------------------------------------------------------------------------   Past Medical History:   Diagnosis Date   • Abscess of antecubital fossa 05/2014    Left that  required I and D and grew Haemophilus influenza group 1   • Anxiety    • Asthma    • Chronic pancreatitis    • COPD (chronic obstructive pulmonary disease)    • DDD (degenerative disc disease), lumbosacral    • Depression    • Diabetes mellitus    • DVT (deep venous thrombosis)     Right arm   • Essential hypertension    • GERD (gastroesophageal reflux disease)    • Hepatitis-C    • Hypercholesteremia    • Injury of back    • Injury of right Achilles tendon     Complicated by MRSA and Pseudomonas   • IV drug abuse    • Mild CAD     Left heart cath at  2012   • MRSA (methicillin resistant staph aureus) culture positive 09/14/2016    LUE   • Obesity    • Opiate addiction     Suboxone IV   • Self-injurious behavior    • Sleep apnea    • Suicide attempt    • Systolic CHF, chronic     EF 45-50% in 2013, EF 60% 9/2016     Past Surgical History:   Procedure Laterality Date   • CHOLECYSTECTOMY  1990s   • INCISION AND DRAINAGE ABSCESS Left 2016    wrist   • INCISION AND DRAINAGE ARM Left 9/15/2016    Procedure: INCISION AND DRAINAGE UPPER EXTREMITY;  Surgeon: Rico Oseguera MD;  [facility]: [facility];  Service:    • ORIF ANKLE FRACTURE Right 2014     Family History   Problem Relation Age of Onset   • Gout Other    • Osteoporosis Other    • Arthritis Other      Rheumatoid and osteoarthritis   • Hypertension Other    • Heart disease Other    • Cancer Other    • Coronary artery disease Mother    • Lung disease Mother    • Gout Sister    • Cancer Maternal Grandmother    • Hypertension Maternal Grandfather    • Gout Maternal Grandfather      Social History   • Marital status:      Social History Main Topics   • Smoking status: Former Smoker     Years: 1.00     Types: Cigarettes   • Smokeless tobacco: Never Used      Comment: quit smoking in my 20's   • Alcohol use No      Comment: denies   • Drug use: Yes     Special: IV      Comment: PT STATES THAT HE USED SUBOXONE IN LEFT AC YESTERDAY   • Sexual activity: Defer       Comment: not currently active.    ---------------------------------------------------------------------------------------------------------------------   Allergies:  Robitussin dm [dextromethorphan-guaifenesin]; Tizanidine; Metformin; Sulfa antibiotics; Contrast dye; and Tramadol  ---------------------------------------------------------------------------------------------------------------------   Prior to Admission Medications     Prescriptions Last Dose Informant Patient Reported? Taking?    albuterol (PROVENTIL HFA;VENTOLIN HFA) 108 (90 BASE) MCG/ACT inhaler 9/13/2017 Self No Yes    Inhale 2 puffs Every 6 (Six) Hours As Needed for Wheezing.    aspirin 81 MG EC tablet 9/12/2017 Self No Yes    Take 1 tablet by mouth Daily.    atorvastatin (LIPITOR) 20 MG tablet 9/12/2017 Self Yes Yes    Take 20 mg by mouth Daily.    dicyclomine (BENTYL) 10 MG capsule 9/12/2017 Self Yes Yes    Take 10 mg by mouth 2 (Two) Times a Day.    furosemide (LASIX) 20 MG tablet 9/12/2017 Self No Yes    Take 1 tablet by mouth Daily.    gabapentin (NEURONTIN) 400 MG capsule 9/13/2017 Self No Yes    Take 1 capsule by mouth 3 (Three) Times a Day.    HYDROcodone-acetaminophen (NORCO) 5-325 MG per tablet 9/13/2017 VI Yes Yes    Take 1 tablet by mouth Daily As Needed. Pt states he takes TID. Per VI, #30 for a 30 day supply on 8/8/17    insulin aspart (novoLOG) 100 UNIT/ML injection 9/13/2017 Self Yes Yes    Inject  under the skin 3 (Three) Times a Day Before Meals. Patient does this per sliding scale    insulin detemir (LEVEMIR) 100 UNIT/ML injection 9/12/2017 Self No Yes    Inject 10 Units under the skin Every Night.    ipratropium-albuterol (DUO-NEB) 0.5-2.5 mg/mL nebulizer 9/12/2017 Self No Yes    Take 3 mL by nebulization Every 6 (Six) Hours As Needed for Shortness of Air.    Magnesium Oxide 400 (240 MG) MG tablet 9/12/2017 Self No Yes    Take ½ tablet by mouth 2 Times a Day.    metoclopramide (REGLAN) 10 MG tablet 9/12/2017 Self No Yes     Take 1 tablet by mouth 2 (Two) Times a Day.    metoprolol tartrate (LOPRESSOR) 50 MG tablet 9/13/2017 Self No Yes    Take 1 tablet by mouth Daily.    nitroglycerin (NITROSTAT) 0.4 MG SL tablet Past Month  No Yes    Place 1 tablet under the tongue Every 5 (Five) Minutes As Needed for Chest Pain. Take no more than 3 doses in 15 minutes.    pancrelipase, Lip-Prot-Amyl, (CREON) 46429 UNITS capsule delayed-release particles capsule 9/12/2017 Self No Yes    Take 2 capsules by mouth 4 (Four) Times a Day With Meals & at Bedtime.    pantoprazole (PROTONIX) 40 MG EC tablet 9/12/2017 Self No Yes    Take 1 tablet by mouth Daily.    polyethylene glycol (MIRALAX) packet 9/13/2017 Self No Yes    MIX 17 GRAM PACKET IN 4 TO 8 OUNCES OF LIQUID AND DRINK ONCE DAILY.    Patient taking differently:  Take 17 g by mouth 2 (Two) Times a Day.    potassium chloride (K-DUR,KLOR-CON) 20 MEQ CR tablet 9/12/2017 Self No Yes    Take 1 tablet by mouth Daily.    doxepin (SINEquan) 50 MG capsule 9/11/2017 Self No No    Take 1 capsule by mouth Every Night.        Hospital Scheduled Meds:  [START ON 9/14/2017] atorvastatin 20 mg Oral Daily   [START ON 9/14/2017] dicyclomine 10 mg Oral BID   doxepin 50 mg Oral Nightly   gabapentin 400 mg Oral Q8H   heparin (porcine) 5,000 Units Subcutaneous Q12H   insulin detemir 5 Units Subcutaneous Nightly   [START ON 9/14/2017] magnesium oxide 200 mg Oral BID   [START ON 9/14/2017] metoclopramide 10 mg Oral BID   [START ON 9/14/2017] metoprolol tartrate 50 mg Oral Daily   [START ON 9/14/2017] pancrelipase (Lip-Prot-Amyl) 24,000 units of lipase Oral 4x Daily With Meals & Nightly   [START ON 9/14/2017] pantoprazole 40 mg Oral Daily   piperacillin-tazobactam 3.375 g Intravenous Q6H   [START ON 9/14/2017] polyethylene glycol 17 g Oral BID   ---------------------------------------------------------------------------------------------------------------------   Vital Signs:  Temp:  [98 °F (36.7 °C)-98.9 °F (37.2 °C)]  98.9 °F (37.2 °C)  Heart Rate:  [81-88] 85  Resp:  [17-20] 20  BP: (112-131)/(75-84) 131/84  Last 3 weights    09/13/17  1800 09/13/17 2057   Weight: 180 lb (81.6 kg) 173 lb 4.8 oz (78.6 kg)     Body mass index is 26.35 kg/(m^2).  ---------------------------------------------------------------------------------------------------------------------   Physical Exam:  Constitutional:  Well-developed and well-nourished.  No respiratory distress.      HENT:  Head: Normocephalic and atraumatic.  Mouth:  Moist mucous membranes.    Eyes:  Conjunctivae and EOM are normal.  Pupils are equal, round, and reactive to light.  No scleral icterus.  Neck:  Neck supple.  No JVD present.    Cardiovascular:  Normal rate, regular rhythm and normal heart sounds with no murmur.  Pulmonary/Chest:  No respiratory distress, no wheezes, no crackles, with normal breath sounds and good air movement.  Abdominal:  Soft.  Bowel sounds are normal.  No distension and no tenderness.   Musculoskeletal:  No edema, no tenderness, and no deformity.  No red or swollen joints anywhere.    Neurological:  Alert and oriented to person, place, and time.  No cranial nerve deficit.  No tongue deviation.  No facial droop.  No slurred speech.   Skin:  Left thumb with swelling and a round area that is 1/2 the diameter of a dime with yellow pus coming out of the thumb; there is erythema of the entire thumb with a good pulse and good capillary refill.  Psychiatric:  Normal mood and affect.  Behavior is normal.  Judgment and thought content normal.   Peripheral vascular:  No edema and strong pulses on all 4 extremities.  Genitourinary:  No mccann catheter in place.  ---------------------------------------------------------------------------------------------------------------------  EKG:  Ordered; NS with heart rate of 91 and QTc of 440 ms.  Telemetry:   Ordered    ECHO:    ---------------------------------------------------------------------------------------------------------------------  Results from last 7 days  Lab Units 09/13/17  1902   CRP mg/dL 2.40*   LACTATE mmol/L 1.6   WBC 10*3/mm3 4.52   HEMOGLOBIN g/dL 14.0   HEMATOCRIT % 43.1   MCV fL 94.7*   MCHC g/dL 32.5*   PLATELETS 10*3/mm3 71*     Results from last 7 days  Lab Units 09/13/17  1902   SODIUM mmol/L 135   POTASSIUM mmol/L 5.3   CHLORIDE mmol/L 105   CO2 mmol/L 24.3   BUN mg/dL <5*   CREATININE mg/dL 0.57   EGFR IF NONAFRICN AM mL/min/1.73 150   CALCIUM mg/dL 9.0   GLUCOSE mg/dL 228*   ALBUMIN g/dL 3.10*   BILIRUBIN mg/dL 1.6   ALK PHOS U/L 110   AST (SGOT) U/L 123*   ALT (SGPT) U/L 85*   Estimated Creatinine Clearance: 168.5 mL/min (by C-G formula based on Cr of 0.57).      Lab Results   Component Value Date    HGBA1C 6.40 (H) 07/03/2017     Lab Results   Component Value Date    TSH 0.284 (L) 02/04/2017    FREET4 1.47 02/04/2017          I have personally looked at the labs and they are stated above.  ---------------------------------------------------------------------------------------------------------------------  Imaging Results (last 7 days)     Xrays of chest and left hand have been ordered.   ---------------------------------------------------------------------------------------------------------------------  Assessment and Plan:  -Left thumb abscess and cellulitis  -Hepatitis C causing transaminitis and thrombocytopenia  -Type II diabetes mellitus, insulin dependent with hyperglycemia   -History of AFib, currently in normal sinus rhythm  -COPD without acute exacerbation   -Essential hypertension, controlled   -Hyperlipidemia   -History of coronary artery disease, S/P heart catheterization in 2012  -History of systolic congestive heart failure with EF of 51-55% on 4/4/2017  -IV drug abuse with history of amphetamine, suboxone, cocaine, and opioid abuse in the past  -History of MRSA of the  achilles tendon and LUE  -Depression   -Anxiety   -Obstructive sleep apnea without use of BiPAP  -History of chronic pancreatitis   -Morbid obesity, now with lower BMI than in the past and now he is only overweight  -Tobacco smoking addiction     I have placed him in contact isolation as he has a history of MRSA; will give zosyn and vancomycin for now.  Consult orthopedic surgery for debridement.  Will give some pain meds as I am sure that he is in pain from the abscess and will request a VI.  Will obtain bedside glucose monitoring and give half of his home Levemir dose.  Will obtain ECHO as he has injected drugs recently; blood cultures are pending.  I sent pus from the wound off for culture as well.  Will repeat labs in the morning.  SCDS for DVT prophylaxis as he has thrombocytopenia and cannot have heparin.  Will monitor on telemetry due to the history of AFib and the current infection.    Elva Hdz MD  09/13/17  10:36 PM       Electronically signed by Elva Hdz MD at 9/14/2017 12:00 AM        Vital Signs (last 24 hours)       09/17 0700  -  09/18 0659 09/18 0700  -  09/18 1130   Most Recent    Temp (°F) 96.4 -  99.2      99.5     99.5 (37.5)    Heart Rate 53 -  95      86     86    Resp   18      18     18    /60 -  165/60      116/74     116/74    SpO2 (%) 94 -  96      95     95          Lines, Drains & Airways    Active LDAs     Name:   Placement date:   Placement time:   Site:   Days:    Peripheral IV Line - Single Lumen 09/14/17 1459 median cubital vein (antecubital fossa), right 18 gauge;1/2 in length  09/14/17    1459      3    Midline Catheter - Single Lumen            [id number]                Prior to Admission Medications     Prescriptions Last Dose Informant Patient Reported? Taking?    albuterol (PROVENTIL HFA;VENTOLIN HFA) 108 (90 BASE) MCG/ACT inhaler 9/13/2017 Self No Yes    Inhale 2 puffs Every 6 (Six) Hours As Needed for Wheezing.    aspirin 81 MG EC tablet 9/12/2017 Self  No Yes    Take 1 tablet by mouth Daily.    atorvastatin (LIPITOR) 20 MG tablet 9/12/2017 Self Yes Yes    Take 20 mg by mouth Daily.    dicyclomine (BENTYL) 10 MG capsule 9/12/2017 Self Yes Yes    Take 10 mg by mouth 2 (Two) Times a Day.    furosemide (LASIX) 20 MG tablet 9/12/2017 Self No Yes    Take 1 tablet by mouth Daily.    gabapentin (NEURONTIN) 400 MG capsule 9/13/2017 Self No Yes    Take 1 capsule by mouth 3 (Three) Times a Day.    HYDROcodone-acetaminophen (NORCO) 5-325 MG per tablet 9/13/2017 VI Yes Yes    Take 1 tablet by mouth Daily As Needed. Pt states he takes TID. Per VI, #30 for a 30 day supply on 8/8/17    Insulin Glargine (BASAGLAR KWIKPEN) 100 UNIT/ML injection pen 9/12/2017 Self Yes Yes    Inject 15 Units under the skin Every Night.    insulin lispro (humaLOG) 100 UNIT/ML injection 9/13/2017 Self Yes Yes    Inject 0-10 Units under the skin 4 (Four) Times a Day Before Meals & at Bedtime As Needed for High Blood Sugar. Per low dose sliding scale:   Per patient:  150-200 = 2 units  201-250 = 4 units    ipratropium-albuterol (DUO-NEB) 0.5-2.5 mg/mL nebulizer 9/12/2017 Self No Yes    Take 3 mL by nebulization Every 6 (Six) Hours As Needed for Shortness of Air.    Magnesium Oxide 400 (240 MG) MG tablet 9/12/2017 Self No Yes    Take ½ tablet by mouth 2 Times a Day.    metoclopramide (REGLAN) 10 MG tablet 9/12/2017 Self No Yes    Take 1 tablet by mouth 2 (Two) Times a Day.    metoprolol tartrate (LOPRESSOR) 50 MG tablet 9/13/2017 Self No Yes    Take 1 tablet by mouth Daily.    nitroglycerin (NITROSTAT) 0.4 MG SL tablet Past Month  No Yes    Place 1 tablet under the tongue Every 5 (Five) Minutes As Needed for Chest Pain. Take no more than 3 doses in 15 minutes.    pancrelipase, Lip-Prot-Amyl, (CREON) 49615 UNITS capsule delayed-release particles capsule 9/12/2017 Self No Yes    Take 2 capsules by mouth 4 (Four) Times a Day With Meals & at Bedtime.    pantoprazole (PROTONIX) 40 MG EC tablet  9/12/2017 Self No Yes    Take 1 tablet by mouth Daily.    polyethylene glycol (MIRALAX) packet 9/13/2017 Self No Yes    MIX 17 GRAM PACKET IN 4 TO 8 OUNCES OF LIQUID AND DRINK ONCE DAILY.    Patient taking differently:  Take 17 g by mouth 2 (Two) Times a Day.    potassium chloride (K-DUR,KLOR-CON) 20 MEQ CR tablet 9/12/2017 Self No Yes    Take 1 tablet by mouth Daily.    doxepin (SINEquan) 50 MG capsule 9/11/2017 Self No No    Take 1 capsule by mouth Every Night.          Hospital Medications (active)       Dose Frequency Start End    atorvastatin (LIPITOR) tablet 20 mg 20 mg Nightly 9/14/2017     Sig - Route: Take 1 tablet by mouth Every Night. - Oral    dextrose (D50W) solution 25 g 25 g Every 15 Minutes PRN 9/13/2017     Sig - Route: Infuse 50 mL into a venous catheter Every 15 (Fifteen) Minutes As Needed for Low Blood Sugar (Blood Sugar Less Than 70, Patient Has IV Access - Unresponsive, NPO or Unable To Safely Swallow). - Intravenous    dextrose (D50W) solution 25 g 25 g Every 15 Minutes PRN 9/14/2017     Sig - Route: Infuse 50 mL into a venous catheter Every 15 (Fifteen) Minutes As Needed for Low Blood Sugar (Blood Sugar Less Than 70, Patient Has IV Access - Unresponsive, NPO or Unable To Safely Swallow). - Intravenous    dextrose (GLUTOSE) oral gel 15 g 15 g Every 15 Minutes PRN 9/13/2017     Sig - Route: Take 15 g by mouth Every 15 (Fifteen) Minutes As Needed for Low Blood Sugar (Blood Sugar Less Than 70, Patient Alert, Is Not NPO & Can Safely Swallow). - Oral    dextrose (GLUTOSE) oral gel 15 g 15 g Every 15 Minutes PRN 9/14/2017     Sig - Route: Take 15 g by mouth Every 15 (Fifteen) Minutes As Needed for Low Blood Sugar (Blood Sugar Less Than 70, Patient Alert, Is Not NPO & Can Safely Swallow). - Oral    dicyclomine (BENTYL) capsule 10 mg 10 mg 2 Times Daily 9/14/2017     Sig - Route: Take 1 capsule by mouth 2 (Two) Times a Day. - Oral    doxepin (SINEquan) capsule 50 mg 50 mg Nightly 9/14/2017     Sig -  Route: Take 2 capsules by mouth Every Night. - Oral    gabapentin (NEURONTIN) capsule 400 mg 400 mg Every 8 Hours Scheduled 9/14/2017     Sig - Route: Take 1 capsule by mouth Every 8 (Eight) Hours. - Oral    glucagon (GLUCAGEN) injection 1 mg 1 mg Every 15 Minutes PRN 9/13/2017     Sig - Route: Inject 1 mg under the skin Every 15 (Fifteen) Minutes As Needed (Blood Glucose Less Than 70 - Patient Without IV Access - Unresponsive, NPO or Unable To Safely Swallow). - Subcutaneous    glucagon (human recombinant) (GLUCAGEN DIAGNOSTIC) injection 1 mg 1 mg Every 15 Minutes PRN 9/14/2017     Sig - Route: Inject 1 mg under the skin Every 15 (Fifteen) Minutes As Needed (Blood Glucose Less Than 70 - Patient Without IV Access - Unresponsive, NPO or Unable To Safely Swallow). - Subcutaneous    HYDROcodone-acetaminophen (NORCO) 5-325 MG per tablet 1 tablet 1 tablet Every 8 Hours PRN 9/18/2017     Sig - Route: Take 1 tablet by mouth Every 8 (Eight) Hours As Needed for Moderate Pain . - Oral    HYDROmorphone (DILAUDID) injection 1 mg 1 mg Once 9/14/2017     Sig - Route: Infuse 1 mL into a venous catheter 1 (One) Time. - Intravenous    insulin aspart (novoLOG) injection 0-14 Units 0-14 Units 4 Times Daily Before Meals & Nightly 9/14/2017     Sig - Route: Inject 0-14 Units under the skin 4 (Four) Times a Day Before Meals & at Bedtime. - Subcutaneous    insulin detemir (LEVEMIR) injection 5 Units 5 Units Nightly 9/14/2017     Sig - Route: Inject 5 Units under the skin Every Night. - Subcutaneous    ipratropium-albuterol (DUO-NEB) nebulizer solution 3 mL 3 mL Every 6 Hours PRN 9/13/2017     Sig - Route: Take 3 mL by nebulization Every 6 (Six) Hours As Needed for Shortness of Air. - Nebulization    magnesium hydroxide (MILK OF MAGNESIA) suspension 2400 mg/10mL 10 mL 10 mL Daily PRN 9/16/2017     Sig - Route: Take 10 mL by mouth Daily As Needed for Constipation. - Oral    magnesium oxide (MAGOX) tablet 200 mg 200 mg 2 Times Daily  "9/14/2017     Sig - Route: Take 0.5 tablets by mouth 2 (Two) Times a Day. - Oral    Magnesium Sulfate 2 gram Bolus, followed by 8 gram infusion (total Mg dose 10 grams)- Mg less than or equal to 1mg/dL 2 g As Needed 9/14/2017     Sig - Route: Infuse 50 mL into a venous catheter As Needed (Mg less than or equal to 1mg/dL). - Intravenous    Linked Group 1:  \"Or\" Linked Group Details        magnesium sulfate 4 gram infusion- Mg 1.6-1.9 mg/dL 4 g As Needed 9/14/2017     Sig - Route: Infuse 100 mL into a venous catheter As Needed (Mg 1.6-1.9 mg/dL). - Intravenous    Linked Group 1:  \"Or\" Linked Group Details        Magnesium Sulfate 6 gram Infusion (2 gm x 3) -Mg 1.1 -1.5 mg/dL 2 g As Needed 9/14/2017     Sig - Route: Infuse 50 mL into a venous catheter As Needed (Mg 1.1 -1.5 mg/dL). - Intravenous    Linked Group 1:  \"Or\" Linked Group Details        metoclopramide (REGLAN) tablet 10 mg 10 mg 2 Times Daily Before Meals 9/14/2017     Sig - Route: Take 1 tablet by mouth 2 (Two) Times a Day Before Meals. - Oral    metoprolol tartrate (LOPRESSOR) tablet 50 mg 50 mg Daily 9/14/2017     Sig - Route: Take 1 tablet by mouth Daily. - Oral    nitroglycerin (NITROSTAT) SL tablet 0.4 mg 0.4 mg Every 5 Minutes PRN 9/13/2017     Sig - Route: Place 1 tablet under the tongue Every 5 (Five) Minutes As Needed for Chest Pain. - Sublingual    pancrelipase (Lip-Prot-Amyl) (CREON) capsule 24,000 units of lipase 24,000 units of lipase 4 Times Daily With Meals & Nightly 9/14/2017     Sig - Route: Take 2 capsules by mouth 4 (Four) Times a Day With Meals & at Bedtime. - Oral    pantoprazole (PROTONIX) EC tablet 40 mg 40 mg Daily 9/14/2017     Sig - Route: Take 1 tablet by mouth Daily. - Oral    Pharmacy Consult  Continuous PRN 9/13/2017     Sig - Route: Continuous As Needed for Consult. - Does not apply    polyethylene glycol (MIRALAX) powder 17 g 17 g 2 Times Daily 9/14/2017     Sig - Route: Take 17 g by mouth 2 (Two) Times a Day. - Oral    " "sodium chloride 0.9 % flush 1-10 mL 1-10 mL As Needed 9/13/2017     Sig - Route: Infuse 1-10 mL into a venous catheter As Needed for Line Care. - Intravenous    sodium chloride 0.9 % flush 1-10 mL 1-10 mL As Needed 9/13/2017     Sig - Route: Infuse 1-10 mL into a venous catheter As Needed for Line Care. - Intravenous    sodium chloride 0.9 % flush 10 mL 10 mL As Needed 9/13/2017     Sig - Route: Infuse 10 mL into a venous catheter As Needed for Line Care. - Intravenous    Cosign for Ordering: Accepted by Elgin Daniels MD on 9/13/2017  6:33 PM    Linked Group 2:  \"And\" Linked Group Details        vancomycin (VANCOCIN) 1,500 mg in sodium chloride 0.9 % 500 mL IVPB 1,500 mg Every 8 Hours 9/14/2017     Sig - Route: Infuse 1,500 mg into a venous catheter Every 8 (Eight) Hours. - Intravenous    Notes to Pharmacy: RN started to hang bag at 1151, but patient lost IV access.  RN called at approx 1500 and said patient also had Zosyn and Magnesium IVs due.  Patient did not receive 1151 dose of Vanco, so rescheduled dose to start at 1600.    HYDROcodone-acetaminophen (NORCO) 5-325 MG per tablet 1 tablet (Discontinued) 1 tablet Daily PRN 9/13/2017 9/17/2017    Sig - Route: Take 1 tablet by mouth Daily As Needed for Moderate Pain . - Oral    piperacillin-tazobactam (ZOSYN) 3.375 g/100 mL 0.9% NS IVPB (mbp) (Discontinued) 3.375 g Every 8 Hours 9/14/2017 9/17/2017    Sig - Route: Infuse 100 mL into a venous catheter Every 8 (Eight) Hours. - Intravenous    Linked Group 3:  \"And\" Linked Group Details                Lab Results (last 24 hours)     Procedure Component Value Units Date/Time    POC Glucose Fingerstick [[id number]]  (Abnormal) Collected:  09/17/17 1138    Specimen:  Blood Updated:  09/17/17 1148     Glucose 222 (H) mg/dL     Narrative:       Meter: [id number] : [id number] [facility]    Wound Culture [[id number]]  (Abnormal)  (Susceptibility) Collected:  09/13/17 8022    Specimen:  Wound from Finger Updated:  " 09/17/17 1503     Wound Culture Scant growth (1+) Staphylococcus aureus, MRSA (C)      This isolate does not demonstrate inducible clindamycin resistance in vitro.  Methicillin resistant Staphylococcus aureus, Patient may be an isolation risk.        Gram Stain Result Moderate (3+) WBCs seen      Few (2+) Gram positive cocci, intracellular and extracellular, in pairs and clusters      Few (2+) Gram negative bacilli, intracellular and extracellular    Susceptibility      Staphylococcus aureus, MRSA     TANVIR     Amoxicillin + Clavulanate <=4/2 ug/ml Resistant     Ampicillin >8 ug/ml Resistant     Ampicillin + Sulbactam <=8/4 ug/ml Resistant     Ceftriaxone <=8 ug/ml Resistant     Ciprofloxacin <=1 ug/ml Susceptible     Clindamycin <=0.5 ug/ml Susceptible     Erythromycin >4 ug/ml Resistant     Gentamicin <=4 ug/ml Susceptible     Levofloxacin <=1 ug/ml Susceptible  [1]      Moxifloxacin <=0.5 ug/ml Susceptible     Oxacillin >2 ug/ml Resistant     Penicillin G >8 ug/ml Resistant     Rifampin <=1 ug/ml Susceptible     Tetracycline <=4 ug/ml Susceptible     Trimethoprim + Sulfamethoxazole <=0.5/9.5 ug/ml Susceptible     Vancomycin 2 ug/ml Susceptible            [1]   Staphylococcus species may develop resistance during prolonged therapy with quinolones.  Isolates that are initially susceptible may become resistant within three to four days after initiation of therapy. Testing of repeat isolates may be warranted.                 Culture, Routine [[id number]]  (Abnormal)  (Susceptibility) Collected:  09/14/17 1954    Specimen:  Swab from Hand, Digit Left Updated:  09/17/17 1520     Culture Moderate growth (3+) Staphylococcus aureus, MRSA (C)      This isolate does not demonstrate inducible clindamycin resistance in vitro.  Methicillin resistant Staphylococcus aureus, Patient may be an isolation risk.        Gram Stain Result Rare (1+) WBCs seen      Occasional Gram positive cocci in pairs, chains and clusters      Occasional  Gram negative bacilli    Susceptibility      Staphylococcus aureus, MRSA     TANVIR     Amoxicillin + Clavulanate <=4/2 ug/ml Resistant     Ampicillin >8 ug/ml Resistant     Ampicillin + Sulbactam 16/8 ug/ml Resistant     Ceftriaxone <=8 ug/ml Resistant     Ciprofloxacin <=1 ug/ml Susceptible     Clindamycin <=0.5 ug/ml Susceptible     Erythromycin >4 ug/ml Resistant     Gentamicin <=4 ug/ml Susceptible     Levofloxacin <=1 ug/ml Susceptible  [1]      Moxifloxacin <=0.5 ug/ml Susceptible     Oxacillin >2 ug/ml Resistant     Penicillin G >8 ug/ml Resistant     Rifampin <=1 ug/ml Susceptible     Tetracycline <=4 ug/ml Susceptible     Trimethoprim + Sulfamethoxazole <=0.5/9.5 ug/ml Susceptible     Vancomycin 2 ug/ml Susceptible            [1]   Staphylococcus species may develop resistance during prolonged therapy with quinolones.  Isolates that are initially susceptible may become resistant within three to four days after initiation of therapy. Testing of repeat isolates may be warranted.                 POC Glucose Fingerstick [[id number]]  (Abnormal) Collected:  09/17/17 1634    Specimen:  Blood Updated:  09/17/17 1640     Glucose 210 (H) mg/dL     Narrative:       Meter: [id number] : [id number] Ann-Marie Viviana    Blood Culture [[id number]]  (Normal) Collected:  09/13/17 1902    Specimen:  Blood from Hand, Right Updated:  09/17/17 2001     Blood Culture No growth at 4 days    POC Glucose Fingerstick [[id number]]  (Abnormal) Collected:  09/17/17 2004    Specimen:  Blood Updated:  09/17/17 2012     Glucose 154 (H) mg/dL     Narrative:       Meter: [id number] : [id number] marleny no    Blood Culture [[id number]]  (Normal) Collected:  09/13/17 1950    Specimen:  Blood from Wrist, Right Updated:  09/17/17 2101     Blood Culture No growth at 4 days    CBC & Differential [[id number]] Collected:  09/18/17 0323    Specimen:  Blood Updated:  09/18/17 0414    Narrative:       The following orders were created for panel order  CBC & Differential.  Procedure                               Abnormality         Status                     ---------                               -----------         ------                     CBC Auto Differential[[id number]]        Abnormal            Final result                 Please view results for these tests on the individual orders.    CBC Auto Differential [[id number]]  (Abnormal) Collected:  09/18/17 0323    Specimen:  Blood Updated:  09/18/17 0414     WBC 2.97 (L) 10*3/mm3      RBC 4.00 (L) 10*6/mm3      Hemoglobin 12.3 (L) g/dL      Hematocrit 37.3 (L) %      MCV 93.3 fL      MCH 30.8 pg      MCHC 33.0 g/dL      RDW 14.5 %      RDW-SD 49.5 fl      MPV 11.1 (H) fL      Platelets 86 (L) 10*3/mm3      Neutrophil % 64.0 %      Lymphocyte % 21.9 %      Monocyte % 10.4 (H) %      Eosinophil % 3.0 %      Basophil % 0.7 %      Immature Grans % 0.0 %      Neutrophils, Absolute 1.90 10*3/mm3      Lymphocytes, Absolute 0.65 (L) 10*3/mm3      Monocytes, Absolute 0.31 10*3/mm3      Eosinophils, Absolute 0.09 10*3/mm3      Basophils, Absolute 0.02 10*3/mm3      Immature Grans, Absolute 0.00 10*3/mm3     Phosphorus [[id number]]  (Normal) Collected:  09/18/17 0323    Specimen:  Blood Updated:  09/18/17 0444     Phosphorus 3.2 mg/dL     C-reactive Protein [[id number]]  (Normal) Collected:  09/18/17 0323    Specimen:  Blood Updated:  09/18/17 0500     C-Reactive Protein 0.95 mg/dL       1+ Hemolysis       Comprehensive Metabolic Panel [[id number]]  (Abnormal) Collected:  09/18/17 0323    Specimen:  Blood Updated:  09/18/17 0501     Glucose 193 (H) mg/dL      BUN 7 mg/dL      Creatinine 0.60 mg/dL      Sodium 137 mmol/L      Potassium 4.2 mmol/L       1+ Hemolysis        Chloride 110 mmol/L      CO2 25.2 mmol/L      Calcium 8.6 mg/dL      Total Protein 5.2 (L) g/dL      Albumin 2.40 (L) g/dL      ALT (SGPT) 54 (H) U/L      AST (SGOT) 78 (H) U/L      Alkaline Phosphatase 146 (H) U/L       Note New Reference Ranges         Total Bilirubin 0.5 mg/dL      eGFR Non African Amer 141 mL/min/1.73      Globulin 2.8 gm/dL      A/G Ratio 0.9 (L) g/dL      BUN/Creatinine Ratio 11.7     Anion Gap 1.8 (L) mmol/L     Magnesium [[id number]]  (Normal) Collected:  09/18/17 0323    Specimen:  Blood Updated:  09/18/17 0501     Magnesium 2.0 mg/dL       1+ Hemolysis       Osmolality, Calculated [[id number]]  (Normal) Collected:  09/18/17 0323    Specimen:  Blood Updated:  09/18/17 0502     Osmolality Calc 277.0 mOsm/kg     POC Glucose Fingerstick [[id number]]  (Abnormal) Collected:  09/18/17 0718    Specimen:  Blood Updated:  09/18/17 0724     Glucose 181 (H) mg/dL     Narrative:       Meter: [id number] : [id number] Efrain Hairston    POC Glucose Fingerstick [[id number]]  (Abnormal) Collected:  09/18/17 1117    Specimen:  Blood Updated:  09/18/17 1124     Glucose 216 (H) mg/dL     Narrative:       Meter: [id number] : [id number] raoul martinez        Imaging Results (last 24 hours)     ** No results found for the last 24 hours. **        ECG/EMG Results (last 24 hours)     ** No results found for the last 24 hours. **        Orders (last 24 hrs)     Start     Ordered    09/18/17 1125  POC Glucose Fingerstick  Once      09/18/17 1124    09/18/17 0725  POC Glucose Fingerstick  Once      09/18/17 0724    09/18/17 0600  CBC Auto Differential  PROCEDURE ONCE      09/18/17 0001    09/18/17 0445  Osmolality, Calculated  Once      09/18/17 0444    09/18/17 0000  HYDROcodone-acetaminophen (NORCO) 5-325 MG per tablet 1 tablet  Every 8 Hours PRN      09/17/17 2356    09/17/17 2012  POC Glucose Fingerstick  Once      09/17/17 2011    09/17/17 1641  POC Glucose Fingerstick  Once      09/17/17 1640    09/17/17 1149  POC Glucose Fingerstick  Once      09/17/17 1148    09/16/17 0916  magnesium hydroxide (MILK OF MAGNESIA) suspension 2400 mg/10mL 10 mL  Daily PRN      09/16/17 0916    09/16/17 0600  CBC & Differential  Daily      09/15/17 0521    09/16/17 0600   Comprehensive Metabolic Panel  Daily      09/15/17 0521    09/16/17 0600  C-reactive Protein  Daily      09/15/17 0521    09/16/17 0600  Magnesium  Daily      09/15/17 0521    09/16/17 0600  Phosphorus  Daily      09/15/17 0521    09/14/17 1730  insulin aspart (novoLOG) injection 0-14 Units  4 Times Daily Before Meals & Nightly      09/14/17 1401    09/14/17 1700  POC Glucose Fingerstick  4 Times Daily Before Meals & at Bedtime      09/14/17 1401    09/14/17 1600  vancomycin (VANCOCIN) 1,500 mg in sodium chloride 0.9 % 500 mL IVPB  Every 8 Hours     Comments:  RN started to hang bag at 1151, but patient lost IV access.  RN called at approx 1500 and said patient also had Zosyn and Magnesium IVs due.  Patient did not receive 1151 dose of Vanco, so rescheduled dose to start at 1600.    09/14/17 1511    09/14/17 1400  dextrose (GLUTOSE) oral gel 15 g  Every 15 Minutes PRN      09/14/17 1401    09/14/17 1400  dextrose (D50W) solution 25 g  Every 15 Minutes PRN      09/14/17 1401    09/14/17 1400  glucagon (human recombinant) (GLUCAGEN DIAGNOSTIC) injection 1 mg  Every 15 Minutes PRN      09/14/17 1401    09/14/17 0900  metoprolol tartrate (LOPRESSOR) tablet 50 mg  Daily      09/13/17 2233 09/14/17 0900  pantoprazole (PROTONIX) EC tablet 40 mg  Daily      09/13/17 2233 09/14/17 0900  polyethylene glycol (MIRALAX) powder 17 g  2 Times Daily      09/13/17 2233 09/14/17 0800  HYDROmorphone (DILAUDID) injection 1 mg  Once      09/14/17 0716    09/14/17 0730  metoclopramide (REGLAN) tablet 10 mg  2 Times Daily Before Meals      09/13/17 2233 09/14/17 0702  Magnesium Sulfate 2 gram Bolus, followed by 8 gram infusion (total Mg dose 10 grams)- Mg less than or equal to 1mg/dL  As Needed      09/14/17 0702 09/14/17 0702  Magnesium Sulfate 6 gram Infusion (2 gm x 3) -Mg 1.1 -1.5 mg/dL  As Needed      09/14/17 0702 09/14/17 0702  magnesium sulfate 4 gram infusion- Mg 1.6-1.9 mg/dL  As Needed      09/14/17 0702     09/14/17 0600  piperacillin-tazobactam (ZOSYN) 3.375 g/100 mL 0.9% NS IVPB (mbp)  Every 8 Hours,   Status:  Discontinued      09/14/17 0451    09/14/17 0000  atorvastatin (LIPITOR) tablet 20 mg  Nightly      09/13/17 2233 09/14/17 0000  dicyclomine (BENTYL) capsule 10 mg  2 Times Daily      09/13/17 2233 09/14/17 0000  doxepin (SINEquan) capsule 50 mg  Nightly      09/13/17 2233 09/14/17 0000  gabapentin (NEURONTIN) capsule 400 mg  Every 8 Hours Scheduled      09/13/17 2233 09/14/17 0000  magnesium oxide (MAGOX) tablet 200 mg  2 Times Daily      09/13/17 2233 09/14/17 0000  pancrelipase (Lip-Prot-Amyl) (CREON) capsule 24,000 units of lipase  4 Times Daily With Meals & Nightly      09/13/17 2233 09/14/17 0000  insulin detemir (LEVEMIR) injection 5 Units  Nightly      09/13/17 2233 09/13/17 2328  sodium chloride 0.9 % flush 1-10 mL  As Needed      09/13/17 2329 09/13/17 2233  Pharmacy Consult  Continuous PRN      09/13/17 2233 09/13/17 2231  HYDROcodone-acetaminophen (NORCO) 5-325 MG per tablet 1 tablet  Daily PRN,   Status:  Discontinued      09/13/17 2233 09/13/17 2231  ipratropium-albuterol (DUO-NEB) nebulizer solution 3 mL  Every 6 Hours PRN      09/13/17 2233 09/13/17 2231  nitroglycerin (NITROSTAT) SL tablet 0.4 mg  Every 5 Minutes PRN      09/13/17 2233 09/13/17 2231  glucagon (GLUCAGEN) injection 1 mg  Every 15 Minutes PRN      09/13/17 2231 09/13/17 2228  dextrose (GLUTOSE) oral gel 15 g  Every 15 Minutes PRN      09/13/17 2230 09/13/17 2228  dextrose (D50W) solution 25 g  Every 15 Minutes PRN      09/13/17 2230 09/13/17 2122  sodium chloride 0.9 % flush 1-10 mL  As Needed      09/13/17 2122 09/13/17 1812  sodium chloride 0.9 % flush 10 mL  As Needed      09/13/17 1813    Unscheduled  Potassium  As Needed     Comments:  For Ventricular Arrhythmias    09/13/17 2236    Unscheduled  Magnesium  As Needed     Comments:  For Ventricular Arrhythmias    09/13/17 2236     Unscheduled  Troponin  As Needed     Comments:  For Chest Pain    09/13/17 2236    Unscheduled  Digoxin Level  As Needed     Comments:  For Atrial Arrhythmias    09/13/17 2236    Unscheduled  Blood Gas, Arterial  As Needed     Comments:  Per O2 Policy  Notify Physician    09/13/17 2236    --  dicyclomine (BENTYL) 10 MG capsule  2 Times Daily      09/13/17 1920    --  HYDROcodone-acetaminophen (NORCO) 5-325 MG per tablet  Daily PRN      09/13/17 1920    --  atorvastatin (LIPITOR) 20 MG tablet  Daily      09/13/17 1920    --  insulin lispro (humaLOG) 100 UNIT/ML injection  4 Times Daily Before Meals & Nightly PRN      09/14/17 1034    --  Insulin Glargine (BASAGLAR KWIKPEN) 100 UNIT/ML injection pen  Nightly      09/14/17 1034    Pending  insulin aspart (novoLOG) injection 0-9 Units  4 Times Daily Before Meals & Nightly      Pending          Operative/Procedure Notes (last 24 hours) (Notes from 9/17/2017 11:30 AM through 9/18/2017 11:30 AM)     No notes of this type exist for this encounter.        Physician Progress Notes (last 24 hours) (Notes from 9/17/2017 11:30 AM through 9/18/2017 11:30 AM)     No notes of this type exist for this encounter.        Consult Notes (last 24 hours) (Notes from 9/17/2017 11:30 AM through 9/18/2017 11:30 AM)     No notes of this type exist for this encounter.        Nutrition Notes (last 24 hours) (Notes from 9/17/2017 11:30 AM through 9/18/2017 11:30 AM)     No notes of this type exist for this encounter.        Physical Therapy Notes (last 24 hours) (Notes from 9/17/2017 11:30 AM through 9/18/2017 11:30 AM)     No notes of this type exist for this encounter.        Occupational Therapy Notes (last 24 hours) (Notes from 9/17/2017 11:30 AM through 9/18/2017 11:30 AM)     No notes of this type exist for this encounter.        Speech Language Pathology Notes (last 24 hours) (Notes from 9/17/2017 11:30 AM through 9/18/2017 11:30 AM)     No notes of this type exist for this encounter.         Respiratory Therapy Notes (last 24 hours) (Notes from 9/17/2017 11:30 AM through 9/18/2017 11:30 AM)     No notes of this type exist for this encounter.

## 2017-09-18 NOTE — PAYOR COMM NOTE
"Isaias Lang (52 y.o. Male)     Date of Birth Social Security Number Address Home Phone MRN    1965  PO   [facility] [address] [phone] [id number]    Christian Marital Status          Judaism        Admission Date Admission Type Admitting Provider Attending Provider Department, Room/Bed    9/13/17 Emergency Elva Hdz MD Perkins, Jimmye S, MD [address], 3305/2S    Discharge Date Discharge Disposition Discharge Destination                      Attending Provider: Elva Hdz MD     Allergies:  Robitussin Dm [Dextromethorphan-guaifenesin], Tizanidine, Metformin, Sulfa Antibiotics, Contrast Dye, Tramadol    Isolation:  Contact   Infection:  MRSA (09/17/17)   Code Status:  FULL    Ht:  68\" (172.7 cm)   Wt:  208 lb 14.4 oz (94.8 kg)    Admission Cmt:  None   Principal Problem:  Abscess of left thumb [L02.512]                 Active Insurance as of 9/13/2017     Primary Coverage     Payor Plan Insurance Group Employer/Plan Group    [facility] [employer] [facility] [employer] [facility]      Payor Plan Address Payor Plan Phone Number Effective From Effective To    [address] [address]  2/1/2016     [place] [address]       Subscriber Name Subscriber Birth Date Member ID       ISAIAS LANG 1965 [id number]                 Emergency Contacts      (Rel.) Home Phone Work Phone Mobile Phone    Domenico Mckinney (Father) [phone] -- --            Hospital Medications (all)       Dose Frequency Start End    atorvastatin (LIPITOR) tablet 20 mg 20 mg Nightly 9/14/2017     Sig - Route: Take 1 tablet by mouth Every Night. - Oral    dextrose (D50W) solution 25 g 25 g Every 15 Minutes PRN 9/13/2017     Sig - Route: Infuse 50 mL into a venous catheter Every 15 (Fifteen) Minutes As Needed for Low Blood Sugar (Blood Sugar Less Than 70, Patient Has IV Access - Unresponsive, NPO or Unable To Safely Swallow). - Intravenous    dextrose (D50W) solution 25 g 25 g " Every 15 Minutes PRN 9/14/2017     Sig - Route: Infuse 50 mL into a venous catheter Every 15 (Fifteen) Minutes As Needed for Low Blood Sugar (Blood Sugar Less Than 70, Patient Has IV Access - Unresponsive, NPO or Unable To Safely Swallow). - Intravenous    dextrose (GLUTOSE) oral gel 15 g 15 g Every 15 Minutes PRN 9/13/2017     Sig - Route: Take 15 g by mouth Every 15 (Fifteen) Minutes As Needed for Low Blood Sugar (Blood Sugar Less Than 70, Patient Alert, Is Not NPO & Can Safely Swallow). - Oral    dextrose (GLUTOSE) oral gel 15 g 15 g Every 15 Minutes PRN 9/14/2017     Sig - Route: Take 15 g by mouth Every 15 (Fifteen) Minutes As Needed for Low Blood Sugar (Blood Sugar Less Than 70, Patient Alert, Is Not NPO & Can Safely Swallow). - Oral    dicyclomine (BENTYL) capsule 10 mg 10 mg 2 Times Daily 9/14/2017     Sig - Route: Take 1 capsule by mouth 2 (Two) Times a Day. - Oral    doxepin (SINEquan) capsule 50 mg 50 mg Nightly 9/14/2017     Sig - Route: Take 2 capsules by mouth Every Night. - Oral    fentaNYL citrate (PF) (SUBLIMAZE) injection 50 mcg 50 mcg Every 5 Minutes PRN 9/14/2017 9/14/2017    Sig - Route: Infuse 1 mL into a venous catheter Every 5 (Five) Minutes As Needed for Moderate Pain  or Severe Pain . - Intravenous    gabapentin (NEURONTIN) capsule 400 mg 400 mg Every 8 Hours Scheduled 9/14/2017     Sig - Route: Take 1 capsule by mouth Every 8 (Eight) Hours. - Oral    glucagon (GLUCAGEN) injection 1 mg 1 mg Every 15 Minutes PRN 9/13/2017     Sig - Route: Inject 1 mg under the skin Every 15 (Fifteen) Minutes As Needed (Blood Glucose Less Than 70 - Patient Without IV Access - Unresponsive, NPO or Unable To Safely Swallow). - Subcutaneous    glucagon (human recombinant) (GLUCAGEN DIAGNOSTIC) injection 1 mg 1 mg Every 15 Minutes PRN 9/14/2017     Sig - Route: Inject 1 mg under the skin Every 15 (Fifteen) Minutes As Needed (Blood Glucose Less Than 70 - Patient Without IV Access - Unresponsive, NPO or Unable To  Safely Swallow). - Subcutaneous    HYDROcodone-acetaminophen (NORCO) 5-325 MG per tablet 1 tablet 1 tablet Once 9/15/2017 9/15/2017    Sig - Route: Take 1 tablet by mouth 1 (One) Time. - Oral    HYDROcodone-acetaminophen (NORCO) 5-325 MG per tablet 1 tablet 1 tablet Every 8 Hours PRN 9/18/2017     Sig - Route: Take 1 tablet by mouth Every 8 (Eight) Hours As Needed for Moderate Pain . - Oral    HYDROmorphone (DILAUDID) injection 1 mg 1 mg Once 9/13/2017 9/13/2017    Sig - Route: Infuse 1 mL into a venous catheter 1 (One) Time. - Intravenous    HYDROmorphone (DILAUDID) injection 1 mg 1 mg Once 9/14/2017     Sig - Route: Infuse 1 mL into a venous catheter 1 (One) Time. - Intravenous    HYDROmorphone (DILAUDID) injection 1 mg 1 mg Once 9/15/2017 9/15/2017    Sig - Route: Infuse 1 mL into a venous catheter 1 (One) Time. - Intravenous    insulin aspart (novoLOG) injection 0-14 Units 0-14 Units 4 Times Daily Before Meals & Nightly 9/14/2017     Sig - Route: Inject 0-14 Units under the skin 4 (Four) Times a Day Before Meals & at Bedtime. - Subcutaneous    insulin detemir (LEVEMIR) injection 5 Units 5 Units Nightly 9/14/2017     Sig - Route: Inject 5 Units under the skin Every Night. - Subcutaneous    ipratropium-albuterol (DUO-NEB) nebulizer solution 3 mL 3 mL Every 6 Hours PRN 9/13/2017     Sig - Route: Take 3 mL by nebulization Every 6 (Six) Hours As Needed for Shortness of Air. - Nebulization    ketorolac (TORADOL) injection 30 mg 30 mg Once 9/13/2017 9/13/2017    Sig - Route: Infuse 30 mg into a venous catheter 1 (One) Time. - Intravenous    Cosign for Ordering: Accepted by Elgin Daniels MD on 9/13/2017  6:33 PM    magnesium hydroxide (MILK OF MAGNESIA) suspension 2400 mg/10mL 10 mL 10 mL Daily PRN 9/16/2017     Sig - Route: Take 10 mL by mouth Daily As Needed for Constipation. - Oral    magnesium oxide (MAGOX) tablet 200 mg 200 mg 2 Times Daily 9/14/2017     Sig - Route: Take 0.5 tablets by mouth 2 (Two) Times  "a Day. - Oral    Magnesium Sulfate 2 gram Bolus, followed by 8 gram infusion (total Mg dose 10 grams)- Mg less than or equal to 1mg/dL 2 g As Needed 9/14/2017     Sig - Route: Infuse 50 mL into a venous catheter As Needed (Mg less than or equal to 1mg/dL). - Intravenous    Linked Group 1:  \"Or\" Linked Group Details        magnesium sulfate 4 gram infusion- Mg 1.6-1.9 mg/dL 4 g As Needed 9/14/2017     Sig - Route: Infuse 100 mL into a venous catheter As Needed (Mg 1.6-1.9 mg/dL). - Intravenous    Linked Group 1:  \"Or\" Linked Group Details        magnesium sulfate 4 gram infusion- Mg 1.6-1.9 mg/dL 4 g Once 9/15/2017 9/15/2017    Sig - Route: Infuse 100 mL into a venous catheter 1 (One) Time. - Intravenous    magnesium sulfate 4 gram infusion- Mg 1.6-1.9 mg/dL 4 g Once 9/16/2017 9/16/2017    Sig - Route: Infuse 100 mL into a venous catheter 1 (One) Time. - Intravenous    magnesium sulfate 4 gram infusion- Mg 1.6-1.9 mg/dL 4 g Once 9/17/2017 9/17/2017    Sig - Route: Infuse 100 mL into a venous catheter 1 (One) Time. - Intravenous    Magnesium Sulfate 6 gram Infusion (2 gm x 3) -Mg 1.1 -1.5 mg/dL 2 g As Needed 9/14/2017     Sig - Route: Infuse 50 mL into a venous catheter As Needed (Mg 1.1 -1.5 mg/dL). - Intravenous    Linked Group 1:  \"Or\" Linked Group Details        Magnesium Sulfate 6 gram Infusion (2 gm x 3) -Mg 1.1 -1.5 mg/dL 2 g Every 2 Hours 9/14/2017 9/14/2017    Sig - Route: Infuse 50 mL into a venous catheter Every 2 (Two) Hours. - Intravenous    metoclopramide (REGLAN) tablet 10 mg 10 mg 2 Times Daily Before Meals 9/14/2017     Sig - Route: Take 1 tablet by mouth 2 (Two) Times a Day Before Meals. - Oral    metoprolol tartrate (LOPRESSOR) tablet 50 mg 50 mg Daily 9/14/2017     Sig - Route: Take 1 tablet by mouth Daily. - Oral    nitroglycerin (NITROSTAT) SL tablet 0.4 mg 0.4 mg Every 5 Minutes PRN 9/13/2017     Sig - Route: Place 1 tablet under the tongue Every 5 (Five) Minutes As Needed for Chest Pain. - " "Sublingual    pancrelipase (Lip-Prot-Amyl) (CREON) capsule 24,000 units of lipase 24,000 units of lipase 4 Times Daily With Meals & Nightly 9/14/2017     Sig - Route: Take 2 capsules by mouth 4 (Four) Times a Day With Meals & at Bedtime. - Oral    pantoprazole (PROTONIX) EC tablet 40 mg 40 mg Daily 9/14/2017     Sig - Route: Take 1 tablet by mouth Daily. - Oral    Pharmacy Consult  Continuous PRN 9/13/2017     Sig - Route: Continuous As Needed for Consult. - Does not apply    polyethylene glycol (MIRALAX) powder 17 g 17 g 2 Times Daily 9/14/2017     Sig - Route: Take 17 g by mouth 2 (Two) Times a Day. - Oral    sodium chloride 0.9 % flush 1-10 mL 1-10 mL As Needed 9/13/2017     Sig - Route: Infuse 1-10 mL into a venous catheter As Needed for Line Care. - Intravenous    sodium chloride 0.9 % flush 1-10 mL 1-10 mL As Needed 9/13/2017     Sig - Route: Infuse 1-10 mL into a venous catheter As Needed for Line Care. - Intravenous    sodium chloride 0.9 % flush 10 mL 10 mL As Needed 9/13/2017     Sig - Route: Infuse 10 mL into a venous catheter As Needed for Line Care. - Intravenous    Cosign for Ordering: Accepted by Elgin Daniels MD on 9/13/2017  6:33 PM    Linked Group 2:  \"And\" Linked Group Details        vancomycin (VANCOCIN) 1,500 mg in sodium chloride 0.9 % 500 mL IVPB 1,500 mg Every 8 Hours 9/14/2017     Sig - Route: Infuse 1,500 mg into a venous catheter Every 8 (Eight) Hours. - Intravenous    Notes to Pharmacy: RN started to hang bag at 1151, but patient lost IV access.  RN called at approx 1500 and said patient also had Zosyn and Magnesium IVs due.  Patient did not receive 1151 dose of Vanco, so rescheduled dose to start at 1600.    vancomycin (VANCOCIN) 1,750 mg in sodium chloride 0.9 % 500 mL IVPB 20 mg/kg × 81.6 kg Once 9/13/2017 9/13/2017    Sig - Route: Infuse 1,750 mg into a venous catheter 1 (One) Time. - Intravenous    Cosign for Ordering: Accepted by Elgin Daniels MD on 9/13/2017  6:33 " PM    bacitracin 50,000 Units, polymyxin B 500,000 Units in sodium chloride (NS) 1,000 mL irrigation (Discontinued)  As Needed 9/14/2017 9/14/2017    Sig: As Needed.    Reason for Discontinue: Patient Discharge    bupivacaine (MARCAINE) injection (Discontinued)  As Needed 9/14/2017 9/14/2017    Sig: As Needed.    Reason for Discontinue: Patient Discharge    glucagon (human recombinant) (GLUCAGEN DIAGNOSTIC) injection 1 mg (Discontinued) 1 mg Every 15 Minutes PRN 9/13/2017 9/13/2017    Sig - Route: Inject 1 mg under the skin Every 15 (Fifteen) Minutes As Needed (Blood Glucose Less Than 70 - Patient Without IV Access - Unresponsive, NPO or Unable To Safely Swallow). - Subcutaneous    heparin (porcine) 5000 UNIT/ML injection 5,000 Units (Discontinued) 5,000 Units Every 12 Hours Scheduled 9/13/2017 9/13/2017    Sig - Route: Inject 1 mL under the skin Every 12 (Twelve) Hours. - Subcutaneous    HYDROcodone-acetaminophen (NORCO) 5-325 MG per tablet 1 tablet (Discontinued) 1 tablet Daily PRN 9/13/2017 9/17/2017    Sig - Route: Take 1 tablet by mouth Daily As Needed for Moderate Pain . - Oral    ipratropium-albuterol (DUO-NEB) nebulizer solution 3 mL (Discontinued) 3 mL Once As Needed 9/14/2017 9/14/2017    Sig - Route: Take 3 mL by nebulization 1 (One) Time As Needed for Wheezing or Shortness of Air (bronchospasm). - Nebulization    Reason for Discontinue: Patient Transfer    lactated ringers infusion (Discontinued) 125 mL/hr Continuous 9/14/2017 9/14/2017    Sig - Route: Infuse 125 mL/hr into a venous catheter Continuous. - Intravenous    meperidine (DEMEROL) injection 12.5 mg (Discontinued) 12.5 mg Every 5 Minutes PRN 9/14/2017 9/14/2017    Sig - Route: Infuse 0.5 mL into a venous catheter Every 5 (Five) Minutes As Needed for Shivering (May repeat x 1). - Intravenous    Reason for Discontinue: Patient Transfer    ondansetron (ZOFRAN) injection 4 mg (Discontinued) 4 mg Once As Needed 9/14/2017 9/14/2017    Sig - Route:  "Infuse 2 mL into a venous catheter 1 (One) Time As Needed for Nausea or Vomiting (may repeat times one dose). - Intravenous    Reason for Discontinue: Patient Transfer    Pharmacy to dose vancomycin (Discontinued)  Continuous PRN 9/13/2017 9/14/2017    Sig - Route: Continuous As Needed for Consult. - Does not apply    Linked Group 3:  \"And\" Linked Group Details        piperacillin-tazobactam (ZOSYN) 3.375 g/100 mL 0.9% NS IVPB (mbp) (Discontinued) 3.375 g Every 6 Hours Scheduled 9/13/2017 9/14/2017    Sig - Route: Infuse 100 mL into a venous catheter Every 6 (Six) Hours. - Intravenous    Linked Group 3:  \"And\" Linked Group Details        piperacillin-tazobactam (ZOSYN) 3.375 g/100 mL 0.9% NS IVPB (mbp) (Discontinued) 3.375 g Every 8 Hours 9/14/2017 9/17/2017    Sig - Route: Infuse 100 mL into a venous catheter Every 8 (Eight) Hours. - Intravenous    Linked Group 3:  \"And\" Linked Group Details        sodium chloride 0.9 % flush 1-10 mL (Discontinued) 1-10 mL As Needed 9/14/2017 9/14/2017    Sig - Route: Infuse 1-10 mL into a venous catheter As Needed for Line Care. - Intravenous    Reason for Discontinue: Patient Transfer    vancomycin (VANCOCIN) 1,500 mg in sodium chloride 0.9 % 500 mL IVPB (Discontinued) 20 mg/kg × 78.6 kg Once 9/13/2017 9/13/2017    Sig - Route: Infuse 1,500 mg into a venous catheter 1 (One) Time. - Intravenous    Reason for Discontinue: Dose adjustment    Linked Group 3:  \"And\" Linked Group Details        vancomycin (VANCOCIN) 1,500 mg in sodium chloride 0.9 % 500 mL IVPB (Discontinued) 1,500 mg Every 8 Hours 9/14/2017 9/14/2017    Sig - Route: Infuse 1,500 mg into a venous catheter Every 8 (Eight) Hours. - Intravenous    Reason for Discontinue: Error          Lab Results (last 72 hours)     Procedure Component Value Units Date/Time    POC Glucose Fingerstick [[id number]]  (Abnormal) Collected:  09/15/17 0651    Specimen:  Blood Updated:  09/15/17 0658     Glucose 398 (H) mg/dL     Narrative:    "    Meter: [id number] : [id number] ISA COREA    POC Glucose Fingerstick [[id number]]  (Abnormal) Collected:  09/15/17 1122    Specimen:  Blood Updated:  09/15/17 1131     Glucose 172 (H) mg/dL     Narrative:       Meter: [id number] : [id number] ISA COREA    POC Glucose Fingerstick [[id number]]  (Abnormal) Collected:  09/15/17 1522    Specimen:  Blood Updated:  09/15/17 1530     Glucose 220 (H) mg/dL     Narrative:       Meter: [id number] : [id number] reeder lissett    POC Glucose Fingerstick [[id number]]  (Abnormal) Collected:  09/15/17 1912    Specimen:  Blood Updated:  09/15/17 1920     Glucose 227 (H) mg/dL     Narrative:       Meter: [id number] : [id number] bianka goel    POC Glucose Fingerstick [[id number]]  (Abnormal) Collected:  09/15/17 2222    Specimen:  Blood Updated:  09/15/17 2229     Glucose 187 (H) mg/dL     Narrative:       Meter: [id number] : [id number] damien francisco    CBC & Differential [[id number]] Collected:  09/16/17 0126    Specimen:  Blood Updated:  09/16/17 0238    Narrative:       The following orders were created for panel order CBC & Differential.  Procedure                               Abnormality         Status                     ---------                               -----------         ------                     Scan Slide[[id number]]                                       Final result               CBC Auto Differential[[id number]]        Abnormal            Final result                 Please view results for these tests on the individual orders.    CBC Auto Differential [[id number]]  (Abnormal) Collected:  09/16/17 0126    Specimen:  Blood Updated:  09/16/17 0238     WBC 2.33 (C) 10*3/mm3      RBC 3.86 (L) 10*6/mm3      Hemoglobin 11.8 (L) g/dL      Hematocrit 36.0 (L) %      MCV 93.3 fL      MCH 30.6 pg      MCHC 32.8 (L) g/dL      RDW 14.6 (H) %      RDW-SD 49.5 fl      MPV 11.9 (H) fL      Platelets 43 (C) 10*3/mm3      Neutrophil % 53.3 %       Lymphocyte % 30.0 %      Monocyte % 11.6 (H) %      Eosinophil % 4.3 %      Basophil % 0.4 %      Immature Grans % 0.4 %      Neutrophils, Absolute 1.24 (L) 10*3/mm3      Lymphocytes, Absolute 0.70 (L) 10*3/mm3      Monocytes, Absolute 0.27 10*3/mm3      Eosinophils, Absolute 0.10 10*3/mm3      Basophils, Absolute 0.01 10*3/mm3      Immature Grans, Absolute 0.01 10*3/mm3      nRBC 0.0 /100 WBC     Scan Slide [[id number]] Collected:  09/16/17 0126    Specimen:  Blood Updated:  09/16/17 0238     Hypochromia Slight/1+     Platelet Estimate Decreased    Magnesium [[id number]]  (Normal) Collected:  09/16/17 0126    Specimen:  Blood Updated:  09/16/17 0240     Magnesium 1.7 mg/dL     Phosphorus [[id number]]  (Normal) Collected:  09/16/17 0126    Specimen:  Blood Updated:  09/16/17 0240     Phosphorus 2.8 mg/dL     Comprehensive Metabolic Panel [[id number]]  (Abnormal) Collected:  09/16/17 0126    Specimen:  Blood Updated:  09/16/17 0248     Glucose 220 (H) mg/dL      BUN 6 (L) mg/dL      Creatinine 0.49 mg/dL      Sodium 139 mmol/L      Potassium 4.4 mmol/L      Chloride 107 mmol/L      CO2 26.9 mmol/L      Calcium 8.1 mg/dL      Total Protein 5.1 (L) g/dL      Albumin 2.50 (L) g/dL      ALT (SGPT) 63 (H) U/L      AST (SGOT) 70 (H) U/L      Alkaline Phosphatase 137 (H) U/L       Note New Reference Ranges        Total Bilirubin 0.6 mg/dL      eGFR Non African Amer >150 mL/min/1.73      Globulin 2.6 gm/dL      A/G Ratio 1.0 (L) g/dL      BUN/Creatinine Ratio 12.2     Anion Gap 5.1 mmol/L     Osmolality, Calculated [[id number]]  (Normal) Collected:  09/16/17 0126    Specimen:  Blood Updated:  09/16/17 0248     Osmolality Calc 281.9 mOsm/kg     C-reactive Protein [[id number]]  (Abnormal) Collected:  09/16/17 0126    Specimen:  Blood Updated:  09/16/17 0248     C-Reactive Protein 1.87 (H) mg/dL     POC Glucose Fingerstick [[id number]]  (Abnormal) Collected:  09/16/17 0616    Specimen:  Blood Updated:  09/16/17 0638     Glucose 226  (H) mg/dL     Narrative:       Meter: [id number] : [id number] JES SEPULVEDA    Urine Culture [[id number]] Collected:  09/13/17 1828    Specimen:  Urine from Urine, Clean Catch Updated:  09/16/17 1131     Urine Culture <10,000 CFU/mL Normal Urogenital Mercedes    Vancomycin, Trough [[id number]]  (Abnormal) Collected:  09/16/17 1056    Specimen:  Blood Updated:  09/16/17 1140     Vancomycin Trough 15.80 (H) mcg/mL     POC Glucose Fingerstick [[id number]]  (Abnormal) Collected:  09/16/17 1145    Specimen:  Blood Updated:  09/16/17 1153     Glucose 195 (H) mg/dL     Narrative:       Meter: [id number] : [id number] ULISES YOUNG    POC Glucose Fingerstick [[id number]]  (Abnormal) Collected:  09/16/17 1642    Specimen:  Blood Updated:  09/16/17 1654     Glucose 180 (H) mg/dL     Narrative:       Meter: [id number] : [id number] ULISES YOUNG    POC Glucose Fingerstick [[id number]]  (Abnormal) Collected:  09/16/17 1846    Specimen:  Blood Updated:  09/16/17 1856     Glucose 184 (H) mg/dL     Narrative:       Meter: [id number] : [id number] bianka goel    Magnesium [[id number]]  (Abnormal) Collected:  09/16/17 2309    Specimen:  Blood Updated:  09/16/17 2346     Magnesium 1.6 (L) mg/dL     CBC & Differential [[id number]] Collected:  09/17/17 0107    Specimen:  Blood Updated:  09/17/17 0227    Narrative:       The following orders were created for panel order CBC & Differential.  Procedure                               Abnormality         Status                     ---------                               -----------         ------                     CBC Auto Differential[[id number]]        Abnormal            Final result                 Please view results for these tests on the individual orders.    CBC Auto Differential [[id number]]  (Abnormal) Collected:  09/17/17 0107    Specimen:  Blood Updated:  09/17/17 0227     WBC 2.57 (L) 10*3/mm3      RBC 3.98 (L) 10*6/mm3      Hemoglobin 12.2 (L) g/dL      Hematocrit 37.2 (L)  %      MCV 93.5 fL      MCH 30.7 pg      MCHC 32.8 (L) g/dL      RDW 14.5 %      RDW-SD 47.7 fl      MPV 10.9 (H) fL      Platelets 81 (L) 10*3/mm3      Neutrophil % 59.9 %      Lymphocyte % 24.1 %      Monocyte % 10.5 (H) %      Eosinophil % 4.3 %      Basophil % 0.8 %      Immature Grans % 0.4 %      Neutrophils, Absolute 1.54 10*3/mm3      Lymphocytes, Absolute 0.62 (L) 10*3/mm3      Monocytes, Absolute 0.27 10*3/mm3      Eosinophils, Absolute 0.11 10*3/mm3      Basophils, Absolute 0.02 10*3/mm3      Immature Grans, Absolute 0.01 10*3/mm3     Magnesium [[id number]]  (Abnormal) Collected:  09/17/17 0107    Specimen:  Blood Updated:  09/17/17 0254     Magnesium 1.6 (L) mg/dL     Phosphorus [[id number]]  (Normal) Collected:  09/17/17 0107    Specimen:  Blood Updated:  09/17/17 0254     Phosphorus 3.0 mg/dL     Comprehensive Metabolic Panel [[id number]]  (Abnormal) Collected:  09/17/17 0107    Specimen:  Blood Updated:  09/17/17 0254     Glucose 257 (H) mg/dL      BUN 9 mg/dL      Creatinine 0.53 mg/dL      Sodium 137 mmol/L      Potassium 4.3 mmol/L      Chloride 107 mmol/L      CO2 26.3 mmol/L      Calcium 8.5 mg/dL      Total Protein 5.2 (L) g/dL      Albumin 2.50 (L) g/dL      ALT (SGPT) 55 (H) U/L      AST (SGOT) 68 (H) U/L      Alkaline Phosphatase 148 (H) U/L       Note New Reference Ranges        Total Bilirubin 0.6 mg/dL      eGFR Non African Amer >150 mL/min/1.73      Globulin 2.7 gm/dL      A/G Ratio 0.9 (L) g/dL      BUN/Creatinine Ratio 17.0     Anion Gap 3.7 mmol/L     Osmolality, Calculated [[id number]]  (Normal) Collected:  09/17/17 0107    Specimen:  Blood Updated:  09/17/17 0255     Osmolality Calc 281.3 mOsm/kg     C-reactive Protein [[id number]]  (Abnormal) Collected:  09/17/17 0108    Specimen:  Blood Updated:  09/17/17 0255     C-Reactive Protein 1.35 (H) mg/dL     POC Glucose Fingerstick [[id number]]  (Abnormal) Collected:  09/17/17 0631    Specimen:  Blood Updated:  09/17/17 0643     Glucose 170 (H)  mg/dL     Narrative:       Meter: [id number] : [id number] [facility]    POC Glucose Fingerstick [[id number]]  (Abnormal) Collected:  09/17/17 1138    Specimen:  Blood Updated:  09/17/17 1148     Glucose 222 (H) mg/dL     Narrative:       Meter: [id number] : [id number] Ann-Marie Michelle    Wound Culture [[id number]]  (Abnormal)  (Susceptibility) Collected:  09/13/17 2252    Specimen:  Wound from Finger Updated:  09/17/17 1503     Wound Culture Scant growth (1+) Staphylococcus aureus, MRSA (C)      This isolate does not demonstrate inducible clindamycin resistance in vitro.  Methicillin resistant Staphylococcus aureus, Patient may be an isolation risk.        Gram Stain Result Moderate (3+) WBCs seen      Few (2+) Gram positive cocci, intracellular and extracellular, in pairs and clusters      Few (2+) Gram negative bacilli, intracellular and extracellular    Susceptibility      Staphylococcus aureus, MRSA     TANVIR     Amoxicillin + Clavulanate <=4/2 ug/ml Resistant     Ampicillin >8 ug/ml Resistant     Ampicillin + Sulbactam <=8/4 ug/ml Resistant     Ceftriaxone <=8 ug/ml Resistant     Ciprofloxacin <=1 ug/ml Susceptible     Clindamycin <=0.5 ug/ml Susceptible     Erythromycin >4 ug/ml Resistant     Gentamicin <=4 ug/ml Susceptible     Levofloxacin <=1 ug/ml Susceptible  [1]      Moxifloxacin <=0.5 ug/ml Susceptible     Oxacillin >2 ug/ml Resistant     Penicillin G >8 ug/ml Resistant     Rifampin <=1 ug/ml Susceptible     Tetracycline <=4 ug/ml Susceptible     Trimethoprim + Sulfamethoxazole <=0.5/9.5 ug/ml Susceptible     Vancomycin 2 ug/ml Susceptible            [1]   Staphylococcus species may develop resistance during prolonged therapy with quinolones.  Isolates that are initially susceptible may become resistant within three to four days after initiation of therapy. Testing of repeat isolates may be warranted.                 Culture, Routine [[id number]]  (Abnormal)  (Susceptibility) Collected:  09/14/17  1954    Specimen:  Swab from Hand, Digit Left Updated:  09/17/17 1520     Culture Moderate growth (3+) Staphylococcus aureus, MRSA (C)      This isolate does not demonstrate inducible clindamycin resistance in vitro.  Methicillin resistant Staphylococcus aureus, Patient may be an isolation risk.        Gram Stain Result Rare (1+) WBCs seen      Occasional Gram positive cocci in pairs, chains and clusters      Occasional Gram negative bacilli    Susceptibility      Staphylococcus aureus, MRSA     TANVIR     Amoxicillin + Clavulanate <=4/2 ug/ml Resistant     Ampicillin >8 ug/ml Resistant     Ampicillin + Sulbactam 16/8 ug/ml Resistant     Ceftriaxone <=8 ug/ml Resistant     Ciprofloxacin <=1 ug/ml Susceptible     Clindamycin <=0.5 ug/ml Susceptible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 present on admission  -Hepatitis C causing transaminitis and thrombocytopenia  -Type II diabetes mellitus, insulin dependent with hyperglycemia   -History of AFib, currently in normal sinus rhythm  -COPD without acute exacerbation   -Essential hypertension, controlled   -Acute hypomagnesemia  -Hyperlipidemia   -History of coronary artery disease, S/P heart catheterization in 2012  -History of systolic congestive heart failure with EF of 51-55% on 4/4/2017  -IV drug abuse with  history of amphetamine, suboxone, cocaine, and opioid abuse in the past  -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  CRP is now only mildly elevated.  Finalized blood cultures are still pending at this point.  We will continue vancomycin and Zosyn.  As for pain control, we will opt to attempt to achieve acceptable pain level with oral pain medication at this time due to previous history of IV drug use.  I will add milk of magnesia daily when necessary for constipation.  We will continue to monitor patient and lab work closely.     The patient is high risk due to the following diagnoses/reasons:  sepsis           I have discussed the patient's assessment and plan with patient.    NOHEMI Baeza  17  8:55 AM         Electronically signed by Bouchra Boyd MD at 2017  3:19 PM      NOHEMI Blakely at 2017  8:53 AM  Version 1 of 2                                                                                                                                                 Hospitalist Progress Note    Patient Identification  Name: Isaias Lang  Age/Sex: 52 y.o. male  :  1965        MRN: [id number]  Visit Number: [id number]  PCP: Ankit Jade MD       LOS: 3 days       Chief complaint:  Left thumb lesion     Subjective:  52-year-old who was admitted on 2017 with a left thumb abscess after injection of suboxone and methamphetamine.  He had incision and drainage of the abscess on 2017.  Today,  he continues to complain of post-op pain and requests IV  pain medication.  He denies fever, dyspnea, cough, chest pain, nausea or vomiting.  He tells me that he has experienced some mild constipation and notes his  "last bowel movement was yesterday.         Objective     Vital Signs  Temp:  [97.6 °F (36.4 °C)-98.9 °F (37.2 °C)] 98.8 °F (37.1 °C)  Heart Rate:  [80-98] 80  Resp:  [16-20] 18  BP: (110-128)/(78-86) 114/78  Last 2 weights    09/15/17  0345 09/16/17  0354   Weight: 186 lb 4 oz (84.5 kg) 198 lb 11.2 oz (90.1 kg)     Body mass index is 30.21 kg/(m^2).    Intake/Output    Intake/Output Summary (Last 24 hours) at 09/16/17 0855  Last data filed at 09/16/17 0354   Gross per 24 hour   Intake              960 ml   Output             1775 ml   Net             -815 ml       Physical Exam:      Objective:  General Appearance:  Comfortable and in distress.    Vital signs: (most recent): Blood pressure 114/78, pulse 80, temperature 98.8 °F (37.1 °C), temperature source Axillary, resp. rate 18, height 68\" (172.7 cm), weight 198 lb 11.2 oz (90.1 kg), SpO2 96 %.  Vital signs are normal.  No fever.    Output: Producing urine.    HEENT: Normal HEENT exam.    Lungs:  Normal respiratory rate and normal effort.  He is not in respiratory distress.  (Breath sounds diminished in bilateral lung bases, otherwise clear.)  Heart: Normal rate.  Regular rhythm.    Chest: No chest wall tenderness.  Symmetric chest wall expansion.   Abdomen: Abdomen is soft and non-distended.  Bowel sounds are normal.   There is no abdominal tenderness.     Extremities: Normal range of motion.  There is no dependent edema.    Pulses: Distal pulses are intact.    Neurological: Patient is alert and oriented to person, place and time.    Pupils:  (Pupils round and equal.).    Skin:  Warm and dry.  (Left thumb wrapped in Kerlix.  No drainage or foul odor noted.)             Results Review:      LABS     Results from last 7 days  Lab Units 09/16/17  0126 09/15/17  0415 09/14/17  0040 09/13/17  1902   WBC 10*3/mm3 2.33* 2.74* 3.60* 4.52   HEMOGLOBIN g/dL 11.8* 11.8* 12.0* 14.0   PLATELETS 10*3/mm3 43* 73* 42* 71*       Results from last 7 days  Lab Units 09/16/17  0126 " 09/15/17  0415 09/14/17  0040 09/13/17  1902   CRP mg/dL 1.87* 1.91* 2.09* 2.40*       Results from last 7 days  Lab Units 09/16/17  0126 09/15/17  0415 09/14/17  0040 09/13/17  1902   SODIUM mmol/L 139 135 135 135   POTASSIUM mmol/L 4.4 4.3 4.2 5.3   CHLORIDE mmol/L 107 105 104 105   CO2 mmol/L 26.9 23.3* 24.7 24.3   BUN mg/dL 6* 7 9 <5*   CREATININE mg/dL 0.49 0.68 0.71 0.57   CALCIUM mg/dL 8.1 7.9 8.1 9.0   GLUCOSE mg/dL 220* 458* 422* 228*       Results from last 7 days  Lab Units 09/16/17  0126 09/15/17  0207 09/14/17  0040   MAGNESIUM mg/dL 1.7 1.7 1.5*     No results found for: HGBA1C    Results from last 7 days  Lab Units 09/16/17  0126 09/15/17  0415 09/14/17  0040 09/13/17  1902   BILIRUBIN mg/dL 0.6 0.7 0.9 1.6   ALK PHOS U/L 137* 105 99 110   AST (SGOT) U/L 70* 82* 78* 123*   ALT (SGPT) U/L 63* 67* 69* 85*                   Telemetry:  Sinus rhythm in the 90s.  I personally reviewed telemetry strip.      I have reviewed the patient's laboratory results.    IMAGING  Imaging Results (last 72 hours)     Procedure Component Value Units Date/Time    XR Hand 3+ View Left [[id number]] Collected:  09/14/17 0738     Updated:  09/14/17 0740    Narrative:       EXAMINATION: XR HAND 3+ VW LEFT-      TECHNIQUE: XR HAND 3+ VW LEFT-        INDICATION: left thumb abscess; L02.512-Cutaneous abscess of left hand      COMPARISON: NONE     FINDINGS:          BONES: No acute fracture. No blastic or lytic lesions.          JOINT: No dislocation.          SOFT TISSUES:  SIGNIFICANT SOFT TISSUE SWELLING OF THE 1ST DIGIT.       Impression:       No acute or destructive bony abnormality.     COMMUNICATION: Per this written report.     This report was finalized on 9/14/2017 7:38 AM by Dr. Brendan Tolentino MD.       XR Chest AP [[id number]] Collected:  09/14/17 0738     Updated:  09/14/17 0740    Narrative:       EXAMINATION: XR CHEST AP-      CLINICAL INDICATION:     SEDATION IN INTUBATED PATIENTS     TECHNIQUE:  XR CHEST AP-       COMPARISON: NONE      FINDINGS:   The lungs remain aerated.  Heart and mediastinum contours are unremarkable.  No pleural effusion.  No pneumothorax.   Bony and soft tissue structures are unremarkable.       Impression:       No radiographic evidence of acute cardiac or pulmonary  disease.     This report was finalized on 9/14/2017 7:38 AM by Dr. Brendan Tolentino MD.             I have personally reviewed the above radiologic imaging.    Medications    atorvastatin 20 mg Oral Nightly   dicyclomine 10 mg Oral BID   doxepin 50 mg Oral Nightly   gabapentin 400 mg Oral Q8H   HYDROmorphone 1 mg Intravenous Once   insulin aspart 0-14 Units Subcutaneous 4x Daily AC & at Bedtime   insulin detemir 5 Units Subcutaneous Nightly   magnesium oxide 200 mg Oral BID   magnesium sulfate 4 g Intravenous Once   metoclopramide 10 mg Oral BID AC   metoprolol tartrate 50 mg Oral Daily   pancrelipase (Lip-Prot-Amyl) 24,000 units of lipase Oral 4x Daily With Meals & Nightly   pantoprazole 40 mg Oral Daily   piperacillin-tazobactam 3.375 g Intravenous Q8H   polyethylene glycol 17 g Oral BID   vancomycin 1,500 mg Intravenous Q8H       Pharmacy Consult      Assessment and Plan:  -Left thumb abscess and cellulitis causing sepsis that was present on admission  -Hepatitis C causing transaminitis and thrombocytopenia  -Type II diabetes mellitus, insulin dependent with hyperglycemia   -History of AFib, currently in normal sinus rhythm  -COPD without acute exacerbation   -Essential hypertension, controlled   -Acute hypomagnesemia  -Hyperlipidemia   -History of coronary artery disease, S/P heart catheterization in 2012  -History of systolic congestive heart failure with EF of 51-55% on 4/4/2017  -IV drug abuse with history of amphetamine, suboxone, cocaine, and opioid abuse in the past  -History of MRSA of the achilles tendon and LUE  -Depression   -Anxiety   -Obstructive sleep apnea without use of BiPAP  -History of chronic pancreatitis   -Morbid  obesity, now with lower BMI than in the past and now he is only overweight  -Tobacco smoking addiction     Blood work continues to improve.  CRP is now only mildly elevated.  Finalized blood cultures are still pending at this point.  We will continue vancomycin and Zosyn.  As for pain control, we will opt to attempt to achieve acceptable pain level with oral pain medication at this time due to previous history of IV drug use.  I will add milk of magnesia daily when necessary for constipation.  We will continue to monitor patient and lab work closely.     The patient is high risk due to the following diagnoses/reasons:  sepsis           I have discussed the patient's assessment and plan with patient.    NOHEMI Baeza  17  8:55 AM         Electronically signed by NOHEMI Blakely at 2017  9:19 AM      Bouchra Boyd MD at 2017  8:39 AM  Version 3 of 3                                                                                                                                                 Hospitalist Progress Note    Patient Identification  Name: Isaias Lang  Age/Sex: 52 y.o. male  :  1965        MRN: [id number]  Visit Number: [id number]  PCP: Ankit Jade MD       LOS: 4 days       Chief complaint:  Left thumb lesion     Subjective:  52-year-old who was admitted on 2017 with a left thumb abscess after injection of suboxone and methamphetamine.  He had incision and drainage of the abscess on 2017.  Today, patient is resting in bed with eye open.  He appears to be in no acute distress.  However, he continues to complain of post-op pain and states that his pain is very severe.  He denies fever, dyspnea, cough, chest pain, nausea or vomiting.         Objective     Vital Signs  Temp:  [97.7 °F (36.5 °C)-98.5 °F (36.9 °C)] 98.3 °F (36.8 °C)  Heart Rate:  [72-84] 72  Resp:  [16-18] 16  BP: ()/(54-70) 114/68  Last 2 weights    17  0358  "09/17/17  0300   Weight: 198 lb 11.2 oz (90.1 kg) 205 lb 8 oz (93.2 kg)     Body mass index is 31.25 kg/(m^2).    Intake/Output    Intake/Output Summary (Last 24 hours) at 09/17/17 0839  Last data filed at 09/17/17 0825   Gross per 24 hour   Intake             3050 ml   Output             2850 ml   Net              200 ml       Physical Exam:      Objective:  General Appearance:  Comfortable and in distress.    Vital signs: (most recent): Blood pressure 114/68, pulse 72, temperature 98.3 °F (36.8 °C), temperature source Axillary, resp. rate 16, height 68\" (172.7 cm), weight 205 lb 8 oz (93.2 kg), SpO2 92 %.  Vital signs are normal.  No fever.    Output: Producing urine.  Stool output assessment: States BM yesterday.    HEENT: Normal HEENT exam.    Lungs:  Normal respiratory rate and normal effort.  He is not in respiratory distress.  (Breath sounds diminished in bilateral lung bases, otherwise clear.)  Heart: Normal rate.  Regular rhythm.    Chest: No chest wall tenderness.  Symmetric chest wall expansion.   Abdomen: Abdomen is soft and non-distended.  Bowel sounds are normal.   There is no abdominal tenderness.     Extremities: Normal range of motion.  There is local extremity swelling (right & left hand with mild/moderate edema).  There is no dependent edema.    Pulses: Distal pulses are intact.    Neurological: Patient is alert and oriented to person, place and time.  Normal strength.    Pupils:  (Pupils round and equal.).    Skin:  Warm and dry.  (Left thumb wrapped in Kerlix.  No drainage or foul odor noted.)           Results Review:      LABS     Results from last 7 days  Lab Units 09/17/17  0107 09/16/17  0126 09/15/17  0415 09/14/17  0040 09/13/17  1902   WBC 10*3/mm3 2.57* 2.33* 2.74* 3.60* 4.52   HEMOGLOBIN g/dL 12.2* 11.8* 11.8* 12.0* 14.0   PLATELETS 10*3/mm3 81* 43* 73* 42* 71*       Results from last 7 days  Lab Units 09/17/17  0108 09/16/17  0126 09/15/17  0415 09/14/17  0040 09/13/17  1902   CRP " mg/dL 1.35* 1.87* 1.91* 2.09* 2.40*       Results from last 7 days  Lab Units 09/17/17  0107 09/16/17  0126 09/15/17  0415 09/14/17  0040 09/13/17  1902   SODIUM mmol/L 137 139 135 135 135   POTASSIUM mmol/L 4.3 4.4 4.3 4.2 5.3   CHLORIDE mmol/L 107 107 105 104 105   CO2 mmol/L 26.3 26.9 23.3* 24.7 24.3   BUN mg/dL 9 6* 7 9 <5*   CREATININE mg/dL 0.53 0.49 0.68 0.71 0.57   CALCIUM mg/dL 8.5 8.1 7.9 8.1 9.0   GLUCOSE mg/dL 257* 220* 458* 422* 228*       Results from last 7 days  Lab Units 09/17/17  0107 09/16/17  2309 09/16/17  0126 09/15/17  0207 09/14/17  0040   MAGNESIUM mg/dL 1.6* 1.6* 1.7 1.7 1.5*     No results found for: HGBA1C    Results from last 7 days  Lab Units 09/17/17  0107 09/16/17  0126 09/15/17  0415 09/14/17  0040 09/13/17  1902   BILIRUBIN mg/dL 0.6 0.6 0.7 0.9 1.6   ALK PHOS U/L 148* 137* 105 99 110   AST (SGOT) U/L 68* 70* 82* 78* 123*   ALT (SGPT) U/L 55* 63* 67* 69* 85*                   Telemetry:  Sinus rhythm in the 90s.  I personally reviewed telemetry strip.      I have reviewed the patient's laboratory results.    IMAGING  Imaging Results (last 72 hours)     Procedure Component Value Units Date/Time    XR Hand 3+ View Left [[id number]] Collected:  09/14/17 0738     Updated:  09/14/17 0740    Narrative:       EXAMINATION: XR HAND 3+ VW LEFT-      TECHNIQUE: XR HAND 3+ VW LEFT-        INDICATION: left thumb abscess; L02.512-Cutaneous abscess of left hand      COMPARISON: NONE     FINDINGS:          BONES: No acute fracture. No blastic or lytic lesions.          JOINT: No dislocation.          SOFT TISSUES:  SIGNIFICANT SOFT TISSUE SWELLING OF THE 1ST DIGIT.       Impression:       No acute or destructive bony abnormality.     COMMUNICATION: Per this written report.     This report was finalized on 9/14/2017 7:38 AM by Dr. Brendan Tolentino MD.       XR Chest AP [[id number]] Collected:  09/14/17 0738     Updated:  09/14/17 0740    Narrative:       EXAMINATION: XR CHEST AP-      CLINICAL  INDICATION:     SEDATION IN INTUBATED PATIENTS     TECHNIQUE:  XR CHEST AP-      COMPARISON: NONE      FINDINGS:   The lungs remain aerated.  Heart and mediastinum contours are unremarkable.  No pleural effusion.  No pneumothorax.   Bony and soft tissue structures are unremarkable.       Impression:       No radiographic evidence of acute cardiac or pulmonary  disease.     This report was finalized on 9/14/2017 7:38 AM by Dr. Brendan Tolentino MD.             I have personally reviewed the above radiologic imaging.    Medications    atorvastatin 20 mg Oral Nightly   dicyclomine 10 mg Oral BID   doxepin 50 mg Oral Nightly   gabapentin 400 mg Oral Q8H   HYDROmorphone 1 mg Intravenous Once   insulin aspart 0-14 Units Subcutaneous 4x Daily AC & at Bedtime   insulin detemir 5 Units Subcutaneous Nightly   magnesium oxide 200 mg Oral BID   magnesium sulfate 4 g Intravenous Once   metoclopramide 10 mg Oral BID AC   metoprolol tartrate 50 mg Oral Daily   pancrelipase (Lip-Prot-Amyl) 24,000 units of lipase Oral 4x Daily With Meals & Nightly   pantoprazole 40 mg Oral Daily   piperacillin-tazobactam 3.375 g Intravenous Q8H   polyethylene glycol 17 g Oral BID   vancomycin 1,500 mg Intravenous Q8H       Pharmacy Consult      Assessment and Plan:  -Left thumb abscess and cellulitis causing sepsis that was present on admission  -Hepatitis C causing transaminitis and thrombocytopenia  -Type II diabetes mellitus, insulin dependent with hyperglycemia   -History of AFib, currently in normal sinus rhythm  -COPD without acute exacerbation   -Essential hypertension, controlled   -Acute hypomagnesemia  -Hyperlipidemia   -History of coronary artery disease, S/P heart catheterization in 2012  -History of systolic congestive heart failure with EF of 51-55% on 4/4/2017  -IV drug abuse with history of amphetamine, suboxone, cocaine, and opioid abuse in the past  -History of MRSA of the achilles tendon and LUE  -Depression   -Anxiety   -Obstructive  sleep apnea without use of BiPAP  -History of chronic pancreatitis   -Morbid obesity, now with lower BMI than in the past and now he is only overweight  -Tobacco smoking addiction     No acute events noted throughout the night.  CRP continues to decrease. Finalized blood cultures are still pending at this point but growing gram-positive cocci.  We will continue vancomycin and discontinue Zosyn at this time.  We will continue to monitor patient and lab work closely.  Continue daily packing per orthopedic surgery recommendations.  Patient likely can be discharged in the morning once final culture results are available and the plan is made for daily packing of his wound.  Patient states that he is not able to drive to come to the infusion center.   needs to arrange for daily packing and need recommendations from orthopedics regarding duration of daily packing.     The patient is high risk due to the following diagnoses/reasons:  sepsis           I have discussed the patient's assessment and plan with patient.    NOHEMI Baeza  17  8:39 AM    I have seen and examined the patient. I agree with the assessment and plan of NOHEMI Baeza. I have amended the above note to reflect my findings in history, physical examination, decision making and management plan.       Electronically signed by Bouchra Boyd MD at 2017  6:11 PM      Bouchra Boyd MD at 2017  8:39 AM  Version 2 of 3                                                                                                                                                 Hospitalist Progress Note    Patient Identification  Name: Isaias Lang  Age/Sex: 52 y.o. male  :  1965        MRN: [id number]  Visit Number: [id number]  PCP: Ankit Jade MD       LOS: 4 days       Chief complaint:  Left thumb lesion     Subjective:  52-year-old who was admitted on 2017 with a left thumb abscess after injection  "of suboxone and methamphetamine.  He had incision and drainage of the abscess on 9/14/2017.  Today, patient is resting in bed with eye open.  He appears to be in no acute distress.  However, he continues to complain of post-op pain and states that his pain is very severe.  He denies fever, dyspnea, cough, chest pain, nausea or vomiting.         Objective     Vital Signs  Temp:  [97.7 °F (36.5 °C)-98.5 °F (36.9 °C)] 98.3 °F (36.8 °C)  Heart Rate:  [72-84] 72  Resp:  [16-18] 16  BP: ()/(54-70) 114/68  Last 2 weights    09/16/17  0354 09/17/17  0300   Weight: 198 lb 11.2 oz (90.1 kg) 205 lb 8 oz (93.2 kg)     Body mass index is 31.25 kg/(m^2).    Intake/Output    Intake/Output Summary (Last 24 hours) at 09/17/17 0839  Last data filed at 09/17/17 0825   Gross per 24 hour   Intake             3050 ml   Output             2850 ml   Net              200 ml       Physical Exam:      Objective:  General Appearance:  Comfortable and in distress.    Vital signs: (most recent): Blood pressure 114/68, pulse 72, temperature 98.3 °F (36.8 °C), temperature source Axillary, resp. rate 16, height 68\" (172.7 cm), weight 205 lb 8 oz (93.2 kg), SpO2 92 %.  Vital signs are normal.  No fever.    Output: Producing urine.  Stool output assessment: States BM yesterday.    HEENT: Normal HEENT exam.    Lungs:  Normal respiratory rate and normal effort.  He is not in respiratory distress.  (Breath sounds diminished in bilateral lung bases, otherwise clear.)  Heart: Normal rate.  Regular rhythm.    Chest: No chest wall tenderness.  Symmetric chest wall expansion.   Abdomen: Abdomen is soft and non-distended.  Bowel sounds are normal.   There is no abdominal tenderness.     Extremities: Normal range of motion.  There is local extremity swelling (right & left hand with mild/moderate edema).  There is no dependent edema.    Pulses: Distal pulses are intact.    Neurological: Patient is alert and oriented to person, place and time.  Normal " strength.    Pupils:  (Pupils round and equal.).    Skin:  Warm and dry.  (Left thumb wrapped in Kerlix.  No drainage or foul odor noted.)           Results Review:      LABS     Results from last 7 days  Lab Units 09/17/17  0107 09/16/17  0126 09/15/17  0415 09/14/17  0040 09/13/17  1902   WBC 10*3/mm3 2.57* 2.33* 2.74* 3.60* 4.52   HEMOGLOBIN g/dL 12.2* 11.8* 11.8* 12.0* 14.0   PLATELETS 10*3/mm3 81* 43* 73* 42* 71*       Results from last 7 days  Lab Units 09/17/17  0108 09/16/17  0126 09/15/17  0415 09/14/17  0040 09/13/17  1902   CRP mg/dL 1.35* 1.87* 1.91* 2.09* 2.40*       Results from last 7 days  Lab Units 09/17/17  0107 09/16/17  0126 09/15/17  0415 09/14/17  0040 09/13/17  1902   SODIUM mmol/L 137 139 135 135 135   POTASSIUM mmol/L 4.3 4.4 4.3 4.2 5.3   CHLORIDE mmol/L 107 107 105 104 105   CO2 mmol/L 26.3 26.9 23.3* 24.7 24.3   BUN mg/dL 9 6* 7 9 <5*   CREATININE mg/dL 0.53 0.49 0.68 0.71 0.57   CALCIUM mg/dL 8.5 8.1 7.9 8.1 9.0   GLUCOSE mg/dL 257* 220* 458* 422* 228*       Results from last 7 days  Lab Units 09/17/17  0107 09/16/17  2309 09/16/17  0126 09/15/17  0207 09/14/17  0040   MAGNESIUM mg/dL 1.6* 1.6* 1.7 1.7 1.5*     No results found for: HGBA1C    Results from last 7 days  Lab Units 09/17/17  0107 09/16/17  0126 09/15/17  0415 09/14/17  0040 09/13/17  1902   BILIRUBIN mg/dL 0.6 0.6 0.7 0.9 1.6   ALK PHOS U/L 148* 137* 105 99 110   AST (SGOT) U/L 68* 70* 82* 78* 123*   ALT (SGPT) U/L 55* 63* 67* 69* 85*                   Telemetry:  Sinus rhythm in the 90s.  I personally reviewed telemetry strip.      I have reviewed the patient's laboratory results.    IMAGING  Imaging Results (last 72 hours)     Procedure Component Value Units Date/Time    XR Hand 3+ View Left [[id number]] Collected:  09/14/17 0738     Updated:  09/14/17 0740    Narrative:       EXAMINATION: XR HAND 3+ VW LEFT-      TECHNIQUE: XR HAND 3+ VW LEFT-        INDICATION: left thumb abscess; L02.512-Cutaneous abscess of left hand       COMPARISON: NONE     FINDINGS:          BONES: No acute fracture. No blastic or lytic lesions.          JOINT: No dislocation.          SOFT TISSUES:  SIGNIFICANT SOFT TISSUE SWELLING OF THE 1ST DIGIT.       Impression:       No acute or destructive bony abnormality.     COMMUNICATION: Per this written report.     This report was finalized on 9/14/2017 7:38 AM by Dr. Brendan Tolentino MD.       XR Chest AP [[id number]] Collected:  09/14/17 0738     Updated:  09/14/17 0740    Narrative:       EXAMINATION: XR CHEST AP-      CLINICAL INDICATION:     SEDATION IN INTUBATED PATIENTS     TECHNIQUE:  XR CHEST AP-      COMPARISON: NONE      FINDINGS:   The lungs remain aerated.  Heart and mediastinum contours are unremarkable.  No pleural effusion.  No pneumothorax.   Bony and soft tissue structures are unremarkable.       Impression:       No radiographic evidence of acute cardiac or pulmonary  disease.     This report was finalized on 9/14/2017 7:38 AM by Dr. Brendan Tolentino MD.             I have personally reviewed the above radiologic imaging.    Medications    atorvastatin 20 mg Oral Nightly   dicyclomine 10 mg Oral BID   doxepin 50 mg Oral Nightly   gabapentin 400 mg Oral Q8H   HYDROmorphone 1 mg Intravenous Once   insulin aspart 0-14 Units Subcutaneous 4x Daily AC & at Bedtime   insulin detemir 5 Units Subcutaneous Nightly   magnesium oxide 200 mg Oral BID   magnesium sulfate 4 g Intravenous Once   metoclopramide 10 mg Oral BID AC   metoprolol tartrate 50 mg Oral Daily   pancrelipase (Lip-Prot-Amyl) 24,000 units of lipase Oral 4x Daily With Meals & Nightly   pantoprazole 40 mg Oral Daily   piperacillin-tazobactam 3.375 g Intravenous Q8H   polyethylene glycol 17 g Oral BID   vancomycin 1,500 mg Intravenous Q8H       Pharmacy Consult      Assessment and Plan:  -Left thumb abscess and cellulitis causing sepsis that was present on admission  -Hepatitis C causing transaminitis and thrombocytopenia  -Type II diabetes mellitus,  insulin dependent with hyperglycemia   -History of AFib, currently in normal sinus rhythm  -COPD without acute exacerbation   -Essential hypertension, controlled   -Acute hypomagnesemia  -Hyperlipidemia   -History of coronary artery disease, S/P heart catheterization in 2012  -History of systolic congestive heart failure with EF of 51-55% on 4/4/2017  -IV drug abuse with history of amphetamine, suboxone, cocaine, and opioid abuse in the past  -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  CRP continues to decrease. Finalized blood cultures are still pending at this point but growing gram-positive cocci.  We will continue vancomycin and discontinue Zosyn at this time.  We will continue to monitor patient and lab work closely.  Continue daily packing per orthopedic surgery recommendations.  Patient likely can be discharged in the morning once final culture results are available and the plan is made for daily packing of his wound.  Patient states that he is not able to drive to come to the infusion center.   needs to arrange for daily packing and need recommendations from orthopedics regarding duration of daily packing.     The patient is high risk due to the following diagnoses/reasons:  sepsis           I have discussed the patient's assessment and plan with patient.    NOHEMI Baeza  09/17/17  8:39 AM         Electronically signed by Bouchra Boyd MD at 9/17/2017  5:58 PM      NOHEMI Blakely at 9/17/2017  8:39 AM  Version 1 of 3                                                                                                                                                 Hospitalist Progress Note    Patient  "Identification  Name: Isaias Lang  Age/Sex: 52 y.o. male  :  1965        MRN: [id number]  Visit Number: [id number]  PCP: nAkit Jade MD       LOS: 4 days       Chief complaint:  Left thumb lesion     Subjective:  52-year-old who was admitted on 2017 with a left thumb abscess after injection of suboxone and methamphetamine.  He had incision and drainage of the abscess on 2017.  Today, patient is resting in bed with eye open.  He appears to be in no acute distress.  However, he continues to complain of post-op pain.  He denies fever, dyspnea, cough, chest pain, nausea or vomiting.         Objective     Vital Signs  Temp:  [97.7 °F (36.5 °C)-98.5 °F (36.9 °C)] 98.3 °F (36.8 °C)  Heart Rate:  [72-84] 72  Resp:  [16-18] 16  BP: ()/(54-70) 114/68  Last 2 weights    17  0354 17  0300   Weight: 198 lb 11.2 oz (90.1 kg) 205 lb 8 oz (93.2 kg)     Body mass index is 31.25 kg/(m^2).    Intake/Output    Intake/Output Summary (Last 24 hours) at 17 0839  Last data filed at 17 0825   Gross per 24 hour   Intake             3050 ml   Output             2850 ml   Net              200 ml       Physical Exam:      Objective:  General Appearance:  Comfortable and in distress.    Vital signs: (most recent): Blood pressure 114/68, pulse 72, temperature 98.3 °F (36.8 °C), temperature source Axillary, resp. rate 16, height 68\" (172.7 cm), weight 205 lb 8 oz (93.2 kg), SpO2 92 %.  Vital signs are normal.  No fever.    Output: Producing urine.  Stool output assessment: States BM yesterday.    HEENT: Normal HEENT exam.    Lungs:  Normal respiratory rate and normal effort.  He is not in respiratory distress.  (Breath sounds diminished in bilateral lung bases, otherwise clear.)  Heart: Normal rate.  Regular rhythm.    Chest: No chest wall tenderness.  Symmetric chest wall expansion.   Abdomen: Abdomen is soft and non-distended.  Bowel sounds are normal.   There is no abdominal tenderness. "     Extremities: Normal range of motion.  There is local extremity swelling (right & left hand with mild/moderate edema).  There is no dependent edema.    Pulses: Distal pulses are intact.    Neurological: Patient is alert and oriented to person, place and time.    Pupils:  (Pupils round and equal.).    Skin:  Warm and dry.  (Left thumb wrapped in Kerlix.  No drainage or foul odor noted.)             Results Review:      LABS     Results from last 7 days  Lab Units 09/17/17  0107 09/16/17  0126 09/15/17  0415 09/14/17  0040 09/13/17  1902   WBC 10*3/mm3 2.57* 2.33* 2.74* 3.60* 4.52   HEMOGLOBIN g/dL 12.2* 11.8* 11.8* 12.0* 14.0   PLATELETS 10*3/mm3 81* 43* 73* 42* 71*       Results from last 7 days  Lab Units 09/17/17  0108 09/16/17  0126 09/15/17  0415 09/14/17  0040 09/13/17  1902   CRP mg/dL 1.35* 1.87* 1.91* 2.09* 2.40*       Results from last 7 days  Lab Units 09/17/17  0107 09/16/17  0126 09/15/17  0415 09/14/17  0040 09/13/17  1902   SODIUM mmol/L 137 139 135 135 135   POTASSIUM mmol/L 4.3 4.4 4.3 4.2 5.3   CHLORIDE mmol/L 107 107 105 104 105   CO2 mmol/L 26.3 26.9 23.3* 24.7 24.3   BUN mg/dL 9 6* 7 9 <5*   CREATININE mg/dL 0.53 0.49 0.68 0.71 0.57   CALCIUM mg/dL 8.5 8.1 7.9 8.1 9.0   GLUCOSE mg/dL 257* 220* 458* 422* 228*       Results from last 7 days  Lab Units 09/17/17  0107 09/16/17  2309 09/16/17  0126 09/15/17  0207 09/14/17  0040   MAGNESIUM mg/dL 1.6* 1.6* 1.7 1.7 1.5*     No results found for: HGBA1C    Results from last 7 days  Lab Units 09/17/17 0107 09/16/17  0126 09/15/17  0415 09/14/17  0040 09/13/17  1902   BILIRUBIN mg/dL 0.6 0.6 0.7 0.9 1.6   ALK PHOS U/L 148* 137* 105 99 110   AST (SGOT) U/L 68* 70* 82* 78* 123*   ALT (SGPT) U/L 55* 63* 67* 69* 85*                   Telemetry:  Sinus rhythm in the 90s.  I personally reviewed telemetry strip.      I have reviewed the patient's laboratory results.    IMAGING  Imaging Results (last 72 hours)     Procedure Component Value Units Date/Time     XR Hand 3+ View Left [[id number]] Collected:  09/14/17 0738     Updated:  09/14/17 0740    Narrative:       EXAMINATION: XR HAND 3+ VW LEFT-      TECHNIQUE: XR HAND 3+ VW LEFT-        INDICATION: left thumb abscess; L02.512-Cutaneous abscess of left hand      COMPARISON: NONE     FINDINGS:          BONES: No acute fracture. No blastic or lytic lesions.          JOINT: No dislocation.          SOFT TISSUES:  SIGNIFICANT SOFT TISSUE SWELLING OF THE 1ST DIGIT.       Impression:       No acute or destructive bony abnormality.     COMMUNICATION: Per this written report.     This report was finalized on 9/14/2017 7:38 AM by Dr. Brendan Tolentino MD.       XR Chest AP [[id number]] Collected:  09/14/17 0738     Updated:  09/14/17 0740    Narrative:       EXAMINATION: XR CHEST AP-      CLINICAL INDICATION:     SEDATION IN INTUBATED PATIENTS     TECHNIQUE:  XR CHEST AP-      COMPARISON: NONE      FINDINGS:   The lungs remain aerated.  Heart and mediastinum contours are unremarkable.  No pleural effusion.  No pneumothorax.   Bony and soft tissue structures are unremarkable.       Impression:       No radiographic evidence of acute cardiac or pulmonary  disease.     This report was finalized on 9/14/2017 7:38 AM by Dr. Brendan Tolentino MD.             I have personally reviewed the above radiologic imaging.    Medications    atorvastatin 20 mg Oral Nightly   dicyclomine 10 mg Oral BID   doxepin 50 mg Oral Nightly   gabapentin 400 mg Oral Q8H   HYDROmorphone 1 mg Intravenous Once   insulin aspart 0-14 Units Subcutaneous 4x Daily AC & at Bedtime   insulin detemir 5 Units Subcutaneous Nightly   magnesium oxide 200 mg Oral BID   magnesium sulfate 4 g Intravenous Once   metoclopramide 10 mg Oral BID AC   metoprolol tartrate 50 mg Oral Daily   pancrelipase (Lip-Prot-Amyl) 24,000 units of lipase Oral 4x Daily With Meals & Nightly   pantoprazole 40 mg Oral Daily   piperacillin-tazobactam 3.375 g Intravenous Q8H   polyethylene glycol 17 g Oral  BID   vancomycin 1,500 mg Intravenous Q8H       Pharmacy Consult      Assessment and Plan:  -Left thumb abscess and cellulitis causing sepsis that was present on admission  -Hepatitis C causing transaminitis and thrombocytopenia  -Type II diabetes mellitus, insulin dependent with hyperglycemia   -History of AFib, currently in normal sinus rhythm  -COPD without acute exacerbation   -Essential hypertension, controlled   -Acute hypomagnesemia  -Hyperlipidemia   -History of coronary artery disease, S/P heart catheterization in 2012  -History of systolic congestive heart failure with EF of 51-55% on 4/4/2017  -IV drug abuse with history of amphetamine, suboxone, cocaine, and opioid abuse in the past  -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  CRP continues to decrease. Finalized blood cultures are still pending at this point.  We will continue vancomycin and Zosyn at this time.  We will continue to monitor patient and lab work closely.  We will plan for antibiotic de-escalation and possible discharge tomorrow pending finalized blood culture results.     The patient is high risk due to the following diagnoses/reasons:  sepsis           I have discussed the patient's assessment and plan with patient.    NOHEMI Baeza  09/17/17  8:39 AM         Electronically signed by NOHEMI Blakely at 9/17/2017  8:55 AM        Consult Notes (last 72 hours) (Notes from 9/15/2017  6:25 AM through 9/18/2017  6:25 AM)     No notes of this type exist for this encounter.

## 2017-09-18 NOTE — CONSULTS
INFECTIOUS DISEASE CONSULTATION REPORT      Referring Provider: Dr. Quarles  Reason for Consultation: De-escalation of IV antibiotics to by mouth      Principal problem: Abscess of left thumb    Subjective .     History of present illness:    As you well know , Mr. Isaias Lang is a 52 y.o. years old male with past medical history significant for IV drug use, anxiety, asthma, chronic pancreatitis, COPD, depression, diabetes, history of DVT, hypertension, hepatitis C, back injury, CAD, MRSA, obesity, sleep apnea, opiate addiction, and CHF , who presented to [facility] Emergency Department on 9/13/2017 for ordering a history of left swelling and redness.  She states that his nephew injected Suboxone.  Patient had I&D on 9/14/17 Dr. Harrington.  Wound is packed and dressed.  She states that he has significant pain.  Advised him to keep extremity elevated on a pillow.  Culture finalized 9/17/17 as MRSA.  CRP normal , WBC 2.97.  Patient has had no grade fever at 99.5.  He denies nausea vomiting or diarrhea.    Infectious Disease consultation was requested for antimicrobial management.     History taken from: patient chart RN    Case was discussed with patient, nursing staff and primary care team    Review of Systems     Constitutional: Low grade temp 99.5 No appetite change or unexpected weight change. No fatigue.  Eyes: no eye drainage, itching or redness.  HEENT: no mouth sores, dysphagia or nose bleed.  Respiratory: no for shortness of breath, cough or production of sputum.  Cardiovascular: no chest pain, no palpitations, no orthopnea.  Gastrointestinal: no nausea, vomiting or diarrhea. No abdominal pain, hematemesis or rectal bleeding.  Genitourinary: no dysuria or polyuria.  Hematologic/lymphatic: no lymph node abnormalities, no easy bruising or easy bleeding.  Musculoskeletal: no muscle or joint pain.  Skin: see history of present illness  Neurological: no loss of consciousness, no seizure, no  headache.  Behavioral/Psych: See history of present illness   Endocrine: no hot flashes.  Immunologic: negative.    Past Medical History    Past Medical History:   Diagnosis Date   • Abscess of antecubital fossa 05/2014    Left that required I and D and grew Haemophilus influenza group 1   • Anxiety    • Asthma    • Chronic pancreatitis    • COPD (chronic obstructive pulmonary disease)    • DDD (degenerative disc disease), lumbosacral    • Depression    • Diabetes mellitus    • DVT (deep venous thrombosis)     Right arm   • Essential hypertension    • GERD (gastroesophageal reflux disease)    • Hepatitis-C    • Hypercholesteremia    • Injury of back    • Injury of right Achilles tendon     Complicated by MRSA and Pseudomonas   • IV drug abuse    • Mild CAD     Left heart cath at  2012   • MRSA (methicillin resistant staph aureus) culture positive 09/14/2016    LUE   • Obesity    • Opiate addiction     Suboxone IV   • Self-injurious behavior    • Sleep apnea    • Suicide attempt    • Systolic CHF, chronic     EF 45-50% in 2013, EF 60% 9/2016       Past Surgical History    Past Surgical History:   Procedure Laterality Date   • CHOLECYSTECTOMY  1990s   • INCISION AND DRAINAGE ABSCESS Left 2016    wrist   • INCISION AND DRAINAGE ARM Left 9/15/2016    Procedure: INCISION AND DRAINAGE UPPER EXTREMITY;  Surgeon: Rico Oseguera MD;  Location: [facility] OR;  Service:    • INCISION AND DRAINAGE ARM Left 9/14/2017    Procedure: INCISION AND DRAINAGE LEFT THUMB;  Surgeon: Adin Harrington MD;  Location: [facility] OR;  Service:    • ORIF ANKLE FRACTURE Right 2014       Family History    Family History   Problem Relation Age of Onset   • Gout Other    • Osteoporosis Other    • Arthritis Other      Rheumatoid and osteoarthritis   • Hypertension Other    • Heart disease Other    • Cancer Other    • Coronary artery disease Mother    • Lung disease Mother    • Gout Sister    • Cancer Maternal Grandmother    • Hypertension Maternal  "Grandfather    • Gout Maternal Grandfather        Social History    Social History   Substance Use Topics   • Smoking status: Former Smoker     Years: 1.00     Types: Cigarettes   • Smokeless tobacco: Never Used      Comment: quit smoking in my 20's   • Alcohol use No      Comment: denies       Allergies    Robitussin dm [dextromethorphan-guaifenesin]; Tizanidine; Metformin; Sulfa antibiotics; Contrast dye; and Tramadol    Objective     /74  Pulse 86  Temp 99.5 °F (37.5 °C) (Axillary)   Resp 18  Ht 68\" (172.7 cm)  Wt 208 lb 14.4 oz (94.8 kg)  SpO2 95%  BMI 31.76 kg/m2    Temp:  [97.8 °F (36.6 °C)-99.5 °F (37.5 °C)] 99.5 °F (37.5 °C)        Intake/Output Summary (Last 24 hours) at 09/18/17 1619  Last data filed at 09/18/17 0900   Gross per 24 hour   Intake             1680 ml   Output              400 ml   Net             1280 ml         Physical Exam:      General Appearance:    Drowsy, cooperative, in no acute distress   Head:    Normocephalic, without obvious abnormality, atraumatic   Eyes:            Lids and lashes normal, conjunctivae and sclerae normal, no   icterus, no pallor, corneas clear, PERRLA   Ears:    Ears appear intact with no abnormalities noted   Throat:   No oral lesions, no thrush, oral mucosa moist   Neck:   No adenopathy, supple, trachea midline, no thyromegaly, no   carotid bruit, no JVD   Back:     No tenderness to percussion or palpation, range of motion   normal   Lungs:     Clear to auscultation,respirations regular, even and unlabored. No wheezing, no ronchi and no crackles.    Heart:    Regular rhythm and normal rate, normal S1 and S2, no            murmur, no gallop, no rub, no click   Chest Wall:    No abnormalities observed   Abdomen:     Normal bowel sounds, no masses, no organomegaly, soft        non-tender, non-distended, no guarding, no rebound                tenderness   Rectal:     Deferred   Extremities: Left thumb with dressing intact post I&D with packing  "   Pulses:   Pulses palpable and equal bilaterally   Skin:   No bleeding, bruising or rash   Lymph nodes:   No palpable adenopathy   Neurologic:   Awake, alert and oriented x 3. Following commands.       Results:      Results from last 7 days  Lab Units 09/18/17  0323 09/17/17  0107 09/16/17  0126 09/15/17  0415 09/14/17  0040 09/13/17  1902   WBC 10*3/mm3 2.97* 2.57* 2.33* 2.74* 3.60* 4.52     Lab Results   Component Value Date    NEUTROABS 1.90 09/18/2017         Results from last 7 days  Lab Units 09/18/17  0323   CREATININE mg/dL 0.60         Results from last 7 days  Lab Units 09/18/17  0323 09/17/17  0108 09/16/17  0126   CRP mg/dL 0.95 1.35* 1.87*       Imaging Results (last 24 hours)     ** No results found for the last 24 hours. **            Cultures:    Blood Culture   Date Value Ref Range Status   09/13/2017 No growth at 4 days  Preliminary   09/13/2017 No growth at 4 days  Preliminary     Urine Culture   Date Value Ref Range Status   09/13/2017 <10,000 CFU/mL Normal Urogenital Mercedes  Final     Wound Culture   Date Value Ref Range Status   09/13/2017 Scant growth (1+) Staphylococcus aureus, MRSA (C)  Final     Comment:     This isolate does not demonstrate inducible clindamycin resistance in vitro.  Methicillin resistant Staphylococcus aureus, Patient may be an isolation risk.       Results Review:    I have personally reviewed laboratory data, culture results, radiology studies and antimicrobial therapy.    Hospital Medications (active)       Dose Frequency Start End    atorvastatin (LIPITOR) tablet 20 mg 20 mg Nightly 9/14/2017     Sig - Route: Take 1 tablet by mouth Every Night. - Oral    dextrose (D50W) solution 25 g 25 g Every 15 Minutes PRN 9/13/2017     Sig - Route: Infuse 50 mL into a venous catheter Every 15 (Fifteen) Minutes As Needed for Low Blood Sugar (Blood Sugar Less Than 70, Patient Has IV Access - Unresponsive, NPO or Unable To Safely Swallow). - Intravenous    dextrose (D50W) solution 25  g 25 g Every 15 Minutes PRN 9/14/2017     Sig - Route: Infuse 50 mL into a venous catheter Every 15 (Fifteen) Minutes As Needed for Low Blood Sugar (Blood Sugar Less Than 70, Patient Has IV Access - Unresponsive, NPO or Unable To Safely Swallow). - Intravenous    dextrose (GLUTOSE) oral gel 15 g 15 g Every 15 Minutes PRN 9/13/2017     Sig - Route: Take 15 g by mouth Every 15 (Fifteen) Minutes As Needed for Low Blood Sugar (Blood Sugar Less Than 70, Patient Alert, Is Not NPO & Can Safely Swallow). - Oral    dextrose (GLUTOSE) oral gel 15 g 15 g Every 15 Minutes PRN 9/14/2017     Sig - Route: Take 15 g by mouth Every 15 (Fifteen) Minutes As Needed for Low Blood Sugar (Blood Sugar Less Than 70, Patient Alert, Is Not NPO & Can Safely Swallow). - Oral    dicyclomine (BENTYL) capsule 10 mg 10 mg 2 Times Daily 9/14/2017     Sig - Route: Take 1 capsule by mouth 2 (Two) Times a Day. - Oral    doxepin (SINEquan) capsule 50 mg 50 mg Nightly 9/14/2017     Sig - Route: Take 2 capsules by mouth Every Night. - Oral    gabapentin (NEURONTIN) capsule 400 mg 400 mg Every 8 Hours Scheduled 9/14/2017     Sig - Route: Take 1 capsule by mouth Every 8 (Eight) Hours. - Oral    glucagon (GLUCAGEN) injection 1 mg 1 mg Every 15 Minutes PRN 9/13/2017     Sig - Route: Inject 1 mg under the skin Every 15 (Fifteen) Minutes As Needed (Blood Glucose Less Than 70 - Patient Without IV Access - Unresponsive, NPO or Unable To Safely Swallow). - Subcutaneous    glucagon (human recombinant) (GLUCAGEN DIAGNOSTIC) injection 1 mg 1 mg Every 15 Minutes PRN 9/14/2017     Sig - Route: Inject 1 mg under the skin Every 15 (Fifteen) Minutes As Needed (Blood Glucose Less Than 70 - Patient Without IV Access - Unresponsive, NPO or Unable To Safely Swallow). - Subcutaneous    HYDROcodone-acetaminophen (NORCO) 5-325 MG per tablet 1 tablet 1 tablet Every 8 Hours PRN 9/18/2017     Sig - Route: Take 1 tablet by mouth Every 8 (Eight) Hours As Needed for Moderate Pain . -  "Oral    HYDROmorphone (DILAUDID) injection 1 mg 1 mg Once 9/14/2017     Sig - Route: Infuse 1 mL into a venous catheter 1 (One) Time. - Intravenous    insulin aspart (novoLOG) injection 0-14 Units 0-14 Units 4 Times Daily Before Meals & Nightly 9/14/2017     Sig - Route: Inject 0-14 Units under the skin 4 (Four) Times a Day Before Meals & at Bedtime. - Subcutaneous    insulin detemir (LEVEMIR) injection 5 Units 5 Units Nightly 9/14/2017     Sig - Route: Inject 5 Units under the skin Every Night. - Subcutaneous    ipratropium-albuterol (DUO-NEB) nebulizer solution 3 mL 3 mL Every 6 Hours PRN 9/13/2017     Sig - Route: Take 3 mL by nebulization Every 6 (Six) Hours As Needed for Shortness of Air. - Nebulization    magnesium hydroxide (MILK OF MAGNESIA) suspension 2400 mg/10mL 10 mL 10 mL Daily PRN 9/16/2017     Sig - Route: Take 10 mL by mouth Daily As Needed for Constipation. - Oral    magnesium oxide (MAGOX) tablet 200 mg 200 mg 2 Times Daily 9/14/2017     Sig - Route: Take 0.5 tablets by mouth 2 (Two) Times a Day. - Oral    Magnesium Sulfate 2 gram Bolus, followed by 8 gram infusion (total Mg dose 10 grams)- Mg less than or equal to 1mg/dL 2 g As Needed 9/14/2017     Sig - Route: Infuse 50 mL into a venous catheter As Needed (Mg less than or equal to 1mg/dL). - Intravenous    Linked Group 1:  \"Or\" Linked Group Details        magnesium sulfate 4 gram infusion- Mg 1.6-1.9 mg/dL 4 g As Needed 9/14/2017     Sig - Route: Infuse 100 mL into a venous catheter As Needed (Mg 1.6-1.9 mg/dL). - Intravenous    Linked Group 1:  \"Or\" Linked Group Details        Magnesium Sulfate 6 gram Infusion (2 gm x 3) -Mg 1.1 -1.5 mg/dL 2 g As Needed 9/14/2017     Sig - Route: Infuse 50 mL into a venous catheter As Needed (Mg 1.1 -1.5 mg/dL). - Intravenous    Linked Group 1:  \"Or\" Linked Group Details        metoprolol tartrate (LOPRESSOR) tablet 50 mg 50 mg Daily 9/14/2017     Sig - Route: Take 1 tablet by mouth Daily. - Oral    " "nitroglycerin (NITROSTAT) SL tablet 0.4 mg 0.4 mg Every 5 Minutes PRN 9/13/2017     Sig - Route: Place 1 tablet under the tongue Every 5 (Five) Minutes As Needed for Chest Pain. - Sublingual    pancrelipase (Lip-Prot-Amyl) (CREON) capsule 24,000 units of lipase 24,000 units of lipase 4 Times Daily With Meals & Nightly 9/14/2017     Sig - Route: Take 2 capsules by mouth 4 (Four) Times a Day With Meals & at Bedtime. - Oral    pantoprazole (PROTONIX) EC tablet 40 mg 40 mg Daily 9/14/2017     Sig - Route: Take 1 tablet by mouth Daily. - Oral    Pharmacy Consult  Continuous PRN 9/13/2017     Sig - Route: Continuous As Needed for Consult. - Does not apply    polyethylene glycol (MIRALAX) powder 17 g 17 g 2 Times Daily 9/14/2017     Sig - Route: Take 17 g by mouth 2 (Two) Times a Day. - Oral    sodium chloride 0.9 % flush 1-10 mL 1-10 mL As Needed 9/13/2017     Sig - Route: Infuse 1-10 mL into a venous catheter As Needed for Line Care. - Intravenous    sodium chloride 0.9 % flush 1-10 mL 1-10 mL As Needed 9/13/2017     Sig - Route: Infuse 1-10 mL into a venous catheter As Needed for Line Care. - Intravenous    sodium chloride 0.9 % flush 10 mL 10 mL As Needed 9/13/2017     Sig - Route: Infuse 10 mL into a venous catheter As Needed for Line Care. - Intravenous    Cosign for Ordering: Accepted by Elgin Daniels MD on 9/13/2017  6:33 PM    Linked Group 2:  \"And\" Linked Group Details        vancomycin (VANCOCIN) 1,500 mg in sodium chloride 0.9 % 500 mL IVPB 1,500 mg Every 8 Hours 9/14/2017     Sig - Route: Infuse 1,500 mg into a venous catheter Every 8 (Eight) Hours. - Intravenous    Notes to Pharmacy: RN started to hang bag at 1151, but patient lost IV access.  RN called at approx 1500 and said patient also had Zosyn and Magnesium IVs due.  Patient did not receive 1151 dose of Vanco, so rescheduled dose to start at 1600.    HYDROcodone-acetaminophen (NORCO) 5-325 MG per tablet 1 tablet (Discontinued) 1 tablet Daily PRN " "9/13/2017 9/17/2017    Sig - Route: Take 1 tablet by mouth Daily As Needed for Moderate Pain . - Oral    metoclopramide (REGLAN) tablet 10 mg (Discontinued) 10 mg 2 Times Daily Before Meals 9/14/2017 9/18/2017    Sig - Route: Take 1 tablet by mouth 2 (Two) Times a Day Before Meals. - Oral    piperacillin-tazobactam (ZOSYN) 3.375 g/100 mL 0.9% NS IVPB (mbp) (Discontinued) 3.375 g Every 8 Hours 9/14/2017 9/17/2017    Sig - Route: Infuse 100 mL into a venous catheter Every 8 (Eight) Hours. - Intravenous    Linked Group 3:  \"And\" Linked Group Details                Assessment/Plan     ASSESSMENT:    # 1 Left thumb abscess R/T IV drug use    PLAN:    Patient admits to IV Suboxone abuse.  States that a nephew injects it for him.  Recurrent problem unfortunately, with cellulitis, abscess/phlebitis.  Recommend to continue vancomycin and consider doxycycline 100 mg by mouth twice a day ×14 days to complete through October 1, 2017 if no underlying osteomyelitis suspected.       Patient's findings and recommendations were discussed with patient, nursing staff and primary care team    Code Status: Full Code    Mehnaz Fernandez, NOHEMI  09/18/17  4:19 PM  "

## 2017-09-18 NOTE — PROGRESS NOTES
I have seen the patient in conjunction with Mayi CULLEN      History of presenting illness:  Patient states his left thumb still hurts.  Incision drainage.  He thinks it might be improving some however the pain gets worse with dressing change.  No chills or fever overnight.  No chest pain or shortness breath and he is tolerating his diet.    Vital Signs  Temp:  [97.8 °F (36.6 °C)-99.5 °F (37.5 °C)] 99.5 °F (37.5 °C)  Heart Rate:  [62-92] 86  Resp:  [18] 18  BP: (108-130)/(50-78) 116/74  Body mass index is 31.76 kg/(m^2).      Intake/Output Summary (Last 24 hours) at 09/18/17 1545  Last data filed at 09/18/17 0900   Gross per 24 hour   Intake             1680 ml   Output              400 ml   Net             1280 ml     Intake & Output (last 3 days)       09/15 0701 - 09/16 0700 09/16 0701 - 09/17 0700 09/17 0701 - 09/18 0700 09/18 0701 - 09/19 0700    P.O. 1200 2810 1420 360    I.V. (mL/kg)        IV Piggyback   500     Total Intake(mL/kg) 1200 (13.3) 2810 (30.1) 1920 (20.3) 360 (3.8)    Urine (mL/kg/hr) 1775 (0.8) 2850 (1.3)  400 (0.5)    Blood        Total Output 1775 2850   400    Net -575 -40 +1920 -40            Unmeasured Urine Occurrence 3 x  7 x     Unmeasured Stool Occurrence   0 x           Physical exam:  Physical Exam   Constitutional: Well-developed, well-nourished.  Pleasant.  No distress  Head: Normocephalic and atraumatic.  Hearing is intact, mucous membranes are moist.   Eyes:  Pupils are equal, round, and reactive to light. No scleral icterus.     Cardiovascular: Normal rate, regular rhythm and normal heart sounds.  No murmur rub or gallop  Pulmonary/Chest: Bilateral breath sounds no rhonchus rales or wheezing heard  Abdominal: Soft, flat, bowel sounds are active, nondistended nontender.  No peritoneal signs   Musculoskeletal: Strength is symmetric, Trace edema, dorsal surface of the left thumb there is about a centimeter incision appears clean based surrounding cellulitis appears to be  dissipating he has limited range of motion however obvious thumb  Neurological: Nonfocal, alert.    Skin: Skin is warm and dry.   Psychiatric:  Normal mood and affect.     Telemetry: sinus    Echo with low normal ejection fraction of 50%    Results Review:  Lab Results   Component Value Date    WBC 2.97 (L) 09/18/2017    HGB 12.3 (L) 09/18/2017    HCT 37.3 (L) 09/18/2017    MCV 93.3 09/18/2017    PLT 86 (L) 09/18/2017     Lab Results   Component Value Date    GLUCOSE 193 (H) 09/18/2017    BUN 7 09/18/2017    CREATININE 0.60 09/18/2017    EGFRIFNONA 141 09/18/2017    EGFRIFAFRI  09/26/2016      Comment:      <15 Indicative of kidney failure.    BCR 11.7 09/18/2017    CO2 25.2 09/18/2017    CALCIUM 8.6 09/18/2017    ALBUMIN 2.40 (L) 09/18/2017    LABIL2 0.9 (L) 09/18/2017    AST 78 (H) 09/18/2017    ALT 54 (H) 09/18/2017       Imaging Results (last 72 hours)     ** No results found for the last 72 hours. **        Cxr, hand xray NEG      Assessment/Plan     Principal Problem:    Abscess of left thumbStatus post incision drainage, culture with MRSA.  On vancomycin, inflammatory markers are low.  Continue dressing changes, possibly could de-escalate soon to by mouth antibiotics and discharge.  Infectious disease has been consult for this timing.      Active Problems:  Diabetes, insulin requiring, currently controlled, follow    History of viral hepatitis, albumin is low, will check a pro time in the a.m.  Encouraged to stay off of IV drugs.    DVT prophylaxis, SCDs, he does have thrombocytopenia possibly related to an element of cirrhosis, holding on anticoagulation at this time.      Memo Quarles MD  09/18/17  3:45 PM

## 2017-09-18 NOTE — PLAN OF CARE
Problem: Infection, Risk/Actual (Adult)  Goal: Identify Related Risk Factors and Signs and Symptoms  Outcome: Outcome(s) achieved Date Met:  09/18/17    Problem: Pain, Acute (Adult)  Goal: Identify Related Risk Factors and Signs and Symptoms  Outcome: Outcome(s) achieved Date Met:  09/18/17  Goal: Acceptable Pain Control/Comfort Level  Outcome: Ongoing (interventions implemented as appropriate)    Problem: Patient Care Overview (Adult)  Goal: Plan of Care Review  Outcome: Ongoing (interventions implemented as appropriate)    Problem: Fall Risk (Adult)  Goal: Identify Related Risk Factors and Signs and Symptoms  Outcome: Ongoing (interventions implemented as appropriate)    Problem: Diabetes, Type 2 (Adult)  Goal: Signs and Symptoms of Listed Potential Problems Will be Absent or Manageable (Diabetes, Type 2)  Outcome: Ongoing (interventions implemented as appropriate)    Problem: Pressure Ulcer Risk (Kael Scale) (Adult,Obstetrics,Pediatric)  Goal: Identify Related Risk Factors and Signs and Symptoms  Outcome: Outcome(s) achieved Date Met:  09/18/17  Goal: Skin Integrity  Outcome: Ongoing (interventions implemented as appropriate)

## 2017-09-18 NOTE — PROGRESS NOTES
Discharge Planning Assessment   Art     Patient Name: Isaias Lang  MRN: [id number]  Today's Date: 9/18/2017    Admit Date: 9/13/2017          Discharge Needs Assessment     None            Discharge Plan       09/18/17 1132    Case Management/Social Work Plan    Plan SS noted [facility] referral and faxed pt information to [facility] for review for possible admit.  SS will follow.        Discharge Placement     No information found        Expected Discharge Date and Time     Expected Discharge Date Expected Discharge Time    Sep 17, 2017               Demographic Summary     None            Functional Status     None            Psychosocial     None            Abuse/Neglect     None            Legal     None            Substance Abuse     None            Patient Forms     None          Sho Ferro

## 2017-09-19 ENCOUNTER — APPOINTMENT (OUTPATIENT)
Dept: GENERAL RADIOLOGY | Facility: HOSPITAL | Age: 52
End: 2017-09-19

## 2017-09-19 ENCOUNTER — APPOINTMENT (OUTPATIENT)
Dept: ULTRASOUND IMAGING | Facility: HOSPITAL | Age: 52
End: 2017-09-19

## 2017-09-19 LAB
ALBUMIN SERPL-MCNC: 2.3 G/DL (ref 3.5–5)
ALBUMIN/GLOB SERPL: 1 G/DL (ref 1.5–2.5)
ALP SERPL-CCNC: 131 U/L (ref 40–129)
ALT SERPL W P-5'-P-CCNC: 52 U/L (ref 10–44)
ANION GAP SERPL CALCULATED.3IONS-SCNC: 0.8 MMOL/L (ref 3.6–11.2)
AST SERPL-CCNC: 79 U/L (ref 10–34)
BASOPHILS # BLD AUTO: 0.01 10*3/MM3 (ref 0–0.3)
BASOPHILS NFR BLD AUTO: 0.3 % (ref 0–2)
BILIRUB SERPL-MCNC: 0.6 MG/DL (ref 0.2–1.8)
BNP SERPL-MCNC: 148 PG/ML (ref 0–100)
BUN BLD-MCNC: 7 MG/DL (ref 7–21)
BUN/CREAT SERPL: 11.5 (ref 7–25)
CALCIUM SPEC-SCNC: 8.2 MG/DL (ref 7.7–10)
CHLORIDE SERPL-SCNC: 107 MMOL/L (ref 99–112)
CO2 SERPL-SCNC: 28.2 MMOL/L (ref 24.3–31.9)
CREAT BLD-MCNC: 0.61 MG/DL (ref 0.43–1.29)
CRP SERPL-MCNC: 1.27 MG/DL (ref 0–0.99)
DEPRECATED RDW RBC AUTO: 48.5 FL (ref 37–54)
EOSINOPHIL # BLD AUTO: 0.09 10*3/MM3 (ref 0–0.7)
EOSINOPHIL NFR BLD AUTO: 3 % (ref 0–5)
ERYTHROCYTE [DISTWIDTH] IN BLOOD BY AUTOMATED COUNT: 14.7 % (ref 11.5–14.5)
GFR SERPL CREATININE-BSD FRML MDRD: 139 ML/MIN/1.73
GLOBULIN UR ELPH-MCNC: 2.4 GM/DL
GLUCOSE BLD-MCNC: 256 MG/DL (ref 70–110)
GLUCOSE BLDC GLUCOMTR-MCNC: 209 MG/DL (ref 70–130)
GLUCOSE BLDC GLUCOMTR-MCNC: 228 MG/DL (ref 70–130)
GLUCOSE BLDC GLUCOMTR-MCNC: 243 MG/DL (ref 70–130)
GLUCOSE BLDC GLUCOMTR-MCNC: 295 MG/DL (ref 70–130)
HCT VFR BLD AUTO: 38.7 % (ref 42–52)
HGB BLD-MCNC: 12.9 G/DL (ref 14–18)
IMM GRANULOCYTES # BLD: 0 10*3/MM3 (ref 0–0.03)
IMM GRANULOCYTES NFR BLD: 0 % (ref 0–0.5)
INR PPP: 1.41 (ref 0.9–1.1)
LYMPHOCYTES # BLD AUTO: 0.51 10*3/MM3 (ref 1–3)
LYMPHOCYTES NFR BLD AUTO: 16.9 % (ref 21–51)
MAGNESIUM SERPL-MCNC: 1.5 MG/DL (ref 1.7–2.6)
MCH RBC QN AUTO: 31.2 PG (ref 27–33)
MCHC RBC AUTO-ENTMCNC: 33.3 G/DL (ref 33–37)
MCV RBC AUTO: 93.7 FL (ref 80–94)
MONOCYTES # BLD AUTO: 0.3 10*3/MM3 (ref 0.1–0.9)
MONOCYTES NFR BLD AUTO: 9.9 % (ref 0–10)
NEUTROPHILS # BLD AUTO: 2.11 10*3/MM3 (ref 1.4–6.5)
NEUTROPHILS NFR BLD AUTO: 69.9 % (ref 30–70)
OSMOLALITY SERPL CALC.SUM OF ELEC: 278.7 MOSM/KG (ref 273–305)
PHOSPHATE SERPL-MCNC: 2.9 MG/DL (ref 2.7–4.5)
PLATELET # BLD AUTO: 78 10*3/MM3 (ref 130–400)
PMV BLD AUTO: 10.7 FL (ref 6–10)
POTASSIUM BLD-SCNC: 3.9 MMOL/L (ref 3.5–5.3)
PROT SERPL-MCNC: 4.7 G/DL (ref 6–8)
PROTHROMBIN TIME: 17.5 SECONDS (ref 11–15.4)
RBC # BLD AUTO: 4.13 10*6/MM3 (ref 4.7–6.1)
SODIUM BLD-SCNC: 136 MMOL/L (ref 135–153)
WBC NRBC COR # BLD: 3.02 10*3/MM3 (ref 4.5–12.5)

## 2017-09-19 PROCEDURE — 85025 COMPLETE CBC W/AUTO DIFF WBC: CPT | Performed by: INTERNAL MEDICINE

## 2017-09-19 PROCEDURE — 25010000002 MAGNESIUM SULFATE 2 GM/50ML SOLUTION: Performed by: INTERNAL MEDICINE

## 2017-09-19 PROCEDURE — 99232 SBSQ HOSP IP/OBS MODERATE 35: CPT | Performed by: INTERNAL MEDICINE

## 2017-09-19 PROCEDURE — 76705 ECHO EXAM OF ABDOMEN: CPT

## 2017-09-19 PROCEDURE — 83880 ASSAY OF NATRIURETIC PEPTIDE: CPT | Performed by: PHYSICIAN ASSISTANT

## 2017-09-19 PROCEDURE — 71010 HC CHEST PA OR AP: CPT

## 2017-09-19 PROCEDURE — 94799 UNLISTED PULMONARY SVC/PX: CPT

## 2017-09-19 PROCEDURE — 80053 COMPREHEN METABOLIC PANEL: CPT | Performed by: INTERNAL MEDICINE

## 2017-09-19 PROCEDURE — C1751 CATH, INF, PER/CENT/MIDLINE: HCPCS

## 2017-09-19 PROCEDURE — 85610 PROTHROMBIN TIME: CPT | Performed by: INTERNAL MEDICINE

## 2017-09-19 PROCEDURE — 25010000002 VITAMIN K1 PER 1 MG: Performed by: INTERNAL MEDICINE

## 2017-09-19 PROCEDURE — 84100 ASSAY OF PHOSPHORUS: CPT | Performed by: INTERNAL MEDICINE

## 2017-09-19 PROCEDURE — 25010000002 VANCOMYCIN PER 500 MG: Performed by: INTERNAL MEDICINE

## 2017-09-19 PROCEDURE — 63710000001 INSULIN ASPART PER 5 UNITS: Performed by: FAMILY MEDICINE

## 2017-09-19 PROCEDURE — 76705 ECHO EXAM OF ABDOMEN: CPT | Performed by: RADIOLOGY

## 2017-09-19 PROCEDURE — 71010 XR CHEST 1 VW: CPT | Performed by: RADIOLOGY

## 2017-09-19 PROCEDURE — 83735 ASSAY OF MAGNESIUM: CPT | Performed by: INTERNAL MEDICINE

## 2017-09-19 PROCEDURE — 82962 GLUCOSE BLOOD TEST: CPT

## 2017-09-19 PROCEDURE — 63710000001 INSULIN DETEMIR PER 5 UNITS: Performed by: INTERNAL MEDICINE

## 2017-09-19 PROCEDURE — 86140 C-REACTIVE PROTEIN: CPT | Performed by: INTERNAL MEDICINE

## 2017-09-19 RX ORDER — PHYTONADIONE 10 MG/ML
5 INJECTION, EMULSION INTRAMUSCULAR; INTRAVENOUS; SUBCUTANEOUS ONCE
Status: COMPLETED | OUTPATIENT
Start: 2017-09-19 | End: 2017-09-19

## 2017-09-19 RX ORDER — FUROSEMIDE 20 MG/1
20 TABLET ORAL DAILY
Status: DISCONTINUED | OUTPATIENT
Start: 2017-09-19 | End: 2017-09-20 | Stop reason: HOSPADM

## 2017-09-19 RX ORDER — MAGNESIUM SULFATE HEPTAHYDRATE 40 MG/ML
2 INJECTION, SOLUTION INTRAVENOUS
Status: COMPLETED | OUTPATIENT
Start: 2017-09-19 | End: 2017-09-19

## 2017-09-19 RX ADMIN — MAGNESIUM GLUCONATE 500 MG ORAL TABLET 200 MG: 500 TABLET ORAL at 07:40

## 2017-09-19 RX ADMIN — HYDROCODONE BITARTRATE AND ACETAMINOPHEN 1 TABLET: 5; 325 TABLET ORAL at 05:28

## 2017-09-19 RX ADMIN — INSULIN ASPART 5 UNITS: 100 INJECTION, SOLUTION INTRAVENOUS; SUBCUTANEOUS at 21:03

## 2017-09-19 RX ADMIN — DICYCLOMINE HYDROCHLORIDE 10 MG: 10 CAPSULE ORAL at 16:32

## 2017-09-19 RX ADMIN — MAGNESIUM GLUCONATE 500 MG ORAL TABLET 200 MG: 500 TABLET ORAL at 16:32

## 2017-09-19 RX ADMIN — PANCRELIPASE 24000 UNITS OF LIPASE: 60000; 12000; 38000 CAPSULE, DELAYED RELEASE PELLETS ORAL at 16:32

## 2017-09-19 RX ADMIN — FUROSEMIDE 20 MG: 20 TABLET ORAL at 16:32

## 2017-09-19 RX ADMIN — MAGNESIUM SULFATE IN WATER 2 G: 40 INJECTION, SOLUTION INTRAVENOUS at 12:28

## 2017-09-19 RX ADMIN — PANTOPRAZOLE SODIUM 40 MG: 40 TABLET, DELAYED RELEASE ORAL at 07:40

## 2017-09-19 RX ADMIN — INSULIN ASPART 5 UNITS: 100 INJECTION, SOLUTION INTRAVENOUS; SUBCUTANEOUS at 07:41

## 2017-09-19 RX ADMIN — VANCOMYCIN HYDROCHLORIDE 1500 MG: 5 INJECTION, POWDER, LYOPHILIZED, FOR SOLUTION INTRAVENOUS at 03:14

## 2017-09-19 RX ADMIN — PHYTONADIONE 5 MG: 10 INJECTION, EMULSION INTRAMUSCULAR; INTRAVENOUS; SUBCUTANEOUS at 16:33

## 2017-09-19 RX ADMIN — PANCRELIPASE 24000 UNITS OF LIPASE: 60000; 12000; 38000 CAPSULE, DELAYED RELEASE PELLETS ORAL at 11:34

## 2017-09-19 RX ADMIN — MAGNESIUM SULFATE IN WATER 2 G: 40 INJECTION, SOLUTION INTRAVENOUS at 16:31

## 2017-09-19 RX ADMIN — GABAPENTIN 400 MG: 400 CAPSULE ORAL at 16:33

## 2017-09-19 RX ADMIN — MAGNESIUM SULFATE IN WATER 2 G: 40 INJECTION, SOLUTION INTRAVENOUS at 11:34

## 2017-09-19 RX ADMIN — ATORVASTATIN CALCIUM 20 MG: 20 TABLET, FILM COATED ORAL at 21:03

## 2017-09-19 RX ADMIN — METOPROLOL TARTRATE 50 MG: 50 TABLET, FILM COATED ORAL at 07:40

## 2017-09-19 RX ADMIN — POLYETHYLENE GLYCOL 3350 17 G: 17 POWDER, FOR SOLUTION ORAL at 07:40

## 2017-09-19 RX ADMIN — GABAPENTIN 400 MG: 400 CAPSULE ORAL at 21:03

## 2017-09-19 RX ADMIN — PANCRELIPASE 24000 UNITS OF LIPASE: 60000; 12000; 38000 CAPSULE, DELAYED RELEASE PELLETS ORAL at 07:40

## 2017-09-19 RX ADMIN — INSULIN ASPART 5 UNITS: 100 INJECTION, SOLUTION INTRAVENOUS; SUBCUTANEOUS at 11:34

## 2017-09-19 RX ADMIN — HYDROCODONE BITARTRATE AND ACETAMINOPHEN 1 TABLET: 5; 325 TABLET ORAL at 16:34

## 2017-09-19 RX ADMIN — GABAPENTIN 400 MG: 400 CAPSULE ORAL at 05:28

## 2017-09-19 RX ADMIN — DICYCLOMINE HYDROCHLORIDE 10 MG: 10 CAPSULE ORAL at 07:40

## 2017-09-19 RX ADMIN — INSULIN ASPART 8 UNITS: 100 INJECTION, SOLUTION INTRAVENOUS; SUBCUTANEOUS at 17:51

## 2017-09-19 RX ADMIN — POLYETHYLENE GLYCOL 3350 17 G: 17 POWDER, FOR SOLUTION ORAL at 16:32

## 2017-09-19 RX ADMIN — VANCOMYCIN HYDROCHLORIDE 1500 MG: 5 INJECTION, POWDER, LYOPHILIZED, FOR SOLUTION INTRAVENOUS at 07:39

## 2017-09-19 RX ADMIN — INSULIN DETEMIR 10 UNITS: 100 INJECTION, SOLUTION SUBCUTANEOUS at 16:33

## 2017-09-19 RX ADMIN — DOXEPIN HYDROCHLORIDE 50 MG: 25 CAPSULE ORAL at 21:03

## 2017-09-19 RX ADMIN — PANCRELIPASE 24000 UNITS OF LIPASE: 60000; 12000; 38000 CAPSULE, DELAYED RELEASE PELLETS ORAL at 21:03

## 2017-09-19 NOTE — PROGRESS NOTES
"  I have seen the patient in conjunction with Mayi CULLEN      History of presenting illness:  Patient states he \"doesn't feel very good\".  He really cannot quantitate this further no chest pain breathing is doing okay and he is tolerating his diet.  Still having some left thumb pain where he had the abscess.  No fever chills overnight.    Vital Signs  Temp:  [97.6 °F (36.4 °C)-98.5 °F (36.9 °C)] 97.9 °F (36.6 °C)  Heart Rate:  [77-90] 90  Resp:  [18] 18  BP: (110-122)/(58-76) 122/58  Body mass index is 31.76 kg/(m^2).      Intake/Output Summary (Last 24 hours) at 09/19/17 1330  Last data filed at 09/19/17 0806   Gross per 24 hour   Intake              860 ml   Output              200 ml   Net              660 ml     Intake & Output (last 3 days)       09/16 0701 - 09/17 0700 09/17 0701 - 09/18 0700 09/18 0701 - 09/19 0700 09/19 0701 - 09/20 0700    P.O. 2810 1420 720     IV Piggyback  500 500     Total Intake(mL/kg) 2810 (30.1) 1920 (20.3) 1220 (12.9)     Urine (mL/kg/hr) 2850 (1.3)  400 (0.2) 200 (0.3)    Total Output 2850   400 200    Net -40 +1920 +820 -200            Unmeasured Urine Occurrence  7 x 3 x     Unmeasured Stool Occurrence  0 x            Physical exam:  Physical Exam   Constitutional: Well-developed, well-nourished.  Pleasant.  No distress  Head: Normocephalic and atraumatic.  Hearing is intact, mucous membranes are moist.   Cardiovascular: Normal rate, regular rhythm and normal heart sounds.  No murmur rub or gallop  Pulmonary/Chest: Bilateral breath sounds no rhonchus rales or wheezing heard  Abdominal: Soft, Benign, sitting up which limits exam  Musculoskeletal: Strength is symmetric, Trace edema, the wound is wrapped  Neurological: Nonfocal, alert.    Skin: Skin is warm and dry.   Psychiatric:  Normal mood and affect.     Telemetry: sinus    Echo with low normal ejection fraction of 50%    Results Review:  Lab Results   Component Value Date    WBC 3.02 (L) 09/19/2017    HGB 12.9 (L) 09/19/2017 "    HCT 38.7 (L) 09/19/2017    MCV 93.7 09/19/2017    PLT 78 (L) 09/19/2017     Lab Results   Component Value Date    GLUCOSE 256 (H) 09/19/2017    BUN 7 09/19/2017    CREATININE 0.61 09/19/2017    EGFRIFNONA 139 09/19/2017    EGFRIFAFRI  09/26/2016      Comment:      <15 Indicative of kidney failure.    BCR 11.5 09/19/2017    CO2 28.2 09/19/2017    CALCIUM 8.2 09/19/2017    ALBUMIN 2.30 (L) 09/19/2017    LABIL2 1.0 (L) 09/19/2017    AST 79 (H) 09/19/2017    ALT 52 (H) 09/19/2017       Imaging Results (last 72 hours)     ** No results found for the last 72 hours. **        Cxr, hand xray NEG    Wound culture MRSA    Blood cultures negative      Assessment/Plan     Principal Problem:    Abscess of left thumbStatus post incision drainage, culture with MRSA.  On vancomycin, inflammatory markers are low.  Continue dressing changes, Patient to go doxycycline on discharge.  Continue vancomycin for today.  CRP remains low.  It is noted I discussed with the patient has had , we discussed options to get his dressing change done but he declined all these options, this being the case patient will have to be discharged home on doxycycline and have encouraged him to change the dressing on his own he states he was able to do this.  Again we did have options but he was unwilling to comply with these.    Active Problems:  Diabetes, insulin requiring, not controlled, insulin adjusted    History of viral hepatitis, Possible cirrhosis with low albumin thrombocytopenia, and coagulopathy.  Will give vitamin K today.  Recheck in a.m.    Hypomagnesemia, being supplemented    DVT prophylaxis, SCDs, he does have thrombocytopenia possibly related to an element of cirrhosis, holding on anticoagulation at this time.      Memo Quarles MD  09/19/17  1:30 PM

## 2017-09-19 NOTE — PROGRESS NOTES
Discharge Planning Assessment   Art     Patient Name: Isaias Lang  MRN: [id number]  Today's Date: 9/19/2017    Admit Date: 9/13/2017          Discharge Needs Assessment     None            Discharge Plan       09/19/17 1259    Case Management/Social Work Plan    Plan SS spoke with pt on this date regarding arrangements for wound care at discharge.  Pt is not agreeable to complete his own wound care.  Pt is not agreeable to provide SS with current address to arrange home health services for wound care.  Pt is not agreeable to provide SS with address to arrange [facility] for daily wound care to be completed at  [facility] clinic.  SS notified pt of how important daily wound care is in the care of his thumb. Pt stated understanding.  SS spoke with Evelyn at  [facility]  who stated  that there are no Medicaid beds available at this time.  SS notified Physician and supervisor Jaja Mcmillan.  SS will follow and assist with discharge needs.        Discharge Placement     No information found        Expected Discharge Date and Time     Expected Discharge Date Expected Discharge Time    Sep 17, 2017               Demographic Summary     None            Functional Status     None            Psychosocial     None            Abuse/Neglect     None            Legal     None            Substance Abuse     None            Patient Forms     None          Sho Ferro

## 2017-09-19 NOTE — PROGRESS NOTES
"  I have personally seen and examined the patient today and discussed overnight interval progress and pertinent issues with nursing staff.    Subjective:    Awake and alert complaining of left hand pain.  He denies fever, chills, nausea, vomiting or diarrhea.  Educated him to keep extremity elevated to help eliminate pain and swelling.  CRP 1.27.  Wound culture from 9/13/17 finalized as MRSA.  Blood cultures finalized no growth. Urine culture normal.  Vital signs stable.       History taken from: patient chart RN      Vital Signs    /58 (BP Location: Right arm)  Pulse 90  Temp 97.9 °F (36.6 °C) (Axillary)   Resp 18  Ht 68\" (172.7 cm)  Wt 208 lb 14.4 oz (94.8 kg)  SpO2 95%  BMI 31.76 kg/m2    Temp:  [97.6 °F (36.4 °C)-98.5 °F (36.9 °C)] 97.9 °F (36.6 °C)      Intake/Output Summary (Last 24 hours) at 09/19/17 1143  Last data filed at 09/19/17 0806   Gross per 24 hour   Intake              860 ml   Output              200 ml   Net              660 ml     Intake & Output (last 3 days)       09/16 0701 - 09/17 0700 09/17 0701 - 09/18 0700 09/18 0701 - 09/19 0700 09/19 0701 - 09/20 0700    P.O. 2810 1420 720     IV Piggyback  500 500     Total Intake(mL/kg) 2810 (30.1) 1920 (20.3) 1220 (12.9)     Urine (mL/kg/hr) 2850 (1.3)  400 (0.2) 200 (0.4)    Total Output 2850   400 200    Net -40 +1920 +820 -200            Unmeasured Urine Occurrence  7 x 3 x     Unmeasured Stool Occurrence  0 x            Physical exam:    General Appearance:    Alert, cooperative, in no acute distress   Head:    Normocephalic, without obvious abnormality, atraumatic   Eyes:            Lids and lashes normal, conjunctivae and sclerae normal, no   icterus, no pallor, corneas clear, PERRLA   Ears:    Ears appear intact with no abnormalities noted   Throat:   No oral lesions, no thrush, oral mucosa moist   Neck:   No adenopathy, supple, trachea midline, no thyromegaly, no   carotid bruit, no JVD   Back:     No tenderness to percussion or " palpation, range of motion   normal   Lungs:     Clear to auscultation,respirations regular, even and unlabored. No wheezing, no ronchi and no crackles.    Heart:    Regular rhythm and normal rate, normal S1 and S2, no            murmur, no gallop, no rub, no click   Chest Wall:    No abnormalities observed   Abdomen:     Normal bowel sounds, no masses, no organomegaly, soft        non-tender, non-distended, no guarding, no rebound                tenderness   Rectal:     Deferred   Extremities: left thumb, post I & D, pain   Pulses:   Pulses palpable and equal bilaterally   Skin:   No bleeding, bruising or rash   Lymph nodes:   No palpable adenopathy   Neurologic:   Awake, alert and oriented x 3. Following commands.           Results:      Results from last 7 days  Lab Units 09/18/17  0323 09/17/17  0107 09/16/17  0126 09/15/17  0415 09/14/17  0040 09/13/17  1902   WBC 10*3/mm3 2.97* 2.57* 2.33* 2.74* 3.60* 4.52     Lab Results   Component Value Date    NEUTROABS 1.90 09/18/2017         Results from last 7 days  Lab Units 09/19/17  0723   CREATININE mg/dL 0.61         Results from last 7 days  Lab Units 09/19/17  0723 09/18/17  0323 09/17/17  0108   CRP mg/dL 1.27* 0.95 1.35*       Imaging Results (last 24 hours)     ** No results found for the last 24 hours. **            Results Review:    I have personally reviewed laboratory data, culture results, radiology studies and antimicrobial therapy.    Hospital Medications (active)       Dose Frequency Start End    atorvastatin (LIPITOR) tablet 20 mg 20 mg Nightly 9/14/2017     Sig - Route: Take 1 tablet by mouth Every Night. - Oral    dextrose (D50W) solution 25 g 25 g Every 15 Minutes PRN 9/13/2017     Sig - Route: Infuse 50 mL into a venous catheter Every 15 (Fifteen) Minutes As Needed for Low Blood Sugar (Blood Sugar Less Than 70, Patient Has IV Access - Unresponsive, NPO or Unable To Safely Swallow). - Intravenous    dextrose (D50W) solution 25 g 25 g Every 15  Minutes PRN 9/14/2017     Sig - Route: Infuse 50 mL into a venous catheter Every 15 (Fifteen) Minutes As Needed for Low Blood Sugar (Blood Sugar Less Than 70, Patient Has IV Access - Unresponsive, NPO or Unable To Safely Swallow). - Intravenous    dextrose (GLUTOSE) oral gel 15 g 15 g Every 15 Minutes PRN 9/13/2017     Sig - Route: Take 15 g by mouth Every 15 (Fifteen) Minutes As Needed for Low Blood Sugar (Blood Sugar Less Than 70, Patient Alert, Is Not NPO & Can Safely Swallow). - Oral    dextrose (GLUTOSE) oral gel 15 g 15 g Every 15 Minutes PRN 9/14/2017     Sig - Route: Take 15 g by mouth Every 15 (Fifteen) Minutes As Needed for Low Blood Sugar (Blood Sugar Less Than 70, Patient Alert, Is Not NPO & Can Safely Swallow). - Oral    dicyclomine (BENTYL) capsule 10 mg 10 mg 2 Times Daily 9/14/2017     Sig - Route: Take 1 capsule by mouth 2 (Two) Times a Day. - Oral    doxepin (SINEquan) capsule 50 mg 50 mg Nightly 9/14/2017     Sig - Route: Take 2 capsules by mouth Every Night. - Oral    gabapentin (NEURONTIN) capsule 400 mg 400 mg Every 8 Hours Scheduled 9/14/2017     Sig - Route: Take 1 capsule by mouth Every 8 (Eight) Hours. - Oral    glucagon (GLUCAGEN) injection 1 mg 1 mg Every 15 Minutes PRN 9/13/2017     Sig - Route: Inject 1 mg under the skin Every 15 (Fifteen) Minutes As Needed (Blood Glucose Less Than 70 - Patient Without IV Access - Unresponsive, NPO or Unable To Safely Swallow). - Subcutaneous    glucagon (human recombinant) (GLUCAGEN DIAGNOSTIC) injection 1 mg 1 mg Every 15 Minutes PRN 9/14/2017     Sig - Route: Inject 1 mg under the skin Every 15 (Fifteen) Minutes As Needed (Blood Glucose Less Than 70 - Patient Without IV Access - Unresponsive, NPO or Unable To Safely Swallow). - Subcutaneous    HYDROcodone-acetaminophen (NORCO) 5-325 MG per tablet 1 tablet 1 tablet Every 8 Hours PRN 9/18/2017     Sig - Route: Take 1 tablet by mouth Every 8 (Eight) Hours As Needed for Moderate Pain . - Oral     "HYDROmorphone (DILAUDID) injection 1 mg 1 mg Once 9/14/2017     Sig - Route: Infuse 1 mL into a venous catheter 1 (One) Time. - Intravenous    insulin aspart (novoLOG) injection 0-14 Units 0-14 Units 4 Times Daily Before Meals & Nightly 9/14/2017     Sig - Route: Inject 0-14 Units under the skin 4 (Four) Times a Day Before Meals & at Bedtime. - Subcutaneous    insulin detemir (LEVEMIR) injection 5 Units 5 Units Nightly 9/14/2017     Sig - Route: Inject 5 Units under the skin Every Night. - Subcutaneous    ipratropium-albuterol (DUO-NEB) nebulizer solution 3 mL 3 mL Every 6 Hours PRN 9/13/2017     Sig - Route: Take 3 mL by nebulization Every 6 (Six) Hours As Needed for Shortness of Air. - Nebulization    magnesium hydroxide (MILK OF MAGNESIA) suspension 2400 mg/10mL 10 mL 10 mL Daily PRN 9/16/2017     Sig - Route: Take 10 mL by mouth Daily As Needed for Constipation. - Oral    magnesium oxide (MAGOX) tablet 200 mg 200 mg 2 Times Daily 9/14/2017     Sig - Route: Take 0.5 tablets by mouth 2 (Two) Times a Day. - Oral    Magnesium Sulfate 2 gram Bolus, followed by 8 gram infusion (total Mg dose 10 grams)- Mg less than or equal to 1mg/dL 2 g As Needed 9/14/2017     Sig - Route: Infuse 50 mL into a venous catheter As Needed (Mg less than or equal to 1mg/dL). - Intravenous    Linked Group 1:  \"Or\" Linked Group Details        magnesium sulfate 4 gram infusion- Mg 1.6-1.9 mg/dL 4 g As Needed 9/14/2017     Sig - Route: Infuse 100 mL into a venous catheter As Needed (Mg 1.6-1.9 mg/dL). - Intravenous    Linked Group 1:  \"Or\" Linked Group Details        Magnesium Sulfate 6 gram Infusion (2 gm x 3) -Mg 1.1 -1.5 mg/dL 2 g As Needed 9/14/2017     Sig - Route: Infuse 50 mL into a venous catheter As Needed (Mg 1.1 -1.5 mg/dL). - Intravenous    Linked Group 1:  \"Or\" Linked Group Details        Magnesium Sulfate 6 gram Infusion (2 gm x 3) -Mg 1.1 -1.5 mg/dL 2 g Every 2 Hours 9/19/2017 9/19/2017    Sig - Route: Infuse 50 mL into a venous " "catheter Every 2 (Two) Hours. - Intravenous    metoprolol tartrate (LOPRESSOR) tablet 50 mg 50 mg Daily 9/14/2017     Sig - Route: Take 1 tablet by mouth Daily. - Oral    nitroglycerin (NITROSTAT) SL tablet 0.4 mg 0.4 mg Every 5 Minutes PRN 9/13/2017     Sig - Route: Place 1 tablet under the tongue Every 5 (Five) Minutes As Needed for Chest Pain. - Sublingual    pancrelipase (Lip-Prot-Amyl) (CREON) capsule 24,000 units of lipase 24,000 units of lipase 4 Times Daily With Meals & Nightly 9/14/2017     Sig - Route: Take 2 capsules by mouth 4 (Four) Times a Day With Meals & at Bedtime. - Oral    pantoprazole (PROTONIX) EC tablet 40 mg 40 mg Daily 9/14/2017     Sig - Route: Take 1 tablet by mouth Daily. - Oral    Pharmacy Consult  Continuous PRN 9/13/2017     Sig - Route: Continuous As Needed for Consult. - Does not apply    polyethylene glycol (MIRALAX) powder 17 g 17 g 2 Times Daily 9/14/2017     Sig - Route: Take 17 g by mouth 2 (Two) Times a Day. - Oral    sodium chloride 0.9 % flush 1-10 mL 1-10 mL As Needed 9/13/2017     Sig - Route: Infuse 1-10 mL into a venous catheter As Needed for Line Care. - Intravenous    sodium chloride 0.9 % flush 1-10 mL 1-10 mL As Needed 9/13/2017     Sig - Route: Infuse 1-10 mL into a venous catheter As Needed for Line Care. - Intravenous    sodium chloride 0.9 % flush 10 mL 10 mL As Needed 9/13/2017     Sig - Route: Infuse 10 mL into a venous catheter As Needed for Line Care. - Intravenous    Cosign for Ordering: Accepted by Elgin Daniels MD on 9/13/2017  6:33 PM    Linked Group 2:  \"And\" Linked Group Details        vancomycin (VANCOCIN) 1,500 mg in sodium chloride 0.9 % 500 mL IVPB 1,500 mg Every 8 Hours 9/14/2017     Sig - Route: Infuse 1,500 mg into a venous catheter Every 8 (Eight) Hours. - Intravenous    Notes to Pharmacy: RN started to hang bag at 1151, but patient lost IV access.  RN called at approx 1500 and said patient also had Zosyn and Magnesium IVs due.  Patient " did not receive 1151 dose of Vanco, so rescheduled dose to start at 1600.    metoclopramide (REGLAN) tablet 10 mg (Discontinued) 10 mg 2 Times Daily Before Meals 9/14/2017 9/18/2017    Sig - Route: Take 1 tablet by mouth 2 (Two) Times a Day Before Meals. - Oral            Cultures:    Blood Culture   Date Value Ref Range Status   09/13/2017 No growth at 5 days  Final   09/13/2017 No growth at 5 days  Final     Urine Culture   Date Value Ref Range Status   09/13/2017 <10,000 CFU/mL Normal Urogenital Mercedes  Final     Wound Culture   Date Value Ref Range Status   09/13/2017 Scant growth (1+) Staphylococcus aureus, MRSA (C)  Final     Comment:     This isolate does not demonstrate inducible clindamycin resistance in vitro.  Methicillin resistant Staphylococcus aureus, Patient may be an isolation risk.       Assessment/Plan     ASSESSMENT:    # 1 Left thumb abscess R/T IV drug use    PLAN:    Awake and alert complaining of left hand pain.  He denies fever, chills, nausea, vomiting or diarrhea.  Educated him to keep extremity elevated to help eliminate pain and swelling.  CRP 1.27.  Wound culture from 9/13/17 finalized as MRSA.  Blood cultures finalized no growth. Urine culture normal.  Vital signs stable.    X-ray from 9/14/17 reports no acute or destructive bony abnormalities.  Continue with vancomycin.  Would recommend to consider doxycycline 100 mg by mouth twice a day ×14 days to complete through October 1, 2017 if no underlying osteomyelitis suspected.  Could not find documentation from Ortho to exclude or confirm osteomyelitis.     Continue to follow closely.       Patient's findings and recommendations were discussed with patient, nursing staff and primary care team    Code Status: Full Code    Mehnaz Fernandez, NOHEMI  09/19/17  11:43 AM

## 2017-09-19 NOTE — PLAN OF CARE
Problem: Infection, Risk/Actual (Adult)  Goal: Infection Prevention/Resolution  Outcome: Ongoing (interventions implemented as appropriate)    Problem: Pain, Acute (Adult)  Goal: Acceptable Pain Control/Comfort Level  Outcome: Ongoing (interventions implemented as appropriate)    Problem: Patient Care Overview (Adult)  Goal: Plan of Care Review  Outcome: Ongoing (interventions implemented as appropriate)    Problem: Fall Risk (Adult)  Goal: Identify Related Risk Factors and Signs and Symptoms  Outcome: Outcome(s) achieved Date Met:  09/19/17  Goal: Absence of Falls  Outcome: Ongoing (interventions implemented as appropriate)    Problem: Diabetes, Type 2 (Adult)  Goal: Signs and Symptoms of Listed Potential Problems Will be Absent or Manageable (Diabetes, Type 2)  Outcome: Outcome(s) achieved Date Met:  09/19/17    Problem: Pressure Ulcer Risk (Kael Scale) (Adult,Obstetrics,Pediatric)  Goal: Skin Integrity  Outcome: Ongoing (interventions implemented as appropriate)

## 2017-09-20 VITALS
WEIGHT: 215.1 LBS | HEART RATE: 53 BPM | TEMPERATURE: 97.2 F | HEIGHT: 68 IN | RESPIRATION RATE: 18 BRPM | SYSTOLIC BLOOD PRESSURE: 96 MMHG | BODY MASS INDEX: 32.6 KG/M2 | DIASTOLIC BLOOD PRESSURE: 58 MMHG | OXYGEN SATURATION: 94 %

## 2017-09-20 LAB
ANION GAP SERPL CALCULATED.3IONS-SCNC: 1.9 MMOL/L (ref 3.6–11.2)
BASOPHILS # BLD AUTO: 0.01 10*3/MM3 (ref 0–0.3)
BASOPHILS NFR BLD AUTO: 0.2 % (ref 0–2)
BUN BLD-MCNC: 10 MG/DL (ref 7–21)
BUN/CREAT SERPL: 17.5 (ref 7–25)
CALCIUM SPEC-SCNC: 8.4 MG/DL (ref 7.7–10)
CHLORIDE SERPL-SCNC: 107 MMOL/L (ref 99–112)
CO2 SERPL-SCNC: 29.1 MMOL/L (ref 24.3–31.9)
CREAT BLD-MCNC: 0.57 MG/DL (ref 0.43–1.29)
CRP SERPL-MCNC: 2.11 MG/DL (ref 0–0.99)
DEPRECATED RDW RBC AUTO: 48.2 FL (ref 37–54)
EOSINOPHIL # BLD AUTO: 0.14 10*3/MM3 (ref 0–0.7)
EOSINOPHIL NFR BLD AUTO: 2.4 % (ref 0–5)
ERYTHROCYTE [DISTWIDTH] IN BLOOD BY AUTOMATED COUNT: 14.7 % (ref 11.5–14.5)
GFR SERPL CREATININE-BSD FRML MDRD: 150 ML/MIN/1.73
GLUCOSE BLD-MCNC: 182 MG/DL (ref 70–110)
GLUCOSE BLDC GLUCOMTR-MCNC: 134 MG/DL (ref 70–130)
GLUCOSE BLDC GLUCOMTR-MCNC: 176 MG/DL (ref 70–130)
HCT VFR BLD AUTO: 37.7 % (ref 42–52)
HGB BLD-MCNC: 12.3 G/DL (ref 14–18)
IMM GRANULOCYTES # BLD: 0.01 10*3/MM3 (ref 0–0.03)
IMM GRANULOCYTES NFR BLD: 0.2 % (ref 0–0.5)
INR PPP: 1.41 (ref 0.9–1.1)
LYMPHOCYTES # BLD AUTO: 1.02 10*3/MM3 (ref 1–3)
LYMPHOCYTES NFR BLD AUTO: 17.2 % (ref 21–51)
MAGNESIUM SERPL-MCNC: 1.8 MG/DL (ref 1.7–2.6)
MCH RBC QN AUTO: 30.4 PG (ref 27–33)
MCHC RBC AUTO-ENTMCNC: 32.6 G/DL (ref 33–37)
MCV RBC AUTO: 93.1 FL (ref 80–94)
MONOCYTES # BLD AUTO: 0.52 10*3/MM3 (ref 0.1–0.9)
MONOCYTES NFR BLD AUTO: 8.8 % (ref 0–10)
NEUTROPHILS # BLD AUTO: 4.24 10*3/MM3 (ref 1.4–6.5)
NEUTROPHILS NFR BLD AUTO: 71.2 % (ref 30–70)
OSMOLALITY SERPL CALC.SUM OF ELEC: 279.4 MOSM/KG (ref 273–305)
PHOSPHATE SERPL-MCNC: 3.2 MG/DL (ref 2.7–4.5)
PLATELET # BLD AUTO: 94 10*3/MM3 (ref 130–400)
PMV BLD AUTO: 11.2 FL (ref 6–10)
POTASSIUM BLD-SCNC: 4 MMOL/L (ref 3.5–5.3)
PROTHROMBIN TIME: 17.4 SECONDS (ref 11–15.4)
RBC # BLD AUTO: 4.05 10*6/MM3 (ref 4.7–6.1)
SODIUM BLD-SCNC: 138 MMOL/L (ref 135–153)
WBC NRBC COR # BLD: 5.94 10*3/MM3 (ref 4.5–12.5)

## 2017-09-20 PROCEDURE — 63710000001 INSULIN ASPART PER 5 UNITS: Performed by: INTERNAL MEDICINE

## 2017-09-20 PROCEDURE — 25010000002 VANCOMYCIN PER 500 MG: Performed by: INTERNAL MEDICINE

## 2017-09-20 PROCEDURE — 85610 PROTHROMBIN TIME: CPT | Performed by: INTERNAL MEDICINE

## 2017-09-20 PROCEDURE — 94799 UNLISTED PULMONARY SVC/PX: CPT

## 2017-09-20 PROCEDURE — 82962 GLUCOSE BLOOD TEST: CPT

## 2017-09-20 PROCEDURE — 83735 ASSAY OF MAGNESIUM: CPT | Performed by: INTERNAL MEDICINE

## 2017-09-20 PROCEDURE — 85025 COMPLETE CBC W/AUTO DIFF WBC: CPT | Performed by: INTERNAL MEDICINE

## 2017-09-20 PROCEDURE — 99239 HOSP IP/OBS DSCHRG MGMT >30: CPT | Performed by: INTERNAL MEDICINE

## 2017-09-20 PROCEDURE — 63710000001 INSULIN DETEMIR PER 5 UNITS: Performed by: INTERNAL MEDICINE

## 2017-09-20 PROCEDURE — 80048 BASIC METABOLIC PNL TOTAL CA: CPT | Performed by: INTERNAL MEDICINE

## 2017-09-20 PROCEDURE — 86140 C-REACTIVE PROTEIN: CPT | Performed by: INTERNAL MEDICINE

## 2017-09-20 PROCEDURE — 84100 ASSAY OF PHOSPHORUS: CPT | Performed by: INTERNAL MEDICINE

## 2017-09-20 PROCEDURE — 63710000001 INSULIN ASPART PER 5 UNITS: Performed by: FAMILY MEDICINE

## 2017-09-20 RX ORDER — DOXYCYCLINE 100 MG/1
100 CAPSULE ORAL EVERY 12 HOURS SCHEDULED
Qty: 28 CAPSULE | Refills: 0 | Status: SHIPPED | OUTPATIENT
Start: 2017-09-20 | End: 2017-10-04

## 2017-09-20 RX ORDER — DOXYCYCLINE 100 MG/1
100 CAPSULE ORAL EVERY 12 HOURS SCHEDULED
Status: DISCONTINUED | OUTPATIENT
Start: 2017-09-20 | End: 2017-09-20 | Stop reason: HOSPADM

## 2017-09-20 RX ORDER — LANOLIN ALCOHOL/MO/W.PET/CERES
200 CREAM (GRAM) TOPICAL 2 TIMES DAILY
Qty: 30 TABLET | Refills: 1 | Status: ON HOLD | OUTPATIENT
Start: 2017-10-06 | End: 2018-06-05

## 2017-09-20 RX ORDER — MAGNESIUM SULFATE HEPTAHYDRATE 40 MG/ML
4 INJECTION, SOLUTION INTRAVENOUS ONCE
Status: COMPLETED | OUTPATIENT
Start: 2017-09-20 | End: 2017-09-20

## 2017-09-20 RX ADMIN — FUROSEMIDE 20 MG: 20 TABLET ORAL at 09:08

## 2017-09-20 RX ADMIN — GABAPENTIN 400 MG: 400 CAPSULE ORAL at 15:39

## 2017-09-20 RX ADMIN — INSULIN ASPART 5 UNITS: 100 INJECTION, SOLUTION INTRAVENOUS; SUBCUTANEOUS at 09:10

## 2017-09-20 RX ADMIN — PANTOPRAZOLE SODIUM 40 MG: 40 TABLET, DELAYED RELEASE ORAL at 09:08

## 2017-09-20 RX ADMIN — POLYETHYLENE GLYCOL 3350 17 G: 17 POWDER, FOR SOLUTION ORAL at 09:09

## 2017-09-20 RX ADMIN — HYDROCODONE BITARTRATE AND ACETAMINOPHEN 1 TABLET: 5; 325 TABLET ORAL at 00:07

## 2017-09-20 RX ADMIN — PANCRELIPASE 24000 UNITS OF LIPASE: 60000; 12000; 38000 CAPSULE, DELAYED RELEASE PELLETS ORAL at 11:50

## 2017-09-20 RX ADMIN — HYDROCODONE BITARTRATE AND ACETAMINOPHEN 1 TABLET: 5; 325 TABLET ORAL at 09:09

## 2017-09-20 RX ADMIN — INSULIN ASPART 3 UNITS: 100 INJECTION, SOLUTION INTRAVENOUS; SUBCUTANEOUS at 09:10

## 2017-09-20 RX ADMIN — VANCOMYCIN HYDROCHLORIDE 1500 MG: 5 INJECTION, POWDER, LYOPHILIZED, FOR SOLUTION INTRAVENOUS at 11:50

## 2017-09-20 RX ADMIN — GABAPENTIN 400 MG: 400 CAPSULE ORAL at 06:14

## 2017-09-20 RX ADMIN — INSULIN ASPART 5 UNITS: 100 INJECTION, SOLUTION INTRAVENOUS; SUBCUTANEOUS at 11:50

## 2017-09-20 RX ADMIN — PANCRELIPASE 24000 UNITS OF LIPASE: 60000; 12000; 38000 CAPSULE, DELAYED RELEASE PELLETS ORAL at 09:09

## 2017-09-20 RX ADMIN — DICYCLOMINE HYDROCHLORIDE 10 MG: 10 CAPSULE ORAL at 09:08

## 2017-09-20 RX ADMIN — IPRATROPIUM BROMIDE AND ALBUTEROL SULFATE 3 ML: .5; 3 SOLUTION RESPIRATORY (INHALATION) at 00:31

## 2017-09-20 RX ADMIN — METOPROLOL TARTRATE 50 MG: 50 TABLET, FILM COATED ORAL at 09:09

## 2017-09-20 RX ADMIN — IPRATROPIUM BROMIDE AND ALBUTEROL SULFATE 3 ML: .5; 3 SOLUTION RESPIRATORY (INHALATION) at 11:25

## 2017-09-20 RX ADMIN — INSULIN DETEMIR 10 UNITS: 100 INJECTION, SOLUTION SUBCUTANEOUS at 00:12

## 2017-09-20 RX ADMIN — MAGNESIUM GLUCONATE 500 MG ORAL TABLET 200 MG: 500 TABLET ORAL at 09:09

## 2017-09-20 RX ADMIN — MAGNESIUM SULFATE HEPTAHYDRATE 4 G: 40 INJECTION, SOLUTION INTRAVENOUS at 13:31

## 2017-09-20 NOTE — DISCHARGE PLACEMENT REQUEST
"Isaias Avendano (52 y.o. Male)     Date of Birth Social Security Number Address Home Phone MRN    1965  PO   [facility] [address] [phone] [id number]    Roman Catholic Marital Status          Orthodox        Admission Date Admission Type Admitting Provider Attending Provider Department, Room/Bed    17 Emergency Elva Hdz MD Perkins, Jimmye S, MD [address], [address]/    Discharge Date Discharge Disposition Discharge Destination         Home or Self Care             Attending Provider: Elva Hdz MD     Allergies:  Robitussin Dm [Dextromethorphan-guaifenesin], Tizanidine, Metformin, Sulfa Antibiotics, Contrast Dye, Tramadol    Isolation:  Contact   Infection:  MRSA (17)   Code Status:  FULL    Ht:  68\" (172.7 cm)   Wt:  215 lb 1.6 oz (97.6 kg)    Admission Cmt:  None   Principal Problem:  Abscess of left thumb [L02.512]                 Active Insurance as of 2017     Primary Coverage     Payor Plan Insurance Group Employer/Plan Group    [place]      Payor Plan Address Payor Plan Phone Number Effective From Effective To    [address] [address]  [address]     [place] [address]       Subscriber Name Subscriber Birth Date Member ID       ISAIAS AVENDANO 1965 [id number]                 Emergency Contacts      (Rel.) Home Phone Work Phone Mobile Phone    Domenico Mckinney (Father) [phone] -- --            Insurance Information                [place] Phone:     Subscriber: Isaias Avendano Subscriber#: [id number]    Group#:  Precert#:              History & Physical      Elva Hdz MD at 2017 10:36 PM              [facility] HOSPITALIST HISTORY AND PHYSICAL    Patient Identification:  Name:  Isaias Avendano  Age:  52 y.o.  Sex:  male  :  1965  MRN:  [id number]   Visit Number:  [id number]  Primary Care Physician:  Ankit CAMARA" MD Yasmany     Chief complaint:  Left thumb lesion    History of presenting illness:  52 y.o. male who presented to [facility] emergency department with a four-day history of left thumb swelling and redness.  The patient is an IV drug user and he had his nephew inject the dorsal base on his left thumb 4 days ago.  The patient thought that only suboxone was injected but he thinks that it was laced with methamphetamine.  He also had fevers, chills, and fatigue.  He fell in the shower about one week ago and has an abrasion on his lateral left elbow with no drainage and no erythema.  ---------------------------------------------------------------------------------------------------------------------   Review of Systems   Constitutional: Positive for chills, fatigue and fever.   HENT: Negative for postnasal drip, rhinorrhea, sneezing and sore throat.    Eyes: Negative for discharge and redness.   Respiratory: Positive for cough (nonproductive) and shortness of breath (x 1 month). Negative for wheezing.    Cardiovascular: Negative for chest pain, palpitations and leg swelling.   Gastrointestinal: Negative for diarrhea, nausea and vomiting.   Genitourinary: Negative for decreased urine volume, dysuria and hematuria.   Musculoskeletal: Positive for joint swelling (left thumb). Negative for arthralgias.   Skin: Positive for wound (left thumb). Negative for pallor and rash.   Neurological: Negative for seizures, speech difficulty and headaches.   Hematological: Does not bruise/bleed easily.   Psychiatric/Behavioral: Negative for confusion, self-injury and suicidal ideas. The patient is not nervous/anxious.     ---------------------------------------------------------------------------------------------------------------------   Past Medical History:   Diagnosis Date   • Abscess of antecubital fossa 05/2014    Left that required I and D and grew Haemophilus influenza group 1   • Anxiety    • Asthma    • Chronic  pancreatitis    • COPD (chronic obstructive pulmonary disease)    • DDD (degenerative disc disease), lumbosacral    • Depression    • Diabetes mellitus    • DVT (deep venous thrombosis)     Right arm   • Essential hypertension    • GERD (gastroesophageal reflux disease)    • Hepatitis-C    • Hypercholesteremia    • Injury of back    • Injury of right Achilles tendon     Complicated by MRSA and Pseudomonas   • IV drug abuse    • Mild CAD     Left heart cath at  2012   • MRSA (methicillin resistant staph aureus) culture positive 09/14/2016    LUE   • Obesity    • Opiate addiction     Suboxone IV   • Self-injurious behavior    • Sleep apnea    • Suicide attempt    • Systolic CHF, chronic     EF 45-50% in 2013, EF 60% 9/2016     Past Surgical History:   Procedure Laterality Date   • CHOLECYSTECTOMY  1990s   • INCISION AND DRAINAGE ABSCESS Left 2016    wrist   • INCISION AND DRAINAGE ARM Left 9/15/2016    Procedure: INCISION AND DRAINAGE UPPER EXTREMITY;  Surgeon: Rico Oseguera MD;  Location: [facility];  Service:    • ORIF ANKLE FRACTURE Right 2014     Family History   Problem Relation Age of Onset   • Gout Other    • Osteoporosis Other    • Arthritis Other      Rheumatoid and osteoarthritis   • Hypertension Other    • Heart disease Other    • Cancer Other    • Coronary artery disease Mother    • Lung disease Mother    • Gout Sister    • Cancer Maternal Grandmother    • Hypertension Maternal Grandfather    • Gout Maternal Grandfather      Social History   • Marital status:      Social History Main Topics   • Smoking status: Former Smoker     Years: 1.00     Types: Cigarettes   • Smokeless tobacco: Never Used      Comment: quit smoking in my 20's   • Alcohol use No      Comment: denies   • Drug use: Yes     Special: IV      Comment: PT STATES THAT HE USED SUBOXONE IN LEFT AC YESTERDAY   • Sexual activity: Defer      Comment: not currently active.     ---------------------------------------------------------------------------------------------------------------------   Allergies:  Robitussin dm [dextromethorphan-guaifenesin]; Tizanidine; Metformin; Sulfa antibiotics; Contrast dye; and Tramadol  ---------------------------------------------------------------------------------------------------------------------   Prior to Admission Medications     Prescriptions Last Dose Informant Patient Reported? Taking?    albuterol (PROVENTIL HFA;VENTOLIN HFA) 108 (90 BASE) MCG/ACT inhaler 9/13/2017 Self No Yes    Inhale 2 puffs Every 6 (Six) Hours As Needed for Wheezing.    aspirin 81 MG EC tablet 9/12/2017 Self No Yes    Take 1 tablet by mouth Daily.    atorvastatin (LIPITOR) 20 MG tablet 9/12/2017 Self Yes Yes    Take 20 mg by mouth Daily.    dicyclomine (BENTYL) 10 MG capsule 9/12/2017 Self Yes Yes    Take 10 mg by mouth 2 (Two) Times a Day.    furosemide (LASIX) 20 MG tablet 9/12/2017 Self No Yes    Take 1 tablet by mouth Daily.    gabapentin (NEURONTIN) 400 MG capsule 9/13/2017 Self No Yes    Take 1 capsule by mouth 3 (Three) Times a Day.    HYDROcodone-acetaminophen (NORCO) 5-325 MG per tablet 9/13/2017 VI Yes Yes    Take 1 tablet by mouth Daily As Needed. Pt states he takes TID. Per VI, #30 for a 30 day supply on 8/8/17    insulin aspart (novoLOG) 100 UNIT/ML injection 9/13/2017 Self Yes Yes    Inject  under the skin 3 (Three) Times a Day Before Meals. Patient does this per sliding scale    insulin detemir (LEVEMIR) 100 UNIT/ML injection 9/12/2017 Self No Yes    Inject 10 Units under the skin Every Night.    ipratropium-albuterol (DUO-NEB) 0.5-2.5 mg/mL nebulizer 9/12/2017 Self No Yes    Take 3 mL by nebulization Every 6 (Six) Hours As Needed for Shortness of Air.    Magnesium Oxide 400 (240 MG) MG tablet 9/12/2017 Self No Yes    Take ½ tablet by mouth 2 Times a Day.    metoclopramide (REGLAN) 10 MG tablet 9/12/2017 Self No Yes    Take 1 tablet by mouth 2  (Two) Times a Day.    metoprolol tartrate (LOPRESSOR) 50 MG tablet 9/13/2017 Self No Yes    Take 1 tablet by mouth Daily.    nitroglycerin (NITROSTAT) 0.4 MG SL tablet Past Month  No Yes    Place 1 tablet under the tongue Every 5 (Five) Minutes As Needed for Chest Pain. Take no more than 3 doses in 15 minutes.    pancrelipase, Lip-Prot-Amyl, (CREON) 18630 UNITS capsule delayed-release particles capsule 9/12/2017 Self No Yes    Take 2 capsules by mouth 4 (Four) Times a Day With Meals & at Bedtime.    pantoprazole (PROTONIX) 40 MG EC tablet 9/12/2017 Self No Yes    Take 1 tablet by mouth Daily.    polyethylene glycol (MIRALAX) packet 9/13/2017 Self No Yes    MIX 17 GRAM PACKET IN 4 TO 8 OUNCES OF LIQUID AND DRINK ONCE DAILY.    Patient taking differently:  Take 17 g by mouth 2 (Two) Times a Day.    potassium chloride (K-DUR,KLOR-CON) 20 MEQ CR tablet 9/12/2017 Self No Yes    Take 1 tablet by mouth Daily.    doxepin (SINEquan) 50 MG capsule 9/11/2017 Self No No    Take 1 capsule by mouth Every Night.        Hospital Scheduled Meds:  [START ON 9/14/2017] atorvastatin 20 mg Oral Daily   [START ON 9/14/2017] dicyclomine 10 mg Oral BID   doxepin 50 mg Oral Nightly   gabapentin 400 mg Oral Q8H   heparin (porcine) 5,000 Units Subcutaneous Q12H   insulin detemir 5 Units Subcutaneous Nightly   [START ON 9/14/2017] magnesium oxide 200 mg Oral BID   [START ON 9/14/2017] metoclopramide 10 mg Oral BID   [START ON 9/14/2017] metoprolol tartrate 50 mg Oral Daily   [START ON 9/14/2017] pancrelipase (Lip-Prot-Amyl) 24,000 units of lipase Oral 4x Daily With Meals & Nightly   [START ON 9/14/2017] pantoprazole 40 mg Oral Daily   piperacillin-tazobactam 3.375 g Intravenous Q6H   [START ON 9/14/2017] polyethylene glycol 17 g Oral BID   ---------------------------------------------------------------------------------------------------------------------   Vital Signs:  Temp:  [98 °F (36.7 °C)-98.9 °F (37.2 °C)] 98.9 °F (37.2 °C)  Heart Rate:   [81-88] 85  Resp:  [17-20] 20  BP: (112-131)/(75-84) 131/84  Last 3 weights    09/13/17  1800 09/13/17 2057   Weight: 180 lb (81.6 kg) 173 lb 4.8 oz (78.6 kg)     Body mass index is 26.35 kg/(m^2).  ---------------------------------------------------------------------------------------------------------------------   Physical Exam:  Constitutional:  Well-developed and well-nourished.  No respiratory distress.      HENT:  Head: Normocephalic and atraumatic.  Mouth:  Moist mucous membranes.    Eyes:  Conjunctivae and EOM are normal.  Pupils are equal, round, and reactive to light.  No scleral icterus.  Neck:  Neck supple.  No JVD present.    Cardiovascular:  Normal rate, regular rhythm and normal heart sounds with no murmur.  Pulmonary/Chest:  No respiratory distress, no wheezes, no crackles, with normal breath sounds and good air movement.  Abdominal:  Soft.  Bowel sounds are normal.  No distension and no tenderness.   Musculoskeletal:  No edema, no tenderness, and no deformity.  No red or swollen joints anywhere.    Neurological:  Alert and oriented to person, place, and time.  No cranial nerve deficit.  No tongue deviation.  No facial droop.  No slurred speech.   Skin:  Left thumb with swelling and a round area that is 1/2 the diameter of a dime with yellow pus coming out of the thumb; there is erythema of the entire thumb with a good pulse and good capillary refill.  Psychiatric:  Normal mood and affect.  Behavior is normal.  Judgment and thought content normal.   Peripheral vascular:  No edema and strong pulses on all 4 extremities.  Genitourinary:  No mccann catheter in place.  ---------------------------------------------------------------------------------------------------------------------  EKG:  Ordered; NS with heart rate of 91 and QTc of 440 ms.  Telemetry:  Ordered    ECHO:    ---------------------------------------------------------------------------------------------------------------------  Results  from last 7 days  Lab Units 09/13/17  1902   CRP mg/dL 2.40*   LACTATE mmol/L 1.6   WBC 10*3/mm3 4.52   HEMOGLOBIN g/dL 14.0   HEMATOCRIT % 43.1   MCV fL 94.7*   MCHC g/dL 32.5*   PLATELETS 10*3/mm3 71*     Results from last 7 days  Lab Units 09/13/17  1902   SODIUM mmol/L 135   POTASSIUM mmol/L 5.3   CHLORIDE mmol/L 105   CO2 mmol/L 24.3   BUN mg/dL <5*   CREATININE mg/dL 0.57   EGFR IF NONAFRICN AM mL/min/1.73 150   CALCIUM mg/dL 9.0   GLUCOSE mg/dL 228*   ALBUMIN g/dL 3.10*   BILIRUBIN mg/dL 1.6   ALK PHOS U/L 110   AST (SGOT) U/L 123*   ALT (SGPT) U/L 85*   Estimated Creatinine Clearance: 168.5 mL/min (by C-G formula based on Cr of 0.57).      Lab Results   Component Value Date    HGBA1C 6.40 (H) 07/03/2017     Lab Results   Component Value Date    TSH 0.284 (L) 02/04/2017    FREET4 1.47 02/04/2017          I have personally looked at the labs and they are stated above.  ---------------------------------------------------------------------------------------------------------------------  Imaging Results (last 7 days)     Xrays of chest and left hand have been ordered.   ---------------------------------------------------------------------------------------------------------------------  Assessment and Plan:  -Left thumb abscess and cellulitis  -Hepatitis C causing transaminitis and thrombocytopenia  -Type II diabetes mellitus, insulin dependent with hyperglycemia   -History of AFib, currently in normal sinus rhythm  -COPD without acute exacerbation   -Essential hypertension, controlled   -Hyperlipidemia   -History of coronary artery disease, S/P heart catheterization in 2012  -History of systolic congestive heart failure with EF of 51-55% on 4/4/2017  -IV drug abuse with history of amphetamine, suboxone, cocaine, and opioid abuse in the past  -History of MRSA of the achilles tendon and LUE  -Depression   -Anxiety   -Obstructive sleep apnea without use of BiPAP  -History of chronic pancreatitis   -Morbid obesity,  now with lower BMI than in the past and now he is only overweight  -Tobacco smoking addiction     I have placed him in contact isolation as he has a history of MRSA; will give zosyn and vancomycin for now.  Consult orthopedic surgery for debridement.  Will give some pain meds as I am sure that he is in pain from the abscess and will request a VI.  Will obtain bedside glucose monitoring and give half of his home Levemir dose.  Will obtain ECHO as he has injected drugs recently; blood cultures are pending.  I sent pus from the wound off for culture as well.  Will repeat labs in the morning.  SCDS for DVT prophylaxis as he has thrombocytopenia and cannot have heparin.  Will monitor on telemetry due to the history of AFib and the current infection.    Elva Hdz MD  09/13/17  10:36 PM       Electronically signed by Elva Hdz MD at 9/14/2017 12:00 AM        Hospital Medications (active)       Dose Frequency Start End    atorvastatin (LIPITOR) tablet 20 mg 20 mg Nightly 9/14/2017     Sig - Route: Take 1 tablet by mouth Every Night. - Oral    dextrose (D50W) solution 25 g 25 g Every 15 Minutes PRN 9/13/2017     Sig - Route: Infuse 50 mL into a venous catheter Every 15 (Fifteen) Minutes As Needed for Low Blood Sugar (Blood Sugar Less Than 70, Patient Has IV Access - Unresponsive, NPO or Unable To Safely Swallow). - Intravenous    dextrose (D50W) solution 25 g 25 g Every 15 Minutes PRN 9/14/2017     Sig - Route: Infuse 50 mL into a venous catheter Every 15 (Fifteen) Minutes As Needed for Low Blood Sugar (Blood Sugar Less Than 70, Patient Has IV Access - Unresponsive, NPO or Unable To Safely Swallow). - Intravenous    dextrose (GLUTOSE) oral gel 15 g 15 g Every 15 Minutes PRN 9/13/2017     Sig - Route: Take 15 g by mouth Every 15 (Fifteen) Minutes As Needed for Low Blood Sugar (Blood Sugar Less Than 70, Patient Alert, Is Not NPO & Can Safely Swallow). - Oral    dextrose (GLUTOSE) oral gel 15 g 15 g Every  15 Minutes PRN 9/14/2017     Sig - Route: Take 15 g by mouth Every 15 (Fifteen) Minutes As Needed for Low Blood Sugar (Blood Sugar Less Than 70, Patient Alert, Is Not NPO & Can Safely Swallow). - Oral    dicyclomine (BENTYL) capsule 10 mg 10 mg 2 Times Daily 9/14/2017     Sig - Route: Take 1 capsule by mouth 2 (Two) Times a Day. - Oral    doxepin (SINEquan) capsule 50 mg 50 mg Nightly 9/14/2017     Sig - Route: Take 2 capsules by mouth Every Night. - Oral    doxycycline (MONODOX) capsule 100 mg 100 mg Every 12 Hours Scheduled 9/20/2017 10/4/2017    Sig - Route: Take 1 capsule by mouth Every 12 (Twelve) Hours. - Oral    furosemide (LASIX) tablet 20 mg 20 mg Daily 9/19/2017     Sig - Route: Take 1 tablet by mouth Daily. - Oral    Cosign for Ordering: Required by Memo Quarles MD    gabapentin (NEURONTIN) capsule 400 mg 400 mg Every 8 Hours Scheduled 9/14/2017     Sig - Route: Take 1 capsule by mouth Every 8 (Eight) Hours. - Oral    glucagon (GLUCAGEN) injection 1 mg 1 mg Every 15 Minutes PRN 9/13/2017     Sig - Route: Inject 1 mg under the skin Every 15 (Fifteen) Minutes As Needed (Blood Glucose Less Than 70 - Patient Without IV Access - Unresponsive, NPO or Unable To Safely Swallow). - Subcutaneous    glucagon (human recombinant) (GLUCAGEN DIAGNOSTIC) injection 1 mg 1 mg Every 15 Minutes PRN 9/14/2017     Sig - Route: Inject 1 mg under the skin Every 15 (Fifteen) Minutes As Needed (Blood Glucose Less Than 70 - Patient Without IV Access - Unresponsive, NPO or Unable To Safely Swallow). - Subcutaneous    HYDROmorphone (DILAUDID) injection 1 mg 1 mg Once 9/14/2017     Sig - Route: Infuse 1 mL into a venous catheter 1 (One) Time. - Intravenous    insulin aspart (novoLOG) injection 0-14 Units 0-14 Units 4 Times Daily Before Meals & Nightly 9/14/2017     Sig - Route: Inject 0-14 Units under the skin 4 (Four) Times a Day Before Meals & at Bedtime. - Subcutaneous    insulin aspart (novoLOG) injection 5 Units 5 Units 3 Times  "Daily With Meals 9/19/2017     Sig - Route: Inject 5 Units under the skin 3 (Three) Times a Day With Meals. - Subcutaneous    insulin detemir (LEVEMIR) injection 10 Units 10 Units Nightly 9/19/2017     Sig - Route: Inject 10 Units under the skin Every Night. - Subcutaneous    ipratropium-albuterol (DUO-NEB) nebulizer solution 3 mL 3 mL Every 6 Hours PRN 9/13/2017     Sig - Route: Take 3 mL by nebulization Every 6 (Six) Hours As Needed for Shortness of Air. - Nebulization    magnesium hydroxide (MILK OF MAGNESIA) suspension 2400 mg/10mL 10 mL 10 mL Daily PRN 9/16/2017     Sig - Route: Take 10 mL by mouth Daily As Needed for Constipation. - Oral    magnesium oxide (MAGOX) tablet 200 mg 200 mg 2 Times Daily 9/14/2017     Sig - Route: Take 0.5 tablets by mouth 2 (Two) Times a Day. - Oral    Magnesium Sulfate 2 gram Bolus, followed by 8 gram infusion (total Mg dose 10 grams)- Mg less than or equal to 1mg/dL 2 g As Needed 9/14/2017     Sig - Route: Infuse 50 mL into a venous catheter As Needed (Mg less than or equal to 1mg/dL). - Intravenous    Linked Group 1:  \"Or\" Linked Group Details        magnesium sulfate 4 gram infusion- Mg 1.6-1.9 mg/dL 4 g As Needed 9/14/2017     Sig - Route: Infuse 100 mL into a venous catheter As Needed (Mg 1.6-1.9 mg/dL). - Intravenous    Linked Group 1:  \"Or\" Linked Group Details        magnesium sulfate 4 gram infusion- Mg 1.6-1.9 mg/dL 4 g Once 9/20/2017     Sig - Route: Infuse 100 mL into a venous catheter 1 (One) Time. - Intravenous    Magnesium Sulfate 6 gram Infusion (2 gm x 3) -Mg 1.1 -1.5 mg/dL 2 g As Needed 9/14/2017     Sig - Route: Infuse 50 mL into a venous catheter As Needed (Mg 1.1 -1.5 mg/dL). - Intravenous    Linked Group 1:  \"Or\" Linked Group Details        Magnesium Sulfate 6 gram Infusion (2 gm x 3) -Mg 1.1 -1.5 mg/dL 2 g Every 2 Hours 9/19/2017 9/19/2017    Sig - Route: Infuse 50 mL into a venous catheter Every 2 (Two) Hours. - Intravenous    metoprolol tartrate " "(LOPRESSOR) tablet 50 mg 50 mg Daily 9/14/2017     Sig - Route: Take 1 tablet by mouth Daily. - Oral    nitroglycerin (NITROSTAT) SL tablet 0.4 mg 0.4 mg Every 5 Minutes PRN 9/13/2017     Sig - Route: Place 1 tablet under the tongue Every 5 (Five) Minutes As Needed for Chest Pain. - Sublingual    pancrelipase (Lip-Prot-Amyl) (CREON) capsule 24,000 units of lipase 24,000 units of lipase 4 Times Daily With Meals & Nightly 9/14/2017     Sig - Route: Take 2 capsules by mouth 4 (Four) Times a Day With Meals & at Bedtime. - Oral    pantoprazole (PROTONIX) EC tablet 40 mg 40 mg Daily 9/14/2017     Sig - Route: Take 1 tablet by mouth Daily. - Oral    Pharmacy Consult  Continuous PRN 9/13/2017     Sig - Route: Continuous As Needed for Consult. - Does not apply    phytonadione (AQUA-MEPHYTON, VITAMIN K) injection 5 mg 5 mg Once 9/19/2017 9/19/2017    Sig - Route: Take 0.5 mL by mouth 1 (One) Time. - Oral    polyethylene glycol (MIRALAX) powder 17 g 17 g 2 Times Daily 9/14/2017     Sig - Route: Take 17 g by mouth 2 (Two) Times a Day. - Oral    sodium chloride 0.9 % flush 1-10 mL 1-10 mL As Needed 9/13/2017     Sig - Route: Infuse 1-10 mL into a venous catheter As Needed for Line Care. - Intravenous    sodium chloride 0.9 % flush 1-10 mL 1-10 mL As Needed 9/13/2017     Sig - Route: Infuse 1-10 mL into a venous catheter As Needed for Line Care. - Intravenous    sodium chloride 0.9 % flush 10 mL 10 mL As Needed 9/13/2017     Sig - Route: Infuse 10 mL into a venous catheter As Needed for Line Care. - Intravenous    Cosign for Ordering: Accepted by Elgin Daniels MD on 9/13/2017  6:33 PM    Linked Group 2:  \"And\" Linked Group Details        HYDROcodone-acetaminophen (NORCO) 5-325 MG per tablet 1 tablet (Discontinued) 1 tablet Every 8 Hours PRN 9/18/2017 9/20/2017    Sig - Route: Take 1 tablet by mouth Every 8 (Eight) Hours As Needed for Moderate Pain . - Oral    vancomycin (VANCOCIN) 1,500 mg in sodium chloride 0.9 % 500 mL " IVPB (Discontinued) 1,500 mg Every 8 Hours 9/14/2017 9/20/2017    Sig - Route: Infuse 1,500 mg into a venous catheter Every 8 (Eight) Hours. - Intravenous    Notes to Pharmacy: RN started to hang bag at 1151, but patient lost IV access.  RN called at approx 1500 and said patient also had Zosyn and Magnesium IVs due.  Patient did not receive 1151 dose of Vanco, so rescheduled dose to start at 1600.            Lab Results (last 24 hours)     Procedure Component Value Units Date/Time    BNP [[id number]]  (Abnormal) Collected:  09/19/17 1524    Specimen:  Blood Updated:  09/19/17 1556     .0 (H) pg/mL     CBC & Differential [[id number]] Collected:  09/19/17 1245    Specimen:  Blood Updated:  09/19/17 1603    Narrative:       The following orders were created for panel order CBC & Differential.  Procedure                               Abnormality         Status                     ---------                               -----------         ------                     Scan Slide[[id number]]                                                                  CBC Auto Differential[[id number]]        Abnormal            Final result                 Please view results for these tests on the individual orders.    POC Glucose Fingerstick [[id number]]  (Abnormal) Collected:  09/19/17 1609    Specimen:  Blood Updated:  09/19/17 1615     Glucose 295 (H) mg/dL     Narrative:       Meter: [id number] : [id number] raoul [employer]    POC Glucose Fingerstick [[id number]]  (Abnormal) Collected:  09/19/17 2054    Specimen:  Blood Updated:  09/19/17 2100     Glucose 243 (H) mg/dL     Narrative:       Meter: [id number] : [id number] rafia [employer]    POC Glucose Fingerstick [[id number]]  (Abnormal) Collected:  09/20/17 0730    Specimen:  Blood Updated:  09/20/17 0738     Glucose 176 (H) mg/dL     Narrative:       Meter: [id number] : [id number] lexis disla    CBC & Differential [[id number]] Collected:  09/20/17 0751    Specimen:   Blood Updated:  09/20/17 0840    Narrative:       The following orders were created for panel order CBC & Differential.  Procedure                               Abnormality         Status                     ---------                               -----------         ------                     CBC Auto Differential[[id number]]        Abnormal            Final result                 Please view results for these tests on the individual orders.    CBC Auto Differential [[id number]]  (Abnormal) Collected:  09/20/17 0751    Specimen:  Blood Updated:  09/20/17 0840     WBC 5.94 10*3/mm3      RBC 4.05 (L) 10*6/mm3      Hemoglobin 12.3 (L) g/dL      Hematocrit 37.7 (L) %      MCV 93.1 fL      MCH 30.4 pg      MCHC 32.6 (L) g/dL      RDW 14.7 (H) %      RDW-SD 48.2 fl      MPV 11.2 (H) fL      Platelets 94 (L) 10*3/mm3      Neutrophil % 71.2 (H) %      Lymphocyte % 17.2 (L) %      Monocyte % 8.8 %      Eosinophil % 2.4 %      Basophil % 0.2 %      Immature Grans % 0.2 %      Neutrophils, Absolute 4.24 10*3/mm3      Lymphocytes, Absolute 1.02 10*3/mm3      Monocytes, Absolute 0.52 10*3/mm3      Eosinophils, Absolute 0.14 10*3/mm3      Basophils, Absolute 0.01 10*3/mm3      Immature Grans, Absolute 0.01 10*3/mm3     Phosphorus [[id number]]  (Normal) Collected:  09/20/17 0751    Specimen:  Blood Updated:  09/20/17 0859     Phosphorus 3.2 mg/dL     Basic Metabolic Panel [[id number]]  (Abnormal) Collected:  09/20/17 0751    Specimen:  Blood Updated:  09/20/17 0859     Glucose 182 (H) mg/dL      BUN 10 mg/dL      Creatinine 0.57 mg/dL      Sodium 138 mmol/L      Potassium 4.0 mmol/L      Chloride 107 mmol/L      CO2 29.1 mmol/L      Calcium 8.4 mg/dL      eGFR Non African Amer 150 mL/min/1.73      BUN/Creatinine Ratio 17.5     Anion Gap 1.9 (L) mmol/L     Narrative:       GFR Normal >60  Chronic Kidney Disease <60  Kidney Failure <15    C-reactive Protein [[id number]]  (Abnormal) Collected:  09/20/17 0751    Specimen:  Blood Updated:   09/20/17 0859     C-Reactive Protein 2.11 (H) mg/dL     Magnesium [[id number]]  (Normal) Collected:  09/20/17 0751    Specimen:  Blood Updated:  09/20/17 0859     Magnesium 1.8 mg/dL     Osmolality, Calculated [[id number]]  (Normal) Collected:  09/20/17 0751    Specimen:  Blood Updated:  09/20/17 0859     Osmolality Calc 279.4 mOsm/kg     Protime-INR [[id number]]  (Abnormal) Collected:  09/20/17 0751    Specimen:  Blood Updated:  09/20/17 0912     Protime 17.4 (H) Seconds       Note new Reference Range        INR 1.41 (H)    Narrative:       Suggested INR therapeutic range for stable oral anticoagulant therapy:    Low Intensity therapy:   1.5-2.0  Moderate Intensity therapy:   2.0-3.0  High Intensity therapy:   2.5-4.0    POC Glucose Fingerstick [[id number]]  (Abnormal) Collected:  09/20/17 1134    Specimen:  Blood Updated:  09/20/17 1143     Glucose 134 (H) mg/dL     Narrative:       Meter: [id number] : [id number] pires nighat        Imaging Results (last 24 hours)     Procedure Component Value Units Date/Time    XR Chest 1 View [[id number]] Collected:  09/19/17 1549     Updated:  09/19/17 1552    Narrative:       XR CHEST 1 VW-     CLINICAL INDICATION: SOA; L02.512-Cutaneous abscess of left hand          COMPARISON: 9/14/2017.     TECHNIQUE: Single frontal view of the chest.     FINDINGS:     There is no focal alveolar infiltrate or effusion.  The cardiac silhouette is normal. The pulmonary vasculature is  unremarkable.  There is no evidence of an acute osseous abnormality.   There are no suspicious-appearing parenchymal soft tissue nodules.            Impression:       No evidence of active or acute cardiopulmonary disease on today's chest  radiograph.         This report was finalized on 9/19/2017 3:49 PM by Dr. Jersey Maher MD.       US Abdomen Limited [[id number]] Collected:  09/20/17 0806     Updated:  09/20/17 0813    Narrative:       US ABDOMEN LIMITED-      CLINICAL INDICATION: Evaluate for ascites;  "L02.512-Cutaneous abscess of  left hand.          COMPARISON: None available.     FINDINGS: Sonographic imaging of the abdomen shows no drainable fluid  within the abdomen.       Impression:       Possibly small volume ascites but no drainable collections  identified.      This report was finalized on 9/20/2017 8:11 AM by Dr. Jersey Maher MD.           ECG/EMG Results (last 24 hours)     ** No results found for the last 24 hours. **           Physician Progress Notes (last 24 hours) (Notes from 9/19/2017  2:42 PM through 9/20/2017  2:42 PM)      Tamara Bobby MD at 9/20/2017 11:10 AM  Version 2 of 2           I have personally seen and examined the patient today and discussed overnight interval progress and pertinent issues with nursing staff.    Subjective:    Drowsy but awake.  Continues to complain of left hand pain.  Dressing removed serosanguineous drainage noted, erythema and edema.  He can be and his thumb some but not totally due to edema.  Nursing reports that patient has been refusing some doses of vancomycin he is stating that he is getting too much fluid.  CRP 2.11.  Slightly elevated from yesterday.  Wound culture finalized as MRSA.    History taken from: patient chart RN      Vital Signs    /72 (BP Location: Left arm, Patient Position: Sitting)  Pulse 100  Temp 98.9 °F (37.2 °C) (Oral)   Resp 20  Ht 68\" (172.7 cm)  Wt 215 lb 1.6 oz (97.6 kg)  SpO2 97%  BMI 32.71 kg/m2    Temp:  [97.8 °F (36.6 °C)-98.9 °F (37.2 °C)] 98.9 °F (37.2 °C)      Intake/Output Summary (Last 24 hours) at 09/20/17 1110  Last data filed at 09/20/17 0900   Gross per 24 hour   Intake             1420 ml   Output              725 ml   Net              695 ml     Intake & Output (last 3 days)       09/17 0701 - 09/18 0700 09/18 0701 - 09/19 0700 09/19 0701 - 09/20 0700 09/20 0701 - 09/21 0700    P.O. 1420 720 940 480    IV Piggyback 500 500      Total Intake(mL/kg) 1920 (20.3) 1220 (12.9) 940 (9.6) 480 (4.9)    " Urine (mL/kg/hr)  400 (0.2) 500 (0.2) 425 (1)    Total Output   400 500 425    Net +1920 +820 +440 +55            Unmeasured Urine Occurrence 7 x 3 x      Unmeasured Stool Occurrence 0 x             Physical exam:    General Appearance:    Alert, cooperative, in no acute distress   Head:    Normocephalic, without obvious abnormality, atraumatic   Eyes:            Lids and lashes normal, conjunctivae and sclerae normal, no   icterus, no pallor, corneas clear, PERRLA   Ears:    Ears appear intact with no abnormalities noted   Throat:   No oral lesions, no thrush, oral mucosa moist   Neck:   No adenopathy, supple, trachea midline, no thyromegaly, no   carotid bruit, no JVD   Back:     No tenderness to percussion or palpation, range of motion   normal   Lungs:     Clear to auscultation,respirations regular, even and unlabored. No wheezing, no ronchi and no crackles.    Heart:    Regular rhythm and normal rate, normal S1 and S2, no            murmur, no gallop, no rub, no click   Chest Wall:    No abnormalities observed   Abdomen:     Normal bowel sounds, no masses, no organomegaly, soft        non-tender, non-distended, no guarding, no rebound                tenderness   Rectal:     Deferred   Extremities: left thumb, post I & D, pain   Pulses:   Pulses palpable and equal bilaterally   Skin:   No bleeding, bruising or rash   Lymph nodes:   No palpable adenopathy   Neurologic:   Awake, alert and oriented x 3. Following commands.           Results:      Results from last 7 days  Lab Units 09/20/17  0751 09/19/17  1245 09/18/17  0323 09/17/17  0107 09/16/17  0126 09/15/17  0415 09/14/17  0040   WBC 10*3/mm3 5.94 3.02* 2.97* 2.57* 2.33* 2.74* 3.60*     Lab Results   Component Value Date    NEUTROABS 4.24 09/20/2017         Results from last 7 days  Lab Units 09/20/17  0751   CREATININE mg/dL 0.57         Results from last 7 days  Lab Units 09/20/17  0751 09/19/17  0723 09/18/17  0323   CRP mg/dL 2.11* 1.27* 0.95        Imaging Results (last 24 hours)     Procedure Component Value Units Date/Time    XR Chest 1 View [[id number]] Collected:  09/19/17 1549     Updated:  09/19/17 1552    Narrative:       XR CHEST 1 VW-     CLINICAL INDICATION: SOA; L02.512-Cutaneous abscess of left hand          COMPARISON: 9/14/2017.     TECHNIQUE: Single frontal view of the chest.     FINDINGS:     There is no focal alveolar infiltrate or effusion.  The cardiac silhouette is normal. The pulmonary vasculature is  unremarkable.  There is no evidence of an acute osseous abnormality.   There are no suspicious-appearing parenchymal soft tissue nodules.            Impression:       No evidence of active or acute cardiopulmonary disease on today's chest  radiograph.         This report was finalized on 9/19/2017 3:49 PM by Dr. Jersey Maher MD.       US Abdomen Limited [[id number]] Collected:  09/20/17 0806     Updated:  09/20/17 0813    Narrative:       US ABDOMEN LIMITED-      CLINICAL INDICATION: Evaluate for ascites; L02.512-Cutaneous abscess of  left hand.          COMPARISON: None available.     FINDINGS: Sonographic imaging of the abdomen shows no drainable fluid  within the abdomen.       Impression:       Possibly small volume ascites but no drainable collections  identified.      This report was finalized on 9/20/2017 8:11 AM by Dr. Jersey Maher MD.               Results Review:    I have personally reviewed laboratory data, culture results, radiology studies and antimicrobial therapy.    Hospital Medications (active)       Dose Frequency Start End    atorvastatin (LIPITOR) tablet 20 mg 20 mg Nightly 9/14/2017     Sig - Route: Take 1 tablet by mouth Every Night. - Oral    dextrose (D50W) solution 25 g 25 g Every 15 Minutes PRN 9/13/2017     Sig - Route: Infuse 50 mL into a venous catheter Every 15 (Fifteen) Minutes As Needed for Low Blood Sugar (Blood Sugar Less Than 70, Patient Has IV Access - Unresponsive, NPO or Unable To Safely Swallow).  - Intravenous    dextrose (D50W) solution 25 g 25 g Every 15 Minutes PRN 9/14/2017     Sig - Route: Infuse 50 mL into a venous catheter Every 15 (Fifteen) Minutes As Needed for Low Blood Sugar (Blood Sugar Less Than 70, Patient Has IV Access - Unresponsive, NPO or Unable To Safely Swallow). - Intravenous    dextrose (GLUTOSE) oral gel 15 g 15 g Every 15 Minutes PRN 9/13/2017     Sig - Route: Take 15 g by mouth Every 15 (Fifteen) Minutes As Needed for Low Blood Sugar (Blood Sugar Less Than 70, Patient Alert, Is Not NPO & Can Safely Swallow). - Oral    dextrose (GLUTOSE) oral gel 15 g 15 g Every 15 Minutes PRN 9/14/2017     Sig - Route: Take 15 g by mouth Every 15 (Fifteen) Minutes As Needed for Low Blood Sugar (Blood Sugar Less Than 70, Patient Alert, Is Not NPO & Can Safely Swallow). - Oral    dicyclomine (BENTYL) capsule 10 mg 10 mg 2 Times Daily 9/14/2017     Sig - Route: Take 1 capsule by mouth 2 (Two) Times a Day. - Oral    doxepin (SINEquan) capsule 50 mg 50 mg Nightly 9/14/2017     Sig - Route: Take 2 capsules by mouth Every Night. - Oral    gabapentin (NEURONTIN) capsule 400 mg 400 mg Every 8 Hours Scheduled 9/14/2017     Sig - Route: Take 1 capsule by mouth Every 8 (Eight) Hours. - Oral    glucagon (GLUCAGEN) injection 1 mg 1 mg Every 15 Minutes PRN 9/13/2017     Sig - Route: Inject 1 mg under the skin Every 15 (Fifteen) Minutes As Needed (Blood Glucose Less Than 70 - Patient Without IV Access - Unresponsive, NPO or Unable To Safely Swallow). - Subcutaneous    glucagon (human recombinant) (GLUCAGEN DIAGNOSTIC) injection 1 mg 1 mg Every 15 Minutes PRN 9/14/2017     Sig - Route: Inject 1 mg under the skin Every 15 (Fifteen) Minutes As Needed (Blood Glucose Less Than 70 - Patient Without IV Access - Unresponsive, NPO or Unable To Safely Swallow). - Subcutaneous    HYDROcodone-acetaminophen (NORCO) 5-325 MG per tablet 1 tablet 1 tablet Every 8 Hours PRN 9/18/2017     Sig - Route: Take 1 tablet by mouth Every 8  "(Eight) Hours As Needed for Moderate Pain . - Oral    HYDROmorphone (DILAUDID) injection 1 mg 1 mg Once 9/14/2017     Sig - Route: Infuse 1 mL into a venous catheter 1 (One) Time. - Intravenous    insulin aspart (novoLOG) injection 0-14 Units 0-14 Units 4 Times Daily Before Meals & Nightly 9/14/2017     Sig - Route: Inject 0-14 Units under the skin 4 (Four) Times a Day Before Meals & at Bedtime. - Subcutaneous    insulin detemir (LEVEMIR) injection 5 Units 5 Units Nightly 9/14/2017     Sig - Route: Inject 5 Units under the skin Every Night. - Subcutaneous    ipratropium-albuterol (DUO-NEB) nebulizer solution 3 mL 3 mL Every 6 Hours PRN 9/13/2017     Sig - Route: Take 3 mL by nebulization Every 6 (Six) Hours As Needed for Shortness of Air. - Nebulization    magnesium hydroxide (MILK OF MAGNESIA) suspension 2400 mg/10mL 10 mL 10 mL Daily PRN 9/16/2017     Sig - Route: Take 10 mL by mouth Daily As Needed for Constipation. - Oral    magnesium oxide (MAGOX) tablet 200 mg 200 mg 2 Times Daily 9/14/2017     Sig - Route: Take 0.5 tablets by mouth 2 (Two) Times a Day. - Oral    Magnesium Sulfate 2 gram Bolus, followed by 8 gram infusion (total Mg dose 10 grams)- Mg less than or equal to 1mg/dL 2 g As Needed 9/14/2017     Sig - Route: Infuse 50 mL into a venous catheter As Needed (Mg less than or equal to 1mg/dL). - Intravenous    Linked Group 1:  \"Or\" Linked Group Details        magnesium sulfate 4 gram infusion- Mg 1.6-1.9 mg/dL 4 g As Needed 9/14/2017     Sig - Route: Infuse 100 mL into a venous catheter As Needed (Mg 1.6-1.9 mg/dL). - Intravenous    Linked Group 1:  \"Or\" Linked Group Details        Magnesium Sulfate 6 gram Infusion (2 gm x 3) -Mg 1.1 -1.5 mg/dL 2 g As Needed 9/14/2017     Sig - Route: Infuse 50 mL into a venous catheter As Needed (Mg 1.1 -1.5 mg/dL). - Intravenous    Linked Group 1:  \"Or\" Linked Group Details        Magnesium Sulfate 6 gram Infusion (2 gm x 3) -Mg 1.1 -1.5 mg/dL 2 g Every 2 Hours " "9/19/2017 9/19/2017    Sig - Route: Infuse 50 mL into a venous catheter Every 2 (Two) Hours. - Intravenous    metoprolol tartrate (LOPRESSOR) tablet 50 mg 50 mg Daily 9/14/2017     Sig - Route: Take 1 tablet by mouth Daily. - Oral    nitroglycerin (NITROSTAT) SL tablet 0.4 mg 0.4 mg Every 5 Minutes PRN 9/13/2017     Sig - Route: Place 1 tablet under the tongue Every 5 (Five) Minutes As Needed for Chest Pain. - Sublingual    pancrelipase (Lip-Prot-Amyl) (CREON) capsule 24,000 units of lipase 24,000 units of lipase 4 Times Daily With Meals & Nightly 9/14/2017     Sig - Route: Take 2 capsules by mouth 4 (Four) Times a Day With Meals & at Bedtime. - Oral    pantoprazole (PROTONIX) EC tablet 40 mg 40 mg Daily 9/14/2017     Sig - Route: Take 1 tablet by mouth Daily. - Oral    Pharmacy Consult  Continuous PRN 9/13/2017     Sig - Route: Continuous As Needed for Consult. - Does not apply    polyethylene glycol (MIRALAX) powder 17 g 17 g 2 Times Daily 9/14/2017     Sig - Route: Take 17 g by mouth 2 (Two) Times a Day. - Oral    sodium chloride 0.9 % flush 1-10 mL 1-10 mL As Needed 9/13/2017     Sig - Route: Infuse 1-10 mL into a venous catheter As Needed for Line Care. - Intravenous    sodium chloride 0.9 % flush 1-10 mL 1-10 mL As Needed 9/13/2017     Sig - Route: Infuse 1-10 mL into a venous catheter As Needed for Line Care. - Intravenous    sodium chloride 0.9 % flush 10 mL 10 mL As Needed 9/13/2017     Sig - Route: Infuse 10 mL into a venous catheter As Needed for Line Care. - Intravenous    Cosign for Ordering: Accepted by Elgin Daniels MD on 9/13/2017  6:33 PM    Linked Group 2:  \"And\" Linked Group Details        vancomycin (VANCOCIN) 1,500 mg in sodium chloride 0.9 % 500 mL IVPB 1,500 mg Every 8 Hours 9/14/2017     Sig - Route: Infuse 1,500 mg into a venous catheter Every 8 (Eight) Hours. - Intravenous    Notes to Pharmacy: RN started to hang bag at 1151, but patient lost IV access.  RN called at approx 1500 and " said patient also had Zosyn and Magnesium IVs due.  Patient did not receive 1151 dose of Vanco, so rescheduled dose to start at 1600.    metoclopramide (REGLAN) tablet 10 mg (Discontinued) 10 mg 2 Times Daily Before Meals 9/14/2017 9/18/2017    Sig - Route: Take 1 tablet by mouth 2 (Two) Times a Day Before Meals. - Oral            Cultures:    Blood Culture   Date Value Ref Range Status   09/13/2017 No growth at 5 days  Final   09/13/2017 No growth at 5 days  Final     Urine Culture   Date Value Ref Range Status   09/13/2017 <10,000 CFU/mL Normal Urogenital Mercedes  Final     Wound Culture   Date Value Ref Range Status   09/13/2017 Scant growth (1+) Staphylococcus aureus, MRSA (C)  Final     Comment:     This isolate does not demonstrate inducible clindamycin resistance in vitro.  Methicillin resistant Staphylococcus aureus, Patient may be an isolation risk.       PROBLEM LIST:    Patient Active Problem List   Diagnosis   • MDD (major depressive disorder), recurrent episode, moderate   • Type 1 diabetes mellitus   • 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   • Cellulitis of right hand   • Cellulitis of right hand excluding fingers and thumb   • Septic shock   • Sepsis   • Abscess of left hand including fingers   • Abscess of left thumb   • IV drug abuse   • Gastroesophageal reflux disease with esophagitis   • Chronic viral hepatitis B without delta agent and without coma   • Chronic hepatitis C without hepatic coma       Assessment/Plan     ASSESSMENT:    # 1 Left thumb abscess R/T IV drug use    PLAN:    Drowsy but awake.  Continues to complain of left hand pain.  Dressing removed serosanguineous drainage noted, erythema and edema.  He can be and his thumb some but not totally due to edema.  Nursing reports that patient has been refusing some doses of vancomycin he is stating that he is getting too much fluid.  CRP 2.11.  Slightly elevated from yesterday.  Wound culture finalized as MRSA.    X-ray from 9/14/17 reports no acute or  "destructive bony abnormalities.  Continue with vancomycin.  Would recommend to consider doxycycline 100 mg by mouth twice a day ×14 days to complete through October 1, 2017 if no underlying osteomyelitis suspected.    Educated patient of the importance of complying with treatment plan.  Explained that if he didn't keep on schedule with his antibiotic treatment that it wouldn't stay therapeutic in his system and resolve the infection which would therefore increase his risk of loss of digit as well as further infection complications.  Nurse at bedside during this discussion.       Patient's findings and recommendations were discussed with patient, nursing staff and primary care team    Code Status: Full Code    NOHEMI Arias  09/20/17  11:10 AM     Electronically signed by Tamara Bobby MD at 9/20/2017 11:44 AM      NOHEMI Arias at 9/20/2017 11:10 AM  Version 1 of 2           I have personally seen and examined the patient today and discussed overnight interval progress and pertinent issues with nursing staff.    Subjective:    Drowsy but awake.  Continues to complain of left hand pain.  Dressing removed serosanguineous drainage noted, erythema and edema.  He can be and his thumb some but not totally due to edema.  Nursing reports that patient has been refusing some doses of vancomycin he is stating that he is getting too much fluid.  CRP 2.11.  Slightly elevated from yesterday.  Wound culture finalized as MRSA.    History taken from: patient chart RN      Vital Signs    /72 (BP Location: Left arm, Patient Position: Sitting)  Pulse 100  Temp 98.9 °F (37.2 °C) (Oral)   Resp 20  Ht 68\" (172.7 cm)  Wt 215 lb 1.6 oz (97.6 kg)  SpO2 97%  BMI 32.71 kg/m2    Temp:  [97.8 °F (36.6 °C)-98.9 °F (37.2 °C)] 98.9 °F (37.2 °C)      Intake/Output Summary (Last 24 hours) at 09/20/17 1110  Last data filed at 09/20/17 0900   Gross per 24 hour   Intake             1420 ml   Output              725 ml "   Net              695 ml     Intake & Output (last 3 days)       09/17 0701 - 09/18 0700 09/18 0701 - 09/19 0700 09/19 0701 - 09/20 0700 09/20 0701 - 09/21 0700    P.O. 1420 720 940 480    IV Piggyback 500 500      Total Intake(mL/kg) 1920 (20.3) 1220 (12.9) 940 (9.6) 480 (4.9)    Urine (mL/kg/hr)  400 (0.2) 500 (0.2) 425 (1)    Total Output   400 500 425    Net +1920 +820 +440 +55            Unmeasured Urine Occurrence 7 x 3 x      Unmeasured Stool Occurrence 0 x             Physical exam:    General Appearance:    Alert, cooperative, in no acute distress   Head:    Normocephalic, without obvious abnormality, atraumatic   Eyes:            Lids and lashes normal, conjunctivae and sclerae normal, no   icterus, no pallor, corneas clear, PERRLA   Ears:    Ears appear intact with no abnormalities noted   Throat:   No oral lesions, no thrush, oral mucosa moist   Neck:   No adenopathy, supple, trachea midline, no thyromegaly, no   carotid bruit, no JVD   Back:     No tenderness to percussion or palpation, range of motion   normal   Lungs:     Clear to auscultation,respirations regular, even and unlabored. No wheezing, no ronchi and no crackles.    Heart:    Regular rhythm and normal rate, normal S1 and S2, no            murmur, no gallop, no rub, no click   Chest Wall:    No abnormalities observed   Abdomen:     Normal bowel sounds, no masses, no organomegaly, soft        non-tender, non-distended, no guarding, no rebound                tenderness   Rectal:     Deferred   Extremities: left thumb, post I & D, pain   Pulses:   Pulses palpable and equal bilaterally   Skin:   No bleeding, bruising or rash   Lymph nodes:   No palpable adenopathy   Neurologic:   Awake, alert and oriented x 3. Following commands.           Results:      Results from last 7 days  Lab Units 09/20/17  0751 09/19/17  1245 09/18/17  0323 09/17/17  0107 09/16/17  0126 09/15/17  0415 09/14/17  0040   WBC 10*3/mm3 5.94 3.02* 2.97* 2.57* 2.33* 2.74*  3.60*     Lab Results   Component Value Date    NEUTROABS 4.24 09/20/2017         Results from last 7 days  Lab Units 09/20/17  0751   CREATININE mg/dL 0.57         Results from last 7 days  Lab Units 09/20/17  0751 09/19/17  0723 09/18/17  0323   CRP mg/dL 2.11* 1.27* 0.95       Imaging Results (last 24 hours)     Procedure Component Value Units Date/Time    XR Chest 1 View [[id number]] Collected:  09/19/17 1549     Updated:  09/19/17 1552    Narrative:       XR CHEST 1 VW-     CLINICAL INDICATION: SOA; L02.512-Cutaneous abscess of left hand          COMPARISON: 9/14/2017.     TECHNIQUE: Single frontal view of the chest.     FINDINGS:     There is no focal alveolar infiltrate or effusion.  The cardiac silhouette is normal. The pulmonary vasculature is  unremarkable.  There is no evidence of an acute osseous abnormality.   There are no suspicious-appearing parenchymal soft tissue nodules.            Impression:       No evidence of active or acute cardiopulmonary disease on today's chest  radiograph.         This report was finalized on 9/19/2017 3:49 PM by Dr. Jersey Maher MD.       US Abdomen Limited [[id number]] Collected:  09/20/17 0806     Updated:  09/20/17 0813    Narrative:       US ABDOMEN LIMITED-      CLINICAL INDICATION: Evaluate for ascites; L02.512-Cutaneous abscess of  left hand.          COMPARISON: None available.     FINDINGS: Sonographic imaging of the abdomen shows no drainable fluid  within the abdomen.       Impression:       Possibly small volume ascites but no drainable collections  identified.      This report was finalized on 9/20/2017 8:11 AM by Dr. Jersey Maher MD.               Results Review:    I have personally reviewed laboratory data, culture results, radiology studies and antimicrobial therapy.    Hospital Medications (active)       Dose Frequency Start End    atorvastatin (LIPITOR) tablet 20 mg 20 mg Nightly 9/14/2017     Sig - Route: Take 1 tablet by mouth Every Night. - Oral     dextrose (D50W) solution 25 g 25 g Every 15 Minutes PRN 9/13/2017     Sig - Route: Infuse 50 mL into a venous catheter Every 15 (Fifteen) Minutes As Needed for Low Blood Sugar (Blood Sugar Less Than 70, Patient Has IV Access - Unresponsive, NPO or Unable To Safely Swallow). - Intravenous    dextrose (D50W) solution 25 g 25 g Every 15 Minutes PRN 9/14/2017     Sig - Route: Infuse 50 mL into a venous catheter Every 15 (Fifteen) Minutes As Needed for Low Blood Sugar (Blood Sugar Less Than 70, Patient Has IV Access - Unresponsive, NPO or Unable To Safely Swallow). - Intravenous    dextrose (GLUTOSE) oral gel 15 g 15 g Every 15 Minutes PRN 9/13/2017     Sig - Route: Take 15 g by mouth Every 15 (Fifteen) Minutes As Needed for Low Blood Sugar (Blood Sugar Less Than 70, Patient Alert, Is Not NPO & Can Safely Swallow). - Oral    dextrose (GLUTOSE) oral gel 15 g 15 g Every 15 Minutes PRN 9/14/2017     Sig - Route: Take 15 g by mouth Every 15 (Fifteen) Minutes As Needed for Low Blood Sugar (Blood Sugar Less Than 70, Patient Alert, Is Not NPO & Can Safely Swallow). - Oral    dicyclomine (BENTYL) capsule 10 mg 10 mg 2 Times Daily 9/14/2017     Sig - Route: Take 1 capsule by mouth 2 (Two) Times a Day. - Oral    doxepin (SINEquan) capsule 50 mg 50 mg Nightly 9/14/2017     Sig - Route: Take 2 capsules by mouth Every Night. - Oral    gabapentin (NEURONTIN) capsule 400 mg 400 mg Every 8 Hours Scheduled 9/14/2017     Sig - Route: Take 1 capsule by mouth Every 8 (Eight) Hours. - Oral    glucagon (GLUCAGEN) injection 1 mg 1 mg Every 15 Minutes PRN 9/13/2017     Sig - Route: Inject 1 mg under the skin Every 15 (Fifteen) Minutes As Needed (Blood Glucose Less Than 70 - Patient Without IV Access - Unresponsive, NPO or Unable To Safely Swallow). - Subcutaneous    glucagon (human recombinant) (GLUCAGEN DIAGNOSTIC) injection 1 mg 1 mg Every 15 Minutes PRN 9/14/2017     Sig - Route: Inject 1 mg under the skin Every 15 (Fifteen) Minutes As  "Needed (Blood Glucose Less Than 70 - Patient Without IV Access - Unresponsive, NPO or Unable To Safely Swallow). - Subcutaneous    HYDROcodone-acetaminophen (NORCO) 5-325 MG per tablet 1 tablet 1 tablet Every 8 Hours PRN 9/18/2017     Sig - Route: Take 1 tablet by mouth Every 8 (Eight) Hours As Needed for Moderate Pain . - Oral    HYDROmorphone (DILAUDID) injection 1 mg 1 mg Once 9/14/2017     Sig - Route: Infuse 1 mL into a venous catheter 1 (One) Time. - Intravenous    insulin aspart (novoLOG) injection 0-14 Units 0-14 Units 4 Times Daily Before Meals & Nightly 9/14/2017     Sig - Route: Inject 0-14 Units under the skin 4 (Four) Times a Day Before Meals & at Bedtime. - Subcutaneous    insulin detemir (LEVEMIR) injection 5 Units 5 Units Nightly 9/14/2017     Sig - Route: Inject 5 Units under the skin Every Night. - Subcutaneous    ipratropium-albuterol (DUO-NEB) nebulizer solution 3 mL 3 mL Every 6 Hours PRN 9/13/2017     Sig - Route: Take 3 mL by nebulization Every 6 (Six) Hours As Needed for Shortness of Air. - Nebulization    magnesium hydroxide (MILK OF MAGNESIA) suspension 2400 mg/10mL 10 mL 10 mL Daily PRN 9/16/2017     Sig - Route: Take 10 mL by mouth Daily As Needed for Constipation. - Oral    magnesium oxide (MAGOX) tablet 200 mg 200 mg 2 Times Daily 9/14/2017     Sig - Route: Take 0.5 tablets by mouth 2 (Two) Times a Day. - Oral    Magnesium Sulfate 2 gram Bolus, followed by 8 gram infusion (total Mg dose 10 grams)- Mg less than or equal to 1mg/dL 2 g As Needed 9/14/2017     Sig - Route: Infuse 50 mL into a venous catheter As Needed (Mg less than or equal to 1mg/dL). - Intravenous    Linked Group 1:  \"Or\" Linked Group Details        magnesium sulfate 4 gram infusion- Mg 1.6-1.9 mg/dL 4 g As Needed 9/14/2017     Sig - Route: Infuse 100 mL into a venous catheter As Needed (Mg 1.6-1.9 mg/dL). - Intravenous    Linked Group 1:  \"Or\" Linked Group Details        Magnesium Sulfate 6 gram Infusion (2 gm x 3) -Mg " "1.1 -1.5 mg/dL 2 g As Needed 9/14/2017     Sig - Route: Infuse 50 mL into a venous catheter As Needed (Mg 1.1 -1.5 mg/dL). - Intravenous    Linked Group 1:  \"Or\" Linked Group Details        Magnesium Sulfate 6 gram Infusion (2 gm x 3) -Mg 1.1 -1.5 mg/dL 2 g Every 2 Hours 9/19/2017 9/19/2017    Sig - Route: Infuse 50 mL into a venous catheter Every 2 (Two) Hours. - Intravenous    metoprolol tartrate (LOPRESSOR) tablet 50 mg 50 mg Daily 9/14/2017     Sig - Route: Take 1 tablet by mouth Daily. - Oral    nitroglycerin (NITROSTAT) SL tablet 0.4 mg 0.4 mg Every 5 Minutes PRN 9/13/2017     Sig - Route: Place 1 tablet under the tongue Every 5 (Five) Minutes As Needed for Chest Pain. - Sublingual    pancrelipase (Lip-Prot-Amyl) (CREON) capsule 24,000 units of lipase 24,000 units of lipase 4 Times Daily With Meals & Nightly 9/14/2017     Sig - Route: Take 2 capsules by mouth 4 (Four) Times a Day With Meals & at Bedtime. - Oral    pantoprazole (PROTONIX) EC tablet 40 mg 40 mg Daily 9/14/2017     Sig - Route: Take 1 tablet by mouth Daily. - Oral    Pharmacy Consult  Continuous PRN 9/13/2017     Sig - Route: Continuous As Needed for Consult. - Does not apply    polyethylene glycol (MIRALAX) powder 17 g 17 g 2 Times Daily 9/14/2017     Sig - Route: Take 17 g by mouth 2 (Two) Times a Day. - Oral    sodium chloride 0.9 % flush 1-10 mL 1-10 mL As Needed 9/13/2017     Sig - Route: Infuse 1-10 mL into a venous catheter As Needed for Line Care. - Intravenous    sodium chloride 0.9 % flush 1-10 mL 1-10 mL As Needed 9/13/2017     Sig - Route: Infuse 1-10 mL into a venous catheter As Needed for Line Care. - Intravenous    sodium chloride 0.9 % flush 10 mL 10 mL As Needed 9/13/2017     Sig - Route: Infuse 10 mL into a venous catheter As Needed for Line Care. - Intravenous    Cosign for Ordering: Accepted by Elgin Daniels MD on 9/13/2017  6:33 PM    Linked Group 2:  \"And\" Linked Group Details        vancomycin (VANCOCIN) 1,500 mg in " sodium chloride 0.9 % 500 mL IVPB 1,500 mg Every 8 Hours 9/14/2017     Sig - Route: Infuse 1,500 mg into a venous catheter Every 8 (Eight) Hours. - Intravenous    Notes to Pharmacy: RN started to hang bag at 1151, but patient lost IV access.  RN called at approx 1500 and said patient also had Zosyn and Magnesium IVs due.  Patient did not receive 1151 dose of Vanco, so rescheduled dose to start at 1600.    metoclopramide (REGLAN) tablet 10 mg (Discontinued) 10 mg 2 Times Daily Before Meals 9/14/2017 9/18/2017    Sig - Route: Take 1 tablet by mouth 2 (Two) Times a Day Before Meals. - Oral            Cultures:    Blood Culture   Date Value Ref Range Status   09/13/2017 No growth at 5 days  Final   09/13/2017 No growth at 5 days  Final     Urine Culture   Date Value Ref Range Status   09/13/2017 <10,000 CFU/mL Normal Urogenital Mercedes  Final     Wound Culture   Date Value Ref Range Status   09/13/2017 Scant growth (1+) Staphylococcus aureus, MRSA (C)  Final     Comment:     This isolate does not demonstrate inducible clindamycin resistance in vitro.  Methicillin resistant Staphylococcus aureus, Patient may be an isolation risk.       PROBLEM LIST:    Patient Active Problem List   Diagnosis   • MDD (major depressive disorder), recurrent episode, moderate   • Type 1 diabetes mellitus   • 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   • Cellulitis of right hand   • Cellulitis of right hand excluding fingers and thumb   • Septic shock   • Sepsis   • Abscess of left hand including fingers   • Abscess of left thumb   • IV drug abuse   • Gastroesophageal reflux disease with esophagitis   • Chronic viral hepatitis B without delta agent and without coma   • Chronic hepatitis C without hepatic coma       Assessment/Plan     ASSESSMENT:    # 1 Left thumb abscess R/T IV drug use    PLAN:    Drowsy but awake.  Continues to complain of left hand pain.  Dressing removed serosanguineous drainage noted, erythema and edema.  He can be and his thumb some  but not totally due to edema.  Nursing reports that patient has been refusing some doses of vancomycin he is stating that he is getting too much fluid.  CRP 2.11.  Slightly elevated from yesterday.  Wound culture finalized as MRSA.    Educated patient of the importance of complying with treatment plan.  Explained that if he didn't keep on schedule with his antibiotic treatment that it wouldn't stay therapeutic in his system and resolve the infection which would therefore increase his risk of loss of digit as well as further infection complications.  Nurse at bedside during this discussion.       Patient's findings and recommendations were discussed with patient, nursing staff and primary care team    Code Status: Full Code    NOHEMI Arias  09/20/17  11:10 AM     Electronically signed by NOHEMI Arias at 9/20/2017 11:14 AM        Medical Student Notes (last 24 hours) (Notes from 9/19/2017  2:42 PM through 9/20/2017  2:42 PM)     No notes of this type exist for this encounter.        Consult Notes (last 24 hours) (Notes from 9/19/2017  2:42 PM through 9/20/2017  2:42 PM)     No notes of this type exist for this encounter.        Nutrition Notes (last 24 hours) (Notes from 9/19/2017  2:42 PM through 9/20/2017  2:42 PM)     No notes of this type exist for this encounter.        Physical Therapy Notes (last 24 hours) (Notes from 9/19/2017  2:42 PM through 9/20/2017  2:42 PM)     No notes of this type exist for this encounter.        Occupational Therapy Notes (last 24 hours) (Notes from 9/19/2017  2:42 PM through 9/20/2017  2:42 PM)     No notes of this type exist for this encounter.             Discharge Summary      Memo Quarles MD at 9/20/2017  1:49 PM          Date of admission: 9/13/17  Date of discharge: 9/20/17    Principal diagnosis: Left thumb abscess and cellulitis secondary to illicit drug use    Secondary diagnosis:  -IV drug use  -Insulin requiring diabetes  -Cirrhosis with history of  "hepatitis C and associated coagulopathy  -Leukopenia probably related to hepatitis C  -Hypomagnesemia  -Ejection fraction 50% by echo    Consultants: Dr. Harrington orthopedic surgery  Dr. Bobby infectious disease    Procedures: Incision and drainage left thumb abscess performed 9/14 by     Exam date of discharge: Assisted by Sho MARTINEZ  Patient is lying in bed easily awakened he states he still having some pain along his left thumb. Per ID note, the wound was evaluated this a.m. By him.  Patient's lungs are clear heart regular rate and rhythm abdomen is rounded but soft bowel sounds are active nontender extremities have trace to 1+ edema left arm.  Thumb wrapping is dry. Vital signs: 102/60, 18, 79, 98.9    Hospital course:  Patient was admitted to the hospital with an infection after an IV drug injection.  This was along the left arm.  Patient was taken to the operating room this had incision and drainage.  Postoperative cultures have grown out MRSA.  Patient was treated with vancomycin here and as recommended by infectious disease has been changed to doxycycline milligrams twice a day for 14 days as an outpatient.  Patient will continue dressing changes,  is arranging home health go out and do this.  Patient has no transportation.  Patient has oxygen at home that he wears at night.  Chest x-ray was clear yesterday.  He has had apparently to have a paracentesis done secondary to his ascites from possible cirrhosis ultrasound done here however did not show any appreciable ascites to drain.  X-ray did not show any bony abnormality to suggest osteomyelitis.  I've explained the patient as I have many times in the past that with ongoing drug use he is going to be high risk of premature death.  He stated \"I know\".  Condition on discharge is stable    Follow-up:Dr Harrington 1 week  Dr Jade in [place] 1 week  Home health    Diet: Carbohydrate    Admission diagnosis: See history and " physical    Medications:  Doxycycline 100 twice a day for 14 days  Miralax 17 g daily  Aspirin 81 mg a day  Lipitor 20 mg a day  Doxepin 50 mg every night  Lasix 20 mg a day  Gabapentin 400 mg 3 times a day  Hydrocodone home dose  Long-acting insulin 15 units every night  Lispro by home sliding scale  DuoNeb when necessary  Magnesium half tablet twice a day starting on the sixth its doxycycline and then  Metoprolol 50 mg a day  Sublingual nitroglycerin when necessary  Pancrease home dose  Protonix 40 mg a day  Potassium 20 mEq daily  Ventolin 2 puffs every 6 hours as needed    40 min discharge       Electronically signed by Memo Quarles MD at 9/20/2017  2:05 PM        Discharge Order     Start     Ordered    09/20/17 1340  Discharge patient  Once     Expected Discharge Date:  09/20/17    Discharge Disposition:  Home or Self Care        09/20/17 5043

## 2017-09-20 NOTE — PROGRESS NOTES
Discharge Planning Assessment   Art     Patient Name: Isaias Lang  MRN: [id number]  Today's Date: 9/20/2017    Admit Date: 9/13/2017          Discharge Needs Assessment     None            Discharge Plan       09/20/17 1521    Final Note    Final Note Pt now agreeable to Home Health referral and gave SS permission to call family at [phone] to obtain address.  SS called pt family and pt resides at [address] [facility] [address] [facility] [address] with family.  Pt family agreeable for home health and states they will  transport pt home.  SS made referral to [facility] RN to call report to [facility] prior to discharge.  Pt to be transported via EMS.  No further intervention needed.        Discharge Placement     No information found        Expected Discharge Date and Time     Expected Discharge Date Expected Discharge Time    Sep 20, 2017               Demographic Summary     None            Functional Status     None            Psychosocial     None            Abuse/Neglect     None            Legal     None            Substance Abuse     None            Patient Forms     None          Sho Ferro

## 2017-09-20 NOTE — DISCHARGE SUMMARY
Date of admission: 9/13/17  Date of discharge: 9/20/17    Principal diagnosis: Left thumb abscess and cellulitis secondary to illicit drug use    Secondary diagnosis:  -IV drug use  -Insulin requiring diabetes  -Cirrhosis with history of hepatitis C and associated coagulopathy  -Leukopenia probably related to hepatitis C  -Hypomagnesemia  -Ejection fraction 50% by echo    Consultants: Dr. Harrington orthopedic surgery  Dr. Bobby infectious disease    Procedures: Incision and drainage left thumb abscess performed 9/14 by     Exam date of discharge: Assisted by Sho MARTINEZ  Patient is lying in bed easily awakened he states he still having some pain along his left thumb. Per ID note, the wound was evaluated this a.m. By him.  Patient's lungs are clear heart regular rate and rhythm abdomen is rounded but soft bowel sounds are active nontender extremities have trace to 1+ edema left arm.  Thumb wrapping is dry. Vital signs: 102/60, 18, 79, 98.9    Hospital course:  Patient was admitted to the hospital with an infection after an IV drug injection.  This was along the left arm.  Patient was taken to the operating room this had incision and drainage.  Postoperative cultures have grown out MRSA.  Patient was treated with vancomycin here and as recommended by infectious disease has been changed to doxycycline milligrams twice a day for 14 days as an outpatient.  Patient will continue dressing changes,  is arranging home health go out and do this.  Patient has no transportation.  Patient has oxygen at home that he wears at night.  Chest x-ray was clear yesterday.  He has had apparently to have a paracentesis done secondary to his ascites from possible cirrhosis ultrasound done here however did not show any appreciable ascites to drain.  X-ray did not show any bony abnormality to suggest osteomyelitis.  I've explained the patient as I have many times in the past that with ongoing drug use he is going to be  "high risk of premature death.  He stated \"I know\".  Condition on discharge is stable    Follow-up:Dr Harrington 1 week  Dr Jade in [place] 1 week  Home health    Diet: Carbohydrate    Admission diagnosis: See history and physical    Medications:  Doxycycline 100 twice a day for 14 days  Miralax 17 g daily  Aspirin 81 mg a day  Lipitor 20 mg a day  Doxepin 50 mg every night  Lasix 20 mg a day  Gabapentin 400 mg 3 times a day  Hydrocodone home dose  Long-acting insulin 15 units every night  Lispro by home sliding scale  DuoNeb when necessary  Magnesium half tablet twice a day starting on the sixth its doxycycline and then  Metoprolol 50 mg a day  Sublingual nitroglycerin when necessary  Pancrease home dose  Protonix 40 mg a day  Potassium 20 mEq daily  Ventolin 2 puffs every 6 hours as needed    40 min discharge    "

## 2017-09-20 NOTE — PROGRESS NOTES
"  I have personally seen and examined the patient today and discussed overnight interval progress and pertinent issues with nursing staff.    Subjective:    Drowsy but awake.  Continues to complain of left hand pain.  Dressing removed serosanguineous drainage noted, erythema and edema.  He can be and his thumb some but not totally due to edema.  Nursing reports that patient has been refusing some doses of vancomycin he is stating that he is getting too much fluid.  CRP 2.11.  Slightly elevated from yesterday.  Wound culture finalized as MRSA.    History taken from: patient chart RN      Vital Signs    /72 (BP Location: Left arm, Patient Position: Sitting)  Pulse 100  Temp 98.9 °F (37.2 °C) (Oral)   Resp 20  Ht 68\" (172.7 cm)  Wt 215 lb 1.6 oz (97.6 kg)  SpO2 97%  BMI 32.71 kg/m2    Temp:  [97.8 °F (36.6 °C)-98.9 °F (37.2 °C)] 98.9 °F (37.2 °C)      Intake/Output Summary (Last 24 hours) at 09/20/17 1110  Last data filed at 09/20/17 0900   Gross per 24 hour   Intake             1420 ml   Output              725 ml   Net              695 ml     Intake & Output (last 3 days)       09/17 0701 - 09/18 0700 09/18 0701 - 09/19 0700 09/19 0701 - 09/20 0700 09/20 0701 - 09/21 0700    P.O. 1420 720 940 480    IV Piggyback 500 500      Total Intake(mL/kg) 1920 (20.3) 1220 (12.9) 940 (9.6) 480 (4.9)    Urine (mL/kg/hr)  400 (0.2) 500 (0.2) 425 (1)    Total Output   400 500 425    Net +1920 +820 +440 +55            Unmeasured Urine Occurrence 7 x 3 x      Unmeasured Stool Occurrence 0 x             Physical exam:    General Appearance:    Alert, cooperative, in no acute distress   Head:    Normocephalic, without obvious abnormality, atraumatic   Eyes:            Lids and lashes normal, conjunctivae and sclerae normal, no   icterus, no pallor, corneas clear, PERRLA   Ears:    Ears appear intact with no abnormalities noted   Throat:   No oral lesions, no thrush, oral mucosa moist   Neck:   No adenopathy, supple, " trachea midline, no thyromegaly, no   carotid bruit, no JVD   Back:     No tenderness to percussion or palpation, range of motion   normal   Lungs:     Clear to auscultation,respirations regular, even and unlabored. No wheezing, no ronchi and no crackles.    Heart:    Regular rhythm and normal rate, normal S1 and S2, no            murmur, no gallop, no rub, no click   Chest Wall:    No abnormalities observed   Abdomen:     Normal bowel sounds, no masses, no organomegaly, soft        non-tender, non-distended, no guarding, no rebound                tenderness   Rectal:     Deferred   Extremities: left thumb, post I & D, pain   Pulses:   Pulses palpable and equal bilaterally   Skin:   No bleeding, bruising or rash   Lymph nodes:   No palpable adenopathy   Neurologic:   Awake, alert and oriented x 3. Following commands.           Results:      Results from last 7 days  Lab Units 09/20/17  0751 09/19/17  1245 09/18/17  0323 09/17/17  0107 09/16/17  0126 09/15/17  0415 09/14/17  0040   WBC 10*3/mm3 5.94 3.02* 2.97* 2.57* 2.33* 2.74* 3.60*     Lab Results   Component Value Date    NEUTROABS 4.24 09/20/2017         Results from last 7 days  Lab Units 09/20/17  0751   CREATININE mg/dL 0.57         Results from last 7 days  Lab Units 09/20/17  0751 09/19/17  0723 09/18/17  0323   CRP mg/dL 2.11* 1.27* 0.95       Imaging Results (last 24 hours)     Procedure Component Value Units Date/Time    XR Chest 1 View [[id number]] Collected:  09/19/17 1549     Updated:  09/19/17 1552    Narrative:       XR CHEST 1 VW-     CLINICAL INDICATION: SOA; L02.512-Cutaneous abscess of left hand          COMPARISON: 9/14/2017.     TECHNIQUE: Single frontal view of the chest.     FINDINGS:     There is no focal alveolar infiltrate or effusion.  The cardiac silhouette is normal. The pulmonary vasculature is  unremarkable.  There is no evidence of an acute osseous abnormality.   There are no suspicious-appearing parenchymal soft tissue nodules.             Impression:       No evidence of active or acute cardiopulmonary disease on today's chest  radiograph.         This report was finalized on 9/19/2017 3:49 PM by Dr. Jersey Maher MD.       US Abdomen Limited [[id number]] Collected:  09/20/17 0806     Updated:  09/20/17 0813    Narrative:       US ABDOMEN LIMITED-      CLINICAL INDICATION: Evaluate for ascites; L02.512-Cutaneous abscess of  left hand.          COMPARISON: None available.     FINDINGS: Sonographic imaging of the abdomen shows no drainable fluid  within the abdomen.       Impression:       Possibly small volume ascites but no drainable collections  identified.      This report was finalized on 9/20/2017 8:11 AM by Dr. Jersey Maher MD.               Results Review:    I have personally reviewed laboratory data, culture results, radiology studies and antimicrobial therapy.    Hospital Medications (active)       Dose Frequency Start End    atorvastatin (LIPITOR) tablet 20 mg 20 mg Nightly 9/14/2017     Sig - Route: Take 1 tablet by mouth Every Night. - Oral    dextrose (D50W) solution 25 g 25 g Every 15 Minutes PRN 9/13/2017     Sig - Route: Infuse 50 mL into a venous catheter Every 15 (Fifteen) Minutes As Needed for Low Blood Sugar (Blood Sugar Less Than 70, Patient Has IV Access - Unresponsive, NPO or Unable To Safely Swallow). - Intravenous    dextrose (D50W) solution 25 g 25 g Every 15 Minutes PRN 9/14/2017     Sig - Route: Infuse 50 mL into a venous catheter Every 15 (Fifteen) Minutes As Needed for Low Blood Sugar (Blood Sugar Less Than 70, Patient Has IV Access - Unresponsive, NPO or Unable To Safely Swallow). - Intravenous    dextrose (GLUTOSE) oral gel 15 g 15 g Every 15 Minutes PRN 9/13/2017     Sig - Route: Take 15 g by mouth Every 15 (Fifteen) Minutes As Needed for Low Blood Sugar (Blood Sugar Less Than 70, Patient Alert, Is Not NPO & Can Safely Swallow). - Oral    dextrose (GLUTOSE) oral gel 15 g 15 g Every 15 Minutes PRN 9/14/2017      Sig - Route: Take 15 g by mouth Every 15 (Fifteen) Minutes As Needed for Low Blood Sugar (Blood Sugar Less Than 70, Patient Alert, Is Not NPO & Can Safely Swallow). - Oral    dicyclomine (BENTYL) capsule 10 mg 10 mg 2 Times Daily 9/14/2017     Sig - Route: Take 1 capsule by mouth 2 (Two) Times a Day. - Oral    doxepin (SINEquan) capsule 50 mg 50 mg Nightly 9/14/2017     Sig - Route: Take 2 capsules by mouth Every Night. - Oral    gabapentin (NEURONTIN) capsule 400 mg 400 mg Every 8 Hours Scheduled 9/14/2017     Sig - Route: Take 1 capsule by mouth Every 8 (Eight) Hours. - Oral    glucagon (GLUCAGEN) injection 1 mg 1 mg Every 15 Minutes PRN 9/13/2017     Sig - Route: Inject 1 mg under the skin Every 15 (Fifteen) Minutes As Needed (Blood Glucose Less Than 70 - Patient Without IV Access - Unresponsive, NPO or Unable To Safely Swallow). - Subcutaneous    glucagon (human recombinant) (GLUCAGEN DIAGNOSTIC) injection 1 mg 1 mg Every 15 Minutes PRN 9/14/2017     Sig - Route: Inject 1 mg under the skin Every 15 (Fifteen) Minutes As Needed (Blood Glucose Less Than 70 - Patient Without IV Access - Unresponsive, NPO or Unable To Safely Swallow). - Subcutaneous    HYDROcodone-acetaminophen (NORCO) 5-325 MG per tablet 1 tablet 1 tablet Every 8 Hours PRN 9/18/2017     Sig - Route: Take 1 tablet by mouth Every 8 (Eight) Hours As Needed for Moderate Pain . - Oral    HYDROmorphone (DILAUDID) injection 1 mg 1 mg Once 9/14/2017     Sig - Route: Infuse 1 mL into a venous catheter 1 (One) Time. - Intravenous    insulin aspart (novoLOG) injection 0-14 Units 0-14 Units 4 Times Daily Before Meals & Nightly 9/14/2017     Sig - Route: Inject 0-14 Units under the skin 4 (Four) Times a Day Before Meals & at Bedtime. - Subcutaneous    insulin detemir (LEVEMIR) injection 5 Units 5 Units Nightly 9/14/2017     Sig - Route: Inject 5 Units under the skin Every Night. - Subcutaneous    ipratropium-albuterol (DUO-NEB) nebulizer solution 3 mL 3 mL Every  "6 Hours PRN 9/13/2017     Sig - Route: Take 3 mL by nebulization Every 6 (Six) Hours As Needed for Shortness of Air. - Nebulization    magnesium hydroxide (MILK OF MAGNESIA) suspension 2400 mg/10mL 10 mL 10 mL Daily PRN 9/16/2017     Sig - Route: Take 10 mL by mouth Daily As Needed for Constipation. - Oral    magnesium oxide (MAGOX) tablet 200 mg 200 mg 2 Times Daily 9/14/2017     Sig - Route: Take 0.5 tablets by mouth 2 (Two) Times a Day. - Oral    Magnesium Sulfate 2 gram Bolus, followed by 8 gram infusion (total Mg dose 10 grams)- Mg less than or equal to 1mg/dL 2 g As Needed 9/14/2017     Sig - Route: Infuse 50 mL into a venous catheter As Needed (Mg less than or equal to 1mg/dL). - Intravenous    Linked Group 1:  \"Or\" Linked Group Details        magnesium sulfate 4 gram infusion- Mg 1.6-1.9 mg/dL 4 g As Needed 9/14/2017     Sig - Route: Infuse 100 mL into a venous catheter As Needed (Mg 1.6-1.9 mg/dL). - Intravenous    Linked Group 1:  \"Or\" Linked Group Details        Magnesium Sulfate 6 gram Infusion (2 gm x 3) -Mg 1.1 -1.5 mg/dL 2 g As Needed 9/14/2017     Sig - Route: Infuse 50 mL into a venous catheter As Needed (Mg 1.1 -1.5 mg/dL). - Intravenous    Linked Group 1:  \"Or\" Linked Group Details        Magnesium Sulfate 6 gram Infusion (2 gm x 3) -Mg 1.1 -1.5 mg/dL 2 g Every 2 Hours 9/19/2017 9/19/2017    Sig - Route: Infuse 50 mL into a venous catheter Every 2 (Two) Hours. - Intravenous    metoprolol tartrate (LOPRESSOR) tablet 50 mg 50 mg Daily 9/14/2017     Sig - Route: Take 1 tablet by mouth Daily. - Oral    nitroglycerin (NITROSTAT) SL tablet 0.4 mg 0.4 mg Every 5 Minutes PRN 9/13/2017     Sig - Route: Place 1 tablet under the tongue Every 5 (Five) Minutes As Needed for Chest Pain. - Sublingual    pancrelipase (Lip-Prot-Amyl) (CREON) capsule 24,000 units of lipase 24,000 units of lipase 4 Times Daily With Meals & Nightly 9/14/2017     Sig - Route: Take 2 capsules by mouth 4 (Four) Times a Day With Meals & " "at Bedtime. - Oral    pantoprazole (PROTONIX) EC tablet 40 mg 40 mg Daily 9/14/2017     Sig - Route: Take 1 tablet by mouth Daily. - Oral    Pharmacy Consult  Continuous PRN 9/13/2017     Sig - Route: Continuous As Needed for Consult. - Does not apply    polyethylene glycol (MIRALAX) powder 17 g 17 g 2 Times Daily 9/14/2017     Sig - Route: Take 17 g by mouth 2 (Two) Times a Day. - Oral    sodium chloride 0.9 % flush 1-10 mL 1-10 mL As Needed 9/13/2017     Sig - Route: Infuse 1-10 mL into a venous catheter As Needed for Line Care. - Intravenous    sodium chloride 0.9 % flush 1-10 mL 1-10 mL As Needed 9/13/2017     Sig - Route: Infuse 1-10 mL into a venous catheter As Needed for Line Care. - Intravenous    sodium chloride 0.9 % flush 10 mL 10 mL As Needed 9/13/2017     Sig - Route: Infuse 10 mL into a venous catheter As Needed for Line Care. - Intravenous    Cosign for Ordering: Accepted by Eglin Daniels MD on 9/13/2017  6:33 PM    Linked Group 2:  \"And\" Linked Group Details        vancomycin (VANCOCIN) 1,500 mg in sodium chloride 0.9 % 500 mL IVPB 1,500 mg Every 8 Hours 9/14/2017     Sig - Route: Infuse 1,500 mg into a venous catheter Every 8 (Eight) Hours. - Intravenous    Notes to Pharmacy: RN started to hang bag at 1151, but patient lost IV access.  RN called at approx 1500 and said patient also had Zosyn and Magnesium IVs due.  Patient did not receive 1151 dose of Vanco, so rescheduled dose to start at 1600.    metoclopramide (REGLAN) tablet 10 mg (Discontinued) 10 mg 2 Times Daily Before Meals 9/14/2017 9/18/2017    Sig - Route: Take 1 tablet by mouth 2 (Two) Times a Day Before Meals. - Oral            Cultures:    Blood Culture   Date Value Ref Range Status   09/13/2017 No growth at 5 days  Final   09/13/2017 No growth at 5 days  Final     Urine Culture   Date Value Ref Range Status   09/13/2017 <10,000 CFU/mL Normal Urogenital Mercedes  Final     Wound Culture   Date Value Ref Range Status   09/13/2017 " Scant growth (1+) Staphylococcus aureus, MRSA (C)  Final     Comment:     This isolate does not demonstrate inducible clindamycin resistance in vitro.  Methicillin resistant Staphylococcus aureus, Patient may be an isolation risk.       Patient Active Problem List    Diagnosis   • IV drug abuse [F19.10]   • Gastroesophageal reflux disease with esophagitis [K21.0]   • Chronic viral hepatitis B without delta agent and without coma [B18.1]   • Chronic hepatitis C without hepatic coma [B18.2]   • 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 [G89.21]   • Type 1 diabetes mellitus [E10.9]   • MDD (major depressive disorder), recurrent episode, moderate [F33.1]         Assessment/Plan     ASSESSMENT:    # 1 Left thumb abscess R/T IV drug use    PLAN:    Drowsy but awake.  Continues to complain of left hand pain.  Dressing removed serosanguineous drainage noted, erythema and edema.  He can be and his thumb some but not totally due to edema.  Nursing reports that patient has been refusing some doses of vancomycin he is stating that he is getting too much fluid.  CRP 2.11.  Slightly elevated from yesterday.  Wound culture finalized as MRSA.    X-ray from 9/14/17 reports no acute or destructive bony abnormalities.  Continue with vancomycin.  Would recommend to consider doxycycline 100 mg by mouth twice a day ×14 days to complete through October 1, 2017 if no underlying osteomyelitis suspected.    Educated patient of the importance of complying with treatment plan.  Explained that if he didn't keep on schedule with his antibiotic treatment that it wouldn't stay therapeutic in his system and resolve the infection which would therefore increase his risk of loss of digit as well as further infection complications.  Nurse at bedside during this discussion.       Patient's findings and recommendations were discussed with patient, nursing staff and primary care team    Code Status: Full Code    NOHMEI Arias  09/20/17  11:10 AM

## 2017-09-21 NOTE — PAYOR COMM NOTE
"[facility]  NPI:[id number]    Utilization Review  Contact: Trudy Rhodes RN  Phone: [phone]  Fax:[phone]    DISCHARGE NOTIFICATION  AUTH # [id number] DISCHARGED TO HOME WITH HOME HEALTH ON  9/20/2017  Isaias Avendano (52 y.o. Male)     Date of Birth Social Security Number Address Home Phone MRN    1965  PO   [facility] [address] [phone] [id number]    [employer] Marital Status          Baptism        Admission Date Admission Type Admitting Provider Attending Provider Department, Room/Bed    9/13/17 Emergency Elva Hdz MD  [facility] 3 [facility], 3323/    Discharge Date Discharge Disposition Discharge Destination        9/20/2017 Home or Self Care             Attending Provider: (none)    Allergies:  Robitussin Dm [Dextromethorphan-guaifenesin], Tizanidine, Metformin, Sulfa Antibiotics, Contrast Dye, Tramadol    Isolation:  Contact   Infection:  MRSA (09/17/17)   Code Status:  Prior    Ht:  68\" (172.7 cm)   Wt:  215 lb 1.6 oz (97.6 kg)    Admission Cmt:  None   Principal Problem:  Abscess of left thumb [L02.512]                 Active Insurance as of 9/13/2017     Primary Coverage     Payor Plan Insurance Group Employer/Plan Group    [facility]      Payor Plan Address Payor Plan Phone Number Effective From Effective To    [address] [address]  2/1/2016     [place] [address]       Subscriber Name Subscriber Birth Date Member ID       ISAIAS AVENDANO 1965 [id number]                 Emergency Contacts      (Rel.) Home Phone Work Phone Mobile Phone    Domenico Mckinney (Father) [phone] -- --               Discharge Summary      Memo Quarles MD at 9/20/2017  1:49 PM          Date of admission: 9/13/17  Date of discharge: 9/20/17    Principal diagnosis: Left thumb abscess and cellulitis secondary to illicit drug use    Secondary diagnosis:  -IV drug use  -Insulin requiring " "diabetes  -Cirrhosis with history of hepatitis C and associated coagulopathy  -Leukopenia probably related to hepatitis C  -Hypomagnesemia  -Ejection fraction 50% by echo    Consultants: Dr. Harrington orthopedic surgery  Dr. Bobby infectious disease    Procedures: Incision and drainage left thumb abscess performed 9/14 by     Exam date of discharge: Assisted by Sho MARTINEZ  Patient is lying in bed easily awakened he states he still having some pain along his left thumb. Per ID note, the wound was evaluated this a.m. By him.  Patient's lungs are clear heart regular rate and rhythm abdomen is rounded but soft bowel sounds are active nontender extremities have trace to 1+ edema left arm.  Thumb wrapping is dry. Vital signs: 102/60, 18, 79, 98.9    Hospital course:  Patient was admitted to the hospital with an infection after an IV drug injection.  This was along the left arm.  Patient was taken to the operating room this had incision and drainage.  Postoperative cultures have grown out MRSA.  Patient was treated with vancomycin here and as recommended by infectious disease has been changed to doxycycline milligrams twice a day for 14 days as an outpatient.  Patient will continue dressing changes,  is arranging home health go out and do this.  Patient has no transportation.  Patient has oxygen at home that he wears at night.  Chest x-ray was clear yesterday.  He has had apparently to have a paracentesis done secondary to his ascites from possible cirrhosis ultrasound done here however did not show any appreciable ascites to drain.  X-ray did not show any bony abnormality to suggest osteomyelitis.  I've explained the patient as I have many times in the past that with ongoing drug use he is going to be high risk of premature death.  He stated \"I know\".  Condition on discharge is stable    Follow-up:Dr Harrington 1 week  Dr Jade in [place] 1 week  Home health    Diet: Carbohydrate    Admission diagnosis: " See history and physical    Medications:  Doxycycline 100 twice a day for 14 days  Miralax 17 g daily  Aspirin 81 mg a day  Lipitor 20 mg a day  Doxepin 50 mg every night  Lasix 20 mg a day  Gabapentin 400 mg 3 times a day  Hydrocodone home dose  Long-acting insulin 15 units every night  Lispro by home sliding scale  DuoNeb when necessary  Magnesium half tablet twice a day starting on the sixth its doxycycline and then  Metoprolol 50 mg a day  Sublingual nitroglycerin when necessary  Pancrease home dose  Protonix 40 mg a day  Potassium 20 mEq daily  Ventolin 2 puffs every 6 hours as needed    40 min discharge       Electronically signed by Memo Quarles MD at 9/20/2017  2:05 PM

## 2017-09-22 ENCOUNTER — HOSPITAL ENCOUNTER (EMERGENCY)
Facility: HOSPITAL | Age: 52
Discharge: HOME | End: 2017-09-22
Attending: EMERGENCY MEDICINE | Admitting: EMERGENCY MEDICINE

## 2017-09-22 VITALS
DIASTOLIC BLOOD PRESSURE: 80 MMHG | HEART RATE: 85 BPM | TEMPERATURE: 97.3 F | HEIGHT: 68 IN | WEIGHT: 220 LBS | SYSTOLIC BLOOD PRESSURE: 118 MMHG | RESPIRATION RATE: 16 BRPM | OXYGEN SATURATION: 95 % | BODY MASS INDEX: 33.34 KG/M2

## 2017-09-22 DIAGNOSIS — R60.1 GENERALIZED EDEMA: Primary | ICD-10-CM

## 2017-09-22 PROCEDURE — 99284 EMERGENCY DEPT VISIT MOD MDM: CPT

## 2017-09-22 PROCEDURE — 25010000002 FUROSEMIDE PER 20 MG: Performed by: PHYSICIAN ASSISTANT

## 2017-09-22 PROCEDURE — 96372 THER/PROPH/DIAG INJ SC/IM: CPT

## 2017-09-22 RX ORDER — FUROSEMIDE 20 MG/1
40 TABLET ORAL DAILY
Qty: 30 TABLET | Refills: 0 | Status: SHIPPED | OUTPATIENT
Start: 2017-09-22 | End: 2017-09-29

## 2017-09-22 RX ORDER — FUROSEMIDE 10 MG/ML
40 INJECTION INTRAMUSCULAR; INTRAVENOUS ONCE
Status: COMPLETED | OUTPATIENT
Start: 2017-09-22 | End: 2017-09-22

## 2017-09-22 RX ORDER — HYDROCODONE BITARTRATE AND ACETAMINOPHEN 10; 325 MG/1; MG/1
1 TABLET ORAL ONCE
Status: COMPLETED | OUTPATIENT
Start: 2017-09-22 | End: 2017-09-22

## 2017-09-22 RX ADMIN — HYDROCODONE BITARTRATE AND ACETAMINOPHEN 1 TABLET: 10; 325 TABLET ORAL at 14:29

## 2017-09-22 RX ADMIN — FUROSEMIDE 40 MG: 10 INJECTION, SOLUTION INTRAMUSCULAR; INTRAVENOUS at 14:07

## 2017-09-22 NOTE — ED PROVIDER NOTES
Subjective   Patient is a 52 y.o. male presenting with lower extremity pain and male genitourinary complaint.   History provided by:  Patient   used: No    Lower Extremity Issue   Location:  Leg  Leg location:  R leg and L leg  Pain details:     Quality:  Unable to specify    Radiates to:  Does not radiate    Severity:  Moderate    Onset quality:  Sudden    Duration:  3 days    Timing:  Constant    Progression:  Unchanged  Chronicity:  Recurrent (has hep c, cirrhosis, ascites)  Relieved by:  None tried  Worsened by:  Nothing  Ineffective treatments:  None tried  Associated symptoms: swelling    Associated symptoms: no fever    Risk factors: recent illness    Risk factors: no concern for non-accidental trauma and no frequent fractures    Risk factors comment:  Pt d/c from hospital x3 days ago for left thumb abscess secondary to IVDA  Male  Problem   Presenting symptoms: swelling    Presenting symptoms: no dysuria    Context: spontaneously    Relieved by:  None tried  Worsened by:  Nothing  Ineffective treatments:  None tried  Associated symptoms: scrotal swelling    Associated symptoms: no diarrhea, no fever, no nausea, no penile redness, no penile swelling, no priapism, no urinary retention and no vomiting    Risk factors: no new sexual partner, no STI exposure and no unprotected sex        Review of Systems   Constitutional: Negative for activity change and fever.   HENT: Negative for congestion, ear pain and sore throat.    Eyes: Negative for pain.   Respiratory: Negative for cough, shortness of breath and wheezing.    Cardiovascular: Negative for chest pain.   Gastrointestinal: Negative for abdominal distention, diarrhea, nausea and vomiting.   Genitourinary: Positive for scrotal swelling. Negative for difficulty urinating, dysuria and penile swelling.   Musculoskeletal: Negative for arthralgias and myalgias.   Skin: Negative for rash and wound.   Neurological: Negative for dizziness and  "headaches.   Psychiatric/Behavioral: Negative for agitation.   All other systems reviewed and are negative.      Past Medical History:   Diagnosis Date   • Abscess of antecubital fossa 05/2014    Left that required I and D and grew Haemophilus influenza group 1   • Anxiety    • Asthma    • Chronic pancreatitis    • COPD (chronic obstructive pulmonary disease)    • DDD (degenerative disc disease), lumbosacral    • Depression    • Diabetes mellitus    • DVT (deep venous thrombosis)     Right arm   • Essential hypertension    • GERD (gastroesophageal reflux disease)    • Hepatitis-C    • Hypercholesteremia    • Injury of back    • Injury of right Achilles tendon     Complicated by MRSA and Pseudomonas   • IV drug abuse    • Mild CAD     Left heart cath at  2012   • MRSA (methicillin resistant staph aureus) culture positive 09/14/2016    LUE   • Obesity    • Opiate addiction     Suboxone IV   • Self-injurious behavior    • Sleep apnea    • Suicide attempt    • Systolic CHF, chronic     EF 45-50% in 2013, EF 60% 9/2016       Allergies   Allergen Reactions   • Robitussin Dm [Dextromethorphan-Guaifenesin] Shortness Of Breath   • Tizanidine Shortness Of Breath   • Metformin Nausea And Vomiting   • Sulfa Antibiotics Other (See Comments)     \"I cannot take them, they do something to me.\"   • Contrast Dye Rash   • Tramadol Rash       Past Surgical History:   Procedure Laterality Date   • CHOLECYSTECTOMY  1990s   • INCISION AND DRAINAGE ABSCESS Left 2016    wrist   • INCISION AND DRAINAGE ARM Left 9/15/2016    Procedure: INCISION AND DRAINAGE UPPER EXTREMITY;  Surgeon: Rico Oseguera MD;  Location: [facility] OR;  Service:    • INCISION AND DRAINAGE ARM Left 9/14/2017    Procedure: INCISION AND DRAINAGE LEFT THUMB;  Surgeon: Adin Harrington MD;  Location: [facility] OR;  Service:    • ORIF ANKLE FRACTURE Right 2014       Family History   Problem Relation Age of Onset   • Gout Other    • Osteoporosis Other    • Arthritis Other      " Rheumatoid and osteoarthritis   • Hypertension Other    • Heart disease Other    • Cancer Other    • Coronary artery disease Mother    • Lung disease Mother    • Gout Sister    • Cancer Maternal Grandmother    • Hypertension Maternal Grandfather    • Gout Maternal Grandfather        Social History     Social History   • Marital status:      Spouse name: N/A   • Number of children: N/A   • Years of education: N/A     Social History Main Topics   • Smoking status: Former Smoker     Years: 1.00     Types: Cigarettes   • Smokeless tobacco: Never Used      Comment: quit smoking in my 20's   • Alcohol use No      Comment: denies   • Drug use: Yes     Special: IV   • Sexual activity: Defer      Comment: not currently active.      Other Topics Concern   • None     Social History Narrative   • None           Objective   Physical Exam   Constitutional: He is oriented to person, place, and time. He appears well-developed and well-nourished.   HENT:   Head: Normocephalic and atraumatic.   Eyes: EOM are normal. Pupils are equal, round, and reactive to light.   Neck: Normal range of motion. Neck supple.   Cardiovascular: Normal rate, regular rhythm and normal heart sounds.    Pulmonary/Chest: Effort normal and breath sounds normal.   Abdominal: Soft. Bowel sounds are normal. He exhibits ascites.   Genitourinary: Right testis shows swelling. Left testis shows swelling.   Musculoskeletal: Normal range of motion.       Vascular Status -  His exam exhibits right foot edema. His exam exhibits left foot edema.  Neurological: He is alert and oriented to person, place, and time.   Skin: Skin is warm and dry.        Psychiatric: He has a normal mood and affect. His behavior is normal. Judgment and thought content normal.   Nursing note and vitals reviewed.      Procedures         ED Course  ED Course   Comment By Time   Dr. Hines evaluated pt also. Advised give pt 40mg lasix here, and pt needs to f/u with PCP. PATRICK Majano  09/22 1348                  [facility]  Number of Diagnoses or Management Options  Generalized edema:      Amount and/or Complexity of Data Reviewed  Clinical lab tests: ordered and reviewed  Tests in the radiology section of CPT®: reviewed and ordered  Tests in the medicine section of CPT®: reviewed and ordered    Patient Progress  Patient progress: stable      Final diagnoses:   Generalized edema            PATRICK Majano  09/22/17 1530

## 2017-09-22 NOTE — ED NOTES
"Pt requesting \"a few hydro 5's to go home with\" at this time.      Margie Alcaraz RN  09/22/17 0354    "

## 2017-09-22 NOTE — ED NOTES
"Pt is requesting to be \"flown to [place]\"; informed patient that the ED provider will have to evaluate him and then determine whether transfer is necessary. Pt verbalizes understanding and denies any questions/concerns.      Margie Alcaraz RN  09/22/17 1300    "

## 2017-10-11 ENCOUNTER — DOCUMENTATION (OUTPATIENT)
Dept: INFECTIOUS DISEASES | Facility: HOSPITAL | Age: 52
End: 2017-10-11

## 2017-10-11 PROBLEM — A41.9 SEPTIC SHOCK (HCC): Status: RESOLVED | Noted: 2017-04-03 | Resolved: 2017-10-11

## 2017-10-11 PROBLEM — L02.512 ABSCESS OF LEFT THUMB: Status: RESOLVED | Noted: 2017-09-13 | Resolved: 2017-10-11

## 2017-10-11 PROBLEM — R65.21 SEPTIC SHOCK (HCC): Status: RESOLVED | Noted: 2017-04-03 | Resolved: 2017-10-11

## 2017-10-11 PROBLEM — A41.9 SEPSIS (HCC): Status: RESOLVED | Noted: 2017-04-05 | Resolved: 2017-10-11

## 2017-10-11 PROBLEM — L03.113 CELLULITIS OF RIGHT HAND EXCLUDING FINGERS AND THUMB: Status: RESOLVED | Noted: 2017-02-05 | Resolved: 2017-10-11

## 2017-10-11 PROBLEM — L02.512 ABSCESS OF LEFT HAND INCLUDING FINGERS: Status: RESOLVED | Noted: 2017-09-13 | Resolved: 2017-10-11

## 2017-10-11 PROBLEM — L03.113 CELLULITIS OF RIGHT HAND: Status: RESOLVED | Noted: 2017-02-04 | Resolved: 2017-10-11

## 2017-10-16 ENCOUNTER — TRANSCRIBE ORDERS (OUTPATIENT)
Dept: ADMINISTRATIVE | Facility: HOSPITAL | Age: 52
End: 2017-10-16

## 2017-10-16 DIAGNOSIS — B18.2 CHRONIC HEPATITIS C WITHOUT HEPATIC COMA (HCC): Primary | ICD-10-CM

## 2018-06-04 ENCOUNTER — HOSPITAL ENCOUNTER (EMERGENCY)
Facility: HOSPITAL | Age: 53
Discharge: STILL ADMITTED OR ADMINISTRATIVE | End: 2018-06-04
Attending: EMERGENCY MEDICINE

## 2018-06-04 ENCOUNTER — APPOINTMENT (OUTPATIENT)
Dept: GENERAL RADIOLOGY | Facility: HOSPITAL | Age: 53
End: 2018-06-04

## 2018-06-04 ENCOUNTER — HOSPITAL ENCOUNTER (INPATIENT)
Facility: HOSPITAL | Age: 53
LOS: 9 days | Discharge: HOME | End: 2018-06-13
Attending: PSYCHIATRY & NEUROLOGY | Admitting: PSYCHIATRY & NEUROLOGY

## 2018-06-04 VITALS
WEIGHT: 150 LBS | RESPIRATION RATE: 18 BRPM | HEIGHT: 68 IN | HEART RATE: 96 BPM | BODY MASS INDEX: 22.73 KG/M2 | SYSTOLIC BLOOD PRESSURE: 114 MMHG | OXYGEN SATURATION: 100 % | DIASTOLIC BLOOD PRESSURE: 77 MMHG | TEMPERATURE: 98.6 F

## 2018-06-04 DIAGNOSIS — R45.851 DEPRESSION WITH SUICIDAL IDEATION: Primary | ICD-10-CM

## 2018-06-04 DIAGNOSIS — IMO0001 UNCONTROLLED DIABETES MELLITUS TYPE 2 WITHOUT COMPLICATIONS, UNSPECIFIED LONG TERM INSULIN USE STATUS: ICD-10-CM

## 2018-06-04 DIAGNOSIS — F32.A DEPRESSION WITH SUICIDAL IDEATION: Primary | ICD-10-CM

## 2018-06-04 DIAGNOSIS — R07.89 ATYPICAL CHEST PAIN: ICD-10-CM

## 2018-06-04 LAB
6-ACETYL MORPHINE: NEGATIVE
ALBUMIN SERPL-MCNC: 3.2 G/DL (ref 3.5–5)
ALBUMIN/GLOB SERPL: 1 G/DL (ref 1.5–2.5)
ALP SERPL-CCNC: 203 U/L (ref 40–129)
ALT SERPL W P-5'-P-CCNC: 52 U/L (ref 10–44)
AMPHET+METHAMPHET UR QL: NEGATIVE
ANION GAP SERPL CALCULATED.3IONS-SCNC: 2.4 MMOL/L (ref 3.6–11.2)
AST SERPL-CCNC: 45 U/L (ref 10–34)
BARBITURATES UR QL SCN: NEGATIVE
BASOPHILS # BLD AUTO: 0.01 10*3/MM3 (ref 0–0.3)
BASOPHILS NFR BLD AUTO: 0.4 % (ref 0–2)
BENZODIAZ UR QL SCN: NEGATIVE
BILIRUB SERPL-MCNC: 0.8 MG/DL (ref 0.2–1.8)
BILIRUB UR QL STRIP: NEGATIVE
BUN BLD-MCNC: 11 MG/DL (ref 7–21)
BUN/CREAT SERPL: 15.5 (ref 7–25)
BUPRENORPHINE SERPL-MCNC: NEGATIVE NG/ML
CALCIUM SPEC-SCNC: 8.6 MG/DL (ref 7.7–10)
CANNABINOIDS SERPL QL: NEGATIVE
CHLORIDE SERPL-SCNC: 100 MMOL/L (ref 99–112)
CLARITY UR: CLEAR
CO2 SERPL-SCNC: 29.6 MMOL/L (ref 24.3–31.9)
COCAINE UR QL: NEGATIVE
COLOR UR: YELLOW
CREAT BLD-MCNC: 0.71 MG/DL (ref 0.43–1.29)
DEPRECATED RDW RBC AUTO: 46.9 FL (ref 37–54)
EOSINOPHIL # BLD AUTO: 0.04 10*3/MM3 (ref 0–0.7)
EOSINOPHIL NFR BLD AUTO: 1.6 % (ref 0–5)
ERYTHROCYTE [DISTWIDTH] IN BLOOD BY AUTOMATED COUNT: 14.5 % (ref 11.5–14.5)
ETHANOL BLD-MCNC: <10 MG/DL
ETHANOL UR QL: <0.01 %
GFR SERPL CREATININE-BSD FRML MDRD: 116 ML/MIN/1.73
GLOBULIN UR ELPH-MCNC: 3.2 GM/DL
GLUCOSE BLD-MCNC: 411 MG/DL (ref 70–110)
GLUCOSE BLDC GLUCOMTR-MCNC: 342 MG/DL (ref 70–130)
GLUCOSE BLDC GLUCOMTR-MCNC: 344 MG/DL (ref 70–130)
GLUCOSE BLDC GLUCOMTR-MCNC: 436 MG/DL (ref 70–130)
GLUCOSE UR STRIP-MCNC: ABNORMAL MG/DL
HCT VFR BLD AUTO: 38.4 % (ref 42–52)
HGB BLD-MCNC: 12.8 G/DL (ref 14–18)
HGB UR QL STRIP.AUTO: NEGATIVE
HOLD SPECIMEN: NORMAL
IMM GRANULOCYTES # BLD: 0 10*3/MM3 (ref 0–0.03)
IMM GRANULOCYTES NFR BLD: 0 % (ref 0–0.5)
KETONES UR QL STRIP: NEGATIVE
LEUKOCYTE ESTERASE UR QL STRIP.AUTO: NEGATIVE
LIPASE SERPL-CCNC: 25 U/L (ref 13–60)
LYMPHOCYTES # BLD AUTO: 0.42 10*3/MM3 (ref 1–3)
LYMPHOCYTES NFR BLD AUTO: 16.3 % (ref 21–51)
MCH RBC QN AUTO: 29.4 PG (ref 27–33)
MCHC RBC AUTO-ENTMCNC: 33.3 G/DL (ref 33–37)
MCV RBC AUTO: 88.1 FL (ref 80–94)
METHADONE UR QL SCN: NEGATIVE
MONOCYTES # BLD AUTO: 0.23 10*3/MM3 (ref 0.1–0.9)
MONOCYTES NFR BLD AUTO: 8.9 % (ref 0–10)
NEUTROPHILS # BLD AUTO: 1.88 10*3/MM3 (ref 1.4–6.5)
NEUTROPHILS NFR BLD AUTO: 72.8 % (ref 30–70)
NITRITE UR QL STRIP: NEGATIVE
OPIATES UR QL: NEGATIVE
OSMOLALITY SERPL CALC.SUM OF ELEC: 281.3 MOSM/KG (ref 273–305)
OVALOCYTES BLD QL SMEAR: NORMAL
OXYCODONE UR QL SCN: NEGATIVE
PCP UR QL SCN: NEGATIVE
PH UR STRIP.AUTO: 6 [PH] (ref 5–8)
PLATELET # BLD AUTO: 39 10*3/MM3 (ref 130–400)
PMV BLD AUTO: ABNORMAL FL (ref 6–10)
POTASSIUM BLD-SCNC: 3.8 MMOL/L (ref 3.5–5.3)
PROT SERPL-MCNC: 6.4 G/DL (ref 6–8)
PROT UR QL STRIP: NEGATIVE
RBC # BLD AUTO: 4.36 10*6/MM3 (ref 4.7–6.1)
SCHISTOCYTES BLD QL SMEAR: NORMAL
SMALL PLATELETS BLD QL SMEAR: NORMAL
SODIUM BLD-SCNC: 132 MMOL/L (ref 135–153)
SP GR UR STRIP: >1.03 (ref 1–1.03)
TROPONIN I SERPL-MCNC: 0.01 NG/ML
TROPONIN I SERPL-MCNC: 0.01 NG/ML
UROBILINOGEN UR QL STRIP: ABNORMAL
WBC NRBC COR # BLD: 2.58 10*3/MM3 (ref 4.5–12.5)
WHOLE BLOOD HOLD SPECIMEN: NORMAL
WHOLE BLOOD HOLD SPECIMEN: NORMAL

## 2018-06-04 PROCEDURE — 85007 BL SMEAR W/DIFF WBC COUNT: CPT | Performed by: PHYSICIAN ASSISTANT

## 2018-06-04 PROCEDURE — 80307 DRUG TEST PRSMV CHEM ANLYZR: CPT | Performed by: PHYSICIAN ASSISTANT

## 2018-06-04 PROCEDURE — 82962 GLUCOSE BLOOD TEST: CPT

## 2018-06-04 PROCEDURE — 63710000001 INSULIN ASPART PER 5 UNITS: Performed by: PSYCHIATRY & NEUROLOGY

## 2018-06-04 PROCEDURE — 71045 X-RAY EXAM CHEST 1 VIEW: CPT | Performed by: RADIOLOGY

## 2018-06-04 PROCEDURE — 85025 COMPLETE CBC W/AUTO DIFF WBC: CPT | Performed by: PHYSICIAN ASSISTANT

## 2018-06-04 PROCEDURE — 80053 COMPREHEN METABOLIC PANEL: CPT | Performed by: PHYSICIAN ASSISTANT

## 2018-06-04 PROCEDURE — 63710000001 INSULIN REGULAR HUMAN PER 5 UNITS: Performed by: PHYSICIAN ASSISTANT

## 2018-06-04 PROCEDURE — 71045 X-RAY EXAM CHEST 1 VIEW: CPT

## 2018-06-04 PROCEDURE — 81003 URINALYSIS AUTO W/O SCOPE: CPT | Performed by: PHYSICIAN ASSISTANT

## 2018-06-04 PROCEDURE — 84484 ASSAY OF TROPONIN QUANT: CPT | Performed by: PHYSICIAN ASSISTANT

## 2018-06-04 PROCEDURE — 83690 ASSAY OF LIPASE: CPT | Performed by: PHYSICIAN ASSISTANT

## 2018-06-04 PROCEDURE — HZ2ZZZZ DETOXIFICATION SERVICES FOR SUBSTANCE ABUSE TREATMENT: ICD-10-PCS | Performed by: PSYCHIATRY & NEUROLOGY

## 2018-06-04 PROCEDURE — 93005 ELECTROCARDIOGRAM TRACING: CPT | Performed by: EMERGENCY MEDICINE

## 2018-06-04 PROCEDURE — 93010 ELECTROCARDIOGRAM REPORT: CPT | Performed by: INTERNAL MEDICINE

## 2018-06-04 RX ORDER — HYDROXYZINE 50 MG/1
50 TABLET, FILM COATED ORAL EVERY 6 HOURS PRN
Status: DISCONTINUED | OUTPATIENT
Start: 2018-06-04 | End: 2018-06-13 | Stop reason: HOSPADM

## 2018-06-04 RX ORDER — LOPERAMIDE HYDROCHLORIDE 2 MG/1
2 CAPSULE ORAL 4 TIMES DAILY PRN
Status: DISCONTINUED | OUTPATIENT
Start: 2018-06-04 | End: 2018-06-13 | Stop reason: HOSPADM

## 2018-06-04 RX ORDER — ALUMINA, MAGNESIA, AND SIMETHICONE 2400; 2400; 240 MG/30ML; MG/30ML; MG/30ML
15 SUSPENSION ORAL EVERY 6 HOURS PRN
Status: DISCONTINUED | OUTPATIENT
Start: 2018-06-04 | End: 2018-06-13 | Stop reason: HOSPADM

## 2018-06-04 RX ORDER — TRAZODONE HYDROCHLORIDE 50 MG/1
50 TABLET ORAL NIGHTLY PRN
Status: DISCONTINUED | OUTPATIENT
Start: 2018-06-04 | End: 2018-06-09

## 2018-06-04 RX ORDER — BENZONATATE 100 MG/1
100 CAPSULE ORAL 3 TIMES DAILY PRN
Status: DISCONTINUED | OUTPATIENT
Start: 2018-06-04 | End: 2018-06-13 | Stop reason: HOSPADM

## 2018-06-04 RX ORDER — DEXTROSE MONOHYDRATE 25 G/50ML
25 INJECTION, SOLUTION INTRAVENOUS
Status: DISCONTINUED | OUTPATIENT
Start: 2018-06-04 | End: 2018-06-13 | Stop reason: HOSPADM

## 2018-06-04 RX ORDER — BENZTROPINE MESYLATE 1 MG/ML
0.5 INJECTION INTRAMUSCULAR; INTRAVENOUS DAILY PRN
Status: DISCONTINUED | OUTPATIENT
Start: 2018-06-04 | End: 2018-06-05

## 2018-06-04 RX ORDER — NICOTINE 21 MG/24HR
1 PATCH, TRANSDERMAL 24 HOURS TRANSDERMAL EVERY 24 HOURS
Status: DISCONTINUED | OUTPATIENT
Start: 2018-06-05 | End: 2018-06-13 | Stop reason: HOSPADM

## 2018-06-04 RX ORDER — BENZTROPINE MESYLATE 1 MG/1
1 TABLET ORAL DAILY PRN
Status: DISCONTINUED | OUTPATIENT
Start: 2018-06-04 | End: 2018-06-05

## 2018-06-04 RX ORDER — ECHINACEA PURPUREA EXTRACT 125 MG
2 TABLET ORAL AS NEEDED
Status: DISCONTINUED | OUTPATIENT
Start: 2018-06-04 | End: 2018-06-13 | Stop reason: HOSPADM

## 2018-06-04 RX ORDER — FAMOTIDINE 20 MG/1
20 TABLET, FILM COATED ORAL 2 TIMES DAILY PRN
Status: DISCONTINUED | OUTPATIENT
Start: 2018-06-04 | End: 2018-06-05

## 2018-06-04 RX ORDER — IBUPROFEN 600 MG/1
600 TABLET ORAL EVERY 6 HOURS PRN
Status: DISCONTINUED | OUTPATIENT
Start: 2018-06-04 | End: 2018-06-05

## 2018-06-04 RX ORDER — NICOTINE POLACRILEX 4 MG
15 LOZENGE BUCCAL
Status: DISCONTINUED | OUTPATIENT
Start: 2018-06-04 | End: 2018-06-13 | Stop reason: HOSPADM

## 2018-06-04 RX ORDER — POTASSIUM CHLORIDE 20 MEQ/1
40 TABLET, EXTENDED RELEASE ORAL ONCE
Status: DISCONTINUED | OUTPATIENT
Start: 2018-06-04 | End: 2018-06-04

## 2018-06-04 RX ORDER — ONDANSETRON 4 MG/1
4 TABLET, FILM COATED ORAL EVERY 6 HOURS PRN
Status: DISCONTINUED | OUTPATIENT
Start: 2018-06-04 | End: 2018-06-13 | Stop reason: HOSPADM

## 2018-06-04 RX ORDER — SODIUM CHLORIDE 0.9 % (FLUSH) 0.9 %
10 SYRINGE (ML) INJECTION AS NEEDED
Status: DISCONTINUED | OUTPATIENT
Start: 2018-06-04 | End: 2018-06-04 | Stop reason: HOSPADM

## 2018-06-04 RX ADMIN — INSULIN ASPART 7 UNITS: 100 INJECTION, SOLUTION INTRAVENOUS; SUBCUTANEOUS at 21:28

## 2018-06-04 RX ADMIN — HUMAN INSULIN 8 UNITS: 100 INJECTION, SOLUTION SUBCUTANEOUS at 17:28

## 2018-06-04 RX ADMIN — HUMAN INSULIN 8 UNITS: 100 INJECTION, SOLUTION SUBCUTANEOUS at 15:38

## 2018-06-04 NOTE — ED NOTES
Multiple  Attempts were made by myself and dorene wood to collect pts green top, I contacted christina in out pt lab (christina) she advised she will come but it will be a minute.      Bibi Mcadams  06/04/18 5873

## 2018-06-04 NOTE — ED PROVIDER NOTES
Subjective     Mental Health Problem   Presenting symptoms: depression and suicidal thoughts    Degree of incapacity (severity):  Severe  Onset quality:  Gradual  Duration:  1 week  Timing:  Constant  Progression:  Worsening  Chronicity:  Recurrent  Context comment:  Patient reports that he has no place to go and is been very depressed.  He's thought about taking an overdose.  He also reports having chest discomfort that lasted all night.  No relieving or exacerbating factors.  Associated symptoms: abdominal pain (chronic upper abdominal discomfort this been there for months.), anxiety and feelings of worthlessness    Associated symptoms: no chest pain        Review of Systems   Constitutional: Negative.  Negative for fever.   HENT: Negative.    Respiratory: Negative.    Cardiovascular: Negative.  Negative for chest pain.   Gastrointestinal: Positive for abdominal pain (chronic upper abdominal discomfort this been there for months.).   Endocrine: Negative.    Genitourinary: Negative.  Negative for dysuria.   Skin: Negative.    Neurological: Negative.    Psychiatric/Behavioral: Positive for suicidal ideas. The patient is nervous/anxious.    All other systems reviewed and are negative.      Past Medical History:   Diagnosis Date   • Abscess of antecubital fossa 05/2014    Left that required I and D and grew Haemophilus influenza group 1   • Anxiety    • Asthma    • Chronic pancreatitis    • COPD (chronic obstructive pulmonary disease)    • DDD (degenerative disc disease), lumbosacral    • Depression    • Diabetes mellitus    • DVT (deep venous thrombosis)     Right arm   • Essential hypertension    • GERD (gastroesophageal reflux disease)    • Hepatitis-C    • Hypercholesteremia    • Injury of back    • Injury of right Achilles tendon     Complicated by MRSA and Pseudomonas   • IV drug abuse    • Mild CAD     Left heart cath at  2012   • MRSA (methicillin resistant staph aureus) culture positive 09/14/2016    PATT  "  • Obesity    • Opiate addiction     Suboxone IV   • Self-injurious behavior    • Sleep apnea    • Suicide attempt    • Systolic CHF, chronic     EF 45-50% in 2013, EF 60% 9/2016       Allergies   Allergen Reactions   • Robitussin Dm [Dextromethorphan-Guaifenesin] Shortness Of Breath   • Tizanidine Shortness Of Breath   • Metformin Nausea And Vomiting   • Sulfa Antibiotics Other (See Comments)     \"I cannot take them, they do something to me.\"   • Contrast Dye Rash   • Tramadol Rash       Past Surgical History:   Procedure Laterality Date   • CHOLECYSTECTOMY  1990s   • INCISION AND DRAINAGE ABSCESS Left 2016    wrist   • INCISION AND DRAINAGE ARM Left 9/15/2016    Procedure: INCISION AND DRAINAGE UPPER EXTREMITY;  Surgeon: Rico Oseguera MD;  Location:  [facility] OR;  Service:    • INCISION AND DRAINAGE ARM Left 9/14/2017    Procedure: INCISION AND DRAINAGE LEFT THUMB;  Surgeon: Adin Harrington MD;  Location:  [facility] OR;  Service:    • ORIF ANKLE FRACTURE Right 2014       Family History   Problem Relation Age of Onset   • Gout Other    • Osteoporosis Other    • Arthritis Other         Rheumatoid and osteoarthritis   • Hypertension Other    • Heart disease Other    • Cancer Other    • Coronary artery disease Mother    • Lung disease Mother    • Gout Sister    • Cancer Maternal Grandmother    • Hypertension Maternal Grandfather    • Gout Maternal Grandfather        Social History     Social History   • Marital status:      Social History Main Topics   • Smoking status: Former Smoker     Years: 1.00     Types: Cigarettes   • Smokeless tobacco: Never Used      Comment: quit smoking in my 20's   • Alcohol use No      Comment: denies   • Drug use: Yes     Types: IV   • Sexual activity: Defer      Comment: not currently active.      Other Topics Concern   • Not on file           Objective   Physical Exam   Constitutional: He is oriented to person, place, and time. He appears well-developed and well-nourished. No " distress.   HENT:   Head: Normocephalic and atraumatic.   Right Ear: External ear normal.   Left Ear: External ear normal.   Nose: Nose normal.   Eyes: Conjunctivae and EOM are normal. Pupils are equal, round, and reactive to light.   Neck: Normal range of motion. Neck supple. No JVD present. No tracheal deviation present.   Cardiovascular: Normal rate, regular rhythm and normal heart sounds.    No murmur heard.  Pulmonary/Chest: Effort normal and breath sounds normal. No respiratory distress. He has no wheezes.   Abdominal: Soft. Bowel sounds are normal. There is no tenderness. There is no rebound and no guarding.   Musculoskeletal: Normal range of motion. He exhibits no edema or deformity.   Neurological: He is alert and oriented to person, place, and time. No cranial nerve deficit.   Skin: Skin is warm and dry. No rash noted. He is not diaphoretic. No erythema. No pallor.   Psychiatric: He has a normal mood and affect. His behavior is normal. Thought content normal.   Nursing note and vitals reviewed.      Procedures           ED Course  ED Course as of Jun 04 1923   Mon Jun 04, 2018   1158 Sinus tachycardia rate of 102 nonspecific ST changes old septal infarct per Dr. Holland. ECG 12 Lead [JI]   1356 D/w Dr Holland labs. No active bleeding.   [JI]   1736 Patient had previously had insulin though subsequent only ate dinner.  His glucose remained elevated.  He was given extra insulin.  [JI]      ED Course User Index  [JI] PATRICK Delatorre                  MDM  Number of Diagnoses or Management Options  Atypical chest pain: new and requires workup  Depression with suicidal ideation: established and worsening  Uncontrolled diabetes mellitus type 2 without complications, unspecified long term insulin use status: established and worsening     Amount and/or Complexity of Data Reviewed  Clinical lab tests: ordered and reviewed  Tests in the radiology section of CPT®: ordered and reviewed  Tests in the medicine section  of CPT®: ordered and reviewed  Decide to obtain previous medical records or to obtain history from someone other than the patient: yes  Discuss the patient with other providers: yes    Risk of Complications, Morbidity, and/or Mortality  Presenting problems: moderate          Final diagnoses:   Depression with suicidal ideation   Uncontrolled diabetes mellitus type 2 without complications, unspecified long term insulin use status   Atypical chest pain            PARTICK Delatorre  06/04/18 1602       PATRICK Delatorre  06/04/18 1920

## 2018-06-04 NOTE — NURSING NOTE
Presented clinicals to Dr Dinh, stated he would not feel comfortable admitted pt at this time until we can get his glucose around 300. Informed ED provider. Will continue to monitor. Ed provider it would be a few hours before redraw of glucose.

## 2018-06-04 NOTE — ED NOTES
Pt placed in gown, belongings taken to intake, security called to bedside for jacinto Joyner RN  06/04/18 0628

## 2018-06-05 PROBLEM — D72.829 LEUKOCYTOSIS (LEUCOCYTOSIS): Status: ACTIVE | Noted: 2018-06-05

## 2018-06-05 PROBLEM — L03.90 CELLULITIS: Status: ACTIVE | Noted: 2018-06-05

## 2018-06-05 PROBLEM — F11.20 UNCOMPLICATED OPIOID DEPENDENCE (HCC): Status: ACTIVE | Noted: 2018-06-05

## 2018-06-05 PROBLEM — D69.6 THROMBOCYTOPENIA (HCC): Status: ACTIVE | Noted: 2018-06-05

## 2018-06-05 PROBLEM — F33.2 SEVERE EPISODE OF RECURRENT MAJOR DEPRESSIVE DISORDER, WITHOUT PSYCHOTIC FEATURES (HCC): Status: ACTIVE | Noted: 2018-06-05

## 2018-06-05 LAB
ALBUMIN SERPL-MCNC: 3.1 G/DL (ref 3.5–5)
ALBUMIN/GLOB SERPL: 0.9 G/DL (ref 1.5–2.5)
ALP SERPL-CCNC: 138 U/L (ref 40–129)
ALT SERPL W P-5'-P-CCNC: 50 U/L (ref 10–44)
ANION GAP SERPL CALCULATED.3IONS-SCNC: 3.7 MMOL/L (ref 3.6–11.2)
AST SERPL-CCNC: 51 U/L (ref 10–34)
BASOPHILS # BLD AUTO: 0.01 10*3/MM3 (ref 0–0.3)
BASOPHILS NFR BLD AUTO: 0.4 % (ref 0–2)
BILIRUB SERPL-MCNC: 0.8 MG/DL (ref 0.2–1.8)
BUN BLD-MCNC: 11 MG/DL (ref 7–21)
BUN/CREAT SERPL: 15.1 (ref 7–25)
CALCIUM SPEC-SCNC: 9 MG/DL (ref 7.7–10)
CHLORIDE SERPL-SCNC: 102 MMOL/L (ref 99–112)
CO2 SERPL-SCNC: 27.3 MMOL/L (ref 24.3–31.9)
CREAT BLD-MCNC: 0.73 MG/DL (ref 0.43–1.29)
CRP SERPL-MCNC: <0.5 MG/DL (ref 0–0.99)
D-LACTATE SERPL-SCNC: 2.1 MMOL/L (ref 0.5–2)
D-LACTATE SERPL-SCNC: 2.9 MMOL/L (ref 0.5–2)
DEPRECATED RDW RBC AUTO: 46.9 FL (ref 37–54)
EOSINOPHIL # BLD AUTO: 0.06 10*3/MM3 (ref 0–0.7)
EOSINOPHIL NFR BLD AUTO: 2.5 % (ref 0–5)
ERYTHROCYTE [DISTWIDTH] IN BLOOD BY AUTOMATED COUNT: 14.5 % (ref 11.5–14.5)
GFR SERPL CREATININE-BSD FRML MDRD: 112 ML/MIN/1.73
GLOBULIN UR ELPH-MCNC: 3.4 GM/DL
GLUCOSE BLD-MCNC: 397 MG/DL (ref 70–110)
GLUCOSE BLDC GLUCOMTR-MCNC: 302 MG/DL (ref 70–130)
GLUCOSE BLDC GLUCOMTR-MCNC: 305 MG/DL (ref 70–130)
GLUCOSE BLDC GLUCOMTR-MCNC: 334 MG/DL (ref 70–130)
GLUCOSE BLDC GLUCOMTR-MCNC: 410 MG/DL (ref 70–130)
HBA1C MFR BLD: 10.4 % (ref 4.5–5.7)
HCT VFR BLD AUTO: 39.5 % (ref 42–52)
HGB BLD-MCNC: 13.3 G/DL (ref 14–18)
HOLD SPECIMEN: NORMAL
IMM GRANULOCYTES # BLD: 0 10*3/MM3 (ref 0–0.03)
IMM GRANULOCYTES NFR BLD: 0 % (ref 0–0.5)
INR PPP: 1.26 (ref 0.9–1.1)
LYMPHOCYTES # BLD AUTO: 0.54 10*3/MM3 (ref 1–3)
LYMPHOCYTES NFR BLD AUTO: 22.4 % (ref 21–51)
MCH RBC QN AUTO: 29.9 PG (ref 27–33)
MCHC RBC AUTO-ENTMCNC: 33.7 G/DL (ref 33–37)
MCV RBC AUTO: 88.8 FL (ref 80–94)
MONOCYTES # BLD AUTO: 0.2 10*3/MM3 (ref 0.1–0.9)
MONOCYTES NFR BLD AUTO: 8.3 % (ref 0–10)
NEUTROPHILS # BLD AUTO: 1.6 10*3/MM3 (ref 1.4–6.5)
NEUTROPHILS NFR BLD AUTO: 66.4 % (ref 30–70)
OSMOLALITY SERPL CALC.SUM OF ELEC: 282.4 MOSM/KG (ref 273–305)
PLATELET # BLD AUTO: 35 10*3/MM3 (ref 130–400)
PMV BLD AUTO: ABNORMAL FL (ref 6–10)
POIKILOCYTOSIS BLD QL SMEAR: NORMAL
POTASSIUM BLD-SCNC: 4.2 MMOL/L (ref 3.5–5.3)
PROT SERPL-MCNC: 6.5 G/DL (ref 6–8)
PROTHROMBIN TIME: 16 SECONDS (ref 11–15.4)
RBC # BLD AUTO: 4.45 10*6/MM3 (ref 4.7–6.1)
SMALL PLATELETS BLD QL SMEAR: NORMAL
SODIUM BLD-SCNC: 133 MMOL/L (ref 135–153)
WBC NRBC COR # BLD: 2.41 10*3/MM3 (ref 4.5–12.5)

## 2018-06-05 PROCEDURE — 90714 TD VACC NO PRESV 7 YRS+ IM: CPT | Performed by: INTERNAL MEDICINE

## 2018-06-05 PROCEDURE — 83036 HEMOGLOBIN GLYCOSYLATED A1C: CPT | Performed by: PHYSICIAN ASSISTANT

## 2018-06-05 PROCEDURE — 63710000001 INSULIN DETEMIR PER 5 UNITS: Performed by: PHYSICIAN ASSISTANT

## 2018-06-05 PROCEDURE — 86140 C-REACTIVE PROTEIN: CPT | Performed by: PHYSICIAN ASSISTANT

## 2018-06-05 PROCEDURE — 82962 GLUCOSE BLOOD TEST: CPT

## 2018-06-05 PROCEDURE — 83605 ASSAY OF LACTIC ACID: CPT | Performed by: PHYSICIAN ASSISTANT

## 2018-06-05 PROCEDURE — 99223 1ST HOSP IP/OBS HIGH 75: CPT | Performed by: PSYCHIATRY & NEUROLOGY

## 2018-06-05 PROCEDURE — 94799 UNLISTED PULMONARY SVC/PX: CPT

## 2018-06-05 PROCEDURE — 85007 BL SMEAR W/DIFF WBC COUNT: CPT | Performed by: PHYSICIAN ASSISTANT

## 2018-06-05 PROCEDURE — 85610 PROTHROMBIN TIME: CPT | Performed by: PHYSICIAN ASSISTANT

## 2018-06-05 PROCEDURE — 85025 COMPLETE CBC W/AUTO DIFF WBC: CPT | Performed by: PHYSICIAN ASSISTANT

## 2018-06-05 PROCEDURE — 99254 IP/OBS CNSLTJ NEW/EST MOD 60: CPT | Performed by: INTERNAL MEDICINE

## 2018-06-05 PROCEDURE — 87040 BLOOD CULTURE FOR BACTERIA: CPT | Performed by: PHYSICIAN ASSISTANT

## 2018-06-05 PROCEDURE — 63710000001 INSULIN ASPART PER 5 UNITS: Performed by: PSYCHIATRY & NEUROLOGY

## 2018-06-05 PROCEDURE — 63710000001 INSULIN ASPART PER 5 UNITS: Performed by: PHYSICIAN ASSISTANT

## 2018-06-05 PROCEDURE — 80053 COMPREHEN METABOLIC PANEL: CPT | Performed by: PHYSICIAN ASSISTANT

## 2018-06-05 PROCEDURE — 25010000002 TETANUS-DIPHTHERIA TOXOIDS (ADULT) PER 0.5 ML: Performed by: INTERNAL MEDICINE

## 2018-06-05 RX ORDER — FUROSEMIDE 20 MG/1
20 TABLET ORAL DAILY
Status: ON HOLD | COMMUNITY
End: 2018-08-01

## 2018-06-05 RX ORDER — POTASSIUM CHLORIDE 20 MEQ/1
20 TABLET, EXTENDED RELEASE ORAL DAILY
Status: DISCONTINUED | OUTPATIENT
Start: 2018-06-05 | End: 2018-06-13 | Stop reason: HOSPADM

## 2018-06-05 RX ORDER — CITALOPRAM 20 MG/1
20 TABLET ORAL NIGHTLY
Status: CANCELLED | OUTPATIENT
Start: 2018-06-05

## 2018-06-05 RX ORDER — METOCLOPRAMIDE 10 MG/1
10 TABLET ORAL 2 TIMES DAILY
Status: CANCELLED | OUTPATIENT
Start: 2018-06-05

## 2018-06-05 RX ORDER — NITROGLYCERIN 0.4 MG/1
0.4 TABLET SUBLINGUAL
Status: DISCONTINUED | OUTPATIENT
Start: 2018-06-05 | End: 2018-06-13 | Stop reason: HOSPADM

## 2018-06-05 RX ORDER — ALBUTEROL SULFATE 90 UG/1
2 AEROSOL, METERED RESPIRATORY (INHALATION) EVERY 6 HOURS PRN
Status: DISCONTINUED | OUTPATIENT
Start: 2018-06-05 | End: 2018-06-08

## 2018-06-05 RX ORDER — CLINDAMYCIN HYDROCHLORIDE 150 MG/1
300 CAPSULE ORAL 3 TIMES DAILY
Status: COMPLETED | OUTPATIENT
Start: 2018-06-05 | End: 2018-06-12

## 2018-06-05 RX ORDER — IPRATROPIUM BROMIDE AND ALBUTEROL SULFATE 2.5; .5 MG/3ML; MG/3ML
3 SOLUTION RESPIRATORY (INHALATION)
Status: DISCONTINUED | OUTPATIENT
Start: 2018-06-05 | End: 2018-06-08

## 2018-06-05 RX ORDER — ERGOCALCIFEROL 1.25 MG/1
50000 CAPSULE ORAL WEEKLY
Status: ON HOLD | COMMUNITY
End: 2018-08-01

## 2018-06-05 RX ORDER — PANTOPRAZOLE SODIUM 40 MG/1
40 TABLET, DELAYED RELEASE ORAL DAILY
Status: DISCONTINUED | OUTPATIENT
Start: 2018-06-05 | End: 2018-06-13 | Stop reason: HOSPADM

## 2018-06-05 RX ORDER — SPIRONOLACTONE 25 MG/1
25 TABLET ORAL DAILY
Status: DISCONTINUED | OUTPATIENT
Start: 2018-06-05 | End: 2018-06-05

## 2018-06-05 RX ORDER — DICYCLOMINE HYDROCHLORIDE 10 MG/1
10 CAPSULE ORAL 2 TIMES DAILY
Status: DISCONTINUED | OUTPATIENT
Start: 2018-06-05 | End: 2018-06-13 | Stop reason: HOSPADM

## 2018-06-05 RX ORDER — CITALOPRAM 20 MG/1
20 TABLET ORAL NIGHTLY
COMMUNITY
End: 2018-06-13 | Stop reason: HOSPADM

## 2018-06-05 RX ORDER — ATORVASTATIN CALCIUM 20 MG/1
20 TABLET, FILM COATED ORAL DAILY
Status: DISCONTINUED | OUTPATIENT
Start: 2018-06-05 | End: 2018-06-13 | Stop reason: HOSPADM

## 2018-06-05 RX ORDER — AMOXICILLIN 500 MG/1
500 CAPSULE ORAL EVERY 12 HOURS SCHEDULED
Status: DISCONTINUED | OUTPATIENT
Start: 2018-06-05 | End: 2018-06-05

## 2018-06-05 RX ORDER — SPIRONOLACTONE 25 MG/1
25 TABLET ORAL DAILY
Status: ON HOLD | COMMUNITY
End: 2018-08-01

## 2018-06-05 RX ORDER — POLYETHYLENE GLYCOL 3350 17 G/17G
17 POWDER, FOR SOLUTION ORAL DAILY
Status: DISCONTINUED | OUTPATIENT
Start: 2018-06-05 | End: 2018-06-08

## 2018-06-05 RX ORDER — POLYETHYLENE GLYCOL 3350 17 G/17G
17 POWDER, FOR SOLUTION ORAL DAILY
Status: ON HOLD | COMMUNITY
End: 2018-08-01

## 2018-06-05 RX ORDER — FUROSEMIDE 20 MG/1
20 TABLET ORAL DAILY
Status: DISCONTINUED | OUTPATIENT
Start: 2018-06-05 | End: 2018-06-07

## 2018-06-05 RX ORDER — METOCLOPRAMIDE 10 MG/1
10 TABLET ORAL
Status: ON HOLD | COMMUNITY
End: 2018-08-01

## 2018-06-05 RX ORDER — ASPIRIN 81 MG/1
81 TABLET ORAL DAILY
Status: DISCONTINUED | OUTPATIENT
Start: 2018-06-05 | End: 2018-06-05

## 2018-06-05 RX ADMIN — ATORVASTATIN CALCIUM 20 MG: 20 TABLET, FILM COATED ORAL at 13:35

## 2018-06-05 RX ADMIN — CLOSTRIDIUM TETANI TOXOID ANTIGEN (FORMALDEHYDE INACTIVATED) AND CORYNEBACTERIUM DIPHTHERIAE TOXOID ANTIGEN (FORMALDEHYDE INACTIVATED) 0.5 ML: 5; 2 INJECTION, SUSPENSION INTRAMUSCULAR at 20:49

## 2018-06-05 RX ADMIN — INSULIN ASPART 14 UNITS: 100 INJECTION, SOLUTION INTRAVENOUS; SUBCUTANEOUS at 16:40

## 2018-06-05 RX ADMIN — DICYCLOMINE HYDROCHLORIDE 10 MG: 10 CAPSULE ORAL at 13:37

## 2018-06-05 RX ADMIN — INSULIN ASPART 7 UNITS: 100 INJECTION, SOLUTION INTRAVENOUS; SUBCUTANEOUS at 12:31

## 2018-06-05 RX ADMIN — POTASSIUM CHLORIDE 20 MEQ: 1500 TABLET, EXTENDED RELEASE ORAL at 13:36

## 2018-06-05 RX ADMIN — FUROSEMIDE 20 MG: 20 TABLET ORAL at 13:36

## 2018-06-05 RX ADMIN — DICYCLOMINE HYDROCHLORIDE 10 MG: 10 CAPSULE ORAL at 20:23

## 2018-06-05 RX ADMIN — INSULIN ASPART 7 UNITS: 100 INJECTION, SOLUTION INTRAVENOUS; SUBCUTANEOUS at 08:01

## 2018-06-05 RX ADMIN — CLINDAMYCIN HYDROCHLORIDE 300 MG: 150 CAPSULE ORAL at 20:23

## 2018-06-05 RX ADMIN — INSULIN ASPART 10 UNITS: 100 INJECTION, SOLUTION INTRAVENOUS; SUBCUTANEOUS at 20:19

## 2018-06-05 RX ADMIN — SPIRONOLACTONE 25 MG: 25 TABLET ORAL at 13:35

## 2018-06-05 RX ADMIN — AMOXICILLIN 500 MG: 500 CAPSULE ORAL at 13:36

## 2018-06-05 RX ADMIN — PANTOPRAZOLE SODIUM 40 MG: 40 TABLET, DELAYED RELEASE ORAL at 13:35

## 2018-06-05 RX ADMIN — MUPIROCIN: 20 OINTMENT TOPICAL at 16:44

## 2018-06-05 RX ADMIN — ASPIRIN 81 MG: 81 TABLET ORAL at 13:35

## 2018-06-05 RX ADMIN — ALBUTEROL SULFATE 2 PUFF: 90 AEROSOL, METERED RESPIRATORY (INHALATION) at 13:35

## 2018-06-05 RX ADMIN — INSULIN DETEMIR 35 UNITS: 100 INJECTION, SOLUTION SUBCUTANEOUS at 20:19

## 2018-06-05 RX ADMIN — METOPROLOL TARTRATE 25 MG: 25 TABLET, FILM COATED ORAL at 13:36

## 2018-06-05 NOTE — PLAN OF CARE
Problem: Patient Care Overview  Goal: Plan of Care Review  Outcome: Ongoing (interventions implemented as appropriate)   06/05/18 1601   Coping/Psychosocial   Plan of Care Reviewed With patient   Coping/Psychosocial   Patient Agreement with Plan of Care agrees   Plan of Care Review   Progress no change   OTHER   Outcome Summary In bed most of today. C/O feeling weak. Rates his anxiety and depression both 8. Reports si-no plan at this time. Hospitalist consult-Pa came and seen him.       Problem: Overarching Goals (Adult)  Goal: Adheres to Safety Considerations for Self and Others  Outcome: Ongoing (interventions implemented as appropriate)    Goal: Optimized Coping Skills in Response to Life Stressors  Outcome: Ongoing (interventions implemented as appropriate)    Goal: Develops/Participates in Therapeutic [place] to Support Successful Transition  Outcome: Ongoing (interventions implemented as appropriate)      Problem: Fall Risk (Adult)  Goal: Identify Related Risk Factors and Signs and Symptoms  Outcome: Ongoing (interventions implemented as appropriate)    Goal: Absence of Fall  Outcome: Ongoing (interventions implemented as appropriate)

## 2018-06-05 NOTE — PROGRESS NOTES
Navigator is helping Primary Therapist with discharge planning for patient. Navigator completed the following referral on this day:    Samantha   [phone]    Doyle Albarado  [phone]    [facility]  [phone]    Anna  [phone]    [facility]   [phone]

## 2018-06-05 NOTE — NURSING NOTE
Dr. Harry here, I informed her of patient's blood sugar of 410 and told her 14 units of novolog was given for coverage. She told me not to give the additional 8 units of novolog that she had just ordered with meals.
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
## 2018-06-05 NOTE — NURSING NOTE
PT is still in treatment room waiting for glucose to come down. No questions or concerns noted. Will continue to monitor.
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
## 2018-06-05 NOTE — NURSING NOTE
I spoke with Candelaria NGUYEN and informed her of abnormal labs-she stated she would look at them.

## 2018-06-05 NOTE — NURSING NOTE
Updated Dr Dinh on glucose. He is okay with admitting pt 344. He stated to place pt on sliding scale for tonight and to ask ED provider dose recommendations. Provider recommended moderate dose.

## 2018-06-06 LAB
25(OH)D3 SERPL-MCNC: 25 NG/ML
ALBUMIN SERPL-MCNC: 2.8 G/DL (ref 3.5–5)
ALBUMIN/GLOB SERPL: 0.9 G/DL (ref 1.5–2.5)
ALP SERPL-CCNC: 146 U/L (ref 40–129)
ALT SERPL W P-5'-P-CCNC: 44 U/L (ref 10–44)
ANION GAP SERPL CALCULATED.3IONS-SCNC: 2.1 MMOL/L (ref 3.6–11.2)
AST SERPL-CCNC: 45 U/L (ref 10–34)
BILIRUB SERPL-MCNC: 0.6 MG/DL (ref 0.2–1.8)
BUN BLD-MCNC: 12 MG/DL (ref 7–21)
BUN/CREAT SERPL: 19 (ref 7–25)
CALCIUM SPEC-SCNC: 8.5 MG/DL (ref 7.7–10)
CHLORIDE SERPL-SCNC: 104 MMOL/L (ref 99–112)
CO2 SERPL-SCNC: 28.9 MMOL/L (ref 24.3–31.9)
CREAT BLD-MCNC: 0.63 MG/DL (ref 0.43–1.29)
D-LACTATE SERPL-SCNC: 1.1 MMOL/L (ref 0.5–2)
DEPRECATED RDW RBC AUTO: 45.1 FL (ref 37–54)
ERYTHROCYTE [DISTWIDTH] IN BLOOD BY AUTOMATED COUNT: 14.3 % (ref 11.5–14.5)
FERRITIN SERPL-MCNC: 76 NG/ML (ref 21.9–321.7)
FOLATE SERPL-MCNC: 12.57 NG/ML (ref 5.4–20)
GFR SERPL CREATININE-BSD FRML MDRD: 133 ML/MIN/1.73
GLOBULIN UR ELPH-MCNC: 3.1 GM/DL
GLUCOSE BLD-MCNC: 317 MG/DL (ref 70–110)
GLUCOSE BLDC GLUCOMTR-MCNC: 165 MG/DL (ref 70–130)
GLUCOSE BLDC GLUCOMTR-MCNC: 263 MG/DL (ref 70–130)
GLUCOSE BLDC GLUCOMTR-MCNC: 360 MG/DL (ref 70–130)
GLUCOSE BLDC GLUCOMTR-MCNC: 400 MG/DL (ref 70–130)
GLUCOSE BLDC GLUCOMTR-MCNC: 408 MG/DL (ref 70–130)
HCT VFR BLD AUTO: 35.8 % (ref 42–52)
HGB BLD-MCNC: 12.5 G/DL (ref 14–18)
IRON 24H UR-MRATE: 40 MCG/DL (ref 53–167)
IRON SATN MFR SERPL: 14 % (ref 20–50)
MCH RBC QN AUTO: 30.9 PG (ref 27–33)
MCHC RBC AUTO-ENTMCNC: 34.9 G/DL (ref 33–37)
MCV RBC AUTO: 88.4 FL (ref 80–94)
OSMOLALITY SERPL CALC.SUM OF ELEC: 282 MOSM/KG (ref 273–305)
PLATELET # BLD AUTO: 38 10*3/MM3 (ref 130–400)
PMV BLD AUTO: 10.7 FL (ref 6–10)
POTASSIUM BLD-SCNC: 3.9 MMOL/L (ref 3.5–5.3)
PROT SERPL-MCNC: 5.9 G/DL (ref 6–8)
RBC # BLD AUTO: 4.05 10*6/MM3 (ref 4.7–6.1)
SODIUM BLD-SCNC: 135 MMOL/L (ref 135–153)
TIBC SERPL-MCNC: 291 MCG/DL (ref 241–421)
VIT B12 BLD-MCNC: 764 PG/ML (ref 211–911)
WBC NRBC COR # BLD: 2.59 10*3/MM3 (ref 4.5–12.5)

## 2018-06-06 PROCEDURE — 82962 GLUCOSE BLOOD TEST: CPT

## 2018-06-06 PROCEDURE — 83540 ASSAY OF IRON: CPT | Performed by: INTERNAL MEDICINE

## 2018-06-06 PROCEDURE — 63710000001 INSULIN ASPART PER 5 UNITS: Performed by: PHYSICIAN ASSISTANT

## 2018-06-06 PROCEDURE — 80053 COMPREHEN METABOLIC PANEL: CPT | Performed by: INTERNAL MEDICINE

## 2018-06-06 PROCEDURE — 99254 IP/OBS CNSLTJ NEW/EST MOD 60: CPT | Performed by: NURSE PRACTITIONER

## 2018-06-06 PROCEDURE — 82728 ASSAY OF FERRITIN: CPT | Performed by: INTERNAL MEDICINE

## 2018-06-06 PROCEDURE — 63710000001 INSULIN ASPART PER 5 UNITS: Performed by: INTERNAL MEDICINE

## 2018-06-06 PROCEDURE — 83605 ASSAY OF LACTIC ACID: CPT | Performed by: INTERNAL MEDICINE

## 2018-06-06 PROCEDURE — 83550 IRON BINDING TEST: CPT | Performed by: INTERNAL MEDICINE

## 2018-06-06 PROCEDURE — 94799 UNLISTED PULMONARY SVC/PX: CPT

## 2018-06-06 PROCEDURE — 82746 ASSAY OF FOLIC ACID SERUM: CPT | Performed by: INTERNAL MEDICINE

## 2018-06-06 PROCEDURE — 82306 VITAMIN D 25 HYDROXY: CPT | Performed by: INTERNAL MEDICINE

## 2018-06-06 PROCEDURE — 63710000001 INSULIN DETEMIR PER 5 UNITS: Performed by: PHYSICIAN ASSISTANT

## 2018-06-06 PROCEDURE — 85027 COMPLETE CBC AUTOMATED: CPT | Performed by: INTERNAL MEDICINE

## 2018-06-06 PROCEDURE — 82607 VITAMIN B-12: CPT | Performed by: INTERNAL MEDICINE

## 2018-06-06 PROCEDURE — 99232 SBSQ HOSP IP/OBS MODERATE 35: CPT | Performed by: INTERNAL MEDICINE

## 2018-06-06 PROCEDURE — 99232 SBSQ HOSP IP/OBS MODERATE 35: CPT | Performed by: PSYCHIATRY & NEUROLOGY

## 2018-06-06 RX ORDER — BUPROPION HYDROCHLORIDE 100 MG/1
100 TABLET, EXTENDED RELEASE ORAL EVERY 12 HOURS SCHEDULED
Status: DISCONTINUED | OUTPATIENT
Start: 2018-06-06 | End: 2018-06-09

## 2018-06-06 RX ORDER — MULTIVITAMIN
1 TABLET ORAL DAILY
Status: DISCONTINUED | OUTPATIENT
Start: 2018-06-06 | End: 2018-06-13 | Stop reason: HOSPADM

## 2018-06-06 RX ADMIN — INSULIN ASPART 3 UNITS: 100 INJECTION, SOLUTION INTRAVENOUS; SUBCUTANEOUS at 12:14

## 2018-06-06 RX ADMIN — INSULIN DETEMIR 35 UNITS: 100 INJECTION, SOLUTION SUBCUTANEOUS at 20:10

## 2018-06-06 RX ADMIN — DICYCLOMINE HYDROCHLORIDE 10 MG: 10 CAPSULE ORAL at 08:14

## 2018-06-06 RX ADMIN — DICYCLOMINE HYDROCHLORIDE 10 MG: 10 CAPSULE ORAL at 20:48

## 2018-06-06 RX ADMIN — OFLOXACIN 50000 UNITS: 300 TABLET, COATED ORAL at 08:14

## 2018-06-06 RX ADMIN — MUPIROCIN: 20 OINTMENT TOPICAL at 08:14

## 2018-06-06 RX ADMIN — INSULIN ASPART 14 UNITS: 100 INJECTION, SOLUTION INTRAVENOUS; SUBCUTANEOUS at 16:32

## 2018-06-06 RX ADMIN — POTASSIUM CHLORIDE 20 MEQ: 1500 TABLET, EXTENDED RELEASE ORAL at 08:13

## 2018-06-06 RX ADMIN — TRAZODONE HYDROCHLORIDE 50 MG: 50 TABLET ORAL at 20:48

## 2018-06-06 RX ADMIN — INSULIN ASPART 12 UNITS: 100 INJECTION, SOLUTION INTRAVENOUS; SUBCUTANEOUS at 20:09

## 2018-06-06 RX ADMIN — BUPROPION HYDROCHLORIDE 100 MG: 100 TABLET, EXTENDED RELEASE ORAL at 11:30

## 2018-06-06 RX ADMIN — ATORVASTATIN CALCIUM 20 MG: 20 TABLET, FILM COATED ORAL at 08:14

## 2018-06-06 RX ADMIN — ONDANSETRON 4 MG: 4 TABLET, FILM COATED ORAL at 16:34

## 2018-06-06 RX ADMIN — INSULIN ASPART 8 UNITS: 100 INJECTION, SOLUTION INTRAVENOUS; SUBCUTANEOUS at 12:15

## 2018-06-06 RX ADMIN — CLINDAMYCIN HYDROCHLORIDE 300 MG: 150 CAPSULE ORAL at 16:33

## 2018-06-06 RX ADMIN — BUPROPION HYDROCHLORIDE 100 MG: 100 TABLET, EXTENDED RELEASE ORAL at 20:48

## 2018-06-06 RX ADMIN — INSULIN ASPART 8 UNITS: 100 INJECTION, SOLUTION INTRAVENOUS; SUBCUTANEOUS at 16:33

## 2018-06-06 RX ADMIN — Medication 1 TABLET: at 20:48

## 2018-06-06 RX ADMIN — POLYETHYLENE GLYCOL (3350) 17 G: 17 POWDER, FOR SOLUTION ORAL at 08:13

## 2018-06-06 RX ADMIN — PANTOPRAZOLE SODIUM 40 MG: 40 TABLET, DELAYED RELEASE ORAL at 08:14

## 2018-06-06 RX ADMIN — CLINDAMYCIN HYDROCHLORIDE 300 MG: 150 CAPSULE ORAL at 08:14

## 2018-06-06 RX ADMIN — METOPROLOL TARTRATE 12.5 MG: 25 TABLET, FILM COATED ORAL at 08:14

## 2018-06-06 RX ADMIN — FUROSEMIDE 20 MG: 20 TABLET ORAL at 08:14

## 2018-06-06 RX ADMIN — INSULIN ASPART 8 UNITS: 100 INJECTION, SOLUTION INTRAVENOUS; SUBCUTANEOUS at 07:10

## 2018-06-06 RX ADMIN — CLINDAMYCIN HYDROCHLORIDE 300 MG: 150 CAPSULE ORAL at 20:49

## 2018-06-06 RX ADMIN — INSULIN ASPART 8 UNITS: 100 INJECTION, SOLUTION INTRAVENOUS; SUBCUTANEOUS at 07:09

## 2018-06-06 RX ADMIN — MUPIROCIN: 20 OINTMENT TOPICAL at 20:50
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
## 2018-06-06 NOTE — PROGRESS NOTES
Navigator is helping Primary Therapist with discharge planning for patient. Navigator completed the following referral on this day:     Samantha   [phone]  - Cherri out of office. Expected back around lunch. 6/6     Doyle Albarado  [phone]  -    [place]  [phone]  -Inna is off today. Check back tomorrow. 6/6.     [place]  [phone]  -Number Busy x2. 6/6    [facility]   [phone]  -No Answer 6/6.

## 2018-06-06 NOTE — PROGRESS NOTES
"INPATIENT PSYCHIATRIC PROGRESS NOTE    Name:  Isaias Lang  :  1965  MRN:  [id number]  Visit Number:  [id number]  Length of stay:  2    Behavioral Health Treatment Plan and Problem List: I have reviewed and approved the Behavioral Health Treatment Plan and Problem list.    SUBJECTIVE  CC: \"Not feeling good\"    INTERVAL HISTORY: Patient remains depressed with feelings that he be better off dead however no suicidal intent or plan demonstrated.  Patient states \"I felt suicidal last night not so much today\".  No delusional thought content, no hallucinatory phenomena.    Appreciate hospitalist consultation, certainly patient's primary difficulties are medical in nature, his depression and substance abuse issues at this point or more or less secondary    Depression rating 7/10  Anxiety rating 7/10  Sleep:  7-8 hours         Review of Systems   Respiratory: Negative.    Cardiovascular: Negative.    Gastrointestinal: Positive for abdominal pain.   Musculoskeletal: Positive for back pain.   Neurological: Positive for weakness.         OBJECTIVE    Temp:  [97.1 °F (36.2 °C)-98.2 °F (36.8 °C)] 97.4 °F (36.3 °C)  Heart Rate:  [] 103  Resp:  [16-18] 16  BP: ()/(59-73) 107/73    MENTAL STATUS EXAM:      Appearance:Casually dressed, good hygeine.   Cooperation:Cooperative  Psychomotor: No psychomotor agitation/retardation, No EPS, No motor tics  Speech-normal rate, amount.   Mood/Affect: Depressed  Thought Processes: associations intact  Thought Content: negativistic   Hallucination(s): none  Hopelessness: Yes  Optimistic:No  Suicidal Thoughts:  slight  Suicidal Plan/Intent: none  Homicidal Thoughts:  absent  Orientation: oriented x 3  Memory: recent intact    Lab Results (last 24 hours)     Procedure Component Value Units Date/Time    POC Glucose Once [[id number]]  (Abnormal) Collected:  18    Specimen:  Blood Updated:  18     Glucose 263 (H) mg/dL     Narrative:       Meter: " [id number] : [id number] Lorenza Nelson    Blood Culture - Blood, Arm, Left [[id number]]  (Normal) Collected:  06/05/18 1732    Specimen:  Blood from Arm, Left Updated:  06/06/18 0600     Blood Culture No growth at less than 24 hours    Vitamin D 25 Hydroxy [[id number]] Collected:  06/06/18 0436    Specimen:  Blood Updated:  06/06/18 0546     25 Hydroxy, Vitamin D 25.0 ng/ml     Narrative:       Reference Ranges for Total Vitamin D 25(OH)    Deficiency      <20.0 ng/ml  Insufficiency   20-30 ng/ml  Sufficiency     ng/ml  Toxicity         >100 ng/ml    Vitamin B12 [[id number]]  (Normal) Collected:  06/06/18 0436    Specimen:  Blood Updated:  06/06/18 0546     Vitamin B-12 764 pg/mL     Folate [[id number]]  (Normal) Collected:  06/06/18 0436    Specimen:  Blood Updated:  06/06/18 0546     Folate 12.57 ng/mL     Ferritin [[id number]]  (Normal) Collected:  06/06/18 0436    Specimen:  Blood Updated:  06/06/18 0546     Ferritin 76.00 ng/mL     Iron Profile [[id number]]  (Abnormal) Collected:  06/06/18 0436    Specimen:  Blood Updated:  06/06/18 0542     Iron 40 (L) mcg/dL      TIBC 291 mcg/dL      Iron Saturation 14 (L) %     Lactic Acid, Plasma [[id number]]  (Normal) Collected:  06/06/18 0436    Specimen:  Blood Updated:  06/06/18 0540     Lactate 1.1 mmol/L     Comprehensive Metabolic Panel [[id number]]  (Abnormal) Collected:  06/06/18 0436    Specimen:  Blood Updated:  06/06/18 0538     Glucose 317 (H) mg/dL      BUN 12 mg/dL      Creatinine 0.63 mg/dL      Sodium 135 mmol/L      Potassium 3.9 mmol/L      Chloride 104 mmol/L      CO2 28.9 mmol/L      Calcium 8.5 mg/dL      Total Protein 5.9 (L) g/dL      Albumin 2.80 (L) g/dL      ALT (SGPT) 44 U/L      AST (SGOT) 45 (H) U/L      Alkaline Phosphatase 146 (H) U/L      Comment: Note New Reference Ranges        Total Bilirubin 0.6 mg/dL      eGFR Non African Amer 133 mL/min/1.73      Globulin 3.1 gm/dL      A/G Ratio 0.9 (L) g/dL      BUN/Creatinine Ratio 19.0     Anion  Gap 2.1 (L) mmol/L     Osmolality, Calculated [[id number]]  (Normal) Collected:  06/06/18 0436    Specimen:  Blood Updated:  06/06/18 0538     Osmolality Calc 282.0 mOsm/kg     CBC (No Diff) [[id number]]  (Abnormal) Collected:  06/06/18 0436    Specimen:  Blood Updated:  06/06/18 0530     WBC 2.59 (L) 10*3/mm3      RBC 4.05 (L) 10*6/mm3      Hemoglobin 12.5 (L) g/dL      Hematocrit 35.8 (L) %      MCV 88.4 fL      MCH 30.9 pg      MCHC 34.9 g/dL      RDW 14.3 %      RDW-SD 45.1 fl      MPV 10.7 (H) fL      Platelets 38 (C) 10*3/mm3     Blood Culture - Blood, Arm, Left [[id number]]  (Normal) Collected:  06/05/18 1557    Specimen:  Blood from Arm, Left Updated:  06/06/18 0445     Blood Culture No growth at less than 24 hours    POC Glucose Once [[id number]]  (Abnormal) Collected:  06/05/18 1959    Specimen:  Blood Updated:  06/05/18 2010     Glucose 305 (H) mg/dL     Narrative:       Meter: [id number] : [id number] Nelson    Lactic Acid, Reflex [[id number]]  (Abnormal) Collected:  06/05/18 1732    Specimen:  Blood Updated:  06/05/18 1809     Lactate 2.9 (C) mmol/L     Lactic Acid, Reflex Timer (This will reflex a repeat order 3-3:15 hours after ordered.) [[id number]] Collected:  06/05/18 1246    Specimen:  Blood Updated:  06/05/18 1700     Extra Tube Hold for add-ons.     Comment: Auto resulted.       Protime-INR [[id number]]  (Abnormal) Collected:  06/05/18 1557    Specimen:  Blood Updated:  06/05/18 1633     Protime 16.0 (H) Seconds      Comment: Note new Reference Range        INR 1.26 (H)    Narrative:       Suggested INR therapeutic range for stable oral anticoagulant therapy:    Low Intensity therapy:   1.5-2.0  Moderate Intensity therapy:   2.0-3.0  High Intensity therapy:   2.5-4.0    POC Glucose Once [[id number]]  (Abnormal) Collected:  06/05/18 1616    Specimen:  Blood Updated:  06/05/18 1625     Glucose 410 (H) mg/dL     Narrative:       Meter: [id number] : [id number] Sanjuanita Retana    CBC &  Differential [[id number]] Collected:  06/05/18 1246    Specimen:  Blood Updated:  06/05/18 1615    Narrative:       The following orders were created for panel order CBC & Differential.  Procedure                               Abnormality         Status                     ---------                               -----------         ------                     Scan Slide[[id number]]                                       Final result               CBC Auto Differential[[id number]]        Abnormal            Final result                 Please view results for these tests on the individual orders.    CBC Auto Differential [[id number]]  (Abnormal) Collected:  06/05/18 1246    Specimen:  Blood Updated:  06/05/18 1615     WBC 2.41 (C) 10*3/mm3      RBC 4.45 (L) 10*6/mm3      Hemoglobin 13.3 (L) g/dL      Hematocrit 39.5 (L) %      MCV 88.8 fL      MCH 29.9 pg      MCHC 33.7 g/dL      RDW 14.5 %      RDW-SD 46.9 fl      MPV -- fL      Comment: Unable to calculate.         Platelets 35 (C) 10*3/mm3      Neutrophil % 66.4 %      Lymphocyte % 22.4 %      Monocyte % 8.3 %      Eosinophil % 2.5 %      Basophil % 0.4 %      Immature Grans % 0.0 %      Neutrophils, Absolute 1.60 10*3/mm3      Lymphocytes, Absolute 0.54 (L) 10*3/mm3      Monocytes, Absolute 0.20 10*3/mm3      Eosinophils, Absolute 0.06 10*3/mm3      Basophils, Absolute 0.01 10*3/mm3      Immature Grans, Absolute 0.00 10*3/mm3     Scan Slide [[id number]] Collected:  06/05/18 1246    Specimen:  Blood Updated:  06/05/18 1615     Poikilocytes Slight/1+     Platelet Estimate Decreased    Hemoglobin A1c [[id number]]  (Abnormal) Collected:  06/05/18 1249    Specimen:  Blood Updated:  06/05/18 1412     Hemoglobin A1C 10.40 (H) %     Lactic Acid, Plasma [[id number]]  (Abnormal) Collected:  06/05/18 1246    Specimen:  Blood Updated:  06/05/18 1357     Lactate 2.1 (C) mmol/L     C-reactive Protein [[id number]]  (Normal) Collected:  06/05/18 1246    Specimen:  Blood Updated:  06/05/18  1334     C-Reactive Protein <0.50 mg/dL     Comprehensive Metabolic Panel [[id number]]  (Abnormal) Collected:  06/05/18 1246    Specimen:  Blood Updated:  06/05/18 1329     Glucose 397 (H) mg/dL      BUN 11 mg/dL      Creatinine 0.73 mg/dL      Sodium 133 (L) mmol/L      Potassium 4.2 mmol/L      Chloride 102 mmol/L      CO2 27.3 mmol/L      Calcium 9.0 mg/dL      Total Protein 6.5 g/dL      Albumin 3.10 (L) g/dL      ALT (SGPT) 50 (H) U/L      AST (SGOT) 51 (H) U/L      Alkaline Phosphatase 138 (H) U/L      Comment: Note New Reference Ranges        Total Bilirubin 0.8 mg/dL      eGFR Non African Amer 112 mL/min/1.73      Globulin 3.4 gm/dL      A/G Ratio 0.9 (L) g/dL      BUN/Creatinine Ratio 15.1     Anion Gap 3.7 mmol/L     Osmolality, Calculated [[id number]]  (Normal) Collected:  06/05/18 1246    Specimen:  Blood Updated:  06/05/18 1329     Osmolality Calc 282.4 mOsm/kg     POC Glucose Once [[id number]]  (Abnormal) Collected:  06/05/18 1107    Specimen:  Blood Updated:  06/05/18 1123     Glucose 334 (H) mg/dL     Narrative:       Meter: [id number] : [id number] Marce Abreu           Imaging Results (last 24 hours)     ** No results found for the last 24 hours. **           ECG/EMG Results (most recent)     None           ALLERGIES: Robitussin dm [dextromethorphan-guaifenesin]; Tizanidine; Metformin; Sulfa antibiotics; Contrast dye; and Tramadol      Current Facility-Administered Medications:   •  albuterol (PROVENTIL HFA;VENTOLIN HFA) inhaler 2 puff, 2 puff, Inhalation, Q6H PRN, Ankit Barnes MD, 2 puff at 06/05/18 1335  •  aluminum-magnesium hydroxide-simethicone (MAALOX MAX) 400-400-40 MG/5ML suspension 15 mL, 15 mL, Oral, Q6H PRN, Orlando Dinh MD  •  atorvastatin (LIPITOR) tablet 20 mg, 20 mg, Oral, Daily, Ankit Barnes MD, 20 mg at 06/06/18 0814  •  benzonatate (TESSALON) capsule 100 mg, 100 mg, Oral, TID PRN, Orlando Dinh MD  •  buPROPion SR (WELLBUTRIN SR) 12 hr tablet 100 mg, 100  mg, Oral, Q12H, Ankit Barnes MD  •  cholecalciferol (VITAMIN D3) capsule 50,000 Units, 50,000 Units, Oral, Weekly, Ankit Barnes MD, 50,000 Units at 06/06/18 0814  •  clindamycin (CLEOCIN) capsule 300 mg, 300 mg, Oral, TID, Dina Harry MD, 300 mg at 06/06/18 0814  •  dextrose (D50W) solution 25 g, 25 g, Intravenous, Q15 Min PRN, Orlando Dinh MD  •  dextrose (GLUTOSE) oral gel 15 g, 15 g, Oral, Q15 Min PRN, Orlando Dinh MD  •  dicyclomine (BENTYL) capsule 10 mg, 10 mg, Oral, BID, Ankit Barnes MD, 10 mg at 06/06/18 0814  •  furosemide (LASIX) tablet 20 mg, 20 mg, Oral, Daily, Dina Harry MD, 20 mg at 06/06/18 0814  •  glucagon (human recombinant) (GLUCAGEN DIAGNOSTIC) injection 1 mg, 1 mg, Subcutaneous, PRN, Orlando Dinh MD  •  hydrOXYzine (ATARAX) tablet 50 mg, 50 mg, Oral, Q6H PRN, Orlando Dinh MD  •  insulin aspart (novoLOG) injection 0-14 Units, 0-14 Units, Subcutaneous, 4x Daily AC & at Bedtime, PATRICK Solomon, 8 Units at 06/06/18 0709  •  insulin aspart (novoLOG) injection 8 Units, 8 Units, Subcutaneous, TID With Meals, Dina Harry MD, 8 Units at 06/06/18 0710  •  insulin detemir (LEVEMIR) injection 35 Units, 35 Units, Subcutaneous, Nightly, PATRICK Solomon, 35 Units at 06/05/18 2019  •  ipratropium-albuterol (DUO-NEB) nebulizer solution 3 mL, 3 mL, Nebulization, 4x Daily - RT, Ankit Barnes MD  •  loperamide (IMODIUM) capsule 2 mg, 2 mg, Oral, 4x Daily PRN, Orlando Dinh MD  •  magnesium hydroxide (MILK OF MAGNESIA) suspension 2400 mg/10mL 10 mL, 10 mL, Oral, Daily PRN, Orlando Dinh MD  •  metoprolol tartrate (LOPRESSOR) tablet 12.5 mg, 12.5 mg, Oral, Daily, Dina Harry MD, 12.5 mg at 06/06/18 0814  •  mupirocin (BACTROBAN) 2 % ointment, , Topical, Q12H, PATRICK Solomon  •  nicotine (NICODERM CQ) 21 MG/24HR patch 1 patch, 1 patch, Transdermal, Q24H, Orlando Dinh MD  •  nitroglycerin (NITROSTAT) SL tablet 0.4 mg, 0.4  mg, Sublingual, Q5 Min PRN, Ankit Barnes MD  •  ondansetron (ZOFRAN) tablet 4 mg, 4 mg, Oral, Q6H PRN, Orlando Dinh MD  •  pantoprazole (PROTONIX) EC tablet 40 mg, 40 mg, Oral, Daily, Ankit Barnes MD, 40 mg at 06/06/18 0814  •  polyethylene glycol (MIRALAX) powder 17 g, 17 g, Oral, Daily, Ankit Barnes MD, 17 g at 06/06/18 0813  •  potassium chloride (K-DUR,KLOR-CON) CR tablet 20 mEq, 20 mEq, Oral, Daily, Ankit Barnes MD, 20 mEq at 06/06/18 0813  •  sodium chloride (OCEAN) nasal spray 2 spray, 2 spray, Each Nare, PRN, Orlando Dinh MD  •  tetanus immune globulin (HYPERTET) injection 250 Units, 250 Units, Intramuscular, Once, Dina Harry MD  •  traZODone (DESYREL) tablet 50 mg, 50 mg, Oral, Nightly PRN, Orlando Dinh MD    ASSESSMENT & PLAN            Patient Active Problem List   Diagnosis Code     • Severe episode of recurrent major depressive disorder, without psychotic features  Plan: Have started Wellbutrin and will contiunue with supportive's individual and group psychotherapeutic efforts      F33.2     Depression with suicidal ideation  Plan: Patient is on special precautions       F32.9, R45.851      • Type 1 diabetes mellitus  Plan: We'll believe following with the medical consultants and their recommendations E10.9   • 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   Plan:  treat symptomatically attempting to avoid opiates, will also need to avoid NSAID due to the thrombocytopenia  G89.21   •       • Gastroesophageal reflux disease with esophagitis  Plan: Continue Protonix  K21.0   •   B18.1   • Chronic hepatitis C without hepatic coma  Plan: Monitor liver status, question possible factor with the patient's leukocytosis and thrombocytopenia Also question of a possible hepatic cancer, to have CT scanning done today per medical consultant recommendation  Chronic viral hepatitis B without delta agent and without coma plan: Monitor monitor her hepatic status     B18.2   •                • Cellulitis Plan:  amoxicillin and have requested medical consultation  L03.90   • Uncomplicated opioid dependence  Plan: Incorporate into the ongoing psychotherapeutic effort as well as disposition plan. F11.20   • Leukocytosis (leucocytosis) Plan: Have requested Hematology consultation for evaluation and treatment        D72.829   • Thrombocytopenia And: Have requested Hematology consultation for evaluation and treatment             Suicide precautions: Suicide precaution Level 3 (q15 min checks)     Behavioral Health Treatment Plan and Problem List: I have reviewed and approved the Behavioral Health Treatment Plan and Problem list.    Clinician:  Ankit Barens MD  06/06/18  10:12 AM    Dictated utilizing Dragon dictation

## 2018-06-06 NOTE — PLAN OF CARE
Problem: Patient Care Overview  Goal: Plan of Care Review  Outcome: Ongoing (interventions implemented as appropriate)   06/06/18 8621   Coping/Psychosocial   Plan of Care Reviewed With patient   Coping/Psychosocial   Patient Agreement with Plan of Care agrees   Plan of Care Review   Progress no change   OTHER   Outcome Summary Up more today-took shower. Denies si/hi. Hematology/oncology consult.        Problem: Overarching Goals (Adult)  Goal: Adheres to Safety Considerations for Self and Others  Outcome: Ongoing (interventions implemented as appropriate)    Goal: Optimized Coping Skills in Response to Life Stressors  Outcome: Ongoing (interventions implemented as appropriate)    Goal: Develops/Participates in Therapeutic [place] to Support Successful Transition  Outcome: Ongoing (interventions implemented as appropriate)      Problem: Fall Risk (Adult)  Goal: Identify Related Risk Factors and Signs and Symptoms  Outcome: Ongoing (interventions implemented as appropriate)    Goal: Absence of Fall  Outcome: Ongoing (interventions implemented as appropriate)
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
## 2018-06-06 NOTE — NURSING NOTE
Dr Hdz returned phone call, made aware of pt critical lab. No new orders given. Will continue to monitor.

## 2018-06-06 NOTE — PLAN OF CARE
Problem: Patient Care Overview  Goal: Interprofessional Rounds/Family Conf  Outcome: Ongoing (interventions implemented as appropriate)   06/06/18 1509   Interdisciplinary Rounds/Family Conf   Summary Individual session   Interdisciplinary Rounds/Family Conf   Participants patient;social work     D: Therapist attempted for individual session with patient on this date.  Patient was in his room lying in bed with his eyes closed.  He did respond to therapist but reported he was feeling very ill and did not feel like talking.  Patient kept his eyes closed during discussion.  He discussed other medical professionals had come to speak with him and check on him today.  Discussed plan at discharge and he reports he would like to find placement in a personal care home or nursing home at discharge.    A: Patient was lying in bed with eyes closed reported not feeling well.  Patient did not express any current SI/HI/AVH.    P: Patient remains hospitalized for surgery and stabilization.  Referrals have been submitted to various nursing homes with no specific updates at this time.

## 2018-06-06 NOTE — PLAN OF CARE
Problem: Patient Care Overview  Goal: Plan of Care Review  Outcome: Ongoing (interventions implemented as appropriate)   06/06/18 0033   Coping/Psychosocial   Plan of Care Reviewed With patient   Coping/Psychosocial   Patient Agreement with Plan of Care agrees   Plan of Care Review   Progress no change   OTHER   Outcome Summary Pt rates anxiety depression 6/10. Reports SI without plan. Denies HI hallucinations.        Problem: Overarching Goals (Adult)  Goal: Adheres to Safety Considerations for Self and Others  Outcome: Ongoing (interventions implemented as appropriate)    Goal: Optimized Coping Skills in Response to Life Stressors  Outcome: Ongoing (interventions implemented as appropriate)    Goal: Develops/Participates in Therapeutic [place] to Support Successful Transition  Outcome: Ongoing (interventions implemented as appropriate)

## 2018-06-07 ENCOUNTER — APPOINTMENT (OUTPATIENT)
Dept: ULTRASOUND IMAGING | Facility: HOSPITAL | Age: 53
End: 2018-06-07

## 2018-06-07 LAB
ALBUMIN SERPL-MCNC: 2.8 G/DL (ref 3.5–5)
ALBUMIN/GLOB SERPL: 0.9 G/DL (ref 1.5–2.5)
ALP SERPL-CCNC: 160 U/L (ref 40–129)
ALT SERPL W P-5'-P-CCNC: 44 U/L (ref 10–44)
ANION GAP SERPL CALCULATED.3IONS-SCNC: 2.4 MMOL/L (ref 3.6–11.2)
AST SERPL-CCNC: 44 U/L (ref 10–34)
BASOPHILS # BLD AUTO: 0.01 10*3/MM3 (ref 0–0.3)
BASOPHILS NFR BLD AUTO: 0.4 % (ref 0–2)
BILIRUB SERPL-MCNC: 0.6 MG/DL (ref 0.2–1.8)
BUN BLD-MCNC: 9 MG/DL (ref 7–21)
BUN/CREAT SERPL: 13.2 (ref 7–25)
CALCIUM SPEC-SCNC: 8.5 MG/DL (ref 7.7–10)
CHLORIDE SERPL-SCNC: 105 MMOL/L (ref 99–112)
CO2 SERPL-SCNC: 25.6 MMOL/L (ref 24.3–31.9)
CREAT BLD-MCNC: 0.68 MG/DL (ref 0.43–1.29)
DEPRECATED RDW RBC AUTO: 46.3 FL (ref 37–54)
EOSINOPHIL # BLD AUTO: 0.05 10*3/MM3 (ref 0–0.7)
EOSINOPHIL NFR BLD AUTO: 2.2 % (ref 0–5)
ERYTHROCYTE [DISTWIDTH] IN BLOOD BY AUTOMATED COUNT: 14.5 % (ref 11.5–14.5)
FIBRINOGEN PPP-MCNC: 167 MG/DL (ref 173–524)
GFR SERPL CREATININE-BSD FRML MDRD: 122 ML/MIN/1.73
GLOBULIN UR ELPH-MCNC: 3.1 GM/DL
GLUCOSE BLD-MCNC: 376 MG/DL (ref 70–110)
GLUCOSE BLDC GLUCOMTR-MCNC: 114 MG/DL (ref 70–130)
GLUCOSE BLDC GLUCOMTR-MCNC: 294 MG/DL (ref 70–130)
GLUCOSE BLDC GLUCOMTR-MCNC: 340 MG/DL (ref 70–130)
GLUCOSE BLDC GLUCOMTR-MCNC: 368 MG/DL (ref 70–130)
HCT VFR BLD AUTO: 36.3 % (ref 42–52)
HGB BLD-MCNC: 12.3 G/DL (ref 14–18)
IMM GRANULOCYTES # BLD: 0 10*3/MM3 (ref 0–0.03)
IMM GRANULOCYTES NFR BLD: 0 % (ref 0–0.5)
LDH SERPL-CCNC: 328 U/L (ref 135–225)
LYMPHOCYTES # BLD AUTO: 0.58 10*3/MM3 (ref 1–3)
LYMPHOCYTES NFR BLD AUTO: 26 % (ref 21–51)
MAGNESIUM SERPL-MCNC: 1.6 MG/DL (ref 1.7–2.6)
MAGNESIUM SERPL-MCNC: 1.7 MG/DL (ref 1.7–2.6)
MCH RBC QN AUTO: 29.9 PG (ref 27–33)
MCHC RBC AUTO-ENTMCNC: 33.9 G/DL (ref 33–37)
MCV RBC AUTO: 88.3 FL (ref 80–94)
MONOCYTES # BLD AUTO: 0.17 10*3/MM3 (ref 0.1–0.9)
MONOCYTES NFR BLD AUTO: 7.6 % (ref 0–10)
NEUTROPHILS # BLD AUTO: 1.42 10*3/MM3 (ref 1.4–6.5)
NEUTROPHILS NFR BLD AUTO: 63.8 % (ref 30–70)
OSMOLALITY SERPL CALC.SUM OF ELEC: 280.5 MOSM/KG (ref 273–305)
PHOSPHATE SERPL-MCNC: 3.6 MG/DL (ref 2.7–4.5)
PLATELET # BLD AUTO: 44 10*3/MM3 (ref 130–400)
PMV BLD AUTO: 12.9 FL (ref 6–10)
POTASSIUM BLD-SCNC: 4 MMOL/L (ref 3.5–5.3)
PROT SERPL-MCNC: 5.9 G/DL (ref 6–8)
RBC # BLD AUTO: 4.11 10*6/MM3 (ref 4.7–6.1)
RETICS #: 0.06 10*6/MM3 (ref 0.02–0.13)
RETICS/RBC NFR AUTO: 1.45 % (ref 0.5–2)
SODIUM BLD-SCNC: 133 MMOL/L (ref 135–153)
WBC NRBC COR # BLD: 2.23 10*3/MM3 (ref 4.5–12.5)

## 2018-06-07 PROCEDURE — 94799 UNLISTED PULMONARY SVC/PX: CPT

## 2018-06-07 PROCEDURE — 99232 SBSQ HOSP IP/OBS MODERATE 35: CPT | Performed by: INTERNAL MEDICINE

## 2018-06-07 PROCEDURE — 83010 ASSAY OF HAPTOGLOBIN QUANT: CPT | Performed by: INTERNAL MEDICINE

## 2018-06-07 PROCEDURE — 85045 AUTOMATED RETICULOCYTE COUNT: CPT | Performed by: INTERNAL MEDICINE

## 2018-06-07 PROCEDURE — 63710000001 INSULIN ASPART PER 5 UNITS: Performed by: PHYSICIAN ASSISTANT

## 2018-06-07 PROCEDURE — 85384 FIBRINOGEN ACTIVITY: CPT | Performed by: INTERNAL MEDICINE

## 2018-06-07 PROCEDURE — 99232 SBSQ HOSP IP/OBS MODERATE 35: CPT | Performed by: PSYCHIATRY & NEUROLOGY

## 2018-06-07 PROCEDURE — 84100 ASSAY OF PHOSPHORUS: CPT | Performed by: PHYSICIAN ASSISTANT

## 2018-06-07 PROCEDURE — 76700 US EXAM ABDOM COMPLETE: CPT

## 2018-06-07 PROCEDURE — 85060 BLOOD SMEAR INTERPRETATION: CPT | Performed by: INTERNAL MEDICINE

## 2018-06-07 PROCEDURE — 85025 COMPLETE CBC W/AUTO DIFF WBC: CPT | Performed by: PHYSICIAN ASSISTANT

## 2018-06-07 PROCEDURE — 80053 COMPREHEN METABOLIC PANEL: CPT | Performed by: PHYSICIAN ASSISTANT

## 2018-06-07 PROCEDURE — 63710000001 INSULIN DETEMIR PER 5 UNITS: Performed by: PHYSICIAN ASSISTANT

## 2018-06-07 PROCEDURE — 63710000001 INSULIN ASPART PER 5 UNITS: Performed by: INTERNAL MEDICINE

## 2018-06-07 PROCEDURE — 83735 ASSAY OF MAGNESIUM: CPT | Performed by: PHYSICIAN ASSISTANT

## 2018-06-07 PROCEDURE — 82962 GLUCOSE BLOOD TEST: CPT

## 2018-06-07 PROCEDURE — 83615 LACTATE (LD) (LDH) ENZYME: CPT | Performed by: INTERNAL MEDICINE

## 2018-06-07 PROCEDURE — 76700 US EXAM ABDOM COMPLETE: CPT | Performed by: RADIOLOGY

## 2018-06-07 RX ORDER — IBUPROFEN 600 MG/1
600 TABLET ORAL EVERY 6 HOURS PRN
Status: DISCONTINUED | OUTPATIENT
Start: 2018-06-07 | End: 2018-06-07

## 2018-06-07 RX ORDER — IBUPROFEN 200 MG
200 TABLET ORAL EVERY 6 HOURS PRN
Status: DISCONTINUED | OUTPATIENT
Start: 2018-06-07 | End: 2018-06-08

## 2018-06-07 RX ADMIN — PANTOPRAZOLE SODIUM 40 MG: 40 TABLET, DELAYED RELEASE ORAL at 09:56

## 2018-06-07 RX ADMIN — INSULIN ASPART 10 UNITS: 100 INJECTION, SOLUTION INTRAVENOUS; SUBCUTANEOUS at 07:30

## 2018-06-07 RX ADMIN — DICYCLOMINE HYDROCHLORIDE 10 MG: 10 CAPSULE ORAL at 09:56

## 2018-06-07 RX ADMIN — IBUPROFEN 600 MG: 600 TABLET ORAL at 10:47

## 2018-06-07 RX ADMIN — IBUPROFEN 200 MG: 200 TABLET, FILM COATED ORAL at 21:15

## 2018-06-07 RX ADMIN — MAGNESIUM GLUCONATE 500 MG ORAL TABLET 400 MG: 500 TABLET ORAL at 13:17

## 2018-06-07 RX ADMIN — POLYETHYLENE GLYCOL (3350) 17 G: 17 POWDER, FOR SOLUTION ORAL at 09:56

## 2018-06-07 RX ADMIN — INSULIN ASPART 12 UNITS: 100 INJECTION, SOLUTION INTRAVENOUS; SUBCUTANEOUS at 21:24

## 2018-06-07 RX ADMIN — CLINDAMYCIN HYDROCHLORIDE 300 MG: 150 CAPSULE ORAL at 16:44

## 2018-06-07 RX ADMIN — ATORVASTATIN CALCIUM 20 MG: 20 TABLET, FILM COATED ORAL at 09:56

## 2018-06-07 RX ADMIN — CLINDAMYCIN HYDROCHLORIDE 300 MG: 150 CAPSULE ORAL at 21:15

## 2018-06-07 RX ADMIN — MUPIROCIN: 20 OINTMENT TOPICAL at 21:15

## 2018-06-07 RX ADMIN — MAGNESIUM GLUCONATE 500 MG ORAL TABLET 400 MG: 500 TABLET ORAL at 21:15

## 2018-06-07 RX ADMIN — INSULIN ASPART 8 UNITS: 100 INJECTION, SOLUTION INTRAVENOUS; SUBCUTANEOUS at 12:21

## 2018-06-07 RX ADMIN — INSULIN DETEMIR 35 UNITS: 100 INJECTION, SOLUTION SUBCUTANEOUS at 21:25

## 2018-06-07 RX ADMIN — FUROSEMIDE 20 MG: 20 TABLET ORAL at 09:57

## 2018-06-07 RX ADMIN — DICYCLOMINE HYDROCHLORIDE 10 MG: 10 CAPSULE ORAL at 21:15

## 2018-06-07 RX ADMIN — HYDROXYZINE HYDROCHLORIDE 50 MG: 50 TABLET ORAL at 21:15

## 2018-06-07 RX ADMIN — MUPIROCIN: 20 OINTMENT TOPICAL at 09:56

## 2018-06-07 RX ADMIN — BUPROPION HYDROCHLORIDE 100 MG: 100 TABLET, EXTENDED RELEASE ORAL at 09:56

## 2018-06-07 RX ADMIN — INSULIN ASPART 8 UNITS: 100 INJECTION, SOLUTION INTRAVENOUS; SUBCUTANEOUS at 07:30

## 2018-06-07 RX ADMIN — POTASSIUM CHLORIDE 20 MEQ: 1500 TABLET, EXTENDED RELEASE ORAL at 09:56

## 2018-06-07 RX ADMIN — ALBUTEROL SULFATE 2 PUFF: 90 AEROSOL, METERED RESPIRATORY (INHALATION) at 16:45

## 2018-06-07 RX ADMIN — INSULIN ASPART 8 UNITS: 100 INJECTION, SOLUTION INTRAVENOUS; SUBCUTANEOUS at 16:44

## 2018-06-07 RX ADMIN — BUPROPION HYDROCHLORIDE 100 MG: 100 TABLET, EXTENDED RELEASE ORAL at 21:15

## 2018-06-07 RX ADMIN — CLINDAMYCIN HYDROCHLORIDE 300 MG: 150 CAPSULE ORAL at 09:57

## 2018-06-07 RX ADMIN — Medication 1 TABLET: at 09:56

## 2018-06-07 RX ADMIN — TRAZODONE HYDROCHLORIDE 50 MG: 50 TABLET ORAL at 21:15

## 2018-06-07 RX ADMIN — MAGNESIUM HYDROXIDE 10 ML: 2400 SUSPENSION ORAL at 21:18

## 2018-06-07 RX ADMIN — INSULIN ASPART 8 UNITS: 100 INJECTION, SOLUTION INTRAVENOUS; SUBCUTANEOUS at 16:43

## 2018-06-07 NOTE — PLAN OF CARE
Problem: Patient Care Overview  Goal: Plan of Care Review  Outcome: Ongoing (interventions implemented as appropriate)  PATIENT VERBALIZES POOR SLEEP, A/D, PAIN, SI WITH NO PLAN. PATIENTS LABS ARE ABNORMAL. DECREASED FIBRINOGEN, WBC, PLATELETS, MAG LEVEL, RBC, WBC, H/H, LYMPHOCYTES. PATIENT HAD US OF ABDOMEN THIS SHIFT. ENLARGED SPLEEN, POSSIBLE CIRRHOSIS, AND ABNORMAL APPEARANCE OF LIVER,   06/07/18 1511   Coping/Psychosocial   Plan of Care Reviewed With patient   Coping/Psychosocial   Patient Agreement with Plan of Care agrees   Plan of Care Review   Progress no change       Problem: Overarching Goals (Adult)  Goal: Adheres to Safety Considerations for Self and Others  Outcome: Ongoing (interventions implemented as appropriate)    Goal: Optimized Coping Skills in Response to Life Stressors  Outcome: Ongoing (interventions implemented as appropriate)    Goal: Develops/Participates in Therapeutic [place] to Support Successful Transition  Outcome: Ongoing (interventions implemented as appropriate)      Problem: Fall Risk (Adult)  Goal: Identify Related Risk Factors and Signs and Symptoms  Outcome: Ongoing (interventions implemented as appropriate)    Goal: Absence of Fall  Outcome: Ongoing (interventions implemented as appropriate)

## 2018-06-07 NOTE — PROGRESS NOTES
"INPATIENT PSYCHIATRIC PROGRESS NOTE    Name:  Isaias Lang  :  1965  MRN:  [id number]  Visit Number:  [id number]  Length of stay:  3    Behavioral Health Treatment Plan and Problem List: I have reviewed and approved the Behavioral Health Treatment Plan and Problem list.    SUBJECTIVE  CC: \"Doing some better\"    INTERVAL HISTORY: \"Just scarred, what's going to happen to me, medically and physically\". Asking about viability of the nursing home referrals. No longer feeling hopeless. \"Just glad nobody is going to hurt me when I lie down\".   Appreciate Oncology consult - US of the liver without a focal lesion but changes consistent with cirrhosis.     Depression rating 5/10  Anxiety rating 5-6/10  Sleep: Very restless, intermittent insomnia.      Review of Systems   Respiratory: Negative.    Cardiovascular: Positive for palpitations.   Gastrointestinal: Positive for abdominal pain.   Genitourinary: Negative.    Musculoskeletal: Positive for arthralgias and back pain.   Neurological: Positive for weakness.         OBJECTIVE    Temp:  [97.6 °F (36.4 °C)-98.3 °F (36.8 °C)] 98.3 °F (36.8 °C)  Heart Rate:  [] 108  Resp:  [18] 18  BP: ()/(62-78) 99/68    MENTAL STATUS EXAM:      Appearance:Casually dressed, good hygeine.   Cooperation:Cooperative  Psychomotor: No psychomotor agitation/retardation, No EPS, No motor tics  Speech-normal rate, amount.   Mood/Affect: Depressed  Thought Processes: linear, logical, and goal directed  Thought Content: negativistic   Hallucination(s): none  Hopelessness: No  Optimistic:minimally  Suicidal Thoughts:  none  Suicidal Plan/Intent: none  Homicidal Thoughts:  absent  Orientation: oriented x 3  Memory: recent intact    Lab Results (last 24 hours)     Procedure Component Value Units Date/Time    Lactate Dehydrogenase [[id number]]  (Abnormal) Collected:  18    Specimen:  Blood Updated:  18 0932      (H) U/L     Peripheral Blood Smear [[id number]] " Collected:  06/07/18 0719    Specimen:  Blood Updated:  06/07/18 0835    Reticulocytes [[id number]]  (Normal) Collected:  06/07/18 0719    Specimen:  Blood Updated:  06/07/18 0833     Reticulocyte % 1.45 %      Reticulocyte Absolute 0.0605 10*6/mm3     Osmolality, Calculated [[id number]]  (Normal) Collected:  06/07/18 0719    Specimen:  Blood Updated:  06/07/18 0832     Osmolality Calc 280.5 mOsm/kg     Comprehensive Metabolic Panel [[id number]]  (Abnormal) Collected:  06/07/18 0719    Specimen:  Blood Updated:  06/07/18 0832     Glucose 376 (H) mg/dL      BUN 9 mg/dL      Creatinine 0.68 mg/dL      Sodium 133 (L) mmol/L      Potassium 4.0 mmol/L      Comment: 1+ Hemolysis         Chloride 105 mmol/L      CO2 25.6 mmol/L      Calcium 8.5 mg/dL      Total Protein 5.9 (L) g/dL      Albumin 2.80 (L) g/dL      ALT (SGPT) 44 U/L      AST (SGOT) 44 (H) U/L      Alkaline Phosphatase 160 (H) U/L      Comment: Note New Reference Ranges        Total Bilirubin 0.6 mg/dL      eGFR Non African Amer 122 mL/min/1.73      Globulin 3.1 gm/dL      A/G Ratio 0.9 (L) g/dL      BUN/Creatinine Ratio 13.2     Anion Gap 2.4 (L) mmol/L     Magnesium [[id number]]  (Abnormal) Collected:  06/07/18 0719    Specimen:  Blood Updated:  06/07/18 0831     Magnesium 1.6 (L) mg/dL     Phosphorus [[id number]]  (Normal) Collected:  06/07/18 0719    Specimen:  Blood Updated:  06/07/18 0831     Phosphorus 3.6 mg/dL     CBC & Differential [[id number]] Collected:  06/07/18 0719    Specimen:  Blood Updated:  06/07/18 0815    Narrative:       The following orders were created for panel order CBC & Differential.  Procedure                               Abnormality         Status                     ---------                               -----------         ------                     Scan Slide[[id number]]                                                                  CBC Auto Differential[[id number]]        Abnormal            Final result                 Please  view results for these tests on the individual orders.    CBC Auto Differential [[id number]]  (Abnormal) Collected:  06/07/18 0719    Specimen:  Blood Updated:  06/07/18 0815     WBC 2.23 (C) 10*3/mm3      RBC 4.11 (L) 10*6/mm3      Hemoglobin 12.3 (L) g/dL      Hematocrit 36.3 (L) %      MCV 88.3 fL      MCH 29.9 pg      MCHC 33.9 g/dL      RDW 14.5 %      RDW-SD 46.3 fl      MPV 12.9 (H) fL      Platelets 44 (C) 10*3/mm3      Neutrophil % 63.8 %      Lymphocyte % 26.0 %      Monocyte % 7.6 %      Eosinophil % 2.2 %      Basophil % 0.4 %      Immature Grans % 0.0 %      Neutrophils, Absolute 1.42 10*3/mm3      Lymphocytes, Absolute 0.58 (L) 10*3/mm3      Monocytes, Absolute 0.17 10*3/mm3      Eosinophils, Absolute 0.05 10*3/mm3      Basophils, Absolute 0.01 10*3/mm3      Immature Grans, Absolute 0.00 10*3/mm3     POC Glucose Once [[id number]]  (Abnormal) Collected:  06/07/18 0659    Specimen:  Blood Updated:  06/07/18 0707     Glucose 340 (H) mg/dL     Narrative:       Meter: [id number] : [id number] Lorenza Nelson    POC Glucose Once [[id number]]  (Abnormal) Collected:  06/06/18 1954    Specimen:  Blood Updated:  06/06/18 2015     Glucose 360 (H) mg/dL     Narrative:       Meter: [id number] : [id number] Lorenza Nelson    Blood Culture - Blood, Arm, Left [[id number]]  (Normal) Collected:  06/05/18 1732    Specimen:  Blood from Arm, Left Updated:  06/06/18 1801     Blood Culture No growth at 24 hours    POC Glucose Once [[id number]]  (Abnormal) Collected:  06/06/18 1643    Specimen:  Blood Updated:  06/06/18 1708     Glucose 400 (H) mg/dL     Narrative:       Meter: [id number] : [id number] Alexey Barfield    Blood Culture - Blood, Arm, Left [[id number]]  (Normal) Collected:  06/05/18 1557    Specimen:  Blood from Arm, Left Updated:  06/06/18 1645     Blood Culture No growth at 24 hours    POC Glucose Once [[id number]]  (Abnormal) Collected:  06/06/18 1617    Specimen:  Blood Updated:  06/06/18 1630     Glucose 408 (H)  mg/dL     Narrative:       Meter: [id number] : [id number] Linguastata    POC Glucose Once [[id number]]  (Abnormal) Collected:  06/06/18 1115    Specimen:  Blood Updated:  06/06/18 1124     Glucose 165 (H) mg/dL     Narrative:       Meter: [id number] : [id number] Radisyss Carolyne           Imaging Results (last 24 hours)     Procedure Component Value Units Date/Time    US Abdomen Complete [[id number]] Collected:  06/07/18 0951     Updated:  06/07/18 0955    Narrative:       EXAMINATION: US ABDOMEN COMPLETE-         CLINICAL INDICATION:     chronic hep b/c and pancytopenia, evaluate for  liver cirrhosis/splenomegaly     TECHNIQUE: Multiplanar gray scale ultrasound of the abdomen.      COMPARISON: NONE      FINDINGS:   Visualized pancreas is poorly assessed due to bowel gas shadowing..   Cholecystectomy.  The common bile duct measures 4.4 mm and is within normal limits. .  Abnormal appearance of the liver. Markedly heterogeneous echotexture is  noted with with nodular margins indicative of cirrhosis. No perihepatic  ascites identified. No focal liver lesion.  Spleen is     enlarged measuring 15.3 cm in length without focal splenic  abnormality.   The RIGHT kidney measures 10.4 cm  in length without mass,  hydronephrosis, or shadowing stone.   The LEFT kidney measures 12.3 cm in length without mass, hydronephrosis,  or shadowing stone.   No ascites demonstrated.   IVC shows patency.  There is normal directional flow within the portal  vein.  Aorta assessment limited due to obscuration from bowel gas artifact.       Impression:       1. Heterogeneous and abnormal appearance of the liver with changes of  cirrhosis noted.  2. Splenomegaly.  3. Other nonacute findings as above.      This report was finalized on 6/7/2018 9:53 AM by Dr. Alexsander Son MD.              ECG/EMG Results (most recent)     None           ALLERGIES: Robitussin dm [dextromethorphan-guaifenesin]; Tizanidine; Metformin; Sulfa antibiotics; Contrast  dye; and Tramadol      Current Facility-Administered Medications:   •  albuterol (PROVENTIL HFA;VENTOLIN HFA) inhaler 2 puff, 2 puff, Inhalation, Q6H PRN, Ankit Barnes MD, 2 puff at 06/05/18 1335  •  aluminum-magnesium hydroxide-simethicone (MAALOX MAX) 400-400-40 MG/5ML suspension 15 mL, 15 mL, Oral, Q6H PRN, Orlando Dinh MD  •  atorvastatin (LIPITOR) tablet 20 mg, 20 mg, Oral, Daily, Ankit Barnes MD, 20 mg at 06/07/18 0956  •  benzonatate (TESSALON) capsule 100 mg, 100 mg, Oral, TID PRN, Orlando Dinh MD  •  buPROPion SR (WELLBUTRIN SR) 12 hr tablet 100 mg, 100 mg, Oral, Q12H, Ankit Barnes MD, 100 mg at 06/07/18 0956  •  cholecalciferol (VITAMIN D3) capsule 50,000 Units, 50,000 Units, Oral, Weekly, Ankit Barnes MD, 50,000 Units at 06/06/18 0814  •  clindamycin (CLEOCIN) capsule 300 mg, 300 mg, Oral, TID, Dina Harry MD, 300 mg at 06/07/18 0957  •  dextrose (D50W) solution 25 g, 25 g, Intravenous, Q15 Min PRN, Orlando Dinh MD  •  dextrose (GLUTOSE) oral gel 15 g, 15 g, Oral, Q15 Min PRN, Orlando Dinh MD  •  dicyclomine (BENTYL) capsule 10 mg, 10 mg, Oral, BID, Ankit Barnes MD, 10 mg at 06/07/18 0956  •  furosemide (LASIX) tablet 20 mg, 20 mg, Oral, Daily, Dina Harry MD, 20 mg at 06/07/18 0957  •  glucagon (human recombinant) (GLUCAGEN DIAGNOSTIC) injection 1 mg, 1 mg, Subcutaneous, PRN, Orlando Dinh MD  •  hydrOXYzine (ATARAX) tablet 50 mg, 50 mg, Oral, Q6H PRN, Orlando Dinh MD  •  insulin aspart (novoLOG) injection 0-14 Units, 0-14 Units, Subcutaneous, 4x Daily AC & at Bedtime, PATRICK Solomon, 10 Units at 06/07/18 0730  •  insulin aspart (novoLOG) injection 8 Units, 8 Units, Subcutaneous, TID With Meals, Dina Harry MD, 8 Units at 06/07/18 0730  •  insulin detemir (LEVEMIR) injection 35 Units, 35 Units, Subcutaneous, Nightly, PATRICK Solomon, 35 Units at 06/06/18 2010  •  ipratropium-albuterol (DUO-NEB) nebulizer  solution 3 mL, 3 mL, Nebulization, 4x Daily - RT, Ankit Barnes MD  •  loperamide (IMODIUM) capsule 2 mg, 2 mg, Oral, 4x Daily PRN, Orlando Dinh MD  •  magnesium hydroxide (MILK OF MAGNESIA) suspension 2400 mg/10mL 10 mL, 10 mL, Oral, Daily PRN, Orlando Dinh MD  •  metoprolol tartrate (LOPRESSOR) tablet 12.5 mg, 12.5 mg, Oral, Daily, Dina Harry MD, 12.5 mg at 06/06/18 0814  •  multivitamin (DAILY SAVAGE) tablet 1 tablet, 1 tablet, Oral, Daily, PATRICK Solomon, 1 tablet at 06/07/18 0956  •  mupirocin (BACTROBAN) 2 % ointment, , Topical, Q12H, PATRICK Solomon  •  nicotine (NICODERM CQ) 21 MG/24HR patch 1 patch, 1 patch, Transdermal, Q24H, Orlando Dinh MD  •  nitroglycerin (NITROSTAT) SL tablet 0.4 mg, 0.4 mg, Sublingual, Q5 Min PRN, Ankit Barnes MD  •  ondansetron (ZOFRAN) tablet 4 mg, 4 mg, Oral, Q6H PRN, Orlando Dinh MD, 4 mg at 06/06/18 1634  •  pantoprazole (PROTONIX) EC tablet 40 mg, 40 mg, Oral, Daily, Ankit Barnes MD, 40 mg at 06/07/18 0956  •  polyethylene glycol (MIRALAX) powder 17 g, 17 g, Oral, Daily, Ankit Barnes MD, 17 g at 06/07/18 0956  •  potassium chloride (K-DUR,KLOR-CON) CR tablet 20 mEq, 20 mEq, Oral, Daily, Ankit Barnes MD, 20 mEq at 06/07/18 0956  •  sodium chloride (OCEAN) nasal spray 2 spray, 2 spray, Each Nare, PRN, Orlando Dinh MD  •  tetanus immune globulin (HYPERTET) injection 250 Units, 250 Units, Intramuscular, Once, Dina Harry MD  •  traZODone (DESYREL) tablet 50 mg, 50 mg, Oral, Nightly PRN, Orlando Dinh MD, 50 mg at 06/06/18 2048    ASSESSMENT & PLAN            Patient Active Problem List   Diagnosis Code     • Severe episode of recurrent major depressive disorder, without psychotic features  Plan: Have started Wellbutrin and will contiunue with supportive's individual and group psychotherapeutic efforts      F33.2     Depression with suicidal ideation  Plan: Patient so longer expressing suicidal  intent , will discontinue suicidal precautions       F32.9, R4.855      • Type 1 diabetes mellitus  Plan: We'll believe following with the medical consultants and their recommendations E10.9   • 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   Plan:  treat symptomatically attempting to avoid opiates, will also need to Minimize NSAID due to the thrombocytopenia  G89.21   •       • Gastroesophageal reflux disease with esophagitis  Plan: Continue Protonix  K21.0   •   B18.1   • Chronic hepatitis C without hepatic coma  Plan: Monitor liver status, question possible factor with the patient's leukocytosis and thrombocytopenia   Chronic viral hepatitis B without delta agent and without coma plan: Monitor monitor her hepatic status     B18.2   •               • Cellulitis Plan:  amoxicillin, Inflammation resolving  L03.90   • Uncomplicated opioid dependence  Plan: Incorporate into the ongoing psychotherapeutic effort as well as disposition plan. F11.20   • Leukocytosis (leucocytosis) Plan: follow oncology's recommendation.         D72.829   • Thrombocytopenia Plan: Follow oncology's recommendation.               Suicide precautions: Will d/c.     Behavioral Health Treatment Plan and Problem List: I have reviewed and approved the Behavioral Health Treatment Plan and Problem list.    Clinician:  Ankit Barnes MD  06/07/18  10:09 AM    Dictated utilizing Dragon dictation

## 2018-06-07 NOTE — PROGRESS NOTES
Navigator is helping Primary Therapist with discharge planning for patient. Navigator completed the following referral on this day:     [facility]   [phone]  - Cherri out of office. Expected back around lunch. 6/6  - Cherri unavailable, Try back in the morning 6/7.     [facility]  [phone]  -Number busy x4 6/7.     [place]  [phone]  -Inna is off today. Check back tomorrow. 6/6.  -No Male Beds Available 6/7.     [facility]  [phone]  -Number Busy x2. 6/6  -Resent 6/7.     [facility]   [phone]  -No Answer 6/6.  -No Male Beds and Declined Patient. 6/7.
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
## 2018-06-07 NOTE — PLAN OF CARE
Problem: Patient Care Overview  Goal: Plan of Care Review  Outcome: Ongoing (interventions implemented as appropriate)   06/07/18 0256   Coping/Psychosocial   Plan of Care Reviewed With patient   Coping/Psychosocial   Patient Agreement with Plan of Care agrees   Plan of Care Review   Progress no change       Problem: Overarching Goals (Adult)  Goal: Adheres to Safety Considerations for Self and Others  Outcome: Ongoing (interventions implemented as appropriate)    Goal: Optimized Coping Skills in Response to Life Stressors  Outcome: Ongoing (interventions implemented as appropriate)    Goal: Develops/Participates in Therapeutic [place] to Support Successful Transition  Outcome: Ongoing (interventions implemented as appropriate)

## 2018-06-07 NOTE — PROGRESS NOTES
"1115  DATA:  Therapist met with Patient individually at bedside on this date. Therapist introduced self as covering therapist and explained that Patient's primary therapist would not be present today. Patient agreeable to discuss treatment progress and discharge concerns. Patient engaged Therapist stating he was \"hurting all over\". Patient clarified discussing the need to receive pain medication. Patient did identify improvements during treatment stating, \"I am feeling a little better today I guess\". Patient inquired about disposition efforts. Therapist addressed.     ASSESSMENT:  Patient avoided eye contact with Therapist during entire assessment keeping his back turned while laying in bed. Patient appeared irritable and guarded providing little insight to situation or emotions. Patient denies any SI/HI at this time.     PLAN:  Patient will continue stabilization. Patient will continue to receive services offered by Treatment Team.       Treatment Team awaiting referrals to be reviewed for [facility], and [facility]. Patient has been denied at [facility] and [facility] due to lack of beds available. Efforts will continue.   "

## 2018-06-08 PROBLEM — R45.851 DEPRESSION WITH SUICIDAL IDEATION: Status: RESOLVED | Noted: 2018-06-04 | Resolved: 2018-06-08

## 2018-06-08 PROBLEM — F32.A DEPRESSION WITH SUICIDAL IDEATION: Status: RESOLVED | Noted: 2018-06-04 | Resolved: 2018-06-08

## 2018-06-08 LAB
ALBUMIN SERPL-MCNC: 2.5 G/DL (ref 3.5–5)
ALBUMIN/GLOB SERPL: 0.8 G/DL (ref 1.5–2.5)
ALP SERPL-CCNC: 138 U/L (ref 40–129)
ALT SERPL W P-5'-P-CCNC: 45 U/L (ref 10–44)
ANION GAP SERPL CALCULATED.3IONS-SCNC: 3.5 MMOL/L (ref 3.6–11.2)
AST SERPL-CCNC: 40 U/L (ref 10–34)
BASOPHILS # BLD AUTO: 0.01 10*3/MM3 (ref 0–0.3)
BASOPHILS NFR BLD AUTO: 0.4 % (ref 0–2)
BILIRUB SERPL-MCNC: 0.5 MG/DL (ref 0.2–1.8)
BILIRUB UR QL STRIP: NEGATIVE
BUN BLD-MCNC: 12 MG/DL (ref 7–21)
BUN/CREAT SERPL: 18.5 (ref 7–25)
CALCIUM SPEC-SCNC: 8.5 MG/DL (ref 7.7–10)
CHLORIDE SERPL-SCNC: 103 MMOL/L (ref 99–112)
CLARITY UR: CLEAR
CO2 SERPL-SCNC: 26.5 MMOL/L (ref 24.3–31.9)
COLOR UR: YELLOW
CREAT BLD-MCNC: 0.65 MG/DL (ref 0.43–1.29)
DEPRECATED RDW RBC AUTO: 48 FL (ref 37–54)
EOSINOPHIL # BLD AUTO: 0.06 10*3/MM3 (ref 0–0.7)
EOSINOPHIL NFR BLD AUTO: 2.5 % (ref 0–5)
ERYTHROCYTE [DISTWIDTH] IN BLOOD BY AUTOMATED COUNT: 14.7 % (ref 11.5–14.5)
GFR SERPL CREATININE-BSD FRML MDRD: 129 ML/MIN/1.73
GLOBULIN UR ELPH-MCNC: 3 GM/DL
GLUCOSE BLD-MCNC: 299 MG/DL (ref 70–110)
GLUCOSE BLDC GLUCOMTR-MCNC: 257 MG/DL (ref 70–130)
GLUCOSE BLDC GLUCOMTR-MCNC: 259 MG/DL (ref 70–130)
GLUCOSE BLDC GLUCOMTR-MCNC: 281 MG/DL (ref 70–130)
GLUCOSE BLDC GLUCOMTR-MCNC: 287 MG/DL (ref 70–130)
GLUCOSE UR STRIP-MCNC: ABNORMAL MG/DL
HAPTOGLOB SERPL-MCNC: <10 MG/DL (ref 34–200)
HCT VFR BLD AUTO: 35.7 % (ref 42–52)
HGB BLD-MCNC: 12.2 G/DL (ref 14–18)
HGB UR QL STRIP.AUTO: NEGATIVE
IMM GRANULOCYTES # BLD: 0.01 10*3/MM3 (ref 0–0.03)
IMM GRANULOCYTES NFR BLD: 0.4 % (ref 0–0.5)
KETONES UR QL STRIP: NEGATIVE
LEUKOCYTE ESTERASE UR QL STRIP.AUTO: NEGATIVE
LYMPHOCYTES # BLD AUTO: 0.53 10*3/MM3 (ref 1–3)
LYMPHOCYTES NFR BLD AUTO: 21.7 % (ref 21–51)
MCH RBC QN AUTO: 30.9 PG (ref 27–33)
MCHC RBC AUTO-ENTMCNC: 34.2 G/DL (ref 33–37)
MCV RBC AUTO: 90.4 FL (ref 80–94)
MONOCYTES # BLD AUTO: 0.21 10*3/MM3 (ref 0.1–0.9)
MONOCYTES NFR BLD AUTO: 8.6 % (ref 0–10)
NEUTROPHILS # BLD AUTO: 1.62 10*3/MM3 (ref 1.4–6.5)
NEUTROPHILS NFR BLD AUTO: 66.4 % (ref 30–70)
NITRITE UR QL STRIP: NEGATIVE
OSMOLALITY SERPL CALC.SUM OF ELEC: 277.3 MOSM/KG (ref 273–305)
PH UR STRIP.AUTO: 7 [PH] (ref 5–8)
PLATELET # BLD AUTO: 34 10*3/MM3 (ref 130–400)
PMV BLD AUTO: ABNORMAL FL (ref 6–10)
POTASSIUM BLD-SCNC: 4.1 MMOL/L (ref 3.5–5.3)
PROT SERPL-MCNC: 5.5 G/DL (ref 6–8)
PROT UR QL STRIP: NEGATIVE
RBC # BLD AUTO: 3.95 10*6/MM3 (ref 4.7–6.1)
SODIUM BLD-SCNC: 133 MMOL/L (ref 135–153)
SP GR UR STRIP: 1.03 (ref 1–1.03)
UROBILINOGEN UR QL STRIP: ABNORMAL
WBC NRBC COR # BLD: 2.44 10*3/MM3 (ref 4.5–12.5)

## 2018-06-08 PROCEDURE — 82962 GLUCOSE BLOOD TEST: CPT

## 2018-06-08 PROCEDURE — 85025 COMPLETE CBC W/AUTO DIFF WBC: CPT | Performed by: PHYSICIAN ASSISTANT

## 2018-06-08 PROCEDURE — 63710000001 INSULIN DETEMIR PER 5 UNITS: Performed by: PHYSICIAN ASSISTANT

## 2018-06-08 PROCEDURE — 81003 URINALYSIS AUTO W/O SCOPE: CPT | Performed by: PHYSICIAN ASSISTANT

## 2018-06-08 PROCEDURE — 63710000001 INSULIN ASPART PER 5 UNITS: Performed by: PHYSICIAN ASSISTANT

## 2018-06-08 PROCEDURE — 63710000001 INSULIN ASPART PER 5 UNITS: Performed by: INTERNAL MEDICINE

## 2018-06-08 PROCEDURE — 80053 COMPREHEN METABOLIC PANEL: CPT | Performed by: PHYSICIAN ASSISTANT

## 2018-06-08 PROCEDURE — 99232 SBSQ HOSP IP/OBS MODERATE 35: CPT | Performed by: PHYSICIAN ASSISTANT

## 2018-06-08 PROCEDURE — 99233 SBSQ HOSP IP/OBS HIGH 50: CPT | Performed by: INTERNAL MEDICINE

## 2018-06-08 PROCEDURE — 94799 UNLISTED PULMONARY SVC/PX: CPT

## 2018-06-08 PROCEDURE — 99232 SBSQ HOSP IP/OBS MODERATE 35: CPT | Performed by: PSYCHIATRY & NEUROLOGY

## 2018-06-08 RX ORDER — FERROUS SULFATE 325(65) MG
325 TABLET ORAL
Status: DISCONTINUED | OUTPATIENT
Start: 2018-06-08 | End: 2018-06-08

## 2018-06-08 RX ORDER — FERROUS SULFATE 325(65) MG
325 TABLET ORAL
Status: DISCONTINUED | OUTPATIENT
Start: 2018-06-08 | End: 2018-06-13 | Stop reason: HOSPADM

## 2018-06-08 RX ORDER — POLYETHYLENE GLYCOL 3350 17 G/17G
17 POWDER, FOR SOLUTION ORAL 2 TIMES DAILY
Status: DISCONTINUED | OUTPATIENT
Start: 2018-06-08 | End: 2018-06-13 | Stop reason: HOSPADM

## 2018-06-08 RX ORDER — SENNOSIDES 8.6 MG
2 TABLET ORAL 2 TIMES DAILY PRN
Status: DISCONTINUED | OUTPATIENT
Start: 2018-06-08 | End: 2018-06-13 | Stop reason: HOSPADM

## 2018-06-08 RX ORDER — LACTULOSE 10 G/15ML
30 SOLUTION ORAL 3 TIMES DAILY PRN
Status: DISCONTINUED | OUTPATIENT
Start: 2018-06-08 | End: 2018-06-13 | Stop reason: HOSPADM

## 2018-06-08 RX ORDER — IPRATROPIUM BROMIDE AND ALBUTEROL SULFATE 2.5; .5 MG/3ML; MG/3ML
3 SOLUTION RESPIRATORY (INHALATION) EVERY 6 HOURS PRN
Status: DISCONTINUED | OUTPATIENT
Start: 2018-06-08 | End: 2018-06-08

## 2018-06-08 RX ADMIN — INSULIN ASPART 8 UNITS: 100 INJECTION, SOLUTION INTRAVENOUS; SUBCUTANEOUS at 16:49

## 2018-06-08 RX ADMIN — CLINDAMYCIN HYDROCHLORIDE 300 MG: 150 CAPSULE ORAL at 08:24

## 2018-06-08 RX ADMIN — ALBUTEROL SULFATE 2 PUFF: 90 AEROSOL, METERED RESPIRATORY (INHALATION) at 14:09

## 2018-06-08 RX ADMIN — POLYETHYLENE GLYCOL (3350) 17 G: 17 POWDER, FOR SOLUTION ORAL at 20:25

## 2018-06-08 RX ADMIN — FERROUS SULFATE TAB 325 MG (65 MG ELEMENTAL FE) 325 MG: 325 (65 FE) TAB at 16:48

## 2018-06-08 RX ADMIN — MAGNESIUM GLUCONATE 500 MG ORAL TABLET 400 MG: 500 TABLET ORAL at 08:24

## 2018-06-08 RX ADMIN — INSULIN ASPART 8 UNITS: 100 INJECTION, SOLUTION INTRAVENOUS; SUBCUTANEOUS at 07:34

## 2018-06-08 RX ADMIN — POTASSIUM CHLORIDE 20 MEQ: 1500 TABLET, EXTENDED RELEASE ORAL at 08:24

## 2018-06-08 RX ADMIN — POLYETHYLENE GLYCOL (3350) 17 G: 17 POWDER, FOR SOLUTION ORAL at 08:24

## 2018-06-08 RX ADMIN — ALBUTEROL SULFATE 2 PUFF: 90 AEROSOL, METERED RESPIRATORY (INHALATION) at 07:41

## 2018-06-08 RX ADMIN — Medication 1 TABLET: at 08:24

## 2018-06-08 RX ADMIN — PANTOPRAZOLE SODIUM 40 MG: 40 TABLET, DELAYED RELEASE ORAL at 08:24

## 2018-06-08 RX ADMIN — METOPROLOL TARTRATE 12.5 MG: 25 TABLET, FILM COATED ORAL at 08:24

## 2018-06-08 RX ADMIN — INSULIN ASPART 8 UNITS: 100 INJECTION, SOLUTION INTRAVENOUS; SUBCUTANEOUS at 16:48

## 2018-06-08 RX ADMIN — INSULIN ASPART 8 UNITS: 100 INJECTION, SOLUTION INTRAVENOUS; SUBCUTANEOUS at 12:18

## 2018-06-08 RX ADMIN — DICYCLOMINE HYDROCHLORIDE 10 MG: 10 CAPSULE ORAL at 08:24

## 2018-06-08 RX ADMIN — TRAZODONE HYDROCHLORIDE 50 MG: 50 TABLET ORAL at 20:25

## 2018-06-08 RX ADMIN — INSULIN ASPART 8 UNITS: 100 INJECTION, SOLUTION INTRAVENOUS; SUBCUTANEOUS at 12:19

## 2018-06-08 RX ADMIN — BUPROPION HYDROCHLORIDE 100 MG: 100 TABLET, EXTENDED RELEASE ORAL at 08:24

## 2018-06-08 RX ADMIN — HYDROXYZINE HYDROCHLORIDE 50 MG: 50 TABLET ORAL at 20:25

## 2018-06-08 RX ADMIN — DICYCLOMINE HYDROCHLORIDE 10 MG: 10 CAPSULE ORAL at 20:25

## 2018-06-08 RX ADMIN — INSULIN ASPART 8 UNITS: 100 INJECTION, SOLUTION INTRAVENOUS; SUBCUTANEOUS at 20:19

## 2018-06-08 RX ADMIN — ATORVASTATIN CALCIUM 20 MG: 20 TABLET, FILM COATED ORAL at 08:24

## 2018-06-08 RX ADMIN — CLINDAMYCIN HYDROCHLORIDE 300 MG: 150 CAPSULE ORAL at 16:48

## 2018-06-08 RX ADMIN — INSULIN ASPART 8 UNITS: 100 INJECTION, SOLUTION INTRAVENOUS; SUBCUTANEOUS at 06:41

## 2018-06-08 RX ADMIN — MUPIROCIN: 20 OINTMENT TOPICAL at 08:24

## 2018-06-08 RX ADMIN — SENNOSIDES 17.2 MG: 8.6 TABLET, FILM COATED ORAL at 17:11

## 2018-06-08 RX ADMIN — INSULIN DETEMIR 40 UNITS: 100 INJECTION, SOLUTION SUBCUTANEOUS at 21:09

## 2018-06-08 RX ADMIN — CLINDAMYCIN HYDROCHLORIDE 300 MG: 150 CAPSULE ORAL at 20:25

## 2018-06-08 RX ADMIN — BUPROPION HYDROCHLORIDE 100 MG: 100 TABLET, EXTENDED RELEASE ORAL at 20:25

## 2018-06-08 NOTE — NURSING NOTE
Attempted to call Dr Barnes to notify of pt labs. Message left for franklin back. Will continue to monitor.

## 2018-06-08 NOTE — PROGRESS NOTES
Navigator is helping Primary Therapist with discharge planning for patient. Navigator completed the following referral on this day:     [facility]  [phone]  -Sent 6/8    [facility] Albarado   [phone]  -Sent 6/8    [facility]   [phone]  - Cherri out of office. Expected back around lunch. 6/6  - Cherri unavailable, Try back in the morning 6/7.   -Cherri out. 6/8    [facility] Albarado  [phone]  -Number busy x4 6/7.  -Number busy x2 6/8.     [place]  [phone]  -Inna is off today. Check back tomorrow. 6/6.  -No Male Beds Available 6/7.     [facility]  [phone]  -Number Busy x2. 6/6  -Resent 6/7.  -Facility asked for updated clinicals. Sent 6/8.    [facility]   [phone]  -No Answer 6/6.  -No Male Beds and Declined Patient. 6/7.

## 2018-06-08 NOTE — DISCHARGE PLACEMENT REQUEST
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   -Thrombocytopenia with platelet count 39,000  -Insulin dependent type II diabetes mellitus with hyperglycemia   -History of paroxysmal atrial fibrillation without chronic anticoagulation due to coagulopathy and thrombocytopenia, currently NSR  -Essential hypertension, currently controlled   -Hyperlipidemia   -History of coronary artery disease s/p [facility] in 2012  -History of CHF, most recent EF 50%  -History of hepatitis C most likely causing leukopenia and transaminitis   -History of hepatitis B   -Splenomegaly   -Mild hyponatremia   -Chronic leukopenia likely secondary to hepatitis C and history of splenomegaly   -COPD without acute exacerbation   -Obstructive sleep apnea without use of BiPAP/CPAP  -Chronic pain syndrome  -Degenerative disc disease    -History of MRSA infection of achilles tendon, LUE, and left thumb in the past related to IVDA  -History of DVT in the past   -GERD  -Depression   -Anxiety   -Vitamin D deficiency  -History of IVDA  -Former  smoker   -Obesity     Continue psychiatric care per primary team. For index finger wound, continue PO Clindamycin course. Continue topical application of mupirocin as well. Tetanus vaccine and immune globulin given yesterday. Continue to closely monitor, infection seems to be clinically improving well.     Blood glucose levels much more controlled today, 100s-200s. Continue current regimen. Titrate insulin therapy as necessary.     Platelets and WBC stable, chronically low. Primary team has consulted hematology/oncology for evaluation. Follow for recommendations. Closely monitor levels, transfuse if necessary. Vitamin D level insufficient, start daily multivitamin.     BP improved today, continue current regimen and holding parameters.     Continue to closely monitor electrolytes and replace per protocol as necessary. Will repeat labs in AM and continue to closely monitor the patient. Hospitalist services will continue to follow. Please do not hesitate to call with any questions or concerns.       I have discussed the patient's assessment and plan with the patient and nursing staff.     PATRICK Solomon  18  5:11 PM  ----------------------------------------------------------------------------------------------------------------------    I, Dr Harry, attest that the above note correctly reflects my work and medical decision. Patient was independently seen and examined by me, including physical exam, assessment, and treatment plan.  The above note was reviewed and edited by Dr Harry.      Electronically signed by Dina Harry MD at 2018  5:40 PM     Ankit Barnes MD at 2018 10:11 AM          INPATIENT PSYCHIATRIC PROGRESS NOTE    Name:  Isaias Lang  :  1965  MRN:  [id number]  Visit Number:  [id number]  Length of stay:  2    Behavioral Health Treatment Plan and Problem List: I have reviewed and approved the Behavioral Health Treatment Plan and Problem  "list.    SUBJECTIVE  CC: \"Not feeling good\"    INTERVAL HISTORY: Patient remains depressed with feelings that he be better off dead however no suicidal intent or plan demonstrated.  Patient states \"I felt suicidal last night not so much today\".  No delusional thought content, no hallucinatory phenomena.    Appreciate hospitalist consultation, certainly patient's primary difficulties are medical in nature, his depression and substance abuse issues at this point or more or less secondary    Depression rating 7/10  Anxiety rating 7/10  Sleep:  7-8 hours         Review of Systems   Respiratory: Negative.    Cardiovascular: Negative.    Gastrointestinal: Positive for abdominal pain.   Musculoskeletal: Positive for back pain.   Neurological: Positive for weakness.         OBJECTIVE    Temp:  [97.1 °F (36.2 °C)-98.2 °F (36.8 °C)] 97.4 °F (36.3 °C)  Heart Rate:  [] 103  Resp:  [16-18] 16  BP: ()/(59-73) 107/73    MENTAL STATUS EXAM:      Appearance:Casually dressed, good hygeine.   Cooperation:Cooperative  Psychomotor: No psychomotor agitation/retardation, No EPS, No motor tics  Speech-normal rate, amount.   Mood/Affect: Depressed  Thought Processes: associations intact  Thought Content: negativistic   Hallucination(s): none  Hopelessness: Yes  Optimistic:No  Suicidal Thoughts:  slight  Suicidal Plan/Intent: none  Homicidal Thoughts:  absent  Orientation: oriented x 3  Memory: recent intact    Lab Results (last 24 hours)     Procedure Component Value Units Date/Time    POC Glucose Once [[id number]]  (Abnormal) Collected:  06/06/18 0646    Specimen:  Blood Updated:  06/06/18 0720     Glucose 263 (H) mg/dL     Narrative:       Meter: [id number] : [id number] Lorenza Nelson    Blood Culture - Blood, Arm, Left [[id number]]  (Normal) Collected:  06/05/18 1732    Specimen:  Blood from Arm, Left Updated:  06/06/18 0600     Blood Culture No growth at less than 24 hours    Vitamin D 25 Hydroxy [[id number]] Collected:  " 06/06/18 0436    Specimen:  Blood Updated:  06/06/18 0546     25 Hydroxy, Vitamin D 25.0 ng/ml     Narrative:       Reference Ranges for Total Vitamin D 25(OH)    Deficiency      <20.0 ng/ml  Insufficiency   20-30 ng/ml  Sufficiency     ng/ml  Toxicity         >100 ng/ml    Vitamin B12 [[id number]]  (Normal) Collected:  06/06/18 0436    Specimen:  Blood Updated:  06/06/18 0546     Vitamin B-12 764 pg/mL     Folate [[id number]]  (Normal) Collected:  06/06/18 0436    Specimen:  Blood Updated:  06/06/18 0546     Folate 12.57 ng/mL     Ferritin [[id number]]  (Normal) Collected:  06/06/18 0436    Specimen:  Blood Updated:  06/06/18 0546     Ferritin 76.00 ng/mL     Iron Profile [[id number]]  (Abnormal) Collected:  06/06/18 0436    Specimen:  Blood Updated:  06/06/18 0542     Iron 40 (L) mcg/dL      TIBC 291 mcg/dL      Iron Saturation 14 (L) %     Lactic Acid, Plasma [[id number]]  (Normal) Collected:  06/06/18 0436    Specimen:  Blood Updated:  06/06/18 0540     Lactate 1.1 mmol/L     Comprehensive Metabolic Panel [[id number]]  (Abnormal) Collected:  06/06/18 0436    Specimen:  Blood Updated:  06/06/18 0538     Glucose 317 (H) mg/dL      BUN 12 mg/dL      Creatinine 0.63 mg/dL      Sodium 135 mmol/L      Potassium 3.9 mmol/L      Chloride 104 mmol/L      CO2 28.9 mmol/L      Calcium 8.5 mg/dL      Total Protein 5.9 (L) g/dL      Albumin 2.80 (L) g/dL      ALT (SGPT) 44 U/L      AST (SGOT) 45 (H) U/L      Alkaline Phosphatase 146 (H) U/L      Comment: Note New Reference Ranges        Total Bilirubin 0.6 mg/dL      eGFR Non African Amer 133 mL/min/1.73      Globulin 3.1 gm/dL      A/G Ratio 0.9 (L) g/dL      BUN/Creatinine Ratio 19.0     Anion Gap 2.1 (L) mmol/L     Osmolality, Calculated [[id number]]  (Normal) Collected:  06/06/18 0436    Specimen:  Blood Updated:  06/06/18 0538     Osmolality Calc 282.0 mOsm/kg     CBC (No Diff) [[id number]]  (Abnormal) Collected:  06/06/18 0436    Specimen:  Blood Updated:  06/06/18 0530      WBC 2.59 (L) 10*3/mm3      RBC 4.05 (L) 10*6/mm3      Hemoglobin 12.5 (L) g/dL      Hematocrit 35.8 (L) %      MCV 88.4 fL      MCH 30.9 pg      MCHC 34.9 g/dL      RDW 14.3 %      RDW-SD 45.1 fl      MPV 10.7 (H) fL      Platelets 38 (C) 10*3/mm3     Blood Culture - Blood, Arm, Left [[id number]]  (Normal) Collected:  06/05/18 1557    Specimen:  Blood from Arm, Left Updated:  06/06/18 0445     Blood Culture No growth at less than 24 hours    POC Glucose Once [[id number]]  (Abnormal) Collected:  06/05/18 1959    Specimen:  Blood Updated:  06/05/18 2010     Glucose 305 (H) mg/dL     Narrative:       Meter: [id number] : [id number] Lorenza Nelson    Lactic Acid, Reflex [[id number]]  (Abnormal) Collected:  06/05/18 1732    Specimen:  Blood Updated:  06/05/18 1809     Lactate 2.9 (C) mmol/L     Lactic Acid, Reflex Timer (This will reflex a repeat order 3-3:15 hours after ordered.) [[id number]] Collected:  06/05/18 1246    Specimen:  Blood Updated:  06/05/18 1700     Extra Tube Hold for add-ons.     Comment: Auto resulted.       Protime-INR [[id number]]  (Abnormal) Collected:  06/05/18 1557    Specimen:  Blood Updated:  06/05/18 1633     Protime 16.0 (H) Seconds      Comment: Note new Reference Range        INR 1.26 (H)    Narrative:       Suggested INR therapeutic range for stable oral anticoagulant therapy:    Low Intensity therapy:   1.5-2.0  Moderate Intensity therapy:   2.0-3.0  High Intensity therapy:   2.5-4.0    POC Glucose Once [[id number]]  (Abnormal) Collected:  06/05/18 1616    Specimen:  Blood Updated:  06/05/18 1625     Glucose 410 (H) mg/dL     Narrative:       Meter: [id number] : [id number] Sanjuanita Retana    CBC & Differential [[id number]] Collected:  06/05/18 1246    Specimen:  Blood Updated:  06/05/18 1615    Narrative:       The following orders were created for panel order CBC & Differential.  Procedure                               Abnormality         Status                     ---------                                -----------         ------                     Scan Slide[[id number]]                                       Final result               CBC Auto Differential[[id number]]        Abnormal            Final result                 Please view results for these tests on the individual orders.    CBC Auto Differential [[id number]]  (Abnormal) Collected:  06/05/18 1246    Specimen:  Blood Updated:  06/05/18 1615     WBC 2.41 (C) 10*3/mm3      RBC 4.45 (L) 10*6/mm3      Hemoglobin 13.3 (L) g/dL      Hematocrit 39.5 (L) %      MCV 88.8 fL      MCH 29.9 pg      MCHC 33.7 g/dL      RDW 14.5 %      RDW-SD 46.9 fl      MPV -- fL      Comment: Unable to calculate.         Platelets 35 (C) 10*3/mm3      Neutrophil % 66.4 %      Lymphocyte % 22.4 %      Monocyte % 8.3 %      Eosinophil % 2.5 %      Basophil % 0.4 %      Immature Grans % 0.0 %      Neutrophils, Absolute 1.60 10*3/mm3      Lymphocytes, Absolute 0.54 (L) 10*3/mm3      Monocytes, Absolute 0.20 10*3/mm3      Eosinophils, Absolute 0.06 10*3/mm3      Basophils, Absolute 0.01 10*3/mm3      Immature Grans, Absolute 0.00 10*3/mm3     Scan Slide [[id number]] Collected:  06/05/18 1246    Specimen:  Blood Updated:  06/05/18 1615     Poikilocytes Slight/1+     Platelet Estimate Decreased    Hemoglobin A1c [[id number]]  (Abnormal) Collected:  06/05/18 1249    Specimen:  Blood Updated:  06/05/18 1412     Hemoglobin A1C 10.40 (H) %     Lactic Acid, Plasma [[id number]]  (Abnormal) Collected:  06/05/18 1246    Specimen:  Blood Updated:  06/05/18 1357     Lactate 2.1 (C) mmol/L     C-reactive Protein [[id number]]  (Normal) Collected:  06/05/18 1246    Specimen:  Blood Updated:  06/05/18 1334     C-Reactive Protein <0.50 mg/dL     Comprehensive Metabolic Panel [[id number]]  (Abnormal) Collected:  06/05/18 1246    Specimen:  Blood Updated:  06/05/18 1329     Glucose 397 (H) mg/dL      BUN 11 mg/dL      Creatinine 0.73 mg/dL      Sodium 133 (L) mmol/L      Potassium 4.2 mmol/L       Chloride 102 mmol/L      CO2 27.3 mmol/L      Calcium 9.0 mg/dL      Total Protein 6.5 g/dL      Albumin 3.10 (L) g/dL      ALT (SGPT) 50 (H) U/L      AST (SGOT) 51 (H) U/L      Alkaline Phosphatase 138 (H) U/L      Comment: Note New Reference Ranges        Total Bilirubin 0.8 mg/dL      eGFR Non African Amer 112 mL/min/1.73      Globulin 3.4 gm/dL      A/G Ratio 0.9 (L) g/dL      BUN/Creatinine Ratio 15.1     Anion Gap 3.7 mmol/L     Osmolality, Calculated [[id number]]  (Normal) Collected:  06/05/18 1246    Specimen:  Blood Updated:  06/05/18 1329     Osmolality Calc 282.4 mOsm/kg     POC Glucose Once [[id number]]  (Abnormal) Collected:  06/05/18 1107    Specimen:  Blood Updated:  06/05/18 1123     Glucose 334 (H) mg/dL     Narrative:       Meter: [id number] : [id number] Marce Abreu           Imaging Results (last 24 hours)     ** No results found for the last 24 hours. **           ECG/EMG Results (most recent)     None           ALLERGIES: Robitussin dm [dextromethorphan-guaifenesin]; Tizanidine; Metformin; Sulfa antibiotics; Contrast dye; and Tramadol      Current Facility-Administered Medications:   •  albuterol (PROVENTIL HFA;VENTOLIN HFA) inhaler 2 puff, 2 puff, Inhalation, Q6H PRN, Ankit Barnes MD, 2 puff at 06/05/18 1335  •  aluminum-magnesium hydroxide-simethicone (MAALOX MAX) 400-400-40 MG/5ML suspension 15 mL, 15 mL, Oral, Q6H PRN, Orlando Dinh MD  •  atorvastatin (LIPITOR) tablet 20 mg, 20 mg, Oral, Daily, Ankit Barnes MD, 20 mg at 06/06/18 0814  •  benzonatate (TESSALON) capsule 100 mg, 100 mg, Oral, TID PRN, Orlando Dinh MD  •  buPROPion SR (WELLBUTRIN SR) 12 hr tablet 100 mg, 100 mg, Oral, Q12H, Ankit Barnes MD  •  cholecalciferol (VITAMIN D3) capsule 50,000 Units, 50,000 Units, Oral, Weekly, Ankit Barnes MD, 50,000 Units at 06/06/18 0814  •  clindamycin (CLEOCIN) capsule 300 mg, 300 mg, Oral, TID, Dina Harry MD, 300 mg at 06/06/18  0814  •  dextrose (D50W) solution 25 g, 25 g, Intravenous, Q15 Min PRN, Orlando Dinh MD  •  dextrose (GLUTOSE) oral gel 15 g, 15 g, Oral, Q15 Min PRN, Orlando Dinh MD  •  dicyclomine (BENTYL) capsule 10 mg, 10 mg, Oral, BID, Ankit Barnes MD, 10 mg at 06/06/18 0814  •  furosemide (LASIX) tablet 20 mg, 20 mg, Oral, Daily, Dina Harry MD, 20 mg at 06/06/18 0814  •  glucagon (human recombinant) (GLUCAGEN DIAGNOSTIC) injection 1 mg, 1 mg, Subcutaneous, PRN, Orlando Dinh MD  •  hydrOXYzine (ATARAX) tablet 50 mg, 50 mg, Oral, Q6H PRN, Orlando Dinh MD  •  insulin aspart (novoLOG) injection 0-14 Units, 0-14 Units, Subcutaneous, 4x Daily AC & at Bedtime, PATRICK Solomon, 8 Units at 06/06/18 0709  •  insulin aspart (novoLOG) injection 8 Units, 8 Units, Subcutaneous, TID With Meals, Dina Harry MD, 8 Units at 06/06/18 0710  •  insulin detemir (LEVEMIR) injection 35 Units, 35 Units, Subcutaneous, Nightly, PATRICK Solomon, 35 Units at 06/05/18 2019  •  ipratropium-albuterol (DUO-NEB) nebulizer solution 3 mL, 3 mL, Nebulization, 4x Daily - RT, Ankit Barnes MD  •  loperamide (IMODIUM) capsule 2 mg, 2 mg, Oral, 4x Daily PRN, Orlando Dinh MD  •  magnesium hydroxide (MILK OF MAGNESIA) suspension 2400 mg/10mL 10 mL, 10 mL, Oral, Daily PRN, Orlando Dinh MD  •  metoprolol tartrate (LOPRESSOR) tablet 12.5 mg, 12.5 mg, Oral, Daily, Dina Harry MD, 12.5 mg at 06/06/18 0814  •  mupirocin (BACTROBAN) 2 % ointment, , Topical, Q12H, PATRICK Solomon  •  nicotine (NICODERM CQ) 21 MG/24HR patch 1 patch, 1 patch, Transdermal, Q24H, Orlando Dinh MD  •  nitroglycerin (NITROSTAT) SL tablet 0.4 mg, 0.4 mg, Sublingual, Q5 Min PRN, Ankit Barnes MD  •  ondansetron (ZOFRAN) tablet 4 mg, 4 mg, Oral, Q6H PRN, Orlando Dinh MD  •  pantoprazole (PROTONIX) EC tablet 40 mg, 40 mg, Oral, Daily, Ankit Barnes MD, 40 mg at 06/06/18 0814  •  polyethylene glycol (MIRALAX) powder  17 g, 17 g, Oral, Daily, Ankit Barnes MD, 17 g at 06/06/18 0813  •  potassium chloride (K-DUR,KLOR-CON) CR tablet 20 mEq, 20 mEq, Oral, Daily, Ankit Barnes MD, 20 mEq at 06/06/18 0813  •  sodium chloride (OCEAN) nasal spray 2 spray, 2 spray, Each Nare, PRN, Orlando Dinh MD  •  tetanus immune globulin (HYPERTET) injection 250 Units, 250 Units, Intramuscular, Once, Dina Harry MD  •  traZODone (DESYREL) tablet 50 mg, 50 mg, Oral, Nightly PRN, Orlando Dinh MD    ASSESSMENT & PLAN            Patient Active Problem List   Diagnosis Code     • Severe episode of recurrent major depressive disorder, without psychotic features  Plan: Have started Wellbutrin and will contiunue with supportive's individual and group psychotherapeutic efforts      F33.2     Depression with suicidal ideation  Plan: Patient is on special precautions       F32.9, R45.851      • Type 1 diabetes mellitus  Plan: We'll believe following with the medical consultants and their recommendations E10.9   • 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   Plan:  treat symptomatically attempting to avoid opiates, will also need to avoid NSAID due to the thrombocytopenia  G89.21   •       • Gastroesophageal reflux disease with esophagitis  Plan: Continue Protonix  K21.0   •   B18.1   • Chronic hepatitis C without hepatic coma  Plan: Monitor liver status, question possible factor with the patient's leukocytosis and thrombocytopenia Also question of a possible hepatic cancer, to have CT scanning done today per medical consultant recommendation  Chronic viral hepatitis B without delta agent and without coma plan: Monitor monitor her hepatic status     B18.2   •               • Cellulitis Plan:  amoxicillin and have requested medical consultation  L03.90   • Uncomplicated opioid dependence  Plan: Incorporate into the ongoing psychotherapeutic effort as well as disposition plan. F11.20   • Leukocytosis (leucocytosis) Plan: Have requested Hematology  consultation for evaluation and treatment        D72.829   • Thrombocytopenia And: Have requested Hematology consultation for evaluation and treatment             Suicide precautions: Suicide precaution Level 3 (q15 min checks)     Behavioral Health Treatment Plan and Problem List: I have reviewed and approved the Behavioral Health Treatment Plan and Problem list.    Clinician:  Ankit Barnes MD  06/06/18  10:12 AM    Dictated utilizing Dragon dictation       Electronically signed by Ankit Barnes MD at 6/6/2018 11:12 AM

## 2018-06-08 NOTE — PROGRESS NOTES
"INPATIENT PSYCHIATRIC PROGRESS NOTE    Name:  Isaias Lang  :  1965  MRN:  [id number]  Visit Number:  [id number]  Length of stay:  4    Behavioral Health Treatment Plan and Problem List: I have reviewed and approved the Behavioral Health Treatment Plan and Problem list.    SUBJECTIVE  CC: \"Feel bad this morning\"     INTERVAL HISTORY: Appreciate both consults and followup by the Hospitalist and oncology - will need a plan for follow up treatment once the patient achieves placement and is discharged from the hospital.   His depressed state is probably close to his baseline given his physical status. Suicide precautions have been d/c. Patient focus is with finding a safe home.    Depression rating 5/10  Anxiety rating 3/10  Sleep:8 hours plus     Review of Systems   Respiratory: Negative.    Cardiovascular: Positive for palpitations.   Gastrointestinal: Positive for constipation.   Musculoskeletal: Positive for back pain.   Neurological: Positive for weakness.         OBJECTIVE    Temp:  [97.3 °F (36.3 °C)] 97.3 °F (36.3 °C)  Heart Rate:  [108-118] 118  Resp:  [20] 20  BP: ()/(68-75) 119/75    MENTAL STATUS EXAM:      Appearance:Casually dressed, good hygeine.   Cooperation:Cooperative  Psychomotor: No psychomotor agitation/retardation, No EPS, No motor tics  Speech-normal rate, amount.   Mood/Affect: Depressed  Thought Processes: linear, logical, and goal directed  Thought Content: Normal   Hallucination(s): none  Hopelessness: No  Optimistic:minimally  Suicidal Thoughts:  none  Suicidal Plan/Intent: none  Homicidal Thoughts:  absent  Orientation: oriented x 3  Memory: Immediate, recent, recent remote, remote intact    Lab Results (last 24 hours)     Procedure Component Value Units Date/Time    Haptoglobin [[id number]]  (Abnormal) Collected:  18    Specimen:  Blood Updated:  18     Haptoglobin <10 (L) mg/dL     Narrative:       Performed at:  [facility] [address] " [place], [place]  [id number]  : Dexter Campos PhD, Phone:  [phone]    POC Glucose Once [[id number]]  (Abnormal) Collected:  06/08/18 0632    Specimen:  Blood Updated:  06/08/18 0639     Glucose 257 (H) mg/dL     Narrative:       Meter: [id number] : [id number] charissa alvarado    Comprehensive Metabolic Panel [[id number]]  (Abnormal) Collected:  06/08/18 0520    Specimen:  Blood Updated:  06/08/18 0627     Glucose 299 (H) mg/dL      BUN 12 mg/dL      Creatinine 0.65 mg/dL      Sodium 133 (L) mmol/L      Potassium 4.1 mmol/L      Chloride 103 mmol/L      CO2 26.5 mmol/L      Calcium 8.5 mg/dL      Total Protein 5.5 (L) g/dL      Albumin 2.50 (L) g/dL      ALT (SGPT) 45 (H) U/L      AST (SGOT) 40 (H) U/L      Alkaline Phosphatase 138 (H) U/L      Comment: Note New Reference Ranges        Total Bilirubin 0.5 mg/dL      eGFR Non African Amer 129 mL/min/1.73      Globulin 3.0 gm/dL      A/G Ratio 0.8 (L) g/dL      BUN/Creatinine Ratio 18.5     Anion Gap 3.5 (L) mmol/L     Osmolality, Calculated [[id number]]  (Normal) Collected:  06/08/18 0520    Specimen:  Blood Updated:  06/08/18 0627     Osmolality Calc 277.3 mOsm/kg     CBC & Differential [[id number]] Collected:  06/08/18 0520    Specimen:  Blood Updated:  06/08/18 0626    Narrative:       The following orders were created for panel order CBC & Differential.  Procedure                               Abnormality         Status                     ---------                               -----------         ------                     Scan Slide[[id number]]                                                                  CBC Auto Differential[[id number]]        Abnormal            Final result                 Please view results for these tests on the individual orders.    CBC Auto Differential [[id number]]  (Abnormal) Collected:  06/08/18 0520    Specimen:  Blood Updated:  06/08/18 0626     WBC 2.44 (C) 10*3/mm3      RBC 3.95 (L) 10*6/mm3      Hemoglobin 12.2  (L) g/dL      Hematocrit 35.7 (L) %      MCV 90.4 fL      MCH 30.9 pg      MCHC 34.2 g/dL      RDW 14.7 (H) %      RDW-SD 48.0 fl      MPV -- fL      Comment: Unable to calculate.         Platelets 34 (C) 10*3/mm3      Neutrophil % 66.4 %      Lymphocyte % 21.7 %      Monocyte % 8.6 %      Eosinophil % 2.5 %      Basophil % 0.4 %      Immature Grans % 0.4 %      Neutrophils, Absolute 1.62 10*3/mm3      Lymphocytes, Absolute 0.53 (L) 10*3/mm3      Monocytes, Absolute 0.21 10*3/mm3      Eosinophils, Absolute 0.06 10*3/mm3      Basophils, Absolute 0.01 10*3/mm3      Immature Grans, Absolute 0.01 10*3/mm3     POC Glucose Once [[id number]]  (Abnormal) Collected:  06/07/18 2122    Specimen:  Blood Updated:  06/07/18 2129     Glucose 368 (H) mg/dL     Narrative:       Meter: [id number] : [id number] charissa alvarado    Magnesium [[id number]]  (Normal) Collected:  06/07/18 1850    Specimen:  Blood Updated:  06/07/18 1958     Magnesium 1.7 mg/dL     Blood Culture - Blood, Arm, Left [[id number]]  (Normal) Collected:  06/05/18 1732    Specimen:  Blood from Arm, Left Updated:  06/07/18 1800     Blood Culture No growth at 2 days    Blood Culture - Blood, Arm, Left [[id number]]  (Normal) Collected:  06/05/18 1557    Specimen:  Blood from Arm, Left Updated:  06/07/18 1645     Blood Culture No growth at 2 days    POC Glucose Once [[id number]]  (Abnormal) Collected:  06/07/18 1614    Specimen:  Blood Updated:  06/07/18 1642     Glucose 294 (H) mg/dL     Narrative:       Meter: [id number] : [id number] Alexey Carolyne    POC Glucose Once [[id number]]  (Normal) Collected:  06/07/18 1110    Specimen:  Blood Updated:  06/07/18 1122     Glucose 114 mg/dL     Narrative:       Meter: [id number] : [id number] Bays Carolyne    Fibrinogen [[id number]]  (Abnormal) Collected:  06/07/18 0948    Specimen:  Blood Updated:  06/07/18 1025     Fibrinogen 167 (L) mg/dL      Comment: Note new Reference Range       Lactate Dehydrogenase [[id number]]  (Abnormal)  Collected:  06/07/18 0719    Specimen:  Blood Updated:  06/07/18 0932      (H) U/L     Peripheral Blood Smear [[id number]] Collected:  06/07/18 0719    Specimen:  Blood Updated:  06/07/18 0835    Reticulocytes [[id number]]  (Normal) Collected:  06/07/18 0719    Specimen:  Blood Updated:  06/07/18 0833     Reticulocyte % 1.45 %      Reticulocyte Absolute 0.0605 10*6/mm3     Osmolality, Calculated [[id number]]  (Normal) Collected:  06/07/18 0719    Specimen:  Blood Updated:  06/07/18 0832     Osmolality Calc 280.5 mOsm/kg     Comprehensive Metabolic Panel [[id number]]  (Abnormal) Collected:  06/07/18 0719    Specimen:  Blood Updated:  06/07/18 0832     Glucose 376 (H) mg/dL      BUN 9 mg/dL      Creatinine 0.68 mg/dL      Sodium 133 (L) mmol/L      Potassium 4.0 mmol/L      Comment: 1+ Hemolysis         Chloride 105 mmol/L      CO2 25.6 mmol/L      Calcium 8.5 mg/dL      Total Protein 5.9 (L) g/dL      Albumin 2.80 (L) g/dL      ALT (SGPT) 44 U/L      AST (SGOT) 44 (H) U/L      Alkaline Phosphatase 160 (H) U/L      Comment: Note New Reference Ranges        Total Bilirubin 0.6 mg/dL      eGFR Non African Amer 122 mL/min/1.73      Globulin 3.1 gm/dL      A/G Ratio 0.9 (L) g/dL      BUN/Creatinine Ratio 13.2     Anion Gap 2.4 (L) mmol/L     Magnesium [[id number]]  (Abnormal) Collected:  06/07/18 0719    Specimen:  Blood Updated:  06/07/18 0831     Magnesium 1.6 (L) mg/dL     Phosphorus [[id number]]  (Normal) Collected:  06/07/18 0719    Specimen:  Blood Updated:  06/07/18 0831     Phosphorus 3.6 mg/dL     CBC & Differential [[id number]] Collected:  06/07/18 0719    Specimen:  Blood Updated:  06/07/18 0815    Narrative:       The following orders were created for panel order CBC & Differential.  Procedure                               Abnormality         Status                     ---------                               -----------         ------                     Scan Slide[[id number]]                                                                   CBC Auto Differential[[id number]]        Abnormal            Final result                 Please view results for these tests on the individual orders.    CBC Auto Differential [[id number]]  (Abnormal) Collected:  06/07/18 0719    Specimen:  Blood Updated:  06/07/18 0815     WBC 2.23 (C) 10*3/mm3      RBC 4.11 (L) 10*6/mm3      Hemoglobin 12.3 (L) g/dL      Hematocrit 36.3 (L) %      MCV 88.3 fL      MCH 29.9 pg      MCHC 33.9 g/dL      RDW 14.5 %      RDW-SD 46.3 fl      MPV 12.9 (H) fL      Platelets 44 (C) 10*3/mm3      Neutrophil % 63.8 %      Lymphocyte % 26.0 %      Monocyte % 7.6 %      Eosinophil % 2.2 %      Basophil % 0.4 %      Immature Grans % 0.0 %      Neutrophils, Absolute 1.42 10*3/mm3      Lymphocytes, Absolute 0.58 (L) 10*3/mm3      Monocytes, Absolute 0.17 10*3/mm3      Eosinophils, Absolute 0.05 10*3/mm3      Basophils, Absolute 0.01 10*3/mm3      Immature Grans, Absolute 0.00 10*3/mm3            Imaging Results (last 24 hours)     Procedure Component Value Units Date/Time    US Abdomen Complete [[id number]] Collected:  06/07/18 0951     Updated:  06/07/18 0955    Narrative:       EXAMINATION: US ABDOMEN COMPLETE-         CLINICAL INDICATION:     chronic hep b/c and pancytopenia, evaluate for  liver cirrhosis/splenomegaly     TECHNIQUE: Multiplanar gray scale ultrasound of the abdomen.      COMPARISON: NONE      FINDINGS:   Visualized pancreas is poorly assessed due to bowel gas shadowing..   Cholecystectomy.  The common bile duct measures 4.4 mm and is within normal limits. .  Abnormal appearance of the liver. Markedly heterogeneous echotexture is  noted with with nodular margins indicative of cirrhosis. No perihepatic  ascites identified. No focal liver lesion.  Spleen is     enlarged measuring 15.3 cm in length without focal splenic  abnormality.   The RIGHT kidney measures 10.4 cm  in length without mass,  hydronephrosis, or shadowing stone.   The LEFT kidney  measures 12.3 cm in length without mass, hydronephrosis,  or shadowing stone.   No ascites demonstrated.   IVC shows patency.  There is normal directional flow within the portal  vein.  Aorta assessment limited due to obscuration from bowel gas artifact.       Impression:       1. Heterogeneous and abnormal appearance of the liver with changes of  cirrhosis noted.  2. Splenomegaly.  3. Other nonacute findings as above.      This report was finalized on 6/7/2018 9:53 AM by Dr. Alexsander Son MD.              ECG/EMG Results (most recent)     None           ALLERGIES: Robitussin dm [dextromethorphan-guaifenesin]; Tizanidine; Metformin; Sulfa antibiotics; Contrast dye; and Tramadol      Current Facility-Administered Medications:   •  albuterol (PROVENTIL HFA;VENTOLIN HFA) inhaler 2 puff, 2 puff, Inhalation, Q6H PRN, Ankit Barnes MD, 2 puff at 06/08/18 0741  •  aluminum-magnesium hydroxide-simethicone (MAALOX MAX) 400-400-40 MG/5ML suspension 15 mL, 15 mL, Oral, Q6H PRN, Orlando Dinh MD  •  atorvastatin (LIPITOR) tablet 20 mg, 20 mg, Oral, Daily, Ankit Barnes MD, 20 mg at 06/07/18 0956  •  benzonatate (TESSALON) capsule 100 mg, 100 mg, Oral, TID PRN, Orlando Dinh MD  •  buPROPion SR (WELLBUTRIN SR) 12 hr tablet 100 mg, 100 mg, Oral, Q12H, Ankit Barnes MD, 100 mg at 06/07/18 2115  •  cholecalciferol (VITAMIN D3) capsule 50,000 Units, 50,000 Units, Oral, Weekly, Ankit Barnes MD, 50,000 Units at 06/06/18 0814  •  clindamycin (CLEOCIN) capsule 300 mg, 300 mg, Oral, TID, Dina Harry MD, 300 mg at 06/07/18 2115  •  dextrose (D50W) solution 25 g, 25 g, Intravenous, Q15 Min PRN, Orlando Dinh MD  •  dextrose (GLUTOSE) oral gel 15 g, 15 g, Oral, Q15 Min PRN, Orlando Dinh MD  •  dicyclomine (BENTYL) capsule 10 mg, 10 mg, Oral, BID, Ankit Barnes MD, 10 mg at 06/07/18 2115  •  glucagon (human recombinant) (GLUCAGEN DIAGNOSTIC) injection 1 mg, 1 mg, Subcutaneous,  PRN, Orlando Dinh MD  •  hydrOXYzine (ATARAX) tablet 50 mg, 50 mg, Oral, Q6H PRN, Orlando Dinh MD, 50 mg at 06/07/18 2115  •  ibuprofen (ADVIL,MOTRIN) tablet 200 mg, 200 mg, Oral, Q6H PRN, Ankit Barnes MD, 200 mg at 06/07/18 2115  •  insulin aspart (novoLOG) injection 0-14 Units, 0-14 Units, Subcutaneous, 4x Daily AC & at Bedtime, PATRICK Solomon, 8 Units at 06/08/18 0641  •  insulin aspart (novoLOG) injection 8 Units, 8 Units, Subcutaneous, TID With Meals, Dina Harry MD, 8 Units at 06/08/18 0734  •  insulin detemir (LEVEMIR) injection 35 Units, 35 Units, Subcutaneous, Nightly, PATRICK Solomon, 35 Units at 06/07/18 2125  •  ipratropium-albuterol (DUO-NEB) nebulizer solution 3 mL, 3 mL, Nebulization, 4x Daily - RT, Ankit Barnes MD  •  loperamide (IMODIUM) capsule 2 mg, 2 mg, Oral, 4x Daily PRN, Orlando Dinh MD  •  magnesium hydroxide (MILK OF MAGNESIA) suspension 2400 mg/10mL 10 mL, 10 mL, Oral, Daily PRN, Orlando Dinh MD, 10 mL at 06/07/18 2118  •  magnesium oxide (MAGOX) tablet 400 mg, 400 mg, Oral, BID, PATRICK Pantoja, 400 mg at 06/07/18 2115  •  metoprolol tartrate (LOPRESSOR) tablet 12.5 mg, 12.5 mg, Oral, Daily, Dina Harry MD, 12.5 mg at 06/06/18 0814  •  multivitamin (DAILY SAVAGE) tablet 1 tablet, 1 tablet, Oral, Daily, PATRICK Solomon, 1 tablet at 06/07/18 0956  •  mupirocin (BACTROBAN) 2 % ointment, , Topical, Q12H, PATRICK Solomon  •  nicotine (NICODERM CQ) 21 MG/24HR patch 1 patch, 1 patch, Transdermal, Q24H, Orlando Dinh MD  •  nitroglycerin (NITROSTAT) SL tablet 0.4 mg, 0.4 mg, Sublingual, Q5 Min PRN, Ankit Barnes MD  •  ondansetron (ZOFRAN) tablet 4 mg, 4 mg, Oral, Q6H PRN, Orlando Dinh MD, 4 mg at 06/06/18 1634  •  pantoprazole (PROTONIX) EC tablet 40 mg, 40 mg, Oral, Daily, Ankit Barnes MD, 40 mg at 06/07/18 0956  •  polyethylene glycol (MIRALAX) powder 17 g, 17 g, Oral, Daily, Ankit Barnes,  MD, 17 g at 06/07/18 0956  •  potassium chloride (K-DUR,KLOR-CON) CR tablet 20 mEq, 20 mEq, Oral, Daily, Ankit Barnes MD, 20 mEq at 06/07/18 0956  •  sodium chloride (OCEAN) nasal spray 2 spray, 2 spray, Each Nare, PRN, Orlando Dinh MD  •  tetanus immune globulin (HYPERTET) injection 250 Units, 250 Units, Intramuscular, Once, Dina Harry MD  •  traZODone (DESYREL) tablet 50 mg, 50 mg, Oral, Nightly PRN, Orlando Dinh MD, 50 mg at 06/07/18 2115    ASSESSMENT & PLAN     Code     F33             Patient Active Problem List   Diagnosis Code     • Severe episode of recurrent major depressive disorder, without psychotic features  Plan: Have started Wellbutrin and will contiunue with supportive's individual and group psychotherapeutic efforts      F33.2     Depression with suicidal ideation  Plan: Patient so longer expressing suicidal intent , will discontinue suicidal precautions       F32.9, R45.851    Uncomplicated opioid dependence  Plan: Incorporate into the ongoing psychotherapeutic effort as well as disposition plan. 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   Plan:  treat symptomatically attempting to avoid opiates, will also need to Minimize NSAID due to the thrombocytopenia  G89.21   •       • Gastroesophageal reflux disease with esophagitis  Plan: Continue Protonix  K21.0   •   B18.1   • Chronic hepatitis C without hepatic coma  Plan: Monitor liver status, question possible factor with the patient's leukocytosis and thrombocytopenia   Chronic viral hepatitis B without delta agent and without coma plan: Monitor monitor his hepatic status     B18.2   •               • Cellulitis Plan:  Clindamycine, Inflammation resolving  L03.90   •     • Leukocytosis (leucocytosis) Plan: follow oncology's recommendation.         D72.829   • Thrombocytopenia Plan: Follow oncology's  recommendation.              Suicide precautions: none    Behavioral Health Treatment Plan and Problem List: I have reviewed and approved the Behavioral Health Treatment Plan and Problem list.    Clinician:  Ankit Barnes MD  06/08/18  8:14 AM    Dictated utilizing Dragon dictation
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
## 2018-06-08 NOTE — PLAN OF CARE
Problem: Patient Care Overview  Goal: Plan of Care Review  Outcome: Ongoing (interventions implemented as appropriate)   06/08/18 0207   Coping/Psychosocial   Plan of Care Reviewed With patient   Coping/Psychosocial   Patient Agreement with Plan of Care agrees   Plan of Care Review   Progress no change   OTHER   Outcome Summary Pt rates anxiety depression 4/10. Denies SI HI hallucinations. Pt calm and cooperative.       Problem: Overarching Goals (Adult)  Goal: Adheres to Safety Considerations for Self and Others  Outcome: Ongoing (interventions implemented as appropriate)    Goal: Optimized Coping Skills in Response to Life Stressors  Outcome: Ongoing (interventions implemented as appropriate)    Goal: Develops/Participates in Therapeutic [place] to Support Successful Transition  Outcome: Ongoing (interventions implemented as appropriate)

## 2018-06-08 NOTE — PROGRESS NOTES
Isaias Lang  [id number]  1965  6/8/2018    REFERRING PHYSICIAN  Ankit Barnes MD      Reason for Followup:   Pancytopenia    Patient Care Team:  NOHEMI Felipe as PCP - General (Family Medicine)    Chief Complaint:  Fatigue    Subjective:    Patient reports that he overall does not feel well this am. He reports abdominal distension and chronic nausea. He denies of any bleeding. He reports ongoing chronic constipation. He had previously been following with Dr. Kelley in regards to his hepatitis however has not yet received treatment. He also was to undergo endoscopic evaluation with Dr. Kelley however was unable to go due to transport reasons.         Allergies:    Robitussin dm [dextromethorphan-guaifenesin]; Tizanidine; Metformin; Sulfa antibiotics; Contrast dye; and Tramadol    Outpatient Medications:  Prescriptions Prior to Admission   Medication Sig Dispense Refill Last Dose   • citalopram (CeleXA) 20 MG tablet Take 20 mg by mouth Every Night.   6/3/2018   • furosemide (LASIX) 20 MG tablet Take 20 mg by mouth Daily.   6/3/2018   • insulin lispro (humaLOG) 100 UNIT/ML injection Inject 10-35 Units under the skin 3 (Three) Times a Day With Meals. Prior to [facility] Admission, Patient was on: per sliding scale   Pt unsure of scale just knows the lowest amount of units he does and the highest he has had to do   6/3/2018   • ipratropium-albuterol (COMBIVENT RESPIMAT)  MCG/ACT inhaler Inhale 1 puff 4 (Four) Times a Day.   6/3/2018   • metoclopramide (REGLAN) 10 MG tablet Take 10 mg by mouth 2 (Two) Times a Day.   6/3/2018   • metoprolol tartrate (LOPRESSOR) 25 MG tablet Take 25 mg by mouth Daily.   6/3/2018   • polyethylene glycol (MIRALAX) packet Take 17 g by mouth 2 (Two) Times a Day.   6/3/2018   • spironolactone (ALDACTONE) 25 MG tablet Take 25 mg by mouth Daily.   6/3/2018   • vitamin D (ERGOCALCIFEROL) 03179 units capsule capsule Take 50,000 Units by mouth 1 (One) Time Per Week.  Prior to [facility] Admission, Patient was on: takes on wednesdays 5/30/2018   • albuterol (PROVENTIL HFA;VENTOLIN HFA) 108 (90 BASE) MCG/ACT inhaler Inhale 2 puffs Every 6 (Six) Hours As Needed for Wheezing. 18 g 2 Unknown at Unknown time   • aspirin 81 MG EC tablet Take 1 tablet by mouth Daily. 100 tablet 0 6/3/2018   • atorvastatin (LIPITOR) 20 MG tablet Take 20 mg by mouth Daily.   6/3/2018   • dicyclomine (BENTYL) 10 MG capsule Take 10 mg by mouth 2 (Two) Times a Day.   6/3/2018   • Insulin Glargine (BASAGLAR KWIKPEN) 100 UNIT/ML injection pen Inject 30 Units under the skin Every Night.   6/3/2018   • nitroglycerin (NITROSTAT) 0.4 MG SL tablet Place 1 tablet under the tongue Every 5 (Five) Minutes As Needed for Chest Pain. Take no more than 3 doses in 15 minutes. 30 tablet 0 Unknown at Unknown time   • pantoprazole (PROTONIX) 40 MG EC tablet Take 1 tablet by mouth Daily. 30 tablet 1 6/3/2018   • potassium chloride (K-DUR,KLOR-CON) 20 MEQ CR tablet Take 1 tablet by mouth Daily. 30 tablet 1 6/3/2018       Inpatient Medications:  Scheduled Meds:    atorvastatin 20 mg Oral Daily   buPROPion  mg Oral Q12H   cholecalciferol 50,000 Units Oral Weekly   clindamycin 300 mg Oral TID   dicyclomine 10 mg Oral BID   insulin aspart 0-14 Units Subcutaneous 4x Daily AC & at Bedtime   insulin aspart 8 Units Subcutaneous TID With Meals   insulin detemir 35 Units Subcutaneous Nightly   ipratropium-albuterol 3 mL Nebulization 4x Daily - RT   metoprolol tartrate 12.5 mg Oral Daily   multivitamin 1 tablet Oral Daily   mupirocin  Topical Q12H   nicotine 1 patch Transdermal Q24H   pantoprazole 40 mg Oral Daily   polyethylene glycol 17 g Oral BID   potassium chloride 20 mEq Oral Daily   tetanus immune globulin 250 Units Intramuscular Once       Continuous Infusions:       PRN Meds:  •  albuterol  •  aluminum-magnesium hydroxide-simethicone  •  benzonatate  •  dextrose  •  dextrose  •  glucagon (human recombinant)  •  hydrOXYzine  •   ibuprofen  •  loperamide  •  magnesium hydroxide  •  nitroglycerin  •  ondansetron  •  sodium chloride  •  traZODone      Review of Systems:  10 point review of systems conducted with patient and positive as per HPI. +BRBPR      Physical Exam:  Vital Signs: These were reviewed and listed as per patient’s electronic medical chart  Vitals:    06/08/18 0824   BP: 115/68   Pulse: 105   Resp: 18   Temp: 97.8 °F (36.6 °C)   SpO2: 98%     General: Awake, alert and oriented, in no distress  HEENT: Head is atraumatic, normocephalic, extraocular movements full, oropharynx clear, no scleral icterus, pink moist mucous membranes  Neck: supple, no jvd, lymphadenopathy or masses  Cardiovascular: regular rate and rhythm without murmurs, rubs or gallops  Pulmonary: non-labored, clear to auscultation bilaterally, no wheezing  Abdomen: soft, non-tender, normal active bowel sounds present  Extremities: No clubbing, cyanosis or edema  Neurologic: Mental status as above, alert, awake and oriented, grossly non-focal exam  Skin: warm, dry, intact, chronic skin changes, erythema of upper extremity digit   : No mccann      Labs / Studies:  Lab Results (last 72 hours)     Procedure Component Value Units Date/Time    POC Glucose Once [[id number]]  (Abnormal) Collected:  06/08/18 1115    Specimen:  Blood Updated:  06/08/18 1125     Glucose 287 (H) mg/dL     Narrative:       Meter: [id number] : [id number] Alexey Barfield    Haptoglobin [[id number]]  (Abnormal) Collected:  06/07/18 0948    Specimen:  Blood Updated:  06/08/18 0715     Haptoglobin <10 (L) mg/dL     Narrative:       Performed at:  [address] [place] [address]  [id number]  : Dexter Campos PhD, Phone:  [phone]    POC Glucose Once [[id number]]  (Abnormal) Collected:  06/08/18 0632    Specimen:  Blood Updated:  06/08/18 0639     Glucose 257 (H) mg/dL     Narrative:       Meter: [id number] : [id number] charissa alvarado    [facility] Metabolic  Panel [[id number]]  (Abnormal) Collected:  06/08/18 0520    Specimen:  Blood Updated:  06/08/18 0627     Glucose 299 (H) mg/dL      BUN 12 mg/dL      Creatinine 0.65 mg/dL      Sodium 133 (L) mmol/L      Potassium 4.1 mmol/L      Chloride 103 mmol/L      CO2 26.5 mmol/L      Calcium 8.5 mg/dL      Total Protein 5.5 (L) g/dL      Albumin 2.50 (L) g/dL      ALT (SGPT) 45 (H) U/L      AST (SGOT) 40 (H) U/L      Alkaline Phosphatase 138 (H) U/L      Comment: Note New Reference Ranges        Total Bilirubin 0.5 mg/dL      eGFR Non African Amer 129 mL/min/1.73      Globulin 3.0 gm/dL      A/G Ratio 0.8 (L) g/dL      BUN/Creatinine Ratio 18.5     Anion Gap 3.5 (L) mmol/L     Osmolality, Calculated [[id number]]  (Normal) Collected:  06/08/18 0520    Specimen:  Blood Updated:  06/08/18 0627     Osmolality Calc 277.3 mOsm/kg     CBC & Differential [[id number]] Collected:  06/08/18 0520    Specimen:  Blood Updated:  06/08/18 0626    Narrative:       The following orders were created for panel order CBC & Differential.  Procedure                               Abnormality         Status                     ---------                               -----------         ------                     Scan Slide[[id number]]                                                                  CBC Auto Differential[[id number]]        Abnormal            Final result                 Please view results for these tests on the individual orders.    CBC Auto Differential [[id number]]  (Abnormal) Collected:  06/08/18 0520    Specimen:  Blood Updated:  06/08/18 0626     WBC 2.44 (C) 10*3/mm3      RBC 3.95 (L) 10*6/mm3      Hemoglobin 12.2 (L) g/dL      Hematocrit 35.7 (L) %      MCV 90.4 fL      MCH 30.9 pg      MCHC 34.2 g/dL      RDW 14.7 (H) %      RDW-SD 48.0 fl      MPV -- fL      Comment: Unable to calculate.         Platelets 34 (C) 10*3/mm3      Neutrophil % 66.4 %      Lymphocyte % 21.7 %      Monocyte % 8.6 %      Eosinophil % 2.5 %      Basophil  % 0.4 %      Immature Grans % 0.4 %      Neutrophils, Absolute 1.62 10*3/mm3      Lymphocytes, Absolute 0.53 (L) 10*3/mm3      Monocytes, Absolute 0.21 10*3/mm3      Eosinophils, Absolute 0.06 10*3/mm3      Basophils, Absolute 0.01 10*3/mm3      Immature Grans, Absolute 0.01 10*3/mm3     POC Glucose Once [[id number]]  (Abnormal) Collected:  06/07/18 2122    Specimen:  Blood Updated:  06/07/18 2129     Glucose 368 (H) mg/dL     Narrative:       Meter: [id number] : [id number] charissa alvarado    Magnesium [[id number]]  (Normal) Collected:  06/07/18 1850    Specimen:  Blood Updated:  06/07/18 1958     Magnesium 1.7 mg/dL     Blood Culture - Blood, Arm, Left [[id number]]  (Normal) Collected:  06/05/18 1732    Specimen:  Blood from Arm, Left Updated:  06/07/18 1800     Blood Culture No growth at 2 days    Blood Culture - Blood, Arm, Left [[id number]]  (Normal) Collected:  06/05/18 1557    Specimen:  Blood from Arm, Left Updated:  06/07/18 1645     Blood Culture No growth at 2 days    POC Glucose Once [[id number]]  (Abnormal) Collected:  06/07/18 1614    Specimen:  Blood Updated:  06/07/18 1642     Glucose 294 (H) mg/dL     Narrative:       Meter: [id number] : [id number] Altrujanna    POC Glucose Once [[id number]]  (Normal) Collected:  06/07/18 1110    Specimen:  Blood Updated:  06/07/18 1122     Glucose 114 mg/dL     Narrative:       Meter: [id number] : [id number] Altrujanna    Fibrinogen [[id number]]  (Abnormal) Collected:  06/07/18 0948    Specimen:  Blood Updated:  06/07/18 1025     Fibrinogen 167 (L) mg/dL      Comment: Note new Reference Range       Lactate Dehydrogenase [[id number]]  (Abnormal) Collected:  06/07/18 0719    Specimen:  Blood Updated:  06/07/18 0932      (H) U/L     Peripheral Blood Smear [[id number]] Collected:  06/07/18 0719    Specimen:  Blood Updated:  06/07/18 0835    Reticulocytes [[id number]]  (Normal) Collected:  06/07/18 0719    Specimen:  Blood Updated:  06/07/18 0888      Reticulocyte % 1.45 %      Reticulocyte Absolute 0.0605 10*6/mm3     Osmolality, Calculated [[id number]]  (Normal) Collected:  06/07/18 0719    Specimen:  Blood Updated:  06/07/18 0832     Osmolality Calc 280.5 mOsm/kg     Comprehensive Metabolic Panel [[id number]]  (Abnormal) Collected:  06/07/18 0719    Specimen:  Blood Updated:  06/07/18 0832     Glucose 376 (H) mg/dL      BUN 9 mg/dL      Creatinine 0.68 mg/dL      Sodium 133 (L) mmol/L      Potassium 4.0 mmol/L      Comment: 1+ Hemolysis         Chloride 105 mmol/L      CO2 25.6 mmol/L      Calcium 8.5 mg/dL      Total Protein 5.9 (L) g/dL      Albumin 2.80 (L) g/dL      ALT (SGPT) 44 U/L      AST (SGOT) 44 (H) U/L      Alkaline Phosphatase 160 (H) U/L      Comment: Note New Reference Ranges        Total Bilirubin 0.6 mg/dL      eGFR Non African Amer 122 mL/min/1.73      Globulin 3.1 gm/dL      A/G Ratio 0.9 (L) g/dL      BUN/Creatinine Ratio 13.2     Anion Gap 2.4 (L) mmol/L     Magnesium [[id number]]  (Abnormal) Collected:  06/07/18 0719    Specimen:  Blood Updated:  06/07/18 0831     Magnesium 1.6 (L) mg/dL     Phosphorus [[id number]]  (Normal) Collected:  06/07/18 0719    Specimen:  Blood Updated:  06/07/18 0831     Phosphorus 3.6 mg/dL     CBC & Differential [[id number]] Collected:  06/07/18 0719    Specimen:  Blood Updated:  06/07/18 0815    Narrative:       The following orders were created for panel order CBC & Differential.  Procedure                               Abnormality         Status                     ---------                               -----------         ------                     Scan Slide[[id number]]                                                                  CBC Auto Differential[[id number]]        Abnormal            Final result                 Please view results for these tests on the individual orders.    CBC Auto Differential [[id number]]  (Abnormal) Collected:  06/07/18 0719    Specimen:  Blood Updated:  06/07/18 0815     WBC  2.23 (C) 10*3/mm3      RBC 4.11 (L) 10*6/mm3      Hemoglobin 12.3 (L) g/dL      Hematocrit 36.3 (L) %      MCV 88.3 fL      MCH 29.9 pg      MCHC 33.9 g/dL      RDW 14.5 %      RDW-SD 46.3 fl      MPV 12.9 (H) fL      Platelets 44 (C) 10*3/mm3      Neutrophil % 63.8 %      Lymphocyte % 26.0 %      Monocyte % 7.6 %      Eosinophil % 2.2 %      Basophil % 0.4 %      Immature Grans % 0.0 %      Neutrophils, Absolute 1.42 10*3/mm3      Lymphocytes, Absolute 0.58 (L) 10*3/mm3      Monocytes, Absolute 0.17 10*3/mm3      Eosinophils, Absolute 0.05 10*3/mm3      Basophils, Absolute 0.01 10*3/mm3      Immature Grans, Absolute 0.00 10*3/mm3     POC Glucose Once [[id number]]  (Abnormal) Collected:  06/07/18 0659    Specimen:  Blood Updated:  06/07/18 0707     Glucose 340 (H) mg/dL     Narrative:       Meter: [id number] : [id number] [facility]    POC Glucose Once [[id number]]  (Abnormal) Collected:  06/06/18 1954    Specimen:  Blood Updated:  06/06/18 2015     Glucose 360 (H) mg/dL     Narrative:       Meter: [id number] : [id number] Smithers Avanza Leslie    POC Glucose Once [[id number]]  (Abnormal) Collected:  06/06/18 1643    Specimen:  Blood Updated:  06/06/18 1708     Glucose 400 (H) mg/dL     Narrative:       Meter: [id number] : [id number] Bays Carolyne    POC Glucose Once [[id number]]  (Abnormal) Collected:  06/06/18 1617    Specimen:  Blood Updated:  06/06/18 1630     Glucose 408 (H) mg/dL     Narrative:       Meter: [id number] : [id number] Bays Carolyne    POC Glucose Once [[id number]]  (Abnormal) Collected:  06/06/18 1115    Specimen:  Blood Updated:  06/06/18 1124     Glucose 165 (H) mg/dL     Narrative:       Meter: [id number] : [id number] Bays Carolyne    POC Glucose Once [[id number]]  (Abnormal) Collected:  06/06/18 0646    Specimen:  Blood Updated:  06/06/18 0720     Glucose 263 (H) mg/dL     Narrative:       Meter: [id number] : [id number] Lorenza Nelson    Vitamin D 25 Hydroxy [[id number]] Collected:   06/06/18 0436    Specimen:  Blood Updated:  06/06/18 0546     25 Hydroxy, Vitamin D 25.0 ng/ml     Narrative:       Reference Ranges for Total Vitamin D 25(OH)    Deficiency      <20.0 ng/ml  Insufficiency   20-30 ng/ml  Sufficiency     ng/ml  Toxicity         >100 ng/ml    Vitamin B12 [[id number]]  (Normal) Collected:  06/06/18 0436    Specimen:  Blood Updated:  06/06/18 0546     Vitamin B-12 764 pg/mL     Folate [[id number]]  (Normal) Collected:  06/06/18 0436    Specimen:  Blood Updated:  06/06/18 0546     Folate 12.57 ng/mL     Ferritin [[id number]]  (Normal) Collected:  06/06/18 0436    Specimen:  Blood Updated:  06/06/18 0546     Ferritin 76.00 ng/mL     Iron Profile [[id number]]  (Abnormal) Collected:  06/06/18 0436    Specimen:  Blood Updated:  06/06/18 0542     Iron 40 (L) mcg/dL      TIBC 291 mcg/dL      Iron Saturation 14 (L) %     Lactic Acid, Plasma [[id number]]  (Normal) Collected:  06/06/18 0436    Specimen:  Blood Updated:  06/06/18 0540     Lactate 1.1 mmol/L     Comprehensive Metabolic Panel [[id number]]  (Abnormal) Collected:  06/06/18 0436    Specimen:  Blood Updated:  06/06/18 0538     Glucose 317 (H) mg/dL      BUN 12 mg/dL      Creatinine 0.63 mg/dL      Sodium 135 mmol/L      Potassium 3.9 mmol/L      Chloride 104 mmol/L      CO2 28.9 mmol/L      Calcium 8.5 mg/dL      Total Protein 5.9 (L) g/dL      Albumin 2.80 (L) g/dL      ALT (SGPT) 44 U/L      AST (SGOT) 45 (H) U/L      Alkaline Phosphatase 146 (H) U/L      Comment: Note New Reference Ranges        Total Bilirubin 0.6 mg/dL      eGFR Non African Amer 133 mL/min/1.73      Globulin 3.1 gm/dL      A/G Ratio 0.9 (L) g/dL      BUN/Creatinine Ratio 19.0     Anion Gap 2.1 (L) mmol/L     Osmolality, Calculated [[id number]]  (Normal) Collected:  06/06/18 0436    Specimen:  Blood Updated:  06/06/18 0538     Osmolality Calc 282.0 mOsm/kg     CBC (No Diff) [[id number]]  (Abnormal) Collected:  06/06/18 0436    Specimen:  Blood Updated:  06/06/18 0530      WBC 2.59 (L) 10*3/mm3      RBC 4.05 (L) 10*6/mm3      Hemoglobin 12.5 (L) g/dL      Hematocrit 35.8 (L) %      MCV 88.4 fL      MCH 30.9 pg      MCHC 34.9 g/dL      RDW 14.3 %      RDW-SD 45.1 fl      MPV 10.7 (H) fL      Platelets 38 (C) 10*3/mm3     POC Glucose Once [[id number]]  (Abnormal) Collected:  06/05/18 1959    Specimen:  Blood Updated:  06/05/18 2010     Glucose 305 (H) mg/dL     Narrative:       Meter: [id number] : [id number] Lorenza Nelson    Lactic Acid, Reflex [[id number]]  (Abnormal) Collected:  06/05/18 1732    Specimen:  Blood Updated:  06/05/18 1809     Lactate 2.9 (C) mmol/L     Lactic Acid, Reflex Timer (This will reflex a repeat order 3-3:15 hours after ordered.) [[id number]] Collected:  06/05/18 1246    Specimen:  Blood Updated:  06/05/18 1700     Extra Tube Hold for add-ons.     Comment: Auto resulted.       Protime-INR [[id number]]  (Abnormal) Collected:  06/05/18 1557    Specimen:  Blood Updated:  06/05/18 1633     Protime 16.0 (H) Seconds      Comment: Note new Reference Range        INR 1.26 (H)    Narrative:       Suggested INR therapeutic range for stable oral anticoagulant therapy:    Low Intensity therapy:   1.5-2.0  Moderate Intensity therapy:   2.0-3.0  High Intensity therapy:   2.5-4.0    POC Glucose Once [[id number]]  (Abnormal) Collected:  06/05/18 1616    Specimen:  Blood Updated:  06/05/18 1625     Glucose 410 (H) mg/dL     Narrative:       Meter: [id number] : [id number] Sanjuanita Retana    CBC & Differential [[id number]] Collected:  06/05/18 1246    Specimen:  Blood Updated:  06/05/18 1615    Narrative:       The following orders were created for panel order CBC & Differential.  Procedure                               Abnormality         Status                     ---------                               -----------         ------                     Scan Slide[[id number]]                                       Final result               CBC Auto Differential[[id number]]         Abnormal            Final result                 Please view results for these tests on the individual orders.    CBC Auto Differential [[id number]]  (Abnormal) Collected:  06/05/18 1246    Specimen:  Blood Updated:  06/05/18 1615     WBC 2.41 (C) 10*3/mm3      RBC 4.45 (L) 10*6/mm3      Hemoglobin 13.3 (L) g/dL      Hematocrit 39.5 (L) %      MCV 88.8 fL      MCH 29.9 pg      MCHC 33.7 g/dL      RDW 14.5 %      RDW-SD 46.9 fl      MPV -- fL      Comment: Unable to calculate.         Platelets 35 (C) 10*3/mm3      Neutrophil % 66.4 %      Lymphocyte % 22.4 %      Monocyte % 8.3 %      Eosinophil % 2.5 %      Basophil % 0.4 %      Immature Grans % 0.0 %      Neutrophils, Absolute 1.60 10*3/mm3      Lymphocytes, Absolute 0.54 (L) 10*3/mm3      Monocytes, Absolute 0.20 10*3/mm3      Eosinophils, Absolute 0.06 10*3/mm3      Basophils, Absolute 0.01 10*3/mm3      Immature Grans, Absolute 0.00 10*3/mm3     Scan Slide [[id number]] Collected:  06/05/18 1246    Specimen:  Blood Updated:  06/05/18 1615     Poikilocytes Slight/1+     Platelet Estimate Decreased    Hemoglobin A1c [[id number]]  (Abnormal) Collected:  06/05/18 1249    Specimen:  Blood Updated:  06/05/18 1412     Hemoglobin A1C 10.40 (H) %     Lactic Acid, Plasma [[id number]]  (Abnormal) Collected:  06/05/18 1246    Specimen:  Blood Updated:  06/05/18 1357     Lactate 2.1 (C) mmol/L     C-reactive Protein [[id number]]  (Normal) Collected:  06/05/18 1246    Specimen:  Blood Updated:  06/05/18 1334     C-Reactive Protein <0.50 mg/dL     Comprehensive Metabolic Panel [[id number]]  (Abnormal) Collected:  06/05/18 1246    Specimen:  Blood Updated:  06/05/18 1329     Glucose 397 (H) mg/dL      BUN 11 mg/dL      Creatinine 0.73 mg/dL      Sodium 133 (L) mmol/L      Potassium 4.2 mmol/L      Chloride 102 mmol/L      CO2 27.3 mmol/L      Calcium 9.0 mg/dL      Total Protein 6.5 g/dL      Albumin 3.10 (L) g/dL      ALT (SGPT) 50 (H) U/L      AST (SGOT) 51 (H) U/L      Alkaline  Phosphatase 138 (H) U/L      Comment: Note New Reference Ranges        Total Bilirubin 0.8 mg/dL      eGFR Non African Amer 112 mL/min/1.73      Globulin 3.4 gm/dL      A/G Ratio 0.9 (L) g/dL      BUN/Creatinine Ratio 15.1     Anion Gap 3.7 mmol/L     Osmolality, Calculated [[id number]]  (Normal) Collected:  06/05/18 1246    Specimen:  Blood Updated:  06/05/18 1329     Osmolality Calc 282.4 mOsm/kg             Imaging Results (last 72 hours)     Procedure Component Value Units Date/Time    US Abdomen Complete [[id number]] Collected:  06/07/18 0951     Updated:  06/07/18 0955    Narrative:       EXAMINATION: US ABDOMEN COMPLETE-         CLINICAL INDICATION:     chronic hep b/c and pancytopenia, evaluate for  liver cirrhosis/splenomegaly     TECHNIQUE: Multiplanar gray scale ultrasound of the abdomen.      COMPARISON: NONE      FINDINGS:   Visualized pancreas is poorly assessed due to bowel gas shadowing..   Cholecystectomy.  The common bile duct measures 4.4 mm and is within normal limits. .  Abnormal appearance of the liver. Markedly heterogeneous echotexture is  noted with with nodular margins indicative of cirrhosis. No perihepatic  ascites identified. No focal liver lesion.  Spleen is     enlarged measuring 15.3 cm in length without focal splenic  abnormality.   The RIGHT kidney measures 10.4 cm  in length without mass,  hydronephrosis, or shadowing stone.   The LEFT kidney measures 12.3 cm in length without mass, hydronephrosis,  or shadowing stone.   No ascites demonstrated.   IVC shows patency.  There is normal directional flow within the portal  vein.  Aorta assessment limited due to obscuration from bowel gas artifact.       Impression:       1. Heterogeneous and abnormal appearance of the liver with changes of  cirrhosis noted.  2. Splenomegaly.  3. Other nonacute findings as above.      This report was finalized on 6/7/2018 9:53 AM by Dr. Alexsander Son MD.                   Assessment & Plan:  Isaias PACHECO  Precious is a very pleasant 53 y.o. male with     1.  Pancytopenia:   - Noted pancytopenia on admission, WBC 2.58, ANC 1.88, Hg 12.8, MCV 88.8, Platelets 39,000. Previous CBCs were reviewed and patient has had chronic pancytopenia since early 2016 but currently counts are lower than previous.   - Based on history above, likely multifactorial and secondary to chronic hepatitis B and C (untreated) and possible liver cirrhosis largely contributing which also leads to splenic sequestration (splenomegaly noted on previous imaging) and decreased thrombopoietin production. Patient's infectious process/antibitoics/inflammation could also be acutely contributing to worsening counts.   - B12 and folate currently replete.   - Iron studies/ferritin were suggestive of Iron deficiency further discussed below  - Abdominal US was significant for cirrhosis/splenomegaly.  - Will continue to monitor. If patient continues to have worsening counts or does not improve, we can continue to follow up in our clinic as an outpatient.   - Recommend transfusion support. Would transfuse platelets for platelet count <50,000 prior to procedures or any active bleeding. Otherwise, would transfuse for platelet count 10-20,000.   - Would hold ASA and NSAIDs if platelet count is <50k  - Patient is currently not neutropenic.      2. Coagulopathy  - This is likely due to his underlying cirrhosis as well as possible chronic DIC (low haptoglobin, elevated LDH, low fibrinogen) which can be seen in cirrhotic patients.   - At present he is hemodynamically stable with stable hemoglobin  - Fibrinogen low, if patient does have bleeding episode would recheck fibrinogen and if <150 will need cryoprecipitate.    3. Chronic Hep B and C/? Liver lesion:  - Previously followed by Dr. Kelley, will need to follow up after discharged for treatment/further management   - Patient is unsure of possible history of liver mass. Most recent US obtained here does not show any  evidence of liver masses per radiology report.  - Will continue to try to obtain records from Dr. Kelley  - He was to go to [facility] for further evaluation unsure if this was for transplant evaluation vs hepatitis treatment vs questionable lesion. He was strongly encouraged to follow with them.    4. Iron deficiency  - He is agreeable to start Ferrous Sulfate 325mg today. Therefore will start once nightly if tolerating please increase this to three times daily. Patient is worried about worsening constipation and therefore have added Senokot and Lactulose as prn orders.  - If he is unable to tolerate oral iron can start Venofer 200mg IV daily for 5 days.  - Should have endoscopic evaluation inpatient vs outpatient. Stool occult pending.    5. Constipation  - I have added Senokot and Lactulose prn to his regimen           Martha Hidalgo MD  06/08/18  12:18 PM

## 2018-06-08 NOTE — PLAN OF CARE
Problem: Fall Risk (Adult)  Goal: Identify Related Risk Factors and Signs and Symptoms  Outcome: Ongoing (interventions implemented as appropriate)  PATIENT VERBALIZED A/D AND GENERALIZED PAIN THIS SHIFT. DR. LOPEZ  SAW THE PATIENT THIS SHIFT. NEW ORDERS:FERROUS SULFATE, SENSCOT, LACYULOSE, DUONEBS  D/C IBUPROFEN  Goal: Absence of Fall  Outcome: Ongoing (interventions implemented as appropriate)

## 2018-06-08 NOTE — DISCHARGE PLACEMENT REQUEST
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 finger is still sore, but is feeling some better. No drainage from the area. Sugars have been running high yesterday and today.   ----------------------------------------------------------------------------------------------------------------------  Objective   Current Hospital Meds:    atorvastatin 20 mg Oral Daily   buPROPion  mg Oral Q12H   cholecalciferol 50,000 Units Oral Weekly   clindamycin 300 mg Oral TID   dicyclomine 10 mg Oral BID   furosemide 20 mg Oral Daily   insulin aspart 0-14 Units Subcutaneous 4x Daily AC & at Bedtime   insulin aspart 8 Units Subcutaneous TID With Meals   insulin detemir 35 Units Subcutaneous Nightly   ipratropium-albuterol 3 mL Nebulization 4x Daily - RT   metoprolol tartrate 12.5 mg Oral Daily   multivitamin 1 tablet Oral Daily   mupirocin  Topical  Q12H   nicotine 1 patch Transdermal Q24H   pantoprazole 40 mg Oral Daily   polyethylene glycol 17 g Oral Daily   potassium chloride 20 mEq Oral Daily   tetanus immune globulin 250 Units Intramuscular Once   ----------------------------------------------------------------------------------------------------------------------  Vital Signs:  Temp:  [97.6 °F (36.4 °C)-98.3 °F (36.8 °C)] 98.3 °F (36.8 °C)  Heart Rate:  [] 108  Resp:  [18] 18  BP: ()/(62-78) 99/68  No data found.    SpO2 Percentage    06/06/18 2000 06/07/18 0750 06/07/18 0800   SpO2: 97% 96% 98%     SpO2:  [96 %-98 %] 98 %  on   ;   Device (Oxygen Therapy): room air    Body mass index is 25.05 kg/m².  Wt Readings from Last 3 Encounters:   06/04/18 70.4 kg (155 lb 3.2 oz)   06/04/18 68 kg (150 lb)   09/22/17 99.8 kg (220 lb)      No intake or output data in the 24 hours ending 06/07/18 1103  Diet Regular; Consistent Carbohydrate  ----------------------------------------------------------------------------------------------------------------------  Physical exam:  Constitutional:  Well-developed and well-nourished.  No respiratory distress.      HENT:  Head:  Normocephalic and atraumatic.  Mouth:  Moist mucous membranes.    Eyes:  Conjunctivae and EOM are normal.  Pupils are equal, round, and reactive to light.  No scleral icterus.    Neck:  Neck supple.  No JVD present.    Cardiovascular:  Normal rate, regular rhythm and normal heart sounds with no murmur.  Pulmonary/Chest:  No respiratory distress, no wheezes, no crackles, with normal breath sounds and good air movement.  Abdominal:  Soft.  Bowel sounds are normal.  No distension and no tenderness.   Musculoskeletal:  No edema, no tenderness, and no deformity.  No red or swollen joints anywhere.    Neurological:  Alert and oriented to person, place, and time.  No cranial nerve deficit.  No tongue deviation.  No facial droop.  No slurred speech.   Skin:  Skin is warm and dry. No rash noted. No  pallor. Wound on lateral right index finger is clean and dry. Without exudate. Mild surrounding erythema noted and is improving. Multiple other healing wounds on his bilateral upper extremities that are without signs of infection.   Peripheral vascular:  Strong pulses in all 4 extremities with no clubbing, no cyanosis, no edema. Cap refill < 3 seconds.   Genitourinary: No mccann catheter is place.  ----------------------------------------------------------------------------------------------------------------------  Tele:  Patient is not currently on telemetry monitoring.    I have personally reviewed the EKG/Telemetry strips   ----------------------------------------------------------------------------------------------------------------------    Results from last 7 days  Lab Units 06/04/18  1405 06/04/18  1200   TROPONIN I ng/mL 0.006 0.012           Results from last 7 days  Lab Units 06/07/18  0719 06/06/18  0436 06/05/18  1732 06/05/18  1557 06/05/18  1246   CRP mg/dL  --   --   --   --  <0.50   LACTATE mmol/L  --  1.1 2.9*  --  2.1*   WBC 10*3/mm3 2.23* 2.59*  --   --  2.41*   HEMOGLOBIN g/dL 12.3* 12.5*  --   --  13.3*   HEMATOCRIT % 36.3* 35.8*  --   --  39.5*   MCV fL 88.3 88.4  --   --  88.8   MCHC g/dL 33.9 34.9  --   --  33.7   PLATELETS 10*3/mm3 44* 38*  --   --  35*   INR   --   --   --  1.26*  --      Results from last 7 days  Lab Units 06/07/18  0719 06/06/18  0436 06/05/18  1246   SODIUM mmol/L 133* 135 133*   POTASSIUM mmol/L 4.0 3.9 4.2   MAGNESIUM mg/dL 1.6*  --   --    CHLORIDE mmol/L 105 104 102   CO2 mmol/L 25.6 28.9 27.3   BUN mg/dL 9 12 11   CREATININE mg/dL 0.68 0.63 0.73   EGFR IF NONAFRICN AM mL/min/1.73 122 133 112   CALCIUM mg/dL 8.5 8.5 9.0   GLUCOSE mg/dL 376* 317* 397*   ALBUMIN g/dL 2.80* 2.80* 3.10*   BILIRUBIN mg/dL 0.6 0.6 0.8   ALK PHOS U/L 160* 146* 138*   AST (SGOT) U/L 44* 45* 51*   ALT (SGPT) U/L 44 44 50*   Estimated Creatinine Clearance: 125.1 mL/min (by C-G formula based  on SCr of 0.68 mg/dL).    Hemoglobin A1C   Date/Time Value Ref Range Status   06/05/2018 1249 10.40 (H) 4.50 - 5.70 % Final     Glucose   Date/Time Value Ref Range Status   06/07/2018 0659 340 (H) 70 - 130 mg/dL Final   06/06/2018 1954 360 (H) 70 - 130 mg/dL Final   06/06/2018 1643 400 (H) 70 - 130 mg/dL Final   06/06/2018 1617 408 (H) 70 - 130 mg/dL Final   06/06/2018 1115 165 (H) 70 - 130 mg/dL Final   06/06/2018 0646 263 (H) 70 - 130 mg/dL Final   06/05/2018 1959 305 (H) 70 - 130 mg/dL Final   06/05/2018 1616 410 (H) 70 - 130 mg/dL Final     Lab Results   Component Value Date    HGBA1C 10.40 (H) 06/05/2018     Lab Results   Component Value Date    TSH 0.284 (L) 02/04/2017    FREET4 1.47 02/04/2017     Blood Culture   Date Value Ref Range Status   06/05/2018 No growth at 24 hours  Preliminary   06/05/2018 No growth at 24 hours  Preliminary      Pain Management Panel     Pain Management Panel Latest Ref Rng & Units 6/4/2018 9/13/2017    AMPHETAMINES SCREEN, URINE Negative Negative Negative    BARBITURATES SCREEN Negative Negative Negative    BENZODIAZEPINE SCREEN, URINE Negative Negative Negative    BUPRENORPHINE Negative Negative Positive(A)    COCAINE SCREEN, URINE Negative Negative Negative    METHADONE SCREEN, URINE Negative Negative Negative      I have personally reviewed the above laboratory results.   ----------------------------------------------------------------------------------------------------------------------  Imaging Results (last 24 hours)     Procedure Component Value Units Date/Time    US Abdomen Complete [[id number]] Collected:  06/07/18 0951     Updated:  06/07/18 0955    Narrative:       EXAMINATION: US ABDOMEN COMPLETE-         CLINICAL INDICATION:     chronic hep b/c and pancytopenia, evaluate for  liver cirrhosis/splenomegaly     TECHNIQUE: Multiplanar gray scale ultrasound of the abdomen.      COMPARISON: NONE      FINDINGS:   Visualized pancreas is poorly assessed due to bowel gas  shadowing..   Cholecystectomy.  The common bile duct measures 4.4 mm and is within normal limits. .  Abnormal appearance of the liver. Markedly heterogeneous echotexture is  noted with with nodular margins indicative of cirrhosis. No perihepatic  ascites identified. No focal liver lesion.  Spleen is     enlarged measuring 15.3 cm in length without focal splenic  abnormality.   The RIGHT kidney measures 10.4 cm  in length without mass,  hydronephrosis, or shadowing stone.   The LEFT kidney measures 12.3 cm in length without mass, hydronephrosis,  or shadowing stone.   No ascites demonstrated.   IVC shows patency.  There is normal directional flow within the portal  vein.  Aorta assessment limited due to obscuration from bowel gas artifact.       Impression:       1. Heterogeneous and abnormal appearance of the liver with changes of  cirrhosis noted.  2. Splenomegaly.  3. Other nonacute findings as above.      This report was finalized on 6/7/2018 9:53 AM by Dr. Alexsander Son MD.         I have personally reviewed the above radiology results.   ----------------------------------------------------------------------------------------------------------------------  Assessment/Plan     -SIRS criteria, with HR >90 and WBC count <4,000 (although has chronic pancytopenia)  -Cellulitis of right index finger status post laceration   -Pancytopenia/Thrombocytopenia with platelet count 39,000 at admission, some improvement to 44,000  -Insulin dependent type II diabetes mellitus with hyperglycemia   -History of paroxysmal atrial fibrillation without chronic anticoagulation due to coagulopathy and thrombocytopenia, currently NSR  -Essential hypertension, with intermittent low pressures since admission   -Hyperlipidemia   -History of coronary artery disease s/p C in 2012, negative stress test 02/2017  -History of CHF, most recent EF 50%, appears compensated   -History of hepatitis C most likely causing leukopenia and  transaminitis   -History of hepatitis B   -Splenomegaly   -Mild hyponatremia, possibly from elevated glucose   -Chronic leukopenia likely secondary to hepatitis C and history of splenomegaly   -COPD without acute exacerbation   -Obstructive sleep apnea without use of BiPAP/CPAP  -Chronic pain syndrome  -Degenerative disc disease    -History of MRSA infection of achilles tendon, LUE, and left thumb in the past related to IVDA  -History of DVT in the past   -Depression   -Anxiety   -Vitamin D deficiency  -History of IVDA  -Former smoker   -Hypomagnesemia     Continue oral clindamycin and topical mupirocin for the finger wound. Appears to be improving.     BP is running borderline low. Metoprolol tartrate was decreased to 12.5mg daily yesterday and was held today secondary to baseline low BP. Continue holding parameters. Will hold furosemide for now as he does not appear to be fluid overloaded at this time.     Replace magnesium to a goal of 2.0-2.2. Will give magnesium oxide 400mg BID x3 doses. Recheck AM magnesium level. Monitor potassium while off of lasix and hold supplement if needed. Repeat CMP in AM.     Glucose is running in the 300-400s range, but did come down to 114 after his morning dose of insulin. Currently receiving moderate to high dose SSI AC/HS, scheduled novolog 8 units TID with meals, and levemir 35 units at night. Will continue this for now and consider advancing his basal dose if he continues to run high.     Continue to follow sodium levels with holding lasix and insulin adjustment. Will consider fluid restriction if continues to decrease.     Appreciate heme/oncology input regarding pancytopenia. Continue to monitor daily CBC. Transfuse if needed.     The patient is high risk due to the following diagnoses/reasons: Pancytopenia, uncontrolled diabetes mellitus    I have discussed the patient's assessment and plan with the patient.     PATRICK Jiménez  06/07/18  11:03  "AM  ----------------------------------------------------------------------------------------------------------------------    I, Dr Harry, attest that the above note correctly reflects my work and medical decision. Patient was independently seen and examined by me, including physical exam, assessment, and treatment plan.  The above note was reviewed and edited by Dr Harry.    Electronically signed by Dina Harry MD at 2018  6:37 PM     Ankit Barnes MD at 2018 10:09 AM          INPATIENT PSYCHIATRIC PROGRESS NOTE    Name:  Isaias Lang  :  1965  MRN:  [id number]  Visit Number:  [id number]  Length of stay:  3    Behavioral Health Treatment Plan and Problem List: I have reviewed and approved the Behavioral Health Treatment Plan and Problem list.    SUBJECTIVE  CC: \"Doing some better\"    INTERVAL HISTORY: \"Just scarred, what's going to happen to me, medically and physically\". Asking about viability of the nursing home referrals. No longer feeling hopeless. \"Just glad nobody is going to hurt me when I lie down\".   Appreciate Oncology consult - US of the liver without a focal lesion but changes consistent with cirrhosis.     Depression rating 5/10  Anxiety rating 5-6/10  Sleep: Very restless, intermittent insomnia.      Review of Systems   Respiratory: Negative.    Cardiovascular: Positive for palpitations.   Gastrointestinal: Positive for abdominal pain.   Genitourinary: Negative.    Musculoskeletal: Positive for arthralgias and back pain.   Neurological: Positive for weakness.         OBJECTIVE    Temp:  [97.6 °F (36.4 °C)-98.3 °F (36.8 °C)] 98.3 °F (36.8 °C)  Heart Rate:  [] 108  Resp:  [18] 18  BP: ()/(62-78) 99/68    MENTAL STATUS EXAM:      Appearance:Casually dressed, good hygeine.   Cooperation:Cooperative  Psychomotor: No psychomotor agitation/retardation, No EPS, No motor tics  Speech-normal rate, amount.   Mood/Affect: Depressed  Thought Processes: " linear, logical, and goal directed  Thought Content: negativistic   Hallucination(s): none  Hopelessness: No  Optimistic:minimally  Suicidal Thoughts:  none  Suicidal Plan/Intent: none  Homicidal Thoughts:  absent  Orientation: oriented x 3  Memory: recent intact    Lab Results (last 24 hours)     Procedure Component Value Units Date/Time    Lactate Dehydrogenase [[id number]]  (Abnormal) Collected:  06/07/18 0719    Specimen:  Blood Updated:  06/07/18 0932      (H) U/L     Peripheral Blood Smear [[id number]] Collected:  06/07/18 0719    Specimen:  Blood Updated:  06/07/18 0835    Reticulocytes [[id number]]  (Normal) Collected:  06/07/18 0719    Specimen:  Blood Updated:  06/07/18 0833     Reticulocyte % 1.45 %      Reticulocyte Absolute 0.0605 10*6/mm3     Osmolality, Calculated [[id number]]  (Normal) Collected:  06/07/18 0719    Specimen:  Blood Updated:  06/07/18 0832     Osmolality Calc 280.5 mOsm/kg     Comprehensive Metabolic Panel [[id number]]  (Abnormal) Collected:  06/07/18 0719    Specimen:  Blood Updated:  06/07/18 0832     Glucose 376 (H) mg/dL      BUN 9 mg/dL      Creatinine 0.68 mg/dL      Sodium 133 (L) mmol/L      Potassium 4.0 mmol/L      Comment: 1+ Hemolysis         Chloride 105 mmol/L      CO2 25.6 mmol/L      Calcium 8.5 mg/dL      Total Protein 5.9 (L) g/dL      Albumin 2.80 (L) g/dL      ALT (SGPT) 44 U/L      AST (SGOT) 44 (H) U/L      Alkaline Phosphatase 160 (H) U/L      Comment: Note New Reference Ranges        Total Bilirubin 0.6 mg/dL      eGFR Non African Amer 122 mL/min/1.73      Globulin 3.1 gm/dL      A/G Ratio 0.9 (L) g/dL      BUN/Creatinine Ratio 13.2     Anion Gap 2.4 (L) mmol/L     Magnesium [[id number]]  (Abnormal) Collected:  06/07/18 0719    Specimen:  Blood Updated:  06/07/18 0831     Magnesium 1.6 (L) mg/dL     Phosphorus [[id number]]  (Normal) Collected:  06/07/18 0719    Specimen:  Blood Updated:  06/07/18 0831     Phosphorus 3.6 mg/dL     CBC & Differential [[id number]]  Collected:  06/07/18 0719    Specimen:  Blood Updated:  06/07/18 0815    Narrative:       The following orders were created for panel order CBC & Differential.  Procedure                               Abnormality         Status                     ---------                               -----------         ------                     Scan Slide[[id number]]                                                                  CBC Auto Differential[[id number]]        Abnormal            Final result                 Please view results for these tests on the individual orders.    CBC Auto Differential [[id number]]  (Abnormal) Collected:  06/07/18 0719    Specimen:  Blood Updated:  06/07/18 0815     WBC 2.23 (C) 10*3/mm3      RBC 4.11 (L) 10*6/mm3      Hemoglobin 12.3 (L) g/dL      Hematocrit 36.3 (L) %      MCV 88.3 fL      MCH 29.9 pg      MCHC 33.9 g/dL      RDW 14.5 %      RDW-SD 46.3 fl      MPV 12.9 (H) fL      Platelets 44 (C) 10*3/mm3      Neutrophil % 63.8 %      Lymphocyte % 26.0 %      Monocyte % 7.6 %      Eosinophil % 2.2 %      Basophil % 0.4 %      Immature Grans % 0.0 %      Neutrophils, Absolute 1.42 10*3/mm3      Lymphocytes, Absolute 0.58 (L) 10*3/mm3      Monocytes, Absolute 0.17 10*3/mm3      Eosinophils, Absolute 0.05 10*3/mm3      Basophils, Absolute 0.01 10*3/mm3      Immature Grans, Absolute 0.00 10*3/mm3     POC Glucose Once [[id number]]  (Abnormal) Collected:  06/07/18 0659    Specimen:  Blood Updated:  06/07/18 0707     Glucose 340 (H) mg/dL     Narrative:       Meter: [id number] : [id number] [employer]    POC Glucose Once [[id number]]  (Abnormal) Collected:  06/06/18 1954    Specimen:  Blood Updated:  06/06/18 2015     Glucose 360 (H) mg/dL     Narrative:       Meter: [id number] : [id number] [employer]    Blood Culture - Blood, Arm, Left [[id number]]  (Normal) Collected:  06/05/18 1732    Specimen:  Blood from Arm, Left Updated:  06/06/18 1801     Blood Culture No growth at 24 hours    POC  Glucose Once [[id number]]  (Abnormal) Collected:  06/06/18 1643    Specimen:  Blood Updated:  06/06/18 1708     Glucose 400 (H) mg/dL     Narrative:       Meter: [id number] : [id number] [employer]    Blood Culture - Blood, Arm, Left [[id number]]  (Normal) Collected:  06/05/18 1557    Specimen:  Blood from Arm, Left Updated:  06/06/18 1645     Blood Culture No growth at 24 hours    POC Glucose Once [[id number]]  (Abnormal) Collected:  06/06/18 1617    Specimen:  Blood Updated:  06/06/18 1630     Glucose 408 (H) mg/dL     Narrative:       Meter: [id number] : [id number] [employer]    POC Glucose Once [[id number]]  (Abnormal) Collected:  06/06/18 1115    Specimen:  Blood Updated:  06/06/18 1124     Glucose 165 (H) mg/dL     Narrative:       Meter: [id number] : [id number] [employer]           Imaging Results (last 24 hours)     Procedure Component Value Units Date/Time    US Abdomen Complete [[id number]] Collected:  06/07/18 0951     Updated:  06/07/18 0955    Narrative:       EXAMINATION: US ABDOMEN COMPLETE-         CLINICAL INDICATION:     chronic hep b/c and pancytopenia, evaluate for  liver cirrhosis/splenomegaly     TECHNIQUE: Multiplanar gray scale ultrasound of the abdomen.      COMPARISON: NONE      FINDINGS:   Visualized pancreas is poorly assessed due to bowel gas shadowing..   Cholecystectomy.  The common bile duct measures 4.4 mm and is within normal limits. .  Abnormal appearance of the liver. Markedly heterogeneous echotexture is  noted with with nodular margins indicative of cirrhosis. No perihepatic  ascites identified. No focal liver lesion.  Spleen is     enlarged measuring 15.3 cm in length without focal splenic  abnormality.   The RIGHT kidney measures 10.4 cm  in length without mass,  hydronephrosis, or shadowing stone.   The LEFT kidney measures 12.3 cm in length without mass, hydronephrosis,  or shadowing stone.   No ascites demonstrated.   IVC shows patency.  There is normal directional  flow within the portal  vein.  Aorta assessment limited due to obscuration from bowel gas artifact.       Impression:       1. Heterogeneous and abnormal appearance of the liver with changes of  cirrhosis noted.  2. Splenomegaly.  3. Other nonacute findings as above.      This report was finalized on 6/7/2018 9:53 AM by Dr. Alexsander Son MD.              ECG/EMG Results (most recent)     None           ALLERGIES: Robitussin dm [dextromethorphan-guaifenesin]; Tizanidine; Metformin; Sulfa antibiotics; Contrast dye; and Tramadol      Current Facility-Administered Medications:   •  albuterol (PROVENTIL HFA;VENTOLIN HFA) inhaler 2 puff, 2 puff, Inhalation, Q6H PRN, Ankit Barnes MD, 2 puff at 06/05/18 1335  •  aluminum-magnesium hydroxide-simethicone (MAALOX MAX) 400-400-40 MG/5ML suspension 15 mL, 15 mL, Oral, Q6H PRN, Orlando Dinh MD  •  atorvastatin (LIPITOR) tablet 20 mg, 20 mg, Oral, Daily, Ankit Barnes MD, 20 mg at 06/07/18 0956  •  benzonatate (TESSALON) capsule 100 mg, 100 mg, Oral, TID PRN, Orlando Dinh MD  •  buPROPion SR (WELLBUTRIN SR) 12 hr tablet 100 mg, 100 mg, Oral, Q12H, Ankit Barnes MD, 100 mg at 06/07/18 0956  •  cholecalciferol (VITAMIN D3) capsule 50,000 Units, 50,000 Units, Oral, Weekly, Ankit Barnes MD, 50,000 Units at 06/06/18 0814  •  clindamycin (CLEOCIN) capsule 300 mg, 300 mg, Oral, TID, Dina Harry MD, 300 mg at 06/07/18 0957  •  dextrose (D50W) solution 25 g, 25 g, Intravenous, Q15 Min PRN, Orlando Dinh MD  •  dextrose (GLUTOSE) oral gel 15 g, 15 g, Oral, Q15 Min PRN, Orlando Dinh MD  •  dicyclomine (BENTYL) capsule 10 mg, 10 mg, Oral, BID, Ankit Barnes MD, 10 mg at 06/07/18 0956  •  furosemide (LASIX) tablet 20 mg, 20 mg, Oral, Daily, Dina Harry MD, 20 mg at 06/07/18 0957  •  glucagon (human recombinant) (GLUCAGEN DIAGNOSTIC) injection 1 mg, 1 mg, Subcutaneous, PRN, Orlando Dinh MD  •  hydrOXYzine (ATARAX)  tablet 50 mg, 50 mg, Oral, Q6H PRN, Orlando Dinh MD  •  insulin aspart (novoLOG) injection 0-14 Units, 0-14 Units, Subcutaneous, 4x Daily AC & at Bedtime, PATRICK Solomon, 10 Units at 06/07/18 0730  •  insulin aspart (novoLOG) injection 8 Units, 8 Units, Subcutaneous, TID With Meals, Dina Harry MD, 8 Units at 06/07/18 0730  •  insulin detemir (LEVEMIR) injection 35 Units, 35 Units, Subcutaneous, Nightly, PATRICK Solomon, 35 Units at 06/06/18 2010  •  ipratropium-albuterol (DUO-NEB) nebulizer solution 3 mL, 3 mL, Nebulization, 4x Daily - RT, Ankit Barnes MD  •  loperamide (IMODIUM) capsule 2 mg, 2 mg, Oral, 4x Daily PRN, Orlando Dinh MD  •  magnesium hydroxide (MILK OF MAGNESIA) suspension 2400 mg/10mL 10 mL, 10 mL, Oral, Daily PRN, Orlando Dinh MD  •  metoprolol tartrate (LOPRESSOR) tablet 12.5 mg, 12.5 mg, Oral, Daily, Dina Harry MD, 12.5 mg at 06/06/18 0814  •  multivitamin (DAILY SAVAGE) tablet 1 tablet, 1 tablet, Oral, Daily, PATRICK Solomon, 1 tablet at 06/07/18 0956  •  mupirocin (BACTROBAN) 2 % ointment, , Topical, Q12H, PATRICK Solomon  •  nicotine (NICODERM CQ) 21 MG/24HR patch 1 patch, 1 patch, Transdermal, Q24H, Orlando Dinh MD  •  nitroglycerin (NITROSTAT) SL tablet 0.4 mg, 0.4 mg, Sublingual, Q5 Min PRN, Ankit Barnes MD  •  ondansetron (ZOFRAN) tablet 4 mg, 4 mg, Oral, Q6H PRN, Orlando Dinh MD, 4 mg at 06/06/18 1634  •  pantoprazole (PROTONIX) EC tablet 40 mg, 40 mg, Oral, Daily, Ankit Barnes MD, 40 mg at 06/07/18 0956  •  polyethylene glycol (MIRALAX) powder 17 g, 17 g, Oral, Daily, Ankit Barnes MD, 17 g at 06/07/18 0956  •  potassium chloride (K-DUR,KLOR-CON) CR tablet 20 mEq, 20 mEq, Oral, Daily, Ankit Barnes MD, 20 mEq at 06/07/18 0956  •  sodium chloride (OCEAN) nasal spray 2 spray, 2 spray, Each Nare, PRN, Orlando Dinh MD  •  tetanus immune globulin (HYPERTET) injection 250 Units, 250 Units,  Intramuscular, Once, Dina Harry MD  •  traZODone (DESYREL) tablet 50 mg, 50 mg, Oral, Nightly PRN, Orlando Dinh MD, 50 mg at 06/06/18 2048    ASSESSMENT & PLAN            Patient Active Problem List   Diagnosis Code     • Severe episode of recurrent major depressive disorder, without psychotic features  Plan: Have started Wellbutrin and will contiunue with supportive's individual and group psychotherapeutic efforts      F33.2     Depression with suicidal ideation  Plan: Patient so longer expressing suicidal intent , will discontinue suicidal precautions       F32.9, R45.851      • Type 1 diabetes mellitus  Plan: We'll believe following with the medical consultants and their recommendations E10.9   • 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   Plan:  treat symptomatically attempting to avoid opiates, will also need to Minimize NSAID due to the thrombocytopenia  G89.21   •       • Gastroesophageal reflux disease with esophagitis  Plan: Continue Protonix  K21.0   •   B18.1   • Chronic hepatitis C without hepatic coma  Plan: Monitor liver status, question possible factor with the patient's leukocytosis and thrombocytopenia   Chronic viral hepatitis B without delta agent and without coma plan: Monitor monitor her hepatic status     B18.2   •               • Cellulitis Plan:  amoxicillin , Inflammation resolving  L03.90   • Uncomplicated opioid dependence  Plan: Incorporate into the ongoing psychotherapeutic effort as well as disposition plan. F11.20   • Leukocytosis (leucocytosis) Plan: follow oncology's recommendation.         D72.829   • Thrombocytopenia Plan: Follow oncology's recommendation.               Suicide precautions: Will d/c.     Behavioral Health Treatment Plan and Problem List: I have reviewed and approved the Behavioral Health Treatment Plan and Problem list.    Clinician:  Ankit Barnes MD  06/07/18  10:09 AM    Dictated utilizing Dragon dictation       Electronically signed by Ankit Pugh  "MD Molly at 2018 11:01 AM     Dina Harry MD at 2018  5:11 PM                                    [facility] HOSPITALIST PROGRESS NOTE     Patient Identification:  Name:  Isaias Lang  Age:  53 y.o.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  L03.90   • Uncomplicated opioid dependence  Plan: Incorporate into the ongoing psychotherapeutic effort as well as disposition plan. F11.20   • Leukocytosis (leucocytosis) Plan: Have requested Hematology consultation for evaluation and treatment        D72.829   • Thrombocytopenia And: Have requested Hematology consultation for evaluation and treatment             Suicide precautions: Suicide precaution Level 3 (q15 min checks)     Behavioral Health Treatment Plan and Problem List: I have reviewed and approved the Behavioral Health Treatment Plan and Problem list.    Clinician:  Ankit Barnes MD  06/06/18  10:12 AM    Dictated utilizing Dragon dictation       Electronically  signed by Ankit Barnes MD at 6/6/2018 11:12 AM

## 2018-06-08 NOTE — PLAN OF CARE
"Problem: Patient Care Overview  Goal: Plan of Care Review  Outcome: Ongoing (interventions implemented as appropriate)   06/08/18 1415   Coping/Psychosocial   Patient Agreement with Plan of Care agrees   Plan of Care Review   Progress no change   OTHER   Outcome Summary Patient agreeable for individual session.    Coping/Psychosocial   Consent Given to Review Plan with All [facility] in [place]      1015  Data:  Therapist engaged Patient this date inquiring about treatment progress and discharge concerns. Patient reports no changes in mood today discussing continued physical discomfort and the need to reside in a nursing home. Therapist addressed and discussed the difference of a personal care home compared to a nursing home. Patient signed consent for all [facility] in [place] at this time in hopes to increase the chances of locating an available bed.     Assessment:  Patient continues to present himself to be irritable and easily agitated. Patient reports feeling the \"same\" today as yesterday implying no improvements. Patient's irritability could be due to uncertain disposition plans.     Plan:  Patient continue stabilization.   Treatment Team continues to await referrals to be reviewed for [facility].       "

## 2018-06-09 ENCOUNTER — APPOINTMENT (OUTPATIENT)
Dept: GENERAL RADIOLOGY | Facility: HOSPITAL | Age: 53
End: 2018-06-09

## 2018-06-09 LAB
ALBUMIN SERPL-MCNC: 2.7 G/DL (ref 3.5–5)
ALBUMIN/GLOB SERPL: 1 G/DL (ref 1.5–2.5)
ALP SERPL-CCNC: 128 U/L (ref 40–129)
ALT SERPL W P-5'-P-CCNC: 42 U/L (ref 10–44)
ANION GAP SERPL CALCULATED.3IONS-SCNC: 3.9 MMOL/L (ref 3.6–11.2)
ANISOCYTOSIS BLD QL: NORMAL
AST SERPL-CCNC: 46 U/L (ref 10–34)
BASOPHILS # BLD AUTO: 0.01 10*3/MM3 (ref 0–0.3)
BASOPHILS NFR BLD AUTO: 0.4 % (ref 0–2)
BILIRUB SERPL-MCNC: 0.7 MG/DL (ref 0.2–1.8)
BUN BLD-MCNC: 14 MG/DL (ref 7–21)
BUN/CREAT SERPL: 22.2 (ref 7–25)
CALCIUM SPEC-SCNC: 8.7 MG/DL (ref 7.7–10)
CHLORIDE SERPL-SCNC: 105 MMOL/L (ref 99–112)
CO2 SERPL-SCNC: 24.1 MMOL/L (ref 24.3–31.9)
CREAT BLD-MCNC: 0.63 MG/DL (ref 0.43–1.29)
DEPRECATED RDW RBC AUTO: 50.5 FL (ref 37–54)
EOSINOPHIL # BLD AUTO: 0.06 10*3/MM3 (ref 0–0.7)
EOSINOPHIL NFR BLD AUTO: 2.1 % (ref 0–5)
ERYTHROCYTE [DISTWIDTH] IN BLOOD BY AUTOMATED COUNT: 15 % (ref 11.5–14.5)
GFR SERPL CREATININE-BSD FRML MDRD: 133 ML/MIN/1.73
GLOBULIN UR ELPH-MCNC: 2.8 GM/DL
GLUCOSE BLD-MCNC: 255 MG/DL (ref 70–110)
GLUCOSE BLDC GLUCOMTR-MCNC: 245 MG/DL (ref 70–130)
GLUCOSE BLDC GLUCOMTR-MCNC: 264 MG/DL (ref 70–130)
GLUCOSE BLDC GLUCOMTR-MCNC: 286 MG/DL (ref 70–130)
GLUCOSE BLDC GLUCOMTR-MCNC: 309 MG/DL (ref 70–130)
HCT VFR BLD AUTO: 36.6 % (ref 42–52)
HEMOCCULT STL QL: NEGATIVE
HGB BLD-MCNC: 11.8 G/DL (ref 14–18)
IMM GRANULOCYTES # BLD: 0 10*3/MM3 (ref 0–0.03)
IMM GRANULOCYTES NFR BLD: 0 % (ref 0–0.5)
LYMPHOCYTES # BLD AUTO: 0.67 10*3/MM3 (ref 1–3)
LYMPHOCYTES NFR BLD AUTO: 23.9 % (ref 21–51)
MCH RBC QN AUTO: 29.6 PG (ref 27–33)
MCHC RBC AUTO-ENTMCNC: 32.2 G/DL (ref 33–37)
MCV RBC AUTO: 92 FL (ref 80–94)
MONOCYTES # BLD AUTO: 0.34 10*3/MM3 (ref 0.1–0.9)
MONOCYTES NFR BLD AUTO: 12.1 % (ref 0–10)
NEUTROPHILS # BLD AUTO: 1.72 10*3/MM3 (ref 1.4–6.5)
NEUTROPHILS NFR BLD AUTO: 61.5 % (ref 30–70)
NRBC BLD MANUAL-RTO: 0 /100 WBC (ref 0–0)
OSMOLALITY SERPL CALC.SUM OF ELEC: 275.5 MOSM/KG (ref 273–305)
PLATELET # BLD AUTO: 43 10*3/MM3 (ref 130–400)
PMV BLD AUTO: 12.4 FL (ref 6–10)
POIKILOCYTOSIS BLD QL SMEAR: NORMAL
POTASSIUM BLD-SCNC: 4.2 MMOL/L (ref 3.5–5.3)
PROT SERPL-MCNC: 5.5 G/DL (ref 6–8)
RBC # BLD AUTO: 3.98 10*6/MM3 (ref 4.7–6.1)
SMALL PLATELETS BLD QL SMEAR: NORMAL
SODIUM BLD-SCNC: 133 MMOL/L (ref 135–153)
WBC NRBC COR # BLD: 2.8 10*3/MM3 (ref 4.5–12.5)

## 2018-06-09 PROCEDURE — 63710000001 INSULIN ASPART PER 5 UNITS: Performed by: PHYSICIAN ASSISTANT

## 2018-06-09 PROCEDURE — 63710000001 INSULIN DETEMIR PER 5 UNITS: Performed by: PHYSICIAN ASSISTANT

## 2018-06-09 PROCEDURE — 99232 SBSQ HOSP IP/OBS MODERATE 35: CPT | Performed by: PSYCHIATRY & NEUROLOGY

## 2018-06-09 PROCEDURE — 80053 COMPREHEN METABOLIC PANEL: CPT | Performed by: PHYSICIAN ASSISTANT

## 2018-06-09 PROCEDURE — 85025 COMPLETE CBC W/AUTO DIFF WBC: CPT | Performed by: PHYSICIAN ASSISTANT

## 2018-06-09 PROCEDURE — 94799 UNLISTED PULMONARY SVC/PX: CPT

## 2018-06-09 PROCEDURE — 82272 OCCULT BLD FECES 1-3 TESTS: CPT | Performed by: INTERNAL MEDICINE

## 2018-06-09 PROCEDURE — 63710000001 INSULIN ASPART PER 5 UNITS: Performed by: INTERNAL MEDICINE

## 2018-06-09 PROCEDURE — 82962 GLUCOSE BLOOD TEST: CPT

## 2018-06-09 PROCEDURE — 99232 SBSQ HOSP IP/OBS MODERATE 35: CPT | Performed by: PHYSICIAN ASSISTANT

## 2018-06-09 PROCEDURE — 85007 BL SMEAR W/DIFF WBC COUNT: CPT | Performed by: PHYSICIAN ASSISTANT

## 2018-06-09 RX ORDER — DOXEPIN HYDROCHLORIDE 10 MG/1
10 CAPSULE ORAL NIGHTLY
Status: DISCONTINUED | OUTPATIENT
Start: 2018-06-09 | End: 2018-06-10

## 2018-06-09 RX ORDER — FUROSEMIDE 20 MG/1
20 TABLET ORAL DAILY
Status: DISCONTINUED | OUTPATIENT
Start: 2018-06-10 | End: 2018-06-10

## 2018-06-09 RX ORDER — ALBUTEROL SULFATE 90 UG/1
2 AEROSOL, METERED RESPIRATORY (INHALATION) EVERY 4 HOURS PRN
Status: DISCONTINUED | OUTPATIENT
Start: 2018-06-09 | End: 2018-06-13 | Stop reason: HOSPADM

## 2018-06-09 RX ORDER — ESCITALOPRAM OXALATE 10 MG/1
10 TABLET ORAL DAILY
Status: DISCONTINUED | OUTPATIENT
Start: 2018-06-09 | End: 2018-06-12

## 2018-06-09 RX ORDER — ALBUTEROL SULFATE 2.5 MG/3ML
2.5 SOLUTION RESPIRATORY (INHALATION) EVERY 6 HOURS PRN
Status: DISCONTINUED | OUTPATIENT
Start: 2018-06-09 | End: 2018-06-13 | Stop reason: HOSPADM

## 2018-06-09 RX ADMIN — INSULIN ASPART 5 UNITS: 100 INJECTION, SOLUTION INTRAVENOUS; SUBCUTANEOUS at 16:20

## 2018-06-09 RX ADMIN — INSULIN DETEMIR 40 UNITS: 100 INJECTION, SOLUTION SUBCUTANEOUS at 20:04

## 2018-06-09 RX ADMIN — INSULIN ASPART 8 UNITS: 100 INJECTION, SOLUTION INTRAVENOUS; SUBCUTANEOUS at 20:03

## 2018-06-09 RX ADMIN — PANTOPRAZOLE SODIUM 40 MG: 40 TABLET, DELAYED RELEASE ORAL at 09:01

## 2018-06-09 RX ADMIN — METOPROLOL TARTRATE 12.5 MG: 25 TABLET, FILM COATED ORAL at 09:01

## 2018-06-09 RX ADMIN — INSULIN ASPART 8 UNITS: 100 INJECTION, SOLUTION INTRAVENOUS; SUBCUTANEOUS at 07:03

## 2018-06-09 RX ADMIN — INSULIN ASPART 8 UNITS: 100 INJECTION, SOLUTION INTRAVENOUS; SUBCUTANEOUS at 11:49

## 2018-06-09 RX ADMIN — FERROUS SULFATE TAB 325 MG (65 MG ELEMENTAL FE) 325 MG: 325 (65 FE) TAB at 17:50

## 2018-06-09 RX ADMIN — IPRATROPIUM BROMIDE 0.5 MG: 0.5 SOLUTION RESPIRATORY (INHALATION) at 02:49

## 2018-06-09 RX ADMIN — POTASSIUM CHLORIDE 20 MEQ: 1500 TABLET, EXTENDED RELEASE ORAL at 09:01

## 2018-06-09 RX ADMIN — ALBUTEROL SULFATE 2 PUFF: 90 AEROSOL, METERED RESPIRATORY (INHALATION) at 20:58

## 2018-06-09 RX ADMIN — CLINDAMYCIN HYDROCHLORIDE 300 MG: 150 CAPSULE ORAL at 16:07

## 2018-06-09 RX ADMIN — CLINDAMYCIN HYDROCHLORIDE 300 MG: 150 CAPSULE ORAL at 09:02

## 2018-06-09 RX ADMIN — ATORVASTATIN CALCIUM 20 MG: 20 TABLET, FILM COATED ORAL at 09:01

## 2018-06-09 RX ADMIN — DICYCLOMINE HYDROCHLORIDE 10 MG: 10 CAPSULE ORAL at 09:01

## 2018-06-09 RX ADMIN — CLINDAMYCIN HYDROCHLORIDE 300 MG: 150 CAPSULE ORAL at 20:56

## 2018-06-09 RX ADMIN — Medication 1 TABLET: at 09:00

## 2018-06-09 RX ADMIN — INSULIN ASPART 8 UNITS: 100 INJECTION, SOLUTION INTRAVENOUS; SUBCUTANEOUS at 11:48

## 2018-06-09 RX ADMIN — MUPIROCIN: 20 OINTMENT TOPICAL at 20:58

## 2018-06-09 RX ADMIN — INSULIN ASPART 8 UNITS: 100 INJECTION, SOLUTION INTRAVENOUS; SUBCUTANEOUS at 16:19

## 2018-06-09 RX ADMIN — POLYETHYLENE GLYCOL (3350) 17 G: 17 POWDER, FOR SOLUTION ORAL at 09:02

## 2018-06-09 RX ADMIN — IPRATROPIUM BROMIDE 0.5 MG: 0.5 SOLUTION RESPIRATORY (INHALATION) at 07:20

## 2018-06-09 RX ADMIN — DICYCLOMINE HYDROCHLORIDE 10 MG: 10 CAPSULE ORAL at 20:56

## 2018-06-09 RX ADMIN — DOXEPIN HYDROCHLORIDE 10 MG: 10 CAPSULE ORAL at 20:55

## 2018-06-09 RX ADMIN — INSULIN ASPART 10 UNITS: 100 INJECTION, SOLUTION INTRAVENOUS; SUBCUTANEOUS at 07:02

## 2018-06-09 RX ADMIN — ESCITALOPRAM 10 MG: 10 TABLET, FILM COATED ORAL at 16:06

## 2018-06-09 RX ADMIN — POLYETHYLENE GLYCOL (3350) 17 G: 17 POWDER, FOR SOLUTION ORAL at 20:56

## 2018-06-09 NOTE — PLAN OF CARE
Problem: Patient Care Overview  Goal: Plan of Care Review  Outcome: Ongoing (interventions implemented as appropriate)   06/09/18 0111   Coping/Psychosocial   Plan of Care Reviewed With patient   Coping/Psychosocial   Patient Agreement with Plan of Care agrees   Plan of Care Review   Progress no change   OTHER   Outcome Summary Pt irritable today. Complaining about other patients on the unit, complaining about not being to snack d/t elevated blood glucose. Pt rates anxiety depresison 6/10. Denies SI HI hallucinations.       Problem: Overarching Goals (Adult)  Goal: Adheres to Safety Considerations for Self and Others  Outcome: Ongoing (interventions implemented as appropriate)    Goal: Optimized Coping Skills in Response to Life Stressors  Outcome: Ongoing (interventions implemented as appropriate)    Goal: Develops/Participates in Therapeutic [place] to Support Successful Transition  Outcome: Ongoing (interventions implemented as appropriate)
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
## 2018-06-09 NOTE — PLAN OF CARE
Problem: Patient Care Overview  Goal: Plan of Care Review  Pt. Verbalizes was up all night rates anx 5 /dep 9 denies S/I H/I watching t.v. @ times calm manner he refused to get kub done  today wants to get in the am notified radiology depart will do in am    06/09/18 1631   Coping/Psychosocial   Plan of Care Reviewed With patient   Coping/Psychosocial   Patient Agreement with Plan of Care agrees   Plan of Care Review   Progress improving       Problem: Overarching Goals (Adult)  Goal: Adheres to Safety Considerations for Self and Others  Outcome: Ongoing (interventions implemented as appropriate)    Goal: Optimized Coping Skills in Response to Life Stressors  Outcome: Ongoing (interventions implemented as appropriate)    Goal: Develops/Participates in Therapeutic [place] to Support Successful Transition  Outcome: Ongoing (interventions implemented as appropriate)

## 2018-06-09 NOTE — PROGRESS NOTES
"      Inpatient Psy Progress Note   Clinician: Sean Barnes MD  Admission Date: 6/4/2018  2:24 PM 06/09/18    Behavioral Health Treatment Plan and Problem List: I have reviewed and approved the Behavioral Health Treatment Plan and Problem list.    Allergies  Allergies   Allergen Reactions   • Robitussin Dm [Dextromethorphan-Guaifenesin] Shortness Of Breath   • Tizanidine Shortness Of Breath   • Metformin Nausea And Vomiting   • Sulfa Antibiotics Other (See Comments)     \"I cannot take them, they do something to me.\"   • Contrast Dye Rash   • Tramadol Rash       Hospital Day: 5 days      Assessment completed within view of staff    History  CC: inpatient followup  Interval HPI: Patient seen and evaluated by me.  Chart reviewed. He c/o side effects from the trazodone.  He c/o one of his medications making him feel irritable - \"angry as hell.\"      Interval Review of Systems:   General ROS: negative for - fever or malaise  Endocrine ROS: negative for - palpitations  Respiratory ROS: no cough, shortness of breath, or wheezing  Cardiovascular ROS: no chest pain or dyspnea on exertion  Gastrointestinal ROS: no abdominal pain,no black or bloody stools    /74 (BP Location: Right arm, Patient Position: Sitting)   Pulse 99   Temp 97.8 °F (36.6 °C) (Temporal Artery )   Resp 18   Ht 167.6 cm (66\")   Wt 70.4 kg (155 lb 3.2 oz)   SpO2 98%   BMI 25.05 kg/m²     Mental Status Exam  Mood: dysphoric  Affect: dysphoric   Thought Processes: linear, logical, and goal directed  Thought Content: negativistic  Hallucinations: no  Suicidal Thoughts: denies  Suicidal Plan/Intent:denies  Hopelesness:Mild  Homicidal Thoughts:  absent      Medical Decision Making:   Labs:     Lab Results (last 24 hours)     Procedure Component Value Units Date/Time    Comprehensive Metabolic Panel [[id number]]  (Abnormal) Collected:  06/09/18 1244    Specimen:  Blood Updated:  06/09/18 1327     Glucose 255 (H) mg/dL      BUN 14 mg/dL      " Creatinine 0.63 mg/dL      Sodium 133 (L) mmol/L      Potassium 4.2 mmol/L      Chloride 105 mmol/L      CO2 24.1 (L) mmol/L      Calcium 8.7 mg/dL      Total Protein 5.5 (L) g/dL      Albumin 2.70 (L) g/dL      ALT (SGPT) 42 U/L      AST (SGOT) 46 (H) U/L      Alkaline Phosphatase 128 U/L      Comment: Note New Reference Ranges        Total Bilirubin 0.7 mg/dL      eGFR Non African Amer 133 mL/min/1.73      Globulin 2.8 gm/dL      A/G Ratio 1.0 (L) g/dL      BUN/Creatinine Ratio 22.2     Anion Gap 3.9 mmol/L     Osmolality, Calculated [[id number]]  (Normal) Collected:  06/09/18 1244    Specimen:  Blood Updated:  06/09/18 1327     Osmolality Calc 275.5 mOsm/kg     CBC & Differential [[id number]] Collected:  06/09/18 1137    Specimen:  Blood Updated:  06/09/18 1321    Narrative:       The following orders were created for panel order CBC & Differential.  Procedure                               Abnormality         Status                     ---------                               -----------         ------                     Scan Slide[[id number]]                                       Final result               CBC Auto Differential[[id number]]        Abnormal            Final result                 Please view results for these tests on the individual orders.    Scan Slide [[id number]] Collected:  06/09/18 1137    Specimen:  Blood Updated:  06/09/18 1321     Anisocytosis Slight/1+     Poikilocytes Slight/1+     Platelet Estimate Decreased    POC Glucose Once [[id number]]  (Abnormal) Collected:  06/09/18 1139    Specimen:  Blood Updated:  06/09/18 1204     Glucose 264 (H) mg/dL     Narrative:       Meter: [id number] : [id number] Montejo    CBC Auto Differential [[id number]]  (Abnormal) Collected:  06/09/18 1137    Specimen:  Blood Updated:  06/09/18 1201     WBC 2.80 (L) 10*3/mm3      RBC 3.98 (L) 10*6/mm3      Hemoglobin 11.8 (L) g/dL      Hematocrit 36.6 (L) %      MCV 92.0 fL      MCH 29.6 pg      MCHC 32.2  (L) g/dL      RDW 15.0 (H) %      RDW-SD 50.5 fl      MPV 12.4 (H) fL      Platelets 43 (C) 10*3/mm3      Neutrophil % 61.5 %      Lymphocyte % 23.9 %      Monocyte % 12.1 (H) %      Eosinophil % 2.1 %      Basophil % 0.4 %      Immature Grans % 0.0 %      Neutrophils, Absolute 1.72 10*3/mm3      Lymphocytes, Absolute 0.67 (L) 10*3/mm3      Monocytes, Absolute 0.34 10*3/mm3      Eosinophils, Absolute 0.06 10*3/mm3      Basophils, Absolute 0.01 10*3/mm3      Immature Grans, Absolute 0.00 10*3/mm3      nRBC 0.0 /100 WBC     POC Glucose Once [[id number]]  (Abnormal) Collected:  06/09/18 0633    Specimen:  Blood Updated:  06/09/18 0640     Glucose 309 (H) mg/dL     Narrative:       Meter: [id number] : [id number] [employer]    Urinalysis With / Microscopic If Indicated (No Culture) - Urine, Clean Catch [[id number]]  (Abnormal) Collected:  06/08/18 2136    Specimen:  Urine from Urine, Clean Catch Updated:  06/08/18 2150     Color, UA Yellow     Appearance, UA Clear     pH, UA 7.0     Specific Gravity, UA 1.026     Glucose, UA >=1000 mg/dL (3+) (A)     Ketones, UA Negative     Bilirubin, UA Negative     Blood, UA Negative     Protein, UA Negative     Leuk Esterase, UA Negative     Nitrite, UA Negative     Urobilinogen, UA 1.0 E.U./dL    Narrative:       Urine microscopic not indicated.    POC Glucose Once [[id number]]  (Abnormal) Collected:  06/08/18 1932    Specimen:  Blood Updated:  06/08/18 1938     Glucose 281 (H) mg/dL     Narrative:       Meter: [id number] : [id number] [employer]    Blood Culture - Blood, Arm, Left [[id number]]  (Normal) Collected:  06/05/18 1732    Specimen:  Blood from Arm, Left Updated:  06/08/18 1801     Blood Culture No growth at 3 days    Blood Culture - Blood, Arm, Left [[id number]]  (Normal) Collected:  06/05/18 1557    Specimen:  Blood from Arm, Left Updated:  06/08/18 1645     Blood Culture No growth at 3 days    POC Glucose Once [[id number]]  (Abnormal) Collected:  06/08/18 1616     Specimen:  Blood Updated:  06/08/18 1625     Glucose 259 (H) mg/dL     Narrative:       Meter: [id number] : [id number] Alexey Barfield            Radiology:     Imaging Results (last 24 hours)     ** No results found for the last 24 hours. **            EKG:     ECG/EMG Results (most recent)     None           Medications:     atorvastatin 20 mg Oral Daily   buPROPion  mg Oral Q12H   cholecalciferol 50,000 Units Oral Weekly   clindamycin 300 mg Oral TID   dicyclomine 10 mg Oral BID   ferrous sulfate 325 mg Oral Daily With Dinner   insulin aspart 0-14 Units Subcutaneous 4x Daily AC & at Bedtime   insulin aspart 8 Units Subcutaneous TID With Meals   insulin detemir 40 Units Subcutaneous Nightly   metoprolol tartrate 12.5 mg Oral Daily   multivitamin 1 tablet Oral Daily   mupirocin  Topical Q12H   nicotine 1 patch Transdermal Q24H   pantoprazole 40 mg Oral Daily   polyethylene glycol 17 g Oral BID   potassium chloride 20 mEq Oral Daily   tetanus immune globulin 250 Units Intramuscular Once          All medications reviewed.      Assessment and Plan:   Diagnosis Code     • Severe episode of recurrent major depressive disorder, without psychotic features  Plan: Continue Wellbutrin and will contiunue with supportive's individual and group psychotherapeutic efforts      F33.2     Depression with suicidal ideation  Plan: Patient no longer expressing suicidal intent , will discontinue suicidal precautions       F32.9, R45.851     Uncomplicated opioid dependence  Plan: Incorporate into the ongoing psychotherapeutic effort as well as disposition plan. F11.20           • Type 1 diabetes mellitus  Plan: We'll believe following with the medical consultants and their recommendations E10.9   • 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   Plan:  treat symptomatically attempting to avoid opiates, will also need to Minimize NSAID due to the thrombocytopenia  G89.21   •       • Gastroesophageal reflux disease with esophagitis  Plan: Continue  Protonix  K21.0   •   B18.1   • Chronic hepatitis C without hepatic coma  Plan: Monitor liver status, question possible factor with the patient's leukocytosis and thrombocytopenia   Chronic viral hepatitis B without delta agent and without coma plan: Monitor monitor his hepatic status     B18.2   •               • Cellulitis Plan:  Clindamycine, Inflammation resolving  L03.90   •       • Leukocytosis (leucocytosis) Plan: follow oncology's recommendation.         D72.829   • Thrombocytopenia Plan: Follow oncology's recommendation.            Insomnia    - Will stop trazodone due to side effects and start a trial of doxepin 10mg QHS     Irritability    - Wellbutrin is a potential culprit for this.   Will stop the wellbutrin and start lexapro as an alternative.      Continue hospitalization for safety and stabilization.  Continue current level of Special Precautions (q15 minute checks).

## 2018-06-10 ENCOUNTER — APPOINTMENT (OUTPATIENT)
Dept: GENERAL RADIOLOGY | Facility: HOSPITAL | Age: 53
End: 2018-06-10

## 2018-06-10 LAB
ALBUMIN SERPL-MCNC: 2.7 G/DL (ref 3.5–5)
ALBUMIN SERPL-MCNC: 2.9 G/DL (ref 3.5–5)
ALBUMIN/GLOB SERPL: 0.9 G/DL (ref 1.5–2.5)
ALBUMIN/GLOB SERPL: 0.9 G/DL (ref 1.5–2.5)
ALP SERPL-CCNC: 124 U/L (ref 40–129)
ALP SERPL-CCNC: 134 U/L (ref 40–129)
ALT SERPL W P-5'-P-CCNC: 43 U/L (ref 10–44)
ALT SERPL W P-5'-P-CCNC: 46 U/L (ref 10–44)
ANION GAP SERPL CALCULATED.3IONS-SCNC: 2.3 MMOL/L (ref 3.6–11.2)
ANION GAP SERPL CALCULATED.3IONS-SCNC: 4.9 MMOL/L (ref 3.6–11.2)
AST SERPL-CCNC: 45 U/L (ref 10–34)
AST SERPL-CCNC: 52 U/L (ref 10–34)
BACTERIA SPEC AEROBE CULT: NORMAL
BACTERIA SPEC AEROBE CULT: NORMAL
BASOPHILS # BLD AUTO: 0.02 10*3/MM3 (ref 0–0.3)
BASOPHILS # BLD AUTO: 0.02 10*3/MM3 (ref 0–0.3)
BASOPHILS NFR BLD AUTO: 0.6 % (ref 0–2)
BASOPHILS NFR BLD AUTO: 0.7 % (ref 0–2)
BILIRUB SERPL-MCNC: 0.6 MG/DL (ref 0.2–1.8)
BILIRUB SERPL-MCNC: 0.7 MG/DL (ref 0.2–1.8)
BUN BLD-MCNC: 13 MG/DL (ref 7–21)
BUN BLD-MCNC: 15 MG/DL (ref 7–21)
BUN/CREAT SERPL: 21.4 (ref 7–25)
BUN/CREAT SERPL: 22.4 (ref 7–25)
CALCIUM SPEC-SCNC: 8.6 MG/DL (ref 7.7–10)
CALCIUM SPEC-SCNC: 8.7 MG/DL (ref 7.7–10)
CHLORIDE SERPL-SCNC: 105 MMOL/L (ref 99–112)
CHLORIDE SERPL-SCNC: 107 MMOL/L (ref 99–112)
CO2 SERPL-SCNC: 25.7 MMOL/L (ref 24.3–31.9)
CO2 SERPL-SCNC: 27.1 MMOL/L (ref 24.3–31.9)
CREAT BLD-MCNC: 0.58 MG/DL (ref 0.43–1.29)
CREAT BLD-MCNC: 0.7 MG/DL (ref 0.43–1.29)
DEPRECATED RDW RBC AUTO: 47.3 FL (ref 37–54)
DEPRECATED RDW RBC AUTO: 48.1 FL (ref 37–54)
EOSINOPHIL # BLD AUTO: 0.06 10*3/MM3 (ref 0–0.7)
EOSINOPHIL # BLD AUTO: 0.08 10*3/MM3 (ref 0–0.7)
EOSINOPHIL NFR BLD AUTO: 1.9 % (ref 0–5)
EOSINOPHIL NFR BLD AUTO: 2.9 % (ref 0–5)
ERYTHROCYTE [DISTWIDTH] IN BLOOD BY AUTOMATED COUNT: 14.8 % (ref 11.5–14.5)
ERYTHROCYTE [DISTWIDTH] IN BLOOD BY AUTOMATED COUNT: 14.9 % (ref 11.5–14.5)
GFR SERPL CREATININE-BSD FRML MDRD: 118 ML/MIN/1.73
GFR SERPL CREATININE-BSD FRML MDRD: 147 ML/MIN/1.73
GLOBULIN UR ELPH-MCNC: 2.9 GM/DL
GLOBULIN UR ELPH-MCNC: 3.3 GM/DL
GLUCOSE BLD-MCNC: 193 MG/DL (ref 70–110)
GLUCOSE BLD-MCNC: 235 MG/DL (ref 70–110)
GLUCOSE BLDC GLUCOMTR-MCNC: 158 MG/DL (ref 70–130)
GLUCOSE BLDC GLUCOMTR-MCNC: 170 MG/DL (ref 70–130)
GLUCOSE BLDC GLUCOMTR-MCNC: 228 MG/DL (ref 70–130)
GLUCOSE BLDC GLUCOMTR-MCNC: 237 MG/DL (ref 70–130)
HCT VFR BLD AUTO: 34.1 % (ref 42–52)
HCT VFR BLD AUTO: 36.6 % (ref 42–52)
HGB BLD-MCNC: 11.5 G/DL (ref 14–18)
HGB BLD-MCNC: 12.6 G/DL (ref 14–18)
IMM GRANULOCYTES # BLD: 0 10*3/MM3 (ref 0–0.03)
IMM GRANULOCYTES # BLD: 0.01 10*3/MM3 (ref 0–0.03)
IMM GRANULOCYTES NFR BLD: 0 % (ref 0–0.5)
IMM GRANULOCYTES NFR BLD: 0.4 % (ref 0–0.5)
LYMPHOCYTES # BLD AUTO: 0.54 10*3/MM3 (ref 1–3)
LYMPHOCYTES # BLD AUTO: 0.65 10*3/MM3 (ref 1–3)
LYMPHOCYTES NFR BLD AUTO: 17.5 % (ref 21–51)
LYMPHOCYTES NFR BLD AUTO: 23.2 % (ref 21–51)
MCH RBC QN AUTO: 30.2 PG (ref 27–33)
MCH RBC QN AUTO: 30.9 PG (ref 27–33)
MCHC RBC AUTO-ENTMCNC: 33.7 G/DL (ref 33–37)
MCHC RBC AUTO-ENTMCNC: 34.4 G/DL (ref 33–37)
MCV RBC AUTO: 89.5 FL (ref 80–94)
MCV RBC AUTO: 89.7 FL (ref 80–94)
MONOCYTES # BLD AUTO: 0.23 10*3/MM3 (ref 0.1–0.9)
MONOCYTES # BLD AUTO: 0.23 10*3/MM3 (ref 0.1–0.9)
MONOCYTES NFR BLD AUTO: 7.5 % (ref 0–10)
MONOCYTES NFR BLD AUTO: 8.2 % (ref 0–10)
NEUTROPHILS # BLD AUTO: 1.81 10*3/MM3 (ref 1.4–6.5)
NEUTROPHILS # BLD AUTO: 2.23 10*3/MM3 (ref 1.4–6.5)
NEUTROPHILS NFR BLD AUTO: 64.6 % (ref 30–70)
NEUTROPHILS NFR BLD AUTO: 72.5 % (ref 30–70)
OSMOLALITY SERPL CALC.SUM OF ELEC: 278.5 MOSM/KG (ref 273–305)
OSMOLALITY SERPL CALC.SUM OF ELEC: 279.2 MOSM/KG (ref 273–305)
PLATELET # BLD AUTO: 50 10*3/MM3 (ref 130–400)
PLATELET # BLD AUTO: 53 10*3/MM3 (ref 130–400)
PMV BLD AUTO: 12.5 FL (ref 6–10)
PMV BLD AUTO: 12.5 FL (ref 6–10)
POTASSIUM BLD-SCNC: 3.8 MMOL/L (ref 3.5–5.3)
POTASSIUM BLD-SCNC: 4.4 MMOL/L (ref 3.5–5.3)
PROT SERPL-MCNC: 5.6 G/DL (ref 6–8)
PROT SERPL-MCNC: 6.2 G/DL (ref 6–8)
RBC # BLD AUTO: 3.81 10*6/MM3 (ref 4.7–6.1)
RBC # BLD AUTO: 4.08 10*6/MM3 (ref 4.7–6.1)
SODIUM BLD-SCNC: 135 MMOL/L (ref 135–153)
SODIUM BLD-SCNC: 137 MMOL/L (ref 135–153)
WBC NRBC COR # BLD: 2.8 10*3/MM3 (ref 4.5–12.5)
WBC NRBC COR # BLD: 3.08 10*3/MM3 (ref 4.5–12.5)

## 2018-06-10 PROCEDURE — 63710000001 INSULIN ASPART PER 5 UNITS: Performed by: PHYSICIAN ASSISTANT

## 2018-06-10 PROCEDURE — 85025 COMPLETE CBC W/AUTO DIFF WBC: CPT | Performed by: PHYSICIAN ASSISTANT

## 2018-06-10 PROCEDURE — 99233 SBSQ HOSP IP/OBS HIGH 50: CPT | Performed by: PSYCHIATRY & NEUROLOGY

## 2018-06-10 PROCEDURE — 63710000001 INSULIN DETEMIR PER 5 UNITS: Performed by: PHYSICIAN ASSISTANT

## 2018-06-10 PROCEDURE — 99232 SBSQ HOSP IP/OBS MODERATE 35: CPT | Performed by: PHYSICIAN ASSISTANT

## 2018-06-10 PROCEDURE — 80053 COMPREHEN METABOLIC PANEL: CPT | Performed by: PHYSICIAN ASSISTANT

## 2018-06-10 PROCEDURE — 82962 GLUCOSE BLOOD TEST: CPT

## 2018-06-10 PROCEDURE — 94799 UNLISTED PULMONARY SVC/PX: CPT

## 2018-06-10 PROCEDURE — 74018 RADEX ABDOMEN 1 VIEW: CPT

## 2018-06-10 PROCEDURE — 74018 RADEX ABDOMEN 1 VIEW: CPT | Performed by: RADIOLOGY

## 2018-06-10 RX ORDER — DOXEPIN HYDROCHLORIDE 25 MG/1
25 CAPSULE ORAL NIGHTLY
Status: DISCONTINUED | OUTPATIENT
Start: 2018-06-10 | End: 2018-06-12

## 2018-06-10 RX ORDER — LACTULOSE 10 G/15ML
30 SOLUTION ORAL ONCE
Status: COMPLETED | OUTPATIENT
Start: 2018-06-10 | End: 2018-06-10

## 2018-06-10 RX ADMIN — ALBUTEROL SULFATE 2 PUFF: 90 AEROSOL, METERED RESPIRATORY (INHALATION) at 08:45

## 2018-06-10 RX ADMIN — DICYCLOMINE HYDROCHLORIDE 10 MG: 10 CAPSULE ORAL at 21:11

## 2018-06-10 RX ADMIN — INSULIN DETEMIR 40 UNITS: 100 INJECTION, SOLUTION SUBCUTANEOUS at 20:03

## 2018-06-10 RX ADMIN — POTASSIUM CHLORIDE 20 MEQ: 1500 TABLET, EXTENDED RELEASE ORAL at 08:12

## 2018-06-10 RX ADMIN — ESCITALOPRAM 10 MG: 10 TABLET, FILM COATED ORAL at 08:11

## 2018-06-10 RX ADMIN — FERROUS SULFATE TAB 325 MG (65 MG ELEMENTAL FE) 325 MG: 325 (65 FE) TAB at 17:01

## 2018-06-10 RX ADMIN — Medication 1 TABLET: at 08:11

## 2018-06-10 RX ADMIN — INSULIN ASPART 10 UNITS: 100 INJECTION, SOLUTION INTRAVENOUS; SUBCUTANEOUS at 12:12

## 2018-06-10 RX ADMIN — CLINDAMYCIN HYDROCHLORIDE 300 MG: 150 CAPSULE ORAL at 21:11

## 2018-06-10 RX ADMIN — CLINDAMYCIN HYDROCHLORIDE 300 MG: 150 CAPSULE ORAL at 16:18

## 2018-06-10 RX ADMIN — DICYCLOMINE HYDROCHLORIDE 10 MG: 10 CAPSULE ORAL at 08:12

## 2018-06-10 RX ADMIN — INSULIN ASPART 5 UNITS: 100 INJECTION, SOLUTION INTRAVENOUS; SUBCUTANEOUS at 20:02

## 2018-06-10 RX ADMIN — INSULIN ASPART 3 UNITS: 100 INJECTION, SOLUTION INTRAVENOUS; SUBCUTANEOUS at 12:12

## 2018-06-10 RX ADMIN — INSULIN ASPART 3 UNITS: 100 INJECTION, SOLUTION INTRAVENOUS; SUBCUTANEOUS at 07:30

## 2018-06-10 RX ADMIN — INSULIN ASPART 10 UNITS: 100 INJECTION, SOLUTION INTRAVENOUS; SUBCUTANEOUS at 07:29

## 2018-06-10 RX ADMIN — MUPIROCIN: 20 OINTMENT TOPICAL at 08:16

## 2018-06-10 RX ADMIN — POLYETHYLENE GLYCOL (3350) 17 G: 17 POWDER, FOR SOLUTION ORAL at 21:10

## 2018-06-10 RX ADMIN — ATORVASTATIN CALCIUM 20 MG: 20 TABLET, FILM COATED ORAL at 08:11

## 2018-06-10 RX ADMIN — DOXEPIN HYDROCHLORIDE 25 MG: 25 CAPSULE ORAL at 21:11

## 2018-06-10 RX ADMIN — PANTOPRAZOLE SODIUM 40 MG: 40 TABLET, DELAYED RELEASE ORAL at 08:12

## 2018-06-10 RX ADMIN — POLYETHYLENE GLYCOL (3350) 17 G: 17 POWDER, FOR SOLUTION ORAL at 08:14

## 2018-06-10 RX ADMIN — INSULIN ASPART 10 UNITS: 100 INJECTION, SOLUTION INTRAVENOUS; SUBCUTANEOUS at 17:01

## 2018-06-10 RX ADMIN — LACTULOSE 30 G: 20 SOLUTION ORAL at 14:32

## 2018-06-10 RX ADMIN — INSULIN ASPART 5 UNITS: 100 INJECTION, SOLUTION INTRAVENOUS; SUBCUTANEOUS at 16:33

## 2018-06-10 RX ADMIN — CLINDAMYCIN HYDROCHLORIDE 300 MG: 150 CAPSULE ORAL at 08:11

## 2018-06-10 NOTE — PROGRESS NOTES
"      Inpatient Psy Progress Note   Clinician: Sean Barnes MD  Admission Date: 6/4/2018  11:55 AM 06/10/18    Behavioral Health Treatment Plan and Problem List: I have reviewed and approved the Behavioral Health Treatment Plan and Problem list.    Allergies  Allergies   Allergen Reactions   • Robitussin Dm [Dextromethorphan-Guaifenesin] Shortness Of Breath   • Tizanidine Shortness Of Breath   • Metformin Nausea And Vomiting   • Sulfa Antibiotics Other (See Comments)     \"I cannot take them, they do something to me.\"   • Contrast Dye Rash   • Tramadol Rash       Hospital Day: 6 days      Assessment completed within view of staff    History  CC: inpatient followup  Interval HPI: Patient seen and evaluated by me.  Chart reviewed. Staff reports that patient has been quite irritable on the unit.    Patient rates  level of depression (subjectively) at a   5/10.  Anxiety   5/10.  Patient tolerating meds okay.  Denies side effects.  Patient being followed by hospitalist team and hematology for pancytopenia, leukopenia, SIRS, thrombocytopenia, insulin dependent DMII, hyponatremia, hypomagnesemia, L flank pain, tacycardia, essential HTN with intermittent low pressures.      Interval Review of Systems:   General ROS: negative for - fever or malaise, \"hurt all over\"  Endocrine ROS: negative for - palpitations  Respiratory ROS: no cough, + occasional shortness of breath, or wheezing  Cardiovascular ROS: no chest pain  + dyspnea on exertion  Gastrointestinal ROS: no abdominal pain,no black or bloody stools.  +stomach cramping    BP 96/54   Pulse 93   Temp 97.8 °F (36.6 °C) (Temporal Artery )   Resp 18   Ht 167.6 cm (66\")   Wt 70.4 kg (155 lb 3.2 oz)   SpO2 94%   BMI 25.05 kg/m²     Mental Status Exam  Mood: irritable  Affect: dysphoric   Thought Processes: linear, logical, and goal directed  Thought Content: negativistic  Hallucinations: no  Suicidal Thoughts: denies  Suicidal " Plan/Intent:denies  Hopelesness:Moderate  Homicidal Thoughts:  absent      Medical Decision Making:   Labs:     Lab Results (last 24 hours)     Procedure Component Value Units Date/Time    POC Glucose Once [[id number]]  (Abnormal) Collected:  06/10/18 1130    Specimen:  Blood Updated:  06/10/18 1140     Glucose 158 (H) mg/dL     Narrative:       Meter: [id number] : [id number] Marce Abreu    POC Glucose Once [[id number]]  (Abnormal) Collected:  06/10/18 0647    Specimen:  Blood Updated:  06/10/18 0656     Glucose 170 (H) mg/dL     Narrative:       Meter: [id number] : [id number] Lorenza Nelson    Comprehensive Metabolic Panel [[id number]]  (Abnormal) Collected:  06/10/18 0457    Specimen:  Blood Updated:  06/10/18 0612     Glucose 193 (H) mg/dL      BUN 13 mg/dL      Creatinine 0.58 mg/dL      Sodium 137 mmol/L      Potassium 3.8 mmol/L      Chloride 105 mmol/L      CO2 27.1 mmol/L      Calcium 8.6 mg/dL      Total Protein 5.6 (L) g/dL      Albumin 2.70 (L) g/dL      ALT (SGPT) 43 U/L      AST (SGOT) 45 (H) U/L      Alkaline Phosphatase 134 (H) U/L      Comment: Note New Reference Ranges        Total Bilirubin 0.6 mg/dL      eGFR Non African Amer 147 mL/min/1.73      Globulin 2.9 gm/dL      A/G Ratio 0.9 (L) g/dL      BUN/Creatinine Ratio 22.4     Anion Gap 4.9 mmol/L     Osmolality, Calculated [[id number]]  (Normal) Collected:  06/10/18 0457    Specimen:  Blood Updated:  06/10/18 0612     Osmolality Calc 279.2 mOsm/kg     CBC & Differential [[id number]] Collected:  06/10/18 0457    Specimen:  Blood Updated:  06/10/18 0543    Narrative:       The following orders were created for panel order CBC & Differential.  Procedure                               Abnormality         Status                     ---------                               -----------         ------                     CBC Auto Differential[[id number]]        Abnormal            Final result                 Please view results for these tests on the  individual orders.    CBC Auto Differential [[id number]]  (Abnormal) Collected:  06/10/18 0457    Specimen:  Blood Updated:  06/10/18 0543     WBC 2.80 (L) 10*3/mm3      RBC 3.81 (L) 10*6/mm3      Hemoglobin 11.5 (L) g/dL      Hematocrit 34.1 (L) %      MCV 89.5 fL      MCH 30.2 pg      MCHC 33.7 g/dL      RDW 14.8 (H) %      RDW-SD 47.3 fl      MPV 12.5 (H) fL      Platelets 50 (L) 10*3/mm3      Neutrophil % 64.6 %      Lymphocyte % 23.2 %      Monocyte % 8.2 %      Eosinophil % 2.9 %      Basophil % 0.7 %      Immature Grans % 0.4 %      Neutrophils, Absolute 1.81 10*3/mm3      Lymphocytes, Absolute 0.65 (L) 10*3/mm3      Monocytes, Absolute 0.23 10*3/mm3      Eosinophils, Absolute 0.08 10*3/mm3      Basophils, Absolute 0.02 10*3/mm3      Immature Grans, Absolute 0.01 10*3/mm3     POC Glucose Once [[id number]]  (Abnormal) Collected:  06/09/18 1956    Specimen:  Blood Updated:  06/09/18 2005     Glucose 286 (H) mg/dL     Narrative:       Meter: [id number] : [id number] Lorenza Nelson    Occult Blood X 1, Stool - Stool, Per Rectum [[id number]]  (Normal) Collected:  06/09/18 1748    Specimen:  Stool from Per Rectum Updated:  06/09/18 1812     Fecal Occult Blood Negative    Blood Culture - Blood, Arm, Left [[id number]]  (Normal) Collected:  06/05/18 1732    Specimen:  Blood from Arm, Left Updated:  06/09/18 1800     Blood Culture No growth at 4 days    Blood Culture - Blood, Arm, Left [[id number]]  (Normal) Collected:  06/05/18 1557    Specimen:  Blood from Arm, Left Updated:  06/09/18 1645     Blood Culture No growth at 4 days    POC Glucose Once [[id number]]  (Abnormal) Collected:  06/09/18 1610    Specimen:  Blood Updated:  06/09/18 1630     Glucose 245 (H) mg/dL     Narrative:       Meter: [id number] : [id number] John Montejo    Comprehensive Metabolic Panel [[id number]]  (Abnormal) Collected:  06/09/18 1244    Specimen:  Blood Updated:  06/09/18 1327     Glucose 255 (H) mg/dL      BUN 14 mg/dL      Creatinine  0.63 mg/dL      Sodium 133 (L) mmol/L      Potassium 4.2 mmol/L      Chloride 105 mmol/L      CO2 24.1 (L) mmol/L      Calcium 8.7 mg/dL      Total Protein 5.5 (L) g/dL      Albumin 2.70 (L) g/dL      ALT (SGPT) 42 U/L      AST (SGOT) 46 (H) U/L      Alkaline Phosphatase 128 U/L      Comment: Note New Reference Ranges        Total Bilirubin 0.7 mg/dL      eGFR Non African Amer 133 mL/min/1.73      Globulin 2.8 gm/dL      A/G Ratio 1.0 (L) g/dL      BUN/Creatinine Ratio 22.2     Anion Gap 3.9 mmol/L     Osmolality, Calculated [[id number]]  (Normal) Collected:  06/09/18 1244    Specimen:  Blood Updated:  06/09/18 1327     Osmolality Calc 275.5 mOsm/kg     CBC & Differential [[id number]] Collected:  06/09/18 1137    Specimen:  Blood Updated:  06/09/18 1321    Narrative:       The following orders were created for panel order CBC & Differential.  Procedure                               Abnormality         Status                     ---------                               -----------         ------                     Scan Slide[[id number]]                                       Final result               CBC Auto Differential[[id number]]        Abnormal            Final result                 Please view results for these tests on the individual orders.    Scan Slide [[id number]] Collected:  06/09/18 1137    Specimen:  Blood Updated:  06/09/18 1321     Anisocytosis Slight/1+     Poikilocytes Slight/1+     Platelet Estimate Decreased    POC Glucose Once [[id number]]  (Abnormal) Collected:  06/09/18 1139    Specimen:  Blood Updated:  06/09/18 1204     Glucose 264 (H) mg/dL     Narrative:       Meter: [id number] : [id number] John Montejo    CBC Auto Differential [[id number]]  (Abnormal) Collected:  06/09/18 1137    Specimen:  Blood Updated:  06/09/18 1201     WBC 2.80 (L) 10*3/mm3      RBC 3.98 (L) 10*6/mm3      Hemoglobin 11.8 (L) g/dL      Hematocrit 36.6 (L) %      MCV 92.0 fL      MCH 29.6 pg      MCHC 32.2 (L) g/dL       RDW 15.0 (H) %      RDW-SD 50.5 fl      MPV 12.4 (H) fL      Platelets 43 (C) 10*3/mm3      Neutrophil % 61.5 %      Lymphocyte % 23.9 %      Monocyte % 12.1 (H) %      Eosinophil % 2.1 %      Basophil % 0.4 %      Immature Grans % 0.0 %      Neutrophils, Absolute 1.72 10*3/mm3      Lymphocytes, Absolute 0.67 (L) 10*3/mm3      Monocytes, Absolute 0.34 10*3/mm3      Eosinophils, Absolute 0.06 10*3/mm3      Basophils, Absolute 0.01 10*3/mm3      Immature Grans, Absolute 0.00 10*3/mm3      nRBC 0.0 /100 WBC             Radiology:     Imaging Results (last 24 hours)     Procedure Component Value Units Date/Time    XR Abdomen KUB [[id number]] Collected:  06/10/18 0939     Updated:  06/10/18 1008    Narrative:       EXAMINATION: XR ABDOMEN KUB-      CLINICAL INDICATION:Flank pain, constipation        COMPARISON: None      TECHNIQUE: KUB     FINDINGS:   Marked stool throughout consistent with marked constipation. No bowel  obstruction identified. No air-fluid levels. Cholecystectomy clips. Bony  structures within normal limits.       Impression:       Marked constipation.     This report was finalized on 6/10/2018 9:39 AM by Dr. Alexsander Son MD.               EKG:     ECG/EMG Results (most recent)     None           Medications:     atorvastatin 20 mg Oral Daily   cholecalciferol 50,000 Units Oral Weekly   clindamycin 300 mg Oral TID   dicyclomine 10 mg Oral BID   doxepin 10 mg Oral Nightly   escitalopram 10 mg Oral Daily   ferrous sulfate 325 mg Oral Daily With Dinner   furosemide 20 mg Oral Daily   insulin aspart 0-14 Units Subcutaneous 4x Daily AC & at Bedtime   insulin aspart 10 Units Subcutaneous TID With Meals   insulin detemir 40 Units Subcutaneous Nightly   metoprolol tartrate 12.5 mg Oral Daily   multivitamin 1 tablet Oral Daily   mupirocin  Topical Q12H   nicotine 1 patch Transdermal Q24H   pantoprazole 40 mg Oral Daily   polyethylene glycol 17 g Oral BID   potassium chloride 20 mEq Oral Daily   tetanus immune  globulin 250 Units Intramuscular Once          All medications reviewed.      Assessment and Plan:      Diagnosis Code     • Severe episode of recurrent major depressive disorder, without psychotic features  Plan: Stopped Wellbutrin yesterday and switched to Lexapro.     F33.2                 Uncomplicated opioid dependence  Plan: Incorporate into the ongoing psychotherapeutic effort as well as disposition plan. F11.20           • Type 2 diabetes mellitus  Plan: Glucose improving. Now in the mid to high 200s. Currently receiving moderate to high dose SSI AC/HS, scheduled novolog 8 units TID with meals, and levemir 40 units at night. Increase scheduled dose to 10 units TID with meals.We'll continue  follow with the medical consultants and their recommendations:  E10.9   • 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   Plan:  treat symptomatically attempting to avoid opiates, will also need to Minimize NSAID due to the thrombocytopenia  G89.21   •       • Gastroesophageal reflux disease with esophagitis  Plan: Continue Protonix  K21.0   •   B18.1   • Chronic hepatitis C without hepatic coma  Plan: Monitor liver status, question possible factor with the patient's leukocytosis and thrombocytopenia   Chronic viral hepatitis B without delta agent and without coma plan: Monitor monitor his hepatic status     B18.2   •               • Cellulitis Plan:  Clindamycine, Inflammation resolving. L03.90   •       • Leukocytosis (leucocytosis) Plan: follow oncology's recommendation.  Continue to monitor daily CBC.       D72.829   • Thrombocytopenia Plan: Follow oncology's recommendation.             Insomnia    - Increase doxepin to 25 mg QHS       Continue hospitalization for safety and stabilization.  Continue current level of Special Precautions (q15 minute checks).
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
## 2018-06-10 NOTE — PLAN OF CARE
Problem: Patient Care Overview  Goal: Plan of Care Review  Outcome: Ongoing (interventions implemented as appropriate)   06/10/18 7608   Coping/Psychosocial   Plan of Care Reviewed With patient   Coping/Psychosocial   Patient Agreement with Plan of Care agrees   Plan of Care Review   Progress no change   OTHER   Outcome Summary PT RATES ANXIETY AND DEPRESSION 5/10. DENIES ANY SI, HI, IR A/V HALLUCINATIONS. PT IS IRRITABLE WITH STAFF.        Problem: Overarching Goals (Adult)  Goal: Adheres to Safety Considerations for Self and Others  Outcome: Ongoing (interventions implemented as appropriate)    Goal: Optimized Coping Skills in Response to Life Stressors  Outcome: Ongoing (interventions implemented as appropriate)    Goal: Develops/Participates in Therapeutic [place] to Support Successful Transition  Outcome: Ongoing (interventions implemented as appropriate)

## 2018-06-10 NOTE — PLAN OF CARE
Problem: Patient Care Overview  Goal: Plan of Care Review  Outcome: Ongoing (interventions implemented as appropriate)   06/10/18 0034   Coping/Psychosocial   Plan of Care Reviewed With patient   Coping/Psychosocial   Patient Agreement with Plan of Care agrees   Plan of Care Review   Progress no change   OTHER   Outcome Summary Pt irritable, evasive during assessment. Rates anxiety 4/10 denies depression SI HI hallucinations.       Problem: Overarching Goals (Adult)  Goal: Adheres to Safety Considerations for Self and Others  Outcome: Ongoing (interventions implemented as appropriate)    Goal: Optimized Coping Skills in Response to Life Stressors  Outcome: Ongoing (interventions implemented as appropriate)    Goal: Develops/Participates in Therapeutic [facility] to Support Successful Transition  Outcome: Ongoing (interventions implemented as appropriate)

## 2018-06-11 LAB
ALBUMIN SERPL-MCNC: 2.8 G/DL (ref 3.5–5)
ALBUMIN/GLOB SERPL: 0.9 G/DL (ref 1.5–2.5)
ALP SERPL-CCNC: 137 U/L (ref 40–129)
ALT SERPL W P-5'-P-CCNC: 42 U/L (ref 10–44)
ANION GAP SERPL CALCULATED.3IONS-SCNC: 1.6 MMOL/L (ref 3.6–11.2)
AST SERPL-CCNC: 45 U/L (ref 10–34)
BASOPHILS # BLD AUTO: 0.01 10*3/MM3 (ref 0–0.3)
BASOPHILS NFR BLD AUTO: 0.4 % (ref 0–2)
BILIRUB SERPL-MCNC: 0.6 MG/DL (ref 0.2–1.8)
BUN BLD-MCNC: 14 MG/DL (ref 7–21)
BUN/CREAT SERPL: 20.9 (ref 7–25)
CALCIUM SPEC-SCNC: 8.6 MG/DL (ref 7.7–10)
CHLORIDE SERPL-SCNC: 104 MMOL/L (ref 99–112)
CO2 SERPL-SCNC: 24.4 MMOL/L (ref 24.3–31.9)
CREAT BLD-MCNC: 0.67 MG/DL (ref 0.43–1.29)
CYTOLOGIST CVX/VAG CYTO: NORMAL
DEPRECATED RDW RBC AUTO: 47.3 FL (ref 37–54)
EOSINOPHIL # BLD AUTO: 0.05 10*3/MM3 (ref 0–0.7)
EOSINOPHIL NFR BLD AUTO: 1.9 % (ref 0–5)
ERYTHROCYTE [DISTWIDTH] IN BLOOD BY AUTOMATED COUNT: 14.8 % (ref 11.5–14.5)
GFR SERPL CREATININE-BSD FRML MDRD: 124 ML/MIN/1.73
GLOBULIN UR ELPH-MCNC: 3 GM/DL
GLUCOSE BLD-MCNC: 356 MG/DL (ref 70–110)
GLUCOSE BLDC GLUCOMTR-MCNC: 117 MG/DL (ref 70–130)
GLUCOSE BLDC GLUCOMTR-MCNC: 126 MG/DL (ref 70–130)
GLUCOSE BLDC GLUCOMTR-MCNC: 209 MG/DL (ref 70–130)
GLUCOSE BLDC GLUCOMTR-MCNC: 276 MG/DL (ref 70–130)
HCT VFR BLD AUTO: 34.8 % (ref 42–52)
HGB BLD-MCNC: 11.7 G/DL (ref 14–18)
IMM GRANULOCYTES # BLD: 0.01 10*3/MM3 (ref 0–0.03)
IMM GRANULOCYTES NFR BLD: 0.4 % (ref 0–0.5)
LYMPHOCYTES # BLD AUTO: 0.61 10*3/MM3 (ref 1–3)
LYMPHOCYTES NFR BLD AUTO: 23.2 % (ref 21–51)
MCH RBC QN AUTO: 30 PG (ref 27–33)
MCHC RBC AUTO-ENTMCNC: 33.6 G/DL (ref 33–37)
MCV RBC AUTO: 89.2 FL (ref 80–94)
MONOCYTES # BLD AUTO: 0.18 10*3/MM3 (ref 0.1–0.9)
MONOCYTES NFR BLD AUTO: 6.8 % (ref 0–10)
NEUTROPHILS # BLD AUTO: 1.77 10*3/MM3 (ref 1.4–6.5)
NEUTROPHILS NFR BLD AUTO: 67.3 % (ref 30–70)
OSMOLALITY SERPL CALC.SUM OF ELEC: 275.6 MOSM/KG (ref 273–305)
PATH INTERP BLD-IMP: NORMAL
PLATELET # BLD AUTO: 51 10*3/MM3 (ref 130–400)
PMV BLD AUTO: 12.3 FL (ref 6–10)
POTASSIUM BLD-SCNC: 4 MMOL/L (ref 3.5–5.3)
PROT SERPL-MCNC: 5.8 G/DL (ref 6–8)
RBC # BLD AUTO: 3.9 10*6/MM3 (ref 4.7–6.1)
SODIUM BLD-SCNC: 130 MMOL/L (ref 135–153)
WBC NRBC COR # BLD: 2.63 10*3/MM3 (ref 4.5–12.5)

## 2018-06-11 PROCEDURE — 99232 SBSQ HOSP IP/OBS MODERATE 35: CPT | Performed by: INTERNAL MEDICINE

## 2018-06-11 PROCEDURE — 63710000001 INSULIN ASPART PER 5 UNITS: Performed by: PHYSICIAN ASSISTANT

## 2018-06-11 PROCEDURE — 94799 UNLISTED PULMONARY SVC/PX: CPT

## 2018-06-11 PROCEDURE — 80053 COMPREHEN METABOLIC PANEL: CPT | Performed by: PHYSICIAN ASSISTANT

## 2018-06-11 PROCEDURE — 85025 COMPLETE CBC W/AUTO DIFF WBC: CPT | Performed by: PHYSICIAN ASSISTANT

## 2018-06-11 PROCEDURE — 99233 SBSQ HOSP IP/OBS HIGH 50: CPT | Performed by: NURSE PRACTITIONER

## 2018-06-11 PROCEDURE — 63710000001 INSULIN DETEMIR PER 5 UNITS: Performed by: PHYSICIAN ASSISTANT

## 2018-06-11 PROCEDURE — 82962 GLUCOSE BLOOD TEST: CPT

## 2018-06-11 PROCEDURE — 99232 SBSQ HOSP IP/OBS MODERATE 35: CPT | Performed by: PSYCHIATRY & NEUROLOGY

## 2018-06-11 RX ADMIN — TUBERCULIN PURIFIED PROTEIN DERIVATIVE 5 UNITS: 5 INJECTION, SOLUTION INTRADERMAL at 15:29

## 2018-06-11 RX ADMIN — DICYCLOMINE HYDROCHLORIDE 10 MG: 10 CAPSULE ORAL at 21:29

## 2018-06-11 RX ADMIN — CLINDAMYCIN HYDROCHLORIDE 300 MG: 150 CAPSULE ORAL at 15:29

## 2018-06-11 RX ADMIN — POLYETHYLENE GLYCOL (3350) 17 G: 17 POWDER, FOR SOLUTION ORAL at 08:42

## 2018-06-11 RX ADMIN — POLYETHYLENE GLYCOL (3350) 17 G: 17 POWDER, FOR SOLUTION ORAL at 21:29

## 2018-06-11 RX ADMIN — ALBUTEROL SULFATE 2 PUFF: 90 AEROSOL, METERED RESPIRATORY (INHALATION) at 21:30

## 2018-06-11 RX ADMIN — MUPIROCIN: 20 OINTMENT TOPICAL at 08:42

## 2018-06-11 RX ADMIN — ATORVASTATIN CALCIUM 20 MG: 20 TABLET, FILM COATED ORAL at 08:43

## 2018-06-11 RX ADMIN — INSULIN ASPART 8 UNITS: 100 INJECTION, SOLUTION INTRAVENOUS; SUBCUTANEOUS at 07:48

## 2018-06-11 RX ADMIN — POTASSIUM CHLORIDE 20 MEQ: 1500 TABLET, EXTENDED RELEASE ORAL at 08:43

## 2018-06-11 RX ADMIN — INSULIN ASPART 10 UNITS: 100 INJECTION, SOLUTION INTRAVENOUS; SUBCUTANEOUS at 07:48

## 2018-06-11 RX ADMIN — INSULIN ASPART 10 UNITS: 100 INJECTION, SOLUTION INTRAVENOUS; SUBCUTANEOUS at 17:53

## 2018-06-11 RX ADMIN — FERROUS SULFATE TAB 325 MG (65 MG ELEMENTAL FE) 325 MG: 325 (65 FE) TAB at 17:54

## 2018-06-11 RX ADMIN — INSULIN ASPART 5 UNITS: 100 INJECTION, SOLUTION INTRAVENOUS; SUBCUTANEOUS at 20:26

## 2018-06-11 RX ADMIN — INSULIN DETEMIR 40 UNITS: 100 INJECTION, SOLUTION SUBCUTANEOUS at 20:26

## 2018-06-11 RX ADMIN — CLINDAMYCIN HYDROCHLORIDE 300 MG: 150 CAPSULE ORAL at 21:29

## 2018-06-11 RX ADMIN — ESCITALOPRAM 10 MG: 10 TABLET, FILM COATED ORAL at 08:42

## 2018-06-11 RX ADMIN — CLINDAMYCIN HYDROCHLORIDE 300 MG: 150 CAPSULE ORAL at 08:43

## 2018-06-11 RX ADMIN — PANTOPRAZOLE SODIUM 40 MG: 40 TABLET, DELAYED RELEASE ORAL at 08:43

## 2018-06-11 RX ADMIN — DICYCLOMINE HYDROCHLORIDE 10 MG: 10 CAPSULE ORAL at 08:43

## 2018-06-11 RX ADMIN — Medication 1 TABLET: at 08:42

## 2018-06-11 RX ADMIN — ALBUTEROL SULFATE 2 PUFF: 90 AEROSOL, METERED RESPIRATORY (INHALATION) at 00:49

## 2018-06-11 NOTE — PROGRESS NOTES
1030  Met with Dr. Barnes briefly to discuss patients progress with treatment and his disposition plans. Patient has requested to be referred to a personal care facility and referrals have been made waiting on a response from some of the facilities. One has requested that he have a recent TB skin test Dr. Barnes agreed to order that today. Anna requesting to know the amount of his monthly income.     1300  Met with patient for individual, he was resting in his room, patient was informed that [facility] wanted his monthly income amount today, he tells me it is 750.00 per mo if it is under 1,200 they will take his check and give him 60.00 per mo for personal expenses, he has been in a personal care facility and is familiar with the process.    Patient tells Dr. Barnes he is having difficulty with sleep however he seem to be sleeping during the day. He rates depression at 5/10 and anxiety at 2/10. He denies suicidal thoughts, he denies feeling hopeless and hopes to find a placement for him soon.     Continue individual and group therapy to focus on healthy coping skills and continue placement efforts.

## 2018-06-11 NOTE — PROGRESS NOTES
Navigator is helping Primary Therapist with discharge planning for patient. Navigator completed/checked status of the following referral on this day:     [facility]  [phone]  -Sent 6/8  -Declined 6/11.     [facility]  [phone]  -No Male Beds Available 6/11.    [facility]   [phone]  -No Male Beds Available 6/11.    [facility]   [phone]  -Sent 6/8  -Left Message 6/11.    [facility]   [phone]  -No Male Bed Available 6/11.     [facility]  [phone]  - No answer 6/11.    [facility]  [phone]  -No Male Beds Available 6/11.    [facility]   [phone]  - Cherri out of office. Expected back around lunch. 6/6  - Cherri unavailable, Try back in the morning 6/7.   -Cherri out. 6/8  -Number busy x2 6/11.     [facility] [place]  [phone]  -Number busy x4 6/7.  -Number busy x2 6/8.  -Number busy x2 6/11.     [place]  [phone]  -Inna is off today. Check back tomorrow. 6/6.  -No Male Beds Available 6/7.     [facility]  [phone]  -Number Busy x2. 6/6  -Resent 6/7.  -Facility asked for updated clinicals. Sent 6/8.  -Still in review 6/11.     [facility]   [phone]  -No Answer 6/6.  -No Male Beds and Declined Patient. 6/7.

## 2018-06-11 NOTE — PROGRESS NOTES
Navigator spoke with Johana at [facility]. They are willing to accept patient on Wednesday, June 13 2018. Johana states that patient will need to arrive early that day and she will fax a bed letter so we can schedule [facility] for transportation. Johana requests a copy of completed TB Skin test be faxed when it is available.     [facility] - [phone]

## 2018-06-11 NOTE — PROGRESS NOTES
Isaias Lang  [id number]  1965  6/11/2018    REFERRING PHYSICIAN  Ankit Barnes MD    Reason for Followup:   Pancytopenia    Patient Care Team:  NOHEMI Felipe as PCP - General (Family Medicine)    Chief Complaint:  Fatigue, generalized weakness, constipation    Subjective:    Mr. Lang is resting in bed this morning. He continues to complain of fatigue and generalized weakness. He has not had any obvious blood loss/active bleeding. He also complains of chronic constipation but denies any worsening. He is on oral iron therapy and seems to be tolerating this well.     Allergies:    Robitussin dm [dextromethorphan-guaifenesin]; Tizanidine; Metformin; Sulfa antibiotics; Contrast dye; and Tramadol    Outpatient Medications:  Prescriptions Prior to Admission   Medication Sig Dispense Refill Last Dose   • citalopram (CeleXA) 20 MG tablet Take 20 mg by mouth Every Night.   6/3/2018   • furosemide (LASIX) 20 MG tablet Take 20 mg by mouth Daily.   6/3/2018   • insulin lispro (humaLOG) 100 UNIT/ML injection Inject 10-35 Units under the skin 3 (Three) Times a Day With Meals. Prior to [facility] Admission, Patient was on: per sliding scale   Pt unsure of scale just knows the lowest amount of units he does and the highest he has had to do   6/3/2018   • ipratropium-albuterol (COMBIVENT RESPIMAT)  MCG/ACT inhaler Inhale 1 puff 4 (Four) Times a Day.   6/3/2018   • metoclopramide (REGLAN) 10 MG tablet Take 10 mg by mouth 2 (Two) Times a Day.   6/3/2018   • metoprolol tartrate (LOPRESSOR) 25 MG tablet Take 25 mg by mouth Daily.   6/3/2018   • polyethylene glycol (MIRALAX) packet Take 17 g by mouth 2 (Two) Times a Day.   6/3/2018   • spironolactone (ALDACTONE) 25 MG tablet Take 25 mg by mouth Daily.   6/3/2018   • vitamin D (ERGOCALCIFEROL) 30322 units capsule capsule Take 50,000 Units by mouth 1 (One) Time Per Week. Prior to [facility] Admission, Patient was on: takes on wednesdays 5/30/2018   •  albuterol (PROVENTIL HFA;VENTOLIN HFA) 108 (90 BASE) MCG/ACT inhaler Inhale 2 puffs Every 6 (Six) Hours As Needed for Wheezing. 18 g 2 Unknown at Unknown time   • aspirin 81 MG EC tablet Take 1 tablet by mouth Daily. 100 tablet 0 6/3/2018   • atorvastatin (LIPITOR) 20 MG tablet Take 20 mg by mouth Daily.   6/3/2018   • dicyclomine (BENTYL) 10 MG capsule Take 10 mg by mouth 2 (Two) Times a Day.   6/3/2018   • Insulin Glargine (BASAGLAR KWIKPEN) 100 UNIT/ML injection pen Inject 30 Units under the skin Every Night.   6/3/2018   • nitroglycerin (NITROSTAT) 0.4 MG SL tablet Place 1 tablet under the tongue Every 5 (Five) Minutes As Needed for Chest Pain. Take no more than 3 doses in 15 minutes. 30 tablet 0 Unknown at Unknown time   • pantoprazole (PROTONIX) 40 MG EC tablet Take 1 tablet by mouth Daily. 30 tablet 1 6/3/2018   • potassium chloride (K-DUR,KLOR-CON) 20 MEQ CR tablet Take 1 tablet by mouth Daily. 30 tablet 1 6/3/2018       Inpatient Medications:  Scheduled Meds:      atorvastatin 20 mg Oral Daily   cholecalciferol 50,000 Units Oral Weekly   clindamycin 300 mg Oral TID   dicyclomine 10 mg Oral BID   doxepin 25 mg Oral Nightly   escitalopram 10 mg Oral Daily   ferrous sulfate 325 mg Oral Daily With Dinner   insulin aspart 0-14 Units Subcutaneous 4x Daily AC & at Bedtime   insulin aspart 10 Units Subcutaneous TID With Meals   insulin detemir 40 Units Subcutaneous Nightly   metoprolol tartrate 12.5 mg Oral Daily   multivitamin 1 tablet Oral Daily   mupirocin  Topical Q12H   nicotine 1 patch Transdermal Q24H   pantoprazole 40 mg Oral Daily   polyethylene glycol 17 g Oral BID   potassium chloride 20 mEq Oral Daily   tetanus immune globulin 250 Units Intramuscular Once       Continuous Infusions:       PRN Meds:  •  albuterol  •  albuterol  •  aluminum-magnesium hydroxide-simethicone  •  benzonatate  •  dextrose  •  dextrose  •  glucagon (human recombinant)  •  hydrOXYzine  •  ipratropium  •  lactulose  •   loperamide  •  magnesium hydroxide  •  nitroglycerin  •  ondansetron  •  senna  •  sodium chloride      Review of Systems:  10 point review of systems conducted with patient and positive as per HPI.     Physical Exam:  Vital Signs: These were reviewed and listed as per patient’s electronic medical chart  Vitals:    06/11/18 0800   BP: 96/46   Pulse: 101   Resp: 18   Temp: 97.2 °F (36.2 °C)   SpO2: 97%     General: Awake, alert and oriented, in no distress  HEENT: Head is atraumatic, normocephalic, extraocular movements full, oropharynx clear, no scleral icterus, pink moist mucous membranes  Neck: supple, no jvd, lymphadenopathy or masses  Cardiovascular: regular rate and rhythm without murmurs, rubs or gallops  Pulmonary: non-labored, clear to auscultation bilaterally, no wheezing  Abdomen: soft, non-tender, normal active bowel sounds present  Extremities: No clubbing, cyanosis or edema  Neurologic: grossly non-focal exam  Skin: warm, dry, intact, chronic skin changes, erythema of upper extremity digit     Labs / Studies:  Lab Results (last 72 hours)     Procedure Component Value Units Date/Time    POC Glucose Once [[id number]]  (Abnormal) Collected:  06/08/18 1115    Specimen:  Blood Updated:  06/08/18 1125     Glucose 287 (H) mg/dL     Narrative:       Meter: [id number] : [id number] Alexey Carolyne    Haptoglobin [[id number]]  (Abnormal) Collected:  06/07/18 0948    Specimen:  Blood Updated:  06/08/18 0715     Haptoglobin <10 (L) mg/dL     Narrative:       Performed at:  [address] [place] [address]  [id number]  : Dexter Campos PhD, Phone:  [phone]    POC Glucose Once [[id number]]  (Abnormal) Collected:  06/08/18 0632    Specimen:  Blood Updated:  06/08/18 0639     Glucose 257 (H) mg/dL     Narrative:       Meter: [id number] : [id number] charissa alvarado    Comprehensive Metabolic Panel [[id number]]  (Abnormal) Collected:  06/08/18 0520    Specimen:  Blood Updated:  06/08/18  0627     Glucose 299 (H) mg/dL      BUN 12 mg/dL      Creatinine 0.65 mg/dL      Sodium 133 (L) mmol/L      Potassium 4.1 mmol/L      Chloride 103 mmol/L      CO2 26.5 mmol/L      Calcium 8.5 mg/dL      Total Protein 5.5 (L) g/dL      Albumin 2.50 (L) g/dL      ALT (SGPT) 45 (H) U/L      AST (SGOT) 40 (H) U/L      Alkaline Phosphatase 138 (H) U/L      Comment: Note New Reference Ranges        Total Bilirubin 0.5 mg/dL      eGFR Non African Amer 129 mL/min/1.73      Globulin 3.0 gm/dL      A/G Ratio 0.8 (L) g/dL      BUN/Creatinine Ratio 18.5     Anion Gap 3.5 (L) mmol/L     Osmolality, Calculated [[id number]]  (Normal) Collected:  06/08/18 0520    Specimen:  Blood Updated:  06/08/18 0627     Osmolality Calc 277.3 mOsm/kg     CBC & Differential [[id number]] Collected:  06/08/18 0520    Specimen:  Blood Updated:  06/08/18 0626    Narrative:       The following orders were created for panel order CBC & Differential.  Procedure                               Abnormality         Status                     ---------                               -----------         ------                     Scan Slide[[id number]]                                                                  CBC Auto Differential[[id number]]        Abnormal            Final result                 Please view results for these tests on the individual orders.    CBC Auto Differential [[id number]]  (Abnormal) Collected:  06/08/18 0520    Specimen:  Blood Updated:  06/08/18 0626     WBC 2.44 (C) 10*3/mm3      RBC 3.95 (L) 10*6/mm3      Hemoglobin 12.2 (L) g/dL      Hematocrit 35.7 (L) %      MCV 90.4 fL      MCH 30.9 pg      MCHC 34.2 g/dL      RDW 14.7 (H) %      RDW-SD 48.0 fl      MPV -- fL      Comment: Unable to calculate.         Platelets 34 (C) 10*3/mm3      Neutrophil % 66.4 %      Lymphocyte % 21.7 %      Monocyte % 8.6 %      Eosinophil % 2.5 %      Basophil % 0.4 %      Immature Grans % 0.4 %      Neutrophils, Absolute 1.62 10*3/mm3      Lymphocytes,  Absolute 0.53 (L) 10*3/mm3      Monocytes, Absolute 0.21 10*3/mm3      Eosinophils, Absolute 0.06 10*3/mm3      Basophils, Absolute 0.01 10*3/mm3      Immature Grans, Absolute 0.01 10*3/mm3     POC Glucose Once [[id number]]  (Abnormal) Collected:  06/07/18 2122    Specimen:  Blood Updated:  06/07/18 2129     Glucose 368 (H) mg/dL     Narrative:       Meter: [id number] : [id number] charissa alvarado    Magnesium [[id number]]  (Normal) Collected:  06/07/18 1850    Specimen:  Blood Updated:  06/07/18 1958     Magnesium 1.7 mg/dL     Blood Culture - Blood, Arm, Left [[id number]]  (Normal) Collected:  06/05/18 1732    Specimen:  Blood from Arm, Left Updated:  06/07/18 1800     Blood Culture No growth at 2 days    Blood Culture - Blood, Arm, Left [[id number]]  (Normal) Collected:  06/05/18 1557    Specimen:  Blood from Arm, Left Updated:  06/07/18 1645     Blood Culture No growth at 2 days    POC Glucose Once [[id number]]  (Abnormal) Collected:  06/07/18 1614    Specimen:  Blood Updated:  06/07/18 1642     Glucose 294 (H) mg/dL     Narrative:       Meter: [id number] : [id number] Alexey Barfield    POC Glucose Once [[id number]]  (Normal) Collected:  06/07/18 1110    Specimen:  Blood Updated:  06/07/18 1122     Glucose 114 mg/dL     Narrative:       Meter: [id number] : [id number] Alexey Barfield    Fibrinogen [[id number]]  (Abnormal) Collected:  06/07/18 0948    Specimen:  Blood Updated:  06/07/18 1025     Fibrinogen 167 (L) mg/dL      Comment: Note new Reference Range       Lactate Dehydrogenase [[id number]]  (Abnormal) Collected:  06/07/18 0719    Specimen:  Blood Updated:  06/07/18 0932      (H) U/L     Peripheral Blood Smear [[id number]] Collected:  06/07/18 0719    Specimen:  Blood Updated:  06/07/18 0835    Reticulocytes [[id number]]  (Normal) Collected:  06/07/18 0719    Specimen:  Blood Updated:  06/07/18 0833     Reticulocyte % 1.45 %      Reticulocyte Absolute 0.0605 10*6/mm3     Osmolality, Calculated [[id number]]   (Normal) Collected:  06/07/18 0719    Specimen:  Blood Updated:  06/07/18 0832     Osmolality Calc 280.5 mOsm/kg     Comprehensive Metabolic Panel [[id number]]  (Abnormal) Collected:  06/07/18 0719    Specimen:  Blood Updated:  06/07/18 0832     Glucose 376 (H) mg/dL      BUN 9 mg/dL      Creatinine 0.68 mg/dL      Sodium 133 (L) mmol/L      Potassium 4.0 mmol/L      Comment: 1+ Hemolysis         Chloride 105 mmol/L      CO2 25.6 mmol/L      Calcium 8.5 mg/dL      Total Protein 5.9 (L) g/dL      Albumin 2.80 (L) g/dL      ALT (SGPT) 44 U/L      AST (SGOT) 44 (H) U/L      Alkaline Phosphatase 160 (H) U/L      Comment: Note New Reference Ranges        Total Bilirubin 0.6 mg/dL      eGFR Non African Amer 122 mL/min/1.73      Globulin 3.1 gm/dL      A/G Ratio 0.9 (L) g/dL      BUN/Creatinine Ratio 13.2     Anion Gap 2.4 (L) mmol/L     Magnesium [[id number]]  (Abnormal) Collected:  06/07/18 0719    Specimen:  Blood Updated:  06/07/18 0831     Magnesium 1.6 (L) mg/dL     Phosphorus [[id number]]  (Normal) Collected:  06/07/18 0719    Specimen:  Blood Updated:  06/07/18 0831     Phosphorus 3.6 mg/dL     CBC & Differential [[id number]] Collected:  06/07/18 0719    Specimen:  Blood Updated:  06/07/18 0815    Narrative:       The following orders were created for panel order CBC & Differential.  Procedure                               Abnormality         Status                     ---------                               -----------         ------                     Scan Slide[[id number]]                                                                  CBC Auto Differential[[id number]]        Abnormal            Final result                 Please view results for these tests on the individual orders.    CBC Auto Differential [[id number]]  (Abnormal) Collected:  06/07/18 0719    Specimen:  Blood Updated:  06/07/18 0815     WBC 2.23 (C) 10*3/mm3      RBC 4.11 (L) 10*6/mm3      Hemoglobin 12.3 (L) g/dL      Hematocrit 36.3 (L) %       MCV 88.3 fL      MCH 29.9 pg      MCHC 33.9 g/dL      RDW 14.5 %      RDW-SD 46.3 fl      MPV 12.9 (H) fL      Platelets 44 (C) 10*3/mm3      Neutrophil % 63.8 %      Lymphocyte % 26.0 %      Monocyte % 7.6 %      Eosinophil % 2.2 %      Basophil % 0.4 %      Immature Grans % 0.0 %      Neutrophils, Absolute 1.42 10*3/mm3      Lymphocytes, Absolute 0.58 (L) 10*3/mm3      Monocytes, Absolute 0.17 10*3/mm3      Eosinophils, Absolute 0.05 10*3/mm3      Basophils, Absolute 0.01 10*3/mm3      Immature Grans, Absolute 0.00 10*3/mm3     POC Glucose Once [[id number]]  (Abnormal) Collected:  06/07/18 0659    Specimen:  Blood Updated:  06/07/18 0707     Glucose 340 (H) mg/dL     Narrative:       Meter: [id number] : [id number] [employer]    POC Glucose Once [[id number]]  (Abnormal) Collected:  06/06/18 1954    Specimen:  Blood Updated:  06/06/18 2015     Glucose 360 (H) mg/dL     Narrative:       Meter: [id number] : [id number] Britt Leslie    POC Glucose Once [[id number]]  (Abnormal) Collected:  06/06/18 1643    Specimen:  Blood Updated:  06/06/18 1708     Glucose 400 (H) mg/dL     Narrative:       Meter: [id number] : [id number] Bays Carolyne    POC Glucose Once [[id number]]  (Abnormal) Collected:  06/06/18 1617    Specimen:  Blood Updated:  06/06/18 1630     Glucose 408 (H) mg/dL     Narrative:       Meter: [id number] : [id number] Bays Carolyne    POC Glucose Once [[id number]]  (Abnormal) Collected:  06/06/18 1115    Specimen:  Blood Updated:  06/06/18 1124     Glucose 165 (H) mg/dL     Narrative:       Meter: [id number] : [id number] Bays Carolyne    POC Glucose Once [[id number]]  (Abnormal) Collected:  06/06/18 0646    Specimen:  Blood Updated:  06/06/18 0720     Glucose 263 (H) mg/dL     Narrative:       Meter: [id number] : [id number] Lorenza Nelson    Vitamin D 25 Hydroxy [[id number]] Collected:  06/06/18 0436    Specimen:  Blood Updated:  06/06/18 0546     25 Hydroxy, Vitamin D 25.0 ng/ml     Narrative:        Reference Ranges for Total Vitamin D 25(OH)    Deficiency      <20.0 ng/ml  Insufficiency   20-30 ng/ml  Sufficiency     ng/ml  Toxicity         >100 ng/ml    Vitamin B12 [[id number]]  (Normal) Collected:  06/06/18 0436    Specimen:  Blood Updated:  06/06/18 0546     Vitamin B-12 764 pg/mL     Folate [[id number]]  (Normal) Collected:  06/06/18 0436    Specimen:  Blood Updated:  06/06/18 0546     Folate 12.57 ng/mL     Ferritin [[id number]]  (Normal) Collected:  06/06/18 0436    Specimen:  Blood Updated:  06/06/18 0546     Ferritin 76.00 ng/mL     Iron Profile [[id number]]  (Abnormal) Collected:  06/06/18 0436    Specimen:  Blood Updated:  06/06/18 0542     Iron 40 (L) mcg/dL      TIBC 291 mcg/dL      Iron Saturation 14 (L) %     Lactic Acid, Plasma [[id number]]  (Normal) Collected:  06/06/18 0436    Specimen:  Blood Updated:  06/06/18 0540     Lactate 1.1 mmol/L     Comprehensive Metabolic Panel [[id number]]  (Abnormal) Collected:  06/06/18 0436    Specimen:  Blood Updated:  06/06/18 0538     Glucose 317 (H) mg/dL      BUN 12 mg/dL      Creatinine 0.63 mg/dL      Sodium 135 mmol/L      Potassium 3.9 mmol/L      Chloride 104 mmol/L      CO2 28.9 mmol/L      Calcium 8.5 mg/dL      Total Protein 5.9 (L) g/dL      Albumin 2.80 (L) g/dL      ALT (SGPT) 44 U/L      AST (SGOT) 45 (H) U/L      Alkaline Phosphatase 146 (H) U/L      Comment: Note New Reference Ranges        Total Bilirubin 0.6 mg/dL      eGFR Non African Amer 133 mL/min/1.73      Globulin 3.1 gm/dL      A/G Ratio 0.9 (L) g/dL      BUN/Creatinine Ratio 19.0     Anion Gap 2.1 (L) mmol/L     Osmolality, Calculated [[id number]]  (Normal) Collected:  06/06/18 0436    Specimen:  Blood Updated:  06/06/18 0538     Osmolality Calc 282.0 mOsm/kg     CBC (No Diff) [[id number]]  (Abnormal) Collected:  06/06/18 0436    Specimen:  Blood Updated:  06/06/18 0530     WBC 2.59 (L) 10*3/mm3      RBC 4.05 (L) 10*6/mm3      Hemoglobin 12.5 (L) g/dL      Hematocrit 35.8 (L) %       MCV 88.4 fL      MCH 30.9 pg      MCHC 34.9 g/dL      RDW 14.3 %      RDW-SD 45.1 fl      MPV 10.7 (H) fL      Platelets 38 (C) 10*3/mm3     POC Glucose Once [[id number]]  (Abnormal) Collected:  06/05/18 1959    Specimen:  Blood Updated:  06/05/18 2010     Glucose 305 (H) mg/dL     Narrative:       Meter: [id number] : [id number] Lorenza Nelson    Lactic Acid, Reflex [[id number]]  (Abnormal) Collected:  06/05/18 1732    Specimen:  Blood Updated:  06/05/18 1809     Lactate 2.9 (C) mmol/L     Lactic Acid, Reflex Timer (This will reflex a repeat order 3-3:15 hours after ordered.) [[id number]] Collected:  06/05/18 1246    Specimen:  Blood Updated:  06/05/18 1700     Extra Tube Hold for add-ons.     Comment: Auto resulted.       Protime-INR [[id number]]  (Abnormal) Collected:  06/05/18 1557    Specimen:  Blood Updated:  06/05/18 1633     Protime 16.0 (H) Seconds      Comment: Note new Reference Range        INR 1.26 (H)    Narrative:       Suggested INR therapeutic range for stable oral anticoagulant therapy:    Low Intensity therapy:   1.5-2.0  Moderate Intensity therapy:   2.0-3.0  High Intensity therapy:   2.5-4.0    POC Glucose Once [[id number]]  (Abnormal) Collected:  06/05/18 1616    Specimen:  Blood Updated:  06/05/18 1625     Glucose 410 (H) mg/dL     Narrative:       Meter: [id number] : [id number] Sanjuanita Retana    CBC & Differential [[id number]] Collected:  06/05/18 1246    Specimen:  Blood Updated:  06/05/18 1615    Narrative:       The following orders were created for panel order CBC & Differential.  Procedure                               Abnormality         Status                     ---------                               -----------         ------                     Scan Slide[[id number]]                                       Final result               CBC Auto Differential[[id number]]        Abnormal            Final result                 Please view results for these tests on the individual orders.     CBC Auto Differential [[id number]]  (Abnormal) Collected:  06/05/18 1246    Specimen:  Blood Updated:  06/05/18 1615     WBC 2.41 (C) 10*3/mm3      RBC 4.45 (L) 10*6/mm3      Hemoglobin 13.3 (L) g/dL      Hematocrit 39.5 (L) %      MCV 88.8 fL      MCH 29.9 pg      MCHC 33.7 g/dL      RDW 14.5 %      RDW-SD 46.9 fl      MPV -- fL      Comment: Unable to calculate.         Platelets 35 (C) 10*3/mm3      Neutrophil % 66.4 %      Lymphocyte % 22.4 %      Monocyte % 8.3 %      Eosinophil % 2.5 %      Basophil % 0.4 %      Immature Grans % 0.0 %      Neutrophils, Absolute 1.60 10*3/mm3      Lymphocytes, Absolute 0.54 (L) 10*3/mm3      Monocytes, Absolute 0.20 10*3/mm3      Eosinophils, Absolute 0.06 10*3/mm3      Basophils, Absolute 0.01 10*3/mm3      Immature Grans, Absolute 0.00 10*3/mm3     Scan Slide [[id number]] Collected:  06/05/18 1246    Specimen:  Blood Updated:  06/05/18 1615     Poikilocytes Slight/1+     Platelet Estimate Decreased    Hemoglobin A1c [[id number]]  (Abnormal) Collected:  06/05/18 1249    Specimen:  Blood Updated:  06/05/18 1412     Hemoglobin A1C 10.40 (H) %     Lactic Acid, Plasma [[id number]]  (Abnormal) Collected:  06/05/18 1246    Specimen:  Blood Updated:  06/05/18 1357     Lactate 2.1 (C) mmol/L     C-reactive Protein [[id number]]  (Normal) Collected:  06/05/18 1246    Specimen:  Blood Updated:  06/05/18 1334     C-Reactive Protein <0.50 mg/dL     Comprehensive Metabolic Panel [[id number]]  (Abnormal) Collected:  06/05/18 1246    Specimen:  Blood Updated:  06/05/18 1329     Glucose 397 (H) mg/dL      BUN 11 mg/dL      Creatinine 0.73 mg/dL      Sodium 133 (L) mmol/L      Potassium 4.2 mmol/L      Chloride 102 mmol/L      CO2 27.3 mmol/L      Calcium 9.0 mg/dL      Total Protein 6.5 g/dL      Albumin 3.10 (L) g/dL      ALT (SGPT) 50 (H) U/L      AST (SGOT) 51 (H) U/L      Alkaline Phosphatase 138 (H) U/L      Comment: Note New Reference Ranges        Total Bilirubin 0.8 mg/dL      eGFR Non   Amer 112 mL/min/1.73      Globulin 3.4 gm/dL      A/G Ratio 0.9 (L) g/dL      BUN/Creatinine Ratio 15.1     Anion Gap 3.7 mmol/L     Osmolality, Calculated [[id number]]  (Normal) Collected:  06/05/18 1246    Specimen:  Blood Updated:  06/05/18 1329     Osmolality Calc 282.4 mOsm/kg             Imaging Results (last 72 hours)     Procedure Component Value Units Date/Time    US Abdomen Complete [[id number]] Collected:  06/07/18 0951     Updated:  06/07/18 0955    Narrative:       EXAMINATION: US ABDOMEN COMPLETE-         CLINICAL INDICATION:     chronic hep b/c and pancytopenia, evaluate for  liver cirrhosis/splenomegaly     TECHNIQUE: Multiplanar gray scale ultrasound of the abdomen.      COMPARISON: NONE      FINDINGS:   Visualized pancreas is poorly assessed due to bowel gas shadowing..   Cholecystectomy.  The common bile duct measures 4.4 mm and is within normal limits. .  Abnormal appearance of the liver. Markedly heterogeneous echotexture is  noted with with nodular margins indicative of cirrhosis. No perihepatic  ascites identified. No focal liver lesion.  Spleen is     enlarged measuring 15.3 cm in length without focal splenic  abnormality.   The RIGHT kidney measures 10.4 cm  in length without mass,  hydronephrosis, or shadowing stone.   The LEFT kidney measures 12.3 cm in length without mass, hydronephrosis,  or shadowing stone.   No ascites demonstrated.   IVC shows patency.  There is normal directional flow within the portal  vein.  Aorta assessment limited due to obscuration from bowel gas artifact.       Impression:       1. Heterogeneous and abnormal appearance of the liver with changes of  cirrhosis noted.  2. Splenomegaly.  3. Other nonacute findings as above.      This report was finalized on 6/7/2018 9:53 AM by Dr. Alexsander Son MD.             Assessment & Plan:  Isaias Lang is a very pleasant 53 y.o. male with     1.  Pancytopenia:   -Likely multifactorial and secondary to chronic  hepatitis B and C (untreated) and liver cirrhosis largely contributing which leads to splenic sequestration and decreased thrombopoietin production. Patient's infectious process/antibitoics/inflammation could also be acutely contributing to worsening counts.   - B12 and folate are replete.   - Iron studies/ferritin were suggestive of Iron deficiency (further discussed below).  - Abdominal US was significant for cirrhosis/splenomegaly.  -If patient continues to have worsening counts or does not improve, we can continue to follow up in our clinic as an outpatient.   - Recommend transfusion support. Would transfuse platelets for platelet count <50,000 prior to procedures or any active bleeding. Otherwise, would transfuse for platelet count 10-20,000.   - Would hold ASA and NSAIDs if platelet count is <50k      2. Coagulopathy  - This is likely due to his underlying cirrhosis as well as possible chronic DIC (low haptoglobin, elevated LDH, low fibrinogen) which can be seen in cirrhotic patients.   - At present he is hemodynamically stable with stable hemoglobin  - Fibrinogen low, if patient does have bleeding episode would recheck fibrinogen and if <150 will need cryoprecipitate.    3. Chronic Hep B and C/? Liver lesion:  - Previously followed by Dr. Kelley, will need to follow up after discharged for treatment/further management   - Patient is unsure of possible history of liver mass. Most recent US obtained here does not show any evidence of liver masses per radiology report.  - He was to go to [facility] for further evaluation. Unsure if this was for transplant evaluation vs hepatitis treatment vs questionable lesion. He was again strongly encouraged to follow with them.    4. Iron deficiency  - He was agreeable to starting Ferrous Sulfate 325mg. Therefore, started once nightly. If tolerating, please increase this to three times daily. Patient is worried about worsening constipation. Therefore, added Senokot and Lactulose as  prn orders.  - Recommend GI referral for outpatient endoscopic evaluation for CORINE.      5. Constipation  -Continue Senokot and Lactulose prn.      Thank you so much for allowing us to participate in the care of Mr. Lang. Will sign off, but will be happy to follow up as needed.        Elaine Presley, APRN   06/11/18  9:43 AM

## 2018-06-11 NOTE — PROGRESS NOTES
"INPATIENT PSYCHIATRIC PROGRESS NOTE    Name:  Isaias Lang  :  1965  MRN:  [id number]  Visit Number:  [id number]  Length of stay:  7    Behavioral Health Treatment Plan and Problem List: I have reviewed and approved the Behavioral Health Treatment Plan and Problem list.    SUBJECTIVE  CC: \"Not sleeping\".     INTERVAL HISTORY: Moderately Depressed but not SI/SP, precautions discontinued last week, not feeling hopeless. Awaiting Personal Care Home placement. No psychotic symptoms.     Appreciate hospitalist ongoing involvement with this case.     Depression rating 5/10  Anxiety rating 2/10  Sleep: Intermittent insomnia, appears to have slept perhaps 4-5 hours         Review of Systems   Respiratory: Negative.    Cardiovascular: Negative.    Gastrointestinal: Positive for constipation.   Musculoskeletal: Positive for back pain.         OBJECTIVE    Temp:  [97.2 °F (36.2 °C)-98.2 °F (36.8 °C)] 97.2 °F (36.2 °C)  Heart Rate:  [] 101  Resp:  [18-20] 18  BP: ()/(46-78) 96/46    MENTAL STATUS EXAM:      Appearance:Casually dressed, good hygeine.   Cooperation:Cooperative  Psychomotor: No psychomotor agitation/retardation, No EPS, No motor tics  Speech-normal rate, amount.   Mood/Affect: Depressed  Thought Processes: linear, logical, and goal directed  Thought Content: negativistic   Hallucination(s): none  Hopelessness: No  Optimistic:minimally  Suicidal Thoughts:  none  Suicidal Plan/Intent: none  Homicidal Thoughts:  absent  Orientation: oriented x 3  Memory: recent intact    Lab Results (last 24 hours)     Procedure Component Value Units Date/Time    Peripheral Blood Smear [[id number]] Collected:  18    Specimen:  Blood Updated:  18     Performed by: Mena Fleming     Pathologist Interpretation See scanned report    POC Glucose Once [[id number]]  (Abnormal) Collected:  1848    Specimen:  Blood Updated:  18     Glucose 276 (H) mg/dL     Narrative:       " Meter: [id number] : [id number] Lorenza Nelson    Osmolality, Calculated [[id number]]  (Normal) Collected:  06/11/18 0505    Specimen:  Blood Updated:  06/11/18 0636     Osmolality Calc 275.6 mOsm/kg     Comprehensive Metabolic Panel [[id number]]  (Abnormal) Collected:  06/11/18 0505    Specimen:  Blood Updated:  06/11/18 0636     Glucose 356 (H) mg/dL      BUN 14 mg/dL      Creatinine 0.67 mg/dL      Sodium 130 (L) mmol/L      Potassium 4.0 mmol/L      Chloride 104 mmol/L      CO2 24.4 mmol/L      Calcium 8.6 mg/dL      Total Protein 5.8 (L) g/dL      Albumin 2.80 (L) g/dL      ALT (SGPT) 42 U/L      AST (SGOT) 45 (H) U/L      Alkaline Phosphatase 137 (H) U/L      Comment: Note New Reference Ranges        Total Bilirubin 0.6 mg/dL      eGFR Non African Amer 124 mL/min/1.73      Globulin 3.0 gm/dL      A/G Ratio 0.9 (L) g/dL      BUN/Creatinine Ratio 20.9     Anion Gap 1.6 (L) mmol/L     CBC & Differential [[id number]] Collected:  06/11/18 0505    Specimen:  Blood Updated:  06/11/18 0554    Narrative:       The following orders were created for panel order CBC & Differential.  Procedure                               Abnormality         Status                     ---------                               -----------         ------                     CBC Auto Differential[[id number]]        Abnormal            Final result                 Please view results for these tests on the individual orders.    CBC Auto Differential [[id number]]  (Abnormal) Collected:  06/11/18 0505    Specimen:  Blood Updated:  06/11/18 0554     WBC 2.63 (L) 10*3/mm3      RBC 3.90 (L) 10*6/mm3      Hemoglobin 11.7 (L) g/dL      Hematocrit 34.8 (L) %      MCV 89.2 fL      MCH 30.0 pg      MCHC 33.6 g/dL      RDW 14.8 (H) %      RDW-SD 47.3 fl      MPV 12.3 (H) fL      Platelets 51 (L) 10*3/mm3      Neutrophil % 67.3 %      Lymphocyte % 23.2 %      Monocyte % 6.8 %      Eosinophil % 1.9 %      Basophil % 0.4 %      Immature Grans % 0.4 %       Neutrophils, Absolute 1.77 10*3/mm3      Lymphocytes, Absolute 0.61 (L) 10*3/mm3      Monocytes, Absolute 0.18 10*3/mm3      Eosinophils, Absolute 0.05 10*3/mm3      Basophils, Absolute 0.01 10*3/mm3      Immature Grans, Absolute 0.01 10*3/mm3     POC Glucose Once [[id number]]  (Abnormal) Collected:  06/10/18 1955    Specimen:  Blood Updated:  06/10/18 2010     Glucose 237 (H) mg/dL     Narrative:       Meter: [id number] : [id number] Lorenza Nelson    Blood Culture - Blood, Arm, Left [[id number]]  (Normal) Collected:  06/05/18 1732    Specimen:  Blood from Arm, Left Updated:  06/10/18 1800     Blood Culture No growth at 5 days    Comprehensive Metabolic Panel [[id number]]  (Abnormal) Collected:  06/10/18 1618    Specimen:  Blood Updated:  06/10/18 1652     Glucose 235 (H) mg/dL      BUN 15 mg/dL      Creatinine 0.70 mg/dL      Sodium 135 mmol/L      Potassium 4.4 mmol/L      Chloride 107 mmol/L      CO2 25.7 mmol/L      Calcium 8.7 mg/dL      Total Protein 6.2 g/dL      Albumin 2.90 (L) g/dL      ALT (SGPT) 46 (H) U/L      AST (SGOT) 52 (H) U/L      Alkaline Phosphatase 124 U/L      Comment: Note New Reference Ranges        Total Bilirubin 0.7 mg/dL      eGFR Non African Amer 118 mL/min/1.73      Globulin 3.3 gm/dL      A/G Ratio 0.9 (L) g/dL      BUN/Creatinine Ratio 21.4     Anion Gap 2.3 (L) mmol/L     Osmolality, Calculated [[id number]]  (Normal) Collected:  06/10/18 1618    Specimen:  Blood Updated:  06/10/18 1652     Osmolality Calc 278.5 mOsm/kg     Blood Culture - Blood, Arm, Left [[id number]]  (Normal) Collected:  06/05/18 1557    Specimen:  Blood from Arm, Left Updated:  06/10/18 1645     Blood Culture No growth at 5 days    CBC & Differential [[id number]] Collected:  06/10/18 1618    Specimen:  Blood Updated:  06/10/18 1633    Narrative:       The following orders were created for panel order CBC & Differential.  Procedure                               Abnormality         Status                     ---------                                -----------         ------                     CBC Auto Differential[[id number]]        Abnormal            Final result                 Please view results for these tests on the individual orders.    CBC Auto Differential [[id number]]  (Abnormal) Collected:  06/10/18 1618    Specimen:  Blood Updated:  06/10/18 1633     WBC 3.08 (L) 10*3/mm3      RBC 4.08 (L) 10*6/mm3      Hemoglobin 12.6 (L) g/dL      Hematocrit 36.6 (L) %      MCV 89.7 fL      MCH 30.9 pg      MCHC 34.4 g/dL      RDW 14.9 (H) %      RDW-SD 48.1 fl      MPV 12.5 (H) fL      Platelets 53 (L) 10*3/mm3      Neutrophil % 72.5 (H) %      Lymphocyte % 17.5 (L) %      Monocyte % 7.5 %      Eosinophil % 1.9 %      Basophil % 0.6 %      Immature Grans % 0.0 %      Neutrophils, Absolute 2.23 10*3/mm3      Lymphocytes, Absolute 0.54 (L) 10*3/mm3      Monocytes, Absolute 0.23 10*3/mm3      Eosinophils, Absolute 0.06 10*3/mm3      Basophils, Absolute 0.02 10*3/mm3      Immature Grans, Absolute 0.00 10*3/mm3     POC Glucose Once [[id number]]  (Abnormal) Collected:  06/10/18 1617    Specimen:  Blood Updated:  06/10/18 1624     Glucose 228 (H) mg/dL     Narrative:       Meter: [id number] : [id number] Zheng Lois    POC Glucose Once [[id number]]  (Abnormal) Collected:  06/10/18 1130    Specimen:  Blood Updated:  06/10/18 1140     Glucose 158 (H) mg/dL     Narrative:       Meter: [id number] : [id number] Zheng Lois           Imaging Results (last 24 hours)     Procedure Component Value Units Date/Time    XR Abdomen KUB [[id number]] Collected:  06/10/18 0939     Updated:  06/10/18 1008    Narrative:       EXAMINATION: XR ABDOMEN KUB-      CLINICAL INDICATION:Flank pain, constipation        COMPARISON: None      TECHNIQUE: KUB     FINDINGS:   Marked stool throughout consistent with marked constipation. No bowel  obstruction identified. No air-fluid levels. Cholecystectomy clips. Bony  structures within normal limits.       Impression:        Marked constipation.     This report was finalized on 6/10/2018 9:39 AM by Dr. Alexsander Son MD.              ECG/EMG Results (most recent)     None           ALLERGIES: Robitussin dm [dextromethorphan-guaifenesin]; Tizanidine; Metformin; Sulfa antibiotics; Contrast dye; and Tramadol      Current Facility-Administered Medications:   •  albuterol (PROVENTIL HFA;VENTOLIN HFA) inhaler 2 puff, 2 puff, Inhalation, Q4H PRN, Ankit Barnes MD, 2 puff at 06/11/18 0049  •  albuterol (PROVENTIL) nebulizer solution 0.083% 2.5 mg/3mL, 2.5 mg, Nebulization, Q6H PRN, Sean Barnes MD  •  aluminum-magnesium hydroxide-simethicone (MAALOX MAX) 400-400-40 MG/5ML suspension 15 mL, 15 mL, Oral, Q6H PRN, Orlando Dinh MD  •  atorvastatin (LIPITOR) tablet 20 mg, 20 mg, Oral, Daily, Ankit Barnes MD, 20 mg at 06/11/18 0843  •  benzonatate (TESSALON) capsule 100 mg, 100 mg, Oral, TID PRN, Orlando Dinh MD  •  cholecalciferol (VITAMIN D3) capsule 50,000 Units, 50,000 Units, Oral, Weekly, Ankit Barnes MD, 50,000 Units at 06/06/18 0814  •  clindamycin (CLEOCIN) capsule 300 mg, 300 mg, Oral, TID, Dina Harry MD, 300 mg at 06/11/18 0843  •  dextrose (D50W) solution 25 g, 25 g, Intravenous, Q15 Min PRN, Orlando Dinh MD  •  dextrose (GLUTOSE) oral gel 15 g, 15 g, Oral, Q15 Min PRN, Orlando Dinh MD  •  dicyclomine (BENTYL) capsule 10 mg, 10 mg, Oral, BID, Ankit Barnes MD, 10 mg at 06/11/18 0843  •  doxepin (SINEquan) capsule 25 mg, 25 mg, Oral, Nightly, Sean Barnes MD, 25 mg at 06/10/18 2111  •  escitalopram (LEXAPRO) tablet 10 mg, 10 mg, Oral, Daily, Sean Barnes MD, 10 mg at 06/11/18 0842  •  ferrous sulfate tablet 325 mg, 325 mg, Oral, Daily With Dinner, Martha Hidalgo MD, 325 mg at 06/10/18 1701  •  glucagon (human recombinant) (GLUCAGEN DIAGNOSTIC) injection 1 mg, 1 mg, Subcutaneous, PRN, Orlando Dinh MD  •  hydrOXYzine (ATARAX) tablet 50 mg, 50 mg, Oral, Q6H PRN, Orlando  MD Fabrizio, 50 mg at 06/08/18 2025  •  insulin aspart (novoLOG) injection 0-14 Units, 0-14 Units, Subcutaneous, 4x Daily AC & at Bedtime, PATRICK Solomon, 8 Units at 06/11/18 0748  •  insulin aspart (novoLOG) injection 10 Units, 10 Units, Subcutaneous, TID With Meals, PATRICK Pantoja, 10 Units at 06/11/18 0748  •  insulin detemir (LEVEMIR) injection 40 Units, 40 Units, Subcutaneous, Nightly, PATRICK Pantoja, 40 Units at 06/10/18 2003  •  ipratropium (ATROVENT) nebulizer solution 0.5 mg, 0.5 mg, Nebulization, Q6H PRN, PATRICK Pantoja, 0.5 mg at 06/09/18 0720  •  lactulose (CHRONULAC) 10 GM/15ML solution 30 g, 30 g, Oral, TID PRN, Martha Hidalgo MD  •  loperamide (IMODIUM) capsule 2 mg, 2 mg, Oral, 4x Daily PRN, Orlando Dinh MD  •  magnesium hydroxide (MILK OF MAGNESIA) suspension 2400 mg/10mL 10 mL, 10 mL, Oral, Daily PRN, Orlando Dinh MD, 10 mL at 06/07/18 2118  •  metoprolol tartrate (LOPRESSOR) tablet 12.5 mg, 12.5 mg, Oral, Daily, PATRICK Pantoja, 12.5 mg at 06/09/18 0901  •  multivitamin (DAILY SAVAGE) tablet 1 tablet, 1 tablet, Oral, Daily, PATRICK Solomon, 1 tablet at 06/11/18 0842  •  mupirocin (BACTROBAN) 2 % ointment, , Topical, Q12H, PATRICK Solomon  •  nicotine (NICODERM CQ) 21 MG/24HR patch 1 patch, 1 patch, Transdermal, Q24H, Orlando Dinh MD  •  nitroglycerin (NITROSTAT) SL tablet 0.4 mg, 0.4 mg, Sublingual, Q5 Min PRN, Ankit Barnes MD  •  ondansetron (ZOFRAN) tablet 4 mg, 4 mg, Oral, Q6H PRN, Orlando Dinh MD, 4 mg at 06/06/18 1634  •  pantoprazole (PROTONIX) EC tablet 40 mg, 40 mg, Oral, Daily, Ankit Barnes MD, 40 mg at 06/11/18 0843  •  polyethylene glycol (MIRALAX) powder 17 g, 17 g, Oral, BID, Ankit Barnes MD, 17 g at 06/11/18 0842  •  potassium chloride (K-DUR,KLOR-CON) CR tablet 20 mEq, 20 mEq, Oral, Daily, Ankit Barnes MD, 20 mEq at 06/11/18 0843  •  senna (SENOKOT) tablet 17.2 mg, 2  tablet, Oral, BID PRN, Martha Hidalgo MD, 17.2 mg at 06/08/18 1711  •  sodium chloride (OCEAN) nasal spray 2 spray, 2 spray, Each Nare, PRN, Orlando Dinh MD  •  tetanus immune globulin (HYPERTET) injection 250 Units, 250 Units, Intramuscular, Once, Dina Harry MD         ASSESSMENT & PLAN    Patient Active Problem List   Diagnosis Code     • Severe episode of recurrent major depressive disorder, without psychotic features  Plan:  Wellbutrin and will contiunue with supportive's individual and group psychotherapeutic efforts      F33.2     Depression with suicidal ideation  Plan: Patient no longer expressing suicidal intent , have discontinued suicidal precautions       F32.9, R45.851     Uncomplicated opioid dependence  Plan: Incorporate into the ongoing psychotherapeutic effort as well as disposition plan. F11.20           • Type 1 diabetes mellitus  Plan: We'll believe following with the medical consultants and their recommendations E10.9   • 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   Plan:  treat symptomatically attempting to avoid opiates, will also need to Minimize NSAID due to the thrombocytopenia  G89.21   •       • Gastroesophageal reflux disease with esophagitis  Plan: Continue Protonix  K21.0   •   B18.1   • Chronic hepatitis C without hepatic coma  Plan: Monitor liver status, question possible factor with the patient's leukocytosis and thrombocytopenia   Chronic viral hepatitis B without delta agent and without coma plan: Monitor monitor his hepatic status     B18.2   •               • Cellulitis Plan:  Clindamycine, Inflammation resolving  L03.90   •       • Leukocytosis (leucocytosis) Plan: follow oncology's recommendation.         D72.829   • Thrombocytopenia Plan: Follow oncology's recommendation.              Suicide precautions: Suicide precaution Level 3 (q15 min checks)     Behavioral Health Treatment Plan and Problem List: I have reviewed and approved the Behavioral Health Treatment Plan and Problem  list.    Clinician:  Ankit Barnes MD  06/11/18  9:18 AM    Dictated utilizing Dragon dictation

## 2018-06-11 NOTE — PLAN OF CARE
Problem: Patient Care Overview  Goal: Plan of Care Review  Outcome: Ongoing (interventions implemented as appropriate)   06/11/18 1546   Coping/Psychosocial   Plan of Care Reviewed With patient   Coping/Psychosocial   Patient Agreement with Plan of Care agrees   Plan of Care Review   Progress no change   OTHER   Outcome Summary PT REFUSED NURSING ASSESSMENT. PT WAS IRRITABLE WITH NURSING STAFF REPORTING HE WANTED TO BE LEFT ALONE TO SLEEP.        Problem: Overarching Goals (Adult)  Goal: Adheres to Safety Considerations for Self and Others  Outcome: Ongoing (interventions implemented as appropriate)    Goal: Optimized Coping Skills in Response to Life Stressors  Outcome: Ongoing (interventions implemented as appropriate)    Goal: Develops/Participates in Therapeutic [place] to Support Successful Transition  Outcome: Ongoing (interventions implemented as appropriate)

## 2018-06-11 NOTE — NURSING NOTE
Pt came to dayroom verbally abusing staff member. Pt reports staff is unfair to him over snacks and demanding to watch television. Pt reeducated on the importance of diabetes management and the need to check blood glucose levels before snack.

## 2018-06-11 NOTE — PLAN OF CARE
Problem: Patient Care Overview  Goal: Plan of Care Review  Outcome: Ongoing (interventions implemented as appropriate)   06/11/18 0139   Coping/Psychosocial   Plan of Care Reviewed With patient   Coping/Psychosocial   Patient Agreement with Plan of Care agrees   Plan of Care Review   Progress no change   OTHER   Outcome Summary Pt irritble, uncooperative and verbally abusive to staff. Pt c/o staff not allowing him to eat, pt reeducated on importance of diabetes management. Pt witnessed drinking several cups of juice. Denies anxiety rates depression 6/10. Denies SI HI hallucinations.       Problem: Overarching Goals (Adult)  Goal: Adheres to Safety Considerations for Self and Others  Outcome: Ongoing (interventions implemented as appropriate)    Goal: Optimized Coping Skills in Response to Life Stressors  Outcome: Ongoing (interventions implemented as appropriate)    Goal: Develops/Participates in Therapeutic [place] to Support Successful Transition  Outcome: Ongoing (interventions implemented as appropriate)

## 2018-06-11 NOTE — DISCHARGE PLACEMENT REQUEST
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 PHYSICIAN  Ankit Barnes MD    Reason for Followup:   Pancytopenia    Patient Care Team:  NOHEMI Felipe as PCP - General (Family Medicine)    Chief Complaint:  Fatigue, generalized weakness, constipation    Subjective:    Mr. Lang is resting in bed this morning. He continues to complain of fatigue and generalized weakness. He has not had any obvious blood loss/active bleeding. He also complains of chronic constipation but denies any worsening. He is on oral iron therapy and seems to be tolerating this well.     Allergies:    Robitussin dm [dextromethorphan-guaifenesin]; Tizanidine; Metformin; Sulfa antibiotics; Contrast dye; and Tramadol    Outpatient Medications:  Prescriptions Prior to Admission   Medication Sig Dispense Refill Last Dose   • citalopram (CeleXA) 20 MG tablet Take 20 mg by mouth Every Night.   6/3/2018   • furosemide (LASIX) 20 MG tablet Take 20 mg by mouth Daily.   6/3/2018   • insulin lispro (humaLOG) 100 UNIT/ML injection Inject 10-35 Units under the skin 3 (Three) Times a Day With Meals. Prior to [facility], Patient was on: per sliding scale   Pt  unsure of scale just knows the lowest amount of units he does and the highest he has had to do   6/3/2018   • ipratropium-albuterol (COMBIVENT RESPIMAT)  MCG/ACT inhaler Inhale 1 puff 4 (Four) Times a Day.   6/3/2018   • metoclopramide (REGLAN) 10 MG tablet Take 10 mg by mouth 2 (Two) Times a Day.   6/3/2018   • metoprolol tartrate (LOPRESSOR) 25 MG tablet Take 25 mg by mouth Daily.   6/3/2018   • polyethylene glycol (MIRALAX) packet Take 17 g by mouth 2 (Two) Times a Day.   6/3/2018   • spironolactone (ALDACTONE) 25 MG tablet Take 25 mg by mouth Daily.   6/3/2018   • vitamin D (ERGOCALCIFEROL) 44230 units capsule capsule Take 50,000 Units by mouth 1 (One) Time Per Week. Prior to [facility] Admission, Patient was on: takes on wednesdays 5/30/2018   • albuterol (PROVENTIL HFA;VENTOLIN HFA) 108 (90 BASE) MCG/ACT inhaler Inhale 2 puffs Every 6 (Six) Hours As Needed for Wheezing. 18 g 2 Unknown at Unknown time   • aspirin 81 MG EC tablet Take 1 tablet by mouth Daily. 100 tablet 0 6/3/2018   • atorvastatin (LIPITOR) 20 MG tablet Take 20 mg by mouth Daily.   6/3/2018   • dicyclomine (BENTYL) 10 MG capsule Take 10 mg by mouth 2 (Two) Times a Day.   6/3/2018   • Insulin Glargine (BASAGLAR KWIKPEN) 100 UNIT/ML injection pen Inject 30 Units under the skin Every Night.   6/3/2018   • nitroglycerin (NITROSTAT) 0.4 MG SL tablet Place 1 tablet under the tongue Every 5 (Five) Minutes As Needed for Chest Pain. Take no more than 3 doses in 15 minutes. 30 tablet 0 Unknown at Unknown time   • pantoprazole (PROTONIX) 40 MG EC tablet Take 1 tablet by mouth Daily. 30 tablet 1 6/3/2018   • potassium chloride (K-DUR,KLOR-CON) 20 MEQ CR tablet Take 1 tablet by mouth Daily. 30 tablet 1 6/3/2018       Inpatient Medications:  Scheduled Meds:      atorvastatin 20 mg Oral Daily   cholecalciferol 50,000 Units Oral Weekly   clindamycin 300 mg Oral TID   dicyclomine 10 mg Oral BID   doxepin 25 mg Oral Nightly   escitalopram 10 mg Oral Daily    ferrous sulfate 325 mg Oral Daily With Dinner   insulin aspart 0-14 Units Subcutaneous 4x Daily AC & at Bedtime   insulin aspart 10 Units Subcutaneous TID With Meals   insulin detemir 40 Units Subcutaneous Nightly   metoprolol tartrate 12.5 mg Oral Daily   multivitamin 1 tablet Oral Daily   mupirocin  Topical Q12H   nicotine 1 patch Transdermal Q24H   pantoprazole 40 mg Oral Daily   polyethylene glycol 17 g Oral BID   potassium chloride 20 mEq Oral Daily   tetanus immune globulin 250 Units Intramuscular Once       Continuous Infusions:       PRN Meds:  •  albuterol  •  albuterol  •  aluminum-magnesium hydroxide-simethicone  •  benzonatate  •  dextrose  •  dextrose  •  glucagon (human recombinant)  •  hydrOXYzine  •  ipratropium  •  lactulose  •  loperamide  •  magnesium hydroxide  •  nitroglycerin  •  ondansetron  •  senna  •  sodium chloride      Review of Systems:  10 point review of systems conducted with patient and positive as per HPI.     Physical Exam:  Vital Signs: These were reviewed and listed as per patient’s electronic medical chart  Vitals:    06/11/18 0800   BP: 96/46   Pulse: 101   Resp: 18   Temp: 97.2 °F (36.2 °C)   SpO2: 97%     General: Awake, alert and oriented, in no distress  HEENT: Head is atraumatic, normocephalic, extraocular movements full, oropharynx clear, no scleral icterus, pink moist mucous membranes  Neck: supple, no jvd, lymphadenopathy or masses  Cardiovascular: regular rate and rhythm without murmurs, rubs or gallops  Pulmonary: non-labored, clear to auscultation bilaterally, no wheezing  Abdomen: soft, non-tender, normal active bowel sounds present  Extremities: No clubbing, cyanosis or edema  Neurologic: grossly non-focal exam  Skin: warm, dry, intact, chronic skin changes, erythema of upper extremity digit     Labs / Studies:  Lab Results (last 72 hours)     Procedure Component Value Units Date/Time    POC Glucose Once [[id number]]  (Abnormal) Collected:  06/08/18 1115     Specimen:  Blood Updated:  06/08/18 1125     Glucose 287 (H) mg/dL     Narrative:       Meter: [id number] : [id number] Alexey Barfield    Haptoglobin [[id number]]  (Abnormal) Collected:  06/07/18 0948    Specimen:  Blood Updated:  06/08/18 0715     Haptoglobin <10 (L) mg/dL     Narrative:       Performed at:  [address] [place] [address]  [id number]  : Dexter Campos PhD, Phone:  [phone]    POC Glucose Once [[id number]]  (Abnormal) Collected:  06/08/18 0632    Specimen:  Blood Updated:  06/08/18 0639     Glucose 257 (H) mg/dL     Narrative:       Meter: [id number] : [id number] charissa alvarado    Comprehensive Metabolic Panel [[id number]]  (Abnormal) Collected:  06/08/18 0520    Specimen:  Blood Updated:  06/08/18 0627     Glucose 299 (H) mg/dL      BUN 12 mg/dL      Creatinine 0.65 mg/dL      Sodium 133 (L) mmol/L      Potassium 4.1 mmol/L      Chloride 103 mmol/L      CO2 26.5 mmol/L      Calcium 8.5 mg/dL      Total Protein 5.5 (L) g/dL      Albumin 2.50 (L) g/dL      ALT (SGPT) 45 (H) U/L      AST (SGOT) 40 (H) U/L      Alkaline Phosphatase 138 (H) U/L      Comment: Note New Reference Ranges        Total Bilirubin 0.5 mg/dL      eGFR Non African Amer 129 mL/min/1.73      Globulin 3.0 gm/dL      A/G Ratio 0.8 (L) g/dL      BUN/Creatinine Ratio 18.5     Anion Gap 3.5 (L) mmol/L     Osmolality, Calculated [[id number]]  (Normal) Collected:  06/08/18 0520    Specimen:  Blood Updated:  06/08/18 0627     Osmolality Calc 277.3 mOsm/kg     CBC & Differential [[id number]] Collected:  06/08/18 0520    Specimen:  Blood Updated:  06/08/18 0626    Narrative:       The following orders were created for panel order CBC & Differential.  Procedure                               Abnormality         Status                     ---------                               -----------         ------                     Scan Slide[[id number]]                                                                   CBC Auto Differential[[id number]]        Abnormal            Final result                 Please view results for these tests on the individual orders.    CBC Auto Differential [[id number]]  (Abnormal) Collected:  06/08/18 0520    Specimen:  Blood Updated:  06/08/18 0626     WBC 2.44 (C) 10*3/mm3      RBC 3.95 (L) 10*6/mm3      Hemoglobin 12.2 (L) g/dL      Hematocrit 35.7 (L) %      MCV 90.4 fL      MCH 30.9 pg      MCHC 34.2 g/dL      RDW 14.7 (H) %      RDW-SD 48.0 fl      MPV -- fL      Comment: Unable to calculate.         Platelets 34 (C) 10*3/mm3      Neutrophil % 66.4 %      Lymphocyte % 21.7 %      Monocyte % 8.6 %      Eosinophil % 2.5 %      Basophil % 0.4 %      Immature Grans % 0.4 %      Neutrophils, Absolute 1.62 10*3/mm3      Lymphocytes, Absolute 0.53 (L) 10*3/mm3      Monocytes, Absolute 0.21 10*3/mm3      Eosinophils, Absolute 0.06 10*3/mm3      Basophils, Absolute 0.01 10*3/mm3      Immature Grans, Absolute 0.01 10*3/mm3     POC Glucose Once [[id number]]  (Abnormal) Collected:  06/07/18 2122    Specimen:  Blood Updated:  06/07/18 2129     Glucose 368 (H) mg/dL     Narrative:       Meter: [id number] : [id number] charissa alvarado    Magnesium [[id number]]  (Normal) Collected:  06/07/18 1850    Specimen:  Blood Updated:  06/07/18 1958     Magnesium 1.7 mg/dL     Blood Culture - Blood, Arm, Left [[id number]]  (Normal) Collected:  06/05/18 1732    Specimen:  Blood from Arm, Left Updated:  06/07/18 1800     Blood Culture No growth at 2 days    Blood Culture - Blood, Arm, Left [[id number]]  (Normal) Collected:  06/05/18 1557    Specimen:  Blood from Arm, Left Updated:  06/07/18 1645     Blood Culture No growth at 2 days    POC Glucose Once [[id number]]  (Abnormal) Collected:  06/07/18 1614    Specimen:  Blood Updated:  06/07/18 1642     Glucose 294 (H) mg/dL     Narrative:       Meter: [id number] : [id number] Alexey Barfield    POC Glucose Once [[id number]]  (Normal) Collected:  06/07/18 1110    Specimen:   Blood Updated:  06/07/18 1122     Glucose 114 mg/dL     Narrative:       Meter: [id number] : [id number] Alexey Barfield    Fibrinogen [[id number]]  (Abnormal) Collected:  06/07/18 0948    Specimen:  Blood Updated:  06/07/18 1025     Fibrinogen 167 (L) mg/dL      Comment: Note new Reference Range       Lactate Dehydrogenase [[id number]]  (Abnormal) Collected:  06/07/18 0719    Specimen:  Blood Updated:  06/07/18 0932      (H) U/L     Peripheral Blood Smear [[id number]] Collected:  06/07/18 0719    Specimen:  Blood Updated:  06/07/18 0835    Reticulocytes [[id number]]  (Normal) Collected:  06/07/18 0719    Specimen:  Blood Updated:  06/07/18 0833     Reticulocyte % 1.45 %      Reticulocyte Absolute 0.0605 10*6/mm3     Osmolality, Calculated [[id number]]  (Normal) Collected:  06/07/18 0719    Specimen:  Blood Updated:  06/07/18 0832     Osmolality Calc 280.5 mOsm/kg     Comprehensive Metabolic Panel [[id number]]  (Abnormal) Collected:  06/07/18 0719    Specimen:  Blood Updated:  06/07/18 0832     Glucose 376 (H) mg/dL      BUN 9 mg/dL      Creatinine 0.68 mg/dL      Sodium 133 (L) mmol/L      Potassium 4.0 mmol/L      Comment: 1+ Hemolysis         Chloride 105 mmol/L      CO2 25.6 mmol/L      Calcium 8.5 mg/dL      Total Protein 5.9 (L) g/dL      Albumin 2.80 (L) g/dL      ALT (SGPT) 44 U/L      AST (SGOT) 44 (H) U/L      Alkaline Phosphatase 160 (H) U/L      Comment: Note New Reference Ranges        Total Bilirubin 0.6 mg/dL      eGFR Non African Amer 122 mL/min/1.73      Globulin 3.1 gm/dL      A/G Ratio 0.9 (L) g/dL      BUN/Creatinine Ratio 13.2     Anion Gap 2.4 (L) mmol/L     Magnesium [[id number]]  (Abnormal) Collected:  06/07/18 0719    Specimen:  Blood Updated:  06/07/18 0831     Magnesium 1.6 (L) mg/dL     Phosphorus [[id number]]  (Normal) Collected:  06/07/18 0719    Specimen:  Blood Updated:  06/07/18 0831     Phosphorus 3.6 mg/dL     CBC & Differential [[id number]] Collected:  06/07/18 0719    Specimen:   Blood Updated:  06/07/18 0815    Narrative:       The following orders were created for panel order CBC & Differential.  Procedure                               Abnormality         Status                     ---------                               -----------         ------                     Scan Slide[[id number]]                                                                  CBC Auto Differential[[id number]]        Abnormal            Final result                 Please view results for these tests on the individual orders.    CBC Auto Differential [[id number]]  (Abnormal) Collected:  06/07/18 0719    Specimen:  Blood Updated:  06/07/18 0815     WBC 2.23 (C) 10*3/mm3      RBC 4.11 (L) 10*6/mm3      Hemoglobin 12.3 (L) g/dL      Hematocrit 36.3 (L) %      MCV 88.3 fL      MCH 29.9 pg      MCHC 33.9 g/dL      RDW 14.5 %      RDW-SD 46.3 fl      MPV 12.9 (H) fL      Platelets 44 (C) 10*3/mm3      Neutrophil % 63.8 %      Lymphocyte % 26.0 %      Monocyte % 7.6 %      Eosinophil % 2.2 %      Basophil % 0.4 %      Immature Grans % 0.0 %      Neutrophils, Absolute 1.42 10*3/mm3      Lymphocytes, Absolute 0.58 (L) 10*3/mm3      Monocytes, Absolute 0.17 10*3/mm3      Eosinophils, Absolute 0.05 10*3/mm3      Basophils, Absolute 0.01 10*3/mm3      Immature Grans, Absolute 0.00 10*3/mm3     POC Glucose Once [[id number]]  (Abnormal) Collected:  06/07/18 0659    Specimen:  Blood Updated:  06/07/18 0707     Glucose 340 (H) mg/dL     Narrative:       Meter: [id number] : [id number] Coupade    POC Glucose Once [[id number]]  (Abnormal) Collected:  06/06/18 1954    Specimen:  Blood Updated:  06/06/18 2015     Glucose 360 (H) mg/dL     Narrative:       Meter: [id number] : [id number] Britt Leslie    POC Glucose Once [[id number]]  (Abnormal) Collected:  06/06/18 1643    Specimen:  Blood Updated:  06/06/18 1708     Glucose 400 (H) mg/dL     Narrative:       Meter: [id number] : [id number] [facility]    POC Glucose Once  [[id number]]  (Abnormal) Collected:  06/06/18 1617    Specimen:  Blood Updated:  06/06/18 1630     Glucose 408 (H) mg/dL     Narrative:       Meter: [id number] : [id number] [email or web address]    POC Glucose Once [[id number]]  (Abnormal) Collected:  06/06/18 1115    Specimen:  Blood Updated:  06/06/18 1124     Glucose 165 (H) mg/dL     Narrative:       Meter: [id number] : [id number] [email or web address]    POC Glucose Once [[id number]]  (Abnormal) Collected:  06/06/18 0646    Specimen:  Blood Updated:  06/06/18 0720     Glucose 263 (H) mg/dL     Narrative:       Meter: [id number] : [id number] Lorenza Nelson    Vitamin D 25 Hydroxy [[id number]] Collected:  06/06/18 0436    Specimen:  Blood Updated:  06/06/18 0546     25 Hydroxy, Vitamin D 25.0 ng/ml     Narrative:       Reference Ranges for Total Vitamin D 25(OH)    Deficiency      <20.0 ng/ml  Insufficiency   20-30 ng/ml  Sufficiency     ng/ml  Toxicity         >100 ng/ml    Vitamin B12 [[id number]]  (Normal) Collected:  06/06/18 0436    Specimen:  Blood Updated:  06/06/18 0546     Vitamin B-12 764 pg/mL     Folate [[id number]]  (Normal) Collected:  06/06/18 0436    Specimen:  Blood Updated:  06/06/18 0546     Folate 12.57 ng/mL     Ferritin [[id number]]  (Normal) Collected:  06/06/18 0436    Specimen:  Blood Updated:  06/06/18 0546     Ferritin 76.00 ng/mL     Iron Profile [[id number]]  (Abnormal) Collected:  06/06/18 0436    Specimen:  Blood Updated:  06/06/18 0542     Iron 40 (L) mcg/dL      TIBC 291 mcg/dL      Iron Saturation 14 (L) %     Lactic Acid, Plasma [[id number]]  (Normal) Collected:  06/06/18 0436    Specimen:  Blood Updated:  06/06/18 0540     Lactate 1.1 mmol/L     Comprehensive Metabolic Panel [[id number]]  (Abnormal) Collected:  06/06/18 0436    Specimen:  Blood Updated:  06/06/18 0538     Glucose 317 (H) mg/dL      BUN 12 mg/dL      Creatinine 0.63 mg/dL      Sodium 135 mmol/L      Potassium 3.9 mmol/L      Chloride 104 mmol/L      CO2 28.9 mmol/L       Calcium 8.5 mg/dL      Total Protein 5.9 (L) g/dL      Albumin 2.80 (L) g/dL      ALT (SGPT) 44 U/L      AST (SGOT) 45 (H) U/L      Alkaline Phosphatase 146 (H) U/L      Comment: Note New Reference Ranges        Total Bilirubin 0.6 mg/dL      eGFR Non African Amer 133 mL/min/1.73      Globulin 3.1 gm/dL      A/G Ratio 0.9 (L) g/dL      BUN/Creatinine Ratio 19.0     Anion Gap 2.1 (L) mmol/L     Osmolality, Calculated [[id number]]  (Normal) Collected:  06/06/18 0436    Specimen:  Blood Updated:  06/06/18 0538     Osmolality Calc 282.0 mOsm/kg     CBC (No Diff) [[id number]]  (Abnormal) Collected:  06/06/18 0436    Specimen:  Blood Updated:  06/06/18 0530     WBC 2.59 (L) 10*3/mm3      RBC 4.05 (L) 10*6/mm3      Hemoglobin 12.5 (L) g/dL      Hematocrit 35.8 (L) %      MCV 88.4 fL      MCH 30.9 pg      MCHC 34.9 g/dL      RDW 14.3 %      RDW-SD 45.1 fl      MPV 10.7 (H) fL      Platelets 38 (C) 10*3/mm3     POC Glucose Once [[id number]]  (Abnormal) Collected:  06/05/18 1959    Specimen:  Blood Updated:  06/05/18 2010     Glucose 305 (H) mg/dL     Narrative:       Meter: [id number] : [id number] Lorenza Nelson    Lactic Acid, Reflex [[id number]]  (Abnormal) Collected:  06/05/18 1732    Specimen:  Blood Updated:  06/05/18 1809     Lactate 2.9 (C) mmol/L     Lactic Acid, Reflex Timer (This will reflex a repeat order 3-3:15 hours after ordered.) [[id number]] Collected:  06/05/18 1246    Specimen:  Blood Updated:  06/05/18 1700     Extra Tube Hold for add-ons.     Comment: Auto resulted.       Protime-INR [[id number]]  (Abnormal) Collected:  06/05/18 1557    Specimen:  Blood Updated:  06/05/18 1633     Protime 16.0 (H) Seconds      Comment: Note new Reference Range        INR 1.26 (H)    Narrative:       Suggested INR therapeutic range for stable oral anticoagulant therapy:    Low Intensity therapy:   1.5-2.0  Moderate Intensity therapy:   2.0-3.0  High Intensity therapy:   2.5-4.0    POC Glucose Once [[id number]]  (Abnormal)  Collected:  06/05/18 1616    Specimen:  Blood Updated:  06/05/18 1625     Glucose 410 (H) mg/dL     Narrative:       Meter: [id number] : [id number] Sanjuanita Retana    CBC & Differential [[id number]] Collected:  06/05/18 1246    Specimen:  Blood Updated:  06/05/18 1615    Narrative:       The following orders were created for panel order CBC & Differential.  Procedure                               Abnormality         Status                     ---------                               -----------         ------                     Scan Slide[[id number]]                                       Final result               CBC Auto Differential[[id number]]        Abnormal            Final result                 Please view results for these tests on the individual orders.    CBC Auto Differential [[id number]]  (Abnormal) Collected:  06/05/18 1246    Specimen:  Blood Updated:  06/05/18 1615     WBC 2.41 (C) 10*3/mm3      RBC 4.45 (L) 10*6/mm3      Hemoglobin 13.3 (L) g/dL      Hematocrit 39.5 (L) %      MCV 88.8 fL      MCH 29.9 pg      MCHC 33.7 g/dL      RDW 14.5 %      RDW-SD 46.9 fl      MPV -- fL      Comment: Unable to calculate.         Platelets 35 (C) 10*3/mm3      Neutrophil % 66.4 %      Lymphocyte % 22.4 %      Monocyte % 8.3 %      Eosinophil % 2.5 %      Basophil % 0.4 %      Immature Grans % 0.0 %      Neutrophils, Absolute 1.60 10*3/mm3      Lymphocytes, Absolute 0.54 (L) 10*3/mm3      Monocytes, Absolute 0.20 10*3/mm3      Eosinophils, Absolute 0.06 10*3/mm3      Basophils, Absolute 0.01 10*3/mm3      Immature Grans, Absolute 0.00 10*3/mm3     Scan Slide [[id number]] Collected:  06/05/18 1246    Specimen:  Blood Updated:  06/05/18 1615     Poikilocytes Slight/1+     Platelet Estimate Decreased    Hemoglobin A1c [[id number]]  (Abnormal) Collected:  06/05/18 1249    Specimen:  Blood Updated:  06/05/18 1412     Hemoglobin A1C 10.40 (H) %     Lactic Acid, Plasma [[id number]]  (Abnormal) Collected:  06/05/18 1246     Specimen:  Blood Updated:  06/05/18 1357     Lactate 2.1 (C) mmol/L     C-reactive Protein [[id number]]  (Normal) Collected:  06/05/18 1246    Specimen:  Blood Updated:  06/05/18 1334     C-Reactive Protein <0.50 mg/dL     Comprehensive Metabolic Panel [[id number]]  (Abnormal) Collected:  06/05/18 1246    Specimen:  Blood Updated:  06/05/18 1329     Glucose 397 (H) mg/dL      BUN 11 mg/dL      Creatinine 0.73 mg/dL      Sodium 133 (L) mmol/L      Potassium 4.2 mmol/L      Chloride 102 mmol/L      CO2 27.3 mmol/L      Calcium 9.0 mg/dL      Total Protein 6.5 g/dL      Albumin 3.10 (L) g/dL      ALT (SGPT) 50 (H) U/L      AST (SGOT) 51 (H) U/L      Alkaline Phosphatase 138 (H) U/L      Comment: Note New Reference Ranges        Total Bilirubin 0.8 mg/dL      eGFR Non African Amer 112 mL/min/1.73      Globulin 3.4 gm/dL      A/G Ratio 0.9 (L) g/dL      BUN/Creatinine Ratio 15.1     Anion Gap 3.7 mmol/L     Osmolality, Calculated [[id number]]  (Normal) Collected:  06/05/18 1246    Specimen:  Blood Updated:  06/05/18 1329     Osmolality Calc 282.4 mOsm/kg             Imaging Results (last 72 hours)     Procedure Component Value Units Date/Time    US Abdomen Complete [[id number]] Collected:  06/07/18 0951     Updated:  06/07/18 0955    Narrative:       EXAMINATION: US ABDOMEN COMPLETE-         CLINICAL INDICATION:     chronic hep b/c and pancytopenia, evaluate for  liver cirrhosis/splenomegaly     TECHNIQUE: Multiplanar gray scale ultrasound of the abdomen.      COMPARISON: NONE      FINDINGS:   Visualized pancreas is poorly assessed due to bowel gas shadowing..   Cholecystectomy.  The common bile duct measures 4.4 mm and is within normal limits. .  Abnormal appearance of the liver. Markedly heterogeneous echotexture is  noted with with nodular margins indicative of cirrhosis. No perihepatic  ascites identified. No focal liver lesion.  Spleen is     enlarged measuring 15.3 cm in length without focal splenic  abnormality.   The  RIGHT kidney measures 10.4 cm  in length without mass,  hydronephrosis, or shadowing stone.   The LEFT kidney measures 12.3 cm in length without mass, hydronephrosis,  or shadowing stone.   No ascites demonstrated.   IVC shows patency.  There is normal directional flow within the portal  vein.  Aorta assessment limited due to obscuration from bowel gas artifact.       Impression:       1. Heterogeneous and abnormal appearance of the liver with changes of  cirrhosis noted.  2. Splenomegaly.  3. Other nonacute findings as above.      This report was finalized on 6/7/2018 9:53 AM by Dr. Alexsander Son MD.             Assessment & Plan:  Isaias Lang is a very pleasant 53 y.o. male with     1.  Pancytopenia:   -Likely multifactorial and secondary to chronic hepatitis B and C (untreated) and liver cirrhosis largely contributing which leads to splenic sequestration and decreased thrombopoietin production. Patient's infectious process/antibitoics/inflammation could also be acutely contributing to worsening counts.   - B12 and folate are replete.   - Iron studies/ferritin were suggestive of Iron deficiency (further discussed below).  - Abdominal US was significant for cirrhosis/splenomegaly.  -If patient continues to have worsening counts or does not improve, we can continue to follow up in our clinic as an outpatient.   - Recommend transfusion support. Would transfuse platelets for platelet count <50,000 prior to procedures or any active bleeding. Otherwise, would transfuse for platelet count 10-20,000.   - Would hold ASA and NSAIDs if platelet count is <50k      2. Coagulopathy  - This is likely due to his underlying cirrhosis as well as possible chronic DIC (low haptoglobin, elevated LDH, low fibrinogen) which can be seen in cirrhotic patients.   - At present he is hemodynamically stable with stable hemoglobin  - Fibrinogen low, if patient does have bleeding episode would recheck fibrinogen and if <150 will need  "cryoprecipitate.    3. Chronic Hep B and C/? Liver lesion:  - Previously followed by Dr. Kelley, will need to follow up after discharged for treatment/further management   - Patient is unsure of possible history of liver mass. Most recent US obtained here does not show any evidence of liver masses per radiology report.  - He was to go to  for further evaluation. Unsure if this was for transplant evaluation vs hepatitis treatment vs questionable lesion. He was again strongly encouraged to follow with them.    4. Iron deficiency  - He was agreeable to starting Ferrous Sulfate 325mg. Therefore, started once nightly. If tolerating, please increase this to three times daily. Patient is worried about worsening constipation. Therefore, added Senokot and Lactulose as prn orders.  - Recommend GI referral for outpatient endoscopic evaluation for CORINE.      5. Constipation  -Continue Senokot and Lactulose prn.      Thank you so much for allowing us to participate in the care of Mr. Lang. Will sign off, but will be happy to follow up as needed.        NOHEMI Wells   18  9:43 AM        Electronically signed by NOHEMI Wells at 2018 10:01 AM     Ankit Barnes MD at 2018  9:18 AM          INPATIENT PSYCHIATRIC PROGRESS NOTE    Name:  Isaias Lang  :  1965  MRN:  [id number]  Visit Number:  [id number]  Length of stay:  7    Behavioral Health Treatment Plan and Problem List: I have reviewed and approved the Behavioral Health Treatment Plan and Problem list.    SUBJECTIVE  CC: \"Not sleeping\".     INTERVAL HISTORY: Moderately Depressed but not SI/SP, precautions discontinued last week, not feeling hopeless. Awaiting [facility] placement. No psychotic symptoms.     Appreciate hospitalist ongoing involvement with this case.     Depression rating 5/10  Anxiety rating 2/10  Sleep: Intermittent insomnia, appears to have slept perhaps 4-5 hours         Review " of Systems   Respiratory: Negative.    Cardiovascular: Negative.    Gastrointestinal: Positive for constipation.   Musculoskeletal: Positive for back pain.         OBJECTIVE    Temp:  [97.2 °F (36.2 °C)-98.2 °F (36.8 °C)] 97.2 °F (36.2 °C)  Heart Rate:  [] 101  Resp:  [18-20] 18  BP: ()/(46-78) 96/46    MENTAL STATUS EXAM:      Appearance:Casually dressed, good hygeine.   Cooperation:Cooperative  Psychomotor: No psychomotor agitation/retardation, No EPS, No motor tics  Speech-normal rate, amount.   Mood/Affect: Depressed  Thought Processes: linear, logical, and goal directed  Thought Content: negativistic   Hallucination(s): none  Hopelessness: No  Optimistic:minimally  Suicidal Thoughts:  none  Suicidal Plan/Intent: none  Homicidal Thoughts:  absent  Orientation: oriented x 3  Memory: recent intact    Lab Results (last 24 hours)     Procedure Component Value Units Date/Time    Peripheral Blood Smear [[id number]] Collected:  06/07/18 0719    Specimen:  Blood Updated:  06/11/18 0839     Performed by: Mena Fleming     Pathologist Interpretation See scanned report    POC Glucose Once [[id number]]  (Abnormal) Collected:  06/11/18 0648    Specimen:  Blood Updated:  06/11/18 0657     Glucose 276 (H) mg/dL     Narrative:       Meter: [id number] : [id number] Lorenza Nelson    Osmolality, Calculated [[id number]]  (Normal) Collected:  06/11/18 0505    Specimen:  Blood Updated:  06/11/18 0636     Osmolality Calc 275.6 mOsm/kg     Comprehensive Metabolic Panel [[id number]]  (Abnormal) Collected:  06/11/18 0505    Specimen:  Blood Updated:  06/11/18 0636     Glucose 356 (H) mg/dL      BUN 14 mg/dL      Creatinine 0.67 mg/dL      Sodium 130 (L) mmol/L      Potassium 4.0 mmol/L      Chloride 104 mmol/L      CO2 24.4 mmol/L      Calcium 8.6 mg/dL      Total Protein 5.8 (L) g/dL      Albumin 2.80 (L) g/dL      ALT (SGPT) 42 U/L      AST (SGOT) 45 (H) U/L      Alkaline Phosphatase 137 (H) U/L      Comment: Note New  Reference Ranges        Total Bilirubin 0.6 mg/dL      eGFR Non African Amer 124 mL/min/1.73      Globulin 3.0 gm/dL      A/G Ratio 0.9 (L) g/dL      BUN/Creatinine Ratio 20.9     Anion Gap 1.6 (L) mmol/L     CBC & Differential [[id number]] Collected:  06/11/18 0505    Specimen:  Blood Updated:  06/11/18 0554    Narrative:       The following orders were created for panel order CBC & Differential.  Procedure                               Abnormality         Status                     ---------                               -----------         ------                     CBC Auto Differential[[id number]]        Abnormal            Final result                 Please view results for these tests on the individual orders.    CBC Auto Differential [[id number]]  (Abnormal) Collected:  06/11/18 0505    Specimen:  Blood Updated:  06/11/18 0554     WBC 2.63 (L) 10*3/mm3      RBC 3.90 (L) 10*6/mm3      Hemoglobin 11.7 (L) g/dL      Hematocrit 34.8 (L) %      MCV 89.2 fL      MCH 30.0 pg      MCHC 33.6 g/dL      RDW 14.8 (H) %      RDW-SD 47.3 fl      MPV 12.3 (H) fL      Platelets 51 (L) 10*3/mm3      Neutrophil % 67.3 %      Lymphocyte % 23.2 %      Monocyte % 6.8 %      Eosinophil % 1.9 %      Basophil % 0.4 %      Immature Grans % 0.4 %      Neutrophils, Absolute 1.77 10*3/mm3      Lymphocytes, Absolute 0.61 (L) 10*3/mm3      Monocytes, Absolute 0.18 10*3/mm3      Eosinophils, Absolute 0.05 10*3/mm3      Basophils, Absolute 0.01 10*3/mm3      Immature Grans, Absolute 0.01 10*3/mm3     POC Glucose Once [[id number]]  (Abnormal) Collected:  06/10/18 1955    Specimen:  Blood Updated:  06/10/18 2010     Glucose 237 (H) mg/dL     Narrative:       Meter: [id number] : [id number] Lorenza Nelson    Blood Culture - Blood, Arm, Left [[id number]]  (Normal) Collected:  06/05/18 1732    Specimen:  Blood from Arm, Left Updated:  06/10/18 1800     Blood Culture No growth at 5 days    Comprehensive Metabolic Panel [[id number]]  (Abnormal)  Collected:  06/10/18 1618    Specimen:  Blood Updated:  06/10/18 1652     Glucose 235 (H) mg/dL      BUN 15 mg/dL      Creatinine 0.70 mg/dL      Sodium 135 mmol/L      Potassium 4.4 mmol/L      Chloride 107 mmol/L      CO2 25.7 mmol/L      Calcium 8.7 mg/dL      Total Protein 6.2 g/dL      Albumin 2.90 (L) g/dL      ALT (SGPT) 46 (H) U/L      AST (SGOT) 52 (H) U/L      Alkaline Phosphatase 124 U/L      Comment: Note New Reference Ranges        Total Bilirubin 0.7 mg/dL      eGFR Non African Amer 118 mL/min/1.73      Globulin 3.3 gm/dL      A/G Ratio 0.9 (L) g/dL      BUN/Creatinine Ratio 21.4     Anion Gap 2.3 (L) mmol/L     Osmolality, Calculated [[id number]]  (Normal) Collected:  06/10/18 1618    Specimen:  Blood Updated:  06/10/18 1652     Osmolality Calc 278.5 mOsm/kg     Blood Culture - Blood, Arm, Left [[id number]]  (Normal) Collected:  06/05/18 1557    Specimen:  Blood from Arm, Left Updated:  06/10/18 1645     Blood Culture No growth at 5 days    CBC & Differential [[id number]] Collected:  06/10/18 1618    Specimen:  Blood Updated:  06/10/18 1633    Narrative:       The following orders were created for panel order CBC & Differential.  Procedure                               Abnormality         Status                     ---------                               -----------         ------                     CBC Auto Differential[[id number]]        Abnormal            Final result                 Please view results for these tests on the individual orders.    CBC Auto Differential [[id number]]  (Abnormal) Collected:  06/10/18 1618    Specimen:  Blood Updated:  06/10/18 1633     WBC 3.08 (L) 10*3/mm3      RBC 4.08 (L) 10*6/mm3      Hemoglobin 12.6 (L) g/dL      Hematocrit 36.6 (L) %      MCV 89.7 fL      MCH 30.9 pg      MCHC 34.4 g/dL      RDW 14.9 (H) %      RDW-SD 48.1 fl      MPV 12.5 (H) fL      Platelets 53 (L) 10*3/mm3      Neutrophil % 72.5 (H) %      Lymphocyte % 17.5 (L) %      Monocyte % 7.5 %       Eosinophil % 1.9 %      Basophil % 0.6 %      Immature Grans % 0.0 %      Neutrophils, Absolute 2.23 10*3/mm3      Lymphocytes, Absolute 0.54 (L) 10*3/mm3      Monocytes, Absolute 0.23 10*3/mm3      Eosinophils, Absolute 0.06 10*3/mm3      Basophils, Absolute 0.02 10*3/mm3      Immature Grans, Absolute 0.00 10*3/mm3     POC Glucose Once [[id number]]  (Abnormal) Collected:  06/10/18 1617    Specimen:  Blood Updated:  06/10/18 1624     Glucose 228 (H) mg/dL     Narrative:       Meter: [id number] : [id number] Zheng Lois    POC Glucose Once [[id number]]  (Abnormal) Collected:  06/10/18 1130    Specimen:  Blood Updated:  06/10/18 1140     Glucose 158 (H) mg/dL     Narrative:       Meter: [id number] : [id number] Zheng Lois           Imaging Results (last 24 hours)     Procedure Component Value Units Date/Time    XR Abdomen KUB [[id number]] Collected:  06/10/18 0939     Updated:  06/10/18 1008    Narrative:       EXAMINATION: XR ABDOMEN KUB-      CLINICAL INDICATION:Flank pain, constipation        COMPARISON: None      TECHNIQUE: KUB     FINDINGS:   Marked stool throughout consistent with marked constipation. No bowel  obstruction identified. No air-fluid levels. Cholecystectomy clips. Bony  structures within normal limits.       Impression:       Marked constipation.     This report was finalized on 6/10/2018 9:39 AM by Dr. Alexsander Son MD.              ECG/EMG Results (most recent)     None           ALLERGIES: Robitussin dm [dextromethorphan-guaifenesin]; Tizanidine; Metformin; Sulfa antibiotics; Contrast dye; and Tramadol      Current Facility-Administered Medications:   •  albuterol (PROVENTIL HFA;VENTOLIN HFA) inhaler 2 puff, 2 puff, Inhalation, Q4H PRN, Ankit Barnes MD, 2 puff at 06/11/18 0049  •  albuterol (PROVENTIL) nebulizer solution 0.083% 2.5 mg/3mL, 2.5 mg, Nebulization, Q6H PRN, Sean Barnes MD  •  aluminum-magnesium hydroxide-simethicone (MAALOX MAX) 400-400-40 MG/5ML suspension 15  mL, 15 mL, Oral, Q6H PRN, Orlando Dinh MD  •  atorvastatin (LIPITOR) tablet 20 mg, 20 mg, Oral, Daily, Ankit Barnes MD, 20 mg at 06/11/18 0843  •  benzonatate (TESSALON) capsule 100 mg, 100 mg, Oral, TID PRN, Orlando Dinh MD  •  cholecalciferol (VITAMIN D3) capsule 50,000 Units, 50,000 Units, Oral, Weekly, Ankit Barnes MD, 50,000 Units at 06/06/18 0814  •  clindamycin (CLEOCIN) capsule 300 mg, 300 mg, Oral, TID, Dina aHrry MD, 300 mg at 06/11/18 0843  •  dextrose (D50W) solution 25 g, 25 g, Intravenous, Q15 Min PRN, Orlando Dinh MD  •  dextrose (GLUTOSE) oral gel 15 g, 15 g, Oral, Q15 Min PRN, Orlando Dinh MD  •  dicyclomine (BENTYL) capsule 10 mg, 10 mg, Oral, BID, Ankit Barnes MD, 10 mg at 06/11/18 0843  •  doxepin (SINEquan) capsule 25 mg, 25 mg, Oral, Nightly, Sean Barnes MD, 25 mg at 06/10/18 2111  •  escitalopram (LEXAPRO) tablet 10 mg, 10 mg, Oral, Daily, Sean Barnes MD, 10 mg at 06/11/18 0842  •  ferrous sulfate tablet 325 mg, 325 mg, Oral, Daily With Dinner, Martha Hidalgo MD, 325 mg at 06/10/18 1701  •  glucagon (human recombinant) (GLUCAGEN DIAGNOSTIC) injection 1 mg, 1 mg, Subcutaneous, PRN, Orlando Dinh MD  •  hydrOXYzine (ATARAX) tablet 50 mg, 50 mg, Oral, Q6H PRN, Orlando Dinh MD, 50 mg at 06/08/18 2025  •  insulin aspart (novoLOG) injection 0-14 Units, 0-14 Units, Subcutaneous, 4x Daily AC & at Bedtime, PATRICK Solomon, 8 Units at 06/11/18 0748  •  insulin aspart (novoLOG) injection 10 Units, 10 Units, Subcutaneous, TID With Meals, PATRICK Pantoja, 10 Units at 06/11/18 0748  •  insulin detemir (LEVEMIR) injection 40 Units, 40 Units, Subcutaneous, Nightly, PATRICK Pantoja, 40 Units at 06/10/18 2003  •  ipratropium (ATROVENT) nebulizer solution 0.5 mg, 0.5 mg, Nebulization, Q6H PRN, PATRICK Pantoja, 0.5 mg at 06/09/18 0720  •  lactulose (CHRONULAC) 10 GM/15ML solution 30 g, 30 g, Oral, TID PRN, Martha  MD Rocky  •  loperamide (IMODIUM) capsule 2 mg, 2 mg, Oral, 4x Daily PRN, Orlando Dinh MD  •  magnesium hydroxide (MILK OF MAGNESIA) suspension 2400 mg/10mL 10 mL, 10 mL, Oral, Daily PRN, Orlando Dinh MD, 10 mL at 06/07/18 2118  •  metoprolol tartrate (LOPRESSOR) tablet 12.5 mg, 12.5 mg, Oral, Daily, PATRICK Pantoja, 12.5 mg at 06/09/18 0901  •  multivitamin (DAILY SAVAGE) tablet 1 tablet, 1 tablet, Oral, Daily, PATRICK Solomon, 1 tablet at 06/11/18 0842  •  mupirocin (BACTROBAN) 2 % ointment, , Topical, Q12H, PATRICK Solomon  •  nicotine (NICODERM CQ) 21 MG/24HR patch 1 patch, 1 patch, Transdermal, Q24H, Orlando Dinh MD  •  nitroglycerin (NITROSTAT) SL tablet 0.4 mg, 0.4 mg, Sublingual, Q5 Min PRN, Ankit Barnes MD  •  ondansetron (ZOFRAN) tablet 4 mg, 4 mg, Oral, Q6H PRN, Orlando Dinh MD, 4 mg at 06/06/18 1634  •  pantoprazole (PROTONIX) EC tablet 40 mg, 40 mg, Oral, Daily, Ankit Barnes MD, 40 mg at 06/11/18 0843  •  polyethylene glycol (MIRALAX) powder 17 g, 17 g, Oral, BID, Ankit Barnes MD, 17 g at 06/11/18 0842  •  potassium chloride (K-DUR,KLOR-CON) CR tablet 20 mEq, 20 mEq, Oral, Daily, Ankit Barnes MD, 20 mEq at 06/11/18 0843  •  senna (SENOKOT) tablet 17.2 mg, 2 tablet, Oral, BID PRN, Martha Hidalgo MD, 17.2 mg at 06/08/18 1711  •  sodium chloride (OCEAN) nasal spray 2 spray, 2 spray, Each Nare, PRN, Orlando Dinh MD  •  tetanus immune globulin (HYPERTET) injection 250 Units, 250 Units, Intramuscular, Once, Dina Harry MD         ASSESSMENT & PLAN    Patient Active Problem List   Diagnosis Code     • Severe episode of recurrent major depressive disorder, without psychotic features  Plan:  Wellbutrin and will contiunue with supportive's individual and group psychotherapeutic efforts      F33.2     Depression with suicidal ideation  Plan: Patient no longer expressing suicidal intent , have discontinued suicidal precautions        F32.9, R45.851     Uncomplicated opioid dependence  Plan: Incorporate into the ongoing psychotherapeutic effort as well as disposition plan. F11.20           • Type 1 diabetes mellitus  Plan: We'll believe following with the medical consultants and their recommendations E10.9   • 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   Plan:  treat symptomatically attempting to avoid opiates, will also need to Minimize NSAID due to the thrombocytopenia  G89.21   •       • Gastroesophageal reflux disease with esophagitis  Plan: Continue Protonix  K21.0   •   B18.1   • Chronic hepatitis C without hepatic coma  Plan: Monitor liver status, question possible factor with the patient's leukocytosis and thrombocytopenia   Chronic viral hepatitis B without delta agent and without coma plan: Monitor monitor his hepatic status     B18.2   •               • Cellulitis Plan:  Clindamycine, Inflammation resolving  L03.90   •       • Leukocytosis (leucocytosis) Plan: follow oncology's recommendation.         D72.829   • Thrombocytopenia Plan: Follow oncology's recommendation.              Suicide precautions: Suicide precaution Level 3 (q15 min checks)     Behavioral Health Treatment Plan and Problem List: I have reviewed and approved the Behavioral Health Treatment Plan and Problem list.    Clinician:  Ankit Barnes MD  06/11/18  9:18 AM    Dictated utilizing Dragon dictation       Electronically signed by Ankit Barnes MD at 6/11/2018  9:27 AM     Sean Barnes MD at 6/10/2018 11:55 AM                Inpatient Psy Progress Note   Clinician: Sean Barnes MD  Admission Date: 6/4/2018  11:55 AM 06/10/18    Behavioral Health Treatment Plan and Problem List: I have reviewed and approved the Behavioral Health Treatment Plan and Problem list.    Allergies  Allergies   Allergen Reactions   • Robitussin Dm [Dextromethorphan-Guaifenesin] Shortness Of Breath   • Tizanidine Shortness Of Breath   • Metformin Nausea And Vomiting   • Sulfa  "Antibiotics Other (See Comments)     \"I cannot take them, they do something to me.\"   • Contrast Dye Rash   • Tramadol Rash       Hospital Day: 6 days      Assessment completed within view of staff    History  CC: inpatient followup  Interval HPI: Patient seen and evaluated by me.  Chart reviewed. Staff reports that patient has been quite irritable on the unit.    Patient rates  level of depression (subjectively) at a   5/10.  Anxiety   5/10.  Patient tolerating meds okay.  Denies side effects.  Patient being followed by hospitalist team and hematology for pancytopenia, leukopenia, SIRS, thrombocytopenia, insulin dependent DMII, hyponatremia, hypomagnesemia, L flank pain, tacycardia, essential HTN with intermittent low pressures.      Interval Review of Systems:   General ROS: negative for - fever or malaise, \"hurt all over\"  Endocrine ROS: negative for - palpitations  Respiratory ROS: no cough, + occasional shortness of breath, or wheezing  Cardiovascular ROS: no chest pain  + dyspnea on exertion  Gastrointestinal ROS: no abdominal pain,no black or bloody stools.  +stomach cramping    BP 96/54   Pulse 93   Temp 97.8 °F (36.6 °C) (Temporal Artery )   Resp 18   Ht 167.6 cm (66\")   Wt 70.4 kg (155 lb 3.2 oz)   SpO2 94%   BMI 25.05 kg/m²      Mental Status Exam  Mood: irritable  Affect: dysphoric   Thought Processes: linear, logical, and goal directed  Thought Content: negativistic  Hallucinations: no  Suicidal Thoughts: denies  Suicidal Plan/Intent:denies  Hopelesness:Moderate  Homicidal Thoughts:  absent      Medical Decision Making:   Labs:     Lab Results (last 24 hours)     Procedure Component Value Units Date/Time    POC Glucose Once [[id number]]  (Abnormal) Collected:  06/10/18 1130    Specimen:  Blood Updated:  06/10/18 1140     Glucose 158 (H) mg/dL     Narrative:       Meter: [id number] : [id number] Marce Abreu    POC Glucose Once [[id number]]  (Abnormal) Collected:  06/10/18 0647    Specimen:  Blood " Updated:  06/10/18 0656     Glucose 170 (H) mg/dL     Narrative:       Meter: [id number] : [id number] Lorenza Nelson    Comprehensive Metabolic Panel [[id number]]  (Abnormal) Collected:  06/10/18 0457    Specimen:  Blood Updated:  06/10/18 0612     Glucose 193 (H) mg/dL      BUN 13 mg/dL      Creatinine 0.58 mg/dL      Sodium 137 mmol/L      Potassium 3.8 mmol/L      Chloride 105 mmol/L      CO2 27.1 mmol/L      Calcium 8.6 mg/dL      Total Protein 5.6 (L) g/dL      Albumin 2.70 (L) g/dL      ALT (SGPT) 43 U/L      AST (SGOT) 45 (H) U/L      Alkaline Phosphatase 134 (H) U/L      Comment: Note New Reference Ranges        Total Bilirubin 0.6 mg/dL      eGFR Non African Amer 147 mL/min/1.73      Globulin 2.9 gm/dL      A/G Ratio 0.9 (L) g/dL      BUN/Creatinine Ratio 22.4     Anion Gap 4.9 mmol/L     Osmolality, Calculated [[id number]]  (Normal) Collected:  06/10/18 0457    Specimen:  Blood Updated:  06/10/18 0612     Osmolality Calc 279.2 mOsm/kg     CBC & Differential [[id number]] Collected:  06/10/18 0457    Specimen:  Blood Updated:  06/10/18 0543    Narrative:       The following orders were created for panel order CBC & Differential.  Procedure                               Abnormality         Status                     ---------                               -----------         ------                     CBC Auto Differential[[id number]]        Abnormal            Final result                 Please view results for these tests on the individual orders.    CBC Auto Differential [[id number]]  (Abnormal) Collected:  06/10/18 0457    Specimen:  Blood Updated:  06/10/18 0543     WBC 2.80 (L) 10*3/mm3      RBC 3.81 (L) 10*6/mm3      Hemoglobin 11.5 (L) g/dL      Hematocrit 34.1 (L) %      MCV 89.5 fL      MCH 30.2 pg      MCHC 33.7 g/dL      RDW 14.8 (H) %      RDW-SD 47.3 fl      MPV 12.5 (H) fL      Platelets 50 (L) 10*3/mm3      Neutrophil % 64.6 %      Lymphocyte % 23.2 %      Monocyte % 8.2 %      Eosinophil % 2.9  %      Basophil % 0.7 %      Immature Grans % 0.4 %      Neutrophils, Absolute 1.81 10*3/mm3      Lymphocytes, Absolute 0.65 (L) 10*3/mm3      Monocytes, Absolute 0.23 10*3/mm3      Eosinophils, Absolute 0.08 10*3/mm3      Basophils, Absolute 0.02 10*3/mm3      Immature Grans, Absolute 0.01 10*3/mm3     POC Glucose Once [[id number]]  (Abnormal) Collected:  06/09/18 1956    Specimen:  Blood Updated:  06/09/18 2005     Glucose 286 (H) mg/dL     Narrative:       Meter: [id number] : [id number] Lorenza Nelson    Occult Blood X 1, Stool - Stool, Per Rectum [[id number]]  (Normal) Collected:  06/09/18 1748    Specimen:  Stool from Per Rectum Updated:  06/09/18 1812     Fecal Occult Blood Negative    Blood Culture - Blood, Arm, Left [[id number]]  (Normal) Collected:  06/05/18 1732    Specimen:  Blood from Arm, Left Updated:  06/09/18 1800     Blood Culture No growth at 4 days    Blood Culture - Blood, Arm, Left [[id number]]  (Normal) Collected:  06/05/18 1557    Specimen:  Blood from Arm, Left Updated:  06/09/18 1645     Blood Culture No growth at 4 days    POC Glucose Once [[id number]]  (Abnormal) Collected:  06/09/18 1610    Specimen:  Blood Updated:  06/09/18 1630     Glucose 245 (H) mg/dL     Narrative:       Meter: [id number] : [id number] Lopez Nikia    Comprehensive Metabolic Panel [[id number]]  (Abnormal) Collected:  06/09/18 1244    Specimen:  Blood Updated:  06/09/18 1327     Glucose 255 (H) mg/dL      BUN 14 mg/dL      Creatinine 0.63 mg/dL      Sodium 133 (L) mmol/L      Potassium 4.2 mmol/L      Chloride 105 mmol/L      CO2 24.1 (L) mmol/L      Calcium 8.7 mg/dL      Total Protein 5.5 (L) g/dL      Albumin 2.70 (L) g/dL      ALT (SGPT) 42 U/L      AST (SGOT) 46 (H) U/L      Alkaline Phosphatase 128 U/L      Comment: Note New Reference Ranges        Total Bilirubin 0.7 mg/dL      eGFR Non African Amer 133 mL/min/1.73      Globulin 2.8 gm/dL      A/G Ratio 1.0 (L) g/dL      BUN/Creatinine Ratio 22.2     Anion  Gap 3.9 mmol/L     Osmolality, Calculated [[id number]]  (Normal) Collected:  06/09/18 1244    Specimen:  Blood Updated:  06/09/18 1327     Osmolality Calc 275.5 mOsm/kg     CBC & Differential [[id number]] Collected:  06/09/18 1137    Specimen:  Blood Updated:  06/09/18 1321    Narrative:       The following orders were created for panel order CBC & Differential.  Procedure                               Abnormality         Status                     ---------                               -----------         ------                     Scan Slide[[id number]]                                       Final result               CBC Auto Differential[[id number]]        Abnormal            Final result                 Please view results for these tests on the individual orders.    Scan Slide [[id number]] Collected:  06/09/18 1137    Specimen:  Blood Updated:  06/09/18 1321     Anisocytosis Slight/1+     Poikilocytes Slight/1+     Platelet Estimate Decreased    POC Glucose Once [[id number]]  (Abnormal) Collected:  06/09/18 1139    Specimen:  Blood Updated:  06/09/18 1204     Glucose 264 (H) mg/dL     Narrative:       Meter: [id number] : [id number] John Montejo    CBC Auto Differential [[id number]]  (Abnormal) Collected:  06/09/18 1137    Specimen:  Blood Updated:  06/09/18 1201     WBC 2.80 (L) 10*3/mm3      RBC 3.98 (L) 10*6/mm3      Hemoglobin 11.8 (L) g/dL      Hematocrit 36.6 (L) %      MCV 92.0 fL      MCH 29.6 pg      MCHC 32.2 (L) g/dL      RDW 15.0 (H) %      RDW-SD 50.5 fl      MPV 12.4 (H) fL      Platelets 43 (C) 10*3/mm3      Neutrophil % 61.5 %      Lymphocyte % 23.9 %      Monocyte % 12.1 (H) %      Eosinophil % 2.1 %      Basophil % 0.4 %      Immature Grans % 0.0 %      Neutrophils, Absolute 1.72 10*3/mm3      Lymphocytes, Absolute 0.67 (L) 10*3/mm3      Monocytes, Absolute 0.34 10*3/mm3      Eosinophils, Absolute 0.06 10*3/mm3      Basophils, Absolute 0.01 10*3/mm3      Immature Grans, Absolute 0.00 10*3/mm3       nRBC 0.0 /100 WBC             Radiology:     Imaging Results (last 24 hours)     Procedure Component Value Units Date/Time    XR Abdomen KUB [[id number]] Collected:  06/10/18 0939     Updated:  06/10/18 1008    Narrative:       EXAMINATION: XR ABDOMEN KUB-      CLINICAL INDICATION:Flank pain, constipation        COMPARISON: None      TECHNIQUE: KUB     FINDINGS:   Marked stool throughout consistent with marked constipation. No bowel  obstruction identified. No air-fluid levels. Cholecystectomy clips. Bony  structures within normal limits.       Impression:       Marked constipation.     This report was finalized on 6/10/2018 9:39 AM by Dr. Alexsander Son MD.               EKG:     ECG/EMG Results (most recent)     None           Medications:     atorvastatin 20 mg Oral Daily   cholecalciferol 50,000 Units Oral Weekly   clindamycin 300 mg Oral TID   dicyclomine 10 mg Oral BID   doxepin 10 mg Oral Nightly   escitalopram 10 mg Oral Daily   ferrous sulfate 325 mg Oral Daily With Dinner   furosemide 20 mg Oral Daily   insulin aspart 0-14 Units Subcutaneous 4x Daily AC & at Bedtime   insulin aspart 10 Units Subcutaneous TID With Meals   insulin detemir 40 Units Subcutaneous Nightly   metoprolol tartrate 12.5 mg Oral Daily   multivitamin 1 tablet Oral Daily   mupirocin  Topical Q12H   nicotine 1 patch Transdermal Q24H   pantoprazole 40 mg Oral Daily   polyethylene glycol 17 g Oral BID   potassium chloride 20 mEq Oral Daily   tetanus immune globulin 250 Units Intramuscular Once          All medications reviewed.      Assessment and Plan:      Diagnosis Code     • Severe episode of recurrent major depressive disorder, without psychotic features  Plan: Stopped Wellbutrin yesterday and switched to Lexapro.     F33.2                 Uncomplicated opioid dependence  Plan: Incorporate into the ongoing psychotherapeutic effort as well as disposition plan. F11.20           • Type 2 diabetes mellitus  Plan: Glucose improving. Now  in the mid to high 200s. Currently receiving moderate to high dose SSI AC/HS, scheduled novolog 8 units TID with meals, and levemir 40 units at night. Increase scheduled dose to 10 units TID with meals.We'll continue  follow with the medical consultants and their recommendations:  E10.9   • 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   Plan:  treat symptomatically attempting to avoid opiates, will also need to Minimize NSAID due to the thrombocytopenia  G89.21   •       • Gastroesophageal reflux disease with esophagitis  Plan: Continue Protonix  K21.0   •   B18.1   • Chronic hepatitis C without hepatic coma  Plan: Monitor liver status, question possible factor with the patient's leukocytosis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

## 2018-06-12 LAB
ALBUMIN SERPL-MCNC: 3 G/DL (ref 3.5–5)
ALBUMIN/GLOB SERPL: 1.1 G/DL (ref 1.5–2.5)
ALP SERPL-CCNC: 127 U/L (ref 40–129)
ALT SERPL W P-5'-P-CCNC: 39 U/L (ref 10–44)
ANION GAP SERPL CALCULATED.3IONS-SCNC: 6.2 MMOL/L (ref 3.6–11.2)
AST SERPL-CCNC: 48 U/L (ref 10–34)
BASOPHILS # BLD AUTO: 0.02 10*3/MM3 (ref 0–0.3)
BASOPHILS NFR BLD AUTO: 0.7 % (ref 0–2)
BILIRUB SERPL-MCNC: 0.6 MG/DL (ref 0.2–1.8)
BUN BLD-MCNC: 18 MG/DL (ref 7–21)
BUN/CREAT SERPL: 24.3 (ref 7–25)
CALCIUM SPEC-SCNC: 8.3 MG/DL (ref 7.7–10)
CHLORIDE SERPL-SCNC: 104 MMOL/L (ref 99–112)
CO2 SERPL-SCNC: 25.8 MMOL/L (ref 24.3–31.9)
CREAT BLD-MCNC: 0.74 MG/DL (ref 0.43–1.29)
DEPRECATED RDW RBC AUTO: 48.1 FL (ref 37–54)
EOSINOPHIL # BLD AUTO: 0.06 10*3/MM3 (ref 0–0.7)
EOSINOPHIL NFR BLD AUTO: 2.2 % (ref 0–5)
ERYTHROCYTE [DISTWIDTH] IN BLOOD BY AUTOMATED COUNT: 14.9 % (ref 11.5–14.5)
GFR SERPL CREATININE-BSD FRML MDRD: 111 ML/MIN/1.73
GLOBULIN UR ELPH-MCNC: 2.8 GM/DL
GLUCOSE BLD-MCNC: 287 MG/DL (ref 70–110)
GLUCOSE BLDC GLUCOMTR-MCNC: 126 MG/DL (ref 70–130)
GLUCOSE BLDC GLUCOMTR-MCNC: 234 MG/DL (ref 70–130)
GLUCOSE BLDC GLUCOMTR-MCNC: 273 MG/DL (ref 70–130)
GLUCOSE BLDC GLUCOMTR-MCNC: 294 MG/DL (ref 70–130)
HCT VFR BLD AUTO: 38.3 % (ref 42–52)
HGB BLD-MCNC: 12.6 G/DL (ref 14–18)
IMM GRANULOCYTES # BLD: 0 10*3/MM3 (ref 0–0.03)
IMM GRANULOCYTES NFR BLD: 0 % (ref 0–0.5)
LYMPHOCYTES # BLD AUTO: 0.67 10*3/MM3 (ref 1–3)
LYMPHOCYTES NFR BLD AUTO: 24.5 % (ref 21–51)
MCH RBC QN AUTO: 29 PG (ref 27–33)
MCHC RBC AUTO-ENTMCNC: 32.9 G/DL (ref 33–37)
MCV RBC AUTO: 88.2 FL (ref 80–94)
MONOCYTES # BLD AUTO: 0.29 10*3/MM3 (ref 0.1–0.9)
MONOCYTES NFR BLD AUTO: 10.6 % (ref 0–10)
NEUTROPHILS # BLD AUTO: 1.7 10*3/MM3 (ref 1.4–6.5)
NEUTROPHILS NFR BLD AUTO: 62 % (ref 30–70)
OSMOLALITY SERPL CALC.SUM OF ELEC: 284.3 MOSM/KG (ref 273–305)
PLATELET # BLD AUTO: 58 10*3/MM3 (ref 130–400)
PMV BLD AUTO: 12.5 FL (ref 6–10)
POTASSIUM BLD-SCNC: 4.3 MMOL/L (ref 3.5–5.3)
PROT SERPL-MCNC: 5.8 G/DL (ref 6–8)
RBC # BLD AUTO: 4.34 10*6/MM3 (ref 4.7–6.1)
SODIUM BLD-SCNC: 136 MMOL/L (ref 135–153)
WBC NRBC COR # BLD: 2.74 10*3/MM3 (ref 4.5–12.5)

## 2018-06-12 PROCEDURE — 85025 COMPLETE CBC W/AUTO DIFF WBC: CPT | Performed by: PHYSICIAN ASSISTANT

## 2018-06-12 PROCEDURE — 63710000001 INSULIN ASPART PER 5 UNITS: Performed by: PHYSICIAN ASSISTANT

## 2018-06-12 PROCEDURE — 82962 GLUCOSE BLOOD TEST: CPT

## 2018-06-12 PROCEDURE — 80053 COMPREHEN METABOLIC PANEL: CPT | Performed by: PHYSICIAN ASSISTANT

## 2018-06-12 PROCEDURE — 94799 UNLISTED PULMONARY SVC/PX: CPT

## 2018-06-12 PROCEDURE — 99232 SBSQ HOSP IP/OBS MODERATE 35: CPT | Performed by: PSYCHIATRY & NEUROLOGY

## 2018-06-12 PROCEDURE — 63710000001 INSULIN DETEMIR PER 5 UNITS: Performed by: PHYSICIAN ASSISTANT

## 2018-06-12 RX ORDER — MIRTAZAPINE 15 MG/1
15 TABLET, FILM COATED ORAL NIGHTLY
Status: DISCONTINUED | OUTPATIENT
Start: 2018-06-12 | End: 2018-06-13 | Stop reason: HOSPADM

## 2018-06-12 RX ORDER — HYDROXYZINE 50 MG/1
50 TABLET, FILM COATED ORAL EVERY 6 HOURS PRN
Qty: 90 TABLET | Refills: 0 | Status: ON HOLD | OUTPATIENT
Start: 2018-06-12 | End: 2018-07-27

## 2018-06-12 RX ORDER — MIRTAZAPINE 30 MG/1
30 TABLET, FILM COATED ORAL NIGHTLY
Qty: 30 TABLET | Refills: 0 | Status: ON HOLD | OUTPATIENT
Start: 2018-06-12 | End: 2018-07-27

## 2018-06-12 RX ADMIN — FERROUS SULFATE TAB 325 MG (65 MG ELEMENTAL FE) 325 MG: 325 (65 FE) TAB at 17:13

## 2018-06-12 RX ADMIN — MUPIROCIN: 20 OINTMENT TOPICAL at 09:23

## 2018-06-12 RX ADMIN — LACTULOSE 30 G: 20 SOLUTION ORAL at 09:27

## 2018-06-12 RX ADMIN — INSULIN DETEMIR 40 UNITS: 100 INJECTION, SOLUTION SUBCUTANEOUS at 20:01

## 2018-06-12 RX ADMIN — INSULIN ASPART 8 UNITS: 100 INJECTION, SOLUTION INTRAVENOUS; SUBCUTANEOUS at 20:01

## 2018-06-12 RX ADMIN — ESCITALOPRAM 10 MG: 10 TABLET, FILM COATED ORAL at 09:19

## 2018-06-12 RX ADMIN — DICYCLOMINE HYDROCHLORIDE 10 MG: 10 CAPSULE ORAL at 09:20

## 2018-06-12 RX ADMIN — POLYETHYLENE GLYCOL (3350) 17 G: 17 POWDER, FOR SOLUTION ORAL at 21:16

## 2018-06-12 RX ADMIN — INSULIN ASPART 10 UNITS: 100 INJECTION, SOLUTION INTRAVENOUS; SUBCUTANEOUS at 17:13

## 2018-06-12 RX ADMIN — POTASSIUM CHLORIDE 20 MEQ: 1500 TABLET, EXTENDED RELEASE ORAL at 09:20

## 2018-06-12 RX ADMIN — DICYCLOMINE HYDROCHLORIDE 10 MG: 10 CAPSULE ORAL at 21:17

## 2018-06-12 RX ADMIN — ATORVASTATIN CALCIUM 20 MG: 20 TABLET, FILM COATED ORAL at 09:20

## 2018-06-12 RX ADMIN — INSULIN ASPART 5 UNITS: 100 INJECTION, SOLUTION INTRAVENOUS; SUBCUTANEOUS at 17:13

## 2018-06-12 RX ADMIN — PANTOPRAZOLE SODIUM 40 MG: 40 TABLET, DELAYED RELEASE ORAL at 09:20

## 2018-06-12 RX ADMIN — CLINDAMYCIN HYDROCHLORIDE 300 MG: 150 CAPSULE ORAL at 16:13

## 2018-06-12 RX ADMIN — INSULIN ASPART 10 UNITS: 100 INJECTION, SOLUTION INTRAVENOUS; SUBCUTANEOUS at 09:12

## 2018-06-12 RX ADMIN — Medication 1 TABLET: at 09:19

## 2018-06-12 RX ADMIN — METOPROLOL TARTRATE 12.5 MG: 25 TABLET, FILM COATED ORAL at 09:19

## 2018-06-12 RX ADMIN — CLINDAMYCIN HYDROCHLORIDE 300 MG: 150 CAPSULE ORAL at 09:21

## 2018-06-12 RX ADMIN — INSULIN ASPART 8 UNITS: 100 INJECTION, SOLUTION INTRAVENOUS; SUBCUTANEOUS at 09:12

## 2018-06-12 RX ADMIN — ALBUTEROL SULFATE 2 PUFF: 90 AEROSOL, METERED RESPIRATORY (INHALATION) at 02:54

## 2018-06-12 RX ADMIN — POLYETHYLENE GLYCOL (3350) 17 G: 17 POWDER, FOR SOLUTION ORAL at 09:21

## 2018-06-12 NOTE — PROGRESS NOTES
1600  Met with patient for individual, he reports feeling some better today. We discussed [facility] as a placement options they have accepted him and [facility] was scheduled for 10:00 am tomorrow.Patient asks if he can call and ask some questions about the facility and payment, provided his with the phone number. Assisted patient in contacting Johana at the facility who was able to answer his questions. Patient is agreeable to accept the placement.     Patient denies suicidal thoughts, he rates  Depression and anxiety at 4/10. He reports sleeping during the day and having trouble sleeping at night because of his roommate making noise.     Anticipate discharge tomorrow to [facility] facility in [place]. [facility] is scheduled to pick him up at 10:00

## 2018-06-12 NOTE — PROGRESS NOTES
[facility] Medicine Services  INPATIENT PROGRESS NOTE    Length of Stay: 7  Date of Admission: 6/4/2018  Primary Care Physician: NOHEMI Felipe    Subjective   Chief Complaint: None  HPI: Patient with infected R index finger, and uncontrolled DM.     Review of Systems   Constitutional: Negative.    Eyes: Negative.    Respiratory: Negative.    Cardiovascular: Negative.    Gastrointestinal: Negative.    Endocrine: Negative.    Genitourinary: Negative.    Musculoskeletal: Negative.    Skin: Negative.    Allergic/Immunologic: Negative.    Neurological: Negative.    Hematological: Negative.         All pertinent negatives and positives are as above. All other systems have been reviewed and are negative unless otherwise stated.     Objective    Temp:  [97.2 °F (36.2 °C)] 97.2 °F (36.2 °C)  Heart Rate:  [101-107] 107  Resp:  [18-20] 20  BP: (96)/(46) 96/46  Physical exam:  Constitutional:  Well-developed and well-nourished.  No respiratory distress.      HENT:  Head:  Normocephalic and atraumatic.  Mouth:  Moist mucous membranes.    Eyes:  Conjunctivae and EOM are normal.  Pupils are equal, round.  No scleral icterus.    Neck:  Neck supple.  No JVD present.    Cardiovascular:  Normal rate, regular rhythm and normal heart sounds with no murmur.  Pulmonary/Chest:  No respiratory distress, no wheezes, no crackles, with normal breath sounds and good air movement.  Abdominal:  Soft.  No distension and no tenderness.   Musculoskeletal:  Index finger minimally tender; no fluctuancy detected.  No red or swollen joints anywhere.    Neurological:  Alert and oriented to person, place, and time.  No cranial nerve deficit.   Skin:  Skin is warm and dry. No rash noted. No pallor.   Peripheral vascular: no clubbing, no cyanosis, no edema.    Results Review:  I have reviewed the labs, radiology results, and diagnostic studies.    Laboratory Data:   Lab Results (last 24 hours)     Procedure Component  Value Units Date/Time    POC Glucose Once [[id number]]  (Abnormal) Collected:  06/11/18 1955    Specimen:  Blood Updated:  06/11/18 2008     Glucose 209 (H) mg/dL     Narrative:       Meter: [id number] : [id number] Lorenza Nelson    POC Glucose Once [[id number]]  (Normal) Collected:  06/11/18 1550    Specimen:  Blood Updated:  06/11/18 1558     Glucose 126 mg/dL     Narrative:       Meter: [id number] : [id number] Marce Abreu    POC Glucose Once [[id number]]  (Normal) Collected:  06/11/18 1119    Specimen:  Blood Updated:  06/11/18 1127     Glucose 117 mg/dL     Narrative:       Meter: [id number] : [id number] Marce Abreu    Peripheral Blood Smear [[id number]] Collected:  06/07/18 0719    Specimen:  Blood Updated:  06/11/18 0839     Performed by: Mena Fleming     Pathologist Interpretation See scanned report    POC Glucose Once [[id number]]  (Abnormal) Collected:  06/11/18 0648    Specimen:  Blood Updated:  06/11/18 0657     Glucose 276 (H) mg/dL     Narrative:       Meter: [id number] : [id number] Lorenza Nelson    Osmolality, Calculated [[id number]]  (Normal) Collected:  06/11/18 0505    Specimen:  Blood Updated:  06/11/18 0636     Osmolality Calc 275.6 mOsm/kg     Comprehensive Metabolic Panel [[id number]]  (Abnormal) Collected:  06/11/18 0505    Specimen:  Blood Updated:  06/11/18 0636     Glucose 356 (H) mg/dL      BUN 14 mg/dL      Creatinine 0.67 mg/dL      Sodium 130 (L) mmol/L      Potassium 4.0 mmol/L      Chloride 104 mmol/L      CO2 24.4 mmol/L      Calcium 8.6 mg/dL      Total Protein 5.8 (L) g/dL      Albumin 2.80 (L) g/dL      ALT (SGPT) 42 U/L      AST (SGOT) 45 (H) U/L      Alkaline Phosphatase 137 (H) U/L      Comment: Note New Reference Ranges        Total Bilirubin 0.6 mg/dL      eGFR Non African Amer 124 mL/min/1.73      Globulin 3.0 gm/dL      A/G Ratio 0.9 (L) g/dL      BUN/Creatinine Ratio 20.9     Anion Gap 1.6 (L) mmol/L     CBC & Differential [[id number]] Collected:  06/11/18 2414     Specimen:  Blood Updated:  06/11/18 0554    Narrative:       The following orders were created for panel order CBC & Differential.  Procedure                               Abnormality         Status                     ---------                               -----------         ------                     CBC Auto Differential[[id number]]        Abnormal            Final result                 Please view results for these tests on the individual orders.    CBC Auto Differential [[id number]]  (Abnormal) Collected:  06/11/18 0505    Specimen:  Blood Updated:  06/11/18 0554     WBC 2.63 (L) 10*3/mm3      RBC 3.90 (L) 10*6/mm3      Hemoglobin 11.7 (L) g/dL      Hematocrit 34.8 (L) %      MCV 89.2 fL      MCH 30.0 pg      MCHC 33.6 g/dL      RDW 14.8 (H) %      RDW-SD 47.3 fl      MPV 12.3 (H) fL      Platelets 51 (L) 10*3/mm3      Neutrophil % 67.3 %      Lymphocyte % 23.2 %      Monocyte % 6.8 %      Eosinophil % 1.9 %      Basophil % 0.4 %      Immature Grans % 0.4 %      Neutrophils, Absolute 1.77 10*3/mm3      Lymphocytes, Absolute 0.61 (L) 10*3/mm3      Monocytes, Absolute 0.18 10*3/mm3      Eosinophils, Absolute 0.05 10*3/mm3      Basophils, Absolute 0.01 10*3/mm3      Immature Grans, Absolute 0.01 10*3/mm3           Culture Data:   No results found for: BLOODCX  No results found for: URINECX  No results found for: RESPCX  No results found for: WOUNDCX  No results found for: STOOLCX  No components found for: BODYFLD    Radiology Data:   Imaging Results (last 24 hours)     ** No results found for the last 24 hours. **          I have reviewed the patient current medications.     Assessment/Plan     Assessment and Plan:   -History of paroxysmal atrial fibrillation without chronic anticoagulation due to coagulopathy and thrombocytopenia, currently NSR  -Hyperlipidemia   -History of coronary artery disease s/p C in 2012, negative stress test 02/2017  -History of CHF, most recent EF 50%, appears compensated    -History of hepatitis C most likely causing leukopenia and transaminitis   -History of hepatitis B   -Splenomegaly   -Chronic leukopenia likely secondary to hepatitis C and history of splenomegaly   -COPD without acute exacerbation   -Obstructive sleep apnea without use of BiPAP/CPAP  -Chronic pain syndrome  -Degenerative disc disease    -History of MRSA infection of achilles tendon, LUE, and left thumb in the past related to IVDA  -History of DVT in the past   -Vitamin D deficiency  -History of IVDA  -Former smoker    The patient's DM is not controlled. Will consult dietitian for dietary mgt., as he is already on SSI and long acting insulin.  Continue Clindamycin    Discharge Planning: I expect patient to be discharged when d/c from psych  Elgin Perry MD   06/11/18   9:26 PM

## 2018-06-12 NOTE — PLAN OF CARE
Problem: Patient Care Overview  Goal: Plan of Care Review  Outcome: Ongoing (interventions implemented as appropriate)  Pt. Verbalizes is up & down not much sleep rates anx.5 dep 3 denies any s/i h/i he verbalizes stop taking the meds wasn't helping he has agree to go to [facility] care tomorrow pt. Cooperative    06/12/18 6304   Coping/Psychosocial   Plan of Care Reviewed With patient   Coping/Psychosocial   Patient Agreement with Plan of Care agrees   Plan of Care Review   Progress improving       Problem: Overarching Goals (Adult)  Goal: Adheres to Safety Considerations for Self and Others  Outcome: Ongoing (interventions implemented as appropriate)    Goal: Optimized Coping Skills in Response to Life Stressors  Outcome: Ongoing (interventions implemented as appropriate)    Goal: Develops/Participates in Therapeutic [place] to Support Successful Transition  Outcome: Ongoing (interventions implemented as appropriate)

## 2018-06-12 NOTE — PROGRESS NOTES
"INPATIENT PSYCHIATRIC PROGRESS NOTE    Name:  Isaias Lang  :  1965  MRN:  [id number]  Visit Number:  [id number]  Length of stay:  8    Behavioral Health Treatment Plan and Problem List: I have reviewed and approved the Behavioral Health Treatment Plan and Problem list.    SUBJECTIVE  CC: \"The medicine is making me jerk\"    INTERVAL HISTORY: irritable, remains mild to moderately depressed without SI and with a trace of hope for a better future having secured a H placement for the patient tomorrow.     Depression rating 4/10  Anxiety rating 4/10  Sleep:restless          Review of Systems   Respiratory: Negative.    Cardiovascular: Negative.    Gastrointestinal: Negative.    Musculoskeletal: Negative.    Neurological: Negative.          OBJECTIVE    Temp:  [97.7 °F (36.5 °C)-98.2 °F (36.8 °C)] 97.7 °F (36.5 °C)  Heart Rate:  [102-112] 112  Resp:  [16-20] 16  BP: (106-118)/(69-79) 118/79    MENTAL STATUS EXAM:      Appearance:Casually dressed, good hygeine.   Cooperation:Cooperative  Psychomotor: No psychomotor agitation/retardation, No EPS, No motor tics  Speech-normal rate, amount.   Mood/Affect: irritable  Thought Processes: associations intact  Thought Content: negativistic   Hallucination(s): none  Hopelessness: No  Optimistic:minimally  Suicidal Thoughts:  none  Suicidal Plan/Intent: none  Homicidal Thoughts:  absent  Orientation: oriented x 3  Memory: recent intact    Lab Results (last 24 hours)     Procedure Component Value Units Date/Time    POC Glucose Once [[id number]]  (Abnormal) Collected:  18 0656    Specimen:  Blood Updated:  18 0704     Glucose 294 (H) mg/dL     Narrative:       Meter: [id number] : [id number] Lorenza Nelson    CBC & Differential [[id number]] Collected:  18 0638    Specimen:  Blood Updated:  18 0651    Narrative:       The following orders were created for panel order CBC & Differential.  Procedure                               Abnormality         " Status                     ---------                               -----------         ------                     CBC Auto Differential[[id number]]        Abnormal            Final result                 Please view results for these tests on the individual orders.    CBC Auto Differential [[id number]]  (Abnormal) Collected:  06/12/18 0638    Specimen:  Blood Updated:  06/12/18 0651     WBC 2.74 (L) 10*3/mm3      RBC 4.34 (L) 10*6/mm3      Hemoglobin 12.6 (L) g/dL      Hematocrit 38.3 (L) %      MCV 88.2 fL      MCH 29.0 pg      MCHC 32.9 (L) g/dL      RDW 14.9 (H) %      RDW-SD 48.1 fl      MPV 12.5 (H) fL      Platelets 58 (L) 10*3/mm3      Neutrophil % 62.0 %      Lymphocyte % 24.5 %      Monocyte % 10.6 (H) %      Eosinophil % 2.2 %      Basophil % 0.7 %      Immature Grans % 0.0 %      Neutrophils, Absolute 1.70 10*3/mm3      Lymphocytes, Absolute 0.67 (L) 10*3/mm3      Monocytes, Absolute 0.29 10*3/mm3      Eosinophils, Absolute 0.06 10*3/mm3      Basophils, Absolute 0.02 10*3/mm3      Immature Grans, Absolute 0.00 10*3/mm3     Osmolality, Calculated [[id number]]  (Normal) Collected:  06/12/18 0444    Specimen:  Blood Updated:  06/12/18 0625     Osmolality Calc 284.3 mOsm/kg     Comprehensive Metabolic Panel [[id number]]  (Abnormal) Collected:  06/12/18 0444    Specimen:  Blood Updated:  06/12/18 0625     Glucose 287 (H) mg/dL      BUN 18 mg/dL      Creatinine 0.74 mg/dL      Sodium 136 mmol/L      Potassium 4.3 mmol/L      Comment: 1+ Hemolysis         Chloride 104 mmol/L      CO2 25.8 mmol/L      Calcium 8.3 mg/dL      Total Protein 5.8 (L) g/dL      Albumin 3.00 (L) g/dL      ALT (SGPT) 39 U/L      AST (SGOT) 48 (H) U/L      Alkaline Phosphatase 127 U/L      Comment: Note New Reference Ranges        Total Bilirubin 0.6 mg/dL      eGFR Non African Amer 111 mL/min/1.73      Globulin 2.8 gm/dL      A/G Ratio 1.1 (L) g/dL      BUN/Creatinine Ratio 24.3     Anion Gap 6.2 mmol/L     POC Glucose Once [[id number]]   (Abnormal) Collected:  06/11/18 1955    Specimen:  Blood Updated:  06/11/18 2008     Glucose 209 (H) mg/dL     Narrative:       Meter: [id number] : [id number] Lorenza Nelson    POC Glucose Once [[id number]]  (Normal) Collected:  06/11/18 1550    Specimen:  Blood Updated:  06/11/18 1558     Glucose 126 mg/dL     Narrative:       Meter: [id number] : [id number] Zheng Lois    POC Glucose Once [[id number]]  (Normal) Collected:  06/11/18 1119    Specimen:  Blood Updated:  06/11/18 1127     Glucose 117 mg/dL     Narrative:       Meter: [id number] : [id number] Zheng Lois           Imaging Results (last 24 hours)     ** No results found for the last 24 hours. **           ECG/EMG Results (most recent)     None           ALLERGIES: Robitussin dm [dextromethorphan-guaifenesin]; Tizanidine; Metformin; Sulfa antibiotics; Contrast dye; and Tramadol      Current Facility-Administered Medications:   •  albuterol (PROVENTIL HFA;VENTOLIN HFA) inhaler 2 puff, 2 puff, Inhalation, Q4H PRN, Ankit Barnes MD, 2 puff at 06/12/18 0254  •  albuterol (PROVENTIL) nebulizer solution 0.083% 2.5 mg/3mL, 2.5 mg, Nebulization, Q6H PRN, Sean Barnes MD  •  aluminum-magnesium hydroxide-simethicone (MAALOX MAX) 400-400-40 MG/5ML suspension 15 mL, 15 mL, Oral, Q6H PRN, Orlando Dinh MD  •  atorvastatin (LIPITOR) tablet 20 mg, 20 mg, Oral, Daily, Ankit Barnes MD, 20 mg at 06/12/18 0920  •  benzonatate (TESSALON) capsule 100 mg, 100 mg, Oral, TID PRN, Orlando Dinh MD  •  cholecalciferol (VITAMIN D3) capsule 50,000 Units, 50,000 Units, Oral, Weekly, Ankit Barnes MD, 50,000 Units at 06/06/18 0814  •  clindamycin (CLEOCIN) capsule 300 mg, 300 mg, Oral, TID, Dina Harry MD, 300 mg at 06/12/18 0921  •  dextrose (D50W) solution 25 g, 25 g, Intravenous, Q15 Min PRN, Orlando Dinh MD  •  dextrose (GLUTOSE) oral gel 15 g, 15 g, Oral, Q15 Min PRN, Orlando Dinh MD  •  dicyclomine (BENTYL) capsule 10 mg, 10  mg, Oral, BID, Ankit Barnes MD, 10 mg at 06/12/18 0920  •  ferrous sulfate tablet 325 mg, 325 mg, Oral, Daily With Dinner, Martha Hidalgo MD, 325 mg at 06/11/18 1754  •  glucagon (human recombinant) (GLUCAGEN DIAGNOSTIC) injection 1 mg, 1 mg, Subcutaneous, PRN, Orlando Dinh MD  •  hydrOXYzine (ATARAX) tablet 50 mg, 50 mg, Oral, Q6H PRN, Orlando Dinh MD, 50 mg at 06/08/18 2025  •  insulin aspart (novoLOG) injection 0-14 Units, 0-14 Units, Subcutaneous, 4x Daily AC & at Bedtime, PATRICK Solomon, 8 Units at 06/12/18 0912  •  insulin aspart (novoLOG) injection 10 Units, 10 Units, Subcutaneous, TID With Meals, PATRICK Pantoja, 10 Units at 06/12/18 0912  •  insulin detemir (LEVEMIR) injection 40 Units, 40 Units, Subcutaneous, Nightly, PATRICK Pantoja, 40 Units at 06/11/18 2026  •  ipratropium (ATROVENT) nebulizer solution 0.5 mg, 0.5 mg, Nebulization, Q6H PRN, PATRICK Pantoja, 0.5 mg at 06/09/18 0720  •  lactulose (CHRONULAC) 10 GM/15ML solution 30 g, 30 g, Oral, TID PRN, Martha Hidalgo MD, 30 g at 06/12/18 0927  •  loperamide (IMODIUM) capsule 2 mg, 2 mg, Oral, 4x Daily PRN, Orlando Dinh MD  •  magnesium hydroxide (MILK OF MAGNESIA) suspension 2400 mg/10mL 10 mL, 10 mL, Oral, Daily PRN, Orlando Dinh MD, 10 mL at 06/07/18 2118  •  metoprolol tartrate (LOPRESSOR) tablet 12.5 mg, 12.5 mg, Oral, Daily, PATRICK Pantoja, 12.5 mg at 06/12/18 0919  •  mirtazapine (REMERON) tablet 15 mg, 15 mg, Oral, Nightly, Ankit Barnes MD  •  multivitamin (DAILY SAVAGE) tablet 1 tablet, 1 tablet, Oral, Daily, PATRICK Solomon, 1 tablet at 06/12/18 0919  •  mupirocin (BACTROBAN) 2 % ointment, , Topical, Q12H, PATRICK Solomon  •  nicotine (NICODERM CQ) 21 MG/24HR patch 1 patch, 1 patch, Transdermal, Q24H, Orlando Dinh MD  •  nitroglycerin (NITROSTAT) SL tablet 0.4 mg, 0.4 mg, Sublingual, Q5 Min PRN, Ankit Barnes MD  •  ondansetron (ZOFRAN)  tablet 4 mg, 4 mg, Oral, Q6H PRN, Orlando Dinh MD, 4 mg at 06/06/18 1634  •  pantoprazole (PROTONIX) EC tablet 40 mg, 40 mg, Oral, Daily, Ankit Barnes MD, 40 mg at 06/12/18 0920  •  polyethylene glycol (MIRALAX) powder 17 g, 17 g, Oral, BID, Ankit Barnes MD, 17 g at 06/12/18 0921  •  potassium chloride (K-DUR,KLOR-CON) CR tablet 20 mEq, 20 mEq, Oral, Daily, Ankit Barnes MD, 20 mEq at 06/12/18 0920  •  senna (SENOKOT) tablet 17.2 mg, 2 tablet, Oral, BID PRN, Martha Hidalgo MD, 17.2 mg at 06/08/18 1711  •  sodium chloride (OCEAN) nasal spray 2 spray, 2 spray, Each Nare, PRN, Orlando Dinh MD  •  tetanus immune globulin (HYPERTET) injection 250 Units, 250 Units, Intramuscular, Once, Dina Harry MD    ASSESSMENT & PLAN    Patient Active Problem List   Diagnosis Code     • Severe episode of recurrent major depressive disorder, without psychotic features  Plan:  Wellbutrin and will contiunue with supportive's individual and group psychotherapeutic efforts      F33.2     Depression with suicidal ideation  Plan: Patient no longer expressing suicidal intent , have discontinued suicidal precautions       F32.9, R45.851     Uncomplicated opioid dependence  Plan: Incorporate into the ongoing psychotherapeutic effort as well as disposition plan. F11.20           • Type 1 diabetes mellitus  Plan: We'll believe following with the medical consultants and their recommendations E10.9   • 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   Plan:  treat symptomatically attempting to avoid opiates, will also need to Minimize NSAID due to the thrombocytopenia  G89.21   •       • Gastroesophageal reflux disease with esophagitis  Plan: Continue Protonix  K21.0   •   B18.1   • Chronic hepatitis C without hepatic coma  Plan: Monitor liver status, question possible factor with the patient's leukocytosis and thrombocytopenia   Chronic viral hepatitis B without delta agent and without coma plan: Monitor monitor  his hepatic status     B18.2   •               • Cellulitis Plan:  Clindamycine, Inflammation resolving  L03.90   •       • Leukocytosis (leucocytosis) Plan: follow oncology's recommendation.         D72.829   • Thrombocytopenia Plan: Follow oncology's recommendation.              Suicide precautions: Suicide precaution Level 3 (q15 min checks)     Behavioral Health Treatment Plan and Problem List: I have reviewed and approved the Behavioral Health Treatment Plan and Problem list.    Clinician:  Ankit Barnes MD  06/12/18  11:08 AM    Dictated utilizing Dragon dictation

## 2018-06-12 NOTE — PROGRESS NOTES
Francy from [facility] asks that patient arrive as early as possible. [facility]tec is scheduled for  at 10:00am.    RTEC: Patient will be discharged on June 13 2018.

## 2018-06-13 VITALS
RESPIRATION RATE: 18 BRPM | HEIGHT: 66 IN | WEIGHT: 155.2 LBS | DIASTOLIC BLOOD PRESSURE: 78 MMHG | BODY MASS INDEX: 24.94 KG/M2 | HEART RATE: 104 BPM | OXYGEN SATURATION: 97 % | TEMPERATURE: 97.6 F | SYSTOLIC BLOOD PRESSURE: 119 MMHG

## 2018-06-13 LAB — GLUCOSE BLDC GLUCOMTR-MCNC: 260 MG/DL (ref 70–130)

## 2018-06-13 PROCEDURE — 63710000001 INSULIN ASPART PER 5 UNITS: Performed by: PHYSICIAN ASSISTANT

## 2018-06-13 PROCEDURE — 99239 HOSP IP/OBS DSCHRG MGMT >30: CPT | Performed by: PSYCHIATRY & NEUROLOGY

## 2018-06-13 PROCEDURE — 94799 UNLISTED PULMONARY SVC/PX: CPT

## 2018-06-13 PROCEDURE — 82962 GLUCOSE BLOOD TEST: CPT

## 2018-06-13 RX ADMIN — INSULIN ASPART 10 UNITS: 100 INJECTION, SOLUTION INTRAVENOUS; SUBCUTANEOUS at 07:43

## 2018-06-13 RX ADMIN — ATORVASTATIN CALCIUM 20 MG: 20 TABLET, FILM COATED ORAL at 08:22

## 2018-06-13 RX ADMIN — MUPIROCIN: 20 OINTMENT TOPICAL at 08:26

## 2018-06-13 RX ADMIN — ALBUTEROL SULFATE 2 PUFF: 90 AEROSOL, METERED RESPIRATORY (INHALATION) at 00:56

## 2018-06-13 RX ADMIN — OFLOXACIN 50000 UNITS: 300 TABLET, COATED ORAL at 08:23

## 2018-06-13 RX ADMIN — DICYCLOMINE HYDROCHLORIDE 10 MG: 10 CAPSULE ORAL at 08:22

## 2018-06-13 RX ADMIN — PANTOPRAZOLE SODIUM 40 MG: 40 TABLET, DELAYED RELEASE ORAL at 08:22

## 2018-06-13 RX ADMIN — ALBUTEROL SULFATE 2 PUFF: 90 AEROSOL, METERED RESPIRATORY (INHALATION) at 09:15

## 2018-06-13 RX ADMIN — POLYETHYLENE GLYCOL (3350) 17 G: 17 POWDER, FOR SOLUTION ORAL at 08:23

## 2018-06-13 RX ADMIN — POTASSIUM CHLORIDE 20 MEQ: 1500 TABLET, EXTENDED RELEASE ORAL at 08:23

## 2018-06-13 RX ADMIN — MIRTAZAPINE 15 MG: 15 TABLET, FILM COATED ORAL at 01:00

## 2018-06-13 RX ADMIN — METOPROLOL TARTRATE 12.5 MG: 25 TABLET, FILM COATED ORAL at 08:23

## 2018-06-13 RX ADMIN — INSULIN ASPART 8 UNITS: 100 INJECTION, SOLUTION INTRAVENOUS; SUBCUTANEOUS at 07:43

## 2018-06-13 RX ADMIN — Medication 1 TABLET: at 08:22

## 2018-06-13 NOTE — DISCHARGE SUMMARY
Date of Discharge:  6/13/2018    Discharge Diagnosis:Principal Problem:    Severe episode of recurrent major depressive disorder, without psychotic features  Active Problems:    Type 1 diabetes mellitus    Chronic hepatitis C without hepatic coma    Cellulitis    Uncomplicated opioid dependence    Leukocytosis (leucocytosis)    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    Gastroesophageal reflux disease with esophagitis    Chronic viral hepatitis B without delta agent and without coma    Thrombocytopenia        Presenting Problem/History of Present Illness: Patient presented in the emergency room depressed voicing suicidal intent, hospitalized to facilitate further evaluation, safety and treatment, see history of present illness for further details.    Hospital Course:  Patient was admitted for safety and stabilization and was placed on standard precautions.  Routine labs were checked.  Patient was assigned a masters level therapist and provided with an opportunity to participate in group and individual therapy on the unit.  Patient seen on a daily basis for evaluation and supportive therapy. Patient psychotropics/antidepressant medications Limited to Remeron and when necessary hydroxyzine with his suicidal thoughts dissipating quite rapidly during his initial few days of hospitalization.  His depression improved although certainly a residual dysphoria/ discouragement with his compounding serious medical issues.  The hospitalist/internal medicine along with the hematologist consulted with the patient and advised and recommended his current medications that were listed below for discharge.  Resolving patient's homeless situation and lack of support with a personal care home referral was extremely beneficial.  See below for medication recommendations per hospitalist  service along with the psychiatric meds per this physician.  Patient should be safe for management as an outpatient, at discharge he was not experiencing any thoughts  of harming self or others and was free of any psychotic thought content.  He is to be admitted to the [facility] in [place] date of discharge.        Consults:   Consults     Date and Time Order Name Status Description    6/6/2018 0858 Hematology & Oncology Inpatient Consult Completed     6/5/2018 1008 Inpatient Hospitalist Consult Completed           Labs:  Lab Results (all)     Procedure Component Value Units Date/Time    POC Glucose Once [[id number]]  (Abnormal) Collected:  06/13/18 0702    Specimen:  Blood Updated:  06/13/18 0709     Glucose 260 (H) mg/dL     Narrative:       Meter: [id number] : [id number] sunshine varela    POC Glucose Once [[id number]]  (Abnormal) Collected:  06/12/18 1953    Specimen:  Blood Updated:  06/12/18 2006     Glucose 273 (H) mg/dL     Narrative:       Meter: [id number] : [id number] CPowerlene    POC Glucose Once [[id number]]  (Abnormal) Collected:  06/12/18 1612    Specimen:  Blood Updated:  06/12/18 1637     Glucose 234 (H) mg/dL     Narrative:       Meter: [id number] : [id number] Zheng Lois    POC Glucose Once [[id number]]  (Normal) Collected:  06/12/18 1149    Specimen:  Blood Updated:  06/12/18 1156     Glucose 126 mg/dL     Narrative:       Meter: [id number] : [id number] Zheng Lois    POC Glucose Once [[id number]]  (Abnormal) Collected:  06/12/18 0656    Specimen:  Blood Updated:  06/12/18 0704     Glucose 294 (H) mg/dL     Narrative:       Meter: [id number] : [id number] BrittSparkbrowserlene    CBC & Differential [[id number]] Collected:  06/12/18 0638    Specimen:  Blood Updated:  06/12/18 0651    Narrative:       The following orders were created for panel order CBC & Differential.  Procedure                               Abnormality         Status                     ---------                               -----------         ------                     CBC Auto Differential[[id number]]        Abnormal            Final result                 Please view  results for these tests on the individual orders.    CBC Auto Differential [[id number]]  (Abnormal) Collected:  06/12/18 0638    Specimen:  Blood Updated:  06/12/18 0651     WBC 2.74 (L) 10*3/mm3      RBC 4.34 (L) 10*6/mm3      Hemoglobin 12.6 (L) g/dL      Hematocrit 38.3 (L) %      MCV 88.2 fL      MCH 29.0 pg      MCHC 32.9 (L) g/dL      RDW 14.9 (H) %      RDW-SD 48.1 fl      MPV 12.5 (H) fL      Platelets 58 (L) 10*3/mm3      Neutrophil % 62.0 %      Lymphocyte % 24.5 %      Monocyte % 10.6 (H) %      Eosinophil % 2.2 %      Basophil % 0.7 %      Immature Grans % 0.0 %      Neutrophils, Absolute 1.70 10*3/mm3      Lymphocytes, Absolute 0.67 (L) 10*3/mm3      Monocytes, Absolute 0.29 10*3/mm3      Eosinophils, Absolute 0.06 10*3/mm3      Basophils, Absolute 0.02 10*3/mm3      Immature Grans, Absolute 0.00 10*3/mm3     Osmolality, Calculated [[id number]]  (Normal) Collected:  06/12/18 0444    Specimen:  Blood Updated:  06/12/18 0625     Osmolality Calc 284.3 mOsm/kg     Comprehensive Metabolic Panel [[id number]]  (Abnormal) Collected:  06/12/18 0444    Specimen:  Blood Updated:  06/12/18 0625     Glucose 287 (H) mg/dL      BUN 18 mg/dL      Creatinine 0.74 mg/dL      Sodium 136 mmol/L      Potassium 4.3 mmol/L      Comment: 1+ Hemolysis         Chloride 104 mmol/L      CO2 25.8 mmol/L      Calcium 8.3 mg/dL      Total Protein 5.8 (L) g/dL      Albumin 3.00 (L) g/dL      ALT (SGPT) 39 U/L      AST (SGOT) 48 (H) U/L      Alkaline Phosphatase 127 U/L      Comment: Note New Reference Ranges        Total Bilirubin 0.6 mg/dL      eGFR Non African Amer 111 mL/min/1.73      Globulin 2.8 gm/dL      A/G Ratio 1.1 (L) g/dL      BUN/Creatinine Ratio 24.3     Anion Gap 6.2 mmol/L     POC Glucose Once [[id number]]  (Abnormal) Collected:  06/11/18 1955    Specimen:  Blood Updated:  06/11/18 2008     Glucose 209 (H) mg/dL     Narrative:       Meter: [id number] : [id number] Lorenza Nelson    POC Glucose Once [[id number]]  (Normal)  Collected:  06/11/18 1550    Specimen:  Blood Updated:  06/11/18 1558     Glucose 126 mg/dL     Narrative:       Meter: [id number] : [id number] Marce Abreu    POC Glucose Once [[id number]]  (Normal) Collected:  06/11/18 1119    Specimen:  Blood Updated:  06/11/18 1127     Glucose 117 mg/dL     Narrative:       Meter: [id number] : [id number] Marce Abreu    Peripheral Blood Smear [[id number]] Collected:  06/07/18 0719    Specimen:  Blood Updated:  06/11/18 0839     Performed by: Mena Fleming     Pathologist Interpretation See scanned report    POC Glucose Once [[id number]]  (Abnormal) Collected:  06/11/18 0648    Specimen:  Blood Updated:  06/11/18 0657     Glucose 276 (H) mg/dL     Narrative:       Meter: [id number] : [id number] Lorenza Nelson    Osmolality, Calculated [[id number]]  (Normal) Collected:  06/11/18 0505    Specimen:  Blood Updated:  06/11/18 0636     Osmolality Calc 275.6 mOsm/kg     Comprehensive Metabolic Panel [[id number]]  (Abnormal) Collected:  06/11/18 0505    Specimen:  Blood Updated:  06/11/18 0636     Glucose 356 (H) mg/dL      BUN 14 mg/dL      Creatinine 0.67 mg/dL      Sodium 130 (L) mmol/L      Potassium 4.0 mmol/L      Chloride 104 mmol/L      CO2 24.4 mmol/L      Calcium 8.6 mg/dL      Total Protein 5.8 (L) g/dL      Albumin 2.80 (L) g/dL      ALT (SGPT) 42 U/L      AST (SGOT) 45 (H) U/L      Alkaline Phosphatase 137 (H) U/L      Comment: Note New Reference Ranges        Total Bilirubin 0.6 mg/dL      eGFR Non African Amer 124 mL/min/1.73      Globulin 3.0 gm/dL      A/G Ratio 0.9 (L) g/dL      BUN/Creatinine Ratio 20.9     Anion Gap 1.6 (L) mmol/L     CBC & Differential [[id number]] Collected:  06/11/18 0505    Specimen:  Blood Updated:  06/11/18 0554    Narrative:       The following orders were created for panel order CBC & Differential.  Procedure                               Abnormality         Status                     ---------                               -----------          ------                     CBC Auto Differential[[id number]]        Abnormal            Final result                 Please view results for these tests on the individual orders.    CBC Auto Differential [[id number]]  (Abnormal) Collected:  06/11/18 0505    Specimen:  Blood Updated:  06/11/18 0554     WBC 2.63 (L) 10*3/mm3      RBC 3.90 (L) 10*6/mm3      Hemoglobin 11.7 (L) g/dL      Hematocrit 34.8 (L) %      MCV 89.2 fL      MCH 30.0 pg      MCHC 33.6 g/dL      RDW 14.8 (H) %      RDW-SD 47.3 fl      MPV 12.3 (H) fL      Platelets 51 (L) 10*3/mm3      Neutrophil % 67.3 %      Lymphocyte % 23.2 %      Monocyte % 6.8 %      Eosinophil % 1.9 %      Basophil % 0.4 %      Immature Grans % 0.4 %      Neutrophils, Absolute 1.77 10*3/mm3      Lymphocytes, Absolute 0.61 (L) 10*3/mm3      Monocytes, Absolute 0.18 10*3/mm3      Eosinophils, Absolute 0.05 10*3/mm3      Basophils, Absolute 0.01 10*3/mm3      Immature Grans, Absolute 0.01 10*3/mm3     POC Glucose Once [[id number]]  (Abnormal) Collected:  06/10/18 1955    Specimen:  Blood Updated:  06/10/18 2010     Glucose 237 (H) mg/dL     Narrative:       Meter: [id number] : [id number] Lorenza Nelson    Blood Culture - Blood, Arm, Left [[id number]]  (Normal) Collected:  06/05/18 1732    Specimen:  Blood from Arm, Left Updated:  06/10/18 1800     Blood Culture No growth at 5 days    Comprehensive Metabolic Panel [[id number]]  (Abnormal) Collected:  06/10/18 1618    Specimen:  Blood Updated:  06/10/18 1652     Glucose 235 (H) mg/dL      BUN 15 mg/dL      Creatinine 0.70 mg/dL      Sodium 135 mmol/L      Potassium 4.4 mmol/L      Chloride 107 mmol/L      CO2 25.7 mmol/L      Calcium 8.7 mg/dL      Total Protein 6.2 g/dL      Albumin 2.90 (L) g/dL      ALT (SGPT) 46 (H) U/L      AST (SGOT) 52 (H) U/L      Alkaline Phosphatase 124 U/L      Comment: Note New Reference Ranges        Total Bilirubin 0.7 mg/dL      eGFR Non African Amer 118 mL/min/1.73      Globulin 3.3 gm/dL       A/G Ratio 0.9 (L) g/dL      BUN/Creatinine Ratio 21.4     Anion Gap 2.3 (L) mmol/L     Osmolality, Calculated [[id number]]  (Normal) Collected:  06/10/18 1618    Specimen:  Blood Updated:  06/10/18 1652     Osmolality Calc 278.5 mOsm/kg     Blood Culture - Blood, Arm, Left [[id number]]  (Normal) Collected:  06/05/18 1557    Specimen:  Blood from Arm, Left Updated:  06/10/18 1645     Blood Culture No growth at 5 days    CBC & Differential [[id number]] Collected:  06/10/18 1618    Specimen:  Blood Updated:  06/10/18 1633    Narrative:       The following orders were created for panel order CBC & Differential.  Procedure                               Abnormality         Status                     ---------                               -----------         ------                     CBC Auto Differential[[id number]]        Abnormal            Final result                 Please view results for these tests on the individual orders.    CBC Auto Differential [[id number]]  (Abnormal) Collected:  06/10/18 1618    Specimen:  Blood Updated:  06/10/18 1633     WBC 3.08 (L) 10*3/mm3      RBC 4.08 (L) 10*6/mm3      Hemoglobin 12.6 (L) g/dL      Hematocrit 36.6 (L) %      MCV 89.7 fL      MCH 30.9 pg      MCHC 34.4 g/dL      RDW 14.9 (H) %      RDW-SD 48.1 fl      MPV 12.5 (H) fL      Platelets 53 (L) 10*3/mm3      Neutrophil % 72.5 (H) %      Lymphocyte % 17.5 (L) %      Monocyte % 7.5 %      Eosinophil % 1.9 %      Basophil % 0.6 %      Immature Grans % 0.0 %      Neutrophils, Absolute 2.23 10*3/mm3      Lymphocytes, Absolute 0.54 (L) 10*3/mm3      Monocytes, Absolute 0.23 10*3/mm3      Eosinophils, Absolute 0.06 10*3/mm3      Basophils, Absolute 0.02 10*3/mm3      Immature Grans, Absolute 0.00 10*3/mm3     POC Glucose Once [[id number]]  (Abnormal) Collected:  06/10/18 1617    Specimen:  Blood Updated:  06/10/18 1624     Glucose 228 (H) mg/dL     Narrative:       Meter: [id number] : [id number] Marce Lois    POC Glucose Once  [[id number]]  (Abnormal) Collected:  06/10/18 1130    Specimen:  Blood Updated:  06/10/18 1140     Glucose 158 (H) mg/dL     Narrative:       Meter: [id number] : [id number] Marce Abreu    POC Glucose Once [[id number]]  (Abnormal) Collected:  06/10/18 0647    Specimen:  Blood Updated:  06/10/18 0656     Glucose 170 (H) mg/dL     Narrative:       Meter: [id number] : [id number] Britt Leslie    Comprehensive Metabolic Panel [[id number]]  (Abnormal) Collected:  06/10/18 0457    Specimen:  Blood Updated:  06/10/18 0612     Glucose 193 (H) mg/dL      BUN 13 mg/dL      Creatinine 0.58 mg/dL      Sodium 137 mmol/L      Potassium 3.8 mmol/L      Chloride 105 mmol/L      CO2 27.1 mmol/L      Calcium 8.6 mg/dL      Total Protein 5.6 (L) g/dL      Albumin 2.70 (L) g/dL      ALT (SGPT) 43 U/L      AST (SGOT) 45 (H) U/L      Alkaline Phosphatase 134 (H) U/L      Comment: Note New Reference Ranges        Total Bilirubin 0.6 mg/dL      eGFR Non African Amer 147 mL/min/1.73      Globulin 2.9 gm/dL      A/G Ratio 0.9 (L) g/dL      BUN/Creatinine Ratio 22.4     Anion Gap 4.9 mmol/L     Osmolality, Calculated [[id number]]  (Normal) Collected:  06/10/18 0457    Specimen:  Blood Updated:  06/10/18 0612     Osmolality Calc 279.2 mOsm/kg     CBC & Differential [[id number]] Collected:  06/10/18 0457    Specimen:  Blood Updated:  06/10/18 0543    Narrative:       The following orders were created for panel order CBC & Differential.  Procedure                               Abnormality         Status                     ---------                               -----------         ------                     CBC Auto Differential[[id number]]        Abnormal            Final result                 Please view results for these tests on the individual orders.    CBC Auto Differential [[id number]]  (Abnormal) Collected:  06/10/18 0457    Specimen:  Blood Updated:  06/10/18 0543     WBC 2.80 (L) 10*3/mm3      RBC 3.81 (L) 10*6/mm3      Hemoglobin  11.5 (L) g/dL      Hematocrit 34.1 (L) %      MCV 89.5 fL      MCH 30.2 pg      MCHC 33.7 g/dL      RDW 14.8 (H) %      RDW-SD 47.3 fl      MPV 12.5 (H) fL      Platelets 50 (L) 10*3/mm3      Neutrophil % 64.6 %      Lymphocyte % 23.2 %      Monocyte % 8.2 %      Eosinophil % 2.9 %      Basophil % 0.7 %      Immature Grans % 0.4 %      Neutrophils, Absolute 1.81 10*3/mm3      Lymphocytes, Absolute 0.65 (L) 10*3/mm3      Monocytes, Absolute 0.23 10*3/mm3      Eosinophils, Absolute 0.08 10*3/mm3      Basophils, Absolute 0.02 10*3/mm3      Immature Grans, Absolute 0.01 10*3/mm3     POC Glucose Once [[id number]]  (Abnormal) Collected:  06/09/18 1956    Specimen:  Blood Updated:  06/09/18 2005     Glucose 286 (H) mg/dL     Narrative:       Meter: [id number] : [id number] Lorenza Nelson    Occult Blood X 1, Stool - Stool, Per Rectum [[id number]]  (Normal) Collected:  06/09/18 1748    Specimen:  Stool from Per Rectum Updated:  06/09/18 1812     Fecal Occult Blood Negative    POC Glucose Once [[id number]]  (Abnormal) Collected:  06/09/18 1610    Specimen:  Blood Updated:  06/09/18 1630     Glucose 245 (H) mg/dL     Narrative:       Meter: [id number] : [id number] Lopez Nikia    Comprehensive Metabolic Panel [[id number]]  (Abnormal) Collected:  06/09/18 1244    Specimen:  Blood Updated:  06/09/18 1327     Glucose 255 (H) mg/dL      BUN 14 mg/dL      Creatinine 0.63 mg/dL      Sodium 133 (L) mmol/L      Potassium 4.2 mmol/L      Chloride 105 mmol/L      CO2 24.1 (L) mmol/L      Calcium 8.7 mg/dL      Total Protein 5.5 (L) g/dL      Albumin 2.70 (L) g/dL      ALT (SGPT) 42 U/L      AST (SGOT) 46 (H) U/L      Alkaline Phosphatase 128 U/L      Comment: Note New Reference Ranges        Total Bilirubin 0.7 mg/dL      eGFR Non African Amer 133 mL/min/1.73      Globulin 2.8 gm/dL      A/G Ratio 1.0 (L) g/dL      BUN/Creatinine Ratio 22.2     Anion Gap 3.9 mmol/L     Osmolality, Calculated [[id number]]  (Normal) Collected:   06/09/18 1244    Specimen:  Blood Updated:  06/09/18 1327     Osmolality Calc 275.5 mOsm/kg     CBC & Differential [[id number]] Collected:  06/09/18 1137    Specimen:  Blood Updated:  06/09/18 1321    Narrative:       The following orders were created for panel order CBC & Differential.  Procedure                               Abnormality         Status                     ---------                               -----------         ------                     Scan Slide[[id number]]                                       Final result               CBC Auto Differential[[id number]]        Abnormal            Final result                 Please view results for these tests on the individual orders.    Scan Slide [[id number]] Collected:  06/09/18 1137    Specimen:  Blood Updated:  06/09/18 1321     Anisocytosis Slight/1+     Poikilocytes Slight/1+     Platelet Estimate Decreased    POC Glucose Once [[id number]]  (Abnormal) Collected:  06/09/18 1139    Specimen:  Blood Updated:  06/09/18 1204     Glucose 264 (H) mg/dL     Narrative:       Meter: [id number] : [id number] Lopez Nikia    CBC Auto Differential [[id number]]  (Abnormal) Collected:  06/09/18 1137    Specimen:  Blood Updated:  06/09/18 1201     WBC 2.80 (L) 10*3/mm3      RBC 3.98 (L) 10*6/mm3      Hemoglobin 11.8 (L) g/dL      Hematocrit 36.6 (L) %      MCV 92.0 fL      MCH 29.6 pg      MCHC 32.2 (L) g/dL      RDW 15.0 (H) %      RDW-SD 50.5 fl      MPV 12.4 (H) fL      Platelets 43 (C) 10*3/mm3      Neutrophil % 61.5 %      Lymphocyte % 23.9 %      Monocyte % 12.1 (H) %      Eosinophil % 2.1 %      Basophil % 0.4 %      Immature Grans % 0.0 %      Neutrophils, Absolute 1.72 10*3/mm3      Lymphocytes, Absolute 0.67 (L) 10*3/mm3      Monocytes, Absolute 0.34 10*3/mm3      Eosinophils, Absolute 0.06 10*3/mm3      Basophils, Absolute 0.01 10*3/mm3      Immature Grans, Absolute 0.00 10*3/mm3      nRBC 0.0 /100 WBC     POC Glucose Once [[id number]]  (Abnormal)  Collected:  06/09/18 0633    Specimen:  Blood Updated:  06/09/18 0640     Glucose 309 (H) mg/dL     Narrative:       Meter: [id number] : [id number] [employer]    Urinalysis With / Microscopic If Indicated (No Culture) - Urine, Clean Catch [[id number]]  (Abnormal) Collected:  06/08/18 2136    Specimen:  Urine from Urine, Clean Catch Updated:  06/08/18 2150     Color, UA Yellow     Appearance, UA Clear     pH, UA 7.0     Specific Gravity, UA 1.026     Glucose, UA >=1000 mg/dL (3+) (A)     Ketones, UA Negative     Bilirubin, UA Negative     Blood, UA Negative     Protein, UA Negative     Leuk Esterase, UA Negative     Nitrite, UA Negative     Urobilinogen, UA 1.0 E.U./dL    Narrative:       Urine microscopic not indicated.    POC Glucose Once [[id number]]  (Abnormal) Collected:  06/08/18 1932    Specimen:  Blood Updated:  06/08/18 1938     Glucose 281 (H) mg/dL     Narrative:       Meter: [id number] : [id number] [employer]    POC Glucose Once [[id number]]  (Abnormal) Collected:  06/08/18 1616    Specimen:  Blood Updated:  06/08/18 1625     Glucose 259 (H) mg/dL     Narrative:       Meter: [id number] : [id number] UTOPYnna    POC Glucose Once [[id number]]  (Abnormal) Collected:  06/08/18 1115    Specimen:  Blood Updated:  06/08/18 1125     Glucose 287 (H) mg/dL     Narrative:       Meter: [id number] : [id number] UTOPYnna    Haptoglobin [[id number]]  (Abnormal) Collected:  06/07/18 0948    Specimen:  Blood Updated:  06/08/18 0715     Haptoglobin <10 (L) mg/dL     Narrative:       Performed at:  [address] [place] [address]  [id number]  : Dexter Campos PhD, Phone:  [phone]    POC Glucose Once [[id number]]  (Abnormal) Collected:  06/08/18 0632    Specimen:  Blood Updated:  06/08/18 0639     Glucose 257 (H) mg/dL     Narrative:       Meter: [id number] : [id number] charissa alvarado    Comprehensive Metabolic Panel [[id number]]  (Abnormal) Collected:  06/08/18 0520     Specimen:  Blood Updated:  06/08/18 0627     Glucose 299 (H) mg/dL      BUN 12 mg/dL      Creatinine 0.65 mg/dL      Sodium 133 (L) mmol/L      Potassium 4.1 mmol/L      Chloride 103 mmol/L      CO2 26.5 mmol/L      Calcium 8.5 mg/dL      Total Protein 5.5 (L) g/dL      Albumin 2.50 (L) g/dL      ALT (SGPT) 45 (H) U/L      AST (SGOT) 40 (H) U/L      Alkaline Phosphatase 138 (H) U/L      Comment: Note New Reference Ranges        Total Bilirubin 0.5 mg/dL      eGFR Non African Amer 129 mL/min/1.73      Globulin 3.0 gm/dL      A/G Ratio 0.8 (L) g/dL      BUN/Creatinine Ratio 18.5     Anion Gap 3.5 (L) mmol/L     Osmolality, Calculated [[id number]]  (Normal) Collected:  06/08/18 0520    Specimen:  Blood Updated:  06/08/18 0627     Osmolality Calc 277.3 mOsm/kg     CBC & Differential [[id number]] Collected:  06/08/18 0520    Specimen:  Blood Updated:  06/08/18 0626    Narrative:       The following orders were created for panel order CBC & Differential.  Procedure                               Abnormality         Status                     ---------                               -----------         ------                     Scan Slide[[id number]]                                                                  CBC Auto Differential[[id number]]        Abnormal            Final result                 Please view results for these tests on the individual orders.    CBC Auto Differential [[id number]]  (Abnormal) Collected:  06/08/18 0520    Specimen:  Blood Updated:  06/08/18 0626     WBC 2.44 (C) 10*3/mm3      RBC 3.95 (L) 10*6/mm3      Hemoglobin 12.2 (L) g/dL      Hematocrit 35.7 (L) %      MCV 90.4 fL      MCH 30.9 pg      MCHC 34.2 g/dL      RDW 14.7 (H) %      RDW-SD 48.0 fl      MPV -- fL      Comment: Unable to calculate.         Platelets 34 (C) 10*3/mm3      Neutrophil % 66.4 %      Lymphocyte % 21.7 %      Monocyte % 8.6 %      Eosinophil % 2.5 %      Basophil % 0.4 %      Immature Grans % 0.4 %      Neutrophils,  Absolute 1.62 10*3/mm3      Lymphocytes, Absolute 0.53 (L) 10*3/mm3      Monocytes, Absolute 0.21 10*3/mm3      Eosinophils, Absolute 0.06 10*3/mm3      Basophils, Absolute 0.01 10*3/mm3      Immature Grans, Absolute 0.01 10*3/mm3     POC Glucose Once [[id number]]  (Abnormal) Collected:  06/07/18 2122    Specimen:  Blood Updated:  06/07/18 2129     Glucose 368 (H) mg/dL     Narrative:       Meter: [id number] : [id number] charissa alvarado    Magnesium [[id number]]  (Normal) Collected:  06/07/18 1850    Specimen:  Blood Updated:  06/07/18 1958     Magnesium 1.7 mg/dL     POC Glucose Once [[id number]]  (Abnormal) Collected:  06/07/18 1614    Specimen:  Blood Updated:  06/07/18 1642     Glucose 294 (H) mg/dL     Narrative:       Meter: [id number] : [id number] Alexey Barfield    POC Glucose Once [[id number]]  (Normal) Collected:  06/07/18 1110    Specimen:  Blood Updated:  06/07/18 1122     Glucose 114 mg/dL     Narrative:       Meter: [id number] : [id number] Alexey Barfield    Fibrinogen [[id number]]  (Abnormal) Collected:  06/07/18 0948    Specimen:  Blood Updated:  06/07/18 1025     Fibrinogen 167 (L) mg/dL      Comment: Note new Reference Range       Lactate Dehydrogenase [[id number]]  (Abnormal) Collected:  06/07/18 0719    Specimen:  Blood Updated:  06/07/18 0932      (H) U/L     Reticulocytes [[id number]]  (Normal) Collected:  06/07/18 0719    Specimen:  Blood Updated:  06/07/18 0833     Reticulocyte % 1.45 %      Reticulocyte Absolute 0.0605 10*6/mm3     Osmolality, Calculated [[id number]]  (Normal) Collected:  06/07/18 0719    Specimen:  Blood Updated:  06/07/18 0832     Osmolality Calc 280.5 mOsm/kg     Comprehensive Metabolic Panel [[id number]]  (Abnormal) Collected:  06/07/18 0719    Specimen:  Blood Updated:  06/07/18 0832     Glucose 376 (H) mg/dL      BUN 9 mg/dL      Creatinine 0.68 mg/dL      Sodium 133 (L) mmol/L      Potassium 4.0 mmol/L      Comment: 1+ Hemolysis         Chloride 105 mmol/L      CO2  25.6 mmol/L      Calcium 8.5 mg/dL      Total Protein 5.9 (L) g/dL      Albumin 2.80 (L) g/dL      ALT (SGPT) 44 U/L      AST (SGOT) 44 (H) U/L      Alkaline Phosphatase 160 (H) U/L      Comment: Note New Reference Ranges        Total Bilirubin 0.6 mg/dL      eGFR Non African Amer 122 mL/min/1.73      Globulin 3.1 gm/dL      A/G Ratio 0.9 (L) g/dL      BUN/Creatinine Ratio 13.2     Anion Gap 2.4 (L) mmol/L     Magnesium [[id number]]  (Abnormal) Collected:  06/07/18 0719    Specimen:  Blood Updated:  06/07/18 0831     Magnesium 1.6 (L) mg/dL     Phosphorus [[id number]]  (Normal) Collected:  06/07/18 0719    Specimen:  Blood Updated:  06/07/18 0831     Phosphorus 3.6 mg/dL     CBC & Differential [[id number]] Collected:  06/07/18 0719    Specimen:  Blood Updated:  06/07/18 0815    Narrative:       The following orders were created for panel order CBC & Differential.  Procedure                               Abnormality         Status                     ---------                               -----------         ------                     Scan Slide[[id number]]                                                                  CBC Auto Differential[[id number]]        Abnormal            Final result                 Please view results for these tests on the individual orders.    CBC Auto Differential [[id number]]  (Abnormal) Collected:  06/07/18 0719    Specimen:  Blood Updated:  06/07/18 0815     WBC 2.23 (C) 10*3/mm3      RBC 4.11 (L) 10*6/mm3      Hemoglobin 12.3 (L) g/dL      Hematocrit 36.3 (L) %      MCV 88.3 fL      MCH 29.9 pg      MCHC 33.9 g/dL      RDW 14.5 %      RDW-SD 46.3 fl      MPV 12.9 (H) fL      Platelets 44 (C) 10*3/mm3      Neutrophil % 63.8 %      Lymphocyte % 26.0 %      Monocyte % 7.6 %      Eosinophil % 2.2 %      Basophil % 0.4 %      Immature Grans % 0.0 %      Neutrophils, Absolute 1.42 10*3/mm3      Lymphocytes, Absolute 0.58 (L) 10*3/mm3      Monocytes, Absolute 0.17 10*3/mm3      Eosinophils,  Absolute 0.05 10*3/mm3      Basophils, Absolute 0.01 10*3/mm3      Immature Grans, Absolute 0.00 10*3/mm3     POC Glucose Once [[id number]]  (Abnormal) Collected:  06/07/18 0659    Specimen:  Blood Updated:  06/07/18 0707     Glucose 340 (H) mg/dL     Narrative:       Meter: [id number] : [id number] Britt Leslie    POC Glucose Once [[id number]]  (Abnormal) Collected:  06/06/18 1954    Specimen:  Blood Updated:  06/06/18 2015     Glucose 360 (H) mg/dL     Narrative:       Meter: [id number] : [id number] Britt Leslie    POC Glucose Once [[id number]]  (Abnormal) Collected:  06/06/18 1643    Specimen:  Blood Updated:  06/06/18 1708     Glucose 400 (H) mg/dL     Narrative:       Meter: [id number] : [id number] Bays Carolyne    POC Glucose Once [[id number]]  (Abnormal) Collected:  06/06/18 1617    Specimen:  Blood Updated:  06/06/18 1630     Glucose 408 (H) mg/dL     Narrative:       Meter: [id number] : [id number] Bays Carolyne    POC Glucose Once [[id number]]  (Abnormal) Collected:  06/06/18 1115    Specimen:  Blood Updated:  06/06/18 1124     Glucose 165 (H) mg/dL     Narrative:       Meter: [id number] : [id number] Bays Carolyne    POC Glucose Once [[id number]]  (Abnormal) Collected:  06/06/18 0646    Specimen:  Blood Updated:  06/06/18 0720     Glucose 263 (H) mg/dL     Narrative:       Meter: [id number] : [id number] [facility]    Vitamin D 25 Hydroxy [[id number]] Collected:  06/06/18 0436    Specimen:  Blood Updated:  06/06/18 0546     25 Hydroxy, Vitamin D 25.0 ng/ml     Narrative:       Reference Ranges for Total Vitamin D 25(OH)    Deficiency      <20.0 ng/ml  Insufficiency   20-30 ng/ml  Sufficiency     ng/ml  Toxicity         >100 ng/ml    Vitamin B12 [[id number]]  (Normal) Collected:  06/06/18 0436    Specimen:  Blood Updated:  06/06/18 0546     Vitamin B-12 764 pg/mL     Folate [[id number]]  (Normal) Collected:  06/06/18 0436    Specimen:  Blood Updated:  06/06/18 0546     Folate 12.57 ng/mL      Ferritin [[id number]]  (Normal) Collected:  06/06/18 0436    Specimen:  Blood Updated:  06/06/18 0546     Ferritin 76.00 ng/mL     Iron Profile [[id number]]  (Abnormal) Collected:  06/06/18 0436    Specimen:  Blood Updated:  06/06/18 0542     Iron 40 (L) mcg/dL      TIBC 291 mcg/dL      Iron Saturation 14 (L) %     Lactic Acid, Plasma [[id number]]  (Normal) Collected:  06/06/18 0436    Specimen:  Blood Updated:  06/06/18 0540     Lactate 1.1 mmol/L     Comprehensive Metabolic Panel [[id number]]  (Abnormal) Collected:  06/06/18 0436    Specimen:  Blood Updated:  06/06/18 0538     Glucose 317 (H) mg/dL      BUN 12 mg/dL      Creatinine 0.63 mg/dL      Sodium 135 mmol/L      Potassium 3.9 mmol/L      Chloride 104 mmol/L      CO2 28.9 mmol/L      Calcium 8.5 mg/dL      Total Protein 5.9 (L) g/dL      Albumin 2.80 (L) g/dL      ALT (SGPT) 44 U/L      AST (SGOT) 45 (H) U/L      Alkaline Phosphatase 146 (H) U/L      Comment: Note New Reference Ranges        Total Bilirubin 0.6 mg/dL      eGFR Non African Amer 133 mL/min/1.73      Globulin 3.1 gm/dL      A/G Ratio 0.9 (L) g/dL      BUN/Creatinine Ratio 19.0     Anion Gap 2.1 (L) mmol/L     Osmolality, Calculated [[id number]]  (Normal) Collected:  06/06/18 0436    Specimen:  Blood Updated:  06/06/18 0538     Osmolality Calc 282.0 mOsm/kg     CBC (No Diff) [[id number]]  (Abnormal) Collected:  06/06/18 0436    Specimen:  Blood Updated:  06/06/18 0530     WBC 2.59 (L) 10*3/mm3      RBC 4.05 (L) 10*6/mm3      Hemoglobin 12.5 (L) g/dL      Hematocrit 35.8 (L) %      MCV 88.4 fL      MCH 30.9 pg      MCHC 34.9 g/dL      RDW 14.3 %      RDW-SD 45.1 fl      MPV 10.7 (H) fL      Platelets 38 (C) 10*3/mm3     POC Glucose Once [[id number]]  (Abnormal) Collected:  06/05/18 1959    Specimen:  Blood Updated:  06/05/18 2010     Glucose 305 (H) mg/dL     Narrative:       Meter: [id number] : [id number] Lorenza Nelson    Lactic Acid, Reflex [[id number]]  (Abnormal) Collected:  06/05/18 1732     Specimen:  Blood Updated:  06/05/18 1809     Lactate 2.9 (C) mmol/L     Lactic Acid, Reflex Timer (This will reflex a repeat order 3-3:15 hours after ordered.) [[id number]] Collected:  06/05/18 1246    Specimen:  Blood Updated:  06/05/18 1700     Extra Tube Hold for add-ons.     Comment: Auto resulted.       Protime-INR [[id number]]  (Abnormal) Collected:  06/05/18 1557    Specimen:  Blood Updated:  06/05/18 1633     Protime 16.0 (H) Seconds      Comment: Note new Reference Range        INR 1.26 (H)    Narrative:       Suggested INR therapeutic range for stable oral anticoagulant therapy:    Low Intensity therapy:   1.5-2.0  Moderate Intensity therapy:   2.0-3.0  High Intensity therapy:   2.5-4.0    POC Glucose Once [[id number]]  (Abnormal) Collected:  06/05/18 1616    Specimen:  Blood Updated:  06/05/18 1625     Glucose 410 (H) mg/dL     Narrative:       Meter: [id number] : [id number] Sanjuanita Lainey    CBC & Differential [[id number]] Collected:  06/05/18 1246    Specimen:  Blood Updated:  06/05/18 1615    Narrative:       The following orders were created for panel order CBC & Differential.  Procedure                               Abnormality         Status                     ---------                               -----------         ------                     Scan Slide[[id number]]                                       Final result               CBC Auto Differential[[id number]]        Abnormal            Final result                 Please view results for these tests on the individual orders.    CBC Auto Differential [[id number]]  (Abnormal) Collected:  06/05/18 1246    Specimen:  Blood Updated:  06/05/18 1615     WBC 2.41 (C) 10*3/mm3      RBC 4.45 (L) 10*6/mm3      Hemoglobin 13.3 (L) g/dL      Hematocrit 39.5 (L) %      MCV 88.8 fL      MCH 29.9 pg      MCHC 33.7 g/dL      RDW 14.5 %      RDW-SD 46.9 fl      MPV -- fL      Comment: Unable to calculate.         Platelets 35 (C) 10*3/mm3      Neutrophil % 66.4 %       Lymphocyte % 22.4 %      Monocyte % 8.3 %      Eosinophil % 2.5 %      Basophil % 0.4 %      Immature Grans % 0.0 %      Neutrophils, Absolute 1.60 10*3/mm3      Lymphocytes, Absolute 0.54 (L) 10*3/mm3      Monocytes, Absolute 0.20 10*3/mm3      Eosinophils, Absolute 0.06 10*3/mm3      Basophils, Absolute 0.01 10*3/mm3      Immature Grans, Absolute 0.00 10*3/mm3     Scan Slide [[id number]] Collected:  06/05/18 1246    Specimen:  Blood Updated:  06/05/18 1615     Poikilocytes Slight/1+     Platelet Estimate Decreased    Hemoglobin A1c [[id number]]  (Abnormal) Collected:  06/05/18 1249    Specimen:  Blood Updated:  06/05/18 1412     Hemoglobin A1C 10.40 (H) %     Lactic Acid, Plasma [[id number]]  (Abnormal) Collected:  06/05/18 1246    Specimen:  Blood Updated:  06/05/18 1357     Lactate 2.1 (C) mmol/L     C-reactive Protein [[id number]]  (Normal) Collected:  06/05/18 1246    Specimen:  Blood Updated:  06/05/18 1334     C-Reactive Protein <0.50 mg/dL     Comprehensive Metabolic Panel [[id number]]  (Abnormal) Collected:  06/05/18 1246    Specimen:  Blood Updated:  06/05/18 1329     Glucose 397 (H) mg/dL      BUN 11 mg/dL      Creatinine 0.73 mg/dL      Sodium 133 (L) mmol/L      Potassium 4.2 mmol/L      Chloride 102 mmol/L      CO2 27.3 mmol/L      Calcium 9.0 mg/dL      Total Protein 6.5 g/dL      Albumin 3.10 (L) g/dL      ALT (SGPT) 50 (H) U/L      AST (SGOT) 51 (H) U/L      Alkaline Phosphatase 138 (H) U/L      Comment: Note New Reference Ranges        Total Bilirubin 0.8 mg/dL      eGFR Non African Amer 112 mL/min/1.73      Globulin 3.4 gm/dL      A/G Ratio 0.9 (L) g/dL      BUN/Creatinine Ratio 15.1     Anion Gap 3.7 mmol/L     Osmolality, Calculated [[id number]]  (Normal) Collected:  06/05/18 1246    Specimen:  Blood Updated:  06/05/18 1329     Osmolality Calc 282.4 mOsm/kg     POC Glucose Once [[id number]]  (Abnormal) Collected:  06/05/18 1107    Specimen:  Blood Updated:  06/05/18 1123     Glucose 334 (H) mg/dL      Narrative:       Meter: [id number] : [id number] Marce Abreu    POC Glucose Once [[id number]]  (Abnormal) Collected:  06/05/18 0645    Specimen:  Blood Updated:  06/05/18 0657     Glucose 302 (H) mg/dL     Narrative:       Meter: [id number] : [id number] Britt Leslie    POC Glucose Once [[id number]]  (Abnormal) Collected:  06/04/18 2123    Specimen:  Blood Updated:  06/04/18 2134     Glucose 342 (H) mg/dL     Narrative:       Meter: [id number] : [id number] Britt Leslie          Imaging:  Imaging Results (all)     Procedure Component Value Units Date/Time    XR Abdomen KUB [[id number]] Collected:  06/10/18 0939     Updated:  06/10/18 1008    Narrative:       EXAMINATION: XR ABDOMEN KUB-      CLINICAL INDICATION:Flank pain, constipation        COMPARISON: None      TECHNIQUE: KUB     FINDINGS:   Marked stool throughout consistent with marked constipation. No bowel  obstruction identified. No air-fluid levels. Cholecystectomy clips. Bony  structures within normal limits.       Impression:       Marked constipation.     This report was finalized on 6/10/2018 9:39 AM by Dr. Alexsander Son MD.       US Abdomen Complete [[id number]] Collected:  06/07/18 0951     Updated:  06/07/18 0955    Narrative:       EXAMINATION: US ABDOMEN COMPLETE-         CLINICAL INDICATION:     chronic hep b/c and pancytopenia, evaluate for  liver cirrhosis/splenomegaly     TECHNIQUE: Multiplanar gray scale ultrasound of the abdomen.      COMPARISON: NONE      FINDINGS:   Visualized pancreas is poorly assessed due to bowel gas shadowing..   Cholecystectomy.  The common bile duct measures 4.4 mm and is within normal limits. .  Abnormal appearance of the liver. Markedly heterogeneous echotexture is  noted with with nodular margins indicative of cirrhosis. No perihepatic  ascites identified. No focal liver lesion.  Spleen is     enlarged measuring 15.3 cm in length without focal splenic  abnormality.   The RIGHT kidney measures 10.4 cm  in  length without mass,  hydronephrosis, or shadowing stone.   The LEFT kidney measures 12.3 cm in length without mass, hydronephrosis,  or shadowing stone.   No ascites demonstrated.   IVC shows patency.  There is normal directional flow within the portal  vein.  Aorta assessment limited due to obscuration from bowel gas artifact.       Impression:       1. Heterogeneous and abnormal appearance of the liver with changes of  cirrhosis noted.  2. Splenomegaly.  3. Other nonacute findings as above.      This report was finalized on 6/7/2018 9:53 AM by Dr. Alexsander Son MD.                     Condition on Discharge:  improved    Prognosis:     Vital Signs  Temp:  [97.6 °F (36.4 °C)-98 °F (36.7 °C)] 97.6 °F (36.4 °C)  Heart Rate:  [] 100  Resp:  [18] 18  BP: (103-116)/(70-81) 116/81    Discharge Disposition  Transfer to Another Facility    Discharge Medications     Discharge Medications      New Medications      Instructions Start Date   hydrOXYzine 50 MG tablet  Commonly known as:  ATARAX   Take 1 tablet by mouth Every 6 (Six) Hours As Needed for Anxiety.      mirtazapine 30 MG tablet  Commonly known as:  REMERON   Take 1 tablet by mouth Every Night.         ASK your doctor about these medications      Instructions Start Date   ASPIR-LOW 81 MG EC tablet  Generic drug:  aspirin   Take 1 tablet by mouth Daily.      atorvastatin 20 MG tablet  Commonly known as:  LIPITOR   Take 20 mg by mouth Daily.      citalopram 20 MG tablet  Commonly known as:  CeleXA   Take 20 mg by mouth Every Night.      dicyclomine 10 MG capsule  Commonly known as:  BENTYL   Take 10 mg by mouth 2 (Two) Times a Day.      furosemide 20 MG tablet  Commonly known as:  LASIX   Take 20 mg by mouth Daily.      Insulin Glargine 100 UNIT/ML injection pen  Commonly known as:  BASAGLAR KWIKPEN   Inject 30 Units under the skin Every Night.      insulin lispro 100 UNIT/ML injection  Commonly known as:  humaLOG   Inject 10-35 Units under the skin 3 (Three)  Times a Day With Meals. Prior to [facility] Admission, Patient was on: per sliding scale  Pt unsure of scale just knows the lowest amount of units he does and the highest he has had to do      ipratropium-albuterol  MCG/ACT inhaler  Commonly known as:  COMBIVENT RESPIMAT   Inhale 1 puff 4 (Four) Times a Day.      metoclopramide 10 MG tablet  Commonly known as:  REGLAN   Take 10 mg by mouth 2 (Two) Times a Day.      metoprolol tartrate 25 MG tablet  Commonly known as:  LOPRESSOR   Take 25 mg by mouth Daily.      nitroglycerin 0.4 MG SL tablet  Commonly known as:  NITROSTAT   Place 1 tablet under the tongue Every 5 (Five) Minutes As Needed for Chest Pain. Take no more than 3 doses in 15 minutes.      pantoprazole 40 MG EC tablet  Commonly known as:  PROTONIX   Take 1 tablet by mouth Daily.      polyethylene glycol packet  Commonly known as:  MIRALAX   Take 17 g by mouth 2 (Two) Times a Day.      potassium chloride 20 MEQ CR tablet  Commonly known as:  K-DUR,KLOR-CON   Take 1 tablet by mouth Daily.      spironolactone 25 MG tablet  Commonly known as:  ALDACTONE   Take 25 mg by mouth Daily.      VENTOLIN  (90 Base) MCG/ACT inhaler  Generic drug:  albuterol   Inhale 2 puffs Every 6 (Six) Hours As Needed for Wheezing.      vitamin D 75314 units capsule capsule  Commonly known as:  ERGOCALCIFEROL   Take 50,000 Units by mouth 1 (One) Time Per Week. Prior to [facility] Admission, Patient was on: takes on wednesdays             Discharge Diet: DIABETIC    Activity at Discharge: No restrictions.     Follow-up Appointments: to be admitted to the [facility].           Ankit Barnes MD  06/13/18  9:43 AM  Time spent with the discharge process >30 minutes.     Dictated utilizing Dragon dictation

## 2018-06-13 NOTE — PROGRESS NOTES
Nutrition Services    Patient Name:  Isaias Lang  YOB: 1965  MRN: [id number]  Admit Date:  6/4/2018    Patient was educated on diet management of DM on 6/12/18.  Patient has been educated by RD staff on previous admissions and sometimes declined previously.  Patient doesn't remember ever speaking with a dietitian.  Patient stated he can't see very well and having difficulty reading information.  Patient was verbally educated and provided information.  Patient stated he can have someone help him with information.  RD name and phone number provided.     Electronically signed by:  Telma Apraicio RD  06/13/18 8:55 AM

## 2018-06-13 NOTE — PROGRESS NOTES
The patient had been discharged when an attempt was made to see him.    There is reason to believe that the patient is not adhering to a diabetic diet. He is on the appropriate insulin regimen but he remains uncontrolled. His HbA1C is 10.4, implying chronic hyperglycemia.  The patient will be referred to the nutritionist. We will obtain further studies, including urine microalbumin to help determine the presence of early DM nephropathy, a well as lipid panel.  Patient will need DM eye screening, if he has not had one done in the past year. He will also be recommended for pneumococcal and influenza vaccines if he has not had these.     The patient still actively smokes and is currently on nicotine patch.  He will need an ongoing  to quit tobacco products entirely.    He has been on antibiotic therapy for his finger for 8 days now. It might be appropriate to stop antibiotic at this time.  We will continue to make efforts to control his blood glucose.

## 2018-06-13 NOTE — PLAN OF CARE
"Problem: Patient Care Overview  Goal: Plan of Care Review  Outcome: Ongoing (interventions implemented as appropriate)   06/13/18 0149   Coping/Psychosocial   Plan of Care Reviewed With patient   Coping/Psychosocial   Patient Agreement with Plan of Care agrees   Plan of Care Review   Progress no change   OTHER   Outcome Summary Pt rates anxiety depression 5/10. Pt c/o not getting the \"right medications\". Pt c/o back and leg pain asking for pain medication. Pt irritable. Denies SI HI hallucinations.        Problem: Overarching Goals (Adult)  Goal: Adheres to Safety Considerations for Self and Others  Outcome: Ongoing (interventions implemented as appropriate)    Goal: Optimized Coping Skills in Response to Life Stressors  Outcome: Ongoing (interventions implemented as appropriate)    Goal: Develops/Participates in Therapeutic [place] to Support Successful Transition  Outcome: Ongoing (interventions implemented as appropriate)        "

## 2018-06-13 NOTE — NURSING NOTE
Pt sitting in dayroom c/o increased pain to bilateral legs and back.  Dr PIOTR Barnes notified via telephone and made aware of pt status. No new orders given. Will continue to monitor.

## 2018-07-27 ENCOUNTER — APPOINTMENT (OUTPATIENT)
Dept: GENERAL RADIOLOGY | Facility: HOSPITAL | Age: 53
End: 2018-07-27

## 2018-07-27 ENCOUNTER — HOSPITAL ENCOUNTER (INPATIENT)
Facility: HOSPITAL | Age: 53
LOS: 7 days | Discharge: HOME | End: 2018-08-03
Attending: PSYCHIATRY & NEUROLOGY | Admitting: PSYCHIATRY & NEUROLOGY

## 2018-07-27 ENCOUNTER — HOSPITAL ENCOUNTER (EMERGENCY)
Facility: HOSPITAL | Age: 53
Discharge: STILL ADMITTED OR ADMINISTRATIVE | End: 2018-07-27
Attending: INTERNAL MEDICINE

## 2018-07-27 VITALS
DIASTOLIC BLOOD PRESSURE: 80 MMHG | WEIGHT: 190 LBS | SYSTOLIC BLOOD PRESSURE: 127 MMHG | TEMPERATURE: 98.1 F | BODY MASS INDEX: 28.79 KG/M2 | OXYGEN SATURATION: 100 % | HEART RATE: 108 BPM | RESPIRATION RATE: 18 BRPM | HEIGHT: 68 IN

## 2018-07-27 DIAGNOSIS — K74.60 CIRRHOSIS OF LIVER WITH ASCITES, UNSPECIFIED HEPATIC CIRRHOSIS TYPE (HCC): ICD-10-CM

## 2018-07-27 DIAGNOSIS — R45.851 SUICIDAL THOUGHTS: ICD-10-CM

## 2018-07-27 DIAGNOSIS — R18.8 CIRRHOSIS OF LIVER WITH ASCITES, UNSPECIFIED HEPATIC CIRRHOSIS TYPE (HCC): ICD-10-CM

## 2018-07-27 DIAGNOSIS — D69.6 THROMBOCYTOPENIA (HCC): ICD-10-CM

## 2018-07-27 DIAGNOSIS — F32.A DEPRESSION, UNSPECIFIED DEPRESSION TYPE: Primary | ICD-10-CM

## 2018-07-27 LAB
6-ACETYL MORPHINE: NEGATIVE
ALBUMIN SERPL-MCNC: 3.4 G/DL (ref 3.5–5)
ALBUMIN/GLOB SERPL: 1.1 G/DL (ref 1.5–2.5)
ALP SERPL-CCNC: 213 U/L (ref 40–129)
ALT SERPL W P-5'-P-CCNC: 113 U/L (ref 10–44)
AMMONIA BLD-SCNC: 40 UMOL/L (ref 16–60)
AMPHET+METHAMPHET UR QL: NEGATIVE
ANION GAP SERPL CALCULATED.3IONS-SCNC: 3.1 MMOL/L (ref 3.6–11.2)
AST SERPL-CCNC: 138 U/L (ref 10–34)
BARBITURATES UR QL SCN: NEGATIVE
BASOPHILS # BLD AUTO: 0.02 10*3/MM3 (ref 0–0.3)
BASOPHILS NFR BLD AUTO: 0.7 % (ref 0–2)
BENZODIAZ UR QL SCN: NEGATIVE
BILIRUB SERPL-MCNC: 0.9 MG/DL (ref 0.2–1.8)
BILIRUB UR QL STRIP: NEGATIVE
BNP SERPL-MCNC: 157 PG/ML (ref 0–100)
BUN BLD-MCNC: 16 MG/DL (ref 7–21)
BUN/CREAT SERPL: 22.9 (ref 7–25)
BUPRENORPHINE SERPL-MCNC: NEGATIVE NG/ML
CALCIUM SPEC-SCNC: 8.9 MG/DL (ref 7.7–10)
CANNABINOIDS SERPL QL: NEGATIVE
CHLORIDE SERPL-SCNC: 108 MMOL/L (ref 99–112)
CLARITY UR: CLEAR
CO2 SERPL-SCNC: 26.9 MMOL/L (ref 24.3–31.9)
COCAINE UR QL: NEGATIVE
COLOR UR: YELLOW
CREAT BLD-MCNC: 0.7 MG/DL (ref 0.43–1.29)
DEPRECATED RDW RBC AUTO: 48.6 FL (ref 37–54)
EOSINOPHIL # BLD AUTO: 0.09 10*3/MM3 (ref 0–0.7)
EOSINOPHIL NFR BLD AUTO: 3.1 % (ref 0–5)
ERYTHROCYTE [DISTWIDTH] IN BLOOD BY AUTOMATED COUNT: 14.9 % (ref 11.5–14.5)
ETHANOL BLD-MCNC: <10 MG/DL
ETHANOL UR QL: <0.01 %
GFR SERPL CREATININE-BSD FRML MDRD: 118 ML/MIN/1.73
GLOBULIN UR ELPH-MCNC: 3.2 GM/DL
GLUCOSE BLD-MCNC: 201 MG/DL (ref 70–110)
GLUCOSE BLDC GLUCOMTR-MCNC: 283 MG/DL (ref 70–130)
GLUCOSE UR STRIP-MCNC: ABNORMAL MG/DL
HCT VFR BLD AUTO: 36.5 % (ref 42–52)
HGB BLD-MCNC: 11.8 G/DL (ref 14–18)
HGB UR QL STRIP.AUTO: NEGATIVE
IMM GRANULOCYTES # BLD: 0.01 10*3/MM3 (ref 0–0.03)
IMM GRANULOCYTES NFR BLD: 0.3 % (ref 0–0.5)
KETONES UR QL STRIP: NEGATIVE
LEUKOCYTE ESTERASE UR QL STRIP.AUTO: NEGATIVE
LIPASE SERPL-CCNC: 21 U/L (ref 13–60)
LYMPHOCYTES # BLD AUTO: 0.39 10*3/MM3 (ref 1–3)
LYMPHOCYTES NFR BLD AUTO: 13.4 % (ref 21–51)
MAGNESIUM SERPL-MCNC: 1.6 MG/DL (ref 1.7–2.6)
MCH RBC QN AUTO: 29.2 PG (ref 27–33)
MCHC RBC AUTO-ENTMCNC: 32.3 G/DL (ref 33–37)
MCV RBC AUTO: 90.3 FL (ref 80–94)
METHADONE UR QL SCN: NEGATIVE
MONOCYTES # BLD AUTO: 0.28 10*3/MM3 (ref 0.1–0.9)
MONOCYTES NFR BLD AUTO: 9.6 % (ref 0–10)
NEUTROPHILS # BLD AUTO: 2.12 10*3/MM3 (ref 1.4–6.5)
NEUTROPHILS NFR BLD AUTO: 72.9 % (ref 30–70)
NITRITE UR QL STRIP: NEGATIVE
OPIATES UR QL: NEGATIVE
OSMOLALITY SERPL CALC.SUM OF ELEC: 282.6 MOSM/KG (ref 273–305)
OXYCODONE UR QL SCN: NEGATIVE
PCP UR QL SCN: NEGATIVE
PH UR STRIP.AUTO: 6.5 [PH] (ref 5–8)
PLATELET # BLD AUTO: 46 10*3/MM3 (ref 130–400)
PMV BLD AUTO: 11.5 FL (ref 6–10)
POTASSIUM BLD-SCNC: 4.1 MMOL/L (ref 3.5–5.3)
PROT SERPL-MCNC: 6.6 G/DL (ref 6–8)
PROT UR QL STRIP: NEGATIVE
RBC # BLD AUTO: 4.04 10*6/MM3 (ref 4.7–6.1)
RBC MORPH BLD: NORMAL
SMALL PLATELETS BLD QL SMEAR: NORMAL
SODIUM BLD-SCNC: 138 MMOL/L (ref 135–153)
SP GR UR STRIP: 1.01 (ref 1–1.03)
UROBILINOGEN UR QL STRIP: ABNORMAL
WBC NRBC COR # BLD: 2.91 10*3/MM3 (ref 4.5–12.5)

## 2018-07-27 PROCEDURE — 81003 URINALYSIS AUTO W/O SCOPE: CPT | Performed by: PHYSICIAN ASSISTANT

## 2018-07-27 PROCEDURE — 85025 COMPLETE CBC W/AUTO DIFF WBC: CPT | Performed by: PHYSICIAN ASSISTANT

## 2018-07-27 PROCEDURE — 85007 BL SMEAR W/DIFF WBC COUNT: CPT | Performed by: PHYSICIAN ASSISTANT

## 2018-07-27 PROCEDURE — 93005 ELECTROCARDIOGRAM TRACING: CPT | Performed by: INTERNAL MEDICINE

## 2018-07-27 PROCEDURE — 71046 X-RAY EXAM CHEST 2 VIEWS: CPT

## 2018-07-27 PROCEDURE — 80307 DRUG TEST PRSMV CHEM ANLYZR: CPT | Performed by: PHYSICIAN ASSISTANT

## 2018-07-27 PROCEDURE — 82962 GLUCOSE BLOOD TEST: CPT

## 2018-07-27 PROCEDURE — 82140 ASSAY OF AMMONIA: CPT | Performed by: PHYSICIAN ASSISTANT

## 2018-07-27 PROCEDURE — 83690 ASSAY OF LIPASE: CPT | Performed by: PHYSICIAN ASSISTANT

## 2018-07-27 PROCEDURE — 93005 ELECTROCARDIOGRAM TRACING: CPT | Performed by: PHYSICIAN ASSISTANT

## 2018-07-27 PROCEDURE — 94799 UNLISTED PULMONARY SVC/PX: CPT

## 2018-07-27 PROCEDURE — 93010 ELECTROCARDIOGRAM REPORT: CPT | Performed by: INTERNAL MEDICINE

## 2018-07-27 PROCEDURE — 83735 ASSAY OF MAGNESIUM: CPT | Performed by: PHYSICIAN ASSISTANT

## 2018-07-27 PROCEDURE — 80053 COMPREHEN METABOLIC PANEL: CPT | Performed by: PHYSICIAN ASSISTANT

## 2018-07-27 PROCEDURE — 63710000001 INSULIN DETEMIR PER 5 UNITS: Performed by: PSYCHIATRY & NEUROLOGY

## 2018-07-27 PROCEDURE — HZ2ZZZZ DETOXIFICATION SERVICES FOR SUBSTANCE ABUSE TREATMENT: ICD-10-PCS | Performed by: PSYCHIATRY & NEUROLOGY

## 2018-07-27 PROCEDURE — 71046 X-RAY EXAM CHEST 2 VIEWS: CPT | Performed by: RADIOLOGY

## 2018-07-27 PROCEDURE — 94640 AIRWAY INHALATION TREATMENT: CPT

## 2018-07-27 PROCEDURE — 83880 ASSAY OF NATRIURETIC PEPTIDE: CPT | Performed by: PHYSICIAN ASSISTANT

## 2018-07-27 RX ORDER — MAGNESIUM OXIDE 400 MG/1
400 TABLET ORAL DAILY
Status: ON HOLD | COMMUNITY
End: 2018-08-01

## 2018-07-27 RX ORDER — ALBUTEROL SULFATE 90 UG/1
2 AEROSOL, METERED RESPIRATORY (INHALATION) EVERY 6 HOURS PRN
Status: DISCONTINUED | OUTPATIENT
Start: 2018-07-27 | End: 2018-08-03 | Stop reason: HOSPADM

## 2018-07-27 RX ORDER — FAMOTIDINE 20 MG/1
20 TABLET, FILM COATED ORAL 2 TIMES DAILY PRN
Status: DISCONTINUED | OUTPATIENT
Start: 2018-07-27 | End: 2018-08-03 | Stop reason: HOSPADM

## 2018-07-27 RX ORDER — FUROSEMIDE 20 MG/1
20 TABLET ORAL DAILY
Status: CANCELLED | OUTPATIENT
Start: 2018-07-27

## 2018-07-27 RX ORDER — ONDANSETRON 4 MG/1
4 TABLET, FILM COATED ORAL EVERY 6 HOURS PRN
Status: DISCONTINUED | OUTPATIENT
Start: 2018-07-27 | End: 2018-08-03 | Stop reason: HOSPADM

## 2018-07-27 RX ORDER — METOPROLOL SUCCINATE 25 MG/1
25 TABLET, EXTENDED RELEASE ORAL DAILY
Status: CANCELLED | OUTPATIENT
Start: 2018-07-27

## 2018-07-27 RX ORDER — METOCLOPRAMIDE 10 MG/1
10 TABLET ORAL
Status: CANCELLED | OUTPATIENT
Start: 2018-07-27

## 2018-07-27 RX ORDER — HYDROXYZINE 50 MG/1
50 TABLET, FILM COATED ORAL EVERY 6 HOURS PRN
Status: DISCONTINUED | OUTPATIENT
Start: 2018-07-27 | End: 2018-08-03 | Stop reason: HOSPADM

## 2018-07-27 RX ORDER — CYCLOBENZAPRINE HCL 10 MG
10 TABLET ORAL 3 TIMES DAILY PRN
Status: DISCONTINUED | OUTPATIENT
Start: 2018-07-27 | End: 2018-08-03 | Stop reason: HOSPADM

## 2018-07-27 RX ORDER — OXYCODONE HYDROCHLORIDE 5 MG/1
5 TABLET ORAL EVERY 6 HOURS PRN
COMMUNITY
End: 2018-08-03 | Stop reason: HOSPADM

## 2018-07-27 RX ORDER — FUROSEMIDE 20 MG/1
20 TABLET ORAL DAILY
Status: DISCONTINUED | OUTPATIENT
Start: 2018-07-28 | End: 2018-07-28

## 2018-07-27 RX ORDER — ASPIRIN 81 MG/1
81 TABLET ORAL DAILY
Status: CANCELLED | OUTPATIENT
Start: 2018-07-27

## 2018-07-27 RX ORDER — METOPROLOL SUCCINATE 25 MG/1
25 TABLET, EXTENDED RELEASE ORAL DAILY
Status: ON HOLD | COMMUNITY
End: 2018-08-01

## 2018-07-27 RX ORDER — DEXTROSE MONOHYDRATE 25 G/50ML
25 INJECTION, SOLUTION INTRAVENOUS
Status: DISCONTINUED | OUTPATIENT
Start: 2018-07-27 | End: 2018-08-03 | Stop reason: HOSPADM

## 2018-07-27 RX ORDER — DICYCLOMINE HYDROCHLORIDE 10 MG/1
10 CAPSULE ORAL 2 TIMES DAILY
Status: DISCONTINUED | OUTPATIENT
Start: 2018-07-27 | End: 2018-08-03 | Stop reason: HOSPADM

## 2018-07-27 RX ORDER — IBUPROFEN 600 MG/1
600 TABLET ORAL EVERY 6 HOURS PRN
Status: DISCONTINUED | OUTPATIENT
Start: 2018-07-27 | End: 2018-08-03 | Stop reason: HOSPADM

## 2018-07-27 RX ORDER — CYCLOBENZAPRINE HCL 10 MG
10 TABLET ORAL 3 TIMES DAILY PRN
Status: CANCELLED | OUTPATIENT
Start: 2018-07-27

## 2018-07-27 RX ORDER — IBUPROFEN 800 MG/1
800 TABLET ORAL EVERY 8 HOURS PRN
COMMUNITY
End: 2018-08-03 | Stop reason: HOSPADM

## 2018-07-27 RX ORDER — BENZTROPINE MESYLATE 1 MG/1
1 TABLET ORAL DAILY PRN
Status: DISCONTINUED | OUTPATIENT
Start: 2018-07-27 | End: 2018-08-02

## 2018-07-27 RX ORDER — AMITRIPTYLINE HYDROCHLORIDE 25 MG/1
25 TABLET, FILM COATED ORAL NIGHTLY
COMMUNITY
End: 2018-08-03 | Stop reason: HOSPADM

## 2018-07-27 RX ORDER — ONDANSETRON 4 MG/1
4 TABLET, ORALLY DISINTEGRATING ORAL EVERY 6 HOURS PRN
COMMUNITY
End: 2018-08-03 | Stop reason: HOSPADM

## 2018-07-27 RX ORDER — ATORVASTATIN CALCIUM 20 MG/1
20 TABLET, FILM COATED ORAL DAILY
Status: CANCELLED | OUTPATIENT
Start: 2018-07-27

## 2018-07-27 RX ORDER — IPRATROPIUM BROMIDE AND ALBUTEROL SULFATE 2.5; .5 MG/3ML; MG/3ML
3 SOLUTION RESPIRATORY (INHALATION)
Status: DISCONTINUED | OUTPATIENT
Start: 2018-07-27 | End: 2018-07-29

## 2018-07-27 RX ORDER — BENZTROPINE MESYLATE 1 MG/ML
0.5 INJECTION INTRAMUSCULAR; INTRAVENOUS DAILY PRN
Status: DISCONTINUED | OUTPATIENT
Start: 2018-07-27 | End: 2018-08-02

## 2018-07-27 RX ORDER — CYCLOBENZAPRINE HCL 10 MG
10 TABLET ORAL 3 TIMES DAILY PRN
Status: ON HOLD | COMMUNITY
End: 2018-08-01

## 2018-07-27 RX ORDER — IBUPROFEN 400 MG/1
800 TABLET ORAL EVERY 8 HOURS PRN
Status: CANCELLED | OUTPATIENT
Start: 2018-07-27

## 2018-07-27 RX ORDER — IPRATROPIUM BROMIDE AND ALBUTEROL SULFATE 2.5; .5 MG/3ML; MG/3ML
3 SOLUTION RESPIRATORY (INHALATION) EVERY 6 HOURS
COMMUNITY
End: 2018-08-03 | Stop reason: HOSPADM

## 2018-07-27 RX ORDER — ERGOCALCIFEROL 1.25 MG/1
50000 CAPSULE ORAL WEEKLY
Status: CANCELLED | OUTPATIENT
Start: 2018-07-27

## 2018-07-27 RX ORDER — OXYCODONE HYDROCHLORIDE 5 MG/1
5 TABLET ORAL EVERY 6 HOURS PRN
Status: CANCELLED | OUTPATIENT
Start: 2018-07-27

## 2018-07-27 RX ORDER — HYDROCODONE BITARTRATE AND ACETAMINOPHEN 5; 325 MG/1; MG/1
1 TABLET ORAL EVERY 6 HOURS PRN
Status: ON HOLD | COMMUNITY
End: 2018-07-27

## 2018-07-27 RX ORDER — AMITRIPTYLINE HYDROCHLORIDE 25 MG/1
25 TABLET, FILM COATED ORAL NIGHTLY
Status: DISCONTINUED | OUTPATIENT
Start: 2018-07-27 | End: 2018-08-02

## 2018-07-27 RX ORDER — ACETAMINOPHEN 500 MG
500 TABLET ORAL EVERY 6 HOURS PRN
COMMUNITY
End: 2018-08-03 | Stop reason: HOSPADM

## 2018-07-27 RX ORDER — ONDANSETRON 4 MG/1
4 TABLET, ORALLY DISINTEGRATING ORAL EVERY 6 HOURS PRN
Status: CANCELLED | OUTPATIENT
Start: 2018-07-27

## 2018-07-27 RX ORDER — ASPIRIN 81 MG/1
81 TABLET ORAL DAILY
Status: DISCONTINUED | OUTPATIENT
Start: 2018-07-28 | End: 2018-07-30

## 2018-07-27 RX ORDER — DIPHENHYDRAMINE HYDROCHLORIDE 50 MG/ML
12.5 INJECTION INTRAMUSCULAR; INTRAVENOUS EVERY 6 HOURS PRN
Status: DISCONTINUED | OUTPATIENT
Start: 2018-07-27 | End: 2018-07-27 | Stop reason: HOSPADM

## 2018-07-27 RX ORDER — IPRATROPIUM BROMIDE AND ALBUTEROL SULFATE 2.5; .5 MG/3ML; MG/3ML
3 SOLUTION RESPIRATORY (INHALATION) EVERY 6 HOURS PRN
Status: CANCELLED | OUTPATIENT
Start: 2018-07-27

## 2018-07-27 RX ORDER — ECHINACEA PURPUREA EXTRACT 125 MG
2 TABLET ORAL AS NEEDED
Status: DISCONTINUED | OUTPATIENT
Start: 2018-07-27 | End: 2018-08-03 | Stop reason: HOSPADM

## 2018-07-27 RX ORDER — ACETAMINOPHEN 500 MG
500 TABLET ORAL EVERY 6 HOURS PRN
Status: CANCELLED | OUTPATIENT
Start: 2018-07-27

## 2018-07-27 RX ORDER — DEXTROSE MONOHYDRATE 25 G/50ML
25 INJECTION, SOLUTION INTRAVENOUS
Status: DISCONTINUED | OUTPATIENT
Start: 2018-07-27 | End: 2018-07-27 | Stop reason: SDUPTHER

## 2018-07-27 RX ORDER — DICYCLOMINE HYDROCHLORIDE 10 MG/1
10 CAPSULE ORAL 2 TIMES DAILY
Status: CANCELLED | OUTPATIENT
Start: 2018-07-27

## 2018-07-27 RX ORDER — DICYCLOMINE HYDROCHLORIDE 10 MG/1
10 CAPSULE ORAL 2 TIMES DAILY
Status: ON HOLD | COMMUNITY
End: 2018-08-01

## 2018-07-27 RX ORDER — METOPROLOL SUCCINATE 25 MG/1
25 TABLET, EXTENDED RELEASE ORAL DAILY
Status: DISCONTINUED | OUTPATIENT
Start: 2018-07-28 | End: 2018-08-03 | Stop reason: HOSPADM

## 2018-07-27 RX ORDER — NICOTINE 21 MG/24HR
1 PATCH, TRANSDERMAL 24 HOURS TRANSDERMAL
Status: DISCONTINUED | OUTPATIENT
Start: 2018-07-27 | End: 2018-08-03 | Stop reason: HOSPADM

## 2018-07-27 RX ORDER — BENZONATATE 100 MG/1
100 CAPSULE ORAL 3 TIMES DAILY PRN
Status: DISCONTINUED | OUTPATIENT
Start: 2018-07-27 | End: 2018-08-03 | Stop reason: HOSPADM

## 2018-07-27 RX ORDER — GABAPENTIN 800 MG/1
800 TABLET ORAL 3 TIMES DAILY
Status: ON HOLD | COMMUNITY
End: 2018-07-27

## 2018-07-27 RX ORDER — NITROGLYCERIN 0.4 MG/1
0.4 TABLET SUBLINGUAL
Status: CANCELLED | OUTPATIENT
Start: 2018-07-27

## 2018-07-27 RX ORDER — IBUPROFEN 400 MG/1
400 TABLET ORAL ONCE
Status: COMPLETED | OUTPATIENT
Start: 2018-07-27 | End: 2018-07-27

## 2018-07-27 RX ORDER — IPRATROPIUM BROMIDE AND ALBUTEROL SULFATE 2.5; .5 MG/3ML; MG/3ML
3 SOLUTION RESPIRATORY (INHALATION) EVERY 6 HOURS
Status: CANCELLED | OUTPATIENT
Start: 2018-07-27

## 2018-07-27 RX ORDER — SODIUM CHLORIDE 0.9 % (FLUSH) 0.9 %
10 SYRINGE (ML) INJECTION AS NEEDED
Status: DISCONTINUED | OUTPATIENT
Start: 2018-07-27 | End: 2018-07-27 | Stop reason: HOSPADM

## 2018-07-27 RX ORDER — PANTOPRAZOLE SODIUM 40 MG/1
40 TABLET, DELAYED RELEASE ORAL DAILY
Status: DISCONTINUED | OUTPATIENT
Start: 2018-07-28 | End: 2018-08-03 | Stop reason: HOSPADM

## 2018-07-27 RX ORDER — NICOTINE POLACRILEX 4 MG
15 LOZENGE BUCCAL
Status: DISCONTINUED | OUTPATIENT
Start: 2018-07-27 | End: 2018-07-27 | Stop reason: SDUPTHER

## 2018-07-27 RX ORDER — ALBUTEROL SULFATE 90 UG/1
2 AEROSOL, METERED RESPIRATORY (INHALATION) EVERY 6 HOURS PRN
Status: CANCELLED | OUTPATIENT
Start: 2018-07-27

## 2018-07-27 RX ORDER — NITROGLYCERIN 0.4 MG/1
0.4 TABLET SUBLINGUAL
Status: DISCONTINUED | OUTPATIENT
Start: 2018-07-27 | End: 2018-08-03 | Stop reason: HOSPADM

## 2018-07-27 RX ORDER — PANTOPRAZOLE SODIUM 40 MG/1
40 TABLET, DELAYED RELEASE ORAL DAILY
Status: CANCELLED | OUTPATIENT
Start: 2018-07-27

## 2018-07-27 RX ORDER — ALUMINA, MAGNESIA, AND SIMETHICONE 2400; 2400; 240 MG/30ML; MG/30ML; MG/30ML
15 SUSPENSION ORAL EVERY 6 HOURS PRN
Status: DISCONTINUED | OUTPATIENT
Start: 2018-07-27 | End: 2018-08-03 | Stop reason: HOSPADM

## 2018-07-27 RX ORDER — SPIRONOLACTONE 25 MG/1
25 TABLET ORAL DAILY
Status: DISCONTINUED | OUTPATIENT
Start: 2018-07-28 | End: 2018-08-03 | Stop reason: HOSPADM

## 2018-07-27 RX ORDER — SPIRONOLACTONE 25 MG/1
25 TABLET ORAL DAILY
Status: CANCELLED | OUTPATIENT
Start: 2018-07-27

## 2018-07-27 RX ORDER — POLYETHYLENE GLYCOL 3350 17 G/17G
17 POWDER, FOR SOLUTION ORAL DAILY
Status: DISCONTINUED | OUTPATIENT
Start: 2018-07-28 | End: 2018-08-03 | Stop reason: HOSPADM

## 2018-07-27 RX ORDER — OXYCODONE HYDROCHLORIDE 5 MG/1
5 TABLET ORAL EVERY 6 HOURS PRN
Status: DISCONTINUED | OUTPATIENT
Start: 2018-07-27 | End: 2018-08-03 | Stop reason: HOSPADM

## 2018-07-27 RX ORDER — LOPERAMIDE HYDROCHLORIDE 2 MG/1
2 CAPSULE ORAL 4 TIMES DAILY PRN
Status: DISCONTINUED | OUTPATIENT
Start: 2018-07-27 | End: 2018-08-03 | Stop reason: HOSPADM

## 2018-07-27 RX ORDER — HYDROXYZINE PAMOATE 50 MG/1
50 CAPSULE ORAL EVERY 6 HOURS PRN
COMMUNITY
End: 2018-08-03 | Stop reason: HOSPADM

## 2018-07-27 RX ORDER — POTASSIUM CHLORIDE 20 MEQ/1
20 TABLET, EXTENDED RELEASE ORAL DAILY
Status: CANCELLED | OUTPATIENT
Start: 2018-07-27

## 2018-07-27 RX ORDER — POTASSIUM CHLORIDE 20 MEQ/1
20 TABLET, EXTENDED RELEASE ORAL DAILY
Status: DISCONTINUED | OUTPATIENT
Start: 2018-07-28 | End: 2018-08-03 | Stop reason: HOSPADM

## 2018-07-27 RX ORDER — AMITRIPTYLINE HYDROCHLORIDE 25 MG/1
25 TABLET, FILM COATED ORAL NIGHTLY
Status: CANCELLED | OUTPATIENT
Start: 2018-07-27

## 2018-07-27 RX ORDER — NICOTINE POLACRILEX 4 MG
15 LOZENGE BUCCAL
Status: DISCONTINUED | OUTPATIENT
Start: 2018-07-27 | End: 2018-08-03 | Stop reason: HOSPADM

## 2018-07-27 RX ORDER — HYDROXYZINE 50 MG/1
50 TABLET, FILM COATED ORAL EVERY 6 HOURS PRN
Status: CANCELLED | OUTPATIENT
Start: 2018-07-27

## 2018-07-27 RX ORDER — TRAZODONE HYDROCHLORIDE 50 MG/1
50 TABLET ORAL NIGHTLY PRN
Status: DISCONTINUED | OUTPATIENT
Start: 2018-07-27 | End: 2018-08-03 | Stop reason: HOSPADM

## 2018-07-27 RX ORDER — POLYETHYLENE GLYCOL 3350 17 G/17G
17 POWDER, FOR SOLUTION ORAL DAILY
Status: CANCELLED | OUTPATIENT
Start: 2018-07-27

## 2018-07-27 RX ADMIN — CYCLOBENZAPRINE HYDROCHLORIDE 10 MG: 10 TABLET, FILM COATED ORAL at 21:08

## 2018-07-27 RX ADMIN — OXYCODONE HYDROCHLORIDE 5 MG: 5 TABLET ORAL at 21:08

## 2018-07-27 RX ADMIN — INSULIN DETEMIR 20 UNITS: 100 INJECTION, SOLUTION SUBCUTANEOUS at 21:10

## 2018-07-27 RX ADMIN — AMITRIPTYLINE HYDROCHLORIDE 25 MG: 25 TABLET, FILM COATED ORAL at 21:08

## 2018-07-27 RX ADMIN — IBUPROFEN 400 MG: 400 TABLET, FILM COATED ORAL at 11:10

## 2018-07-27 RX ADMIN — DICYCLOMINE HYDROCHLORIDE 10 MG: 10 CAPSULE ORAL at 21:08

## 2018-07-27 RX ADMIN — IPRATROPIUM BROMIDE AND ALBUTEROL SULFATE 3 ML: .5; 3 SOLUTION RESPIRATORY (INHALATION) at 20:16

## 2018-07-28 LAB
GLUCOSE BLDC GLUCOMTR-MCNC: 177 MG/DL (ref 70–130)
GLUCOSE BLDC GLUCOMTR-MCNC: 199 MG/DL (ref 70–130)
GLUCOSE BLDC GLUCOMTR-MCNC: 302 MG/DL (ref 70–130)
GLUCOSE BLDC GLUCOMTR-MCNC: 73 MG/DL (ref 70–130)

## 2018-07-28 PROCEDURE — 63710000001 INSULIN DETEMIR PER 5 UNITS: Performed by: PSYCHIATRY & NEUROLOGY

## 2018-07-28 PROCEDURE — 99223 1ST HOSP IP/OBS HIGH 75: CPT | Performed by: PSYCHIATRY & NEUROLOGY

## 2018-07-28 PROCEDURE — 63710000001 INSULIN ASPART PER 5 UNITS: Performed by: PSYCHIATRY & NEUROLOGY

## 2018-07-28 PROCEDURE — 94799 UNLISTED PULMONARY SVC/PX: CPT

## 2018-07-28 PROCEDURE — 82962 GLUCOSE BLOOD TEST: CPT

## 2018-07-28 RX ORDER — FUROSEMIDE 40 MG/1
40 TABLET ORAL DAILY
Status: DISCONTINUED | OUTPATIENT
Start: 2018-07-29 | End: 2018-08-03 | Stop reason: HOSPADM

## 2018-07-28 RX ADMIN — OXYCODONE HYDROCHLORIDE 5 MG: 5 TABLET ORAL at 09:57

## 2018-07-28 RX ADMIN — INSULIN DETEMIR 20 UNITS: 100 INJECTION, SOLUTION SUBCUTANEOUS at 20:27

## 2018-07-28 RX ADMIN — DICYCLOMINE HYDROCHLORIDE 10 MG: 10 CAPSULE ORAL at 20:27

## 2018-07-28 RX ADMIN — IPRATROPIUM BROMIDE AND ALBUTEROL SULFATE 3 ML: .5; 3 SOLUTION RESPIRATORY (INHALATION) at 12:54

## 2018-07-28 RX ADMIN — OXYCODONE HYDROCHLORIDE 5 MG: 5 TABLET ORAL at 17:16

## 2018-07-28 RX ADMIN — PANTOPRAZOLE SODIUM 40 MG: 40 TABLET, DELAYED RELEASE ORAL at 09:50

## 2018-07-28 RX ADMIN — INSULIN ASPART 20 UNITS: 100 INJECTION, SOLUTION INTRAVENOUS; SUBCUTANEOUS at 11:14

## 2018-07-28 RX ADMIN — INSULIN ASPART 20 UNITS: 100 INJECTION, SOLUTION INTRAVENOUS; SUBCUTANEOUS at 17:17

## 2018-07-28 RX ADMIN — INSULIN ASPART 20 UNITS: 100 INJECTION, SOLUTION INTRAVENOUS; SUBCUTANEOUS at 09:49

## 2018-07-28 RX ADMIN — MAGNESIUM GLUCONATE 500 MG ORAL TABLET 400 MG: 500 TABLET ORAL at 09:50

## 2018-07-28 RX ADMIN — METOPROLOL SUCCINATE 25 MG: 25 TABLET, FILM COATED, EXTENDED RELEASE ORAL at 09:50

## 2018-07-28 RX ADMIN — CYCLOBENZAPRINE HYDROCHLORIDE 10 MG: 10 TABLET, FILM COATED ORAL at 20:37

## 2018-07-28 RX ADMIN — FUROSEMIDE 20 MG: 20 TABLET ORAL at 09:50

## 2018-07-28 RX ADMIN — ASPIRIN 81 MG: 81 TABLET, DELAYED RELEASE ORAL at 09:50

## 2018-07-28 RX ADMIN — AMITRIPTYLINE HYDROCHLORIDE 25 MG: 25 TABLET, FILM COATED ORAL at 20:27

## 2018-07-28 RX ADMIN — POLYETHYLENE GLYCOL (3350) 17 G: 17 POWDER, FOR SOLUTION ORAL at 09:58

## 2018-07-28 RX ADMIN — OXYCODONE HYDROCHLORIDE 5 MG: 5 TABLET ORAL at 23:11

## 2018-07-28 RX ADMIN — DICYCLOMINE HYDROCHLORIDE 10 MG: 10 CAPSULE ORAL at 09:50

## 2018-07-28 RX ADMIN — SPIRONOLACTONE 25 MG: 25 TABLET ORAL at 09:50

## 2018-07-28 RX ADMIN — IPRATROPIUM BROMIDE AND ALBUTEROL SULFATE 3 ML: .5; 3 SOLUTION RESPIRATORY (INHALATION) at 20:39

## 2018-07-28 RX ADMIN — OFLOXACIN 50000 UNITS: 300 TABLET, COATED ORAL at 09:51

## 2018-07-28 RX ADMIN — ALBUTEROL SULFATE 2 PUFF: 90 AEROSOL, METERED RESPIRATORY (INHALATION) at 23:08

## 2018-07-28 RX ADMIN — POTASSIUM CHLORIDE 20 MEQ: 1500 TABLET, EXTENDED RELEASE ORAL at 09:50

## 2018-07-29 LAB
ANION GAP SERPL CALCULATED.3IONS-SCNC: 6 MMOL/L (ref 3.6–11.2)
BUN BLD-MCNC: 12 MG/DL (ref 7–21)
BUN/CREAT SERPL: 18.5 (ref 7–25)
CALCIUM SPEC-SCNC: 9 MG/DL (ref 7.7–10)
CHLORIDE SERPL-SCNC: 106 MMOL/L (ref 99–112)
CO2 SERPL-SCNC: 26 MMOL/L (ref 24.3–31.9)
CREAT BLD-MCNC: 0.65 MG/DL (ref 0.43–1.29)
GFR SERPL CREATININE-BSD FRML MDRD: 129 ML/MIN/1.73
GLUCOSE BLD-MCNC: 73 MG/DL (ref 70–110)
GLUCOSE BLDC GLUCOMTR-MCNC: 148 MG/DL (ref 70–130)
GLUCOSE BLDC GLUCOMTR-MCNC: 190 MG/DL (ref 70–130)
GLUCOSE BLDC GLUCOMTR-MCNC: 209 MG/DL (ref 70–130)
OSMOLALITY SERPL CALC.SUM OF ELEC: 274 MOSM/KG (ref 273–305)
POTASSIUM BLD-SCNC: 4 MMOL/L (ref 3.5–5.3)
SODIUM BLD-SCNC: 138 MMOL/L (ref 135–153)

## 2018-07-29 PROCEDURE — 94799 UNLISTED PULMONARY SVC/PX: CPT

## 2018-07-29 PROCEDURE — 63710000001 INSULIN DETEMIR PER 5 UNITS: Performed by: PSYCHIATRY & NEUROLOGY

## 2018-07-29 PROCEDURE — 82962 GLUCOSE BLOOD TEST: CPT

## 2018-07-29 PROCEDURE — 80048 BASIC METABOLIC PNL TOTAL CA: CPT | Performed by: PSYCHIATRY & NEUROLOGY

## 2018-07-29 PROCEDURE — 63710000001 INSULIN ASPART PER 5 UNITS: Performed by: PSYCHIATRY & NEUROLOGY

## 2018-07-29 PROCEDURE — 99232 SBSQ HOSP IP/OBS MODERATE 35: CPT | Performed by: PSYCHIATRY & NEUROLOGY

## 2018-07-29 RX ORDER — IPRATROPIUM BROMIDE AND ALBUTEROL SULFATE 2.5; .5 MG/3ML; MG/3ML
3 SOLUTION RESPIRATORY (INHALATION) 2 TIMES DAILY
Status: DISCONTINUED | OUTPATIENT
Start: 2018-07-29 | End: 2018-07-29

## 2018-07-29 RX ORDER — MIRTAZAPINE 15 MG/1
30 TABLET, FILM COATED ORAL NIGHTLY
Status: DISCONTINUED | OUTPATIENT
Start: 2018-07-29 | End: 2018-08-03 | Stop reason: HOSPADM

## 2018-07-29 RX ORDER — METOCLOPRAMIDE 10 MG/1
10 TABLET ORAL
Status: DISCONTINUED | OUTPATIENT
Start: 2018-07-29 | End: 2018-08-03 | Stop reason: HOSPADM

## 2018-07-29 RX ORDER — IPRATROPIUM BROMIDE AND ALBUTEROL SULFATE 2.5; .5 MG/3ML; MG/3ML
3 SOLUTION RESPIRATORY (INHALATION) 2 TIMES DAILY
Status: DISCONTINUED | OUTPATIENT
Start: 2018-07-29 | End: 2018-08-03 | Stop reason: HOSPADM

## 2018-07-29 RX ADMIN — DICYCLOMINE HYDROCHLORIDE 10 MG: 10 CAPSULE ORAL at 21:00

## 2018-07-29 RX ADMIN — IPRATROPIUM BROMIDE AND ALBUTEROL SULFATE 3 ML: .5; 3 SOLUTION RESPIRATORY (INHALATION) at 20:43

## 2018-07-29 RX ADMIN — FUROSEMIDE 40 MG: 40 TABLET ORAL at 08:48

## 2018-07-29 RX ADMIN — ALBUTEROL SULFATE 2 PUFF: 90 AEROSOL, METERED RESPIRATORY (INHALATION) at 04:06

## 2018-07-29 RX ADMIN — PANTOPRAZOLE SODIUM 40 MG: 40 TABLET, DELAYED RELEASE ORAL at 08:48

## 2018-07-29 RX ADMIN — SPIRONOLACTONE 25 MG: 25 TABLET ORAL at 08:48

## 2018-07-29 RX ADMIN — POLYETHYLENE GLYCOL (3350) 17 G: 17 POWDER, FOR SOLUTION ORAL at 08:48

## 2018-07-29 RX ADMIN — METOCLOPRAMIDE 10 MG: 10 TABLET ORAL at 11:30

## 2018-07-29 RX ADMIN — ASPIRIN 81 MG: 81 TABLET, DELAYED RELEASE ORAL at 08:48

## 2018-07-29 RX ADMIN — METOCLOPRAMIDE 10 MG: 10 TABLET ORAL at 16:58

## 2018-07-29 RX ADMIN — MAGNESIUM GLUCONATE 500 MG ORAL TABLET 400 MG: 500 TABLET ORAL at 08:48

## 2018-07-29 RX ADMIN — INSULIN ASPART 20 UNITS: 100 INJECTION, SOLUTION INTRAVENOUS; SUBCUTANEOUS at 17:01

## 2018-07-29 RX ADMIN — MIRTAZAPINE 30 MG: 15 TABLET, FILM COATED ORAL at 21:00

## 2018-07-29 RX ADMIN — OXYCODONE HYDROCHLORIDE 5 MG: 5 TABLET ORAL at 22:15

## 2018-07-29 RX ADMIN — METOCLOPRAMIDE 10 MG: 10 TABLET ORAL at 09:23

## 2018-07-29 RX ADMIN — POTASSIUM CHLORIDE 20 MEQ: 1500 TABLET, EXTENDED RELEASE ORAL at 08:48

## 2018-07-29 RX ADMIN — INSULIN DETEMIR 20 UNITS: 100 INJECTION, SOLUTION SUBCUTANEOUS at 21:00

## 2018-07-29 RX ADMIN — DICYCLOMINE HYDROCHLORIDE 10 MG: 10 CAPSULE ORAL at 08:48

## 2018-07-29 RX ADMIN — CYCLOBENZAPRINE HYDROCHLORIDE 10 MG: 10 TABLET, FILM COATED ORAL at 22:15

## 2018-07-29 RX ADMIN — INSULIN ASPART 20 UNITS: 100 INJECTION, SOLUTION INTRAVENOUS; SUBCUTANEOUS at 08:36

## 2018-07-29 RX ADMIN — OXYCODONE HYDROCHLORIDE 5 MG: 5 TABLET ORAL at 16:58

## 2018-07-29 RX ADMIN — OXYCODONE HYDROCHLORIDE 5 MG: 5 TABLET ORAL at 09:00

## 2018-07-29 RX ADMIN — AMITRIPTYLINE HYDROCHLORIDE 25 MG: 25 TABLET, FILM COATED ORAL at 21:00

## 2018-07-30 ENCOUNTER — APPOINTMENT (OUTPATIENT)
Dept: ULTRASOUND IMAGING | Facility: HOSPITAL | Age: 53
End: 2018-07-30

## 2018-07-30 PROBLEM — F19.20 POLYSUBSTANCE DEPENDENCE INCLUDING OPIOID TYPE DRUG WITH COMPLICATION, EPISODIC ABUSE (HCC): Status: ACTIVE | Noted: 2018-06-05

## 2018-07-30 LAB
ANION GAP SERPL CALCULATED.3IONS-SCNC: 6 MMOL/L (ref 3.6–11.2)
BUN BLD-MCNC: 14 MG/DL (ref 7–21)
BUN/CREAT SERPL: 21.5 (ref 7–25)
CALCIUM SPEC-SCNC: 8.9 MG/DL (ref 7.7–10)
CHLORIDE SERPL-SCNC: 105 MMOL/L (ref 99–112)
CO2 SERPL-SCNC: 28 MMOL/L (ref 24.3–31.9)
CREAT BLD-MCNC: 0.65 MG/DL (ref 0.43–1.29)
GFR SERPL CREATININE-BSD FRML MDRD: 129 ML/MIN/1.73
GLUCOSE BLD-MCNC: 173 MG/DL (ref 70–110)
GLUCOSE BLDC GLUCOMTR-MCNC: 150 MG/DL (ref 70–130)
GLUCOSE BLDC GLUCOMTR-MCNC: 180 MG/DL (ref 70–130)
GLUCOSE BLDC GLUCOMTR-MCNC: 185 MG/DL (ref 70–130)
GLUCOSE BLDC GLUCOMTR-MCNC: 226 MG/DL (ref 70–130)
OSMOLALITY SERPL CALC.SUM OF ELEC: 282.2 MOSM/KG (ref 273–305)
POTASSIUM BLD-SCNC: 3.8 MMOL/L (ref 3.5–5.3)
SODIUM BLD-SCNC: 139 MMOL/L (ref 135–153)

## 2018-07-30 PROCEDURE — 94799 UNLISTED PULMONARY SVC/PX: CPT

## 2018-07-30 PROCEDURE — 63710000001 INSULIN ASPART PER 5 UNITS: Performed by: PSYCHIATRY & NEUROLOGY

## 2018-07-30 PROCEDURE — 80048 BASIC METABOLIC PNL TOTAL CA: CPT | Performed by: PSYCHIATRY & NEUROLOGY

## 2018-07-30 PROCEDURE — 76705 ECHO EXAM OF ABDOMEN: CPT

## 2018-07-30 PROCEDURE — 63710000001 INSULIN DETEMIR PER 5 UNITS: Performed by: PSYCHIATRY & NEUROLOGY

## 2018-07-30 PROCEDURE — 76705 ECHO EXAM OF ABDOMEN: CPT | Performed by: RADIOLOGY

## 2018-07-30 PROCEDURE — 99232 SBSQ HOSP IP/OBS MODERATE 35: CPT | Performed by: PSYCHIATRY & NEUROLOGY

## 2018-07-30 PROCEDURE — 82962 GLUCOSE BLOOD TEST: CPT

## 2018-07-30 RX ADMIN — MIRTAZAPINE 30 MG: 15 TABLET, FILM COATED ORAL at 20:43

## 2018-07-30 RX ADMIN — MAGNESIUM GLUCONATE 500 MG ORAL TABLET 400 MG: 500 TABLET ORAL at 09:21

## 2018-07-30 RX ADMIN — METOCLOPRAMIDE 10 MG: 10 TABLET ORAL at 06:31

## 2018-07-30 RX ADMIN — IPRATROPIUM BROMIDE AND ALBUTEROL SULFATE 3 ML: .5; 3 SOLUTION RESPIRATORY (INHALATION) at 13:20

## 2018-07-30 RX ADMIN — AMITRIPTYLINE HYDROCHLORIDE 25 MG: 25 TABLET, FILM COATED ORAL at 20:43

## 2018-07-30 RX ADMIN — OXYCODONE HYDROCHLORIDE 5 MG: 5 TABLET ORAL at 17:20

## 2018-07-30 RX ADMIN — DICYCLOMINE HYDROCHLORIDE 10 MG: 10 CAPSULE ORAL at 20:43

## 2018-07-30 RX ADMIN — METOCLOPRAMIDE 10 MG: 10 TABLET ORAL at 16:45

## 2018-07-30 RX ADMIN — FUROSEMIDE 40 MG: 40 TABLET ORAL at 09:21

## 2018-07-30 RX ADMIN — IPRATROPIUM BROMIDE AND ALBUTEROL SULFATE 3 ML: .5; 3 SOLUTION RESPIRATORY (INHALATION) at 20:01

## 2018-07-30 RX ADMIN — METOPROLOL SUCCINATE 25 MG: 25 TABLET, FILM COATED, EXTENDED RELEASE ORAL at 09:21

## 2018-07-30 RX ADMIN — DICYCLOMINE HYDROCHLORIDE 10 MG: 10 CAPSULE ORAL at 09:21

## 2018-07-30 RX ADMIN — INSULIN DETEMIR 20 UNITS: 100 INJECTION, SOLUTION SUBCUTANEOUS at 20:39

## 2018-07-30 RX ADMIN — OXYCODONE HYDROCHLORIDE 5 MG: 5 TABLET ORAL at 09:55

## 2018-07-30 RX ADMIN — SPIRONOLACTONE 25 MG: 25 TABLET ORAL at 09:21

## 2018-07-30 RX ADMIN — METOCLOPRAMIDE 10 MG: 10 TABLET ORAL at 11:01

## 2018-07-30 RX ADMIN — IBUPROFEN 600 MG: 600 TABLET ORAL at 20:42

## 2018-07-30 RX ADMIN — INSULIN ASPART 20 UNITS: 100 INJECTION, SOLUTION INTRAVENOUS; SUBCUTANEOUS at 12:20

## 2018-07-30 RX ADMIN — ASPIRIN 81 MG: 81 TABLET, DELAYED RELEASE ORAL at 09:21

## 2018-07-30 RX ADMIN — INSULIN ASPART 20 UNITS: 100 INJECTION, SOLUTION INTRAVENOUS; SUBCUTANEOUS at 17:08

## 2018-07-30 RX ADMIN — PANTOPRAZOLE SODIUM 40 MG: 40 TABLET, DELAYED RELEASE ORAL at 09:21

## 2018-07-30 RX ADMIN — POLYETHYLENE GLYCOL (3350) 17 G: 17 POWDER, FOR SOLUTION ORAL at 09:21

## 2018-07-30 RX ADMIN — OXYCODONE HYDROCHLORIDE 5 MG: 5 TABLET ORAL at 23:31

## 2018-07-30 RX ADMIN — CYCLOBENZAPRINE HYDROCHLORIDE 10 MG: 10 TABLET, FILM COATED ORAL at 20:42

## 2018-07-30 RX ADMIN — POTASSIUM CHLORIDE 20 MEQ: 1500 TABLET, EXTENDED RELEASE ORAL at 09:21

## 2018-07-31 LAB
GLUCOSE BLDC GLUCOMTR-MCNC: 194 MG/DL (ref 70–130)
GLUCOSE BLDC GLUCOMTR-MCNC: 228 MG/DL (ref 70–130)
GLUCOSE BLDC GLUCOMTR-MCNC: 259 MG/DL (ref 70–130)

## 2018-07-31 PROCEDURE — 63710000001 INSULIN DETEMIR PER 5 UNITS: Performed by: PSYCHIATRY & NEUROLOGY

## 2018-07-31 PROCEDURE — 82962 GLUCOSE BLOOD TEST: CPT

## 2018-07-31 PROCEDURE — 94799 UNLISTED PULMONARY SVC/PX: CPT

## 2018-07-31 PROCEDURE — 99232 SBSQ HOSP IP/OBS MODERATE 35: CPT | Performed by: PSYCHIATRY & NEUROLOGY

## 2018-07-31 PROCEDURE — 63710000001 INSULIN ASPART PER 5 UNITS: Performed by: PSYCHIATRY & NEUROLOGY

## 2018-07-31 RX ADMIN — FUROSEMIDE 40 MG: 40 TABLET ORAL at 09:59

## 2018-07-31 RX ADMIN — IPRATROPIUM BROMIDE AND ALBUTEROL SULFATE 3 ML: .5; 3 SOLUTION RESPIRATORY (INHALATION) at 20:21

## 2018-07-31 RX ADMIN — POTASSIUM CHLORIDE 20 MEQ: 1500 TABLET, EXTENDED RELEASE ORAL at 09:59

## 2018-07-31 RX ADMIN — METOCLOPRAMIDE 10 MG: 10 TABLET ORAL at 12:20

## 2018-07-31 RX ADMIN — METOPROLOL SUCCINATE 25 MG: 25 TABLET, FILM COATED, EXTENDED RELEASE ORAL at 10:00

## 2018-07-31 RX ADMIN — OXYCODONE HYDROCHLORIDE 5 MG: 5 TABLET ORAL at 18:07

## 2018-07-31 RX ADMIN — INSULIN DETEMIR 20 UNITS: 100 INJECTION, SOLUTION SUBCUTANEOUS at 20:04

## 2018-07-31 RX ADMIN — INSULIN ASPART 20 UNITS: 100 INJECTION, SOLUTION INTRAVENOUS; SUBCUTANEOUS at 12:09

## 2018-07-31 RX ADMIN — INSULIN ASPART 20 UNITS: 100 INJECTION, SOLUTION INTRAVENOUS; SUBCUTANEOUS at 17:04

## 2018-07-31 RX ADMIN — OXYCODONE HYDROCHLORIDE 5 MG: 5 TABLET ORAL at 07:31

## 2018-07-31 RX ADMIN — CYCLOBENZAPRINE HYDROCHLORIDE 10 MG: 10 TABLET, FILM COATED ORAL at 20:04

## 2018-07-31 RX ADMIN — MIRTAZAPINE 30 MG: 15 TABLET, FILM COATED ORAL at 20:04

## 2018-07-31 RX ADMIN — INSULIN ASPART 20 UNITS: 100 INJECTION, SOLUTION INTRAVENOUS; SUBCUTANEOUS at 07:50

## 2018-07-31 RX ADMIN — PANTOPRAZOLE SODIUM 40 MG: 40 TABLET, DELAYED RELEASE ORAL at 09:59

## 2018-07-31 RX ADMIN — SPIRONOLACTONE 25 MG: 25 TABLET ORAL at 09:59

## 2018-07-31 RX ADMIN — AMITRIPTYLINE HYDROCHLORIDE 25 MG: 25 TABLET, FILM COATED ORAL at 20:04

## 2018-07-31 RX ADMIN — DICYCLOMINE HYDROCHLORIDE 10 MG: 10 CAPSULE ORAL at 09:59

## 2018-07-31 RX ADMIN — IBUPROFEN 600 MG: 600 TABLET ORAL at 20:04

## 2018-07-31 RX ADMIN — METOCLOPRAMIDE 10 MG: 10 TABLET ORAL at 17:04

## 2018-07-31 RX ADMIN — MAGNESIUM GLUCONATE 500 MG ORAL TABLET 400 MG: 500 TABLET ORAL at 09:59

## 2018-07-31 RX ADMIN — NICOTINE 1 PATCH: 21 PATCH TRANSDERMAL at 09:59

## 2018-07-31 RX ADMIN — DICYCLOMINE HYDROCHLORIDE 10 MG: 10 CAPSULE ORAL at 20:04

## 2018-07-31 RX ADMIN — POLYETHYLENE GLYCOL (3350) 17 G: 17 POWDER, FOR SOLUTION ORAL at 09:59

## 2018-07-31 RX ADMIN — METOCLOPRAMIDE 10 MG: 10 TABLET ORAL at 06:09

## 2018-08-01 PROBLEM — R45.851 DEPRESSION WITH SUICIDAL IDEATION: Status: RESOLVED | Noted: 2018-07-27 | Resolved: 2018-08-01

## 2018-08-01 PROBLEM — F32.A DEPRESSION WITH SUICIDAL IDEATION: Status: RESOLVED | Noted: 2018-07-27 | Resolved: 2018-08-01

## 2018-08-01 LAB
GLUCOSE BLDC GLUCOMTR-MCNC: 212 MG/DL (ref 70–130)
GLUCOSE BLDC GLUCOMTR-MCNC: 225 MG/DL (ref 70–130)
GLUCOSE BLDC GLUCOMTR-MCNC: 249 MG/DL (ref 70–130)
GLUCOSE BLDC GLUCOMTR-MCNC: 331 MG/DL (ref 70–130)

## 2018-08-01 PROCEDURE — 63710000001 INSULIN ASPART PER 5 UNITS: Performed by: PSYCHIATRY & NEUROLOGY

## 2018-08-01 PROCEDURE — 63710000001 INSULIN DETEMIR PER 5 UNITS: Performed by: PSYCHIATRY & NEUROLOGY

## 2018-08-01 PROCEDURE — 82962 GLUCOSE BLOOD TEST: CPT

## 2018-08-01 PROCEDURE — 99239 HOSP IP/OBS DSCHRG MGMT >30: CPT | Performed by: PSYCHIATRY & NEUROLOGY

## 2018-08-01 PROCEDURE — 94799 UNLISTED PULMONARY SVC/PX: CPT

## 2018-08-01 RX ORDER — METOCLOPRAMIDE 10 MG/1
10 TABLET ORAL
Qty: 120 TABLET | Refills: 0 | Status: SHIPPED | OUTPATIENT
Start: 2018-08-01 | End: 2018-11-02 | Stop reason: HOSPADM

## 2018-08-01 RX ORDER — POTASSIUM CHLORIDE 20 MEQ/1
20 TABLET, EXTENDED RELEASE ORAL DAILY
Qty: 30 TABLET | Refills: 0 | Status: ON HOLD | OUTPATIENT
Start: 2018-08-01 | End: 2018-11-02

## 2018-08-01 RX ORDER — LANOLIN ALCOHOL/MO/W.PET/CERES
400 CREAM (GRAM) TOPICAL DAILY
Qty: 30 TABLET | Refills: 0 | Status: ON HOLD | OUTPATIENT
Start: 2018-08-01 | End: 2018-11-02

## 2018-08-01 RX ORDER — SPIRONOLACTONE 25 MG/1
25 TABLET ORAL DAILY
Qty: 30 TABLET | Refills: 0 | Status: ON HOLD | OUTPATIENT
Start: 2018-08-01 | End: 2018-10-23

## 2018-08-01 RX ORDER — PANTOPRAZOLE SODIUM 40 MG/1
40 TABLET, DELAYED RELEASE ORAL DAILY
Qty: 30 TABLET | Refills: 0 | Status: ON HOLD | OUTPATIENT
Start: 2018-08-01 | End: 2018-11-02

## 2018-08-01 RX ORDER — ALBUTEROL SULFATE 90 UG/1
2 AEROSOL, METERED RESPIRATORY (INHALATION) EVERY 6 HOURS PRN
Qty: 18 G | Refills: 2 | Status: SHIPPED | OUTPATIENT
Start: 2018-08-01 | End: 2018-11-02 | Stop reason: HOSPADM

## 2018-08-01 RX ORDER — IBUPROFEN 600 MG/1
600 TABLET ORAL EVERY 6 HOURS PRN
Qty: 90 TABLET | Refills: 0 | Status: SHIPPED | OUTPATIENT
Start: 2018-08-01 | End: 2018-09-28

## 2018-08-01 RX ORDER — ERGOCALCIFEROL 1.25 MG/1
50000 CAPSULE ORAL WEEKLY
Qty: 30 CAPSULE | Refills: 0 | Status: ON HOLD | OUTPATIENT
Start: 2018-08-01 | End: 2018-11-02

## 2018-08-01 RX ORDER — ATORVASTATIN CALCIUM 20 MG/1
20 TABLET, FILM COATED ORAL DAILY
Qty: 30 TABLET | Refills: 0 | Status: ON HOLD | OUTPATIENT
Start: 2018-08-01 | End: 2018-10-23

## 2018-08-01 RX ORDER — METOPROLOL SUCCINATE 25 MG/1
25 TABLET, EXTENDED RELEASE ORAL DAILY
Qty: 30 TABLET | Refills: 0 | Status: ON HOLD | OUTPATIENT
Start: 2018-08-01 | End: 2018-11-02

## 2018-08-01 RX ORDER — NITROGLYCERIN 0.4 MG/1
0.4 TABLET SUBLINGUAL
Qty: 30 TABLET | Refills: 0 | Status: ON HOLD | OUTPATIENT
Start: 2018-08-01 | End: 2018-11-02

## 2018-08-01 RX ORDER — CYCLOBENZAPRINE HCL 10 MG
10 TABLET ORAL 3 TIMES DAILY PRN
Qty: 90 TABLET | Refills: 0 | Status: ON HOLD | OUTPATIENT
Start: 2018-08-01 | End: 2018-10-23

## 2018-08-01 RX ORDER — INSULIN GLARGINE 100 [IU]/ML
20 INJECTION, SOLUTION SUBCUTANEOUS NIGHTLY
Qty: 15 ML | Refills: 12 | Status: ON HOLD | OUTPATIENT
Start: 2018-08-01 | End: 2018-10-23

## 2018-08-01 RX ORDER — AMITRIPTYLINE HYDROCHLORIDE 25 MG/1
50 TABLET, FILM COATED ORAL NIGHTLY
Qty: 60 TABLET | Refills: 0 | Status: ON HOLD | OUTPATIENT
Start: 2018-08-01 | End: 2018-10-23

## 2018-08-01 RX ORDER — POLYETHYLENE GLYCOL 3350 17 G/17G
17 POWDER, FOR SOLUTION ORAL DAILY
Qty: 30 EACH | Refills: 2 | Status: ON HOLD | OUTPATIENT
Start: 2018-08-01 | End: 2018-10-23

## 2018-08-01 RX ORDER — FUROSEMIDE 20 MG/1
20 TABLET ORAL DAILY
Qty: 30 TABLET | Refills: 0 | Status: ON HOLD | OUTPATIENT
Start: 2018-08-01 | End: 2018-10-23

## 2018-08-01 RX ORDER — DICYCLOMINE HYDROCHLORIDE 10 MG/1
10 CAPSULE ORAL 2 TIMES DAILY
Qty: 60 CAPSULE | Refills: 0 | Status: ON HOLD | OUTPATIENT
Start: 2018-08-01 | End: 2018-10-23

## 2018-08-01 RX ADMIN — OXYCODONE HYDROCHLORIDE 5 MG: 5 TABLET ORAL at 00:05

## 2018-08-01 RX ADMIN — MAGNESIUM GLUCONATE 500 MG ORAL TABLET 400 MG: 500 TABLET ORAL at 08:55

## 2018-08-01 RX ADMIN — METOCLOPRAMIDE 10 MG: 10 TABLET ORAL at 13:07

## 2018-08-01 RX ADMIN — CYCLOBENZAPRINE HYDROCHLORIDE 10 MG: 10 TABLET, FILM COATED ORAL at 20:22

## 2018-08-01 RX ADMIN — SPIRONOLACTONE 25 MG: 25 TABLET ORAL at 08:55

## 2018-08-01 RX ADMIN — IPRATROPIUM BROMIDE AND ALBUTEROL SULFATE 3 ML: .5; 3 SOLUTION RESPIRATORY (INHALATION) at 13:40

## 2018-08-01 RX ADMIN — DICYCLOMINE HYDROCHLORIDE 10 MG: 10 CAPSULE ORAL at 20:21

## 2018-08-01 RX ADMIN — OXYCODONE HYDROCHLORIDE 5 MG: 5 TABLET ORAL at 23:09

## 2018-08-01 RX ADMIN — DICYCLOMINE HYDROCHLORIDE 10 MG: 10 CAPSULE ORAL at 08:56

## 2018-08-01 RX ADMIN — IBUPROFEN 600 MG: 600 TABLET ORAL at 20:22

## 2018-08-01 RX ADMIN — PANTOPRAZOLE SODIUM 40 MG: 40 TABLET, DELAYED RELEASE ORAL at 08:55

## 2018-08-01 RX ADMIN — METOPROLOL SUCCINATE 25 MG: 25 TABLET, FILM COATED, EXTENDED RELEASE ORAL at 08:55

## 2018-08-01 RX ADMIN — OXYCODONE HYDROCHLORIDE 5 MG: 5 TABLET ORAL at 07:28

## 2018-08-01 RX ADMIN — OXYCODONE HYDROCHLORIDE 5 MG: 5 TABLET ORAL at 16:45

## 2018-08-01 RX ADMIN — AMITRIPTYLINE HYDROCHLORIDE 25 MG: 25 TABLET, FILM COATED ORAL at 20:21

## 2018-08-01 RX ADMIN — MIRTAZAPINE 30 MG: 15 TABLET, FILM COATED ORAL at 20:22

## 2018-08-01 RX ADMIN — POTASSIUM CHLORIDE 20 MEQ: 1500 TABLET, EXTENDED RELEASE ORAL at 08:56

## 2018-08-01 RX ADMIN — METOCLOPRAMIDE 10 MG: 10 TABLET ORAL at 05:48

## 2018-08-01 RX ADMIN — TRAZODONE HYDROCHLORIDE 50 MG: 50 TABLET ORAL at 23:09

## 2018-08-01 RX ADMIN — FUROSEMIDE 40 MG: 40 TABLET ORAL at 08:55

## 2018-08-01 RX ADMIN — ALBUTEROL SULFATE 2 PUFF: 90 AEROSOL, METERED RESPIRATORY (INHALATION) at 00:54

## 2018-08-01 RX ADMIN — METOCLOPRAMIDE 10 MG: 10 TABLET ORAL at 16:44

## 2018-08-01 RX ADMIN — INSULIN ASPART 20 UNITS: 100 INJECTION, SOLUTION INTRAVENOUS; SUBCUTANEOUS at 17:41

## 2018-08-01 RX ADMIN — INSULIN ASPART 20 UNITS: 100 INJECTION, SOLUTION INTRAVENOUS; SUBCUTANEOUS at 07:26

## 2018-08-01 RX ADMIN — INSULIN DETEMIR 20 UNITS: 100 INJECTION, SOLUTION SUBCUTANEOUS at 20:06

## 2018-08-01 RX ADMIN — INSULIN ASPART 20 UNITS: 100 INJECTION, SOLUTION INTRAVENOUS; SUBCUTANEOUS at 12:02

## 2018-08-01 RX ADMIN — POLYETHYLENE GLYCOL (3350) 17 G: 17 POWDER, FOR SOLUTION ORAL at 08:56

## 2018-08-01 NOTE — PLAN OF CARE
Problem: Patient Care Overview  Goal: Plan of Care Review  Outcome: Ongoing (interventions implemented as appropriate)   08/01/18 0137   Coping/Psychosocial   Plan of Care Reviewed With patient   Coping/Psychosocial   Patient Agreement with Plan of Care agrees   Plan of Care Review   Progress no change       Problem: Overarching Goals (Adult)  Goal: Adheres to Safety Considerations for Self and Others  Outcome: Ongoing (interventions implemented as appropriate)    Goal: Optimized Coping Skills in Response to Life Stressors  Outcome: Ongoing (interventions implemented as appropriate)    Goal: Develops/Participates in Therapeutic [place] to Support Successful Transition  Outcome: Ongoing (interventions implemented as appropriate)

## 2018-08-01 NOTE — NURSING NOTE
Pt called [facility] at 0900 for phone assessment, assessment still in progress at 1010, then patient ended call .  Pt stated he could not go there because his room would be 2nd floor, and he could not take the stairs multiple times per day.

## 2018-08-01 NOTE — PROGRESS NOTES
Contacted the personal care homes     [facility]   [phone]= Stated that they can't accept him because his main issues are substance abuse related.    [phone] Left a message for a return call    [facility]  [phone]= Spoke with [facility] who stated that they have had him in the past and they would not be able to accept him again because he would use drugs and associate with strangers.    [facility]    [phone]= Left a message for the  to call me back.      If patient does go to a personal care home he will have to have a TB skin test and it will have to be read before they can accept him.      Sent a new referral on this date to [facility]

## 2018-08-01 NOTE — PROGRESS NOTES
Navigator spoke with Antonia at [facility]. She stated that the patient would not be appropriate for them due to him going up and down stairs multiple times per day and his compounded medical conditions.

## 2018-08-01 NOTE — DISCHARGE SUMMARY
Date of Discharge:  8/1/2018    Discharge Diagnosis:Active Problems:    Severe episode of recurrent major depressive disorder, without psychotic features (CMS/HCC)    Type 1 diabetes mellitus (CMS/HCC)    Chronic hepatitis C without hepatic coma (CMS/HCC)    Polysubstance dependence including opioid type drug with complication, episodic abuse (CMS/HCC)    Leukocytosis (leucocytosis)    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    Gastroesophageal reflux disease with esophagitis    Chronic viral hepatitis B without delta agent and without coma (CMS/HCC)    Thrombocytopenia (CMS/HCC)        Presenting Problem/History of Present Illness: Patient presented in the emergency room depressed expressing suicidal intent.  Patient admitted for safety, evaluation and treatment, see admission note for details.      Hospital Course:  Patient was admitted for safety and stabilization and was placed on standard precautions.  Routine labs were checked.  Patient was assigned a masters level therapist and provided with an opportunity to participate in group and individual therapy on the unit.  Patient seen on a daily basis for evaluation and supportive therapy.  Patient continued on his medications (see extensive list) and initially continued on the amitriptyline and Remeron.  At discharge decided to discontinue Remeron due to the potentiation of possible Leucopenia.  Patient remains moderately depressed and discouraged as relates to his domestic as well as medical issues but was denying any thoughts or intent of harming himself or others.  Was free of any psychotic determinants.  Patient has a long history of  polysubstance abuse and was accepting of a referral to a residential chemical dependency program to facilitate his ongoing recovery as well as homeless status.  See below for his medications prescribed at discharge.  Patient is to be admitted to the [facility] program in [place] day of discharge.    Consults:   Consults     No orders found  from 6/28/2018 to 7/28/2018.          Labs:  Lab Results (all)     Procedure Component Value Units Date/Time    POC Glucose Once [[id number]]  (Abnormal) Collected:  08/01/18 0547    Specimen:  Blood Updated:  08/01/18 0554     Glucose 331 (H) mg/dL     Narrative:       Meter: [id number] : [id number] JUAN ALBERTO ADELAIDE    POC Glucose Once [[id number]]  (Abnormal) Collected:  07/31/18 1928    Specimen:  Blood Updated:  07/31/18 1936     Glucose 194 (H) mg/dL     Narrative:       Meter: [id number] : [id number] JUAN ALBERTO ADELAIDE    POC Glucose Once [[id number]]  (Abnormal) Collected:  07/31/18 1624    Specimen:  Blood Updated:  07/31/18 1634     Glucose 228 (H) mg/dL     Narrative:       Meter: [id number] : [id number] John Montejo    POC Glucose Once [[id number]]  (Abnormal) Collected:  07/31/18 0602    Specimen:  Blood Updated:  07/31/18 0610     Glucose 259 (H) mg/dL     Narrative:       Meter: [id number] : [id number] JUAN ALBERTO ADELAIDE    POC Glucose Once [[id number]]  (Abnormal) Collected:  07/30/18 2037    Specimen:  Blood Updated:  07/30/18 2043     Glucose 150 (H) mg/dL     Narrative:       Meter: [id number] : [id number] JUAN ALBERTO ADELAIDE    POC Glucose Once [[id number]]  (Abnormal) Collected:  07/30/18 1631    Specimen:  Blood Updated:  07/30/18 1648     Glucose 226 (H) mg/dL     Narrative:       Meter: [id number] : [id number] Driss Thakur Jennifer    POC Glucose Once [[id number]]  (Abnormal) Collected:  07/30/18 1100    Specimen:  Blood Updated:  07/30/18 1107     Glucose 185 (H) mg/dL     Narrative:       Meter: [id number] : [id number] Naqvijustin Pereraores Jennifer    POC Glucose Once [[id number]]  (Abnormal) Collected:  07/30/18 0721    Specimen:  Blood Updated:  07/30/18 0728     Glucose 180 (H) mg/dL     Narrative:       Meter: [id number] : [id number] Driss Rodriguez    Osmolality, Calculated [[id number]]  (Normal) Collected:  07/30/18 0501    Specimen:  Blood Updated:  07/30/18 0552     Osmolality  Calc 282.2 mOsm/kg     Basic Metabolic Panel [[id number]]  (Abnormal) Collected:  07/30/18 0501    Specimen:  Blood Updated:  07/30/18 0552     Glucose 173 (H) mg/dL      BUN 14 mg/dL      Creatinine 0.65 mg/dL      Sodium 139 mmol/L      Potassium 3.8 mmol/L      Chloride 105 mmol/L      CO2 28.0 mmol/L      Calcium 8.9 mg/dL      eGFR Non African Amer 129 mL/min/1.73      BUN/Creatinine Ratio 21.5     Anion Gap 6.0 mmol/L     Narrative:       GFR Normal >60  Chronic Kidney Disease <60  Kidney Failure <15    POC Glucose Once [[id number]]  (Abnormal) Collected:  07/29/18 2013    Specimen:  Blood Updated:  07/29/18 2050     Glucose 148 (H) mg/dL     Narrative:       Meter: [id number] : [id number] Manuela Holley    POC Glucose Once [[id number]]  (Abnormal) Collected:  07/29/18 1603    Specimen:  Blood Updated:  07/29/18 1614     Glucose 190 (H) mg/dL     Narrative:       Meter: [id number] : [id number] Driss Rodriguez    Osmolality, Calculated [[id number]]  (Normal) Collected:  07/29/18 1050    Specimen:  Blood Updated:  07/29/18 1142     Osmolality Calc 274.0 mOsm/kg     Basic Metabolic Panel [[id number]]  (Normal) Collected:  07/29/18 1050    Specimen:  Blood Updated:  07/29/18 1142     Glucose 73 mg/dL      BUN 12 mg/dL      Creatinine 0.65 mg/dL      Sodium 138 mmol/L      Potassium 4.0 mmol/L      Chloride 106 mmol/L      CO2 26.0 mmol/L      Calcium 9.0 mg/dL      eGFR Non African Amer 129 mL/min/1.73      BUN/Creatinine Ratio 18.5     Anion Gap 6.0 mmol/L     Narrative:       GFR Normal >60  Chronic Kidney Disease <60  Kidney Failure <15    POC Glucose Once [[id number]]  (Abnormal) Collected:  07/29/18 0705    Specimen:  Blood Updated:  07/29/18 0715     Glucose 209 (H) mg/dL     Narrative:       Meter: [id number] : [id number] Naqvijustin Thakur Jennifer    POC Glucose Once [[id number]]  (Abnormal) Collected:  07/28/18 2009    Specimen:  Blood Updated:  07/28/18 2018     Glucose 302 (H) mg/dL      Narrative:       Meter: [id number] : [id number] Manuela Holley    POC Glucose Once [[id number]]  (Normal) Collected:  07/28/18 1625    Specimen:  Blood Updated:  07/28/18 1632     Glucose 73 mg/dL     Narrative:       Meter: [id number] : [id number] sunshine avery    POC Glucose Once [[id number]]  (Abnormal) Collected:  07/28/18 1112    Specimen:  Blood Updated:  07/28/18 1118     Glucose 199 (H) mg/dL     Narrative:       Meter: [id number] : [id number] sunshine avery    POC Glucose Once [[id number]]  (Abnormal) Collected:  07/28/18 0623    Specimen:  Blood Updated:  07/28/18 0630     Glucose 177 (H) mg/dL     Narrative:       Meter: [id number] : [id number] BEACH RADHA    POC Glucose Once [[id number]]  (Abnormal) Collected:  07/27/18 1953    Specimen:  Blood Updated:  07/27/18 2000     Glucose 283 (H) mg/dL     Narrative:       Meter: [id number] : [id number] BEACH RADHA          Imaging:  Imaging Results (all)     Procedure Component Value Units Date/Time    US Liver [[id number]] Collected:  07/30/18 1607     Updated:  07/30/18 1613    Narrative:       ULTRASOUND LIVER-      CLINICAL INDICATION: Told had cancer at [facility].        COMPARISON: 06/07/2018     FINDINGS: Sonographic imaging of the liver demonstrates no focal hepatic  mass. No intrahepatic ductal dilatation.       Impression:       No focal hepatic mass or intrahepatic ductal dilatation.           This report was finalized on 7/30/2018 4:11 PM by Dr. Jersey Maher MD.           Condition on Discharge:  improved    Prognosis: guarded    Vital Signs  Temp:  [97.2 °F (36.2 °C)-97.6 °F (36.4 °C)] 97.2 °F (36.2 °C)  Heart Rate:  [] 101  Resp:  [18] 18  BP: (133-152)/() 152/92    Discharge Disposition  Home or Self Care    Discharge Medications     Discharge Medications      New Medications      Instructions Start Date   insulin aspart 100 UNIT/ML injection  Commonly known as:  novoLOG  Replaces:  insulin lispro 100 UNIT/ML injection    20 Units, Subcutaneous, 3 Times Daily With Meals      insulin detemir 100 UNIT/ML injection  Commonly known as:  LEVEMIR  Replaces:  Insulin Glargine 100 UNIT/ML injection pen   20 Units, Subcutaneous, Nightly         Changes to Medications      Instructions Start Date   amitriptyline 25 MG tablet  Commonly known as:  ELAVIL  What changed:  · how much to take  · how to take this  · when to take this  · additional instructions   Take two at hs      ibuprofen 600 MG tablet  Commonly known as:  BRI HERRERA  What changed:  · medication strength  · how much to take  · when to take this  · reasons to take this   600 mg, Oral, Every 6 Hours PRN      ipratropium-albuterol  MCG/ACT inhaler  Commonly known as:  COMBIVENT RESPIMAT  What changed:  Another medication with the same name was removed. Continue taking this medication, and follow the directions you see here.   1 puff, Inhalation, 4 Times Daily - RT         Continue These Medications      Instructions Start Date   albuterol 108 (90 Base) MCG/ACT inhaler  Commonly known as:  PROVENTIL HFA;VENTOLIN HFA   2 puffs, Inhalation, Every 6 Hours PRN      ASPIR-LOW 81 MG EC tablet  Generic drug:  aspirin   81 mg, Oral, Daily      atorvastatin 20 MG tablet  Commonly known as:  LIPITOR   20 mg, Oral, Daily      cyclobenzaprine 10 MG tablet  Commonly known as:  FLEXERIL   10 mg, Oral, 3 Times Daily PRN      dicyclomine 10 MG capsule  Commonly known as:  BENTYL   10 mg, Oral, 2 Times Daily      furosemide 20 MG tablet  Commonly known as:  LASIX   20 mg, Oral, Daily      magnesium oxide 400 MG tablet  Commonly known as:  MAG-OX   400 mg, Oral, Daily      metoclopramide 10 MG tablet  Commonly known as:  REGLAN   10 mg, Oral, 4 Times Daily Before Meals & Nightly      metoprolol succinate XL 25 MG 24 hr tablet  Commonly known as:  TOPROL-XL   25 mg, Oral, Daily      nitroglycerin 0.4 MG SL tablet  Commonly known as:  NITROSTAT   0.4 mg, Sublingual, Every 5 Minutes PRN, Take no  more than 3 doses in 15 minutes.      pantoprazole 40 MG EC tablet  Commonly known as:  PROTONIX   40 mg, Oral, Daily      polyethylene glycol packet  Commonly known as:  MIRALAX   17 g, Oral, Daily      potassium chloride 20 MEQ CR tablet  Commonly known as:  K-DUR,KLOR-CON   20 mEq, Oral, Daily      spironolactone 25 MG tablet  Commonly known as:  ALDACTONE   25 mg, Oral, Daily      vitamin D 65034 units capsule capsule  Commonly known as:  ERGOCALCIFEROL   50,000 Units, Oral, Weekly         Stop These Medications    acetaminophen 500 MG tablet  Commonly known as:  TYLENOL     hydrOXYzine 50 MG capsule  Commonly known as:  VISTARIL     Insulin Glargine 100 UNIT/ML injection pen  Commonly known as:  BASAGLAR KWIKPEN  Replaced by:  insulin detemir 100 UNIT/ML injection     insulin lispro 100 UNIT/ML injection  Commonly known as:  humaLOG  Replaced by:  insulin aspart 100 UNIT/ML injection     ondansetron ODT 4 MG disintegrating tablet  Commonly known as:  ZOFRAN-ODT     oxyCODONE 5 MG immediate release tablet  Commonly known as:  ROXICODONE            Discharge Diet: diabetic diet.     Activity at Discharge: no restrictions    Follow-up Appointments: [facility] of [facility].           Ankit Barnes MD  08/01/18  8:10 AM  Time spent with the discharge process >30 minutes.     Dictated utilizing Dragon dictation

## 2018-08-01 NOTE — PLAN OF CARE
"Problem: Patient Care Overview  Goal: Interprofessional Rounds/Family Conf  Outcome: Ongoing (interventions implemented as appropriate)   08/01/18 1129   Interdisciplinary Rounds/Family Conf   Participants patient;social work   Interdisciplinary Rounds/Family Conf   Summary Individual session     D: Therapist met with patient briefly in patient room to discuss leaving him.  Patient expressed frustration from phone assessment with Marek.  He reports it lasted over an hour and he didn't determine he could not go to the facility due to being house on the second floor and having \"downstairs.  He continues to report he would like to go to a personal care home and possible and therapist discussed referrals have been made but no response at this point.  Discussed the possibility of homeless shelter placement and patient currently declines to consent homeless shelter.  He reports he is unsure of what he can do at this point and is frustrated.  He asked if discharge can be discontinued for today to further work on plans for placement.    A: Patient affect is flat and patient appeared irritable and frustrated.  He reports ongoing depression.  He denied SI/HI/AVH.    P: Patient has been discharged by Dr. Barnes but is hesitant to leave hospital due to no specific aftercare plan established.      "

## 2018-08-01 NOTE — PLAN OF CARE
Problem: Patient Care Overview  Goal: Plan of Care Review  Outcome: Ongoing (interventions implemented as appropriate)   08/01/18 5178   Coping/Psychosocial   Plan of Care Reviewed With patient   Coping/Psychosocial   Patient Agreement with Plan of Care agrees   OTHER   Outcome Summary calm mood.   Plan of Care Review   Progress no change

## 2018-08-01 NOTE — PLAN OF CARE
Problem: Patient Care Overview  Goal: Interprofessional Rounds/Family Conf  Outcome: Ongoing (interventions implemented as appropriate)   08/01/18 0930   Interdisciplinary Rounds/Family Conf   Participants family;social work   Interdisciplinary Rounds/Family Conf   Summary Family Contact      D: Therapist received a call from Patient's son Isaias Lang JR.  and confirmed written consent for information. Patient's son reported concern for discharge plans and felt patient needed some type of long term treatment. Therapist informed him that patient was completing phone assessment for long term substance abuse tx as we spoke. He reports that patient has had substance abuse issues his whole life and he feels that would be an appropriate option. He reports he had not spoken to patient in over a year when he called him to pick him up from personal care home in [place]. He reports he asked him to take guardianship but he has 3 young children and does not feel it would be safe to have him in the home. He reports he feels that he needs a long term program to assist him staying clean and avoiding medical issues associated with use.

## 2018-08-02 LAB
GLUCOSE BLDC GLUCOMTR-MCNC: 312 MG/DL (ref 70–130)
GLUCOSE BLDC GLUCOMTR-MCNC: 316 MG/DL (ref 70–130)
GLUCOSE BLDC GLUCOMTR-MCNC: 356 MG/DL (ref 70–130)
GLUCOSE BLDC GLUCOMTR-MCNC: 360 MG/DL (ref 70–130)
GLUCOSE BLDC GLUCOMTR-MCNC: 413 MG/DL (ref 70–130)
GLUCOSE BLDC GLUCOMTR-MCNC: 419 MG/DL (ref 70–130)

## 2018-08-02 PROCEDURE — 94799 UNLISTED PULMONARY SVC/PX: CPT

## 2018-08-02 PROCEDURE — 99232 SBSQ HOSP IP/OBS MODERATE 35: CPT | Performed by: PSYCHIATRY & NEUROLOGY

## 2018-08-02 PROCEDURE — 82962 GLUCOSE BLOOD TEST: CPT

## 2018-08-02 PROCEDURE — 63710000001 INSULIN DETEMIR PER 5 UNITS: Performed by: PSYCHIATRY & NEUROLOGY

## 2018-08-02 PROCEDURE — 63710000001 INSULIN ASPART PER 5 UNITS: Performed by: PSYCHIATRY & NEUROLOGY

## 2018-08-02 RX ORDER — AMITRIPTYLINE HYDROCHLORIDE 50 MG/1
50 TABLET, FILM COATED ORAL NIGHTLY
Status: DISCONTINUED | OUTPATIENT
Start: 2018-08-02 | End: 2018-08-03 | Stop reason: HOSPADM

## 2018-08-02 RX ADMIN — POLYETHYLENE GLYCOL (3350) 17 G: 17 POWDER, FOR SOLUTION ORAL at 08:16

## 2018-08-02 RX ADMIN — ALBUTEROL SULFATE 2 PUFF: 90 AEROSOL, METERED RESPIRATORY (INHALATION) at 13:51

## 2018-08-02 RX ADMIN — METOCLOPRAMIDE 10 MG: 10 TABLET ORAL at 11:35

## 2018-08-02 RX ADMIN — SPIRONOLACTONE 25 MG: 25 TABLET ORAL at 08:17

## 2018-08-02 RX ADMIN — INSULIN DETEMIR 20 UNITS: 100 INJECTION, SOLUTION SUBCUTANEOUS at 21:31

## 2018-08-02 RX ADMIN — FUROSEMIDE 40 MG: 40 TABLET ORAL at 08:17

## 2018-08-02 RX ADMIN — METOPROLOL SUCCINATE 25 MG: 25 TABLET, FILM COATED, EXTENDED RELEASE ORAL at 08:17

## 2018-08-02 RX ADMIN — POTASSIUM CHLORIDE 20 MEQ: 1500 TABLET, EXTENDED RELEASE ORAL at 08:17

## 2018-08-02 RX ADMIN — ALBUTEROL SULFATE 2 PUFF: 90 AEROSOL, METERED RESPIRATORY (INHALATION) at 01:29

## 2018-08-02 RX ADMIN — OXYCODONE HYDROCHLORIDE 5 MG: 5 TABLET ORAL at 06:23

## 2018-08-02 RX ADMIN — OXYCODONE HYDROCHLORIDE 5 MG: 5 TABLET ORAL at 13:53

## 2018-08-02 RX ADMIN — DICYCLOMINE HYDROCHLORIDE 10 MG: 10 CAPSULE ORAL at 08:17

## 2018-08-02 RX ADMIN — DICYCLOMINE HYDROCHLORIDE 10 MG: 10 CAPSULE ORAL at 21:27

## 2018-08-02 RX ADMIN — PANTOPRAZOLE SODIUM 40 MG: 40 TABLET, DELAYED RELEASE ORAL at 08:17

## 2018-08-02 RX ADMIN — INSULIN ASPART 20 UNITS: 100 INJECTION, SOLUTION INTRAVENOUS; SUBCUTANEOUS at 16:33

## 2018-08-02 RX ADMIN — MAGNESIUM GLUCONATE 500 MG ORAL TABLET 400 MG: 500 TABLET ORAL at 08:17

## 2018-08-02 RX ADMIN — TRAZODONE HYDROCHLORIDE 50 MG: 50 TABLET ORAL at 21:28

## 2018-08-02 RX ADMIN — METOCLOPRAMIDE 10 MG: 10 TABLET ORAL at 05:38

## 2018-08-02 RX ADMIN — INSULIN ASPART 20 UNITS: 100 INJECTION, SOLUTION INTRAVENOUS; SUBCUTANEOUS at 11:38

## 2018-08-02 RX ADMIN — METOCLOPRAMIDE 10 MG: 10 TABLET ORAL at 16:46

## 2018-08-02 RX ADMIN — AMITRIPTYLINE HYDROCHLORIDE 50 MG: 50 TABLET, FILM COATED ORAL at 21:27

## 2018-08-02 RX ADMIN — OXYCODONE HYDROCHLORIDE 5 MG: 5 TABLET ORAL at 23:04

## 2018-08-02 RX ADMIN — INSULIN ASPART 20 UNITS: 100 INJECTION, SOLUTION INTRAVENOUS; SUBCUTANEOUS at 05:50

## 2018-08-02 RX ADMIN — MIRTAZAPINE 30 MG: 15 TABLET, FILM COATED ORAL at 21:27

## 2018-08-02 NOTE — PROGRESS NOTES
"INPATIENT PSYCHIATRIC PROGRESS NOTE    Name:  Isaias Lang  :  1965  MRN:  [id number]  Visit Number:  [id number]  Length of stay:  6    Behavioral Health Treatment Plan and Problem List: I have reviewed and approved the Behavioral Health Treatment Plan and Problem list.    SUBJECTIVE  CC: \"I don't know\".     INTERVAL HISTORY: mental status unchanged. Rejected the [facility] obt[facility] yesterday because he would have to walk up steps; to quote him \"been kicked out of all the local homeless shelters, unwelcome back at [facility]. Quick to lapse back to threatening behavior if displeased with disposition.    Will continue placement efforts.      Depression rating 7/10  Anxiety rating 7/10  Sleep: restless    Craving: \"not really\" /10       Review of Systems   Respiratory: Negative.    Cardiovascular: Negative.    Gastrointestinal: Negative.    Musculoskeletal: Positive for myalgias.   Neurological: Negative.          OBJECTIVE    Temp:  [97.7 °F (36.5 °C)-98.2 °F (36.8 °C)] 98.2 °F (36.8 °C)  Heart Rate:  [] 119  Resp:  [18-22] 18  BP: (111-144)/(67-99) 139/81    MENTAL STATUS EXAM:      Appearance:Casually dressed, good hygeine.   Cooperation:Cooperative  Psychomotor: No psychomotor agitation/retardation, No EPS, No motor tics  Speech-normal rate, amount.   Mood/Affect: \"aggravated\"   Thought Processes: linear, logical, and goal directed  Thought Content: negativistic   Hallucination(s): none  Hopelessness: \"Running out of hope\"   Optimistic:No  Suicidal Thoughts:  none  Suicidal Plan/Intent: none  Homicidal Thoughts:  absent  Orientation: oriented x 3  Memory: recent intact    Lab Results (last 24 hours)     Procedure Component Value Units Date/Time    POC Glucose Once [[id number]]  (Abnormal) Collected:  18    Specimen:  Blood Updated:  18     Glucose 356 (H) mg/dL     Narrative:       Meter: [id number] : [id number] JUAN ALBERTO BRYSON    POC Glucose Once [[id number]]  (Abnormal) " Collected:  08/02/18 0537    Specimen:  Blood Updated:  08/02/18 0603     Glucose 419 (H) mg/dL     Narrative:       Meter: [id number] : [id number] JUAN ALBERTO ADELAIDE    POC Glucose Once [[id number]]  (Abnormal) Collected:  08/01/18 1933    Specimen:  Blood Updated:  08/01/18 1939     Glucose 212 (H) mg/dL     Narrative:       Meter: [id number] : [id number] JUAN ALBERTO ADELAIDE    POC Glucose Once [[id number]]  (Abnormal) Collected:  08/01/18 1637    Specimen:  Blood Updated:  08/01/18 1707     Glucose 249 (H) mg/dL     Narrative:       Meter: [id number] : [id number] Jhon Montejo    POC Glucose Once [[id number]]  (Abnormal) Collected:  08/01/18 1129    Specimen:  Blood Updated:  08/01/18 1148     Glucose 225 (H) mg/dL     Narrative:       Meter: [id number] : [id number] John Montejo           Imaging Results (last 24 hours)     ** No results found for the last 24 hours. **           ECG/EMG Results (most recent)     None           ALLERGIES: Guaifenesin & derivatives; Robitussin dm [dextromethorphan-guaifenesin]; Tizanidine; Metformin; Sulfa antibiotics; Contrast dye; and Tramadol      Current Facility-Administered Medications:   •  albuterol (PROVENTIL HFA;VENTOLIN HFA) inhaler 2 puff, 2 puff, Inhalation, Q6H PRN, Pool Josue MD, 2 puff at 08/02/18 0129  •  aluminum-magnesium hydroxide-simethicone (MAALOX MAX) 400-400-40 MG/5ML suspension 15 mL, 15 mL, Oral, Q6H PRN, Pool Josue MD  •  amitriptyline (ELAVIL) tablet 25 mg, 25 mg, Oral, Nightly, Pool Josue MD, 25 mg at 08/01/18 2021  •  benzonatate (TESSALON) capsule 100 mg, 100 mg, Oral, TID PRN, Pool Josue MD  •  benztropine (COGENTIN) tablet 1 mg, 1 mg, Oral, Daily PRN **OR** benztropine (COGENTIN) injection 0.5 mg, 0.5 mg, Intramuscular, Daily PRN, Pool Josue MD  •  cholecalciferol (VITAMIN D3) capsule 50,000 Units, 50,000 Units, Oral, Weekly, Pool Josue MD, 50,000 Units at 07/28/18  0951  •  cyclobenzaprine (FLEXERIL) tablet 10 mg, 10 mg, Oral, TID PRN, Pool Josue MD, 10 mg at 08/01/18 2022  •  dextrose (D50W) solution 25 g, 25 g, Intravenous, Q15 Min PRN, Pool Josue MD  •  dextrose (GLUTOSE) oral gel 15 g, 15 g, Oral, Q15 Min PRN, Pool Josue MD  •  dicyclomine (BENTYL) capsule 10 mg, 10 mg, Oral, BID, Pool Josue MD, 10 mg at 08/01/18 2021  •  famotidine (PEPCID) tablet 20 mg, 20 mg, Oral, BID PRN, Pool Josue MD  •  furosemide (LASIX) tablet 40 mg, 40 mg, Oral, Daily, Sean Barnes MD, 40 mg at 08/01/18 0855  •  glucagon (human recombinant) (GLUCAGEN DIAGNOSTIC) injection 1 mg, 1 mg, Subcutaneous, PRN, Pool Josue MD  •  hydrOXYzine (ATARAX) tablet 50 mg, 50 mg, Oral, Q6H PRN, Pool Josue MD  •  ibuprofen (ADVIL,MOTRIN) tablet 600 mg, 600 mg, Oral, Q6H PRN, Pool Josue MD, 600 mg at 08/01/18 2022  •  insulin aspart (novoLOG) injection 20 Units, 20 Units, Subcutaneous, TID With Meals, Pool Josue MD, 20 Units at 08/02/18 0550  •  insulin detemir (LEVEMIR) injection 20 Units, 20 Units, Subcutaneous, Nightly, Pool Josue MD, 20 Units at 08/01/18 2006  •  ipratropium-albuterol (DUO-NEB) nebulizer solution 3 mL, 3 mL, Nebulization, BID, Pool Josue MD, 3 mL at 08/01/18 1340  •  loperamide (IMODIUM) capsule 2 mg, 2 mg, Oral, 4x Daily PRN, Pool Josue MD  •  magnesium hydroxide (MILK OF MAGNESIA) suspension 2400 mg/10mL 10 mL, 10 mL, Oral, Daily PRN, Pool Josue MD  •  magnesium oxide (MAGOX) tablet 400 mg, 400 mg, Oral, Daily, Pool Josue MD, 400 mg at 08/01/18 0855  •  metoclopramide (REGLAN) tablet 10 mg, 10 mg, Oral, TID AC, Sean Barnes MD, 10 mg at 08/02/18 0538  •  metoprolol succinate XL (TOPROL-XL) 24 hr tablet 25 mg, 25 mg, Oral, Daily, Pool Josue MD, 25 mg at 08/01/18 0855  •  mirtazapine (REMERON)  tablet 30 mg, 30 mg, Oral, Nightly, Sean Barnes MD, 30 mg at 08/01/18 2022  •  nicotine (NICODERM CQ) 21 MG/24HR patch 1 patch, 1 patch, Transdermal, Q24H, Pool Josue MD, 1 patch at 07/31/18 0959  •  nitroglycerin (NITROSTAT) SL tablet 0.4 mg, 0.4 mg, Sublingual, Q5 Min PRN, Pool Josue MD  •  ondansetron (ZOFRAN) tablet 4 mg, 4 mg, Oral, Q6H PRN, Pool Josue MD  •  oxyCODONE (ROXICODONE) immediate release tablet 5 mg, 5 mg, Oral, Q6H PRN, Pool Josue MD, 5 mg at 08/02/18 0623  •  pantoprazole (PROTONIX) EC tablet 40 mg, 40 mg, Oral, Daily, Pool Josue MD, 40 mg at 08/01/18 0855  •  polyethylene glycol (MIRALAX) powder 17 g, 17 g, Oral, Daily, Pool Josue MD, 17 g at 08/01/18 0856  •  potassium chloride (K-DUR,KLOR-CON) CR tablet 20 mEq, 20 mEq, Oral, Daily, Pool Josue MD, 20 mEq at 08/01/18 0856  •  sodium chloride (OCEAN) nasal spray 2 spray, 2 spray, Each Nare, PRN, Pool Josue MD  •  spironolactone (ALDACTONE) tablet 25 mg, 25 mg, Oral, Daily, Pool Josue MD, 25 mg at 08/01/18 0855  •  traZODone (DESYREL) tablet 50 mg, 50 mg, Oral, Nightly PRN, Pool Josue MD, 50 mg at 08/01/18 2303    ASSESSMENT & PLAN    Patient Active Problem List   Diagnosis   • Type 1 diabetes mellitus (CMS/HCC)  plan: Insulin    • 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 plan: When necessary none steroidal anti-inflammatory medication          • Gastroesophageal reflux disease with esophagitis and: Protonix    • Chronic viral hepatitis B without delta agent and without coma (CMS/HCC) plan: Monitor liver function status     • Chronic hepatitis C without hepatic coma (CMS/HCC) plan: Monitor liver function status    • Severe episode of recurrent major depressive disorder, without psychotic features (CMS/HCC) plan: Remeron and supportive psychotherapeutic efforts individual group.           • Polysubstance dependence including  opioid type drug with complication, episodic abuse (CMS/HCC) plan: Avoid rekindling with current medications.     • Leukocytosis (leucocytosis) plan: Hematology consultation    • Thrombocytopenia (CMS/HCC) plan: Hematology consultation    • Depression with suicidal ideation plan: Patient is on special precautions            Suicide precautions: Suicide precaution Level 3 (q15 min checks)     Behavioral Health Treatment Plan and Problem List: I have reviewed and approved the Behavioral Health Treatment Plan and Problem list.    Clinician:  Ankit Barnes MD  08/02/18  7:59 AM    Dictated utilizing Dragon dictation

## 2018-08-02 NOTE — PROGRESS NOTES
Navigator is assisting with discharge planning. Contacted [facility] and they are currently working on getting insurance approval for patient's admission.

## 2018-08-02 NOTE — PROGRESS NOTES
Covering therapist met with patient, who was agreeable, at bed side.  Discussed progress with treatment and address concerns.  Therapist discussed that referral is still in review with [facility] and [facility].  Patient reports he cannot complete phone interviews because his son is not present with him.  Therapist educated patient that the phone interviews are for him only to assess his appropriateness for admission.  Patient appeared irritable and displayed poor insight. He denied thoughts to harm himself or others.  He denied hallucinations.  Patient reported feeling hopeless with disposition planning and stated he is not allowed to return to local homeless shelters.     Stable

## 2018-08-02 NOTE — PLAN OF CARE
Problem: Patient Care Overview  Goal: Plan of Care Review  Outcome: Ongoing (interventions implemented as appropriate)   08/02/18 1810   Coping/Psychosocial   Plan of Care Reviewed With patient   Coping/Psychosocial   Patient Agreement with Plan of Care agrees   OTHER   Outcome Summary Pt cooperative with staff. Pt rates anxiety and depression 6/10. Pt denies SI/HI and OMID. Pt may D/C tomorrow.   Plan of Care Review   Progress no change

## 2018-08-02 NOTE — PLAN OF CARE
Problem: Patient Care Overview  Goal: Plan of Care Review  Outcome: Ongoing (interventions implemented as appropriate)  Patient calm and cooperative this shift. Rates anxiety a 6 and depression a 5. Denies SI/HI or hallucinations. Patient didn't sleep well.    08/02/18 0236   Coping/Psychosocial   Plan of Care Reviewed With patient   Plan of Care Review   Progress no change       Problem: Overarching Goals (Adult)  Goal: Adheres to Safety Considerations for Self and Others  Outcome: Ongoing (interventions implemented as appropriate)    Goal: Optimized Coping Skills in Response to Life Stressors  Outcome: Ongoing (interventions implemented as appropriate)    Goal: Develops/Participates in Therapeutic [place] to Support Successful Transition  Outcome: Ongoing (interventions implemented as appropriate)

## 2018-08-02 NOTE — NURSING NOTE
Contacted  regarding pts accu check 419 and not on ssi, and receives 20 units novolog three times daily with meals. Verbal order given to go ahead and move  pt  20 units of novolog to now and give it. md also made aware pt has been up all night drinking juice and eating peanut butter and crackers

## 2018-08-02 NOTE — NURSING NOTE
"Pt up at nursing station request more meds for sleep pt stated \" I never had this problem when I was taking my  dope\" and then stated \" im just going to discharge today and start back on my dope so I can sleep\"   "

## 2018-08-03 VITALS
TEMPERATURE: 98.6 F | OXYGEN SATURATION: 94 % | BODY MASS INDEX: 29.86 KG/M2 | WEIGHT: 197 LBS | RESPIRATION RATE: 18 BRPM | HEIGHT: 68 IN | SYSTOLIC BLOOD PRESSURE: 124 MMHG | DIASTOLIC BLOOD PRESSURE: 80 MMHG | HEART RATE: 109 BPM

## 2018-08-03 LAB
GLUCOSE BLDC GLUCOMTR-MCNC: 412 MG/DL (ref 70–130)
GLUCOSE BLDC GLUCOMTR-MCNC: 432 MG/DL (ref 70–130)
GLUCOSE BLDC GLUCOMTR-MCNC: 452 MG/DL (ref 70–130)
GLUCOSE BLDC GLUCOMTR-MCNC: 509 MG/DL (ref 70–130)

## 2018-08-03 PROCEDURE — 94799 UNLISTED PULMONARY SVC/PX: CPT

## 2018-08-03 PROCEDURE — 63710000001 INSULIN ASPART PER 5 UNITS: Performed by: PSYCHIATRY & NEUROLOGY

## 2018-08-03 PROCEDURE — 99239 HOSP IP/OBS DSCHRG MGMT >30: CPT | Performed by: PSYCHIATRY & NEUROLOGY

## 2018-08-03 PROCEDURE — 82962 GLUCOSE BLOOD TEST: CPT

## 2018-08-03 RX ORDER — NICOTINE POLACRILEX 4 MG
15 LOZENGE BUCCAL
Status: DISCONTINUED | OUTPATIENT
Start: 2018-08-03 | End: 2018-08-03 | Stop reason: HOSPADM

## 2018-08-03 RX ORDER — AMITRIPTYLINE HYDROCHLORIDE 50 MG/1
50 TABLET, FILM COATED ORAL NIGHTLY
Qty: 30 TABLET | Refills: 0 | Status: ON HOLD | OUTPATIENT
Start: 2018-08-03 | End: 2018-10-23

## 2018-08-03 RX ORDER — DEXTROSE MONOHYDRATE 25 G/50ML
25 INJECTION, SOLUTION INTRAVENOUS
Status: DISCONTINUED | OUTPATIENT
Start: 2018-08-03 | End: 2018-08-03 | Stop reason: HOSPADM

## 2018-08-03 RX ADMIN — METOCLOPRAMIDE 10 MG: 10 TABLET ORAL at 11:57

## 2018-08-03 RX ADMIN — DICYCLOMINE HYDROCHLORIDE 10 MG: 10 CAPSULE ORAL at 08:26

## 2018-08-03 RX ADMIN — METOCLOPRAMIDE 10 MG: 10 TABLET ORAL at 06:31

## 2018-08-03 RX ADMIN — OXYCODONE HYDROCHLORIDE 5 MG: 5 TABLET ORAL at 11:57

## 2018-08-03 RX ADMIN — INSULIN ASPART 20 UNITS: 100 INJECTION, SOLUTION INTRAVENOUS; SUBCUTANEOUS at 16:17

## 2018-08-03 RX ADMIN — METOCLOPRAMIDE 10 MG: 10 TABLET ORAL at 16:35

## 2018-08-03 RX ADMIN — POLYETHYLENE GLYCOL (3350) 17 G: 17 POWDER, FOR SOLUTION ORAL at 08:26

## 2018-08-03 RX ADMIN — MAGNESIUM GLUCONATE 500 MG ORAL TABLET 400 MG: 500 TABLET ORAL at 08:26

## 2018-08-03 RX ADMIN — OXYCODONE HYDROCHLORIDE 5 MG: 5 TABLET ORAL at 06:01

## 2018-08-03 RX ADMIN — INSULIN ASPART 20 UNITS: 100 INJECTION, SOLUTION INTRAVENOUS; SUBCUTANEOUS at 11:05

## 2018-08-03 RX ADMIN — INSULIN ASPART 9 UNITS: 100 INJECTION, SOLUTION INTRAVENOUS; SUBCUTANEOUS at 16:43

## 2018-08-03 RX ADMIN — INSULIN ASPART 20 UNITS: 100 INJECTION, SOLUTION INTRAVENOUS; SUBCUTANEOUS at 07:12

## 2018-08-03 RX ADMIN — METOPROLOL SUCCINATE 25 MG: 25 TABLET, FILM COATED, EXTENDED RELEASE ORAL at 08:26

## 2018-08-03 RX ADMIN — SPIRONOLACTONE 25 MG: 25 TABLET ORAL at 08:26

## 2018-08-03 RX ADMIN — IPRATROPIUM BROMIDE AND ALBUTEROL SULFATE 3 ML: .5; 3 SOLUTION RESPIRATORY (INHALATION) at 01:04

## 2018-08-03 RX ADMIN — FUROSEMIDE 40 MG: 40 TABLET ORAL at 08:26

## 2018-08-03 RX ADMIN — PANTOPRAZOLE SODIUM 40 MG: 40 TABLET, DELAYED RELEASE ORAL at 08:26

## 2018-08-03 RX ADMIN — POTASSIUM CHLORIDE 20 MEQ: 1500 TABLET, EXTENDED RELEASE ORAL at 08:26

## 2018-08-03 NOTE — NURSING NOTE
Contacted Dr Barnes to inform of patient blood sugar 509. Dr Barnes gave new order for sliding scale insulin moderate dose to be started.

## 2018-08-03 NOTE — DISCHARGE SUMMARY
Date of Discharge:  8/3/2018    Discharge Diagnosis:Active Problems:    Severe episode of recurrent major depressive disorder, without psychotic features (CMS/HCC)    Type 1 diabetes mellitus (CMS/HCC)    Chronic hepatitis C without hepatic coma (CMS/HCC)    Polysubstance dependence including opioid type drug with complication, episodic abuse (CMS/HCC)    Leukocytosis (leucocytosis)    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    Gastroesophageal reflux disease with esophagitis    Chronic viral hepatitis B without delta agent and without coma (CMS/HCC)    Thrombocytopenia (CMS/HCC)        Presenting Problem/History of Present Illness: Patient presented in the emergency room depressed stating suicidal intent, admitted for safety evaluation and treatment, see admission note for details.      Hospital Course:  Patient was admitted for safety and stabilization and was placed on standard precautions.  Routine labs were checked.  Patient was assigned a masters level therapist and provided with an opportunity to participate in group and individual therapy on the unit.  Patient seen on a daily basis for evaluation and supportive therapy.  Patient actually was set to be discharged August 1 but sabotaged that discharge plan stating that he cannot go to a place where of he needed to go up and down steps.  Discharge summary was dictated on that date, refer to that transcription for details through that point in time.  Since then patient's status has been stable medically and psychiatrically, no recurrent of any apparent thoughts to harm self or others and certainly no psychotic thought content.  Patient remains ambivalent about chemical dependency treatment but [facility] after much deliberation.  Medications as prescribed August 1.    Consults:   Consults     No orders found from 6/28/2018 to 7/28/2018.          Labs:  Lab Results (all)     Procedure Component Value Units Date/Time     POC Glucose Once [[id number]]  (Abnormal) Collected:  08/03/18 1103    Specimen:  Blood Updated:  08/03/18 1110     Glucose 412 (H) mg/dL     Narrative:       Meter: [id number] : [id number] sunshine varela    POC Glucose Once [[id number]]  (Abnormal) Collected:  08/03/18 0603    Specimen:  Blood Updated:  08/03/18 0610     Glucose 432 (H) mg/dL     Narrative:       Meter: [id number] : [id number] Ever Bach    POC Glucose Once [[id number]]  (Abnormal) Collected:  08/02/18 2130    Specimen:  Blood Updated:  08/02/18 2136     Glucose 360 (H) mg/dL     Narrative:       Meter: [id number] : [id number] Ever Coleskinny    POC Glucose Once [[id number]]  (Abnormal) Collected:  08/02/18 1620    Specimen:  Blood Updated:  08/02/18 1629     Glucose 312 (H) mg/dL     Narrative:       Meter: [id number] : [id number] Rey Brenda    POC Glucose Once [[id number]]  (Abnormal) Collected:  08/02/18 1116    Specimen:  Blood Updated:  08/02/18 1124     Glucose 316 (H) mg/dL     Narrative:       Meter: [id number] : [id number] Recommend Brenda    POC Glucose Once [[id number]]  (Abnormal) Collected:  08/02/18 0535    Specimen:  Blood Updated:  08/02/18 1026     Glucose 413 (H) mg/dL     Narrative:       Meter: [id number] : [id number] JUAN ALBERTO ADELAIDE    POC Glucose Once [[id number]]  (Abnormal) Collected:  08/02/18 0625    Specimen:  Blood Updated:  08/02/18 0636     Glucose 356 (H) mg/dL     Narrative:       Meter: [id number] : [id number] JUAN ALBERTO ADELAIDE    POC Glucose Once [[id number]]  (Abnormal) Collected:  08/02/18 0537    Specimen:  Blood Updated:  08/02/18 0603     Glucose 419 (H) mg/dL     Narrative:       Meter: [id number] : [id number] JUAN ALBERTO ADELAIDE    POC Glucose Once [[id number]]  (Abnormal) Collected:  08/01/18 1933    Specimen:  Blood Updated:  08/01/18 1939     Glucose 212 (H) mg/dL     Narrative:       Meter: [id number] : [id number] JUAN ALBERTO BRYSON    POC Glucose Once [[id number]]  (Abnormal) Collected:  08/01/18  1637    Specimen:  Blood Updated:  08/01/18 1707     Glucose 249 (H) mg/dL     Narrative:       Meter: [id number] : [id number] Lopez Nikia    POC Glucose Once [[id number]]  (Abnormal) Collected:  08/01/18 1129    Specimen:  Blood Updated:  08/01/18 1148     Glucose 225 (H) mg/dL     Narrative:       Meter: [id number] : [id number] Lopez Nikia    POC Glucose Once [[id number]]  (Abnormal) Collected:  08/01/18 0547    Specimen:  Blood Updated:  08/01/18 0554     Glucose 331 (H) mg/dL     Narrative:       Meter: [id number] : [id number] JUAN ALBERTO ADELAIDE    POC Glucose Once [[id number]]  (Abnormal) Collected:  07/31/18 1928    Specimen:  Blood Updated:  07/31/18 1936     Glucose 194 (H) mg/dL     Narrative:       Meter: [id number] : [id number] JUAN ALBERTO ADELAIDE    POC Glucose Once [[id number]]  (Abnormal) Collected:  07/31/18 1624    Specimen:  Blood Updated:  07/31/18 1634     Glucose 228 (H) mg/dL     Narrative:       Meter: [id number] : [id number] Lopez Nikia    POC Glucose Once [[id number]]  (Abnormal) Collected:  07/31/18 0602    Specimen:  Blood Updated:  07/31/18 0610     Glucose 259 (H) mg/dL     Narrative:       Meter: [id number] : [id number] JUAN ALBERTO ADELAIDE    POC Glucose Once [[id number]]  (Abnormal) Collected:  07/30/18 2037    Specimen:  Blood Updated:  07/30/18 2043     Glucose 150 (H) mg/dL     Narrative:       Meter: [id number] : [id number] JUAN ALBERTO ADELAIDE    POC Glucose Once [[id number]]  (Abnormal) Collected:  07/30/18 1631    Specimen:  Blood Updated:  07/30/18 1648     Glucose 226 (H) mg/dL     Narrative:       Meter: [id number] : [id number] Driss Rodriguez    POC Glucose Once [[id number]]  (Abnormal) Collected:  07/30/18 1100    Specimen:  Blood Updated:  07/30/18 1107     Glucose 185 (H) mg/dL     Narrative:       Meter: [id number] : [id number] Driss Rodriguez    POC Glucose Once [[id number]]  (Abnormal) Collected:  07/30/18 0721    Specimen:  Blood Updated:   07/30/18 0728     Glucose 180 (H) mg/dL     Narrative:       Meter: [id number] : [id number] Naqvijustin Pereraores Jennifer    Osmolality, Calculated [[id number]]  (Normal) Collected:  07/30/18 0501    Specimen:  Blood Updated:  07/30/18 0552     Osmolality Calc 282.2 mOsm/kg     Basic Metabolic Panel [[id number]]  (Abnormal) Collected:  07/30/18 0501    Specimen:  Blood Updated:  07/30/18 0552     Glucose 173 (H) mg/dL      BUN 14 mg/dL      Creatinine 0.65 mg/dL      Sodium 139 mmol/L      Potassium 3.8 mmol/L      Chloride 105 mmol/L      CO2 28.0 mmol/L      Calcium 8.9 mg/dL      eGFR Non African Amer 129 mL/min/1.73      BUN/Creatinine Ratio 21.5     Anion Gap 6.0 mmol/L     Narrative:       GFR Normal >60  Chronic Kidney Disease <60  Kidney Failure <15    POC Glucose Once [[id number]]  (Abnormal) Collected:  07/29/18 2013    Specimen:  Blood Updated:  07/29/18 2050     Glucose 148 (H) mg/dL     Narrative:       Meter: [id number] : [id number] Manuela Holley    POC Glucose Once [[id number]]  (Abnormal) Collected:  07/29/18 1603    Specimen:  Blood Updated:  07/29/18 1614     Glucose 190 (H) mg/dL     Narrative:       Meter: [id number] : [id number] Naqvi Ofe Jennifer    Osmolality, Calculated [[id number]]  (Normal) Collected:  07/29/18 1050    Specimen:  Blood Updated:  07/29/18 1142     Osmolality Calc 274.0 mOsm/kg     Basic Metabolic Panel [[id number]]  (Normal) Collected:  07/29/18 1050    Specimen:  Blood Updated:  07/29/18 1142     Glucose 73 mg/dL      BUN 12 mg/dL      Creatinine 0.65 mg/dL      Sodium 138 mmol/L      Potassium 4.0 mmol/L      Chloride 106 mmol/L      CO2 26.0 mmol/L      Calcium 9.0 mg/dL      eGFR Non African Amer 129 mL/min/1.73      BUN/Creatinine Ratio 18.5     Anion Gap 6.0 mmol/L     Narrative:       GFR Normal >60  Chronic Kidney Disease <60  Kidney Failure <15    POC Glucose Once [[id number]]  (Abnormal) Collected:  07/29/18 0705    Specimen:  Blood Updated:  07/29/18 0715      Glucose 209 (H) mg/dL     Narrative:       Meter: [id number] : [id number] Driss Rodriguez    POC Glucose Once [[id number]]  (Abnormal) Collected:  07/28/18 2009    Specimen:  Blood Updated:  07/28/18 2018     Glucose 302 (H) mg/dL     Narrative:       Meter: [id number] : [id number] Manuela Holley    POC Glucose Once [[id number]]  (Normal) Collected:  07/28/18 1625    Specimen:  Blood Updated:  07/28/18 1632     Glucose 73 mg/dL     Narrative:       Meter: [id number] : [id number] sunshine avery    POC Glucose Once [[id number]]  (Abnormal) Collected:  07/28/18 1112    Specimen:  Blood Updated:  07/28/18 1118     Glucose 199 (H) mg/dL     Narrative:       Meter: [id number] : [id number] sunshine avery    POC Glucose Once [[id number]]  (Abnormal) Collected:  07/28/18 0623    Specimen:  Blood Updated:  07/28/18 0630     Glucose 177 (H) mg/dL     Narrative:       Meter: [id number] : [id number] BEACH RADHA    POC Glucose Once [[id number]]  (Abnormal) Collected:  07/27/18 1953    Specimen:  Blood Updated:  07/27/18 2000     Glucose 283 (H) mg/dL     Narrative:       Meter: [id number] : [id number] BEACH RADHA          Imaging:  Imaging Results (all)     Procedure Component Value Units Date/Time    US Liver [[id number]] Collected:  07/30/18 1607     Updated:  07/30/18 1613    Narrative:       ULTRASOUND LIVER-      CLINICAL INDICATION: Told had cancer at [facility].        COMPARISON: 06/07/2018     FINDINGS: Sonographic imaging of the liver demonstrates no focal hepatic  mass. No intrahepatic ductal dilatation.       Impression:       No focal hepatic mass or intrahepatic ductal dilatation.           This report was finalized on 7/30/2018 4:11 PM by Dr. Jersey Maher MD.                     Condition on Discharge:  improved    Prognosis: gurarde  Vital Signs  Temp:  [98.4 °F (36.9 °C)-98.6 °F (37 °C)] 98.6 °F (37 °C)  Heart Rate:  [105-124] 119  Resp:  [18-20] 18  BP: (124-149)/(80-85) 124/80    Discharge  Disposition  Home or Self Care    Discharge Medications     Discharge Medications      New Medications      Instructions Start Date   Magnesium Oxide 400 (240 Mg) MG tablet  Replaces:  magnesium oxide 400 MG tablet   400 mg, Oral, Daily      NOVOLOG 100 UNIT/ML injection  Generic drug:  insulin aspart  Replaces:  insulin lispro 100 UNIT/ML injection   20 Units, Subcutaneous, 3 Times Daily With Meals         Changes to Medications      Instructions Start Date   amitriptyline 25 MG tablet  Commonly known as:  ELAVIL  What changed:  how much to take   50 mg, Oral, Nightly      ibuprofen 600 MG tablet  Commonly known as:  ADVIL,MOTRIN  What changed:  · medication strength  · how much to take  · when to take this  · reasons to take this   600 mg, Oral, Every 6 Hours PRN      ipratropium-albuterol  MCG/ACT inhaler  Commonly known as:  COMBIVENT RESPIMAT  What changed:  Another medication with the same name was removed. Continue taking this medication, and follow the directions you see here.   1 puff, Inhalation, 4 Times Daily - RT         Continue These Medications      Instructions Start Date   ASPIR-LOW 81 MG EC tablet  Generic drug:  aspirin   81 mg, Oral, Daily      atorvastatin 20 MG tablet  Commonly known as:  LIPITOR   20 mg, Oral, Daily      cyclobenzaprine 10 MG tablet  Commonly known as:  FLEXERIL   10 mg, Oral, 3 Times Daily PRN      dicyclomine 10 MG capsule  Commonly known as:  BENTYL   10 mg, Oral, 2 Times Daily      furosemide 20 MG tablet  Commonly known as:  LASIX   20 mg, Oral, Daily      Insulin Glargine 100 UNIT/ML injection pen  Commonly known as:  BASAGLAR KWIKPEN   20 Units, Subcutaneous, Nightly      metoclopramide 10 MG tablet  Commonly known as:  REGLAN   10 mg, Oral, 4 Times Daily Before Meals & Nightly      metoprolol succinate XL 25 MG 24 hr tablet  Commonly known as:  TOPROL-XL   25 mg, Oral, Daily      nitroglycerin 0.4 MG SL tablet  Commonly known as:  NITROSTAT   0.4 mg, Sublingual,  Every 5 Minutes PRN, Take no more than 3 doses in 15 minutes.      pantoprazole 40 MG EC tablet  Commonly known as:  PROTONIX   40 mg, Oral, Daily      polyethylene glycol pack packet  Commonly known as:  MIRALAX   17 g, Oral, Daily      potassium chloride 20 MEQ CR tablet  Commonly known as:  K-DUR,KLOR-CON   20 mEq, Oral, Daily      spironolactone 25 MG tablet  Commonly known as:  ALDACTONE   25 mg, Oral, Daily      VENTOLIN  (90 Base) MCG/ACT inhaler  Generic drug:  albuterol   2 puffs, Inhalation, Every 6 Hours PRN      vitamin D 21633 units capsule capsule  Commonly known as:  ERGOCALCIFEROL   50,000 Units, Oral, Weekly         Stop These Medications    acetaminophen 500 MG tablet  Commonly known as:  TYLENOL     hydrOXYzine 50 MG capsule  Commonly known as:  VISTARIL     insulin lispro 100 UNIT/ML injection  Commonly known as:  humaLOG  Replaced by:  NOVOLOG 100 UNIT/ML injection     magnesium oxide 400 MG tablet  Commonly known as:  MAG-OX  Replaced by:  Magnesium Oxide 400 (240 Mg) MG tablet     ondansetron ODT 4 MG disintegrating tablet  Commonly known as:  ZOFRAN-ODT     oxyCODONE 5 MG immediate release tablet  Commonly known as:  ROXICODONE            Discharge Diet: regular    Activity at Discharge: no restrictions, cautioned about bleeding risk.     Follow-up Appointments:patient to be admitted to [facility] today.           Ankit Barnes MD  08/03/18  11:49 AM  Time spent with the discharge process >30 minutes.     Dictated utilizing Dragon dictation

## 2018-08-03 NOTE — DISCHARGE INSTR - APPOINTMENTS
[facility]   [address]  [place] [address]  [phone]    Admission: 8/3/2018    [facility] will send [facility]

## 2018-08-03 NOTE — PROGRESS NOTES
Navigator spoke with the  (Ankit) from [facility]. He will be here around 3-4 PM to pick the patient up to transport him to  in [place].

## 2018-08-03 NOTE — PLAN OF CARE
Problem: Patient Care Overview  Goal: Interprofessional Rounds/Family Conf  Outcome: Ongoing (interventions implemented as appropriate)   08/03/18 1044   Interdisciplinary Rounds/Family Conf   Participants social work;nursing   Interdisciplinary Rounds/Family Conf   Summary Staffed patient's case with RN and Navigator.     Therapist met with patient for individual session to discuss disposition.  Patient reports his son does not want him to attend rehab in [place] and patient inquired about [facility].  Therapist informed patient that [facility] declined him due to his medical issues.  Therapist spoke with patient's son, Isaias Sanabria, who stated that he prefers patient to attend rehab closer to [place].  Therapist discussed that patient has been declined to local rehabs and that [facility] is the only accepting facility.  Isaias Araiza reported that he is unable to bring patient's belongings unless he can come late this evening or early tomorrow morning.  Therapist discussed that patient has bed today only at [facility] and that his belongings can be sent to the rehab.  Patient appeared cooperative and calm.  He denied SI, HI, and AVH.  Patient's son appeared supportive.

## 2018-08-03 NOTE — PLAN OF CARE
Problem: Patient Care Overview  Goal: Plan of Care Review  Outcome: Ongoing (interventions implemented as appropriate)  Patient reports eating and sleeping well; pt out to dayroom this shift for snack and watching tv with peers; rates anxiety and depression 5; no SI/HI/AVH; calm and cooperative.   08/03/18 0348   Coping/Psychosocial   Plan of Care Reviewed With patient   Coping/Psychosocial   Patient Agreement with Plan of Care agrees   Plan of Care Review   Progress improving

## 2018-08-03 NOTE — PROGRESS NOTES
[facility] stated late yesterday that they were concerned about patient's medical issues. They were still discussing as of late yesterday.    I called [facility] on this date and spoke with Benjamín. She stated that one of the questions they had was if the patient was ambulatory. I informed her that he was. She stated that they will go ahead and admit the patient today. They will provide transportation for the patient. She stated that she will have the  call with an estimated time of arrival.     Staffed case with primary therapist. Patient is now saying that his son does not want him to go to [place] to [facility]. Requesting that I call [facility] back. They will not accept the patient due to his multiple health issues. They don't feel that he is appropriate for their facility.

## 2018-09-28 ENCOUNTER — HOSPITAL ENCOUNTER (EMERGENCY)
Facility: HOSPITAL | Age: 53
Discharge: HOME | End: 2018-09-28
Attending: EMERGENCY MEDICINE | Admitting: EMERGENCY MEDICINE

## 2018-09-28 VITALS
DIASTOLIC BLOOD PRESSURE: 73 MMHG | HEIGHT: 68 IN | HEART RATE: 106 BPM | OXYGEN SATURATION: 97 % | TEMPERATURE: 97.4 F | SYSTOLIC BLOOD PRESSURE: 110 MMHG | RESPIRATION RATE: 18 BRPM | BODY MASS INDEX: 28.34 KG/M2 | WEIGHT: 187 LBS

## 2018-09-28 DIAGNOSIS — D69.6 THROMBOCYTOPENIA (HCC): ICD-10-CM

## 2018-09-28 DIAGNOSIS — H11.33 SUBCONJUNCTIVAL HEMORRHAGE OF BOTH EYES: ICD-10-CM

## 2018-09-28 DIAGNOSIS — E11.65 UNCONTROLLED TYPE 2 DIABETES MELLITUS WITH HYPERGLYCEMIA, WITH LONG-TERM CURRENT USE OF INSULIN (HCC): Primary | ICD-10-CM

## 2018-09-28 DIAGNOSIS — D63.8 ANEMIA OF CHRONIC DISEASE: ICD-10-CM

## 2018-09-28 DIAGNOSIS — Z79.4 UNCONTROLLED TYPE 2 DIABETES MELLITUS WITH HYPERGLYCEMIA, WITH LONG-TERM CURRENT USE OF INSULIN (HCC): Primary | ICD-10-CM

## 2018-09-28 DIAGNOSIS — K76.9 CHRONIC LIVER DISEASE: ICD-10-CM

## 2018-09-28 LAB
ALBUMIN SERPL-MCNC: 3.38 G/DL (ref 3.2–4.8)
ALBUMIN/GLOB SERPL: 1.1 G/DL (ref 1.5–2.5)
ALP SERPL-CCNC: 165 U/L (ref 25–100)
ALT SERPL W P-5'-P-CCNC: 111 U/L (ref 7–40)
ANION GAP SERPL CALCULATED.3IONS-SCNC: 11 MMOL/L (ref 3–11)
AST SERPL-CCNC: 91 U/L (ref 0–33)
BASOPHILS # BLD AUTO: 0 10*3/MM3 (ref 0–0.2)
BASOPHILS NFR BLD AUTO: 0 % (ref 0–1)
BILIRUB SERPL-MCNC: 0.7 MG/DL (ref 0.3–1.2)
BUN BLD-MCNC: 25 MG/DL (ref 9–23)
BUN/CREAT SERPL: 24.5 (ref 7–25)
CALCIUM SPEC-SCNC: 8.9 MG/DL (ref 8.7–10.4)
CHLORIDE SERPL-SCNC: 99 MMOL/L (ref 99–109)
CO2 SERPL-SCNC: 22 MMOL/L (ref 20–31)
CREAT BLD-MCNC: 1.02 MG/DL (ref 0.6–1.3)
DEPRECATED RDW RBC AUTO: 49.8 FL (ref 37–54)
EOSINOPHIL # BLD AUTO: 0 10*3/MM3 (ref 0–0.3)
EOSINOPHIL NFR BLD AUTO: 0 % (ref 0–3)
ERYTHROCYTE [DISTWIDTH] IN BLOOD BY AUTOMATED COUNT: 16.5 % (ref 11.3–14.5)
GFR SERPL CREATININE-BSD FRML MDRD: 76 ML/MIN/1.73
GLOBULIN UR ELPH-MCNC: 3 GM/DL
GLUCOSE BLD-MCNC: 498 MG/DL (ref 70–100)
GLUCOSE BLDC GLUCOMTR-MCNC: 457 MG/DL (ref 70–130)
GLUCOSE BLDC GLUCOMTR-MCNC: 459 MG/DL (ref 70–130)
GLUCOSE BLDC GLUCOMTR-MCNC: 475 MG/DL (ref 70–130)
GLUCOSE BLDC GLUCOMTR-MCNC: 492 MG/DL (ref 70–130)
HCT VFR BLD AUTO: 33.1 % (ref 38.9–50.9)
HGB BLD-MCNC: 10.7 G/DL (ref 13.1–17.5)
HYPOCHROMIA BLD QL: NORMAL
IMM GRANULOCYTES # BLD: 0.01 10*3/MM3 (ref 0–0.03)
IMM GRANULOCYTES NFR BLD: 0.2 % (ref 0–0.6)
LYMPHOCYTES # BLD AUTO: 0.53 10*3/MM3 (ref 0.6–4.8)
LYMPHOCYTES NFR BLD AUTO: 10.5 % (ref 24–44)
MCH RBC QN AUTO: 26.9 PG (ref 27–31)
MCHC RBC AUTO-ENTMCNC: 32.3 G/DL (ref 32–36)
MCV RBC AUTO: 83.2 FL (ref 80–99)
MONOCYTES # BLD AUTO: 0.35 10*3/MM3 (ref 0–1)
MONOCYTES NFR BLD AUTO: 6.9 % (ref 0–12)
NEUTROPHILS # BLD AUTO: 4.19 10*3/MM3 (ref 1.5–8.3)
NEUTROPHILS NFR BLD AUTO: 82.6 % (ref 41–71)
OVALOCYTES BLD QL SMEAR: NORMAL
PLAT MORPH BLD: NORMAL
PLATELET # BLD AUTO: 32 10*3/MM3 (ref 150–450)
POTASSIUM BLD-SCNC: 3.9 MMOL/L (ref 3.5–5.5)
PROT SERPL-MCNC: 6.4 G/DL (ref 5.7–8.2)
RBC # BLD AUTO: 3.98 10*6/MM3 (ref 4.2–5.76)
SODIUM BLD-SCNC: 132 MMOL/L (ref 132–146)
WBC MORPH BLD: NORMAL
WBC NRBC COR # BLD: 5.07 10*3/MM3 (ref 3.5–10.8)

## 2018-09-28 PROCEDURE — 80053 COMPREHEN METABOLIC PANEL: CPT | Performed by: EMERGENCY MEDICINE

## 2018-09-28 PROCEDURE — 63710000001 INSULIN REGULAR HUMAN PER 5 UNITS: Performed by: EMERGENCY MEDICINE

## 2018-09-28 PROCEDURE — 96374 THER/PROPH/DIAG INJ IV PUSH: CPT

## 2018-09-28 PROCEDURE — 85007 BL SMEAR W/DIFF WBC COUNT: CPT | Performed by: EMERGENCY MEDICINE

## 2018-09-28 PROCEDURE — 82962 GLUCOSE BLOOD TEST: CPT

## 2018-09-28 PROCEDURE — 85025 COMPLETE CBC W/AUTO DIFF WBC: CPT | Performed by: EMERGENCY MEDICINE

## 2018-09-28 PROCEDURE — 25010000002 KETOROLAC TROMETHAMINE PER 15 MG

## 2018-09-28 PROCEDURE — 99285 EMERGENCY DEPT VISIT HI MDM: CPT

## 2018-09-28 PROCEDURE — 96361 HYDRATE IV INFUSION ADD-ON: CPT

## 2018-09-28 RX ORDER — HYDROCODONE BITARTRATE AND ACETAMINOPHEN 7.5; 325 MG/1; MG/1
1 TABLET ORAL EVERY 8 HOURS PRN
Status: ON HOLD | COMMUNITY
End: 2018-10-23

## 2018-09-28 RX ORDER — GABAPENTIN 800 MG/1
800 TABLET ORAL 3 TIMES DAILY
Status: ON HOLD | COMMUNITY
End: 2018-10-23

## 2018-09-28 RX ORDER — KETOROLAC TROMETHAMINE 15 MG/ML
10 INJECTION, SOLUTION INTRAMUSCULAR; INTRAVENOUS ONCE
Status: COMPLETED | OUTPATIENT
Start: 2018-09-28 | End: 2018-09-28

## 2018-09-28 RX ADMIN — SODIUM CHLORIDE 1000 ML: 9 INJECTION, SOLUTION INTRAVENOUS at 07:20

## 2018-09-28 RX ADMIN — INSULIN HUMAN 15 UNITS: 100 INJECTION, SOLUTION PARENTERAL at 11:34

## 2018-09-28 RX ADMIN — INSULIN HUMAN 25 UNITS: 100 INJECTION, SOLUTION PARENTERAL at 08:48

## 2018-09-28 RX ADMIN — KETOROLAC TROMETHAMINE 10 MG: 15 INJECTION, SOLUTION INTRAMUSCULAR; INTRAVENOUS at 06:42

## 2018-10-19 ENCOUNTER — APPOINTMENT (OUTPATIENT)
Dept: CT IMAGING | Facility: HOSPITAL | Age: 53
End: 2018-10-19

## 2018-10-19 ENCOUNTER — HOSPITAL ENCOUNTER (EMERGENCY)
Facility: HOSPITAL | Age: 53
Discharge: ANOTHER HOSPITAL | End: 2018-10-19
Attending: FAMILY MEDICINE | Admitting: FAMILY MEDICINE

## 2018-10-19 VITALS
TEMPERATURE: 98 F | BODY MASS INDEX: 28.79 KG/M2 | RESPIRATION RATE: 16 BRPM | SYSTOLIC BLOOD PRESSURE: 118 MMHG | HEIGHT: 68 IN | WEIGHT: 190 LBS | DIASTOLIC BLOOD PRESSURE: 69 MMHG | HEART RATE: 83 BPM | OXYGEN SATURATION: 97 %

## 2018-10-19 DIAGNOSIS — R45.851 SUICIDAL IDEATION: ICD-10-CM

## 2018-10-19 DIAGNOSIS — K86.1 CHRONIC PANCREATITIS, UNSPECIFIED PANCREATITIS TYPE (HCC): ICD-10-CM

## 2018-10-19 DIAGNOSIS — K74.60 CIRRHOSIS OF LIVER WITHOUT ASCITES, UNSPECIFIED HEPATIC CIRRHOSIS TYPE (HCC): ICD-10-CM

## 2018-10-19 DIAGNOSIS — K56.609 SMALL BOWEL OBSTRUCTION (HCC): Primary | ICD-10-CM

## 2018-10-19 DIAGNOSIS — F19.20 DRUG ABUSE AND DEPENDENCE (HCC): ICD-10-CM

## 2018-10-19 LAB
6-ACETYL MORPHINE: NEGATIVE
A-A DO2: 38 MMHG (ref 0–300)
ALBUMIN SERPL-MCNC: 3.2 G/DL (ref 3.5–5)
ALBUMIN/GLOB SERPL: 1 G/DL (ref 1.5–2.5)
ALP SERPL-CCNC: 102 U/L (ref 40–129)
ALT SERPL W P-5'-P-CCNC: 80 U/L (ref 10–44)
AMPHET+METHAMPHET UR QL: NEGATIVE
ANION GAP SERPL CALCULATED.3IONS-SCNC: 7.5 MMOL/L (ref 3.6–11.2)
ANISOCYTOSIS BLD QL: NORMAL
APTT PPP: 34.6 SECONDS (ref 23.8–36.1)
ARTERIAL PATENCY WRIST A: POSITIVE
AST SERPL-CCNC: 110 U/L (ref 10–34)
ATMOSPHERIC PRESS: 734 MMHG
BARBITURATES UR QL SCN: NEGATIVE
BASE EXCESS BLDA CALC-SCNC: 1.5 MMOL/L
BASOPHILS # BLD AUTO: 0.02 10*3/MM3 (ref 0–0.3)
BASOPHILS NFR BLD AUTO: 0.4 % (ref 0–2)
BDY SITE: ABNORMAL
BENZODIAZ UR QL SCN: NEGATIVE
BILIRUB SERPL-MCNC: 1 MG/DL (ref 0.2–1.8)
BILIRUB UR QL STRIP: NEGATIVE
BNP SERPL-MCNC: 233 PG/ML (ref 0–100)
BODY TEMPERATURE: 98.6 C
BUN BLD-MCNC: 16 MG/DL (ref 7–21)
BUN/CREAT SERPL: 21.6 (ref 7–25)
BUPRENORPHINE SERPL-MCNC: POSITIVE NG/ML
CALCIUM SPEC-SCNC: 8.4 MG/DL (ref 7.7–10)
CANNABINOIDS SERPL QL: NEGATIVE
CHLORIDE SERPL-SCNC: 103 MMOL/L (ref 99–112)
CK SERPL-CCNC: 129 U/L (ref 24–204)
CLARITY UR: CLEAR
CO2 SERPL-SCNC: 27.5 MMOL/L (ref 24.3–31.9)
COCAINE UR QL: NEGATIVE
COHGB MFR BLD: 2.5 % (ref 0–5)
COLOR UR: YELLOW
CREAT BLD-MCNC: 0.74 MG/DL (ref 0.43–1.29)
CRP SERPL-MCNC: <0.5 MG/DL (ref 0–0.99)
D-LACTATE SERPL-SCNC: 1.9 MMOL/L (ref 0.5–2)
DEPRECATED RDW RBC AUTO: 54.6 FL (ref 37–54)
EOSINOPHIL # BLD AUTO: 0.13 10*3/MM3 (ref 0–0.7)
EOSINOPHIL NFR BLD AUTO: 2.7 % (ref 0–5)
ERYTHROCYTE [DISTWIDTH] IN BLOOD BY AUTOMATED COUNT: 18.1 % (ref 11.5–14.5)
GFR SERPL CREATININE-BSD FRML MDRD: 111 ML/MIN/1.73
GLOBULIN UR ELPH-MCNC: 3.3 GM/DL
GLUCOSE BLD-MCNC: 295 MG/DL (ref 70–110)
GLUCOSE UR STRIP-MCNC: ABNORMAL MG/DL
HCO3 BLDA-SCNC: 25.2 MMOL/L (ref 22–26)
HCT VFR BLD AUTO: 37.3 % (ref 42–52)
HCT VFR BLD CALC: 35 % (ref 42–52)
HGB BLD-MCNC: 11.7 G/DL (ref 14–18)
HGB BLDA-MCNC: 11.8 G/DL (ref 12–16)
HGB UR QL STRIP.AUTO: NEGATIVE
HOROWITZ INDEX BLD+IHG-RTO: 21 %
HYPOCHROMIA BLD QL: NORMAL
IMM GRANULOCYTES # BLD: 0.01 10*3/MM3 (ref 0–0.03)
IMM GRANULOCYTES NFR BLD: 0.2 % (ref 0–0.5)
INR PPP: 1.33 (ref 0.9–1.1)
KETONES UR QL STRIP: NEGATIVE
LEUKOCYTE ESTERASE UR QL STRIP.AUTO: NEGATIVE
LIPASE SERPL-CCNC: 20 U/L (ref 13–60)
LYMPHOCYTES # BLD AUTO: 0.64 10*3/MM3 (ref 1–3)
LYMPHOCYTES NFR BLD AUTO: 13.3 % (ref 21–51)
MAGNESIUM SERPL-MCNC: 1.6 MG/DL (ref 1.7–2.6)
MCH RBC QN AUTO: 26.7 PG (ref 27–33)
MCHC RBC AUTO-ENTMCNC: 31.4 G/DL (ref 33–37)
MCV RBC AUTO: 85.2 FL (ref 80–94)
METHADONE UR QL SCN: NEGATIVE
METHGB BLD QL: 0.4 % (ref 0–3)
MODALITY: ABNORMAL
MONOCYTES # BLD AUTO: 0.23 10*3/MM3 (ref 0.1–0.9)
MONOCYTES NFR BLD AUTO: 4.8 % (ref 0–10)
MYOGLOBIN SERPL-MCNC: 83 NG/ML (ref 0–109)
NEUTROPHILS # BLD AUTO: 3.8 10*3/MM3 (ref 1.4–6.5)
NEUTROPHILS NFR BLD AUTO: 78.6 % (ref 30–70)
NITRITE UR QL STRIP: NEGATIVE
OPIATES UR QL: NEGATIVE
OSMOLALITY SERPL CALC.SUM OF ELEC: 287.8 MOSM/KG (ref 273–305)
OVALOCYTES BLD QL SMEAR: NORMAL
OXYCODONE UR QL SCN: NEGATIVE
OXYHGB MFR BLDV: 89.9 % (ref 85–100)
PCO2 BLDA: 36.1 MM HG (ref 35–45)
PCP UR QL SCN: NEGATIVE
PH BLDA: 7.46 PH UNITS (ref 7.35–7.45)
PH UR STRIP.AUTO: 6 [PH] (ref 5–8)
PHOSPHATE SERPL-MCNC: 2.8 MG/DL (ref 2.7–4.5)
PLATELET # BLD AUTO: 71 10*3/MM3 (ref 130–400)
PMV BLD AUTO: ABNORMAL FL (ref 6–10)
PO2 BLDA: 63 MM HG (ref 80–100)
POTASSIUM BLD-SCNC: 4.1 MMOL/L (ref 3.5–5.3)
PROT SERPL-MCNC: 6.5 G/DL (ref 6–8)
PROT UR QL STRIP: NEGATIVE
PROTHROMBIN TIME: 16.7 SECONDS (ref 11–15.4)
RBC # BLD AUTO: 4.38 10*6/MM3 (ref 4.7–6.1)
SAO2 % BLDCOA: 92.6 % (ref 90–100)
SMALL PLATELETS BLD QL SMEAR: NORMAL
SODIUM BLD-SCNC: 138 MMOL/L (ref 135–153)
SP GR UR STRIP: 1.02 (ref 1–1.03)
TROPONIN I SERPL-MCNC: 0.02 NG/ML
TROPONIN I SERPL-MCNC: 0.03 NG/ML
UROBILINOGEN UR QL STRIP: ABNORMAL
WBC NRBC COR # BLD: 4.83 10*3/MM3 (ref 4.5–12.5)

## 2018-10-19 PROCEDURE — 86140 C-REACTIVE PROTEIN: CPT | Performed by: FAMILY MEDICINE

## 2018-10-19 PROCEDURE — 80307 DRUG TEST PRSMV CHEM ANLYZR: CPT | Performed by: FAMILY MEDICINE

## 2018-10-19 PROCEDURE — 83880 ASSAY OF NATRIURETIC PEPTIDE: CPT | Performed by: FAMILY MEDICINE

## 2018-10-19 PROCEDURE — 82550 ASSAY OF CK (CPK): CPT | Performed by: FAMILY MEDICINE

## 2018-10-19 PROCEDURE — 83690 ASSAY OF LIPASE: CPT | Performed by: FAMILY MEDICINE

## 2018-10-19 PROCEDURE — 83735 ASSAY OF MAGNESIUM: CPT | Performed by: FAMILY MEDICINE

## 2018-10-19 PROCEDURE — 71250 CT THORAX DX C-: CPT | Performed by: RADIOLOGY

## 2018-10-19 PROCEDURE — 71250 CT THORAX DX C-: CPT

## 2018-10-19 PROCEDURE — 83050 HGB METHEMOGLOBIN QUAN: CPT | Performed by: FAMILY MEDICINE

## 2018-10-19 PROCEDURE — 74176 CT ABD & PELVIS W/O CONTRAST: CPT

## 2018-10-19 PROCEDURE — 25010000002 MORPHINE PER 10 MG: Performed by: FAMILY MEDICINE

## 2018-10-19 PROCEDURE — 36415 COLL VENOUS BLD VENIPUNCTURE: CPT

## 2018-10-19 PROCEDURE — 74176 CT ABD & PELVIS W/O CONTRAST: CPT | Performed by: RADIOLOGY

## 2018-10-19 PROCEDURE — 81003 URINALYSIS AUTO W/O SCOPE: CPT | Performed by: FAMILY MEDICINE

## 2018-10-19 PROCEDURE — 93005 ELECTROCARDIOGRAM TRACING: CPT | Performed by: FAMILY MEDICINE

## 2018-10-19 PROCEDURE — 83874 ASSAY OF MYOGLOBIN: CPT | Performed by: FAMILY MEDICINE

## 2018-10-19 PROCEDURE — 99285 EMERGENCY DEPT VISIT HI MDM: CPT

## 2018-10-19 PROCEDURE — 82805 BLOOD GASES W/O2 SATURATION: CPT | Performed by: FAMILY MEDICINE

## 2018-10-19 PROCEDURE — 85730 THROMBOPLASTIN TIME PARTIAL: CPT | Performed by: FAMILY MEDICINE

## 2018-10-19 PROCEDURE — 82375 ASSAY CARBOXYHB QUANT: CPT | Performed by: FAMILY MEDICINE

## 2018-10-19 PROCEDURE — 85007 BL SMEAR W/DIFF WBC COUNT: CPT | Performed by: FAMILY MEDICINE

## 2018-10-19 PROCEDURE — 84484 ASSAY OF TROPONIN QUANT: CPT | Performed by: FAMILY MEDICINE

## 2018-10-19 PROCEDURE — 85025 COMPLETE CBC W/AUTO DIFF WBC: CPT | Performed by: FAMILY MEDICINE

## 2018-10-19 PROCEDURE — 84100 ASSAY OF PHOSPHORUS: CPT | Performed by: FAMILY MEDICINE

## 2018-10-19 PROCEDURE — 96374 THER/PROPH/DIAG INJ IV PUSH: CPT

## 2018-10-19 PROCEDURE — 83605 ASSAY OF LACTIC ACID: CPT | Performed by: FAMILY MEDICINE

## 2018-10-19 PROCEDURE — 87040 BLOOD CULTURE FOR BACTERIA: CPT | Performed by: FAMILY MEDICINE

## 2018-10-19 PROCEDURE — 36600 WITHDRAWAL OF ARTERIAL BLOOD: CPT | Performed by: FAMILY MEDICINE

## 2018-10-19 PROCEDURE — 93010 ELECTROCARDIOGRAM REPORT: CPT | Performed by: INTERNAL MEDICINE

## 2018-10-19 PROCEDURE — 80053 COMPREHEN METABOLIC PANEL: CPT | Performed by: FAMILY MEDICINE

## 2018-10-19 PROCEDURE — 85610 PROTHROMBIN TIME: CPT | Performed by: FAMILY MEDICINE

## 2018-10-19 RX ORDER — LACTULOSE 10 G/15ML
10 SOLUTION ORAL 2 TIMES DAILY
COMMUNITY
End: 2018-11-02 | Stop reason: HOSPADM

## 2018-10-19 RX ORDER — ONDANSETRON 4 MG/1
4 TABLET, ORALLY DISINTEGRATING ORAL ONCE
Status: COMPLETED | OUTPATIENT
Start: 2018-10-19 | End: 2018-10-19

## 2018-10-19 RX ORDER — MORPHINE SULFATE 2 MG/ML
2 INJECTION, SOLUTION INTRAMUSCULAR; INTRAVENOUS ONCE
Status: COMPLETED | OUTPATIENT
Start: 2018-10-19 | End: 2018-10-19

## 2018-10-19 RX ORDER — SODIUM CHLORIDE 0.9 % (FLUSH) 0.9 %
10 SYRINGE (ML) INJECTION AS NEEDED
Status: DISCONTINUED | OUTPATIENT
Start: 2018-10-19 | End: 2018-10-20 | Stop reason: HOSPADM

## 2018-10-19 RX ADMIN — MORPHINE SULFATE 2 MG: 2 INJECTION, SOLUTION INTRAMUSCULAR; INTRAVENOUS at 18:49

## 2018-10-19 RX ADMIN — ONDANSETRON 4 MG: 4 TABLET, ORALLY DISINTEGRATING ORAL at 18:03

## 2018-10-22 ENCOUNTER — APPOINTMENT (OUTPATIENT)
Dept: GENERAL RADIOLOGY | Facility: HOSPITAL | Age: 53
End: 2018-10-22

## 2018-10-22 ENCOUNTER — HOSPITAL ENCOUNTER (EMERGENCY)
Facility: HOSPITAL | Age: 53
Discharge: STILL ADMITTED OR ADMINISTRATIVE | End: 2018-10-23
Attending: FAMILY MEDICINE

## 2018-10-22 DIAGNOSIS — R60.9 CHRONIC EDEMA: ICD-10-CM

## 2018-10-22 DIAGNOSIS — R45.851 SUICIDAL IDEATIONS: Primary | ICD-10-CM

## 2018-10-22 DIAGNOSIS — K74.60 CIRRHOSIS OF LIVER WITH ASCITES, UNSPECIFIED HEPATIC CIRRHOSIS TYPE (HCC): ICD-10-CM

## 2018-10-22 DIAGNOSIS — R18.8 CIRRHOSIS OF LIVER WITH ASCITES, UNSPECIFIED HEPATIC CIRRHOSIS TYPE (HCC): ICD-10-CM

## 2018-10-22 LAB
6-ACETYL MORPHINE: NEGATIVE
A-A DO2: 54.9 MMHG (ref 0–300)
ALBUMIN SERPL-MCNC: 3.3 G/DL (ref 3.5–5)
ALBUMIN/GLOB SERPL: 1 G/DL (ref 1.5–2.5)
ALP SERPL-CCNC: 150 U/L (ref 40–129)
ALT SERPL W P-5'-P-CCNC: 70 U/L (ref 10–44)
AMPHET+METHAMPHET UR QL: NEGATIVE
ANION GAP SERPL CALCULATED.3IONS-SCNC: 7.9 MMOL/L (ref 3.6–11.2)
APTT PPP: 36.2 SECONDS (ref 23.8–36.1)
ARTERIAL PATENCY WRIST A: ABNORMAL
AST SERPL-CCNC: 88 U/L (ref 10–34)
ATMOSPHERIC PRESS: 733 MMHG
BARBITURATES UR QL SCN: NEGATIVE
BASE EXCESS BLDA CALC-SCNC: 3.1 MMOL/L
BASOPHILS # BLD AUTO: 0.01 10*3/MM3 (ref 0–0.3)
BASOPHILS NFR BLD AUTO: 0.4 % (ref 0–2)
BDY SITE: ABNORMAL
BENZODIAZ UR QL SCN: NEGATIVE
BILIRUB SERPL-MCNC: 0.7 MG/DL (ref 0.2–1.8)
BILIRUB UR QL STRIP: NEGATIVE
BNP SERPL-MCNC: 409 PG/ML (ref 0–100)
BODY TEMPERATURE: 98.6 C
BUN BLD-MCNC: 19 MG/DL (ref 7–21)
BUN/CREAT SERPL: 19 (ref 7–25)
BUPRENORPHINE SERPL-MCNC: NEGATIVE NG/ML
CALCIUM SPEC-SCNC: 8.7 MG/DL (ref 7.7–10)
CANNABINOIDS SERPL QL: NEGATIVE
CHLORIDE SERPL-SCNC: 100 MMOL/L (ref 99–112)
CLARITY UR: CLEAR
CO2 SERPL-SCNC: 30.1 MMOL/L (ref 24.3–31.9)
COCAINE UR QL: NEGATIVE
COHGB MFR BLD: 1.5 % (ref 0–5)
COLOR UR: YELLOW
CREAT BLD-MCNC: 1 MG/DL (ref 0.43–1.29)
CRP SERPL-MCNC: 0.54 MG/DL (ref 0–0.99)
D-LACTATE SERPL-SCNC: 1.7 MMOL/L (ref 0.5–2)
DEPRECATED RDW RBC AUTO: 52.6 FL (ref 37–54)
EOSINOPHIL # BLD AUTO: 0.13 10*3/MM3 (ref 0–0.7)
EOSINOPHIL NFR BLD AUTO: 4.6 % (ref 0–5)
ERYTHROCYTE [DISTWIDTH] IN BLOOD BY AUTOMATED COUNT: 17.2 % (ref 11.5–14.5)
GFR SERPL CREATININE-BSD FRML MDRD: 78 ML/MIN/1.73
GLOBULIN UR ELPH-MCNC: 3.4 GM/DL
GLUCOSE BLD-MCNC: 305 MG/DL (ref 70–110)
GLUCOSE UR STRIP-MCNC: ABNORMAL MG/DL
HCO3 BLDA-SCNC: 29.3 MMOL/L (ref 22–26)
HCT VFR BLD AUTO: 36 % (ref 42–52)
HCT VFR BLD CALC: 36 % (ref 42–52)
HGB BLD-MCNC: 11.2 G/DL (ref 14–18)
HGB BLDA-MCNC: 12.1 G/DL (ref 12–16)
HGB UR QL STRIP.AUTO: NEGATIVE
HOROWITZ INDEX BLD+IHG-RTO: 21 %
IMM GRANULOCYTES # BLD: 0 10*3/MM3 (ref 0–0.03)
IMM GRANULOCYTES NFR BLD: 0 % (ref 0–0.5)
INR PPP: 1.31 (ref 0.9–1.1)
KETONES UR QL STRIP: NEGATIVE
LEUKOCYTE ESTERASE UR QL STRIP.AUTO: NEGATIVE
LIPASE SERPL-CCNC: 24 U/L (ref 13–60)
LYMPHOCYTES # BLD AUTO: 0.55 10*3/MM3 (ref 1–3)
LYMPHOCYTES NFR BLD AUTO: 19.6 % (ref 21–51)
MCH RBC QN AUTO: 26.4 PG (ref 27–33)
MCHC RBC AUTO-ENTMCNC: 31.1 G/DL (ref 33–37)
MCV RBC AUTO: 84.9 FL (ref 80–94)
METHADONE UR QL SCN: NEGATIVE
METHGB BLD QL: 0.6 % (ref 0–3)
MODALITY: ABNORMAL
MONOCYTES # BLD AUTO: 0.14 10*3/MM3 (ref 0.1–0.9)
MONOCYTES NFR BLD AUTO: 5 % (ref 0–10)
NEUTROPHILS # BLD AUTO: 1.97 10*3/MM3 (ref 1.4–6.5)
NEUTROPHILS NFR BLD AUTO: 70.4 % (ref 30–70)
NITRITE UR QL STRIP: NEGATIVE
OPIATES UR QL: POSITIVE
OSMOLALITY SERPL CALC.SUM OF ELEC: 289.4 MOSM/KG (ref 273–305)
OXYCODONE UR QL SCN: NEGATIVE
OXYHGB MFR BLDV: 49 % (ref 85–100)
PCO2 BLDA: 51.9 MM HG (ref 35–45)
PCP UR QL SCN: NEGATIVE
PH BLDA: 7.37 PH UNITS (ref 7.35–7.45)
PH UR STRIP.AUTO: 5.5 [PH] (ref 5–8)
PLATELET # BLD AUTO: 63 10*3/MM3 (ref 130–400)
PMV BLD AUTO: 11.7 FL (ref 6–10)
PO2 BLDA: 27 MM HG (ref 80–100)
POTASSIUM BLD-SCNC: 4.6 MMOL/L (ref 3.5–5.3)
PROT SERPL-MCNC: 6.7 G/DL (ref 6–8)
PROT UR QL STRIP: NEGATIVE
PROTHROMBIN TIME: 16.5 SECONDS (ref 11–15.4)
RBC # BLD AUTO: 4.24 10*6/MM3 (ref 4.7–6.1)
SAO2 % BLDCOA: 50.1 % (ref 90–100)
SODIUM BLD-SCNC: 138 MMOL/L (ref 135–153)
SP GR UR STRIP: 1.01 (ref 1–1.03)
TROPONIN I SERPL-MCNC: 0.01 NG/ML
UROBILINOGEN UR QL STRIP: ABNORMAL
WBC NRBC COR # BLD: 2.8 10*3/MM3 (ref 4.5–12.5)

## 2018-10-22 PROCEDURE — 81003 URINALYSIS AUTO W/O SCOPE: CPT | Performed by: FAMILY MEDICINE

## 2018-10-22 PROCEDURE — 87040 BLOOD CULTURE FOR BACTERIA: CPT | Performed by: FAMILY MEDICINE

## 2018-10-22 PROCEDURE — 80053 COMPREHEN METABOLIC PANEL: CPT | Performed by: FAMILY MEDICINE

## 2018-10-22 PROCEDURE — 85025 COMPLETE CBC W/AUTO DIFF WBC: CPT | Performed by: FAMILY MEDICINE

## 2018-10-22 PROCEDURE — 83036 HEMOGLOBIN GLYCOSYLATED A1C: CPT | Performed by: NURSE PRACTITIONER

## 2018-10-22 PROCEDURE — 80307 DRUG TEST PRSMV CHEM ANLYZR: CPT | Performed by: FAMILY MEDICINE

## 2018-10-22 PROCEDURE — 83605 ASSAY OF LACTIC ACID: CPT | Performed by: FAMILY MEDICINE

## 2018-10-22 PROCEDURE — 94799 UNLISTED PULMONARY SVC/PX: CPT

## 2018-10-22 PROCEDURE — 71045 X-RAY EXAM CHEST 1 VIEW: CPT

## 2018-10-22 PROCEDURE — 84484 ASSAY OF TROPONIN QUANT: CPT | Performed by: FAMILY MEDICINE

## 2018-10-22 PROCEDURE — 83880 ASSAY OF NATRIURETIC PEPTIDE: CPT | Performed by: FAMILY MEDICINE

## 2018-10-22 PROCEDURE — 82746 ASSAY OF FOLIC ACID SERUM: CPT | Performed by: NURSE PRACTITIONER

## 2018-10-22 PROCEDURE — 82607 VITAMIN B-12: CPT | Performed by: NURSE PRACTITIONER

## 2018-10-22 PROCEDURE — 25010000002 FUROSEMIDE PER 20 MG: Performed by: FAMILY MEDICINE

## 2018-10-22 PROCEDURE — 82375 ASSAY CARBOXYHB QUANT: CPT | Performed by: FAMILY MEDICINE

## 2018-10-22 PROCEDURE — 83690 ASSAY OF LIPASE: CPT | Performed by: FAMILY MEDICINE

## 2018-10-22 PROCEDURE — 25010000002 ONDANSETRON PER 1 MG: Performed by: FAMILY MEDICINE

## 2018-10-22 PROCEDURE — 85730 THROMBOPLASTIN TIME PARTIAL: CPT | Performed by: FAMILY MEDICINE

## 2018-10-22 PROCEDURE — 25010000002 KETOROLAC TROMETHAMINE PER 15 MG: Performed by: FAMILY MEDICINE

## 2018-10-22 PROCEDURE — 93005 ELECTROCARDIOGRAM TRACING: CPT | Performed by: FAMILY MEDICINE

## 2018-10-22 PROCEDURE — 82805 BLOOD GASES W/O2 SATURATION: CPT | Performed by: FAMILY MEDICINE

## 2018-10-22 PROCEDURE — 83050 HGB METHEMOGLOBIN QUAN: CPT | Performed by: FAMILY MEDICINE

## 2018-10-22 PROCEDURE — 86140 C-REACTIVE PROTEIN: CPT | Performed by: FAMILY MEDICINE

## 2018-10-22 PROCEDURE — 82140 ASSAY OF AMMONIA: CPT | Performed by: FAMILY MEDICINE

## 2018-10-22 PROCEDURE — 93010 ELECTROCARDIOGRAM REPORT: CPT | Performed by: INTERNAL MEDICINE

## 2018-10-22 PROCEDURE — 85610 PROTHROMBIN TIME: CPT | Performed by: FAMILY MEDICINE

## 2018-10-22 PROCEDURE — 71045 X-RAY EXAM CHEST 1 VIEW: CPT | Performed by: RADIOLOGY

## 2018-10-22 PROCEDURE — 84443 ASSAY THYROID STIM HORMONE: CPT | Performed by: NURSE PRACTITIONER

## 2018-10-22 PROCEDURE — 36600 WITHDRAWAL OF ARTERIAL BLOOD: CPT | Performed by: FAMILY MEDICINE

## 2018-10-22 RX ORDER — ONDANSETRON 2 MG/ML
4 INJECTION INTRAMUSCULAR; INTRAVENOUS ONCE
Status: COMPLETED | OUTPATIENT
Start: 2018-10-22 | End: 2018-10-22

## 2018-10-22 RX ORDER — FUROSEMIDE 10 MG/ML
40 INJECTION INTRAMUSCULAR; INTRAVENOUS ONCE
Status: COMPLETED | OUTPATIENT
Start: 2018-10-22 | End: 2018-10-22

## 2018-10-22 RX ORDER — SODIUM CHLORIDE 0.9 % (FLUSH) 0.9 %
10 SYRINGE (ML) INJECTION AS NEEDED
Status: DISCONTINUED | OUTPATIENT
Start: 2018-10-22 | End: 2018-10-23 | Stop reason: HOSPADM

## 2018-10-22 RX ORDER — KETOROLAC TROMETHAMINE 30 MG/ML
15 INJECTION, SOLUTION INTRAMUSCULAR; INTRAVENOUS ONCE
Status: COMPLETED | OUTPATIENT
Start: 2018-10-22 | End: 2018-10-22

## 2018-10-22 RX ADMIN — SODIUM CHLORIDE 1000 ML: 9 INJECTION, SOLUTION INTRAVENOUS at 22:30

## 2018-10-22 RX ADMIN — ONDANSETRON 4 MG: 2 INJECTION, SOLUTION INTRAMUSCULAR; INTRAVENOUS at 23:42

## 2018-10-22 RX ADMIN — FUROSEMIDE 40 MG: 10 INJECTION, SOLUTION INTRAMUSCULAR; INTRAVENOUS at 23:21

## 2018-10-22 RX ADMIN — KETOROLAC TROMETHAMINE 15 MG: 30 INJECTION, SOLUTION INTRAMUSCULAR; INTRAVENOUS at 23:50

## 2018-10-23 ENCOUNTER — HOSPITAL ENCOUNTER (INPATIENT)
Facility: HOSPITAL | Age: 53
LOS: 10 days | Discharge: HOME | End: 2018-11-02
Attending: PSYCHIATRY & NEUROLOGY | Admitting: PSYCHIATRY & NEUROLOGY

## 2018-10-23 ENCOUNTER — APPOINTMENT (OUTPATIENT)
Dept: GENERAL RADIOLOGY | Facility: HOSPITAL | Age: 53
End: 2018-10-23

## 2018-10-23 VITALS
WEIGHT: 190 LBS | OXYGEN SATURATION: 100 % | SYSTOLIC BLOOD PRESSURE: 110 MMHG | TEMPERATURE: 97.7 F | DIASTOLIC BLOOD PRESSURE: 66 MMHG | HEART RATE: 89 BPM | HEIGHT: 68 IN | RESPIRATION RATE: 20 BRPM | BODY MASS INDEX: 28.79 KG/M2

## 2018-10-23 DIAGNOSIS — F33.2 SEVERE EPISODE OF RECURRENT MAJOR DEPRESSIVE DISORDER, WITHOUT PSYCHOTIC FEATURES (HCC): Primary | ICD-10-CM

## 2018-10-23 PROBLEM — I21.9 MYOCARDIAL INFARCTION (HCC): Status: ACTIVE | Noted: 2018-10-23

## 2018-10-23 PROBLEM — D69.6 THROMBOCYTOPENIA (HCC): Status: ACTIVE | Noted: 2018-10-23

## 2018-10-23 PROBLEM — I50.9 CONGESTIVE HEART FAILURE (CHF) (HCC): Status: ACTIVE | Noted: 2018-10-23

## 2018-10-23 PROBLEM — F32.9 MDD (MAJOR DEPRESSIVE DISORDER): Status: ACTIVE | Noted: 2018-10-23

## 2018-10-23 PROBLEM — I25.10 CORONARY ARTERY DISEASE: Status: ACTIVE | Noted: 2018-10-23

## 2018-10-23 PROBLEM — D72.819 LEUKOPENIA: Status: ACTIVE | Noted: 2018-10-23

## 2018-10-23 LAB
ALBUMIN SERPL-MCNC: 2.8 G/DL (ref 3.5–5)
ALBUMIN/GLOB SERPL: 0.9 G/DL (ref 1.5–2.5)
ALP SERPL-CCNC: 123 U/L (ref 40–129)
ALT SERPL W P-5'-P-CCNC: 61 U/L (ref 10–44)
AMMONIA BLD-SCNC: 46 UMOL/L (ref 16–60)
ANION GAP SERPL CALCULATED.3IONS-SCNC: 3.6 MMOL/L (ref 3.6–11.2)
ANISOCYTOSIS BLD QL: NORMAL
AST SERPL-CCNC: 77 U/L (ref 10–34)
BASOPHILS # BLD AUTO: 0.01 10*3/MM3 (ref 0–0.3)
BASOPHILS NFR BLD AUTO: 0.4 % (ref 0–2)
BILIRUB SERPL-MCNC: 0.7 MG/DL (ref 0.2–1.8)
BNP SERPL-MCNC: 370 PG/ML (ref 0–100)
BUN BLD-MCNC: 17 MG/DL (ref 7–21)
BUN/CREAT SERPL: 19.1 (ref 7–25)
CALCIUM SPEC-SCNC: 8.5 MG/DL (ref 7.7–10)
CHLORIDE SERPL-SCNC: 104 MMOL/L (ref 99–112)
CO2 SERPL-SCNC: 28.4 MMOL/L (ref 24.3–31.9)
CREAT BLD-MCNC: 0.89 MG/DL (ref 0.43–1.29)
DEPRECATED RDW RBC AUTO: 54.3 FL (ref 37–54)
EOSINOPHIL # BLD AUTO: 0.12 10*3/MM3 (ref 0–0.7)
EOSINOPHIL NFR BLD AUTO: 5.3 % (ref 0–5)
ERYTHROCYTE [DISTWIDTH] IN BLOOD BY AUTOMATED COUNT: 17.6 % (ref 11.5–14.5)
FOLATE SERPL-MCNC: 17.42 NG/ML (ref 5.4–20)
GFR SERPL CREATININE-BSD FRML MDRD: 89 ML/MIN/1.73
GLOBULIN UR ELPH-MCNC: 3.2 GM/DL
GLUCOSE BLD-MCNC: 298 MG/DL (ref 70–110)
GLUCOSE BLDC GLUCOMTR-MCNC: 104 MG/DL (ref 70–130)
GLUCOSE BLDC GLUCOMTR-MCNC: 159 MG/DL (ref 70–130)
GLUCOSE BLDC GLUCOMTR-MCNC: 201 MG/DL (ref 70–130)
GLUCOSE BLDC GLUCOMTR-MCNC: 220 MG/DL (ref 70–130)
GLUCOSE BLDC GLUCOMTR-MCNC: 318 MG/DL (ref 70–130)
GLUCOSE BLDC GLUCOMTR-MCNC: 89 MG/DL (ref 70–130)
HBA1C MFR BLD: 9.6 % (ref 4.5–5.7)
HCT VFR BLD AUTO: 35.4 % (ref 42–52)
HGB BLD-MCNC: 10.9 G/DL (ref 14–18)
HYPOCHROMIA BLD QL: NORMAL
IMM GRANULOCYTES # BLD: 0 10*3/MM3 (ref 0–0.03)
IMM GRANULOCYTES NFR BLD: 0 % (ref 0–0.5)
LARGE PLATELETS: NORMAL
LYMPHOCYTES # BLD AUTO: 0.57 10*3/MM3 (ref 1–3)
LYMPHOCYTES NFR BLD AUTO: 25.2 % (ref 21–51)
MCH RBC QN AUTO: 26.1 PG (ref 27–33)
MCHC RBC AUTO-ENTMCNC: 30.8 G/DL (ref 33–37)
MCV RBC AUTO: 84.9 FL (ref 80–94)
MONOCYTES # BLD AUTO: 0.09 10*3/MM3 (ref 0.1–0.9)
MONOCYTES NFR BLD AUTO: 4 % (ref 0–10)
NEUTROPHILS # BLD AUTO: 1.47 10*3/MM3 (ref 1.4–6.5)
NEUTROPHILS NFR BLD AUTO: 65.1 % (ref 30–70)
OSMOLALITY SERPL CALC.SUM OF ELEC: 284.6 MOSM/KG (ref 273–305)
OVALOCYTES BLD QL SMEAR: NORMAL
PLATELET # BLD AUTO: 54 10*3/MM3 (ref 130–400)
PMV BLD AUTO: ABNORMAL FL (ref 6–10)
POIKILOCYTOSIS BLD QL SMEAR: NORMAL
POTASSIUM BLD-SCNC: 4.3 MMOL/L (ref 3.5–5.3)
PROT SERPL-MCNC: 6 G/DL (ref 6–8)
RBC # BLD AUTO: 4.17 10*6/MM3 (ref 4.7–6.1)
SMALL PLATELETS BLD QL SMEAR: NORMAL
SODIUM BLD-SCNC: 136 MMOL/L (ref 135–153)
TROPONIN I SERPL-MCNC: 0.02 NG/ML
TSH SERPL DL<=0.05 MIU/L-ACNC: 2.03 MIU/ML (ref 0.55–4.78)
VIT B12 BLD-MCNC: 796 PG/ML (ref 211–911)
WBC NRBC COR # BLD: 2.26 10*3/MM3 (ref 4.5–12.5)

## 2018-10-23 PROCEDURE — HZ2ZZZZ DETOXIFICATION SERVICES FOR SUBSTANCE ABUSE TREATMENT: ICD-10-PCS | Performed by: PSYCHIATRY & NEUROLOGY

## 2018-10-23 PROCEDURE — 85025 COMPLETE CBC W/AUTO DIFF WBC: CPT | Performed by: NURSE PRACTITIONER

## 2018-10-23 PROCEDURE — 99223 1ST HOSP IP/OBS HIGH 75: CPT | Performed by: PSYCHIATRY & NEUROLOGY

## 2018-10-23 PROCEDURE — 71046 X-RAY EXAM CHEST 2 VIEWS: CPT

## 2018-10-23 PROCEDURE — 71046 X-RAY EXAM CHEST 2 VIEWS: CPT | Performed by: RADIOLOGY

## 2018-10-23 PROCEDURE — 63710000001 INSULIN ASPART PER 5 UNITS: Performed by: PSYCHIATRY & NEUROLOGY

## 2018-10-23 PROCEDURE — 80053 COMPREHEN METABOLIC PANEL: CPT | Performed by: PSYCHIATRY & NEUROLOGY

## 2018-10-23 PROCEDURE — 25010000002 FUROSEMIDE PER 20 MG: Performed by: FAMILY MEDICINE

## 2018-10-23 PROCEDURE — 85007 BL SMEAR W/DIFF WBC COUNT: CPT | Performed by: NURSE PRACTITIONER

## 2018-10-23 PROCEDURE — 63710000001 INSULIN DETEMIR PER 5 UNITS: Performed by: PSYCHIATRY & NEUROLOGY

## 2018-10-23 PROCEDURE — 82962 GLUCOSE BLOOD TEST: CPT

## 2018-10-23 PROCEDURE — 83880 ASSAY OF NATRIURETIC PEPTIDE: CPT | Performed by: NURSE PRACTITIONER

## 2018-10-23 PROCEDURE — 84484 ASSAY OF TROPONIN QUANT: CPT | Performed by: FAMILY MEDICINE

## 2018-10-23 RX ORDER — BENZTROPINE MESYLATE 1 MG/ML
0.5 INJECTION INTRAMUSCULAR; INTRAVENOUS DAILY PRN
Status: DISCONTINUED | OUTPATIENT
Start: 2018-10-23 | End: 2018-11-02 | Stop reason: HOSPADM

## 2018-10-23 RX ORDER — METOPROLOL SUCCINATE 25 MG/1
25 TABLET, EXTENDED RELEASE ORAL DAILY
Status: DISCONTINUED | OUTPATIENT
Start: 2018-10-23 | End: 2018-10-31

## 2018-10-23 RX ORDER — CITALOPRAM 20 MG/1
40 TABLET ORAL DAILY
Status: CANCELLED | OUTPATIENT
Start: 2018-10-23

## 2018-10-23 RX ORDER — FLUTICASONE PROPIONATE 50 MCG
2 SPRAY, SUSPENSION (ML) NASAL DAILY PRN
Status: DISCONTINUED | OUTPATIENT
Start: 2018-10-23 | End: 2018-11-02 | Stop reason: HOSPADM

## 2018-10-23 RX ORDER — NICOTINE POLACRILEX 4 MG
15 LOZENGE BUCCAL
Status: DISCONTINUED | OUTPATIENT
Start: 2018-10-23 | End: 2018-11-02 | Stop reason: HOSPADM

## 2018-10-23 RX ORDER — FLUTICASONE PROPIONATE 50 MCG
2 SPRAY, SUSPENSION (ML) NASAL DAILY PRN
Status: ON HOLD | COMMUNITY
End: 2018-11-02

## 2018-10-23 RX ORDER — POTASSIUM CHLORIDE 20 MEQ/1
20 TABLET, EXTENDED RELEASE ORAL DAILY
Status: DISCONTINUED | OUTPATIENT
Start: 2018-10-23 | End: 2018-10-26

## 2018-10-23 RX ORDER — POLYETHYLENE GLYCOL 3350 17 G/17G
17 POWDER, FOR SOLUTION ORAL 2 TIMES DAILY
Status: ON HOLD | COMMUNITY
End: 2018-11-02

## 2018-10-23 RX ORDER — ATORVASTATIN CALCIUM 40 MG/1
40 TABLET, FILM COATED ORAL NIGHTLY
Status: ON HOLD | COMMUNITY
End: 2018-11-02

## 2018-10-23 RX ORDER — SULINDAC 200 MG/1
200 TABLET ORAL 2 TIMES DAILY WITH MEALS
COMMUNITY
End: 2018-11-02 | Stop reason: HOSPADM

## 2018-10-23 RX ORDER — NAPROXEN 250 MG/1
250 TABLET ORAL 2 TIMES DAILY PRN
COMMUNITY
End: 2018-11-02 | Stop reason: HOSPADM

## 2018-10-23 RX ORDER — METOCLOPRAMIDE 10 MG/1
10 TABLET ORAL
Status: CANCELLED | OUTPATIENT
Start: 2018-10-23

## 2018-10-23 RX ORDER — DEXTROSE MONOHYDRATE 25 G/50ML
25 INJECTION, SOLUTION INTRAVENOUS
Status: DISCONTINUED | OUTPATIENT
Start: 2018-10-23 | End: 2018-11-02 | Stop reason: HOSPADM

## 2018-10-23 RX ORDER — NICOTINE 21 MG/24HR
1 PATCH, TRANSDERMAL 24 HOURS TRANSDERMAL EVERY 24 HOURS
Status: DISCONTINUED | OUTPATIENT
Start: 2018-10-23 | End: 2018-11-02 | Stop reason: HOSPADM

## 2018-10-23 RX ORDER — INSULIN GLARGINE 100 [IU]/ML
25 INJECTION, SOLUTION SUBCUTANEOUS EVERY MORNING
COMMUNITY
End: 2018-11-02 | Stop reason: HOSPADM

## 2018-10-23 RX ORDER — FUROSEMIDE 40 MG/1
40 TABLET ORAL DAILY
COMMUNITY
End: 2018-11-02 | Stop reason: HOSPADM

## 2018-10-23 RX ORDER — CITALOPRAM 40 MG/1
40 TABLET ORAL DAILY
COMMUNITY
End: 2018-11-02 | Stop reason: HOSPADM

## 2018-10-23 RX ORDER — LOPERAMIDE HYDROCHLORIDE 2 MG/1
2 CAPSULE ORAL 4 TIMES DAILY PRN
Status: DISCONTINUED | OUTPATIENT
Start: 2018-10-23 | End: 2018-11-02 | Stop reason: HOSPADM

## 2018-10-23 RX ORDER — BENZTROPINE MESYLATE 1 MG/1
1 TABLET ORAL DAILY PRN
Status: DISCONTINUED | OUTPATIENT
Start: 2018-10-23 | End: 2018-11-02 | Stop reason: HOSPADM

## 2018-10-23 RX ORDER — FERROUS SULFATE 325(65) MG
325 TABLET ORAL 2 TIMES DAILY
Status: ON HOLD | COMMUNITY
End: 2018-11-02

## 2018-10-23 RX ORDER — POLYETHYLENE GLYCOL 3350 17 G/17G
17 POWDER, FOR SOLUTION ORAL 2 TIMES DAILY
Status: DISCONTINUED | OUTPATIENT
Start: 2018-10-23 | End: 2018-11-02 | Stop reason: HOSPADM

## 2018-10-23 RX ORDER — ATORVASTATIN CALCIUM 40 MG/1
40 TABLET, FILM COATED ORAL NIGHTLY
Status: DISCONTINUED | OUTPATIENT
Start: 2018-10-23 | End: 2018-11-02 | Stop reason: HOSPADM

## 2018-10-23 RX ORDER — INSULIN GLARGINE 100 [IU]/ML
35 INJECTION, SOLUTION SUBCUTANEOUS NIGHTLY
COMMUNITY
End: 2018-11-02 | Stop reason: HOSPADM

## 2018-10-23 RX ORDER — NAPROXEN 250 MG/1
250 TABLET ORAL 2 TIMES DAILY PRN
Status: CANCELLED | OUTPATIENT
Start: 2018-10-23

## 2018-10-23 RX ORDER — FAMOTIDINE 20 MG/1
20 TABLET, FILM COATED ORAL 2 TIMES DAILY PRN
Status: DISCONTINUED | OUTPATIENT
Start: 2018-10-23 | End: 2018-11-02 | Stop reason: HOSPADM

## 2018-10-23 RX ORDER — ALBUTEROL SULFATE 2.5 MG/3ML
2.5 SOLUTION RESPIRATORY (INHALATION) EVERY 6 HOURS PRN
COMMUNITY
End: 2018-11-02 | Stop reason: HOSPADM

## 2018-10-23 RX ORDER — FUROSEMIDE 40 MG/1
40 TABLET ORAL DAILY
Status: DISCONTINUED | OUTPATIENT
Start: 2018-10-23 | End: 2018-10-28

## 2018-10-23 RX ORDER — HYDROXYZINE 50 MG/1
50 TABLET, FILM COATED ORAL EVERY 6 HOURS PRN
Status: DISCONTINUED | OUTPATIENT
Start: 2018-10-23 | End: 2018-10-24

## 2018-10-23 RX ORDER — ONDANSETRON 4 MG/1
4 TABLET, FILM COATED ORAL EVERY 6 HOURS PRN
Status: DISCONTINUED | OUTPATIENT
Start: 2018-10-23 | End: 2018-11-02 | Stop reason: HOSPADM

## 2018-10-23 RX ORDER — SODIUM CHLORIDE 9 MG/ML
INJECTION, SOLUTION INTRAVENOUS
Status: DISPENSED
Start: 2018-10-23 | End: 2018-10-23

## 2018-10-23 RX ORDER — LACTULOSE 10 G/15ML
10 SOLUTION ORAL 2 TIMES DAILY
Status: DISCONTINUED | OUTPATIENT
Start: 2018-10-23 | End: 2018-11-02 | Stop reason: HOSPADM

## 2018-10-23 RX ORDER — ASPIRIN 81 MG/1
81 TABLET ORAL DAILY
Status: CANCELLED | OUTPATIENT
Start: 2018-10-23

## 2018-10-23 RX ORDER — FERROUS SULFATE 325(65) MG
325 TABLET ORAL 2 TIMES DAILY
Status: DISCONTINUED | OUTPATIENT
Start: 2018-10-23 | End: 2018-11-02 | Stop reason: HOSPADM

## 2018-10-23 RX ORDER — DICYCLOMINE HYDROCHLORIDE 10 MG/1
10 CAPSULE ORAL DAILY
Status: ON HOLD | COMMUNITY
End: 2018-10-23

## 2018-10-23 RX ORDER — ECHINACEA PURPUREA EXTRACT 125 MG
2 TABLET ORAL AS NEEDED
Status: DISCONTINUED | OUTPATIENT
Start: 2018-10-23 | End: 2018-11-02 | Stop reason: HOSPADM

## 2018-10-23 RX ORDER — BENZONATATE 100 MG/1
100 CAPSULE ORAL 3 TIMES DAILY PRN
Status: DISCONTINUED | OUTPATIENT
Start: 2018-10-23 | End: 2018-11-02 | Stop reason: HOSPADM

## 2018-10-23 RX ORDER — ALBUTEROL SULFATE 90 UG/1
2 AEROSOL, METERED RESPIRATORY (INHALATION) EVERY 6 HOURS PRN
Status: DISCONTINUED | OUTPATIENT
Start: 2018-10-23 | End: 2018-11-02 | Stop reason: HOSPADM

## 2018-10-23 RX ORDER — ALUMINA, MAGNESIA, AND SIMETHICONE 2400; 2400; 240 MG/30ML; MG/30ML; MG/30ML
15 SUSPENSION ORAL EVERY 6 HOURS PRN
Status: DISCONTINUED | OUTPATIENT
Start: 2018-10-23 | End: 2018-11-02 | Stop reason: HOSPADM

## 2018-10-23 RX ORDER — IBUPROFEN 600 MG/1
600 TABLET ORAL EVERY 6 HOURS PRN
Status: DISCONTINUED | OUTPATIENT
Start: 2018-10-23 | End: 2018-10-23

## 2018-10-23 RX ORDER — NITROGLYCERIN 0.4 MG/1
0.4 TABLET SUBLINGUAL
Status: DISCONTINUED | OUTPATIENT
Start: 2018-10-23 | End: 2018-11-02 | Stop reason: HOSPADM

## 2018-10-23 RX ORDER — PANTOPRAZOLE SODIUM 40 MG/1
40 TABLET, DELAYED RELEASE ORAL EVERY MORNING
Status: DISCONTINUED | OUTPATIENT
Start: 2018-10-23 | End: 2018-11-02 | Stop reason: HOSPADM

## 2018-10-23 RX ORDER — FUROSEMIDE 10 MG/ML
20 INJECTION INTRAMUSCULAR; INTRAVENOUS ONCE
Status: COMPLETED | OUTPATIENT
Start: 2018-10-23 | End: 2018-10-23

## 2018-10-23 RX ADMIN — SODIUM CHLORIDE 500 ML: 9 INJECTION, SOLUTION INTRAVENOUS at 01:08

## 2018-10-23 RX ADMIN — PANTOPRAZOLE SODIUM 40 MG: 40 TABLET, DELAYED RELEASE ORAL at 14:37

## 2018-10-23 RX ADMIN — LACTULOSE 10 G: 10 SOLUTION ORAL at 14:37

## 2018-10-23 RX ADMIN — METOPROLOL SUCCINATE 25 MG: 25 TABLET, FILM COATED, EXTENDED RELEASE ORAL at 14:37

## 2018-10-23 RX ADMIN — ALBUTEROL SULFATE 2 PUFF: 90 AEROSOL, METERED RESPIRATORY (INHALATION) at 14:39

## 2018-10-23 RX ADMIN — ATORVASTATIN CALCIUM 40 MG: 40 TABLET, FILM COATED ORAL at 21:14

## 2018-10-23 RX ADMIN — FUROSEMIDE 20 MG: 10 INJECTION, SOLUTION INTRAMUSCULAR; INTRAVENOUS at 02:12

## 2018-10-23 RX ADMIN — INSULIN ASPART 20 UNITS: 100 INJECTION, SOLUTION INTRAVENOUS; SUBCUTANEOUS at 12:33

## 2018-10-23 RX ADMIN — INSULIN ASPART 5 UNITS: 100 INJECTION, SOLUTION INTRAVENOUS; SUBCUTANEOUS at 07:22

## 2018-10-23 RX ADMIN — IBUPROFEN 600 MG: 600 TABLET ORAL at 19:41

## 2018-10-23 RX ADMIN — OFLOXACIN 50000 UNITS: 300 TABLET, COATED ORAL at 14:39

## 2018-10-23 RX ADMIN — POLYETHYLENE GLYCOL (3350) 17 G: 17 POWDER, FOR SOLUTION ORAL at 21:14

## 2018-10-23 RX ADMIN — FERROUS SULFATE TAB 325 MG (65 MG ELEMENTAL FE) 325 MG: 325 (65 FE) TAB at 14:39

## 2018-10-23 RX ADMIN — LACTULOSE 10 G: 10 SOLUTION ORAL at 21:15

## 2018-10-23 RX ADMIN — FUROSEMIDE 40 MG: 40 TABLET ORAL at 14:37

## 2018-10-23 RX ADMIN — INSULIN ASPART 3 UNITS: 100 INJECTION, SOLUTION INTRAVENOUS; SUBCUTANEOUS at 12:33

## 2018-10-23 RX ADMIN — INSULIN DETEMIR 35 UNITS: 100 INJECTION, SOLUTION SUBCUTANEOUS at 03:50

## 2018-10-23 RX ADMIN — INSULIN ASPART 20 UNITS: 100 INJECTION, SOLUTION INTRAVENOUS; SUBCUTANEOUS at 07:22

## 2018-10-23 RX ADMIN — POTASSIUM CHLORIDE 20 MEQ: 1500 TABLET, EXTENDED RELEASE ORAL at 14:37

## 2018-10-23 RX ADMIN — INSULIN DETEMIR 35 UNITS: 100 INJECTION, SOLUTION SUBCUTANEOUS at 21:16

## 2018-10-23 RX ADMIN — FERROUS SULFATE TAB 325 MG (65 MG ELEMENTAL FE) 325 MG: 325 (65 FE) TAB at 21:14

## 2018-10-23 RX ADMIN — INSULIN ASPART 20 UNITS: 100 INJECTION, SOLUTION INTRAVENOUS; SUBCUTANEOUS at 16:43

## 2018-10-23 RX ADMIN — MAGNESIUM GLUCONATE 500 MG ORAL TABLET 400 MG: 500 TABLET ORAL at 14:37

## 2018-10-24 LAB
ALBUMIN SERPL-MCNC: 2.8 G/DL (ref 3.5–5)
ALBUMIN/GLOB SERPL: 1 G/DL (ref 1.5–2.5)
ALP SERPL-CCNC: 139 U/L (ref 40–129)
ALT SERPL W P-5'-P-CCNC: 54 U/L (ref 10–44)
ANION GAP SERPL CALCULATED.3IONS-SCNC: 3.1 MMOL/L (ref 3.6–11.2)
ANISOCYTOSIS BLD QL: NORMAL
AST SERPL-CCNC: 82 U/L (ref 10–34)
BACTERIA SPEC AEROBE CULT: NORMAL
BACTERIA SPEC AEROBE CULT: NORMAL
BASOPHILS # BLD AUTO: 0 10*3/MM3 (ref 0–0.3)
BASOPHILS NFR BLD AUTO: 0 % (ref 0–2)
BILIRUB SERPL-MCNC: 0.6 MG/DL (ref 0.2–1.8)
BUN BLD-MCNC: 16 MG/DL (ref 7–21)
BUN/CREAT SERPL: 21.3 (ref 7–25)
CALCIUM SPEC-SCNC: 8.7 MG/DL (ref 7.7–10)
CHLORIDE SERPL-SCNC: 104 MMOL/L (ref 99–112)
CO2 SERPL-SCNC: 30.9 MMOL/L (ref 24.3–31.9)
CREAT BLD-MCNC: 0.75 MG/DL (ref 0.43–1.29)
DEPRECATED RDW RBC AUTO: 54.4 FL (ref 37–54)
EOSINOPHIL # BLD AUTO: 0.09 10*3/MM3 (ref 0–0.7)
EOSINOPHIL NFR BLD AUTO: 4.1 % (ref 0–5)
ERYTHROCYTE [DISTWIDTH] IN BLOOD BY AUTOMATED COUNT: 17.6 % (ref 11.5–14.5)
GFR SERPL CREATININE-BSD FRML MDRD: 109 ML/MIN/1.73
GLOBULIN UR ELPH-MCNC: 2.9 GM/DL
GLUCOSE BLD-MCNC: 286 MG/DL (ref 70–110)
GLUCOSE BLDC GLUCOMTR-MCNC: 175 MG/DL (ref 70–130)
GLUCOSE BLDC GLUCOMTR-MCNC: 190 MG/DL (ref 70–130)
GLUCOSE BLDC GLUCOMTR-MCNC: 244 MG/DL (ref 70–130)
GLUCOSE BLDC GLUCOMTR-MCNC: 268 MG/DL (ref 70–130)
HCT VFR BLD AUTO: 33.3 % (ref 42–52)
HGB BLD-MCNC: 10.4 G/DL (ref 14–18)
HYPOCHROMIA BLD QL: NORMAL
IMM GRANULOCYTES # BLD: 0 10*3/MM3 (ref 0–0.03)
IMM GRANULOCYTES NFR BLD: 0 % (ref 0–0.5)
INR PPP: 1.29 (ref 0.9–1.1)
LYMPHOCYTES # BLD AUTO: 0.58 10*3/MM3 (ref 1–3)
LYMPHOCYTES NFR BLD AUTO: 26.1 % (ref 21–51)
MCH RBC QN AUTO: 26.3 PG (ref 27–33)
MCHC RBC AUTO-ENTMCNC: 31.2 G/DL (ref 33–37)
MCV RBC AUTO: 84.3 FL (ref 80–94)
MONOCYTES # BLD AUTO: 0.22 10*3/MM3 (ref 0.1–0.9)
MONOCYTES NFR BLD AUTO: 9.9 % (ref 0–10)
NEUTROPHILS # BLD AUTO: 1.33 10*3/MM3 (ref 1.4–6.5)
NEUTROPHILS NFR BLD AUTO: 59.9 % (ref 30–70)
OSMOLALITY SERPL CALC.SUM OF ELEC: 287.3 MOSM/KG (ref 273–305)
OVALOCYTES BLD QL SMEAR: NORMAL
PLATELET # BLD AUTO: 51 10*3/MM3 (ref 130–400)
PMV BLD AUTO: ABNORMAL FL (ref 6–10)
POTASSIUM BLD-SCNC: 4.3 MMOL/L (ref 3.5–5.3)
PROT SERPL-MCNC: 5.7 G/DL (ref 6–8)
PROTHROMBIN TIME: 16.4 SECONDS (ref 11–15.4)
RBC # BLD AUTO: 3.95 10*6/MM3 (ref 4.7–6.1)
SMALL PLATELETS BLD QL SMEAR: NORMAL
SODIUM BLD-SCNC: 138 MMOL/L (ref 135–153)
WBC NRBC COR # BLD: 2.22 10*3/MM3 (ref 4.5–12.5)

## 2018-10-24 PROCEDURE — 99232 SBSQ HOSP IP/OBS MODERATE 35: CPT | Performed by: INTERNAL MEDICINE

## 2018-10-24 PROCEDURE — 85025 COMPLETE CBC W/AUTO DIFF WBC: CPT | Performed by: NURSE PRACTITIONER

## 2018-10-24 PROCEDURE — 99232 SBSQ HOSP IP/OBS MODERATE 35: CPT | Performed by: PSYCHIATRY & NEUROLOGY

## 2018-10-24 PROCEDURE — 85007 BL SMEAR W/DIFF WBC COUNT: CPT | Performed by: NURSE PRACTITIONER

## 2018-10-24 PROCEDURE — 63710000001 INSULIN DETEMIR PER 5 UNITS: Performed by: PSYCHIATRY & NEUROLOGY

## 2018-10-24 PROCEDURE — 63710000001 INSULIN ASPART PER 5 UNITS: Performed by: PSYCHIATRY & NEUROLOGY

## 2018-10-24 PROCEDURE — 82962 GLUCOSE BLOOD TEST: CPT

## 2018-10-24 PROCEDURE — 85610 PROTHROMBIN TIME: CPT | Performed by: NURSE PRACTITIONER

## 2018-10-24 PROCEDURE — 99223 1ST HOSP IP/OBS HIGH 75: CPT | Performed by: NURSE PRACTITIONER

## 2018-10-24 PROCEDURE — 80053 COMPREHEN METABOLIC PANEL: CPT | Performed by: NURSE PRACTITIONER

## 2018-10-24 RX ORDER — GABAPENTIN 300 MG/1
300 CAPSULE ORAL 3 TIMES DAILY
Status: DISCONTINUED | OUTPATIENT
Start: 2018-10-24 | End: 2018-11-02 | Stop reason: HOSPADM

## 2018-10-24 RX ORDER — MIRTAZAPINE 15 MG/1
15 TABLET, FILM COATED ORAL NIGHTLY
Status: DISCONTINUED | OUTPATIENT
Start: 2018-10-24 | End: 2018-10-26

## 2018-10-24 RX ORDER — TRAZODONE HYDROCHLORIDE 50 MG/1
50 TABLET ORAL NIGHTLY
Status: DISCONTINUED | OUTPATIENT
Start: 2018-10-24 | End: 2018-10-29

## 2018-10-24 RX ORDER — SPIRONOLACTONE 25 MG/1
25 TABLET ORAL DAILY
Status: DISCONTINUED | OUTPATIENT
Start: 2018-10-24 | End: 2018-11-02 | Stop reason: HOSPADM

## 2018-10-24 RX ORDER — GABAPENTIN 300 MG/1
300 CAPSULE ORAL EVERY 8 HOURS SCHEDULED
Status: DISCONTINUED | OUTPATIENT
Start: 2018-10-24 | End: 2018-10-24 | Stop reason: SDUPTHER

## 2018-10-24 RX ADMIN — INSULIN ASPART 2 UNITS: 100 INJECTION, SOLUTION INTRAVENOUS; SUBCUTANEOUS at 16:22

## 2018-10-24 RX ADMIN — ALBUTEROL SULFATE 2 PUFF: 90 AEROSOL, METERED RESPIRATORY (INHALATION) at 06:21

## 2018-10-24 RX ADMIN — GABAPENTIN 300 MG: 300 CAPSULE ORAL at 09:04

## 2018-10-24 RX ADMIN — INSULIN DETEMIR 35 UNITS: 100 INJECTION, SOLUTION SUBCUTANEOUS at 21:07

## 2018-10-24 RX ADMIN — PANTOPRAZOLE SODIUM 40 MG: 40 TABLET, DELAYED RELEASE ORAL at 06:14

## 2018-10-24 RX ADMIN — SPIRONOLACTONE 25 MG: 25 TABLET ORAL at 09:02

## 2018-10-24 RX ADMIN — MAGNESIUM GLUCONATE 500 MG ORAL TABLET 400 MG: 500 TABLET ORAL at 09:03

## 2018-10-24 RX ADMIN — MIRTAZAPINE 15 MG: 15 TABLET, FILM COATED ORAL at 20:59

## 2018-10-24 RX ADMIN — FERROUS SULFATE TAB 325 MG (65 MG ELEMENTAL FE) 325 MG: 325 (65 FE) TAB at 20:59

## 2018-10-24 RX ADMIN — METOPROLOL SUCCINATE 25 MG: 25 TABLET, FILM COATED, EXTENDED RELEASE ORAL at 09:03

## 2018-10-24 RX ADMIN — FERROUS SULFATE TAB 325 MG (65 MG ELEMENTAL FE) 325 MG: 325 (65 FE) TAB at 09:03

## 2018-10-24 RX ADMIN — POLYETHYLENE GLYCOL (3350) 17 G: 17 POWDER, FOR SOLUTION ORAL at 20:59

## 2018-10-24 RX ADMIN — ALBUTEROL SULFATE 2 PUFF: 90 AEROSOL, METERED RESPIRATORY (INHALATION) at 01:03

## 2018-10-24 RX ADMIN — POTASSIUM CHLORIDE 20 MEQ: 1500 TABLET, EXTENDED RELEASE ORAL at 09:03

## 2018-10-24 RX ADMIN — INSULIN ASPART 4 UNITS: 100 INJECTION, SOLUTION INTRAVENOUS; SUBCUTANEOUS at 21:07

## 2018-10-24 RX ADMIN — FUROSEMIDE 40 MG: 40 TABLET ORAL at 09:03

## 2018-10-24 RX ADMIN — INSULIN ASPART 3 UNITS: 100 INJECTION, SOLUTION INTRAVENOUS; SUBCUTANEOUS at 07:06

## 2018-10-24 RX ADMIN — INSULIN ASPART 20 UNITS: 100 INJECTION, SOLUTION INTRAVENOUS; SUBCUTANEOUS at 16:21

## 2018-10-24 RX ADMIN — INSULIN ASPART 2 UNITS: 100 INJECTION, SOLUTION INTRAVENOUS; SUBCUTANEOUS at 11:41

## 2018-10-24 RX ADMIN — INSULIN ASPART 20 UNITS: 100 INJECTION, SOLUTION INTRAVENOUS; SUBCUTANEOUS at 07:04

## 2018-10-24 RX ADMIN — LACTULOSE 10 G: 10 SOLUTION ORAL at 20:59

## 2018-10-24 RX ADMIN — LACTULOSE 10 G: 10 SOLUTION ORAL at 09:03

## 2018-10-24 RX ADMIN — INSULIN DETEMIR 25 UNITS: 100 INJECTION, SOLUTION SUBCUTANEOUS at 08:47

## 2018-10-24 RX ADMIN — GABAPENTIN 300 MG: 300 CAPSULE ORAL at 15:58

## 2018-10-24 RX ADMIN — GABAPENTIN 300 MG: 300 CAPSULE ORAL at 20:59

## 2018-10-24 RX ADMIN — INSULIN ASPART 20 UNITS: 100 INJECTION, SOLUTION INTRAVENOUS; SUBCUTANEOUS at 11:41

## 2018-10-24 RX ADMIN — ATORVASTATIN CALCIUM 40 MG: 40 TABLET, FILM COATED ORAL at 20:59

## 2018-10-25 LAB
ANION GAP SERPL CALCULATED.3IONS-SCNC: 0.7 MMOL/L (ref 3.6–11.2)
BUN BLD-MCNC: 11 MG/DL (ref 7–21)
BUN/CREAT SERPL: 16.4 (ref 7–25)
CALCIUM SPEC-SCNC: 9.1 MG/DL (ref 7.7–10)
CHLORIDE SERPL-SCNC: 103 MMOL/L (ref 99–112)
CO2 SERPL-SCNC: 30.3 MMOL/L (ref 24.3–31.9)
CREAT BLD-MCNC: 0.67 MG/DL (ref 0.43–1.29)
GFR SERPL CREATININE-BSD FRML MDRD: 124 ML/MIN/1.73
GLUCOSE BLD-MCNC: 227 MG/DL (ref 70–110)
GLUCOSE BLDC GLUCOMTR-MCNC: 127 MG/DL (ref 70–130)
GLUCOSE BLDC GLUCOMTR-MCNC: 164 MG/DL (ref 70–130)
GLUCOSE BLDC GLUCOMTR-MCNC: 188 MG/DL (ref 70–130)
GLUCOSE BLDC GLUCOMTR-MCNC: 251 MG/DL (ref 70–130)
OSMOLALITY SERPL CALC.SUM OF ELEC: 274.8 MOSM/KG (ref 273–305)
POTASSIUM BLD-SCNC: 4.9 MMOL/L (ref 3.5–5.3)
SODIUM BLD-SCNC: 134 MMOL/L (ref 135–153)

## 2018-10-25 PROCEDURE — 63710000001 INSULIN DETEMIR PER 5 UNITS: Performed by: PSYCHIATRY & NEUROLOGY

## 2018-10-25 PROCEDURE — 63710000001 INSULIN ASPART PER 5 UNITS: Performed by: PSYCHIATRY & NEUROLOGY

## 2018-10-25 PROCEDURE — 99232 SBSQ HOSP IP/OBS MODERATE 35: CPT | Performed by: PHYSICIAN ASSISTANT

## 2018-10-25 PROCEDURE — 80048 BASIC METABOLIC PNL TOTAL CA: CPT | Performed by: NURSE PRACTITIONER

## 2018-10-25 PROCEDURE — 99232 SBSQ HOSP IP/OBS MODERATE 35: CPT | Performed by: PSYCHIATRY & NEUROLOGY

## 2018-10-25 PROCEDURE — 82962 GLUCOSE BLOOD TEST: CPT

## 2018-10-25 RX ADMIN — POLYETHYLENE GLYCOL (3350) 17 G: 17 POWDER, FOR SOLUTION ORAL at 09:25

## 2018-10-25 RX ADMIN — MIRTAZAPINE 15 MG: 15 TABLET, FILM COATED ORAL at 20:32

## 2018-10-25 RX ADMIN — ALBUTEROL SULFATE 2 PUFF: 90 AEROSOL, METERED RESPIRATORY (INHALATION) at 22:01

## 2018-10-25 RX ADMIN — LACTULOSE 10 G: 10 SOLUTION ORAL at 20:31

## 2018-10-25 RX ADMIN — PANTOPRAZOLE SODIUM 40 MG: 40 TABLET, DELAYED RELEASE ORAL at 06:08

## 2018-10-25 RX ADMIN — GABAPENTIN 300 MG: 300 CAPSULE ORAL at 16:51

## 2018-10-25 RX ADMIN — NICOTINE 1 PATCH: 21 PATCH TRANSDERMAL at 09:23

## 2018-10-25 RX ADMIN — SPIRONOLACTONE 25 MG: 25 TABLET ORAL at 09:23

## 2018-10-25 RX ADMIN — ALBUTEROL SULFATE 2 PUFF: 90 AEROSOL, METERED RESPIRATORY (INHALATION) at 02:41

## 2018-10-25 RX ADMIN — FERROUS SULFATE TAB 325 MG (65 MG ELEMENTAL FE) 325 MG: 325 (65 FE) TAB at 09:23

## 2018-10-25 RX ADMIN — METOPROLOL TARTRATE 25 MG: 25 TABLET, FILM COATED ORAL at 09:29

## 2018-10-25 RX ADMIN — GABAPENTIN 300 MG: 300 CAPSULE ORAL at 09:26

## 2018-10-25 RX ADMIN — METOPROLOL TARTRATE 25 MG: 25 TABLET, FILM COATED ORAL at 02:31

## 2018-10-25 RX ADMIN — INSULIN ASPART 2 UNITS: 100 INJECTION, SOLUTION INTRAVENOUS; SUBCUTANEOUS at 07:40

## 2018-10-25 RX ADMIN — LACTULOSE 10 G: 10 SOLUTION ORAL at 09:24

## 2018-10-25 RX ADMIN — INSULIN ASPART 20 UNITS: 100 INJECTION, SOLUTION INTRAVENOUS; SUBCUTANEOUS at 07:41

## 2018-10-25 RX ADMIN — TRAZODONE HYDROCHLORIDE 50 MG: 50 TABLET ORAL at 20:32

## 2018-10-25 RX ADMIN — METOPROLOL SUCCINATE 25 MG: 25 TABLET, FILM COATED, EXTENDED RELEASE ORAL at 09:23

## 2018-10-25 RX ADMIN — POTASSIUM CHLORIDE 20 MEQ: 1500 TABLET, EXTENDED RELEASE ORAL at 09:23

## 2018-10-25 RX ADMIN — INSULIN DETEMIR 35 UNITS: 100 INJECTION, SOLUTION SUBCUTANEOUS at 20:31

## 2018-10-25 RX ADMIN — GABAPENTIN 300 MG: 300 CAPSULE ORAL at 20:32

## 2018-10-25 RX ADMIN — INSULIN DETEMIR 25 UNITS: 100 INJECTION, SOLUTION SUBCUTANEOUS at 09:26

## 2018-10-25 RX ADMIN — INSULIN ASPART 2 UNITS: 100 INJECTION, SOLUTION INTRAVENOUS; SUBCUTANEOUS at 16:52

## 2018-10-25 RX ADMIN — INSULIN ASPART 20 UNITS: 100 INJECTION, SOLUTION INTRAVENOUS; SUBCUTANEOUS at 11:58

## 2018-10-25 RX ADMIN — FERROUS SULFATE TAB 325 MG (65 MG ELEMENTAL FE) 325 MG: 325 (65 FE) TAB at 20:32

## 2018-10-25 RX ADMIN — INSULIN ASPART 20 UNITS: 100 INJECTION, SOLUTION INTRAVENOUS; SUBCUTANEOUS at 17:00

## 2018-10-25 RX ADMIN — MAGNESIUM GLUCONATE 500 MG ORAL TABLET 400 MG: 500 TABLET ORAL at 09:23

## 2018-10-25 RX ADMIN — FUROSEMIDE 40 MG: 40 TABLET ORAL at 09:23

## 2018-10-25 RX ADMIN — ATORVASTATIN CALCIUM 40 MG: 40 TABLET, FILM COATED ORAL at 20:32

## 2018-10-25 RX ADMIN — INSULIN ASPART 4 UNITS: 100 INJECTION, SOLUTION INTRAVENOUS; SUBCUTANEOUS at 20:30

## 2018-10-25 RX ADMIN — POLYETHYLENE GLYCOL (3350) 17 G: 17 POWDER, FOR SOLUTION ORAL at 20:32

## 2018-10-26 LAB
ALBUMIN SERPL-MCNC: 2.8 G/DL (ref 3.5–5)
ALBUMIN/GLOB SERPL: 0.9 G/DL (ref 1.5–2.5)
ALP SERPL-CCNC: 129 U/L (ref 40–129)
ALT SERPL W P-5'-P-CCNC: 52 U/L (ref 10–44)
ANION GAP SERPL CALCULATED.3IONS-SCNC: 4.4 MMOL/L (ref 3.6–11.2)
ANISOCYTOSIS BLD QL: NORMAL
AST SERPL-CCNC: 66 U/L (ref 10–34)
BASOPHILS # BLD AUTO: 0.02 10*3/MM3 (ref 0–0.3)
BASOPHILS NFR BLD AUTO: 0.7 % (ref 0–2)
BILIRUB SERPL-MCNC: 0.7 MG/DL (ref 0.2–1.8)
BUN BLD-MCNC: 12 MG/DL (ref 7–21)
BUN/CREAT SERPL: 18.2 (ref 7–25)
CALCIUM SPEC-SCNC: 9.2 MG/DL (ref 7.7–10)
CHLORIDE SERPL-SCNC: 104 MMOL/L (ref 99–112)
CO2 SERPL-SCNC: 29.6 MMOL/L (ref 24.3–31.9)
CREAT BLD-MCNC: 0.66 MG/DL (ref 0.43–1.29)
DEPRECATED RDW RBC AUTO: 56 FL (ref 37–54)
EOSINOPHIL # BLD AUTO: 0.12 10*3/MM3 (ref 0–0.7)
EOSINOPHIL NFR BLD AUTO: 4.1 % (ref 0–5)
ERYTHROCYTE [DISTWIDTH] IN BLOOD BY AUTOMATED COUNT: 18.3 % (ref 11.5–14.5)
GFR SERPL CREATININE-BSD FRML MDRD: 126 ML/MIN/1.73
GLOBULIN UR ELPH-MCNC: 3.2 GM/DL
GLUCOSE BLD-MCNC: 197 MG/DL (ref 70–110)
GLUCOSE BLDC GLUCOMTR-MCNC: 187 MG/DL (ref 70–130)
GLUCOSE BLDC GLUCOMTR-MCNC: 196 MG/DL (ref 70–130)
GLUCOSE BLDC GLUCOMTR-MCNC: 291 MG/DL (ref 70–130)
GLUCOSE BLDC GLUCOMTR-MCNC: 305 MG/DL (ref 70–130)
HCT VFR BLD AUTO: 35.1 % (ref 42–52)
HGB BLD-MCNC: 10.7 G/DL (ref 14–18)
HYPOCHROMIA BLD QL: NORMAL
IMM GRANULOCYTES # BLD: 0.01 10*3/MM3 (ref 0–0.03)
IMM GRANULOCYTES NFR BLD: 0.3 % (ref 0–0.5)
LYMPHOCYTES # BLD AUTO: 0.81 10*3/MM3 (ref 1–3)
LYMPHOCYTES NFR BLD AUTO: 27.4 % (ref 21–51)
MAGNESIUM SERPL-MCNC: 1.7 MG/DL (ref 1.7–2.6)
MCH RBC QN AUTO: 26.2 PG (ref 27–33)
MCHC RBC AUTO-ENTMCNC: 30.5 G/DL (ref 33–37)
MCV RBC AUTO: 86 FL (ref 80–94)
MONOCYTES # BLD AUTO: 0.34 10*3/MM3 (ref 0.1–0.9)
MONOCYTES NFR BLD AUTO: 11.5 % (ref 0–10)
NEUTROPHILS # BLD AUTO: 1.66 10*3/MM3 (ref 1.4–6.5)
NEUTROPHILS NFR BLD AUTO: 56 % (ref 30–70)
OSMOLALITY SERPL CALC.SUM OF ELEC: 280.9 MOSM/KG (ref 273–305)
OVALOCYTES BLD QL SMEAR: NORMAL
PLATELET # BLD AUTO: 58 10*3/MM3 (ref 130–400)
PMV BLD AUTO: ABNORMAL FL (ref 6–10)
POTASSIUM BLD-SCNC: 4.3 MMOL/L (ref 3.5–5.3)
PROT SERPL-MCNC: 6 G/DL (ref 6–8)
RBC # BLD AUTO: 4.08 10*6/MM3 (ref 4.7–6.1)
SMALL PLATELETS BLD QL SMEAR: NORMAL
SODIUM BLD-SCNC: 138 MMOL/L (ref 135–153)
WBC NRBC COR # BLD: 2.96 10*3/MM3 (ref 4.5–12.5)

## 2018-10-26 PROCEDURE — 63710000001 INSULIN ASPART PER 5 UNITS: Performed by: PSYCHIATRY & NEUROLOGY

## 2018-10-26 PROCEDURE — 85007 BL SMEAR W/DIFF WBC COUNT: CPT | Performed by: PHYSICIAN ASSISTANT

## 2018-10-26 PROCEDURE — 63710000001 INSULIN DETEMIR PER 5 UNITS: Performed by: PSYCHIATRY & NEUROLOGY

## 2018-10-26 PROCEDURE — 99232 SBSQ HOSP IP/OBS MODERATE 35: CPT | Performed by: PHYSICIAN ASSISTANT

## 2018-10-26 PROCEDURE — 85025 COMPLETE CBC W/AUTO DIFF WBC: CPT | Performed by: PHYSICIAN ASSISTANT

## 2018-10-26 PROCEDURE — 94799 UNLISTED PULMONARY SVC/PX: CPT

## 2018-10-26 PROCEDURE — 83735 ASSAY OF MAGNESIUM: CPT | Performed by: PHYSICIAN ASSISTANT

## 2018-10-26 PROCEDURE — 82962 GLUCOSE BLOOD TEST: CPT

## 2018-10-26 PROCEDURE — 80053 COMPREHEN METABOLIC PANEL: CPT | Performed by: PHYSICIAN ASSISTANT

## 2018-10-26 PROCEDURE — 99232 SBSQ HOSP IP/OBS MODERATE 35: CPT | Performed by: PSYCHIATRY & NEUROLOGY

## 2018-10-26 RX ORDER — MIRTAZAPINE 15 MG/1
30 TABLET, FILM COATED ORAL NIGHTLY
Status: DISCONTINUED | OUTPATIENT
Start: 2018-10-26 | End: 2018-10-27

## 2018-10-26 RX ORDER — LOSARTAN POTASSIUM 25 MG/1
25 TABLET ORAL DAILY
Status: DISCONTINUED | OUTPATIENT
Start: 2018-10-26 | End: 2018-10-31

## 2018-10-26 RX ADMIN — POTASSIUM CHLORIDE 20 MEQ: 1500 TABLET, EXTENDED RELEASE ORAL at 08:50

## 2018-10-26 RX ADMIN — GABAPENTIN 300 MG: 300 CAPSULE ORAL at 16:03

## 2018-10-26 RX ADMIN — POLYETHYLENE GLYCOL (3350) 17 G: 17 POWDER, FOR SOLUTION ORAL at 08:50

## 2018-10-26 RX ADMIN — POLYETHYLENE GLYCOL (3350) 17 G: 17 POWDER, FOR SOLUTION ORAL at 20:26

## 2018-10-26 RX ADMIN — GABAPENTIN 300 MG: 300 CAPSULE ORAL at 20:26

## 2018-10-26 RX ADMIN — SPIRONOLACTONE 25 MG: 25 TABLET ORAL at 08:33

## 2018-10-26 RX ADMIN — LOSARTAN POTASSIUM 25 MG: 25 TABLET, FILM COATED ORAL at 18:19

## 2018-10-26 RX ADMIN — INSULIN DETEMIR 35 UNITS: 100 INJECTION, SOLUTION SUBCUTANEOUS at 20:34

## 2018-10-26 RX ADMIN — INSULIN ASPART 2 UNITS: 100 INJECTION, SOLUTION INTRAVENOUS; SUBCUTANEOUS at 06:36

## 2018-10-26 RX ADMIN — ATORVASTATIN CALCIUM 40 MG: 40 TABLET, FILM COATED ORAL at 20:26

## 2018-10-26 RX ADMIN — FERROUS SULFATE TAB 325 MG (65 MG ELEMENTAL FE) 325 MG: 325 (65 FE) TAB at 08:31

## 2018-10-26 RX ADMIN — FERROUS SULFATE TAB 325 MG (65 MG ELEMENTAL FE) 325 MG: 325 (65 FE) TAB at 20:26

## 2018-10-26 RX ADMIN — PANTOPRAZOLE SODIUM 40 MG: 40 TABLET, DELAYED RELEASE ORAL at 08:30

## 2018-10-26 RX ADMIN — GABAPENTIN 300 MG: 300 CAPSULE ORAL at 08:51

## 2018-10-26 RX ADMIN — METOPROLOL SUCCINATE 25 MG: 25 TABLET, FILM COATED, EXTENDED RELEASE ORAL at 08:33

## 2018-10-26 RX ADMIN — INSULIN ASPART 20 UNITS: 100 INJECTION, SOLUTION INTRAVENOUS; SUBCUTANEOUS at 07:40

## 2018-10-26 RX ADMIN — INSULIN ASPART 20 UNITS: 100 INJECTION, SOLUTION INTRAVENOUS; SUBCUTANEOUS at 11:37

## 2018-10-26 RX ADMIN — LACTULOSE 10 G: 10 SOLUTION ORAL at 20:26

## 2018-10-26 RX ADMIN — INSULIN ASPART 5 UNITS: 100 INJECTION, SOLUTION INTRAVENOUS; SUBCUTANEOUS at 20:34

## 2018-10-26 RX ADMIN — LACTULOSE 10 G: 10 SOLUTION ORAL at 08:30

## 2018-10-26 RX ADMIN — INSULIN ASPART 20 UNITS: 100 INJECTION, SOLUTION INTRAVENOUS; SUBCUTANEOUS at 16:15

## 2018-10-26 RX ADMIN — FUROSEMIDE 40 MG: 40 TABLET ORAL at 08:31

## 2018-10-26 RX ADMIN — MIRTAZAPINE 30 MG: 15 TABLET, FILM COATED ORAL at 20:26

## 2018-10-26 RX ADMIN — INSULIN ASPART 2 UNITS: 100 INJECTION, SOLUTION INTRAVENOUS; SUBCUTANEOUS at 11:37

## 2018-10-26 RX ADMIN — MAGNESIUM GLUCONATE 500 MG ORAL TABLET 400 MG: 500 TABLET ORAL at 08:31

## 2018-10-26 RX ADMIN — INSULIN ASPART 4 UNITS: 100 INJECTION, SOLUTION INTRAVENOUS; SUBCUTANEOUS at 16:16

## 2018-10-26 RX ADMIN — INSULIN DETEMIR 25 UNITS: 100 INJECTION, SOLUTION SUBCUTANEOUS at 08:29

## 2018-10-27 LAB
ALBUMIN SERPL-MCNC: 3.3 G/DL (ref 3.5–5)
ALBUMIN/GLOB SERPL: 0.9 G/DL (ref 1.5–2.5)
ALP SERPL-CCNC: 120 U/L (ref 40–129)
ALT SERPL W P-5'-P-CCNC: 58 U/L (ref 10–44)
ANION GAP SERPL CALCULATED.3IONS-SCNC: 3.9 MMOL/L (ref 3.6–11.2)
ANISOCYTOSIS BLD QL: NORMAL
AST SERPL-CCNC: 73 U/L (ref 10–34)
BACTERIA SPEC AEROBE CULT: NORMAL
BACTERIA SPEC AEROBE CULT: NORMAL
BASOPHILS # BLD AUTO: 0.03 10*3/MM3 (ref 0–0.3)
BASOPHILS NFR BLD AUTO: 0.9 % (ref 0–2)
BILIRUB SERPL-MCNC: 0.9 MG/DL (ref 0.2–1.8)
BUN BLD-MCNC: 11 MG/DL (ref 7–21)
BUN/CREAT SERPL: 14.9 (ref 7–25)
CALCIUM SPEC-SCNC: 9.4 MG/DL (ref 7.7–10)
CHLORIDE SERPL-SCNC: 105 MMOL/L (ref 99–112)
CO2 SERPL-SCNC: 29.1 MMOL/L (ref 24.3–31.9)
CREAT BLD-MCNC: 0.74 MG/DL (ref 0.43–1.29)
DEPRECATED RDW RBC AUTO: 55.7 FL (ref 37–54)
EOSINOPHIL # BLD AUTO: 0.14 10*3/MM3 (ref 0–0.7)
EOSINOPHIL NFR BLD AUTO: 4.2 % (ref 0–5)
ERYTHROCYTE [DISTWIDTH] IN BLOOD BY AUTOMATED COUNT: 18.4 % (ref 11.5–14.5)
GFR SERPL CREATININE-BSD FRML MDRD: 111 ML/MIN/1.73
GLOBULIN UR ELPH-MCNC: 3.5 GM/DL
GLUCOSE BLD-MCNC: 184 MG/DL (ref 70–110)
GLUCOSE BLDC GLUCOMTR-MCNC: 184 MG/DL (ref 70–130)
GLUCOSE BLDC GLUCOMTR-MCNC: 210 MG/DL (ref 70–130)
GLUCOSE BLDC GLUCOMTR-MCNC: 212 MG/DL (ref 70–130)
GLUCOSE BLDC GLUCOMTR-MCNC: 277 MG/DL (ref 70–130)
HCT VFR BLD AUTO: 37.1 % (ref 42–52)
HGB BLD-MCNC: 11.6 G/DL (ref 14–18)
HYPOCHROMIA BLD QL: NORMAL
IMM GRANULOCYTES # BLD: 0.02 10*3/MM3 (ref 0–0.03)
IMM GRANULOCYTES NFR BLD: 0.6 % (ref 0–0.5)
LYMPHOCYTES # BLD AUTO: 0.78 10*3/MM3 (ref 1–3)
LYMPHOCYTES NFR BLD AUTO: 23.3 % (ref 21–51)
MCH RBC QN AUTO: 26.4 PG (ref 27–33)
MCHC RBC AUTO-ENTMCNC: 31.3 G/DL (ref 33–37)
MCV RBC AUTO: 84.5 FL (ref 80–94)
MONOCYTES # BLD AUTO: 0.29 10*3/MM3 (ref 0.1–0.9)
MONOCYTES NFR BLD AUTO: 8.7 % (ref 0–10)
NEUTROPHILS # BLD AUTO: 2.09 10*3/MM3 (ref 1.4–6.5)
NEUTROPHILS NFR BLD AUTO: 62.3 % (ref 30–70)
OSMOLALITY SERPL CALC.SUM OF ELEC: 279.8 MOSM/KG (ref 273–305)
PLATELET # BLD AUTO: 73 10*3/MM3 (ref 130–400)
PMV BLD AUTO: ABNORMAL FL (ref 6–10)
POTASSIUM BLD-SCNC: 4.7 MMOL/L (ref 3.5–5.3)
PROT SERPL-MCNC: 6.8 G/DL (ref 6–8)
RBC # BLD AUTO: 4.39 10*6/MM3 (ref 4.7–6.1)
SMALL PLATELETS BLD QL SMEAR: NORMAL
SODIUM BLD-SCNC: 138 MMOL/L (ref 135–153)
WBC NRBC COR # BLD: 3.35 10*3/MM3 (ref 4.5–12.5)

## 2018-10-27 PROCEDURE — 63710000001 INSULIN ASPART PER 5 UNITS: Performed by: PHYSICIAN ASSISTANT

## 2018-10-27 PROCEDURE — 80053 COMPREHEN METABOLIC PANEL: CPT | Performed by: PHYSICIAN ASSISTANT

## 2018-10-27 PROCEDURE — 82962 GLUCOSE BLOOD TEST: CPT

## 2018-10-27 PROCEDURE — 99232 SBSQ HOSP IP/OBS MODERATE 35: CPT | Performed by: PHYSICIAN ASSISTANT

## 2018-10-27 PROCEDURE — 85007 BL SMEAR W/DIFF WBC COUNT: CPT | Performed by: PHYSICIAN ASSISTANT

## 2018-10-27 PROCEDURE — 94799 UNLISTED PULMONARY SVC/PX: CPT

## 2018-10-27 PROCEDURE — 63710000001 INSULIN ASPART PER 5 UNITS: Performed by: PSYCHIATRY & NEUROLOGY

## 2018-10-27 PROCEDURE — 63710000001 INSULIN DETEMIR PER 5 UNITS: Performed by: PHYSICIAN ASSISTANT

## 2018-10-27 PROCEDURE — 85025 COMPLETE CBC W/AUTO DIFF WBC: CPT | Performed by: PHYSICIAN ASSISTANT

## 2018-10-27 PROCEDURE — 99231 SBSQ HOSP IP/OBS SF/LOW 25: CPT | Performed by: PSYCHIATRY & NEUROLOGY

## 2018-10-27 PROCEDURE — 63710000001 INSULIN DETEMIR PER 5 UNITS: Performed by: PSYCHIATRY & NEUROLOGY

## 2018-10-27 RX ORDER — BUPROPION HYDROCHLORIDE 150 MG/1
150 TABLET, EXTENDED RELEASE ORAL EVERY 12 HOURS SCHEDULED
Status: DISCONTINUED | OUTPATIENT
Start: 2018-10-27 | End: 2018-11-02 | Stop reason: HOSPADM

## 2018-10-27 RX ADMIN — SPIRONOLACTONE 25 MG: 25 TABLET ORAL at 08:30

## 2018-10-27 RX ADMIN — POLYETHYLENE GLYCOL (3350) 17 G: 17 POWDER, FOR SOLUTION ORAL at 08:29

## 2018-10-27 RX ADMIN — FERROUS SULFATE TAB 325 MG (65 MG ELEMENTAL FE) 325 MG: 325 (65 FE) TAB at 08:30

## 2018-10-27 RX ADMIN — INSULIN ASPART 3 UNITS: 100 INJECTION, SOLUTION INTRAVENOUS; SUBCUTANEOUS at 20:41

## 2018-10-27 RX ADMIN — LACTULOSE 10 G: 10 SOLUTION ORAL at 20:47

## 2018-10-27 RX ADMIN — LOSARTAN POTASSIUM 25 MG: 25 TABLET, FILM COATED ORAL at 08:29

## 2018-10-27 RX ADMIN — FERROUS SULFATE TAB 325 MG (65 MG ELEMENTAL FE) 325 MG: 325 (65 FE) TAB at 20:41

## 2018-10-27 RX ADMIN — INSULIN ASPART 3 UNITS: 100 INJECTION, SOLUTION INTRAVENOUS; SUBCUTANEOUS at 16:30

## 2018-10-27 RX ADMIN — POLYETHYLENE GLYCOL (3350) 17 G: 17 POWDER, FOR SOLUTION ORAL at 20:41

## 2018-10-27 RX ADMIN — INSULIN DETEMIR 35 UNITS: 100 INJECTION, SOLUTION SUBCUTANEOUS at 20:41

## 2018-10-27 RX ADMIN — INSULIN DETEMIR 30 UNITS: 100 INJECTION, SOLUTION SUBCUTANEOUS at 09:01

## 2018-10-27 RX ADMIN — INSULIN ASPART 4 UNITS: 100 INJECTION, SOLUTION INTRAVENOUS; SUBCUTANEOUS at 07:17

## 2018-10-27 RX ADMIN — INSULIN ASPART 20 UNITS: 100 INJECTION, SOLUTION INTRAVENOUS; SUBCUTANEOUS at 11:32

## 2018-10-27 RX ADMIN — INSULIN ASPART 22 UNITS: 100 INJECTION, SOLUTION INTRAVENOUS; SUBCUTANEOUS at 17:02

## 2018-10-27 RX ADMIN — GABAPENTIN 300 MG: 300 CAPSULE ORAL at 08:31

## 2018-10-27 RX ADMIN — MAGNESIUM GLUCONATE 500 MG ORAL TABLET 400 MG: 500 TABLET ORAL at 08:30

## 2018-10-27 RX ADMIN — PANTOPRAZOLE SODIUM 40 MG: 40 TABLET, DELAYED RELEASE ORAL at 06:23

## 2018-10-27 RX ADMIN — INSULIN ASPART 2 UNITS: 100 INJECTION, SOLUTION INTRAVENOUS; SUBCUTANEOUS at 11:32

## 2018-10-27 RX ADMIN — GABAPENTIN 300 MG: 300 CAPSULE ORAL at 16:30

## 2018-10-27 RX ADMIN — TRAZODONE HYDROCHLORIDE 50 MG: 50 TABLET ORAL at 20:41

## 2018-10-27 RX ADMIN — LACTULOSE 10 G: 10 SOLUTION ORAL at 08:29

## 2018-10-27 RX ADMIN — INSULIN ASPART 20 UNITS: 100 INJECTION, SOLUTION INTRAVENOUS; SUBCUTANEOUS at 07:18

## 2018-10-27 RX ADMIN — METOPROLOL SUCCINATE 25 MG: 25 TABLET, FILM COATED, EXTENDED RELEASE ORAL at 08:30

## 2018-10-27 RX ADMIN — GABAPENTIN 300 MG: 300 CAPSULE ORAL at 20:44

## 2018-10-27 RX ADMIN — ATORVASTATIN CALCIUM 40 MG: 40 TABLET, FILM COATED ORAL at 20:41

## 2018-10-27 RX ADMIN — BUPROPION HYDROCHLORIDE 150 MG: 150 TABLET, EXTENDED RELEASE ORAL at 20:46

## 2018-10-27 RX ADMIN — FUROSEMIDE 40 MG: 40 TABLET ORAL at 08:29

## 2018-10-28 LAB
ALBUMIN SERPL-MCNC: 2.9 G/DL (ref 3.5–5)
ALBUMIN/GLOB SERPL: 0.9 G/DL (ref 1.5–2.5)
ALP SERPL-CCNC: 119 U/L (ref 40–129)
ALT SERPL W P-5'-P-CCNC: 46 U/L (ref 10–44)
ANION GAP SERPL CALCULATED.3IONS-SCNC: 3.6 MMOL/L (ref 3.6–11.2)
ANISOCYTOSIS BLD QL: NORMAL
AST SERPL-CCNC: 56 U/L (ref 10–34)
BASOPHILS # BLD AUTO: 0.02 10*3/MM3 (ref 0–0.3)
BASOPHILS NFR BLD AUTO: 0.8 % (ref 0–2)
BILIRUB SERPL-MCNC: 0.6 MG/DL (ref 0.2–1.8)
BUN BLD-MCNC: 15 MG/DL (ref 7–21)
BUN/CREAT SERPL: 19 (ref 7–25)
CALCIUM SPEC-SCNC: 9 MG/DL (ref 7.7–10)
CHLORIDE SERPL-SCNC: 103 MMOL/L (ref 99–112)
CO2 SERPL-SCNC: 27.4 MMOL/L (ref 24.3–31.9)
CREAT BLD-MCNC: 0.79 MG/DL (ref 0.43–1.29)
DEPRECATED RDW RBC AUTO: 55.5 FL (ref 37–54)
EOSINOPHIL # BLD AUTO: 0.14 10*3/MM3 (ref 0–0.7)
EOSINOPHIL NFR BLD AUTO: 5.3 % (ref 0–5)
ERYTHROCYTE [DISTWIDTH] IN BLOOD BY AUTOMATED COUNT: 18.7 % (ref 11.5–14.5)
GFR SERPL CREATININE-BSD FRML MDRD: 103 ML/MIN/1.73
GLOBULIN UR ELPH-MCNC: 3.1 GM/DL
GLUCOSE BLD-MCNC: 338 MG/DL (ref 70–110)
GLUCOSE BLDC GLUCOMTR-MCNC: 251 MG/DL (ref 70–130)
GLUCOSE BLDC GLUCOMTR-MCNC: 320 MG/DL (ref 70–130)
GLUCOSE BLDC GLUCOMTR-MCNC: 336 MG/DL (ref 70–130)
GLUCOSE BLDC GLUCOMTR-MCNC: 342 MG/DL (ref 70–130)
HCT VFR BLD AUTO: 34 % (ref 42–52)
HGB BLD-MCNC: 10.7 G/DL (ref 14–18)
HYPOCHROMIA BLD QL: NORMAL
IMM GRANULOCYTES # BLD: 0 10*3/MM3 (ref 0–0.03)
IMM GRANULOCYTES NFR BLD: 0 % (ref 0–0.5)
LYMPHOCYTES # BLD AUTO: 0.7 10*3/MM3 (ref 1–3)
LYMPHOCYTES NFR BLD AUTO: 26.4 % (ref 21–51)
MCH RBC QN AUTO: 26.6 PG (ref 27–33)
MCHC RBC AUTO-ENTMCNC: 31.5 G/DL (ref 33–37)
MCV RBC AUTO: 84.6 FL (ref 80–94)
MONOCYTES # BLD AUTO: 0.25 10*3/MM3 (ref 0.1–0.9)
MONOCYTES NFR BLD AUTO: 9.4 % (ref 0–10)
NEUTROPHILS # BLD AUTO: 1.54 10*3/MM3 (ref 1.4–6.5)
NEUTROPHILS NFR BLD AUTO: 58.1 % (ref 30–70)
OSMOLALITY SERPL CALC.SUM OF ELEC: 282.4 MOSM/KG (ref 273–305)
PLATELET # BLD AUTO: 69 10*3/MM3 (ref 130–400)
PMV BLD AUTO: ABNORMAL FL (ref 6–10)
POTASSIUM BLD-SCNC: 4.6 MMOL/L (ref 3.5–5.3)
PROT SERPL-MCNC: 6 G/DL (ref 6–8)
RBC # BLD AUTO: 4.02 10*6/MM3 (ref 4.7–6.1)
SMALL PLATELETS BLD QL SMEAR: NORMAL
SODIUM BLD-SCNC: 134 MMOL/L (ref 135–153)
WBC NRBC COR # BLD: 2.65 10*3/MM3 (ref 4.5–12.5)

## 2018-10-28 PROCEDURE — 63710000001 INSULIN ASPART PER 5 UNITS: Performed by: PSYCHIATRY & NEUROLOGY

## 2018-10-28 PROCEDURE — 85025 COMPLETE CBC W/AUTO DIFF WBC: CPT | Performed by: PHYSICIAN ASSISTANT

## 2018-10-28 PROCEDURE — 63710000001 INSULIN DETEMIR PER 5 UNITS: Performed by: PHYSICIAN ASSISTANT

## 2018-10-28 PROCEDURE — 99231 SBSQ HOSP IP/OBS SF/LOW 25: CPT | Performed by: PSYCHIATRY & NEUROLOGY

## 2018-10-28 PROCEDURE — 80053 COMPREHEN METABOLIC PANEL: CPT | Performed by: PHYSICIAN ASSISTANT

## 2018-10-28 PROCEDURE — 63710000001 INSULIN DETEMIR PER 5 UNITS: Performed by: INTERNAL MEDICINE

## 2018-10-28 PROCEDURE — 99232 SBSQ HOSP IP/OBS MODERATE 35: CPT | Performed by: PHYSICIAN ASSISTANT

## 2018-10-28 PROCEDURE — 85007 BL SMEAR W/DIFF WBC COUNT: CPT | Performed by: PHYSICIAN ASSISTANT

## 2018-10-28 PROCEDURE — 63710000001 INSULIN ASPART PER 5 UNITS: Performed by: INTERNAL MEDICINE

## 2018-10-28 PROCEDURE — 63710000001 INSULIN ASPART PER 5 UNITS: Performed by: PHYSICIAN ASSISTANT

## 2018-10-28 PROCEDURE — 82962 GLUCOSE BLOOD TEST: CPT

## 2018-10-28 RX ORDER — FUROSEMIDE 20 MG/1
20 TABLET ORAL
Status: DISCONTINUED | OUTPATIENT
Start: 2018-10-29 | End: 2018-11-02 | Stop reason: HOSPADM

## 2018-10-28 RX ADMIN — GABAPENTIN 300 MG: 300 CAPSULE ORAL at 20:29

## 2018-10-28 RX ADMIN — FUROSEMIDE 40 MG: 40 TABLET ORAL at 08:43

## 2018-10-28 RX ADMIN — METOPROLOL SUCCINATE 25 MG: 25 TABLET, FILM COATED, EXTENDED RELEASE ORAL at 08:43

## 2018-10-28 RX ADMIN — BUPROPION HYDROCHLORIDE 150 MG: 150 TABLET, EXTENDED RELEASE ORAL at 08:43

## 2018-10-28 RX ADMIN — ATORVASTATIN CALCIUM 40 MG: 40 TABLET, FILM COATED ORAL at 20:29

## 2018-10-28 RX ADMIN — GABAPENTIN 300 MG: 300 CAPSULE ORAL at 16:00

## 2018-10-28 RX ADMIN — INSULIN DETEMIR 30 UNITS: 100 INJECTION, SOLUTION SUBCUTANEOUS at 08:51

## 2018-10-28 RX ADMIN — INSULIN ASPART 4 UNITS: 100 INJECTION, SOLUTION INTRAVENOUS; SUBCUTANEOUS at 20:29

## 2018-10-28 RX ADMIN — BUPROPION HYDROCHLORIDE 150 MG: 150 TABLET, EXTENDED RELEASE ORAL at 20:29

## 2018-10-28 RX ADMIN — INSULIN ASPART 5 UNITS: 100 INJECTION, SOLUTION INTRAVENOUS; SUBCUTANEOUS at 08:47

## 2018-10-28 RX ADMIN — SPIRONOLACTONE 25 MG: 25 TABLET ORAL at 08:43

## 2018-10-28 RX ADMIN — INSULIN ASPART 5 UNITS: 100 INJECTION, SOLUTION INTRAVENOUS; SUBCUTANEOUS at 16:34

## 2018-10-28 RX ADMIN — INSULIN ASPART 22 UNITS: 100 INJECTION, SOLUTION INTRAVENOUS; SUBCUTANEOUS at 08:47

## 2018-10-28 RX ADMIN — FERROUS SULFATE TAB 325 MG (65 MG ELEMENTAL FE) 325 MG: 325 (65 FE) TAB at 20:29

## 2018-10-28 RX ADMIN — GABAPENTIN 300 MG: 300 CAPSULE ORAL at 08:50

## 2018-10-28 RX ADMIN — PANTOPRAZOLE SODIUM 40 MG: 40 TABLET, DELAYED RELEASE ORAL at 05:18

## 2018-10-28 RX ADMIN — LACTULOSE 10 G: 10 SOLUTION ORAL at 08:43

## 2018-10-28 RX ADMIN — LACTULOSE 10 G: 10 SOLUTION ORAL at 20:29

## 2018-10-28 RX ADMIN — INSULIN ASPART 5 UNITS: 100 INJECTION, SOLUTION INTRAVENOUS; SUBCUTANEOUS at 11:02

## 2018-10-28 RX ADMIN — MAGNESIUM GLUCONATE 500 MG ORAL TABLET 400 MG: 500 TABLET ORAL at 08:43

## 2018-10-28 RX ADMIN — LOSARTAN POTASSIUM 25 MG: 25 TABLET, FILM COATED ORAL at 08:43

## 2018-10-28 RX ADMIN — INSULIN ASPART 22 UNITS: 100 INJECTION, SOLUTION INTRAVENOUS; SUBCUTANEOUS at 11:02

## 2018-10-28 RX ADMIN — POLYETHYLENE GLYCOL (3350) 17 G: 17 POWDER, FOR SOLUTION ORAL at 08:43

## 2018-10-28 RX ADMIN — TRAZODONE HYDROCHLORIDE 50 MG: 50 TABLET ORAL at 20:29

## 2018-10-28 RX ADMIN — INSULIN DETEMIR 25 UNITS: 100 INJECTION, SOLUTION SUBCUTANEOUS at 20:29

## 2018-10-28 RX ADMIN — POLYETHYLENE GLYCOL (3350) 17 G: 17 POWDER, FOR SOLUTION ORAL at 20:29

## 2018-10-28 RX ADMIN — FERROUS SULFATE TAB 325 MG (65 MG ELEMENTAL FE) 325 MG: 325 (65 FE) TAB at 08:43

## 2018-10-28 RX ADMIN — INSULIN ASPART 15 UNITS: 100 INJECTION, SOLUTION INTRAVENOUS; SUBCUTANEOUS at 17:10

## 2018-10-29 LAB
ALBUMIN SERPL-MCNC: 3 G/DL (ref 3.5–5)
ALBUMIN/GLOB SERPL: 0.9 G/DL (ref 1.5–2.5)
ALP SERPL-CCNC: 138 U/L (ref 40–129)
ALT SERPL W P-5'-P-CCNC: 51 U/L (ref 10–44)
ANION GAP SERPL CALCULATED.3IONS-SCNC: 4 MMOL/L (ref 3.6–11.2)
ANISOCYTOSIS BLD QL: NORMAL
AST SERPL-CCNC: 62 U/L (ref 10–34)
BASOPHILS # BLD AUTO: 0.01 10*3/MM3 (ref 0–0.3)
BASOPHILS NFR BLD AUTO: 0.4 % (ref 0–2)
BILIRUB SERPL-MCNC: 0.6 MG/DL (ref 0.2–1.8)
BUN BLD-MCNC: 15 MG/DL (ref 7–21)
BUN/CREAT SERPL: 20 (ref 7–25)
CALCIUM SPEC-SCNC: 9 MG/DL (ref 7.7–10)
CHLORIDE SERPL-SCNC: 101 MMOL/L (ref 99–112)
CO2 SERPL-SCNC: 26 MMOL/L (ref 24.3–31.9)
CREAT BLD-MCNC: 0.75 MG/DL (ref 0.43–1.29)
DEPRECATED RDW RBC AUTO: 55.6 FL (ref 37–54)
EOSINOPHIL # BLD AUTO: 0.1 10*3/MM3 (ref 0–0.7)
EOSINOPHIL NFR BLD AUTO: 3.7 % (ref 0–5)
ERYTHROCYTE [DISTWIDTH] IN BLOOD BY AUTOMATED COUNT: 18.4 % (ref 11.5–14.5)
GFR SERPL CREATININE-BSD FRML MDRD: 109 ML/MIN/1.73
GLOBULIN UR ELPH-MCNC: 3.4 GM/DL
GLUCOSE BLD-MCNC: 340 MG/DL (ref 70–110)
GLUCOSE BLDC GLUCOMTR-MCNC: 201 MG/DL (ref 70–130)
GLUCOSE BLDC GLUCOMTR-MCNC: 290 MG/DL (ref 70–130)
GLUCOSE BLDC GLUCOMTR-MCNC: 302 MG/DL (ref 70–130)
GLUCOSE BLDC GLUCOMTR-MCNC: 364 MG/DL (ref 70–130)
HCT VFR BLD AUTO: 32.9 % (ref 42–52)
HGB BLD-MCNC: 10.3 G/DL (ref 14–18)
IMM GRANULOCYTES # BLD: 0.01 10*3/MM3 (ref 0–0.03)
IMM GRANULOCYTES NFR BLD: 0.4 % (ref 0–0.5)
LYMPHOCYTES # BLD AUTO: 0.63 10*3/MM3 (ref 1–3)
LYMPHOCYTES NFR BLD AUTO: 23.5 % (ref 21–51)
MCH RBC QN AUTO: 26.6 PG (ref 27–33)
MCHC RBC AUTO-ENTMCNC: 31.3 G/DL (ref 33–37)
MCV RBC AUTO: 85 FL (ref 80–94)
MONOCYTES # BLD AUTO: 0.24 10*3/MM3 (ref 0.1–0.9)
MONOCYTES NFR BLD AUTO: 9 % (ref 0–10)
NEUTROPHILS # BLD AUTO: 1.69 10*3/MM3 (ref 1.4–6.5)
NEUTROPHILS NFR BLD AUTO: 63 % (ref 30–70)
OSMOLALITY SERPL CALC.SUM OF ELEC: 276.9 MOSM/KG (ref 273–305)
OVALOCYTES BLD QL SMEAR: NORMAL
PLATELET # BLD AUTO: 66 10*3/MM3 (ref 130–400)
POTASSIUM BLD-SCNC: 5 MMOL/L (ref 3.5–5.3)
PROT SERPL-MCNC: 6.4 G/DL (ref 6–8)
RBC # BLD AUTO: 3.87 10*6/MM3 (ref 4.7–6.1)
SMALL PLATELETS BLD QL SMEAR: NORMAL
SODIUM BLD-SCNC: 131 MMOL/L (ref 135–153)
WBC NRBC COR # BLD: 2.68 10*3/MM3 (ref 4.5–12.5)

## 2018-10-29 PROCEDURE — 63710000001 INSULIN ASPART PER 5 UNITS: Performed by: PHYSICIAN ASSISTANT

## 2018-10-29 PROCEDURE — 63710000001 INSULIN ASPART PER 5 UNITS: Performed by: INTERNAL MEDICINE

## 2018-10-29 PROCEDURE — 99232 SBSQ HOSP IP/OBS MODERATE 35: CPT | Performed by: PSYCHIATRY & NEUROLOGY

## 2018-10-29 PROCEDURE — 63710000001 INSULIN DETEMIR PER 5 UNITS: Performed by: PHYSICIAN ASSISTANT

## 2018-10-29 PROCEDURE — 99232 SBSQ HOSP IP/OBS MODERATE 35: CPT | Performed by: PHYSICIAN ASSISTANT

## 2018-10-29 PROCEDURE — 85007 BL SMEAR W/DIFF WBC COUNT: CPT | Performed by: PHYSICIAN ASSISTANT

## 2018-10-29 PROCEDURE — 80053 COMPREHEN METABOLIC PANEL: CPT | Performed by: PHYSICIAN ASSISTANT

## 2018-10-29 PROCEDURE — 85025 COMPLETE CBC W/AUTO DIFF WBC: CPT | Performed by: PHYSICIAN ASSISTANT

## 2018-10-29 PROCEDURE — 82962 GLUCOSE BLOOD TEST: CPT

## 2018-10-29 PROCEDURE — 63710000001 INSULIN ASPART PER 5 UNITS: Performed by: PSYCHIATRY & NEUROLOGY

## 2018-10-29 PROCEDURE — 63710000001 INSULIN DETEMIR PER 5 UNITS: Performed by: INTERNAL MEDICINE

## 2018-10-29 RX ORDER — TRAZODONE HYDROCHLORIDE 50 MG/1
100 TABLET ORAL NIGHTLY
Status: DISCONTINUED | OUTPATIENT
Start: 2018-10-29 | End: 2018-11-02 | Stop reason: HOSPADM

## 2018-10-29 RX ADMIN — INSULIN ASPART 6 UNITS: 100 INJECTION, SOLUTION INTRAVENOUS; SUBCUTANEOUS at 11:18

## 2018-10-29 RX ADMIN — PANTOPRAZOLE SODIUM 40 MG: 40 TABLET, DELAYED RELEASE ORAL at 05:16

## 2018-10-29 RX ADMIN — FUROSEMIDE 20 MG: 20 TABLET ORAL at 16:30

## 2018-10-29 RX ADMIN — INSULIN ASPART 15 UNITS: 100 INJECTION, SOLUTION INTRAVENOUS; SUBCUTANEOUS at 07:16

## 2018-10-29 RX ADMIN — GABAPENTIN 300 MG: 300 CAPSULE ORAL at 22:58

## 2018-10-29 RX ADMIN — INSULIN ASPART 20 UNITS: 100 INJECTION, SOLUTION INTRAVENOUS; SUBCUTANEOUS at 16:31

## 2018-10-29 RX ADMIN — FUROSEMIDE 20 MG: 20 TABLET ORAL at 09:11

## 2018-10-29 RX ADMIN — INSULIN ASPART 6 UNITS: 100 INJECTION, SOLUTION INTRAVENOUS; SUBCUTANEOUS at 20:11

## 2018-10-29 RX ADMIN — LOSARTAN POTASSIUM 25 MG: 25 TABLET, FILM COATED ORAL at 11:01

## 2018-10-29 RX ADMIN — POLYETHYLENE GLYCOL (3350) 17 G: 17 POWDER, FOR SOLUTION ORAL at 09:11

## 2018-10-29 RX ADMIN — SPIRONOLACTONE 25 MG: 25 TABLET ORAL at 09:11

## 2018-10-29 RX ADMIN — LACTULOSE 10 G: 10 SOLUTION ORAL at 09:11

## 2018-10-29 RX ADMIN — INSULIN DETEMIR 35 UNITS: 100 INJECTION, SOLUTION SUBCUTANEOUS at 20:11

## 2018-10-29 RX ADMIN — INSULIN ASPART 4 UNITS: 100 INJECTION, SOLUTION INTRAVENOUS; SUBCUTANEOUS at 16:31

## 2018-10-29 RX ADMIN — ALBUTEROL SULFATE 2 PUFF: 90 AEROSOL, METERED RESPIRATORY (INHALATION) at 22:53

## 2018-10-29 RX ADMIN — INSULIN DETEMIR 25 UNITS: 100 INJECTION, SOLUTION SUBCUTANEOUS at 09:14

## 2018-10-29 RX ADMIN — GABAPENTIN 300 MG: 300 CAPSULE ORAL at 09:13

## 2018-10-29 RX ADMIN — INSULIN ASPART 5 UNITS: 100 INJECTION, SOLUTION INTRAVENOUS; SUBCUTANEOUS at 07:16

## 2018-10-29 RX ADMIN — FERROUS SULFATE TAB 325 MG (65 MG ELEMENTAL FE) 325 MG: 325 (65 FE) TAB at 09:11

## 2018-10-29 RX ADMIN — INSULIN ASPART 15 UNITS: 100 INJECTION, SOLUTION INTRAVENOUS; SUBCUTANEOUS at 11:18

## 2018-10-29 RX ADMIN — GABAPENTIN 300 MG: 300 CAPSULE ORAL at 15:57

## 2018-10-29 RX ADMIN — BUPROPION HYDROCHLORIDE 150 MG: 150 TABLET, EXTENDED RELEASE ORAL at 11:01

## 2018-10-29 RX ADMIN — METOPROLOL SUCCINATE 25 MG: 25 TABLET, FILM COATED, EXTENDED RELEASE ORAL at 09:11

## 2018-10-29 RX ADMIN — MAGNESIUM GLUCONATE 500 MG ORAL TABLET 400 MG: 500 TABLET ORAL at 09:11

## 2018-10-30 LAB
ALBUMIN SERPL-MCNC: 2.9 G/DL (ref 3.5–5)
ALBUMIN/GLOB SERPL: 0.9 G/DL (ref 1.5–2.5)
ALP SERPL-CCNC: 118 U/L (ref 40–129)
ALT SERPL W P-5'-P-CCNC: 48 U/L (ref 10–44)
ANION GAP SERPL CALCULATED.3IONS-SCNC: 3.7 MMOL/L (ref 3.6–11.2)
AST SERPL-CCNC: 54 U/L (ref 10–34)
BASOPHILS # BLD AUTO: 0.03 10*3/MM3 (ref 0–0.3)
BASOPHILS NFR BLD AUTO: 1.1 % (ref 0–2)
BILIRUB SERPL-MCNC: 0.6 MG/DL (ref 0.2–1.8)
BUN BLD-MCNC: 16 MG/DL (ref 7–21)
BUN/CREAT SERPL: 18 (ref 7–25)
CALCIUM SPEC-SCNC: 8.7 MG/DL (ref 7.7–10)
CHLORIDE SERPL-SCNC: 103 MMOL/L (ref 99–112)
CO2 SERPL-SCNC: 27.3 MMOL/L (ref 24.3–31.9)
CREAT BLD-MCNC: 0.89 MG/DL (ref 0.43–1.29)
DEPRECATED RDW RBC AUTO: 55.9 FL (ref 37–54)
EOSINOPHIL # BLD AUTO: 0.12 10*3/MM3 (ref 0–0.7)
EOSINOPHIL NFR BLD AUTO: 4.3 % (ref 0–5)
ERYTHROCYTE [DISTWIDTH] IN BLOOD BY AUTOMATED COUNT: 18.4 % (ref 11.5–14.5)
GFR SERPL CREATININE-BSD FRML MDRD: 89 ML/MIN/1.73
GLOBULIN UR ELPH-MCNC: 3.2 GM/DL
GLUCOSE BLD-MCNC: 321 MG/DL (ref 70–110)
GLUCOSE BLDC GLUCOMTR-MCNC: 165 MG/DL (ref 70–130)
GLUCOSE BLDC GLUCOMTR-MCNC: 194 MG/DL (ref 70–130)
GLUCOSE BLDC GLUCOMTR-MCNC: 287 MG/DL (ref 70–130)
GLUCOSE BLDC GLUCOMTR-MCNC: 324 MG/DL (ref 70–130)
HCT VFR BLD AUTO: 34.4 % (ref 42–52)
HGB BLD-MCNC: 10.9 G/DL (ref 14–18)
IMM GRANULOCYTES # BLD: 0.01 10*3/MM3 (ref 0–0.03)
IMM GRANULOCYTES NFR BLD: 0.4 % (ref 0–0.5)
LYMPHOCYTES # BLD AUTO: 0.76 10*3/MM3 (ref 1–3)
LYMPHOCYTES NFR BLD AUTO: 27.2 % (ref 21–51)
MCH RBC QN AUTO: 26.8 PG (ref 27–33)
MCHC RBC AUTO-ENTMCNC: 31.7 G/DL (ref 33–37)
MCV RBC AUTO: 84.7 FL (ref 80–94)
MONOCYTES # BLD AUTO: 0.19 10*3/MM3 (ref 0.1–0.9)
MONOCYTES NFR BLD AUTO: 6.8 % (ref 0–10)
NEUTROPHILS # BLD AUTO: 1.68 10*3/MM3 (ref 1.4–6.5)
NEUTROPHILS NFR BLD AUTO: 60.2 % (ref 30–70)
OSMOLALITY SERPL CALC.SUM OF ELEC: 281.8 MOSM/KG (ref 273–305)
PLATELET # BLD AUTO: 69 10*3/MM3 (ref 130–400)
PMV BLD AUTO: 12 FL (ref 6–10)
POTASSIUM BLD-SCNC: 4.4 MMOL/L (ref 3.5–5.3)
PROT SERPL-MCNC: 6.1 G/DL (ref 6–8)
RBC # BLD AUTO: 4.06 10*6/MM3 (ref 4.7–6.1)
SODIUM BLD-SCNC: 134 MMOL/L (ref 135–153)
WBC NRBC COR # BLD: 2.79 10*3/MM3 (ref 4.5–12.5)

## 2018-10-30 PROCEDURE — 80053 COMPREHEN METABOLIC PANEL: CPT | Performed by: PHYSICIAN ASSISTANT

## 2018-10-30 PROCEDURE — 94799 UNLISTED PULMONARY SVC/PX: CPT

## 2018-10-30 PROCEDURE — 99232 SBSQ HOSP IP/OBS MODERATE 35: CPT | Performed by: PHYSICIAN ASSISTANT

## 2018-10-30 PROCEDURE — 63710000001 INSULIN DETEMIR PER 5 UNITS: Performed by: PHYSICIAN ASSISTANT

## 2018-10-30 PROCEDURE — 99232 SBSQ HOSP IP/OBS MODERATE 35: CPT | Performed by: PSYCHIATRY & NEUROLOGY

## 2018-10-30 PROCEDURE — 82962 GLUCOSE BLOOD TEST: CPT

## 2018-10-30 PROCEDURE — 85025 COMPLETE CBC W/AUTO DIFF WBC: CPT | Performed by: PHYSICIAN ASSISTANT

## 2018-10-30 PROCEDURE — 63710000001 INSULIN ASPART PER 5 UNITS: Performed by: PHYSICIAN ASSISTANT

## 2018-10-30 RX ADMIN — SPIRONOLACTONE 25 MG: 25 TABLET ORAL at 08:08

## 2018-10-30 RX ADMIN — PANTOPRAZOLE SODIUM 40 MG: 40 TABLET, DELAYED RELEASE ORAL at 06:18

## 2018-10-30 RX ADMIN — ALBUTEROL SULFATE 2 PUFF: 90 AEROSOL, METERED RESPIRATORY (INHALATION) at 05:35

## 2018-10-30 RX ADMIN — FUROSEMIDE 20 MG: 20 TABLET ORAL at 08:08

## 2018-10-30 RX ADMIN — FERROUS SULFATE TAB 325 MG (65 MG ELEMENTAL FE) 325 MG: 325 (65 FE) TAB at 08:08

## 2018-10-30 RX ADMIN — GABAPENTIN 300 MG: 300 CAPSULE ORAL at 08:10

## 2018-10-30 RX ADMIN — INSULIN ASPART 20 UNITS: 100 INJECTION, SOLUTION INTRAVENOUS; SUBCUTANEOUS at 11:05

## 2018-10-30 RX ADMIN — ATORVASTATIN CALCIUM 40 MG: 40 TABLET, FILM COATED ORAL at 20:50

## 2018-10-30 RX ADMIN — FUROSEMIDE 20 MG: 20 TABLET ORAL at 16:14

## 2018-10-30 RX ADMIN — TRAZODONE HYDROCHLORIDE 100 MG: 50 TABLET ORAL at 00:02

## 2018-10-30 RX ADMIN — INSULIN ASPART 7 UNITS: 100 INJECTION, SOLUTION INTRAVENOUS; SUBCUTANEOUS at 07:06

## 2018-10-30 RX ADMIN — POLYETHYLENE GLYCOL (3350) 17 G: 17 POWDER, FOR SOLUTION ORAL at 20:50

## 2018-10-30 RX ADMIN — LOSARTAN POTASSIUM 25 MG: 25 TABLET, FILM COATED ORAL at 08:09

## 2018-10-30 RX ADMIN — INSULIN ASPART 20 UNITS: 100 INJECTION, SOLUTION INTRAVENOUS; SUBCUTANEOUS at 07:06

## 2018-10-30 RX ADMIN — FERROUS SULFATE TAB 325 MG (65 MG ELEMENTAL FE) 325 MG: 325 (65 FE) TAB at 20:50

## 2018-10-30 RX ADMIN — INSULIN ASPART 2 UNITS: 100 INJECTION, SOLUTION INTRAVENOUS; SUBCUTANEOUS at 11:05

## 2018-10-30 RX ADMIN — LACTULOSE 10 G: 10 SOLUTION ORAL at 08:08

## 2018-10-30 RX ADMIN — GABAPENTIN 300 MG: 300 CAPSULE ORAL at 15:09

## 2018-10-30 RX ADMIN — INSULIN ASPART 2 UNITS: 100 INJECTION, SOLUTION INTRAVENOUS; SUBCUTANEOUS at 16:53

## 2018-10-30 RX ADMIN — POLYETHYLENE GLYCOL (3350) 17 G: 17 POWDER, FOR SOLUTION ORAL at 08:08

## 2018-10-30 RX ADMIN — INSULIN DETEMIR 35 UNITS: 100 INJECTION, SOLUTION SUBCUTANEOUS at 20:53

## 2018-10-30 RX ADMIN — BUPROPION HYDROCHLORIDE 150 MG: 150 TABLET, EXTENDED RELEASE ORAL at 20:50

## 2018-10-30 RX ADMIN — INSULIN ASPART 6 UNITS: 100 INJECTION, SOLUTION INTRAVENOUS; SUBCUTANEOUS at 20:53

## 2018-10-30 RX ADMIN — BUPROPION HYDROCHLORIDE 150 MG: 150 TABLET, EXTENDED RELEASE ORAL at 08:08

## 2018-10-30 RX ADMIN — INSULIN ASPART 20 UNITS: 100 INJECTION, SOLUTION INTRAVENOUS; SUBCUTANEOUS at 16:16

## 2018-10-30 RX ADMIN — MAGNESIUM GLUCONATE 500 MG ORAL TABLET 400 MG: 500 TABLET ORAL at 08:08

## 2018-10-30 RX ADMIN — LACTULOSE 10 G: 10 SOLUTION ORAL at 20:51

## 2018-10-30 RX ADMIN — TRAZODONE HYDROCHLORIDE 100 MG: 50 TABLET ORAL at 20:50

## 2018-10-30 RX ADMIN — METOPROLOL SUCCINATE 25 MG: 25 TABLET, FILM COATED, EXTENDED RELEASE ORAL at 08:08

## 2018-10-30 RX ADMIN — INSULIN DETEMIR 35 UNITS: 100 INJECTION, SOLUTION SUBCUTANEOUS at 08:10

## 2018-10-30 RX ADMIN — GABAPENTIN 300 MG: 300 CAPSULE ORAL at 20:51

## 2018-10-30 RX ADMIN — OFLOXACIN 50000 UNITS: 300 TABLET, COATED ORAL at 08:08

## 2018-10-31 ENCOUNTER — APPOINTMENT (OUTPATIENT)
Dept: ULTRASOUND IMAGING | Facility: HOSPITAL | Age: 53
End: 2018-10-31

## 2018-10-31 LAB
ALBUMIN SERPL-MCNC: 2.8 G/DL (ref 3.5–5)
ALBUMIN/GLOB SERPL: 0.9 G/DL (ref 1.5–2.5)
ALP SERPL-CCNC: 108 U/L (ref 40–129)
ALT SERPL W P-5'-P-CCNC: 46 U/L (ref 10–44)
ANION GAP SERPL CALCULATED.3IONS-SCNC: 4.1 MMOL/L (ref 3.6–11.2)
ANISOCYTOSIS BLD QL: NORMAL
AST SERPL-CCNC: 48 U/L (ref 10–34)
BASOPHILS # BLD AUTO: 0.03 10*3/MM3 (ref 0–0.3)
BASOPHILS NFR BLD AUTO: 1.1 % (ref 0–2)
BILIRUB SERPL-MCNC: 0.6 MG/DL (ref 0.2–1.8)
BUN BLD-MCNC: 19 MG/DL (ref 7–21)
BUN/CREAT SERPL: 22.9 (ref 7–25)
CALCIUM SPEC-SCNC: 8.8 MG/DL (ref 7.7–10)
CHLORIDE SERPL-SCNC: 103 MMOL/L (ref 99–112)
CO2 SERPL-SCNC: 26.9 MMOL/L (ref 24.3–31.9)
CREAT BLD-MCNC: 0.83 MG/DL (ref 0.43–1.29)
DEPRECATED RDW RBC AUTO: 56.4 FL (ref 37–54)
EOSINOPHIL # BLD AUTO: 0.12 10*3/MM3 (ref 0–0.7)
EOSINOPHIL NFR BLD AUTO: 4.3 % (ref 0–5)
ERYTHROCYTE [DISTWIDTH] IN BLOOD BY AUTOMATED COUNT: 18.6 % (ref 11.5–14.5)
GFR SERPL CREATININE-BSD FRML MDRD: 97 ML/MIN/1.73
GLOBULIN UR ELPH-MCNC: 3.2 GM/DL
GLUCOSE BLD-MCNC: 258 MG/DL (ref 70–110)
GLUCOSE BLDC GLUCOMTR-MCNC: 131 MG/DL (ref 70–130)
GLUCOSE BLDC GLUCOMTR-MCNC: 201 MG/DL (ref 70–130)
GLUCOSE BLDC GLUCOMTR-MCNC: 227 MG/DL (ref 70–130)
GLUCOSE BLDC GLUCOMTR-MCNC: 272 MG/DL (ref 70–130)
HCT VFR BLD AUTO: 34.4 % (ref 42–52)
HGB BLD-MCNC: 10.8 G/DL (ref 14–18)
HYPOCHROMIA BLD QL: NORMAL
IMM GRANULOCYTES # BLD: 0.01 10*3/MM3 (ref 0–0.03)
IMM GRANULOCYTES NFR BLD: 0.4 % (ref 0–0.5)
LYMPHOCYTES # BLD AUTO: 0.8 10*3/MM3 (ref 1–3)
LYMPHOCYTES NFR BLD AUTO: 28.4 % (ref 21–51)
MCH RBC QN AUTO: 26.4 PG (ref 27–33)
MCHC RBC AUTO-ENTMCNC: 31.4 G/DL (ref 33–37)
MCV RBC AUTO: 84.1 FL (ref 80–94)
MONOCYTES # BLD AUTO: 0.32 10*3/MM3 (ref 0.1–0.9)
MONOCYTES NFR BLD AUTO: 11.3 % (ref 0–10)
NEUTROPHILS # BLD AUTO: 1.54 10*3/MM3 (ref 1.4–6.5)
NEUTROPHILS NFR BLD AUTO: 54.5 % (ref 30–70)
OSMOLALITY SERPL CALC.SUM OF ELEC: 279.4 MOSM/KG (ref 273–305)
OVALOCYTES BLD QL SMEAR: NORMAL
PLATELET # BLD AUTO: 69 10*3/MM3 (ref 130–400)
PMV BLD AUTO: ABNORMAL FL (ref 6–10)
POTASSIUM BLD-SCNC: 4.1 MMOL/L (ref 3.5–5.3)
PROT SERPL-MCNC: 6 G/DL (ref 6–8)
RBC # BLD AUTO: 4.09 10*6/MM3 (ref 4.7–6.1)
SMALL PLATELETS BLD QL SMEAR: NORMAL
SODIUM BLD-SCNC: 134 MMOL/L (ref 135–153)
WBC NRBC COR # BLD: 2.82 10*3/MM3 (ref 4.5–12.5)

## 2018-10-31 PROCEDURE — 76705 ECHO EXAM OF ABDOMEN: CPT

## 2018-10-31 PROCEDURE — 99232 SBSQ HOSP IP/OBS MODERATE 35: CPT | Performed by: PSYCHIATRY & NEUROLOGY

## 2018-10-31 PROCEDURE — 85007 BL SMEAR W/DIFF WBC COUNT: CPT | Performed by: PHYSICIAN ASSISTANT

## 2018-10-31 PROCEDURE — 99232 SBSQ HOSP IP/OBS MODERATE 35: CPT | Performed by: PHYSICIAN ASSISTANT

## 2018-10-31 PROCEDURE — 76705 ECHO EXAM OF ABDOMEN: CPT | Performed by: RADIOLOGY

## 2018-10-31 PROCEDURE — 80053 COMPREHEN METABOLIC PANEL: CPT | Performed by: PHYSICIAN ASSISTANT

## 2018-10-31 PROCEDURE — 85025 COMPLETE CBC W/AUTO DIFF WBC: CPT | Performed by: PHYSICIAN ASSISTANT

## 2018-10-31 PROCEDURE — 82962 GLUCOSE BLOOD TEST: CPT

## 2018-10-31 PROCEDURE — 63710000001 INSULIN DETEMIR PER 5 UNITS: Performed by: PHYSICIAN ASSISTANT

## 2018-10-31 PROCEDURE — 63710000001 INSULIN ASPART PER 5 UNITS: Performed by: PHYSICIAN ASSISTANT

## 2018-10-31 RX ORDER — DIAPER,BRIEF,INFANT-TODD,DISP
EACH MISCELLANEOUS EVERY 12 HOURS PRN
Status: DISCONTINUED | OUTPATIENT
Start: 2018-10-31 | End: 2018-11-02 | Stop reason: HOSPADM

## 2018-10-31 RX ORDER — LOSARTAN POTASSIUM 25 MG/1
25 TABLET ORAL DAILY
Status: DISCONTINUED | OUTPATIENT
Start: 2018-11-03 | End: 2018-10-31

## 2018-10-31 RX ORDER — METOPROLOL SUCCINATE 25 MG/1
37.5 TABLET, EXTENDED RELEASE ORAL DAILY
Status: DISCONTINUED | OUTPATIENT
Start: 2018-11-01 | End: 2018-11-02 | Stop reason: HOSPADM

## 2018-10-31 RX ADMIN — FERROUS SULFATE TAB 325 MG (65 MG ELEMENTAL FE) 325 MG: 325 (65 FE) TAB at 08:50

## 2018-10-31 RX ADMIN — INSULIN DETEMIR 35 UNITS: 100 INJECTION, SOLUTION SUBCUTANEOUS at 20:48

## 2018-10-31 RX ADMIN — POLYETHYLENE GLYCOL (3350) 17 G: 17 POWDER, FOR SOLUTION ORAL at 08:50

## 2018-10-31 RX ADMIN — POLYETHYLENE GLYCOL (3350) 17 G: 17 POWDER, FOR SOLUTION ORAL at 20:47

## 2018-10-31 RX ADMIN — GABAPENTIN 300 MG: 300 CAPSULE ORAL at 16:10

## 2018-10-31 RX ADMIN — INSULIN ASPART 4 UNITS: 100 INJECTION, SOLUTION INTRAVENOUS; SUBCUTANEOUS at 08:47

## 2018-10-31 RX ADMIN — SPIRONOLACTONE 25 MG: 25 TABLET ORAL at 08:50

## 2018-10-31 RX ADMIN — INSULIN ASPART 20 UNITS: 100 INJECTION, SOLUTION INTRAVENOUS; SUBCUTANEOUS at 08:47

## 2018-10-31 RX ADMIN — HYDROCORTISONE: 10 CREAM TOPICAL at 16:12

## 2018-10-31 RX ADMIN — FUROSEMIDE 20 MG: 20 TABLET ORAL at 08:50

## 2018-10-31 RX ADMIN — METOPROLOL SUCCINATE 25 MG: 25 TABLET, FILM COATED, EXTENDED RELEASE ORAL at 08:50

## 2018-10-31 RX ADMIN — INSULIN DETEMIR 35 UNITS: 100 INJECTION, SOLUTION SUBCUTANEOUS at 08:48

## 2018-10-31 RX ADMIN — INSULIN ASPART 4 UNITS: 100 INJECTION, SOLUTION INTRAVENOUS; SUBCUTANEOUS at 20:48

## 2018-10-31 RX ADMIN — BUPROPION HYDROCHLORIDE 150 MG: 150 TABLET, EXTENDED RELEASE ORAL at 08:50

## 2018-10-31 RX ADMIN — FUROSEMIDE 20 MG: 20 TABLET ORAL at 16:09

## 2018-10-31 RX ADMIN — GABAPENTIN 300 MG: 300 CAPSULE ORAL at 08:50

## 2018-10-31 RX ADMIN — INSULIN ASPART 20 UNITS: 100 INJECTION, SOLUTION INTRAVENOUS; SUBCUTANEOUS at 12:03

## 2018-10-31 RX ADMIN — LOSARTAN POTASSIUM 25 MG: 25 TABLET, FILM COATED ORAL at 08:50

## 2018-10-31 RX ADMIN — INSULIN ASPART 20 UNITS: 100 INJECTION, SOLUTION INTRAVENOUS; SUBCUTANEOUS at 16:42

## 2018-10-31 RX ADMIN — PANTOPRAZOLE SODIUM 40 MG: 40 TABLET, DELAYED RELEASE ORAL at 05:22

## 2018-10-31 RX ADMIN — BUPROPION HYDROCHLORIDE 150 MG: 150 TABLET, EXTENDED RELEASE ORAL at 20:47

## 2018-10-31 RX ADMIN — LACTULOSE 10 G: 10 SOLUTION ORAL at 20:47

## 2018-10-31 RX ADMIN — GABAPENTIN 300 MG: 300 CAPSULE ORAL at 20:47

## 2018-10-31 RX ADMIN — ALBUTEROL SULFATE 2 PUFF: 90 AEROSOL, METERED RESPIRATORY (INHALATION) at 16:59

## 2018-10-31 RX ADMIN — INSULIN ASPART 6 UNITS: 100 INJECTION, SOLUTION INTRAVENOUS; SUBCUTANEOUS at 16:43

## 2018-10-31 RX ADMIN — ATORVASTATIN CALCIUM 40 MG: 40 TABLET, FILM COATED ORAL at 20:47

## 2018-10-31 RX ADMIN — MAGNESIUM GLUCONATE 500 MG ORAL TABLET 400 MG: 500 TABLET ORAL at 08:50

## 2018-10-31 RX ADMIN — LACTULOSE 10 G: 10 SOLUTION ORAL at 08:50

## 2018-10-31 RX ADMIN — FERROUS SULFATE TAB 325 MG (65 MG ELEMENTAL FE) 325 MG: 325 (65 FE) TAB at 20:47

## 2018-10-31 RX ADMIN — TRAZODONE HYDROCHLORIDE 100 MG: 50 TABLET ORAL at 20:47

## 2018-11-01 LAB
ALBUMIN SERPL-MCNC: 3 G/DL (ref 3.5–5)
ALBUMIN/GLOB SERPL: 1 G/DL (ref 1.5–2.5)
ALP SERPL-CCNC: 110 U/L (ref 40–129)
ALT SERPL W P-5'-P-CCNC: 44 U/L (ref 10–44)
ANION GAP SERPL CALCULATED.3IONS-SCNC: 4.3 MMOL/L (ref 3.6–11.2)
ANISOCYTOSIS BLD QL: NORMAL
AST SERPL-CCNC: 49 U/L (ref 10–34)
BASOPHILS # BLD AUTO: 0.02 10*3/MM3 (ref 0–0.3)
BASOPHILS NFR BLD AUTO: 0.6 % (ref 0–2)
BILIRUB SERPL-MCNC: 0.5 MG/DL (ref 0.2–1.8)
BUN BLD-MCNC: 19 MG/DL (ref 7–21)
BUN/CREAT SERPL: 23.5 (ref 7–25)
CALCIUM SPEC-SCNC: 8.6 MG/DL (ref 7.7–10)
CHLORIDE SERPL-SCNC: 106 MMOL/L (ref 99–112)
CO2 SERPL-SCNC: 24.7 MMOL/L (ref 24.3–31.9)
CREAT BLD-MCNC: 0.81 MG/DL (ref 0.43–1.29)
DEPRECATED RDW RBC AUTO: 57.8 FL (ref 37–54)
EOSINOPHIL # BLD AUTO: 0.14 10*3/MM3 (ref 0–0.7)
EOSINOPHIL NFR BLD AUTO: 4.4 % (ref 0–5)
ERYTHROCYTE [DISTWIDTH] IN BLOOD BY AUTOMATED COUNT: 18.7 % (ref 11.5–14.5)
GFR SERPL CREATININE-BSD FRML MDRD: 100 ML/MIN/1.73
GLOBULIN UR ELPH-MCNC: 3.1 GM/DL
GLUCOSE BLD-MCNC: 189 MG/DL (ref 70–110)
GLUCOSE BLDC GLUCOMTR-MCNC: 108 MG/DL (ref 70–130)
GLUCOSE BLDC GLUCOMTR-MCNC: 152 MG/DL (ref 70–130)
GLUCOSE BLDC GLUCOMTR-MCNC: 183 MG/DL (ref 70–130)
GLUCOSE BLDC GLUCOMTR-MCNC: 205 MG/DL (ref 70–130)
HCT VFR BLD AUTO: 35.3 % (ref 42–52)
HGB BLD-MCNC: 11.3 G/DL (ref 14–18)
HYPOCHROMIA BLD QL: NORMAL
IMM GRANULOCYTES # BLD: 0.01 10*3/MM3 (ref 0–0.03)
IMM GRANULOCYTES NFR BLD: 0.3 % (ref 0–0.5)
LARGE PLATELETS: NORMAL
LYMPHOCYTES # BLD AUTO: 0.76 10*3/MM3 (ref 1–3)
LYMPHOCYTES NFR BLD AUTO: 23.8 % (ref 21–51)
MCH RBC QN AUTO: 27.2 PG (ref 27–33)
MCHC RBC AUTO-ENTMCNC: 32 G/DL (ref 33–37)
MCV RBC AUTO: 85.1 FL (ref 80–94)
MONOCYTES # BLD AUTO: 0.26 10*3/MM3 (ref 0.1–0.9)
MONOCYTES NFR BLD AUTO: 8.2 % (ref 0–10)
NEUTROPHILS # BLD AUTO: 2 10*3/MM3 (ref 1.4–6.5)
NEUTROPHILS NFR BLD AUTO: 62.7 % (ref 30–70)
OSMOLALITY SERPL CALC.SUM OF ELEC: 277.4 MOSM/KG (ref 273–305)
PLATELET # BLD AUTO: 64 10*3/MM3 (ref 130–400)
PMV BLD AUTO: ABNORMAL FL (ref 6–10)
POTASSIUM BLD-SCNC: 4.4 MMOL/L (ref 3.5–5.3)
PROT SERPL-MCNC: 6.1 G/DL (ref 6–8)
RBC # BLD AUTO: 4.15 10*6/MM3 (ref 4.7–6.1)
SMALL PLATELETS BLD QL SMEAR: NORMAL
SODIUM BLD-SCNC: 135 MMOL/L (ref 135–153)
TROPONIN I SERPL-MCNC: 0.01 NG/ML
TROPONIN I SERPL-MCNC: 0.01 NG/ML
TROPONIN I SERPL-MCNC: <0.006 NG/ML
WBC NRBC COR # BLD: 3.19 10*3/MM3 (ref 4.5–12.5)

## 2018-11-01 PROCEDURE — 63710000001 INSULIN DETEMIR PER 5 UNITS: Performed by: PHYSICIAN ASSISTANT

## 2018-11-01 PROCEDURE — 99232 SBSQ HOSP IP/OBS MODERATE 35: CPT | Performed by: INTERNAL MEDICINE

## 2018-11-01 PROCEDURE — 82962 GLUCOSE BLOOD TEST: CPT

## 2018-11-01 PROCEDURE — 93010 ELECTROCARDIOGRAM REPORT: CPT | Performed by: INTERNAL MEDICINE

## 2018-11-01 PROCEDURE — 85025 COMPLETE CBC W/AUTO DIFF WBC: CPT | Performed by: PHYSICIAN ASSISTANT

## 2018-11-01 PROCEDURE — 84484 ASSAY OF TROPONIN QUANT: CPT | Performed by: PSYCHIATRY & NEUROLOGY

## 2018-11-01 PROCEDURE — 84484 ASSAY OF TROPONIN QUANT: CPT | Performed by: INTERNAL MEDICINE

## 2018-11-01 PROCEDURE — 85007 BL SMEAR W/DIFF WBC COUNT: CPT | Performed by: PHYSICIAN ASSISTANT

## 2018-11-01 PROCEDURE — 99254 IP/OBS CNSLTJ NEW/EST MOD 60: CPT | Performed by: INTERNAL MEDICINE

## 2018-11-01 PROCEDURE — 80053 COMPREHEN METABOLIC PANEL: CPT | Performed by: PHYSICIAN ASSISTANT

## 2018-11-01 PROCEDURE — 99232 SBSQ HOSP IP/OBS MODERATE 35: CPT | Performed by: PSYCHIATRY & NEUROLOGY

## 2018-11-01 PROCEDURE — 63710000001 INSULIN ASPART PER 5 UNITS: Performed by: PHYSICIAN ASSISTANT

## 2018-11-01 PROCEDURE — 93005 ELECTROCARDIOGRAM TRACING: CPT | Performed by: PSYCHIATRY & NEUROLOGY

## 2018-11-01 RX ORDER — ISOSORBIDE MONONITRATE 30 MG/1
30 TABLET, EXTENDED RELEASE ORAL
Status: DISCONTINUED | OUTPATIENT
Start: 2018-11-01 | End: 2018-11-02 | Stop reason: HOSPADM

## 2018-11-01 RX ORDER — LOSARTAN POTASSIUM 25 MG/1
25 TABLET ORAL
Status: DISCONTINUED | OUTPATIENT
Start: 2018-11-01 | End: 2018-11-02 | Stop reason: HOSPADM

## 2018-11-01 RX ADMIN — ALBUTEROL SULFATE 2 PUFF: 90 AEROSOL, METERED RESPIRATORY (INHALATION) at 01:42

## 2018-11-01 RX ADMIN — FERROUS SULFATE TAB 325 MG (65 MG ELEMENTAL FE) 325 MG: 325 (65 FE) TAB at 21:20

## 2018-11-01 RX ADMIN — INSULIN ASPART 20 UNITS: 100 INJECTION, SOLUTION INTRAVENOUS; SUBCUTANEOUS at 07:20

## 2018-11-01 RX ADMIN — ATORVASTATIN CALCIUM 40 MG: 40 TABLET, FILM COATED ORAL at 21:20

## 2018-11-01 RX ADMIN — PANTOPRAZOLE SODIUM 40 MG: 40 TABLET, DELAYED RELEASE ORAL at 06:17

## 2018-11-01 RX ADMIN — SPIRONOLACTONE 25 MG: 25 TABLET ORAL at 09:02

## 2018-11-01 RX ADMIN — INSULIN ASPART 20 UNITS: 100 INJECTION, SOLUTION INTRAVENOUS; SUBCUTANEOUS at 12:00

## 2018-11-01 RX ADMIN — LACTULOSE 10 G: 10 SOLUTION ORAL at 09:01

## 2018-11-01 RX ADMIN — MAGNESIUM GLUCONATE 500 MG ORAL TABLET 400 MG: 500 TABLET ORAL at 09:01

## 2018-11-01 RX ADMIN — FUROSEMIDE 20 MG: 20 TABLET ORAL at 09:01

## 2018-11-01 RX ADMIN — INSULIN ASPART 4 UNITS: 100 INJECTION, SOLUTION INTRAVENOUS; SUBCUTANEOUS at 21:21

## 2018-11-01 RX ADMIN — INSULIN ASPART 2 UNITS: 100 INJECTION, SOLUTION INTRAVENOUS; SUBCUTANEOUS at 07:21

## 2018-11-01 RX ADMIN — TRAZODONE HYDROCHLORIDE 100 MG: 50 TABLET ORAL at 21:20

## 2018-11-01 RX ADMIN — NITROGLYCERIN 0.4 MG: 0.4 TABLET SUBLINGUAL at 09:37

## 2018-11-01 RX ADMIN — BUPROPION HYDROCHLORIDE 150 MG: 150 TABLET, EXTENDED RELEASE ORAL at 09:01

## 2018-11-01 RX ADMIN — LACTULOSE 10 G: 10 SOLUTION ORAL at 21:20

## 2018-11-01 RX ADMIN — FUROSEMIDE 20 MG: 20 TABLET ORAL at 16:15

## 2018-11-01 RX ADMIN — GABAPENTIN 300 MG: 300 CAPSULE ORAL at 09:03

## 2018-11-01 RX ADMIN — GABAPENTIN 300 MG: 300 CAPSULE ORAL at 16:15

## 2018-11-01 RX ADMIN — INSULIN DETEMIR 35 UNITS: 100 INJECTION, SOLUTION SUBCUTANEOUS at 08:09

## 2018-11-01 RX ADMIN — GABAPENTIN 300 MG: 300 CAPSULE ORAL at 21:20

## 2018-11-01 RX ADMIN — POLYETHYLENE GLYCOL (3350) 17 G: 17 POWDER, FOR SOLUTION ORAL at 09:03

## 2018-11-01 RX ADMIN — INSULIN ASPART 20 UNITS: 100 INJECTION, SOLUTION INTRAVENOUS; SUBCUTANEOUS at 16:47

## 2018-11-01 RX ADMIN — METOPROLOL SUCCINATE 37.5 MG: 25 TABLET, EXTENDED RELEASE ORAL at 09:02

## 2018-11-01 RX ADMIN — POLYETHYLENE GLYCOL (3350) 17 G: 17 POWDER, FOR SOLUTION ORAL at 21:21

## 2018-11-01 RX ADMIN — BUPROPION HYDROCHLORIDE 150 MG: 150 TABLET, EXTENDED RELEASE ORAL at 21:20

## 2018-11-01 RX ADMIN — INSULIN DETEMIR 35 UNITS: 100 INJECTION, SOLUTION SUBCUTANEOUS at 21:21

## 2018-11-01 RX ADMIN — INSULIN ASPART 2 UNITS: 100 INJECTION, SOLUTION INTRAVENOUS; SUBCUTANEOUS at 12:00

## 2018-11-01 RX ADMIN — FERROUS SULFATE TAB 325 MG (65 MG ELEMENTAL FE) 325 MG: 325 (65 FE) TAB at 09:01

## 2018-11-01 NOTE — PROGRESS NOTES
"INPATIENT PSYCHIATRIC PROGRESS NOTE    Name:  Isaias Lang  :  1965  MRN:  [id number]  Visit Number:  [id number]  Length of stay:  9    Behavioral Health Treatment Plan and Problem List: I have reviewed and approved the Behavioral Health Treatment Plan and Problem list.    SUBJECTIVE    CC/ Focus of exam: Depression    Patient's subjective status: \"Having chest pain\"    INTERVAL HISTORY: Patient depression is definitely improved, so longer experiencing any thoughts of being better off dead or suicidal intent, now is focused on the arrangements for his discharge anticipated tomorrow, [facility] follow-up has been arranged with hopefully act team involvement.    Chest pain complaint today, precordial steady not relieved with nitroglycerin with associated shortness of breath.  Will request cardiac consult     Depression rating 6/10  Anxiety rating 6/10  Sleep: Fair         Review of Systems   Respiratory: Positive for shortness of breath.    Cardiovascular: Positive for chest pain.         OBJECTIVE    Temp:  [97.1 °F (36.2 °C)-97.8 °F (36.6 °C)] 97.8 °F (36.6 °C)  Heart Rate:  [102-110] 103  Resp:  [18-20] 20  BP: ()/(58-68) 105/67    MENTAL STATUS EXAM:      Appearance:Casually dressed, good hygeine.   Cooperation:Cooperative  Psychomotor: No psychomotor agitation/retardation, No EPS, No motor tics  Speech-normal rate, amount.   Mood/Affect:  Depressed  Thought Processes:  coherent  Thought Content:  negativistic  Hallucination(s): none  Hopelessness: No  Optimistic:minimally  Suicidal Thoughts:   No  Suicidal Plan/Intent:  No  Homicidal Thoughts:  absent  Orientation: oriented x 3  Memory: recent intact    Lab Results (last 24 hours)     Procedure Component Value Units Date/Time    CBC & Differential [[id number]] Collected:  18    Specimen:  Blood Updated:  18    Narrative:       The following orders were created for panel order CBC & Differential.  Procedure                         "       Abnormality         Status                     ---------                               -----------         ------                     Scan Slide[[id number]]                                       Final result               CBC Auto Differential[[id number]]        Abnormal            Final result                 Please view results for these tests on the individual orders.    CBC Auto Differential [[id number]]  (Abnormal) Collected:  11/01/18 0523    Specimen:  Blood Updated:  11/01/18 0656     WBC 3.19 (L) 10*3/mm3      RBC 4.15 (L) 10*6/mm3      Hemoglobin 11.3 (L) g/dL      Hematocrit 35.3 (L) %      MCV 85.1 fL      MCH 27.2 pg      MCHC 32.0 (L) g/dL      RDW 18.7 (H) %      RDW-SD 57.8 (H) fl      MPV -- fL      Comment: Unable to calculate.         Platelets 64 (L) 10*3/mm3      Neutrophil % 62.7 %      Lymphocyte % 23.8 %      Monocyte % 8.2 %      Eosinophil % 4.4 %      Basophil % 0.6 %      Immature Grans % 0.3 %      Neutrophils, Absolute 2.00 10*3/mm3      Lymphocytes, Absolute 0.76 (L) 10*3/mm3      Monocytes, Absolute 0.26 10*3/mm3      Eosinophils, Absolute 0.14 10*3/mm3      Basophils, Absolute 0.02 10*3/mm3      Immature Grans, Absolute 0.01 10*3/mm3     Scan Slide [[id number]] Collected:  11/01/18 0523    Specimen:  Blood Updated:  11/01/18 0656     Anisocytosis Mod/2+     Hypochromia Slight/1+     Platelet Estimate Decreased     Large Platelets Slight/1+    Comprehensive Metabolic Panel [[id number]]  (Abnormal) Collected:  11/01/18 0523    Specimen:  Blood Updated:  11/01/18 0633     Glucose 189 (H) mg/dL      BUN 19 mg/dL      Creatinine 0.81 mg/dL      Sodium 135 mmol/L      Potassium 4.4 mmol/L      Chloride 106 mmol/L      CO2 24.7 mmol/L      Calcium 8.6 mg/dL      Total Protein 6.1 g/dL      Albumin 3.00 (L) g/dL      ALT (SGPT) 44 U/L      AST (SGOT) 49 (H) U/L      Alkaline Phosphatase 110 U/L      Comment: Note New Reference Ranges        Total Bilirubin 0.5 mg/dL      eGFR Non   Amer 100 mL/min/1.73      Globulin 3.1 gm/dL      A/G Ratio 1.0 (L) g/dL      BUN/Creatinine Ratio 23.5     Anion Gap 4.3 mmol/L     Osmolality, Calculated [[id number]]  (Normal) Collected:  11/01/18 0523    Specimen:  Blood Updated:  11/01/18 0633     Osmolality Calc 277.4 mOsm/kg     POC Glucose Once [[id number]]  (Abnormal) Collected:  11/01/18 0622    Specimen:  Blood Updated:  11/01/18 0628     Glucose 183 (H) mg/dL     Narrative:       Meter: [id number] : [id number] [employer]    POC Glucose Once [[id number]]  (Abnormal) Collected:  10/31/18 2043    Specimen:  Blood Updated:  10/31/18 2050     Glucose 201 (H) mg/dL     Narrative:       Meter: [id number] : [id number] [employer] gm    POC Glucose Once [[id number]]  (Abnormal) Collected:  10/31/18 1606    Specimen:  Blood Updated:  10/31/18 1616     Glucose 272 (H) mg/dL     Narrative:       Meter: [id number] : [id number] [employer]    POC Glucose Once [[id number]]  (Abnormal) Collected:  10/31/18 1119    Specimen:  Blood Updated:  10/31/18 1127     Glucose 131 (H) mg/dL     Narrative:       Meter: [id number] : [id number] [employer]           Imaging Results (last 24 hours)     ** No results found for the last 24 hours. **           ECG/EMG Results (most recent)     Procedure Component Value Units Date/Time    ECG 12 Lead [[id number]] Collected:  11/01/18 0924     Updated:  11/01/18 0935    Narrative:       Test Reason : chest pain  Blood Pressure : **/** mmHG  Vent. Rate : 102 BPM     Atrial Rate : 102 BPM     P-R Int : 130 ms          QRS Dur : 088 ms      QT Int : 364 ms       P-R-T Axes : 067 -15 071 degrees     QTc Int : 474 ms    Sinus tachycardia  Low voltage QRS  Cannot rule out Anterior infarct (cited on or before 22-OCT-2018)  Abnormal ECG  When compared with ECG of 22-OCT-2018 21:58,  Questionable change in initial forces of Anteroseptal leads    Referred By:  SURINDER           Confirmed By:            ALLERGIES: Guaifenesin &  derivatives; Robitussin dm [dextromethorphan-guaifenesin]; Tizanidine; Metformin; Sulfa antibiotics; Contrast dye; and Tramadol      Current Facility-Administered Medications:   •  albuterol (PROVENTIL HFA;VENTOLIN HFA) inhaler 2 puff, 2 puff, Inhalation, Q6H PRN, Ankit Barnes MD, 2 puff at 11/01/18 0142  •  aluminum-magnesium hydroxide-simethicone (MAALOX MAX) 400-400-40 MG/5ML suspension 15 mL, 15 mL, Oral, Q6H PRN, Orlando Dinh MD  •  atorvastatin (LIPITOR) tablet 40 mg, 40 mg, Oral, Nightly, Ankit Barnes MD, 40 mg at 10/31/18 2047  •  benzonatate (TESSALON) capsule 100 mg, 100 mg, Oral, TID PRN, Orlando Dinh MD  •  benztropine (COGENTIN) tablet 1 mg, 1 mg, Oral, Daily PRN **OR** benztropine (COGENTIN) injection 0.5 mg, 0.5 mg, Intramuscular, Daily PRN, Orlando Dinh MD  •  buPROPion SR (WELLBUTRIN SR) 12 hr tablet 150 mg, 150 mg, Oral, Q12H, Ankit Barnes MD, 150 mg at 11/01/18 0901  •  cholecalciferol (VITAMIN D3) capsule 50,000 Units, 50,000 Units, Oral, Weekly, Ankit Barnes MD, 50,000 Units at 10/30/18 0808  •  dextrose (D50W) 25 g/ 50mL Intravenous Solution 25 g, 25 g, Intravenous, Q15 Min PRN, Orlando Dinh MD  •  dextrose (GLUTOSE) oral gel 15 g, 15 g, Oral, Q15 Min PRN, Orlando Dinh MD  •  famotidine (PEPCID) tablet 20 mg, 20 mg, Oral, BID PRN, Orlando Dinh MD  •  ferrous sulfate tablet 325 mg, 325 mg, Oral, BID, Ankit Barnes MD, 325 mg at 11/01/18 0901  •  fluticasone (FLONASE) 50 MCG/ACT nasal spray 2 spray, 2 spray, Nasal, Daily PRN, Ankit Barnes MD  •  furosemide (LASIX) tablet 20 mg, 20 mg, Oral, BID, Gabi Christensen PA, 20 mg at 11/01/18 0901  •  gabapentin (NEURONTIN) capsule 300 mg, 300 mg, Oral, TID, Ankit Barnes MD, 300 mg at 11/01/18 0903  •  glucagon (human recombinant) (GLUCAGEN DIAGNOSTIC) injection 1 mg, 1 mg, Subcutaneous, PRN, Orlando Dinh MD  •  hydrocortisone 1 % cream, ,  Topical, Q12H PRN, Candelaria Whitehead PA  •  insulin aspart (novoLOG) injection 0-9 Units, 0-9 Units, Subcutaneous, 4x Daily AC & at Bedtime, Gabi Christensen PA, 2 Units at 11/01/18 0721  •  insulin aspart (novoLOG) injection 20 Units, 20 Units, Subcutaneous, TID With Meals, Gabi Christensen PA, 20 Units at 11/01/18 0720  •  insulin detemir (LEVEMIR) injection 35 Units, 35 Units, Subcutaneous, Q12H, Gabi Christensen PA, 35 Units at 11/01/18 0809  •  lactulose (CHRONULAC) 10 GM/15ML solution 10 g, 10 g, Oral, BID, Ankit Barnes MD, 10 g at 11/01/18 0901  •  loperamide (IMODIUM) capsule 2 mg, 2 mg, Oral, 4x Daily PRN, Orlando Dinh MD  •  magnesium hydroxide (MILK OF MAGNESIA) suspension 2400 mg/10mL 10 mL, 10 mL, Oral, Daily PRN, Orlando Dinh MD  •  magnesium oxide (MAGOX) tablet 400 mg, 400 mg, Oral, Daily, Ankit Barnes MD, 400 mg at 11/01/18 0901  •  metoprolol succinate XL (TOPROL-XL) 24 hr tablet 37.5 mg, 37.5 mg, Oral, Daily, Bouchra Boyd MD, 37.5 mg at 11/01/18 0902  •  nicotine (NICODERM CQ) 21 MG/24HR patch 1 patch, 1 patch, Transdermal, Q24H, Orlando Dinh MD, 1 patch at 10/25/18 0923  •  nitroglycerin (NITROSTAT) SL tablet 0.4 mg, 0.4 mg, Sublingual, Q5 Min PRN, Ankit Banres MD, 0.4 mg at 11/01/18 0937  •  ondansetron (ZOFRAN) tablet 4 mg, 4 mg, Oral, Q6H PRN, Orlando Dinh MD  •  pantoprazole (PROTONIX) EC tablet 40 mg, 40 mg, Oral, QAM, Ankit Barnes MD, 40 mg at 11/01/18 0617  •  Pharmacy Consult, , Does not apply, Continuous PRN, Gabi Christensen PA  •  polyethylene glycol (MIRALAX) powder 17 g, 17 g, Oral, BID, Ankit Barnes MD, 17 g at 11/01/18 0903  •  sodium chloride (OCEAN) nasal spray 2 spray, 2 spray, Each Nare, PRN, Orlando Dinh MD  •  spironolactone (ALDACTONE) tablet 25 mg, 25 mg, Oral, Daily, Rowan Juan DO, 25 mg at 11/01/18 0902  •  traZODone (DESYREL) tablet 100 mg, 100  mg, Oral, Nightly, Ankit Barnes MD, 100 mg at 10/31/18 2047    ASSESSMENT & PLAN    Severe episode of recurrent major depressive disorder, without psychotic features (CMS/HCC)Plan: Will evaluate antidepressant medication prospectively, Individual and group psychotherapeutic efforts as well as disposition planning effort aiming for either personal care home with case management per [facility] act team (hopefully).    Gastroesophageal reflux disease with esophagitis Plan: Protonix    Chronic viral hepatitis B without delta agent and without coma (CMS/HCC) Plan: will follow recommendations of medical consultant    Chronic hepatitis C without hepatic coma (CMS/HCC) Plan: will follow recommendation of medical consultant    Polysubstance dependence including opioid type drug with complication, episodic abuse (CMS/HCC) Plan: We will incorporated into the psychotherapeutic effort      Leukopenia Plan: will follow recommendation of medical consultant    Thrombocytopenia (CMS/HCC) Plan:will follow recommendation of medical consultant    Coronary artery disease Plan: will follow recommendation of the medical consultant    Myocardial infarction (CMS/HCC) Plan: will follow recommendation of medical consultant.     Congestive heart failure (CHF) (CMS/HCC) plan: will follow recommendation of medical consultant.       Suicide precautions: Suicide precaution Level 3 (q15 min checks)     Behavioral Health Treatment Plan and Problem List: I have reviewed and approved the Behavioral Health Treatment Plan and Problem list.     I spent a total of 25 minutes coordinating the patient's care today;  15 minutes were spent face to face with the patient counseling on depression, the current treatment and follow up plan.     Clinician:  Ankit Barnes MD  11/01/18  9:58 AM    Dictated utilizing Dragon dictation

## 2018-11-01 NOTE — PROGRESS NOTES
[facility] HOSPITALIST PROGRESS NOTE     Patient Identification:  Name:  Isaias Lang  Age:  53 y.o.  Sex:  male  :  1965  MRN:  [id number]  Visit Number:  [id number]  Primary Care Provider:  Naheed Meza APRN    Length of stay:  9    Chief complaint:  Chest pain and shortness of breath at about 2 AM this morning    Subjective:  Patient is a 52 yo male admitted on [id number] to the [facility] per psychiatry for severe episode of major depression with suicidal thoughts. Hospitalist service was consulted for worsening edema and concern for CHF exacerbation.   2018 he complained of substernal chest pain, sharp in nature 8/10 intensity with no radiation but associated with shortness of breath.  Troponin and EKG were ordered.  His EKG was normal and troponin was normal.  Responded well to nitroglycerin.  He is awaiting evaluation by cardiology team.     ----------------------------------------------------------------------------------------------------------------------  Current Hospital Meds:    atorvastatin 40 mg Oral Nightly   buPROPion  mg Oral Q12H   cholecalciferol 50,000 Units Oral Weekly   ferrous sulfate 325 mg Oral BID   furosemide 20 mg Oral BID   gabapentin 300 mg Oral TID   insulin aspart 0-9 Units Subcutaneous 4x Daily AC & at Bedtime   insulin aspart 20 Units Subcutaneous TID With Meals   insulin detemir 35 Units Subcutaneous Q12H   lactulose 10 g Oral BID   magnesium oxide 400 mg Oral Daily   metoprolol succinate XL 37.5 mg Oral Daily   nicotine 1 patch Transdermal Q24H   pantoprazole 40 mg Oral QAM   polyethylene glycol 17 g Oral BID   spironolactone 25 mg Oral Daily   traZODone 100 mg Oral Nightly       Pharmacy Consult      ----------------------------------------------------------------------------------------------------------------------  Vital Signs:  Temp:  [97.1 °F (36.2 °C)-97.8 °F (36.6 °C)] 97.8 °F (36.6 °C)  Heart Rate:  [] 95  Resp:   [18-20] 20  BP: ()/(58-68) 96/63  1    10/30/18  0553 10/31/18  0600 11/01/18  0600   Weight: 87.5 kg (193 lb) 88 kg (194 lb) 88.6 kg (195 lb 6.4 oz)     Body mass index is 29.71 kg/m².  No intake or output data in the 24 hours ending 11/01/18 1241  Diet Regular; Cardiac, Daily Fluid Restriction, Consistent Carbohydrate; 1500 mL Fluid  ----------------------------------------------------------------------------------------------------------------------  Physical exam:  Constitutional:  Well-developed and well-nourished.  No respiratory distress.      HENT:  Head:  Normocephalic and atraumatic.  Mouth:  Moist mucous membranes.    Eyes:  Conjunctivae and EOM are normal.  Pupils are equal, round, and reactive to light.  No scleral icterus.    Neck:  Neck supple.  No JVD present.    Cardiovascular:  Normal rate, regular rhythm and normal heart sounds with no murmur.  Pulmonary/Chest:  No respiratory distress, no wheezes, no crackles, with normal breath sounds and good air movement.  Abdominal:  Soft.  Bowel sounds are normal.  Full and no tenderness.   Musculoskeletal:  Trace pedal edema, no tenderness, and no deformity.  No red or swollen joints anywhere.    Neurological:  Alert and oriented to person, place, and time.  No cranial nerve deficit.  No tongue deviation.  No facial droop.  No slurred speech.   Skin:  Skin is warm and dry. No rash noted. No pallor.   Peripheral vascular:  Strong pulses in all 4 extremities with no clubbing, no cyanosis, trace edema.  Psych; depressed  ----------------------------------------------------------------------------------------------------------------------  Tele:    ----------------------------------------------------------------------------------------------------------------------    Results from last 7 days  Lab Units 11/01/18  1003   TROPONIN I ng/mL <0.006       Results from last 7 days  Lab Units 11/01/18  0523 10/31/18  0441 10/30/18  0526   WBC 10*3/mm3 3.19* 2.82*  2.79*   HEMOGLOBIN g/dL 11.3* 10.8* 10.9*   HEMATOCRIT % 35.3* 34.4* 34.4*   MCV fL 85.1 84.1 84.7   MCHC g/dL 32.0* 31.4* 31.7*   PLATELETS 10*3/mm3 64* 69* 69*           Results from last 7 days  Lab Units 11/01/18  0523 10/31/18  0441 10/30/18  0526  10/26/18  0506   SODIUM mmol/L 135 134* 134*  < > 138   POTASSIUM mmol/L 4.4 4.1 4.4  < > 4.3   MAGNESIUM mg/dL  --   --   --   --  1.7   CHLORIDE mmol/L 106 103 103  < > 104   CO2 mmol/L 24.7 26.9 27.3  < > 29.6   BUN mg/dL 19 19 16  < > 12   CREATININE mg/dL 0.81 0.83 0.89  < > 0.66   EGFR IF NONAFRICN AM mL/min/1.73 100 97 89  < > 126   CALCIUM mg/dL 8.6 8.8 8.7  < > 9.2   GLUCOSE mg/dL 189* 258* 321*  < > 197*   ALBUMIN g/dL 3.00* 2.80* 2.90*  < > 2.80*   BILIRUBIN mg/dL 0.5 0.6 0.6  < > 0.7   ALK PHOS U/L 110 108 118  < > 129   AST (SGOT) U/L 49* 48* 54*  < > 66*   ALT (SGPT) U/L 44 46* 48*  < > 52*   < > = values in this interval not displayed.Estimated Creatinine Clearance: 114.1 mL/min (by C-G formula based on SCr of 0.81 mg/dL).    No results found for: AMMONIA                    I have personally looked at the labs and they are summarized above.  ----------------------------------------------------------------------------------------------------------------------  Imaging Results (last 24 hours)     ** No results found for the last 24 hours. **        ----------------------------------------------------------------------------------------------------------------------  Assessment:  -Chest pain with normal troponin and EKG. ?angina  -Acute on chronic systolic CHF exacerbation  -Nonischemic cardiomyopathy, EF of 25% on recent echocardiogram from [facility], decompensated  -Chronic pancreatitis  -Pancytopenia, likely secondary to underlying cirrhosis, stable  -Cirrhosis of the liver  -Chronic hepatitis B and C  -COPD without acute exacerbation  -Type 2 insulin-dependent diabetes mellitus with hyperglycemia  -History of coronary artery disease    -Essential hypertension, stable   -Acute hyponatremia likely pseudohyponatremia from hyperglycemia, resolved   -GERD  -Normocytic anemia, stable, likely anemia of chronic disease   -Hypoalbuminemia  -Major depression  -Anxiety   -Chronic pain disorder   -History of substance abuse   -History of medical non compliance   -Former smoker   -Contact dermatitis of the face.    PLAN:  Continue psych management per psychiatrist.  Chest pain as improved will await for cardiology recommendation.  Continue on diet and insulin  Continue with current medications  Will defer starting entresto to cardiology team    Appreciate the opportunity given the hospitalist team to participate in his care.  We'll continue to follow    Tk Yang MD  11/01/18  12:41 PM

## 2018-11-01 NOTE — NURSING NOTE
Comp care Act team called and reported Malinda Sibley will be here at 0900 AM to interview patient.

## 2018-11-01 NOTE — NURSING NOTE
Called Dr. Nguyễn's office and spoke with assistant. SHe reports she will contact Dr. Nguyễn and have him contact us back at 1600. Reason for calling was patient complains of chest pain. Cardiac work-up being done.

## 2018-11-01 NOTE — DISCHARGE INSTR - APPOINTMENTS
MARI Cordova   [address] [facility] [facility] [address]  [place] [address]  [phone]    Tuesday, November 6 2018 at 9:30am      [address] [place] [address]  [phone]    November 7 2018 at 9:30am with NOHEMI Gallegos

## 2018-11-01 NOTE — CONSULTS
Inpatient Cardiology Consult  Consult performed by: AMAN ARROYO  Consult ordered by: JONNY CADENA        Date of Admit: 10/23/2018  Date of Consult: 11/01/18  Angie Arreola*  Isaias Lang  1965  Consulting Physician: Dr. Aman Arroyo MD, [facility]    Cardiology consultation    Reason for consultation: chest pain    Assessment:  1. ASCVD with report of recent MI and attempted PCI apparently unsuccessful with further details unavailable  2. Reported advanced ischemic cardiomyopathy with LV EF of 20-25% per the patient's report, appears well compensated  3. Chest pains with features of class 3 angina, troponin negative  4. Peripheral arterial disease      Recommendations:  1. Will request records from [facility] in [place], [place].  2. Will continue to trend cardiac markers  3. Continue Toprol XL and spironolactone  4. Start Imdur 30 mg PO daily  5. Start Losartan 25 mg PO daily  6. If he has persistent chest pains would consider further evaluation with stress testing.      Subjective     History of Present Illness    Isaias Lang is a 53 y.o. male with past medical history significant for chronic pancreatitis, Diabetes Mellitus Type 2, Hypertension, Depression, and anxiety. He was admitted to the inpatient psychiatry unit due to severe depression with suicidal thoughts. Last night he developed chest pains and cardiology was consulted for further evaluation. He reports the chest pain started while sleeping about 2 a.m. Last night. This was located in the left chest wall and described as a 7 on a 1-10 pain scale. This was also described as chest pressure, tightness, and sharp pain. This eased off with nitroglycerin although is somewhat persistent today. He has also had progressive shortness of breath and leg edema. He states he was admitted to [facility] in [place], [place] approximately two months ago. He states he was told he had an MI and cardiac  catheterization was performed. PCI was reportedly attempted but unsuccessful per the patient. He also reports an echocardiogram at that time revealed an EF of 20-25 %. He was advised to go home with a LifeVest but refused. These details are unavailable at this time. He states he was discharged home on metoprolol and has been taking his medication. Troponin this morning was negative. EKG did not reveal any acute changes.       Cardiac risk factors:arteriosclerotic heart disease, diabetes mellitus, hypercholesterolemia, hypertension, peripheral vascular disease and Sedentary life style    Last Echo: 9/14/17  · Left atrial cavity size is mildly dilated.  · Left ventricular systolic function is normal. Estimated EF = 50%.  · There are no hemodynamically significant valvular lesions noted    Last Stress: 2/9/17  · Findings consistent with a normal ECG stress test.  · Myocardial perfusion imaging indicates a normal myocardial perfusion study with no evidence of ischemia.  · Normal LV cavity size. Normal LV wall motion noted.  · Left ventricular ejection fraction is normal (Calculated EF = 50-55%).  · Impressions are consistent with a low risk study.        Past Medical History:   Diagnosis Date   • Abscess of antecubital fossa 05/2014    Left that required I and D and grew Haemophilus influenza group 1   • Anxiety    • Asthma    • Chronic pain disorder     back and neck pain   • Chronic pancreatitis (CMS/[facility])    • COPD (chronic obstructive pulmonary disease) (CMS/[facility])    • DDD (degenerative disc disease), lumbosacral    • Depression    • Diabetes mellitus (CMS/H[facility])    • DVT (deep venous thrombosis) (CMS/[facility])     Right arm   • Essential hypertension    • GERD (gastroesophageal reflux disease)    • Hepatitis-C    • Hypercholesteremia    • Injury of back    • Injury of right Achilles tendon     Complicated by MRSA and Pseudomonas   • IV drug abuse (CMS/[facility])    • Mild CAD     Left heart cath at  2012   • MRSA (methicillin  resistant staph aureus) culture positive 09/14/2016    LUE   • Obesity    • Opiate addiction (CMS/[facility])     Suboxone IV   • Self-injurious behavior    • Sleep apnea    • Suicidal thoughts    • Suicide attempt (CMS/[facility])    • Systolic CHF, chronic (CMS/[facility])     EF 45-50% in 2013, EF 60% 9/2016     Past Surgical History:   Procedure Laterality Date   • CHOLECYSTECTOMY  1990s   • INCISION AND DRAINAGE ABSCESS Left 2016    wrist   • INCISION AND DRAINAGE ARM Left 9/15/2016    Procedure: INCISION AND DRAINAGE UPPER EXTREMITY;  Surgeon: Rico Oseguera MD;  Location:  [facility] OR;  Service:    • INCISION AND DRAINAGE ARM Left 9/14/2017    Procedure: INCISION AND DRAINAGE LEFT THUMB;  Surgeon: Adin Harrington MD;  Location:  [facility] OR;  Service:    • ORIF ANKLE FRACTURE Right 2014     Family History   Problem Relation Age of Onset   • Gout Other    • Osteoporosis Other    • Arthritis Other         Rheumatoid and osteoarthritis   • Hypertension Other    • Heart disease Other    • Cancer Other    • Coronary artery disease Mother    • Lung disease Mother    • Gout Sister    • Cancer Maternal Grandmother    • Hypertension Maternal Grandfather    • Gout Maternal Grandfather      Social History   Substance Use Topics   • Smoking status: Former Smoker     Years: 1.00     Types: Cigarettes   • Smokeless tobacco: Never Used      Comment: quit smoking in my 20's   • Alcohol use No      Comment: denies     Prescriptions Prior to Admission   Medication Sig Dispense Refill Last Dose   • atorvastatin (LIPITOR) 40 MG tablet Take 40 mg by mouth Every Night.   10/21/2018   • citalopram (CeleXA) 40 MG tablet Take 40 mg by mouth Daily.   10/21/2018 at Unknown time   • ferrous sulfate 325 (65 FE) MG tablet Take 325 mg by mouth 2 (Two) Times a Day.   10/21/2018   • furosemide (LASIX) 40 MG tablet Take 40 mg by mouth Daily.   10/21/2018   • Insulin Glargine (BASAGLAR KWIKPEN) 100 UNIT/ML injection pen Inject 35 Units under the skin into the  appropriate area as directed Every Night.   10/21/2018   • Insulin Glargine (BASAGLAR KWIKPEN) 100 UNIT/ML injection pen Inject 25 Units under the skin into the appropriate area as directed Every Morning.   10/21/2018   • insulin lispro (humaLOG) 100 UNIT/ML injection Inject 20 Units under the skin into the appropriate area as directed 3 (Three) Times a Day With Meals. Prior to [facility] Admission, Patient was on: he is also on a sliding scale for blood glucose over 200   10/21/2018   • polyethylene glycol (MIRALAX) packet Take 17 g by mouth 2 (Two) Times a Day.   10/21/2018   • sulindac (CLINORIL) 200 MG tablet Take 200 mg by mouth 2 (Two) Times a Day With Meals.   10/21/2018   • albuterol (PROVENTIL HFA;VENTOLIN HFA) 108 (90 Base) MCG/ACT inhaler Inhale 2 puffs Every 6 (Six) Hours As Needed for Wheezing. 18 g 2 Unknown at Unknown time   • albuterol (PROVENTIL) (2.5 MG/3ML) 0.083% nebulizer solution Take 2.5 mg by nebulization Every 6 (Six) Hours As Needed for Wheezing.   Unknown at Unknown time   • aspirin 81 MG EC tablet Take 1 tablet by mouth Daily. 100 tablet 0 10/21/2018   • fluticasone (FLONASE) 50 MCG/ACT nasal spray 2 sprays into the nostril(s) as directed by provider Daily As Needed for Rhinitis.   Unknown at Unknown time   • lactulose (CHRONULAC) 10 GM/15ML solution Take 10 g by mouth 2 (Two) Times a Day.   10/21/2018   • Magnesium Oxide 400 (240 Mg) MG tablet Take 1 tablet by mouth Daily. 30 tablet 0 10/21/2018   • metoclopramide (REGLAN) 10 MG tablet Take 1 tablet by mouth 4 (Four) Times a Day Before Meals & at Bedtime. 120 tablet 0 10/21/2018   • metoprolol succinate XL (TOPROL-XL) 25 MG 24 hr tablet Take 1 tablet by mouth Daily. 30 tablet 0 10/21/2018   • naproxen (NAPROSYN) 250 MG tablet Take 250 mg by mouth 2 (Two) Times a Day As Needed.   Unknown at Unknown time   • nitroglycerin (NITROSTAT) 0.4 MG SL tablet Place 1 tablet under the tongue Every 5 (Five) Minutes As Needed for Chest Pain. Take no more  than 3 doses in 15 minutes. 30 tablet 0 Unknown at Unknown time   • pantoprazole (PROTONIX) 40 MG EC tablet Take 1 tablet by mouth Daily. 30 tablet 0 10/21/2018   • potassium chloride (K-DUR,KLOR-CON) 20 MEQ CR tablet Take 1 tablet by mouth Daily. 30 tablet 0 10/21/2018   • vitamin D (ERGOCALCIFEROL) 54814 units capsule capsule Take 1 capsule by mouth 1 (One) Time Per Week. 30 capsule 0 Unknown at Unknown time     Allergies:  Guaifenesin & derivatives; Robitussin dm [dextromethorphan-guaifenesin]; Tizanidine; Metformin; Sulfa antibiotics; Contrast dye; and Tramadol      Current Facility-Administered Medications:   •  albuterol (PROVENTIL HFA;VENTOLIN HFA) inhaler 2 puff, 2 puff, Inhalation, Q6H PRN, Ankit Barnes MD, 2 puff at 11/01/18 0142  •  aluminum-magnesium hydroxide-simethicone (MAALOX MAX) 400-400-40 MG/5ML suspension 15 mL, 15 mL, Oral, Q6H PRN, Orlando Dinh MD  •  atorvastatin (LIPITOR) tablet 40 mg, 40 mg, Oral, Nightly, Ankit Barnes MD, 40 mg at 10/31/18 2047  •  benzonatate (TESSALON) capsule 100 mg, 100 mg, Oral, TID PRN, Orlando Dinh MD  •  benztropine (COGENTIN) tablet 1 mg, 1 mg, Oral, Daily PRN **OR** benztropine (COGENTIN) injection 0.5 mg, 0.5 mg, Intramuscular, Daily PRN, Orlando Dinh MD  •  buPROPion SR (WELLBUTRIN SR) 12 hr tablet 150 mg, 150 mg, Oral, Q12H, Ankit Barnes MD, 150 mg at 11/01/18 0901  •  cholecalciferol (VITAMIN D3) capsule 50,000 Units, 50,000 Units, Oral, Weekly, Ankit Barnes MD, 50,000 Units at 10/30/18 0808  •  dextrose (D50W) 25 g/ 50mL Intravenous Solution 25 g, 25 g, Intravenous, Q15 Min PRN, Orlando Dinh MD  •  dextrose (GLUTOSE) oral gel 15 g, 15 g, Oral, Q15 Min PRN, Orlando Dinh MD  •  famotidine (PEPCID) tablet 20 mg, 20 mg, Oral, BID PRN, Orlando Dinh MD  •  ferrous sulfate tablet 325 mg, 325 mg, Oral, BID, Ankit Barnes MD, 325 mg at 11/01/18 0901  •  fluticasone (FLONASE) 50 MCG/ACT nasal  spray 2 spray, 2 spray, Nasal, Daily PRN, Ankit Barnes MD  •  furosemide (LASIX) tablet 20 mg, 20 mg, Oral, BID, Gabi Christensen PA, 20 mg at 11/01/18 1615  •  gabapentin (NEURONTIN) capsule 300 mg, 300 mg, Oral, TID, Ankit Barnes MD, 300 mg at 11/01/18 1615  •  glucagon (human recombinant) (GLUCAGEN DIAGNOSTIC) injection 1 mg, 1 mg, Subcutaneous, PRN, Orlando Dinh MD  •  hydrocortisone 1 % cream, , Topical, Q12H PRN, Candelaria Whitehead PA  •  insulin aspart (novoLOG) injection 0-9 Units, 0-9 Units, Subcutaneous, 4x Daily AC & at Bedtime, Gabi Christensen PA, 2 Units at 11/01/18 1200  •  insulin aspart (novoLOG) injection 20 Units, 20 Units, Subcutaneous, TID With Meals, Gabi Christensen PA, 20 Units at 11/01/18 1200  •  insulin detemir (LEVEMIR) injection 35 Units, 35 Units, Subcutaneous, Q12H, Gabi Christensen PA, 35 Units at 11/01/18 0809  •  lactulose (CHRONULAC) 10 GM/15ML solution 10 g, 10 g, Oral, BID, Ankit Barnes MD, 10 g at 11/01/18 0901  •  loperamide (IMODIUM) capsule 2 mg, 2 mg, Oral, 4x Daily PRN, Orlando Dinh MD  •  magnesium hydroxide (MILK OF MAGNESIA) suspension 2400 mg/10mL 10 mL, 10 mL, Oral, Daily PRN, Orlando Dinh MD  •  magnesium oxide (MAGOX) tablet 400 mg, 400 mg, Oral, Daily, Ankit Barnes MD, 400 mg at 11/01/18 0901  •  metoprolol succinate XL (TOPROL-XL) 24 hr tablet 37.5 mg, 37.5 mg, Oral, Daily, Bouchra Boyd MD, 37.5 mg at 11/01/18 0902  •  nicotine (NICODERM CQ) 21 MG/24HR patch 1 patch, 1 patch, Transdermal, Q24H, Orlando Dinh MD, 1 patch at 10/25/18 0923  •  nitroglycerin (NITROSTAT) SL tablet 0.4 mg, 0.4 mg, Sublingual, Q5 Min PRN, Ankit Barnes MD, 0.4 mg at 11/01/18 0937  •  ondansetron (ZOFRAN) tablet 4 mg, 4 mg, Oral, Q6H PRN, Orlando Dinh MD  •  pantoprazole (PROTONIX) EC tablet 40 mg, 40 mg, Oral, QAM, Ankit Barnes MD, 40 mg at 11/01/18 0617  •   Pharmacy Consult, , Does not apply, Continuous PRN, Gabi Christensen PA  •  polyethylene glycol (MIRALAX) powder 17 g, 17 g, Oral, BID, Ankit Barnes MD, 17 g at 11/01/18 0903  •  sodium chloride (OCEAN) nasal spray 2 spray, 2 spray, Each Nare, PRN, Orlando Dinh MD  •  spironolactone (ALDACTONE) tablet 25 mg, 25 mg, Oral, Daily, Rowan Juan DO, 25 mg at 11/01/18 0902  •  traZODone (DESYREL) tablet 100 mg, 100 mg, Oral, Nightly, Ankit Barnes MD, 100 mg at 10/31/18 2047    Review of Systems   Constitutional: Positive for activity change and fatigue.   HENT: Negative for congestion and sore throat.    Respiratory: Positive for shortness of breath. Negative for cough and wheezing.    Cardiovascular: Positive for chest pain and leg swelling. Negative for palpitations.   Gastrointestinal: Positive for abdominal pain. Negative for nausea and vomiting.   Genitourinary: Negative for dysuria.   Musculoskeletal: Negative for arthralgias and back pain.   Skin: Negative for rash and wound.   Neurological: Negative for dizziness, syncope and light-headedness.         Objective      Vital Signs  Temp:  [97.1 °F (36.2 °C)-97.8 °F (36.6 °C)] 97.8 °F (36.6 °C)  Heart Rate:  [] 95  Resp:  [18-20] 20  BP: ()/(58-68) 97/68  Body mass index is 29.71 kg/m².  No intake or output data in the 24 hours ending 11/01/18 1637    Physical Exam   Constitutional: He appears well-developed and well-nourished.   HENT:   Head: Normocephalic and atraumatic.   Neck: No JVD present.   Cardiovascular: Normal rate, regular rhythm and normal heart sounds.  Exam reveals no S3 and no S4.    No murmur heard.  Pulses:       Dorsalis pedis pulses are 1+ on the right side, and 1+ on the left side.        Posterior tibial pulses are 1+ on the right side, and 1+ on the left side.   Pulmonary/Chest: Effort normal and breath sounds normal. He has no wheezes. He has no rales.   Abdominal: Soft. Bowel sounds are  normal. There is tenderness (RUQ tenderness to palpation).   Musculoskeletal:   1+ BLE edema     Skin: Skin is warm and dry.   Psychiatric: He has a normal mood and affect. His behavior is normal.         Results Review:   I reviewed the patient's new clinical results.    Results from last 7 days  Lab Units 18  1003   TROPONIN I ng/mL <0.006       Results from last 7 days  Lab Units 18  0523 10/31/18  0441 10/30/18  0526 10/29/18  0817 10/28/18  0453 10/27/18  1330 10/26/18  0506   WBC 10*3/mm3 3.19* 2.82* 2.79* 2.68* 2.65* 3.35* 2.96*   HEMOGLOBIN g/dL 11.3* 10.8* 10.9* 10.3* 10.7* 11.6* 10.7*   PLATELETS 10*3/mm3 64* 69* 69* 66* 69* 73* 58*       Results from last 7 days  Lab Units 18  0523 10/31/18  0441 10/30/18  0526 10/29/18  0533 10/28/18  0453 10/27/18  1330 10/26/18  0506   SODIUM mmol/L 135 134* 134* 131* 134* 138 138   POTASSIUM mmol/L 4.4 4.1 4.4 5.0 4.6 4.7 4.3   CHLORIDE mmol/L 106 103 103 101 103 105 104   CO2 mmol/L 24.7 26.9 27.3 26.0 27.4 29.1 29.6   BUN mg/dL 19 19 16 15 15 11 12   CREATININE mg/dL 0.81 0.83 0.89 0.75 0.79 0.74 0.66   CALCIUM mg/dL 8.6 8.8 8.7 9.0 9.0 9.4 9.2   GLUCOSE mg/dL 189* 258* 321* 340* 338* 184* 197*   ALT (SGPT) U/L 44 46* 48* 51* 46* 58* 52*   AST (SGOT) U/L 49* 48* 54* 62* 56* 73* 66*     Lab Results   Component Value Date    INR 1.29 (H) 10/24/2018    INR 1.31 (H) 10/22/2018    INR 1.33 (H) 10/19/2018    INR 1.26 (H) 2018    INR 1.41 (H) 2017    INR 1.41 (H) 2017    INR 1.37 (H) 2017     Lab Results   Component Value Date    MG 1.7 10/26/2018    MG 1.6 (L) 10/19/2018    MG 1.6 (L) 2018     Lab Results   Component Value Date    TSH 2.028 10/22/2018    CHLPL 193 2014    TRIG 109 2016    HDL 27 (L) 2016    LDL 48 2016      Lab Results   Component Value Date    .0 (H) 10/23/2018    .0 (H) 10/22/2018    .0 (H) 10/19/2018       [id number]      Imaging Results (last 72 hours)      Procedure Component Value Units Date/Time    US Abdomen Limited [[id number]] Collected:  10/31/18 0833     Updated:  10/31/18 0837    Narrative:       EXAMINATION: US ABDOMEN LIMITED-      CLINICAL INDICATION:     Evaluate for ascites.     COMPARISON:         TECHNIQUE:  Sonographic imaging obtained in transverse and sagittal  planes of 4 quadrants of the abdomen for purposes of fluid assessment.      FINDINGS:   No ascites identified.        Impression:       No significant ascites identified.     This report was finalized on 10/31/2018 8:34 AM by Dr. Alexsander Son MD.              Thank you very much for asking us to be involved in this patient's care.  We will follow along with you.    NOHEMI Wells acting as scribe for Dr. Aman Nguyễn MD, FACC  11/01/18  4:37 PM    I, Aman Nguy[facility]n MD, [facility], personally performed the services described in this documentation as scribed by the above named individual in my presence, and it is both accurate and complete.     Aman Nguyễn MD, FACC   11/01/18  4:37 PM

## 2018-11-01 NOTE — PLAN OF CARE
Problem: Patient Care Overview  Goal: Plan of Care Review  Outcome: Ongoing (interventions implemented as appropriate)   10/31/18 2132   Coping/Psychosocial   Plan of Care Reviewed With patient   Coping/Psychosocial   Patient Agreement with Plan of Care agrees   Plan of Care Review   Progress no change       Problem: Overarching Goals (Adult)  Goal: Adheres to Safety Considerations for Self and Others  Outcome: Ongoing (interventions implemented as appropriate)    Goal: Optimized Coping Skills in Response to Life Stressors  Outcome: Ongoing (interventions implemented as appropriate)    Goal: Develops/Participates in Therapeutic [place] to Support Successful Transition  Outcome: Ongoing (interventions implemented as appropriate)

## 2018-11-01 NOTE — PLAN OF CARE
Problem: Patient Care Overview  Goal: Plan of Care Review  Outcome: Ongoing (interventions implemented as appropriate)   11/01/18 2709   Coping/Psychosocial   Plan of Care Reviewed With patient   Coping/Psychosocial   Patient Agreement with Plan of Care agrees   Plan of Care Review   Progress no change   OTHER   Outcome Summary Pt complaining of chest pain earlier. Pt had cardiac work up and has echo scheduled. Pt rates anxiety 5/10 and depression 4/10. Pt wanting to discharge tomorrow.

## 2018-11-02 VITALS
TEMPERATURE: 97.9 F | HEIGHT: 68 IN | DIASTOLIC BLOOD PRESSURE: 65 MMHG | HEART RATE: 97 BPM | BODY MASS INDEX: 29.37 KG/M2 | WEIGHT: 193.8 LBS | OXYGEN SATURATION: 92 % | SYSTOLIC BLOOD PRESSURE: 97 MMHG | RESPIRATION RATE: 18 BRPM

## 2018-11-02 LAB
ALBUMIN SERPL-MCNC: 3 G/DL (ref 3.5–5)
ALBUMIN/GLOB SERPL: 1 G/DL (ref 1.5–2.5)
ALP SERPL-CCNC: 110 U/L (ref 40–129)
ALT SERPL W P-5'-P-CCNC: 45 U/L (ref 10–44)
ANION GAP SERPL CALCULATED.3IONS-SCNC: 4.5 MMOL/L (ref 3.6–11.2)
ANISOCYTOSIS BLD QL: NORMAL
AST SERPL-CCNC: 54 U/L (ref 10–34)
BASOPHILS # BLD AUTO: 0.01 10*3/MM3 (ref 0–0.3)
BASOPHILS NFR BLD AUTO: 0.3 % (ref 0–2)
BH CV ECHO MEAS - ACS: 2.1 CM
BH CV ECHO MEAS - AO ROOT AREA (BSA CORRECTED): 1.5
BH CV ECHO MEAS - AO ROOT AREA: 7 CM^2
BH CV ECHO MEAS - AO ROOT DIAM: 3 CM
BH CV ECHO MEAS - BSA(HAYCOCK): 2.1 M^2
BH CV ECHO MEAS - BSA: 2 M^2
BH CV ECHO MEAS - BZI_BMI: 29.3 KILOGRAMS/M^2
BH CV ECHO MEAS - BZI_METRIC_HEIGHT: 172.7 CM
BH CV ECHO MEAS - BZI_METRIC_WEIGHT: 87.5 KG
BH CV ECHO MEAS - EDV(CUBED): 94.1 ML
BH CV ECHO MEAS - EDV(MOD-SP4): 117 ML
BH CV ECHO MEAS - EDV(TEICH): 94.8 ML
BH CV ECHO MEAS - EF(CUBED): 50.6 %
BH CV ECHO MEAS - EF(MOD-SP4): 61.5 %
BH CV ECHO MEAS - EF(TEICH): 42.7 %
BH CV ECHO MEAS - ESV(CUBED): 46.5 ML
BH CV ECHO MEAS - ESV(MOD-SP4): 45 ML
BH CV ECHO MEAS - ESV(TEICH): 54.3 ML
BH CV ECHO MEAS - FS: 20.9 %
BH CV ECHO MEAS - IVS/LVPW: 0.95
BH CV ECHO MEAS - IVSD: 1.1 CM
BH CV ECHO MEAS - LA DIMENSION: 5.3 CM
BH CV ECHO MEAS - LA/AO: 1.8
BH CV ECHO MEAS - LV DIASTOLIC VOL/BSA (35-75): 58.1 ML/M^2
BH CV ECHO MEAS - LV MASS(C)D: 189.6 GRAMS
BH CV ECHO MEAS - LV MASS(C)DI: 94.2 GRAMS/M^2
BH CV ECHO MEAS - LV SYSTOLIC VOL/BSA (12-30): 22.4 ML/M^2
BH CV ECHO MEAS - LVIDD: 5.7 CM
BH CV ECHO MEAS - LVIDS: 3.6 CM
BH CV ECHO MEAS - LVLD AP4: 7.8 CM
BH CV ECHO MEAS - LVLS AP4: 8 CM
BH CV ECHO MEAS - LVOT AREA (M): 2.5 CM^2
BH CV ECHO MEAS - LVOT AREA: 2.7 CM^2
BH CV ECHO MEAS - LVOT DIAM: 1.8 CM
BH CV ECHO MEAS - LVPWD: 1.2 CM
BH CV ECHO MEAS - PA ACC SLOPE: 1240 CM/SEC^2
BH CV ECHO MEAS - PA ACC TIME: 0.08 SEC
BH CV ECHO MEAS - PA PR(ACCEL): 42.6 MMHG
BH CV ECHO MEAS - RVDD: 2.7 CM
BH CV ECHO MEAS - SI(CUBED): 23.6 ML/M^2
BH CV ECHO MEAS - SI(MOD-SP4): 35.8 ML/M^2
BH CV ECHO MEAS - SI(TEICH): 20.1 ML/M^2
BH CV ECHO MEAS - SV(CUBED): 47.6 ML
BH CV ECHO MEAS - SV(MOD-SP4): 72 ML
BH CV ECHO MEAS - SV(TEICH): 40.5 ML
BILIRUB SERPL-MCNC: 0.6 MG/DL (ref 0.2–1.8)
BUN BLD-MCNC: 18 MG/DL (ref 7–21)
BUN/CREAT SERPL: 21.4 (ref 7–25)
CALCIUM SPEC-SCNC: 8.6 MG/DL (ref 7.7–10)
CHLORIDE SERPL-SCNC: 104 MMOL/L (ref 99–112)
CO2 SERPL-SCNC: 26.5 MMOL/L (ref 24.3–31.9)
CREAT BLD-MCNC: 0.84 MG/DL (ref 0.43–1.29)
DEPRECATED RDW RBC AUTO: 57.7 FL (ref 37–54)
EOSINOPHIL # BLD AUTO: 0.1 10*3/MM3 (ref 0–0.7)
EOSINOPHIL NFR BLD AUTO: 3 % (ref 0–5)
ERYTHROCYTE [DISTWIDTH] IN BLOOD BY AUTOMATED COUNT: 18.7 % (ref 11.5–14.5)
GFR SERPL CREATININE-BSD FRML MDRD: 96 ML/MIN/1.73
GLOBULIN UR ELPH-MCNC: 3.1 GM/DL
GLUCOSE BLD-MCNC: 248 MG/DL (ref 70–110)
GLUCOSE BLDC GLUCOMTR-MCNC: 129 MG/DL (ref 70–130)
GLUCOSE BLDC GLUCOMTR-MCNC: 187 MG/DL (ref 70–130)
GLUCOSE BLDC GLUCOMTR-MCNC: 228 MG/DL (ref 70–130)
HCT VFR BLD AUTO: 34.5 % (ref 42–52)
HGB BLD-MCNC: 11.1 G/DL (ref 14–18)
HYPOCHROMIA BLD QL: NORMAL
IMM GRANULOCYTES # BLD: 0.01 10*3/MM3 (ref 0–0.03)
IMM GRANULOCYTES NFR BLD: 0.3 % (ref 0–0.5)
LYMPHOCYTES # BLD AUTO: 0.71 10*3/MM3 (ref 1–3)
LYMPHOCYTES NFR BLD AUTO: 21.5 % (ref 21–51)
MAXIMAL PREDICTED HEART RATE: 167 BPM
MCH RBC QN AUTO: 27.4 PG (ref 27–33)
MCHC RBC AUTO-ENTMCNC: 32.2 G/DL (ref 33–37)
MCV RBC AUTO: 85.2 FL (ref 80–94)
MONOCYTES # BLD AUTO: 0.23 10*3/MM3 (ref 0.1–0.9)
MONOCYTES NFR BLD AUTO: 7 % (ref 0–10)
NEUTROPHILS # BLD AUTO: 2.24 10*3/MM3 (ref 1.4–6.5)
NEUTROPHILS NFR BLD AUTO: 67.9 % (ref 30–70)
OSMOLALITY SERPL CALC.SUM OF ELEC: 280.3 MOSM/KG (ref 273–305)
OVALOCYTES BLD QL SMEAR: NORMAL
PLATELET # BLD AUTO: 81 10*3/MM3 (ref 130–400)
PMV BLD AUTO: 13.3 FL (ref 6–10)
POTASSIUM BLD-SCNC: 4.4 MMOL/L (ref 3.5–5.3)
PROT SERPL-MCNC: 6.1 G/DL (ref 6–8)
RBC # BLD AUTO: 4.05 10*6/MM3 (ref 4.7–6.1)
SMALL PLATELETS BLD QL SMEAR: NORMAL
SODIUM BLD-SCNC: 135 MMOL/L (ref 135–153)
STRESS TARGET HR: 142 BPM
TROPONIN I SERPL-MCNC: <0.006 NG/ML
WBC NRBC COR # BLD: 3.3 10*3/MM3 (ref 4.5–12.5)

## 2018-11-02 PROCEDURE — 63710000001 INSULIN ASPART PER 5 UNITS: Performed by: PHYSICIAN ASSISTANT

## 2018-11-02 PROCEDURE — 82962 GLUCOSE BLOOD TEST: CPT

## 2018-11-02 PROCEDURE — 80053 COMPREHEN METABOLIC PANEL: CPT | Performed by: PHYSICIAN ASSISTANT

## 2018-11-02 PROCEDURE — 85007 BL SMEAR W/DIFF WBC COUNT: CPT | Performed by: PHYSICIAN ASSISTANT

## 2018-11-02 PROCEDURE — 99239 HOSP IP/OBS DSCHRG MGMT >30: CPT | Performed by: PSYCHIATRY & NEUROLOGY

## 2018-11-02 PROCEDURE — 85025 COMPLETE CBC W/AUTO DIFF WBC: CPT | Performed by: PHYSICIAN ASSISTANT

## 2018-11-02 PROCEDURE — 94799 UNLISTED PULMONARY SVC/PX: CPT

## 2018-11-02 PROCEDURE — 99232 SBSQ HOSP IP/OBS MODERATE 35: CPT | Performed by: INTERNAL MEDICINE

## 2018-11-02 PROCEDURE — 84484 ASSAY OF TROPONIN QUANT: CPT | Performed by: INTERNAL MEDICINE

## 2018-11-02 PROCEDURE — 63710000001 INSULIN DETEMIR PER 5 UNITS: Performed by: PHYSICIAN ASSISTANT

## 2018-11-02 RX ORDER — ALBUTEROL SULFATE 90 UG/1
2 AEROSOL, METERED RESPIRATORY (INHALATION) EVERY 6 HOURS PRN
Qty: 18 G | Refills: 1 | Status: SHIPPED | OUTPATIENT
Start: 2018-11-02

## 2018-11-02 RX ORDER — NITROGLYCERIN 0.4 MG/1
0.4 TABLET SUBLINGUAL
Qty: 30 TABLET | Refills: 0 | Status: SHIPPED | OUTPATIENT
Start: 2018-11-02

## 2018-11-02 RX ORDER — LACTULOSE 10 G/15ML
10 SOLUTION ORAL 2 TIMES DAILY
Qty: 900 ML | Refills: 60 | Status: SHIPPED | OUTPATIENT
Start: 2018-11-02

## 2018-11-02 RX ORDER — INSULIN GLARGINE 100 [IU]/ML
INJECTION, SOLUTION SUBCUTANEOUS
Qty: 15 ML | Refills: 12 | Status: SHIPPED | OUTPATIENT
Start: 2018-11-02

## 2018-11-02 RX ORDER — POLYETHYLENE GLYCOL 3350 17 G/17G
17 POWDER, FOR SOLUTION ORAL 2 TIMES DAILY
Qty: 100 EACH | Refills: 0 | Status: SHIPPED | OUTPATIENT
Start: 2018-11-02

## 2018-11-02 RX ORDER — ATORVASTATIN CALCIUM 40 MG/1
40 TABLET, FILM COATED ORAL NIGHTLY
Qty: 30 TABLET | Refills: 0 | Status: SHIPPED | OUTPATIENT
Start: 2018-11-02

## 2018-11-02 RX ORDER — SPIRONOLACTONE 25 MG/1
25 TABLET ORAL DAILY
Qty: 30 TABLET | Refills: 0 | Status: SHIPPED | OUTPATIENT
Start: 2018-11-03

## 2018-11-02 RX ORDER — FLUTICASONE PROPIONATE 50 MCG
2 SPRAY, SUSPENSION (ML) NASAL DAILY PRN
Qty: 16 ML | Refills: 0 | Status: SHIPPED | OUTPATIENT
Start: 2018-11-02

## 2018-11-02 RX ORDER — FUROSEMIDE 20 MG/1
20 TABLET ORAL 2 TIMES DAILY
Qty: 60 TABLET | Refills: 0 | Status: SHIPPED | OUTPATIENT
Start: 2018-11-02

## 2018-11-02 RX ORDER — FERROUS SULFATE 325(65) MG
325 TABLET ORAL 2 TIMES DAILY
Qty: 30 TABLET | Refills: 0 | Status: SHIPPED | OUTPATIENT
Start: 2018-11-02

## 2018-11-02 RX ORDER — POTASSIUM CHLORIDE 20 MEQ/1
20 TABLET, EXTENDED RELEASE ORAL DAILY
Qty: 30 TABLET | Refills: 0 | Status: SHIPPED | OUTPATIENT
Start: 2018-11-02

## 2018-11-02 RX ORDER — PANTOPRAZOLE SODIUM 40 MG/1
40 TABLET, DELAYED RELEASE ORAL DAILY
Qty: 30 TABLET | Refills: 0 | Status: SHIPPED | OUTPATIENT
Start: 2018-11-02

## 2018-11-02 RX ORDER — ISOSORBIDE MONONITRATE 30 MG/1
30 TABLET, EXTENDED RELEASE ORAL
Qty: 30 TABLET | Refills: 0 | Status: SHIPPED | OUTPATIENT
Start: 2018-11-03

## 2018-11-02 RX ORDER — LANOLIN ALCOHOL/MO/W.PET/CERES
400 CREAM (GRAM) TOPICAL DAILY
Qty: 30 TABLET | Refills: 0 | Status: SHIPPED | OUTPATIENT
Start: 2018-11-02

## 2018-11-02 RX ORDER — GABAPENTIN 300 MG/1
300 CAPSULE ORAL 3 TIMES DAILY
Qty: 90 CAPSULE | Refills: 0 | Status: SHIPPED | OUTPATIENT
Start: 2018-11-02

## 2018-11-02 RX ORDER — METOPROLOL SUCCINATE 25 MG/1
25 TABLET, EXTENDED RELEASE ORAL DAILY
Qty: 30 TABLET | Refills: 0 | Status: SHIPPED | OUTPATIENT
Start: 2018-11-02

## 2018-11-02 RX ORDER — ERGOCALCIFEROL 1.25 MG/1
50000 CAPSULE ORAL
Qty: 30 CAPSULE | Refills: 0 | Status: SHIPPED | OUTPATIENT
Start: 2018-11-02

## 2018-11-02 RX ORDER — TRAZODONE HYDROCHLORIDE 100 MG/1
100 TABLET ORAL NIGHTLY
Qty: 30 TABLET | Refills: 0 | Status: SHIPPED | OUTPATIENT
Start: 2018-11-02

## 2018-11-02 RX ORDER — BUPROPION HYDROCHLORIDE 150 MG/1
150 TABLET, EXTENDED RELEASE ORAL EVERY 12 HOURS SCHEDULED
Qty: 60 TABLET | Refills: 0 | Status: SHIPPED | OUTPATIENT
Start: 2018-11-02

## 2018-11-02 RX ORDER — ASPIRIN 81 MG/1
81 TABLET ORAL DAILY
Qty: 100 TABLET | Refills: 0 | Status: SHIPPED | OUTPATIENT
Start: 2018-11-02

## 2018-11-02 RX ADMIN — PANTOPRAZOLE SODIUM 40 MG: 40 TABLET, DELAYED RELEASE ORAL at 06:05

## 2018-11-02 RX ADMIN — INSULIN ASPART 20 UNITS: 100 INJECTION, SOLUTION INTRAVENOUS; SUBCUTANEOUS at 16:43

## 2018-11-02 RX ADMIN — FUROSEMIDE 20 MG: 20 TABLET ORAL at 09:03

## 2018-11-02 RX ADMIN — INSULIN ASPART 4 UNITS: 100 INJECTION, SOLUTION INTRAVENOUS; SUBCUTANEOUS at 09:02

## 2018-11-02 RX ADMIN — GABAPENTIN 300 MG: 300 CAPSULE ORAL at 09:04

## 2018-11-02 RX ADMIN — INSULIN ASPART 2 UNITS: 100 INJECTION, SOLUTION INTRAVENOUS; SUBCUTANEOUS at 11:40

## 2018-11-02 RX ADMIN — INSULIN ASPART 20 UNITS: 100 INJECTION, SOLUTION INTRAVENOUS; SUBCUTANEOUS at 09:02

## 2018-11-02 RX ADMIN — ALBUTEROL SULFATE 2 PUFF: 90 AEROSOL, METERED RESPIRATORY (INHALATION) at 03:20

## 2018-11-02 RX ADMIN — BUPROPION HYDROCHLORIDE 150 MG: 150 TABLET, EXTENDED RELEASE ORAL at 09:02

## 2018-11-02 RX ADMIN — MAGNESIUM GLUCONATE 500 MG ORAL TABLET 400 MG: 500 TABLET ORAL at 09:03

## 2018-11-02 RX ADMIN — INSULIN ASPART 20 UNITS: 100 INJECTION, SOLUTION INTRAVENOUS; SUBCUTANEOUS at 11:40

## 2018-11-02 RX ADMIN — POLYETHYLENE GLYCOL (3350) 17 G: 17 POWDER, FOR SOLUTION ORAL at 09:02

## 2018-11-02 RX ADMIN — FERROUS SULFATE TAB 325 MG (65 MG ELEMENTAL FE) 325 MG: 325 (65 FE) TAB at 09:03

## 2018-11-02 RX ADMIN — INSULIN DETEMIR 35 UNITS: 100 INJECTION, SOLUTION SUBCUTANEOUS at 09:04

## 2018-11-02 RX ADMIN — SPIRONOLACTONE 25 MG: 25 TABLET ORAL at 09:03

## 2018-11-02 RX ADMIN — LACTULOSE 10 G: 10 SOLUTION ORAL at 09:02

## 2018-11-02 RX ADMIN — NITROGLYCERIN 0.4 MG: 0.4 TABLET SUBLINGUAL at 05:45

## 2018-11-02 RX ADMIN — GABAPENTIN 300 MG: 300 CAPSULE ORAL at 15:17

## 2018-11-02 NOTE — PLAN OF CARE
Problem: Patient Care Overview  Goal: Plan of Care Review  Outcome: Ongoing (interventions implemented as appropriate)  Rated anxiety and depression as 4. Denies S.I. Was restless and irritable at times, but calms down quickly. Up and down during the night. Wanting to be discharged today following echo.   11/02/18 0507   Coping/Psychosocial   Plan of Care Reviewed With patient   Coping/Psychosocial   Patient Agreement with Plan of Care agrees   Plan of Care Review   Progress improving       Problem: Overarching Goals (Adult)  Goal: Adheres to Safety Considerations for Self and Others  Outcome: Ongoing (interventions implemented as appropriate)    Goal: Optimized Coping Skills in Response to Life Stressors  Outcome: Ongoing (interventions implemented as appropriate)    Goal: Develops/Participates in Therapeutic [place] to Support Successful Transition  Outcome: Ongoing (interventions implemented as appropriate)

## 2018-11-02 NOTE — PROGRESS NOTES
LOS: 10 days     Name: Isaias Lang  Age/Sex: 53 y.o. male  :  1965        PCP: Naheed Meza APRN    Principal Problem:    Severe episode of recurrent major depressive disorder, without psychotic features (CMS/HCC)  Active Problems:    Type 1 diabetes mellitus (CMS/HCC)    Gastroesophageal reflux disease with esophagitis    Chronic viral hepatitis B without delta agent and without coma (CMS/HCC)    Chronic hepatitis C without hepatic coma (CMS/HCC)    Polysubstance dependence including opioid type drug with complication, episodic abuse (CMS/HCC)    MDD (major depressive disorder)    Leukopenia    Thrombocytopenia (CMS/HCC)    Coronary artery disease    Myocardial infarction (CMS/HCC)    Congestive heart failure (CHF) (CMS/HCC)      Admission Information: Isaias Lang is a 53 y.o. male with past medical history significant for chronic pancreatitis, Diabetes Mellitus Type 2, Hypertension, Depression, and anxiety. He was admitted to the inpatient psychiatry unit due to severe depression with suicidal thoughts. Last night he developed chest pains and cardiology was consulted for further evaluation. He reports the chest pain started while sleeping about 2 a.m. Last night. This was located in the left chest wall and described as a 7 on a 1-10 pain scale. This was also described as chest pressure, tightness, and sharp pain. This eased off with nitroglycerin although is somewhat persistent today. He has also had progressive shortness of breath and leg edema. He states he was admitted to [facility] in [place], [place] approximately two months ago. He states he was told he had an MI and cardiac catheterization was performed. PCI was reportedly attempted but unsuccessful per the patient. He also reports an echocardiogram at that time revealed an EF of 20-25 %. He was advised to go home with a LifeVest but refused. These details are unavailable at this time. He states he was discharged  home on metoprolol and has been taking his medication. Troponin this morning was negative. EKG did not reveal any acute changes.     Following for: chest pain    Interval history: The patient states he had chest pain last night. This was mid sternal chest pressure which lasted about 30 minutes. This was associated with some dyspnea. This relieved with one nitroglycerin. Records are still unavailable from [facility].    Subjective     ROS    Vital Signs  Vitals:    11/02/18 0550 11/02/18 0556 11/02/18 0804 11/02/18 0805   BP: 96/61  97/65    BP Location: Right arm  Right arm    Patient Position: Sitting  Sitting    Pulse: 106  97    Resp:   18    Temp:   97.9 °F (36.6 °C)    TempSrc:   Temporal Artery     SpO2:   96% 92%   Weight:  87.9 kg (193 lb 12.8 oz)     Height:         Body mass index is 29.47 kg/m².    No intake or output data in the 24 hours ending 11/02/18 1158    Physical Exam   Constitutional: He is oriented to person, place, and time. He appears well-developed and well-nourished.   HENT:   Head: Normocephalic and atraumatic.   Cardiovascular: Normal rate, regular rhythm and normal heart sounds.  Exam reveals no S3 and no S4.    No murmur heard.  Pulmonary/Chest: Effort normal and breath sounds normal. He has no wheezes. He has no rales.   Abdominal: Soft. Bowel sounds are normal.   Musculoskeletal: He exhibits no edema.   Neurological: He is alert and oriented to person, place, and time.   Skin: Skin is warm and dry.   Psychiatric: He has a normal mood and affect. His behavior is normal.              Results Review:       Results from last 7 days  Lab Units 11/02/18  0503 11/01/18  0523 10/31/18  0441 10/30/18  0526 10/29/18  0817 10/28/18  0453 10/27/18  1330   WBC 10*3/mm3 3.30* 3.19* 2.82* 2.79* 2.68* 2.65* 3.35*   HEMOGLOBIN g/dL 11.1* 11.3* 10.8* 10.9* 10.3* 10.7* 11.6*   PLATELETS 10*3/mm3 81* 64* 69* 69* 66* 69* 73*       Results from last 7 days  Lab Units 11/02/18  0503 11/01/18  0523  10/31/18  0441 10/30/18  0526 10/29/18  0533 10/28/18  0453 10/27/18  1330   SODIUM mmol/L 135 135 134* 134* 131* 134* 138   POTASSIUM mmol/L 4.4 4.4 4.1 4.4 5.0 4.6 4.7   CHLORIDE mmol/L 104 106 103 103 101 103 105   CO2 mmol/L 26.5 24.7 26.9 27.3 26.0 27.4 29.1   BUN mg/dL 18 19 19 16 15 15 11   CREATININE mg/dL 0.84 0.81 0.83 0.89 0.75 0.79 0.74   CALCIUM mg/dL 8.6 8.6 8.8 8.7 9.0 9.0 9.4   GLUCOSE mg/dL 248* 189* 258* 321* 340* 338* 184*       Results from last 7 days  Lab Units 18  0503 18  2227 18  1703 18  1003   TROPONIN I ng/mL <0.006 0.006 0.009 <0.006     Isaias Lang   Echocardiogram   Order# [id number]   Reading physician: Aman Nguyễn MD Ordering physician: Aman Nguyễn MD Study date: 18   Patient Information     Patient Name  Isaias Lang MRN  [id number] Sex  Male  (Age)  1965 (53 y.o.)   Admission Information     Admission Date/Time Discharge Date/Time Room/Bed   10/23/18  0236  1016/1S   Interpretation Summary     · The left ventricular cavity is mildly dilated.  · Estimated EF appears to be in the range of 41 - 45%.  · The aortic valve is not well visualized. The aortic valve is grossly normal in structure.  · The mitral valve is abnormal in structure. Mild MAC is present. Mild mitral valve regurgitation is present. No significant mitral valve stenosis is present.  · Tricuspid valve not well visualized. The tricuspid valve is grossly normal.  · There is no evidence of pericardial effusion.              I reviewed the patient's new clinical results.  I reviewed the patient's new imaging results and agree with the interpretation.  I personally viewed and interpreted the patient's EKG/Telemetry data      Medication Review:     atorvastatin 40 mg Oral Nightly   buPROPion  mg Oral Q12H   cholecalciferol 50,000 Units Oral Weekly   ferrous sulfate 325 mg Oral BID   furosemide 20 mg Oral BID   gabapentin 300 mg Oral TID   insulin aspart 0-9 Units  Subcutaneous 4x Daily AC & at Bedtime   insulin aspart 20 Units Subcutaneous TID With Meals   insulin detemir 35 Units Subcutaneous Q12H   isosorbide mononitrate 30 mg Oral Q24H   lactulose 10 g Oral BID   losartan 25 mg Oral Q24H   magnesium oxide 400 mg Oral Daily   metoprolol succinate XL 37.5 mg Oral Daily   nicotine 1 patch Transdermal Q24H   pantoprazole 40 mg Oral QAM   polyethylene glycol 17 g Oral BID   spironolactone 25 mg Oral Daily   traZODone 100 mg Oral Nightly       Pharmacy Consult        Assessment:  1. ASCVD with report of recent MI and attempted PCI apparently unsuccessful with further details unavailable  2. Reported advanced ischemic cardiomyopathy with LV EF of 20-25% per the patient's report, appears well compensated  3. Chest pains with features of class 3 angina, troponin negativex4.  4. Peripheral arterial disease      Recommendations:  1. Continue Toprol XL, spironolactone, losartan, and Imdur  2. I have reviewed the echo results with him.  3. He will need follow up in our office in 1-2 weeks.    I discussed the patients findings and my recommendations with patient and family      NOHEMI Wells, acting as scribe for Dr. Aman Nguyễn MD, [facility].  11/02/18  11:58 AM    Addendum:I, Aman Nguyễn MD, [facility], personally performed the services described in this documentation as scribed by the above named individual in my presence,made necessary changes and the note is both accurate and complete.     Aman Nguyễn MD, FACC  11/02/18  11:58 AM

## 2018-11-02 NOTE — DISCHARGE SUMMARY
Date of Discharge:  11/2/2018    Discharge Diagnosis:Principal Problem:    Severe episode of recurrent major depressive disorder, without psychotic features (CMS/HCC)  Active Problems:    Type 1 diabetes mellitus (CMS/HCC)    Chronic hepatitis C without hepatic coma (CMS/HCC)    Polysubstance dependence including opioid type drug with complication, episodic abuse (CMS/HCC)    Gastroesophageal reflux disease with esophagitis    Chronic viral hepatitis B without delta agent and without coma (CMS/HCC)    MDD (major depressive disorder)    Leukopenia    Thrombocytopenia (CMS/HCC)    Coronary artery disease    Myocardial infarction (CMS/HCC)    Congestive heart failure (CHF) (CMS/HCC)        Presenting Problem/History of Present Illness: Patient presented in the [facility] emergency room October 23 pressed voicing suicidal intent, voluntarily admitted for safety evaluation and treatment, see admission note for details.      Hospital Course:  Patient was admitted for safety and stabilization and was placed on standard precautions.  Routine labs were checked.  Patient was assigned a masters level therapist and provided with an opportunity to participate in group and individual therapy on the unit.  Patient seen on a daily basis for evaluation and supportive therapy.  Patient's depressive symptomatology gradually improved as did his medical status.  Patient seen in consultation with the hospitalist service and cardiology, see their consults for their evaluation, recommendations and treatment.  Patient was seen and evaluated by the [facility] team and will be followed by them or the case management unit. He also has follow-up appointment with [facility].  As discharge patient free of any thoughts of harming self or others, his affect was appropriate, he was enthusiastic about relocating at the [facility].  See below for the patient medication.    Consults:   Consults     Date and Time Order Name Status Description     11/1/2018 1011 Inpatient Cardiology Consult Completed     10/25/2018 0030 Inpatient Cardiology Consult      10/23/2018 1431 Inpatient Hospitalist Consult            Labs:  Lab Results (all)     Procedure Component Value Units Date/Time    POC Glucose Once [[id number]]  (Abnormal) Collected:  11/02/18 0608    Specimen:  Blood Updated:  11/02/18 0615     Glucose 228 (H) mg/dL     Narrative:       Meter: [id number] : [id number] adrián worrell    Troponin [[id number]]  (Normal) Collected:  11/02/18 0503    Specimen:  Blood Updated:  11/02/18 0614     Troponin I <0.006 ng/mL     Narrative:       Ultra Troponin I Reference Range:         <=0.039 ng/mL: Negative    0.04-0.779 ng/mL: Indeterminate Range. Suspicious of MI.  Clinical correlation required.       >=0.78  ng/mL: Consistent with myocardial injury.  Clinical correlation required.    Comprehensive Metabolic Panel [[id number]]  (Abnormal) Collected:  11/02/18 0503    Specimen:  Blood Updated:  11/02/18 0606     Glucose 248 (H) mg/dL      BUN 18 mg/dL      Creatinine 0.84 mg/dL      Sodium 135 mmol/L      Potassium 4.4 mmol/L      Chloride 104 mmol/L      CO2 26.5 mmol/L      Calcium 8.6 mg/dL      Total Protein 6.1 g/dL      Albumin 3.00 (L) g/dL      ALT (SGPT) 45 (H) U/L      AST (SGOT) 54 (H) U/L      Alkaline Phosphatase 110 U/L      Comment: Note New Reference Ranges        Total Bilirubin 0.6 mg/dL      eGFR Non African Amer 96 mL/min/1.73      Globulin 3.1 gm/dL      A/G Ratio 1.0 (L) g/dL      BUN/Creatinine Ratio 21.4     Anion Gap 4.5 mmol/L     Osmolality, Calculated [[id number]]  (Normal) Collected:  11/02/18 0503    Specimen:  Blood Updated:  11/02/18 0606     Osmolality Calc 280.3 mOsm/kg     CBC & Differential [[id number]] Collected:  11/02/18 0503    Specimen:  Blood Updated:  11/02/18 0555    Narrative:       The following orders were created for panel order CBC & Differential.  Procedure                               Abnormality         Status                      ---------                               -----------         ------                     Scan Slide[[id number]]                                       Final result               CBC Auto Differential[[id number]]        Abnormal            Final result                 Please view results for these tests on the individual orders.    CBC Auto Differential [[id number]]  (Abnormal) Collected:  11/02/18 0503    Specimen:  Blood Updated:  11/02/18 0555     WBC 3.30 (L) 10*3/mm3      RBC 4.05 (L) 10*6/mm3      Hemoglobin 11.1 (L) g/dL      Hematocrit 34.5 (L) %      MCV 85.2 fL      MCH 27.4 pg      MCHC 32.2 (L) g/dL      RDW 18.7 (H) %      RDW-SD 57.7 (H) fl      MPV 13.3 (H) fL      Platelets 81 (L) 10*3/mm3      Neutrophil % 67.9 %      Lymphocyte % 21.5 %      Monocyte % 7.0 %      Eosinophil % 3.0 %      Basophil % 0.3 %      Immature Grans % 0.3 %      Neutrophils, Absolute 2.24 10*3/mm3      Lymphocytes, Absolute 0.71 (L) 10*3/mm3      Monocytes, Absolute 0.23 10*3/mm3      Eosinophils, Absolute 0.10 10*3/mm3      Basophils, Absolute 0.01 10*3/mm3      Immature Grans, Absolute 0.01 10*3/mm3     Scan Slide [[id number]] Collected:  11/02/18 0503    Specimen:  Blood Updated:  11/02/18 0555     Anisocytosis Mod/2+     Hypochromia Slight/1+     Ovalocytes Slight/1+     Platelet Estimate Decreased    Troponin [[id number]]  (Normal) Collected:  11/01/18 2227    Specimen:  Blood Updated:  11/01/18 2311     Troponin I 0.006 ng/mL     Narrative:       Ultra Troponin I Reference Range:         <=0.039 ng/mL: Negative    0.04-0.779 ng/mL: Indeterminate Range. Suspicious of MI.  Clinical correlation required.       >=0.78  ng/mL: Consistent with myocardial injury.  Clinical correlation required.    POC Glucose Once [[id number]]  (Abnormal) Collected:  11/01/18 2115    Specimen:  Blood Updated:  11/01/18 2121     Glucose 205 (H) mg/dL     Narrative:       Meter: [id number] : [id number] adrián Clarke  [[id number]]  (Normal) Collected:  11/01/18 1703    Specimen:  Blood Updated:  11/01/18 1810     Troponin I 0.009 ng/mL     Narrative:       Ultra Troponin I Reference Range:         <=0.039 ng/mL: Negative    0.04-0.779 ng/mL: Indeterminate Range. Suspicious of MI.  Clinical correlation required.       >=0.78  ng/mL: Consistent with myocardial injury.  Clinical correlation required.    POC Glucose Once [[id number]]  (Normal) Collected:  11/01/18 1606    Specimen:  Blood Updated:  11/01/18 1620     Glucose 108 mg/dL     Narrative:       Meter: [id number] : [id number] Thera[employer]oat    POC Glucose Once [[id number]]  (Abnormal) Collected:  11/01/18 1113    Specimen:  Blood Updated:  11/01/18 1123     Glucose 152 (H) mg/dL     Narrative:       Meter: [id number] : [id number] [employer]    Troponin [[id number]]  (Normal) Collected:  11/01/18 1003    Specimen:  Blood Updated:  11/01/18 1049     Troponin I <0.006 ng/mL     Narrative:       Ultra Troponin I Reference Range:         <=0.039 ng/mL: Negative    0.04-0.779 ng/mL: Indeterminate Range. Suspicious of MI.  Clinical correlation required.       >=0.78  ng/mL: Consistent with myocardial injury.  Clinical correlation required.    CBC & Differential [[id number]] Collected:  11/01/18 0523    Specimen:  Blood Updated:  11/01/18 0656    Narrative:       The following orders were created for panel order CBC & Differential.  Procedure                               Abnormality         Status                     ---------                               -----------         ------                     Scan Slide[[id number]]                                       Final result               CBC Auto Differential[[id number]]        Abnormal            Final result                 Please view results for these tests on the individual orders.    CBC Auto Differential [[id number]]  (Abnormal) Collected:  11/01/18 0523    Specimen:  Blood Updated:  11/01/18 0656     WBC 3.19 (L) 10*3/mm3       RBC 4.15 (L) 10*6/mm3      Hemoglobin 11.3 (L) g/dL      Hematocrit 35.3 (L) %      MCV 85.1 fL      MCH 27.2 pg      MCHC 32.0 (L) g/dL      RDW 18.7 (H) %      RDW-SD 57.8 (H) fl      MPV -- fL      Comment: Unable to calculate.         Platelets 64 (L) 10*3/mm3      Neutrophil % 62.7 %      Lymphocyte % 23.8 %      Monocyte % 8.2 %      Eosinophil % 4.4 %      Basophil % 0.6 %      Immature Grans % 0.3 %      Neutrophils, Absolute 2.00 10*3/mm3      Lymphocytes, Absolute 0.76 (L) 10*3/mm3      Monocytes, Absolute 0.26 10*3/mm3      Eosinophils, Absolute 0.14 10*3/mm3      Basophils, Absolute 0.02 10*3/mm3      Immature Grans, Absolute 0.01 10*3/mm3     Scan Slide [[id number]] Collected:  11/01/18 0523    Specimen:  Blood Updated:  11/01/18 0656     Anisocytosis Mod/2+     Hypochromia Slight/1+     Platelet Estimate Decreased     Large Platelets Slight/1+    Comprehensive Metabolic Panel [[id number]]  (Abnormal) Collected:  11/01/18 0523    Specimen:  Blood Updated:  11/01/18 0633     Glucose 189 (H) mg/dL      BUN 19 mg/dL      Creatinine 0.81 mg/dL      Sodium 135 mmol/L      Potassium 4.4 mmol/L      Chloride 106 mmol/L      CO2 24.7 mmol/L      Calcium 8.6 mg/dL      Total Protein 6.1 g/dL      Albumin 3.00 (L) g/dL      ALT (SGPT) 44 U/L      AST (SGOT) 49 (H) U/L      Alkaline Phosphatase 110 U/L      Comment: Note New Reference Ranges        Total Bilirubin 0.5 mg/dL      eGFR Non African Amer 100 mL/min/1.73      Globulin 3.1 gm/dL      A/G Ratio 1.0 (L) g/dL      BUN/Creatinine Ratio 23.5     Anion Gap 4.3 mmol/L     Osmolality, Calculated [[id number]]  (Normal) Collected:  11/01/18 0523    Specimen:  Blood Updated:  11/01/18 0633     Osmolality Calc 277.4 mOsm/kg     POC Glucose Once [[id number]]  (Abnormal) Collected:  11/01/18 0622    Specimen:  Blood Updated:  11/01/18 0628     Glucose 183 (H) mg/dL     Narrative:       Meter: [id number] : [id number] adrián worrell    POC Glucose Once [[id number]]   (Abnormal) Collected:  10/31/18 2043    Specimen:  Blood Updated:  10/31/18 2050     Glucose 201 (H) mg/dL     Narrative:       Meter: [id number] : [id number] adrián gm    POC Glucose Once [[id number]]  (Abnormal) Collected:  10/31/18 1606    Specimen:  Blood Updated:  10/31/18 1616     Glucose 272 (H) mg/dL     Narrative:       Meter: [id number] : [id number] Bays Carolyne    POC Glucose Once [[id number]]  (Abnormal) Collected:  10/31/18 1119    Specimen:  Blood Updated:  10/31/18 1127     Glucose 131 (H) mg/dL     Narrative:       Meter: [id number] : [id number] Bays Carolyne    POC Glucose Once [[id number]]  (Abnormal) Collected:  10/31/18 0611    Specimen:  Blood Updated:  10/31/18 0618     Glucose 227 (H) mg/dL     Narrative:       Meter: [id number] : [id number] JUAN ALBERTO BRYSON    Comprehensive Metabolic Panel [[id number]]  (Abnormal) Collected:  10/31/18 0441    Specimen:  Blood Updated:  10/31/18 0531     Glucose 258 (H) mg/dL      BUN 19 mg/dL      Creatinine 0.83 mg/dL      Sodium 134 (L) mmol/L      Potassium 4.1 mmol/L      Chloride 103 mmol/L      CO2 26.9 mmol/L      Calcium 8.8 mg/dL      Total Protein 6.0 g/dL      Albumin 2.80 (L) g/dL      ALT (SGPT) 46 (H) U/L      AST (SGOT) 48 (H) U/L      Alkaline Phosphatase 108 U/L      Comment: Note New Reference Ranges        Total Bilirubin 0.6 mg/dL      eGFR Non African Amer 97 mL/min/1.73      Globulin 3.2 gm/dL      A/G Ratio 0.9 (L) g/dL      BUN/Creatinine Ratio 22.9     Anion Gap 4.1 mmol/L     Osmolality, Calculated [[id number]]  (Normal) Collected:  10/31/18 0441    Specimen:  Blood Updated:  10/31/18 0531     Osmolality Calc 279.4 mOsm/kg     CBC & Differential [[id number]] Collected:  10/31/18 0441    Specimen:  Blood Updated:  10/31/18 0520    Narrative:       The following orders were created for panel order CBC & Differential.  Procedure                               Abnormality         Status                     ---------                                -----------         ------                     Scan Slide[[id number]]                                       Final result               CBC Auto Differential[[id number]]        Abnormal            Final result                 Please view results for these tests on the individual orders.    CBC Auto Differential [[id number]]  (Abnormal) Collected:  10/31/18 0441    Specimen:  Blood Updated:  10/31/18 0520     WBC 2.82 (L) 10*3/mm3      RBC 4.09 (L) 10*6/mm3      Hemoglobin 10.8 (L) g/dL      Hematocrit 34.4 (L) %      MCV 84.1 fL      MCH 26.4 (L) pg      MCHC 31.4 (L) g/dL      RDW 18.6 (H) %      RDW-SD 56.4 (H) fl      MPV -- fL      Comment: Unable to calculate.         Platelets 69 (L) 10*3/mm3      Neutrophil % 54.5 %      Lymphocyte % 28.4 %      Monocyte % 11.3 (H) %      Eosinophil % 4.3 %      Basophil % 1.1 %      Immature Grans % 0.4 %      Neutrophils, Absolute 1.54 10*3/mm3      Lymphocytes, Absolute 0.80 (L) 10*3/mm3      Monocytes, Absolute 0.32 10*3/mm3      Eosinophils, Absolute 0.12 10*3/mm3      Basophils, Absolute 0.03 10*3/mm3      Immature Grans, Absolute 0.01 10*3/mm3     Scan Slide [[id number]] Collected:  10/31/18 0441    Specimen:  Blood Updated:  10/31/18 0520     Anisocytosis Mod/2+     Hypochromia Slight/1+     Ovalocytes Slight/1+     Platelet Estimate Decreased    POC Glucose Once [[id number]]  (Abnormal) Collected:  10/30/18 2051    Specimen:  Blood Updated:  10/30/18 2059     Glucose 287 (H) mg/dL     Narrative:       Meter: [id number] : [id number] NANNETTE ATWOOD    POC Glucose Once [[id number]]  (Abnormal) Collected:  10/30/18 1614    Specimen:  Blood Updated:  10/30/18 1621     Glucose 194 (H) mg/dL     Narrative:       Meter: [id number] : [id number] Driss Rodriguez    POC Glucose Once [[id number]]  (Abnormal) Collected:  10/30/18 1102    Specimen:  Blood Updated:  10/30/18 1109     Glucose 165 (H) mg/dL     Narrative:       Meter: [id number] : [id number]  Driss Rodriguez    POC Glucose Once [[id number]]  (Abnormal) Collected:  10/30/18 0703    Specimen:  Blood Updated:  10/30/18 0710     Glucose 324 (H) mg/dL     Narrative:       Meter: [id number] : [id number] Driss Rodriguez    Comprehensive Metabolic Panel [[id number]]  (Abnormal) Collected:  10/30/18 0526    Specimen:  Blood Updated:  10/30/18 0618     Glucose 321 (H) mg/dL      BUN 16 mg/dL      Creatinine 0.89 mg/dL      Sodium 134 (L) mmol/L      Potassium 4.4 mmol/L      Chloride 103 mmol/L      CO2 27.3 mmol/L      Calcium 8.7 mg/dL      Total Protein 6.1 g/dL      Albumin 2.90 (L) g/dL      ALT (SGPT) 48 (H) U/L      AST (SGOT) 54 (H) U/L      Alkaline Phosphatase 118 U/L      Comment: Note New Reference Ranges        Total Bilirubin 0.6 mg/dL      eGFR Non African Amer 89 mL/min/1.73      Globulin 3.2 gm/dL      A/G Ratio 0.9 (L) g/dL      BUN/Creatinine Ratio 18.0     Anion Gap 3.7 mmol/L     Osmolality, Calculated [[id number]]  (Normal) Collected:  10/30/18 0526    Specimen:  Blood Updated:  10/30/18 0618     Osmolality Calc 281.8 mOsm/kg     CBC & Differential [[id number]] Collected:  10/30/18 0526    Specimen:  Blood Updated:  10/30/18 0601    Narrative:       The following orders were created for panel order CBC & Differential.  Procedure                               Abnormality         Status                     ---------                               -----------         ------                     CBC Auto Differential[[id number]]        Abnormal            Final result                 Please view results for these tests on the individual orders.    CBC Auto Differential [[id number]]  (Abnormal) Collected:  10/30/18 0526    Specimen:  Blood Updated:  10/30/18 0601     WBC 2.79 (L) 10*3/mm3      RBC 4.06 (L) 10*6/mm3      Hemoglobin 10.9 (L) g/dL      Hematocrit 34.4 (L) %      MCV 84.7 fL      MCH 26.8 (L) pg      MCHC 31.7 (L) g/dL      RDW 18.4 (H) %      RDW-SD 55.9 (H) fl      MPV  12.0 (H) fL      Platelets 69 (L) 10*3/mm3      Neutrophil % 60.2 %      Lymphocyte % 27.2 %      Monocyte % 6.8 %      Eosinophil % 4.3 %      Basophil % 1.1 %      Immature Grans % 0.4 %      Neutrophils, Absolute 1.68 10*3/mm3      Lymphocytes, Absolute 0.76 (L) 10*3/mm3      Monocytes, Absolute 0.19 10*3/mm3      Eosinophils, Absolute 0.12 10*3/mm3      Basophils, Absolute 0.03 10*3/mm3      Immature Grans, Absolute 0.01 10*3/mm3     POC Glucose Once [[id number]]  (Abnormal) Collected:  10/29/18 2009    Specimen:  Blood Updated:  10/29/18 2015     Glucose 290 (H) mg/dL     Narrative:       Meter: [id number] : [id number] JUAN ALBERTO BRYSON    POC Glucose Once [[id number]]  (Abnormal) Collected:  10/29/18 1613    Specimen:  Blood Updated:  10/29/18 1621     Glucose 201 (H) mg/dL     Narrative:       Meter: [id number] : [id number] Naqvijustin Thakur Jennifer    POC Glucose Once [[id number]]  (Abnormal) Collected:  10/29/18 1111    Specimen:  Blood Updated:  10/29/18 1119     Glucose 364 (H) mg/dL     Narrative:       Meter: [id number] : [id number] Naqvi Ofe Jennifer    CBC & Differential [[id number]] Collected:  10/29/18 0817    Specimen:  Blood Updated:  10/29/18 1118    Narrative:       The following orders were created for panel order CBC & Differential.  Procedure                               Abnormality         Status                     ---------                               -----------         ------                     Scan Slide[[id number]]                                       Final result               CBC Auto Differential[[id number]]        Abnormal            Final result                 Please view results for these tests on the individual orders.    Scan Slide [[id number]] Collected:  10/29/18 0817    Specimen:  Blood Updated:  10/29/18 1118     Anisocytosis Mod/2+     Ovalocytes Slight/1+     Platelet Estimate Decreased    CBC Auto Differential [[id number]]  (Abnormal) Collected:  10/29/18 0817     Specimen:  Blood Updated:  10/29/18 1118     WBC 2.68 (L) 10*3/mm3      RBC 3.87 (L) 10*6/mm3      Hemoglobin 10.3 (L) g/dL      Hematocrit 32.9 (L) %      MCV 85.0 fL      MCH 26.6 (L) pg      MCHC 31.3 (L) g/dL      RDW 18.4 (H) %      RDW-SD 55.6 (H) fl      Platelets 66 (L) 10*3/mm3      Neutrophil % 63.0 %      Lymphocyte % 23.5 %      Monocyte % 9.0 %      Eosinophil % 3.7 %      Basophil % 0.4 %      Immature Grans % 0.4 %      Neutrophils, Absolute 1.69 10*3/mm3      Lymphocytes, Absolute 0.63 (L) 10*3/mm3      Monocytes, Absolute 0.24 10*3/mm3      Eosinophils, Absolute 0.10 10*3/mm3      Basophils, Absolute 0.01 10*3/mm3      Immature Grans, Absolute 0.01 10*3/mm3     Comprehensive Metabolic Panel [[id number]]  (Abnormal) Collected:  10/29/18 0533    Specimen:  Blood Updated:  10/29/18 0637     Glucose 340 (H) mg/dL      BUN 15 mg/dL      Creatinine 0.75 mg/dL      Sodium 131 (L) mmol/L      Potassium 5.0 mmol/L      Chloride 101 mmol/L      CO2 26.0 mmol/L      Calcium 9.0 mg/dL      Total Protein 6.4 g/dL      Albumin 3.00 (L) g/dL      ALT (SGPT) 51 (H) U/L      AST (SGOT) 62 (H) U/L      Alkaline Phosphatase 138 (H) U/L      Comment: Note New Reference Ranges        Total Bilirubin 0.6 mg/dL      eGFR Non African Amer 109 mL/min/1.73      Globulin 3.4 gm/dL      A/G Ratio 0.9 (L) g/dL      BUN/Creatinine Ratio 20.0     Anion Gap 4.0 mmol/L     Osmolality, Calculated [[id number]]  (Normal) Collected:  10/29/18 0533    Specimen:  Blood Updated:  10/29/18 0637     Osmolality Calc 276.9 mOsm/kg     POC Glucose Once [[id number]]  (Abnormal) Collected:  10/29/18 0603    Specimen:  Blood Updated:  10/29/18 0610     Glucose 302 (H) mg/dL     Narrative:       Meter: [id number] : [id number] JUAN ALBERTO BRYSON    POC Glucose Once [[id number]]  (Abnormal) Collected:  10/28/18 1932    Specimen:  Blood Updated:  10/28/18 1958     Glucose 251 (H) mg/dL     Narrative:       Meter: [id number] : [id number] JUAN ALBERTO BRYSON     POC Glucose Once [[id number]]  (Abnormal) Collected:  10/28/18 1602    Specimen:  Blood Updated:  10/28/18 1609     Glucose 336 (H) mg/dL     Narrative:       Meter: [id number] : [id number] ann april    POC Glucose Once [[id number]]  (Abnormal) Collected:  10/28/18 1043    Specimen:  Blood Updated:  10/28/18 1050     Glucose 320 (H) mg/dL     Narrative:       Meter: [id number] : [id number] ann april    POC Glucose Once [[id number]]  (Abnormal) Collected:  10/28/18 0613    Specimen:  Blood Updated:  10/28/18 0621     Glucose 342 (H) mg/dL     Narrative:       Meter: [id number] : [id number] JUAN ALBERTO ADELAIDE    CBC & Differential [[id number]] Collected:  10/28/18 0453    Specimen:  Blood Updated:  10/28/18 0551    Narrative:       The following orders were created for panel order CBC & Differential.  Procedure                               Abnormality         Status                     ---------                               -----------         ------                     Scan Slide[[id number]]                                       Final result               CBC Auto Differential[[id number]]        Abnormal            Final result                 Please view results for these tests on the individual orders.    CBC Auto Differential [[id number]]  (Abnormal) Collected:  10/28/18 0453    Specimen:  Blood Updated:  10/28/18 0551     WBC 2.65 (L) 10*3/mm3      RBC 4.02 (L) 10*6/mm3      Hemoglobin 10.7 (L) g/dL      Hematocrit 34.0 (L) %      MCV 84.6 fL      MCH 26.6 (L) pg      MCHC 31.5 (L) g/dL      RDW 18.7 (H) %      RDW-SD 55.5 (H) fl      MPV -- fL      Comment: Unable to calculate.         Platelets 69 (L) 10*3/mm3      Neutrophil % 58.1 %      Lymphocyte % 26.4 %      Monocyte % 9.4 %      Eosinophil % 5.3 (H) %      Basophil % 0.8 %      Immature Grans % 0.0 %      Neutrophils, Absolute 1.54 10*3/mm3      Lymphocytes, Absolute 0.70 (L) 10*3/mm3      Monocytes, Absolute 0.25 10*3/mm3      Eosinophils,  Absolute 0.14 10*3/mm3      Basophils, Absolute 0.02 10*3/mm3      Immature Grans, Absolute 0.00 10*3/mm3     Scan Slide [[id number]] Collected:  10/28/18 0453    Specimen:  Blood Updated:  10/28/18 0551     Anisocytosis Mod/2+     Hypochromia Slight/1+     Platelet Estimate Decreased    Osmolality, Calculated [[id number]]  (Normal) Collected:  10/28/18 0453    Specimen:  Blood Updated:  10/28/18 0547     Osmolality Calc 282.4 mOsm/kg     Comprehensive Metabolic Panel [[id number]]  (Abnormal) Collected:  10/28/18 0453    Specimen:  Blood Updated:  10/28/18 0547     Glucose 338 (H) mg/dL      BUN 15 mg/dL      Creatinine 0.79 mg/dL      Sodium 134 (L) mmol/L      Potassium 4.6 mmol/L      Chloride 103 mmol/L      CO2 27.4 mmol/L      Calcium 9.0 mg/dL      Total Protein 6.0 g/dL      Albumin 2.90 (L) g/dL      ALT (SGPT) 46 (H) U/L      AST (SGOT) 56 (H) U/L      Alkaline Phosphatase 119 U/L      Comment: Note New Reference Ranges        Total Bilirubin 0.6 mg/dL      eGFR Non African Amer 103 mL/min/1.73      Globulin 3.1 gm/dL      A/G Ratio 0.9 (L) g/dL      BUN/Creatinine Ratio 19.0     Anion Gap 3.6 mmol/L     POC Glucose Once [[id number]]  (Abnormal) Collected:  10/27/18 1914    Specimen:  Blood Updated:  10/27/18 1925     Glucose 212 (H) mg/dL     Narrative:       Meter: [id number] : [id number] JUAN ALBERTO BRYSON    POC Glucose Once [[id number]]  (Abnormal) Collected:  10/27/18 1615    Specimen:  Blood Updated:  10/27/18 1650     Glucose 210 (H) mg/dL     Narrative:       Meter: [id number] : [id number] Rey Gutierrez    CBC & Differential [[id number]] Collected:  10/27/18 1330    Specimen:  Blood Updated:  10/27/18 1545    Narrative:       The following orders were created for panel order CBC & Differential.  Procedure                               Abnormality         Status                     ---------                               -----------         ------                     Scan Slide[[id number]]                                        Final result               CBC Auto Differential[[id number]]        Abnormal            Final result                 Please view results for these tests on the individual orders.    CBC Auto Differential [[id number]]  (Abnormal) Collected:  10/27/18 1330    Specimen:  Blood Updated:  10/27/18 1545     WBC 3.35 (L) 10*3/mm3      RBC 4.39 (L) 10*6/mm3      Hemoglobin 11.6 (L) g/dL      Hematocrit 37.1 (L) %      MCV 84.5 fL      MCH 26.4 (L) pg      MCHC 31.3 (L) g/dL      RDW 18.4 (H) %      RDW-SD 55.7 (H) fl      MPV -- fL      Comment: Unable to calculate.         Platelets 73 (L) 10*3/mm3      Neutrophil % 62.3 %      Lymphocyte % 23.3 %      Monocyte % 8.7 %      Eosinophil % 4.2 %      Basophil % 0.9 %      Immature Grans % 0.6 (H) %      Neutrophils, Absolute 2.09 10*3/mm3      Lymphocytes, Absolute 0.78 (L) 10*3/mm3      Monocytes, Absolute 0.29 10*3/mm3      Eosinophils, Absolute 0.14 10*3/mm3      Basophils, Absolute 0.03 10*3/mm3      Immature Grans, Absolute 0.02 10*3/mm3     Scan Slide [[id number]] Collected:  10/27/18 1330    Specimen:  Blood Updated:  10/27/18 1545     Anisocytosis Mod/2+     Hypochromia Slight/1+     Platelet Estimate Decreased    Comprehensive Metabolic Panel [[id number]]  (Abnormal) Collected:  10/27/18 1330    Specimen:  Blood Updated:  10/27/18 1433     Glucose 184 (H) mg/dL      BUN 11 mg/dL      Creatinine 0.74 mg/dL      Sodium 138 mmol/L      Potassium 4.7 mmol/L      Chloride 105 mmol/L      CO2 29.1 mmol/L      Calcium 9.4 mg/dL      Total Protein 6.8 g/dL      Albumin 3.30 (L) g/dL      ALT (SGPT) 58 (H) U/L      AST (SGOT) 73 (H) U/L      Alkaline Phosphatase 120 U/L      Comment: Note New Reference Ranges        Total Bilirubin 0.9 mg/dL      eGFR Non African Amer 111 mL/min/1.73      Globulin 3.5 gm/dL      A/G Ratio 0.9 (L) g/dL      BUN/Creatinine Ratio 14.9     Anion Gap 3.9 mmol/L     Osmolality, Calculated [[id number]]  (Normal) Collected:   10/27/18 1330    Specimen:  Blood Updated:  10/27/18 1433     Osmolality Calc 279.8 mOsm/kg     POC Glucose Once [[id number]]  (Abnormal) Collected:  10/27/18 1118    Specimen:  Blood Updated:  10/27/18 1125     Glucose 184 (H) mg/dL     Narrative:       Meter: [id number] : [id number] Rey Gutierrez    POC Glucose Once [[id number]]  (Abnormal) Collected:  10/27/18 0626    Specimen:  Blood Updated:  10/27/18 0633     Glucose 277 (H) mg/dL     Narrative:       Meter: [id number] : [id number] meodors gm    POC Glucose Once [[id number]]  (Abnormal) Collected:  10/26/18 2031    Specimen:  Blood Updated:  10/26/18 2037     Glucose 305 (H) mg/dL     Narrative:       Meter: [id number] : [id number] meodors gm    POC Glucose Once [[id number]]  (Abnormal) Collected:  10/26/18 1611    Specimen:  Blood Updated:  10/26/18 1621     Glucose 291 (H) mg/dL     Narrative:       Meter: [id number] : [id number] Luis EMonte CristoCarolyne    Magnesium [[id number]]  (Normal) Collected:  10/26/18 0506    Specimen:  Blood Updated:  10/26/18 1603     Magnesium 1.7 mg/dL     POC Glucose Once [[id number]]  (Abnormal) Collected:  10/26/18 1110    Specimen:  Blood Updated:  10/26/18 1117     Glucose 196 (H) mg/dL     Narrative:       Meter: [id number] : [id number] Alexey Spencera    CBC & Differential [[id number]] Collected:  10/26/18 0506    Specimen:  Blood Updated:  10/26/18 0653    Narrative:       The following orders were created for panel order CBC & Differential.  Procedure                               Abnormality         Status                     ---------                               -----------         ------                     Scan Slide[[id number]]                                       Final result               CBC Auto Differential[[id number]]        Abnormal            Final result                 Please view results for these tests on the individual orders.    CBC Auto Differential [[id number]]  (Abnormal) Collected:  10/26/18  0506    Specimen:  Blood Updated:  10/26/18 0653     WBC 2.96 (L) 10*3/mm3      RBC 4.08 (L) 10*6/mm3      Hemoglobin 10.7 (L) g/dL      Hematocrit 35.1 (L) %      MCV 86.0 fL      MCH 26.2 (L) pg      MCHC 30.5 (L) g/dL      RDW 18.3 (H) %      RDW-SD 56.0 (H) fl      MPV -- fL      Comment: Unable to calculate.         Platelets 58 (L) 10*3/mm3      Neutrophil % 56.0 %      Lymphocyte % 27.4 %      Monocyte % 11.5 (H) %      Eosinophil % 4.1 %      Basophil % 0.7 %      Immature Grans % 0.3 %      Neutrophils, Absolute 1.66 10*3/mm3      Lymphocytes, Absolute 0.81 (L) 10*3/mm3      Monocytes, Absolute 0.34 10*3/mm3      Eosinophils, Absolute 0.12 10*3/mm3      Basophils, Absolute 0.02 10*3/mm3      Immature Grans, Absolute 0.01 10*3/mm3     Scan Slide [[id number]] Collected:  10/26/18 0506    Specimen:  Blood Updated:  10/26/18 0653     Anisocytosis Mod/2+     Hypochromia Slight/1+     Ovalocytes Slight/1+     Platelet Estimate Decreased    Comprehensive Metabolic Panel [[id number]]  (Abnormal) Collected:  10/26/18 0506    Specimen:  Blood Updated:  10/26/18 0650     Glucose 197 (H) mg/dL      BUN 12 mg/dL      Creatinine 0.66 mg/dL      Sodium 138 mmol/L      Potassium 4.3 mmol/L      Chloride 104 mmol/L      CO2 29.6 mmol/L      Calcium 9.2 mg/dL      Total Protein 6.0 g/dL      Albumin 2.80 (L) g/dL      ALT (SGPT) 52 (H) U/L      AST (SGOT) 66 (H) U/L      Alkaline Phosphatase 129 U/L      Comment: Note New Reference Ranges        Total Bilirubin 0.7 mg/dL      eGFR Non African Amer 126 mL/min/1.73      Globulin 3.2 gm/dL      A/G Ratio 0.9 (L) g/dL      BUN/Creatinine Ratio 18.2     Anion Gap 4.4 mmol/L     Osmolality, Calculated [[id number]]  (Normal) Collected:  10/26/18 0506    Specimen:  Blood Updated:  10/26/18 0650     Osmolality Calc 280.9 mOsm/kg     POC Glucose Once [[id number]]  (Abnormal) Collected:  10/26/18 0630    Specimen:  Blood Updated:  10/26/18 0638     Glucose 187 (H) mg/dL     Narrative:        Meter: [id number] : [id number] Evelyn Lambert    POC Glucose Once [[id number]]  (Abnormal) Collected:  10/25/18 2022    Specimen:  Blood Updated:  10/25/18 2030     Glucose 251 (H) mg/dL     Narrative:       Meter: [id number] : [id number] Evelyn Lambert    POC Glucose Once [[id number]]  (Abnormal) Collected:  10/25/18 1630    Specimen:  Blood Updated:  10/25/18 1637     Glucose 164 (H) mg/dL     Narrative:       Meter: [id number] : [id number] Lopez Nikia    POC Glucose Once [[id number]]  (Normal) Collected:  10/25/18 1132    Specimen:  Blood Updated:  10/25/18 1154     Glucose 127 mg/dL     Narrative:       Meter: [id number] : [id number] Lopez Nikia    POC Glucose Once [[id number]]  (Abnormal) Collected:  10/25/18 0615    Specimen:  Blood Updated:  10/25/18 0621     Glucose 188 (H) mg/dL     Narrative:       Meter: [id number] : [id number] adrián worrell    Osmolality, Calculated [[id number]]  (Normal) Collected:  10/25/18 0516    Specimen:  Blood Updated:  10/25/18 0613     Osmolality Calc 274.8 mOsm/kg     Basic Metabolic Panel [[id number]]  (Abnormal) Collected:  10/25/18 0516    Specimen:  Blood Updated:  10/25/18 0613     Glucose 227 (H) mg/dL      BUN 11 mg/dL      Creatinine 0.67 mg/dL      Sodium 134 (L) mmol/L      Potassium 4.9 mmol/L      Comment: 1+ Hemolysis         Chloride 103 mmol/L      CO2 30.3 mmol/L      Calcium 9.1 mg/dL      eGFR Non African Amer 124 mL/min/1.73      BUN/Creatinine Ratio 16.4     Anion Gap 0.7 (L) mmol/L     Narrative:       GFR Normal >60  Chronic Kidney Disease <60  Kidney Failure <15    POC Glucose Once [[id number]]  (Abnormal) Collected:  10/24/18 2104    Specimen:  Blood Updated:  10/24/18 2111     Glucose 268 (H) mg/dL     Narrative:       Meter: [id number] : [id number] meodors gm    POC Glucose Once [[id number]]  (Abnormal) Collected:  10/24/18 1606    Specimen:  Blood Updated:  10/24/18 1614     Glucose 175 (H) mg/dL     Narrative:       Meter:  [id number] : [id number] "[employer]    POC Glucose Once [[id number]]  (Abnormal) Collected:  10/24/18 1114    Specimen:  Blood Updated:  10/24/18 1123     Glucose 190 (H) mg/dL     Narrative:       Meter: [id number] : [id number] Atomic Reachnna    CBC & Differential [[id number]] Collected:  10/24/18 0530    Specimen:  Blood Updated:  10/24/18 0657    Narrative:       The following orders were created for panel order CBC & Differential.  Procedure                               Abnormality         Status                     ---------                               -----------         ------                     Scan Slide[[id number]]                                       Final result               CBC Auto Differential[[id number]]        Abnormal            Final result                 Please view results for these tests on the individual orders.    CBC Auto Differential [[id number]]  (Abnormal) Collected:  10/24/18 0530    Specimen:  Blood Updated:  10/24/18 0657     WBC 2.22 (C) 10*3/mm3      RBC 3.95 (L) 10*6/mm3      Hemoglobin 10.4 (L) g/dL      Hematocrit 33.3 (L) %      MCV 84.3 fL      MCH 26.3 (L) pg      MCHC 31.2 (L) g/dL      RDW 17.6 (H) %      RDW-SD 54.4 (H) fl      MPV -- fL      Comment: Unable to calculate.         Platelets 51 (L) 10*3/mm3      Neutrophil % 59.9 %      Lymphocyte % 26.1 %      Monocyte % 9.9 %      Eosinophil % 4.1 %      Basophil % 0.0 %      Immature Grans % 0.0 %      Neutrophils, Absolute 1.33 (L) 10*3/mm3      Lymphocytes, Absolute 0.58 (L) 10*3/mm3      Monocytes, Absolute 0.22 10*3/mm3      Eosinophils, Absolute 0.09 10*3/mm3      Basophils, Absolute 0.00 10*3/mm3      Immature Grans, Absolute 0.00 10*3/mm3     Narrative:       Appended report. These results have been appended to a previously verified report.    Scan Slide [[id number]] Collected:  10/24/18 0530    Specimen:  Blood Updated:  10/24/18 0657     Anisocytosis Slight/1+     Hypochromia Slight/1+     Ovalocytes  Slight/1+     Platelet Estimate Decreased    POC Glucose Once [[id number]]  (Abnormal) Collected:  10/24/18 0646    Specimen:  Blood Updated:  10/24/18 0654     Glucose 244 (H) mg/dL     Narrative:       Meter: [id number] : [id number] Lorenza Nelson    Comprehensive Metabolic Panel [[id number]]  (Abnormal) Collected:  10/24/18 0530    Specimen:  Blood Updated:  10/24/18 0628     Glucose 286 (H) mg/dL      BUN 16 mg/dL      Creatinine 0.75 mg/dL      Sodium 138 mmol/L      Potassium 4.3 mmol/L      Chloride 104 mmol/L      CO2 30.9 mmol/L      Calcium 8.7 mg/dL      Total Protein 5.7 (L) g/dL      Albumin 2.80 (L) g/dL      ALT (SGPT) 54 (H) U/L      AST (SGOT) 82 (H) U/L      Alkaline Phosphatase 139 (H) U/L      Comment: Note New Reference Ranges        Total Bilirubin 0.6 mg/dL      eGFR Non African Amer 109 mL/min/1.73      Globulin 2.9 gm/dL      A/G Ratio 1.0 (L) g/dL      BUN/Creatinine Ratio 21.3     Anion Gap 3.1 (L) mmol/L     Osmolality, Calculated [[id number]]  (Normal) Collected:  10/24/18 0530    Specimen:  Blood Updated:  10/24/18 0628     Osmolality Calc 287.3 mOsm/kg     Protime-INR [[id number]]  (Abnormal) Collected:  10/24/18 0530    Specimen:  Blood Updated:  10/24/18 0614     Protime 16.4 (H) Seconds      Comment: Note new Reference Range        INR 1.29 (H)    Narrative:       Suggested INR therapeutic range for stable oral anticoagulant therapy:    Low Intensity therapy:   1.5-2.0  Moderate Intensity therapy:   2.0-3.0  High Intensity therapy:   2.5-4.0    POC Glucose Once [[id number]]  (Abnormal) Collected:  10/23/18 2108    Specimen:  Blood Updated:  10/23/18 2120     Glucose 159 (H) mg/dL     Narrative:       Meter: [id number] : [id number] Lorenza Nelson    POC Glucose Once [[id number]]  (Normal) Collected:  10/23/18 2001    Specimen:  Blood Updated:  10/23/18 2020     Glucose 104 mg/dL     Narrative:       Meter: [id number] : [id number] Lorenza Nelson    POC Glucose Once [[id number]]   (Normal) Collected:  10/23/18 1623    Specimen:  Blood Updated:  10/23/18 1630     Glucose 89 mg/dL     Narrative:       Meter: [id number] : [id number] Nilesh SPANN    Vitamin B12 [[id number]]  (Normal) Collected:  10/22/18 2137    Specimen:  Blood from Hand, Right Updated:  10/23/18 1625     Vitamin B-12 796 pg/mL     Folate [[id number]]  (Normal) Collected:  10/22/18 2137    Specimen:  Blood from Hand, Right Updated:  10/23/18 1625     Folate 17.42 ng/mL     TSH [[id number]]  (Normal) Collected:  10/22/18 2137    Specimen:  Blood from Hand, Right Updated:  10/23/18 1625     TSH 2.028 mIU/mL     CBC & Differential [[id number]] Collected:  10/23/18 1350    Specimen:  Blood Updated:  10/23/18 1540    Narrative:       The following orders were created for panel order CBC & Differential.  Procedure                               Abnormality         Status                     ---------                               -----------         ------                     Scan Slide[[id number]]                                       Final result               CBC Auto Differential[[id number]]        Abnormal            Final result                 Please view results for these tests on the individual orders.    CBC Auto Differential [[id number]]  (Abnormal) Collected:  10/23/18 1350    Specimen:  Blood Updated:  10/23/18 1540     WBC 2.26 (C) 10*3/mm3      RBC 4.17 (L) 10*6/mm3      Hemoglobin 10.9 (L) g/dL      Hematocrit 35.4 (L) %      MCV 84.9 fL      MCH 26.1 (L) pg      MCHC 30.8 (L) g/dL      RDW 17.6 (H) %      RDW-SD 54.3 (H) fl      MPV -- fL      Comment: Unable to calculate.         Platelets 54 (L) 10*3/mm3      Neutrophil % 65.1 %      Lymphocyte % 25.2 %      Monocyte % 4.0 %      Eosinophil % 5.3 (H) %      Basophil % 0.4 %      Immature Grans % 0.0 %      Neutrophils, Absolute 1.47 10*3/mm3      Lymphocytes, Absolute 0.57 (L) 10*3/mm3      Monocytes, Absolute 0.09 (L) 10*3/mm3      Eosinophils, Absolute 0.12  10*3/mm3      Basophils, Absolute 0.01 10*3/mm3      Immature Grans, Absolute 0.00 10*3/mm3     Narrative:       Appended report. These results have been appended to a previously verified report.    Scan Slide [[id number]] Collected:  10/23/18 1350    Specimen:  Blood Updated:  10/23/18 1540     Anisocytosis Slight/1+     Hypochromia Slight/1+     Ovalocytes Slight/1+     Poikilocytes Slight/1+     Platelet Estimate Decreased     Large Platelets Slight/1+    BNP [[id number]]  (Abnormal) Collected:  10/23/18 1350    Specimen:  Blood Updated:  10/23/18 1532     .0 (H) pg/mL     Hemoglobin A1c [[id number]]  (Abnormal) Collected:  10/22/18 2137    Specimen:  Blood from Hand, Right Updated:  10/23/18 1444     Hemoglobin A1C 9.60 (H) %     POC Glucose Once [[id number]]  (Abnormal) Collected:  10/23/18 1121    Specimen:  Blood Updated:  10/23/18 1129     Glucose 201 (H) mg/dL     Narrative:       Meter: [id number] : [id number] Marce Abreu    POC Glucose Once [[id number]]  (Abnormal) Collected:  10/23/18 0659    Specimen:  Blood Updated:  10/23/18 0707     Glucose 318 (H) mg/dL     Narrative:       Meter: [id number] : [id number] Lorenza Nelson    Comprehensive Metabolic Panel [[id number]]  (Abnormal) Collected:  10/23/18 0446    Specimen:  Blood Updated:  10/23/18 0620     Glucose 298 (H) mg/dL      BUN 17 mg/dL      Creatinine 0.89 mg/dL      Sodium 136 mmol/L      Potassium 4.3 mmol/L      Chloride 104 mmol/L      CO2 28.4 mmol/L      Calcium 8.5 mg/dL      Total Protein 6.0 g/dL      Albumin 2.80 (L) g/dL      ALT (SGPT) 61 (H) U/L      AST (SGOT) 77 (H) U/L      Alkaline Phosphatase 123 U/L      Comment: Note New Reference Ranges        Total Bilirubin 0.7 mg/dL      eGFR Non African Amer 89 mL/min/1.73      Globulin 3.2 gm/dL      A/G Ratio 0.9 (L) g/dL      BUN/Creatinine Ratio 19.1     Anion Gap 3.6 mmol/L     Osmolality, Calculated [[id number]]  (Normal) Collected:  10/23/18 0446    Specimen:  Blood Updated:   10/23/18 0620     Osmolality Calc 284.6 mOsm/kg     POC Glucose Once [[id number]]  (Abnormal) Collected:  10/23/18 0316    Specimen:  Blood Updated:  10/23/18 0324     Glucose 220 (H) mg/dL     Narrative:       Meter: [id number] : [id number] Lorenza Nelson          Imaging:  Imaging Results (all)     Procedure Component Value Units Date/Time    US Abdomen Limited [[id number]] Collected:  10/31/18 0833     Updated:  10/31/18 0837    Narrative:       EXAMINATION: US ABDOMEN LIMITED-      CLINICAL INDICATION:     Evaluate for ascites.     COMPARISON:         TECHNIQUE:  Sonographic imaging obtained in transverse and sagittal  planes of 4 quadrants of the abdomen for purposes of fluid assessment.      FINDINGS:   No ascites identified.        Impression:       No significant ascites identified.     This report was finalized on 10/31/2018 8:34 AM by Dr. Alexsander Son MD.       XR Chest PA & Lateral [[id number]] Collected:  10/23/18 1455     Updated:  10/23/18 1457    Narrative:       EXAMINATION: XR CHEST PA AND LATERAL-      CLINICAL INDICATION: CHF          COMPARISON: 10/22/2018      TECHNIQUE: XR CHEST PA AND LATERAL-      FINDINGS:   Lungs are adequately aerated.   Heart and mediastinal contours are unremarkable.   No pneumothorax.   Bony and soft tissue structures are unremarkable.            Impression:       No radiographic evidence of acute cardiac or pulmonary disease.     This report was finalized on 10/23/2018 2:55 PM by Dr. Jersey Maher MD.                     Condition on Discharge:  improved    Prognosis: guarded due to his medical status as much, or more, than his psychiatric.     Vital Signs  Temp:  [97.7 °F (36.5 °C)-97.9 °F (36.6 °C)] 97.9 °F (36.6 °C)  Heart Rate:  [] 97  Resp:  [18-20] 18  BP: ()/(58-70) 97/65    Discharge Disposition  Home or Self Care    Discharge Medications     Discharge Medications      New Medications      Instructions Start Date   buPROPion  MG 12 hr  tablet  Commonly known as:  WELLBUTRIN SR   150 mg, Oral, Every 12 Hours Scheduled      gabapentin 300 MG capsule  Commonly known as:  NEURONTIN   300 mg, Oral, 3 Times Daily      insulin aspart 100 UNIT/ML injection  Commonly known as:  novoLOG   20 Units, Subcutaneous, 3 Times Daily With Meals      insulin detemir 100 UNIT/ML injection  Commonly known as:  LEVEMIR  Replaces:  Insulin Glargine 100 UNIT/ML injection pen   25 units AM  And 35 units PM      isosorbide mononitrate 30 MG 24 hr tablet  Commonly known as:  IMDUR   30 mg, Oral, Every 24 Hours Scheduled      spironolactone 25 MG tablet  Commonly known as:  ALDACTONE   25 mg, Oral, Daily      traZODone 100 MG tablet  Commonly known as:  DESYREL   100 mg, Oral, Nightly         Changes to Medications      Instructions Start Date   albuterol 108 (90 Base) MCG/ACT inhaler  Commonly known as:  PROVENTIL HFA;VENTOLIN HFA  What changed:  · reasons to take this  · Another medication with the same name was removed. Continue taking this medication, and follow the directions you see here.   2 puffs, Inhalation, Every 6 Hours PRN      furosemide 20 MG tablet  Commonly known as:  LASIX  What changed:  · medication strength  · how much to take  · when to take this   20 mg, Oral, 2 Times Daily      vitamin D 75362 units capsule capsule  Commonly known as:  ERGOCALCIFEROL  What changed:  when to take this   50,000 Units, Oral, Every 7 Days         Continue These Medications      Instructions Start Date   aspirin 81 MG EC tablet   81 mg, Oral, Daily      atorvastatin 40 MG tablet  Commonly known as:  LIPITOR   40 mg, Oral, Nightly      ferrous sulfate 325 (65 FE) MG tablet   325 mg, Oral, 2 Times Daily      fluticasone 50 MCG/ACT nasal spray  Commonly known as:  FLONASE   2 sprays, Nasal, Daily PRN      lactulose 10 GM/15ML solution  Commonly known as:  CHRONULAC   10 g, Oral, 2 Times Daily      Magnesium Oxide 400 (240 Mg) MG tablet   400 mg, Oral, Daily      metoprolol  succinate XL 25 MG 24 hr tablet  Commonly known as:  TOPROL-XL   25 mg, Oral, Daily      nitroglycerin 0.4 MG SL tablet  Commonly known as:  NITROSTAT   0.4 mg, Sublingual, Every 5 Minutes PRN, Take no more than 3 doses in 15 minutes.      pantoprazole 40 MG EC tablet  Commonly known as:  PROTONIX   40 mg, Oral, Daily      polyethylene glycol packet  Commonly known as:  MIRALAX   17 g, Oral, 2 Times Daily      potassium chloride 20 MEQ CR tablet  Commonly known as:  K-DUR,KLOR-CON   20 mEq, Oral, Daily         Stop These Medications    citalopram 40 MG tablet  Commonly known as:  CeleXA     Insulin Glargine 100 UNIT/ML injection pen  Commonly known as:  BASAGLAR KWIKPEN  Replaced by:  insulin detemir 100 UNIT/ML injection     insulin lispro 100 UNIT/ML injection  Commonly known as:  humaLOG     metoclopramide 10 MG tablet  Commonly known as:  REGLAN     naproxen 250 MG tablet  Commonly known as:  NAPROSYN     sulindac 200 MG tablet  Commonly known as:  CLINORIL            Discharge Diet: DIABETIC DIET    Activity at Discharge: obvious physical limitations    Follow-up Appointments: CR case management plus possibly ACT time, PCP follow up with [facility] appointment November 7.           Ankit Barnes MD  11/02/18  11:20 AM  Time spent with the discharge process >30 minutes.     Dictated utilizing Dragon dictation

## 2018-11-02 NOTE — PLAN OF CARE
Problem: Patient Care Overview  Goal: Interprofessional Rounds/Family Conf  Outcome: Ongoing (interventions implemented as appropriate)   11/02/18 1205   Interdisciplinary Rounds/Family Conf   Summary Staffed patient's case with Dr. Barnes, RN, and ACT Team.   Interdisciplinary Rounds/Family Conf   Participants social work;psychiatrist;other (see comments);nursing     Data: Therapist met with patient, Dr. Barnes, and Marina from ACT Team to discuss disposition planning.  Discussed progress with treatment and address concerns.  Marina completed assessment for ACT Team and states their treatment team will review early next week.  Patient appeared anxious to discharge today.  He discussed need for TV among other personal items. Therapist encouraged patient to use his check for his rent first then buy personal items.  Patient discussed plan to attend [facility] [place] upon discharge and follow up with MARI Cordova and with [facility] for PCP.  He denied other concerns.  Assessment: Patient appeared to display appropriate affect and anxious mood.   He adamantly denied thoughts to harm himself or others.  He denied hallucinations and appeared oriented x3 with limited insight.    Plan: Patient to discharge today to [facility] and have follow up scheduled with MARI Cordova.  [facility] , Kaley Almodovar, will transport patient today at 4 PM.

## 2018-11-26 ENCOUNTER — APPOINTMENT (OUTPATIENT)
Dept: GENERAL RADIOLOGY | Facility: HOSPITAL | Age: 53
End: 2018-11-26

## 2018-11-26 ENCOUNTER — HOSPITAL ENCOUNTER (EMERGENCY)
Facility: HOSPITAL | Age: 53
Discharge: OTHER INSTITUTION | End: 2018-11-27
Attending: EMERGENCY MEDICINE

## 2018-11-26 ENCOUNTER — APPOINTMENT (OUTPATIENT)
Dept: ULTRASOUND IMAGING | Facility: HOSPITAL | Age: 53
End: 2018-11-26

## 2018-11-26 DIAGNOSIS — K73.9 CHRONIC HEPATITIS (HCC): ICD-10-CM

## 2018-11-26 DIAGNOSIS — R79.1 ELEVATED INR: ICD-10-CM

## 2018-11-26 DIAGNOSIS — B15.9 ACUTE VIRAL HEPATITIS A: Primary | ICD-10-CM

## 2018-11-26 DIAGNOSIS — R17 JAUNDICE: ICD-10-CM

## 2018-11-26 DIAGNOSIS — F19.20 DRUG ABUSE AND DEPENDENCE (HCC): ICD-10-CM

## 2018-11-26 DIAGNOSIS — R07.9 INTERMITTENT CHEST PAIN: ICD-10-CM

## 2018-11-26 DIAGNOSIS — K71.51 ASCITES WITH CHRONIC ACTIVE HEPATITIS DUE TO TOXIC LIVER DISEASE: ICD-10-CM

## 2018-11-26 LAB
6-ACETYL MORPHINE: NEGATIVE
ALBUMIN SERPL-MCNC: 2.5 G/DL (ref 3.5–5)
ALBUMIN/GLOB SERPL: 0.5 G/DL (ref 1.5–2.5)
ALP SERPL-CCNC: 221 U/L (ref 40–129)
ALT SERPL W P-5'-P-CCNC: 2516 U/L (ref 10–44)
AMMONIA BLD-SCNC: 47 UMOL/L (ref 16–60)
AMPHET+METHAMPHET UR QL: POSITIVE
AMYLASE SERPL-CCNC: 25 U/L (ref 28–100)
ANION GAP SERPL CALCULATED.3IONS-SCNC: 9.1 MMOL/L (ref 3.6–11.2)
ANISOCYTOSIS BLD QL: NORMAL
APAP SERPL-MCNC: <10 MCG/ML (ref 0–200)
APTT PPP: 67.8 SECONDS (ref 23.8–36.1)
AST SERPL-CCNC: 3647 U/L (ref 10–34)
BACTERIA UR QL AUTO: ABNORMAL /HPF
BARBITURATES UR QL SCN: NEGATIVE
BASOPHILS # BLD AUTO: 0.02 10*3/MM3 (ref 0–0.3)
BASOPHILS NFR BLD AUTO: 0.5 % (ref 0–2)
BENZODIAZ UR QL SCN: NEGATIVE
BILIRUB SERPL-MCNC: 19.5 MG/DL (ref 0.2–1.8)
BILIRUB UR QL STRIP: ABNORMAL
BUN BLD-MCNC: 12 MG/DL (ref 7–21)
BUN/CREAT SERPL: 14.1 (ref 7–25)
BUPRENORPHINE SERPL-MCNC: NEGATIVE NG/ML
CALCIUM SPEC-SCNC: 8.5 MG/DL (ref 7.7–10)
CANNABINOIDS SERPL QL: POSITIVE
CHLORIDE SERPL-SCNC: 98 MMOL/L (ref 99–112)
CK MB SERPL-CCNC: 11.39 NG/ML (ref 0–5)
CK MB SERPL-RTO: 5.8 % (ref 0–3)
CK SERPL-CCNC: 195 U/L (ref 24–204)
CLARITY UR: ABNORMAL
CO2 SERPL-SCNC: 24.9 MMOL/L (ref 24.3–31.9)
COCAINE UR QL: NEGATIVE
COLOR UR: ABNORMAL
CREAT BLD-MCNC: 0.85 MG/DL (ref 0.43–1.29)
D-LACTATE SERPL-SCNC: 1.9 MMOL/L (ref 0.5–2)
DEPRECATED RDW RBC AUTO: 61.5 FL (ref 37–54)
EOSINOPHIL # BLD AUTO: 0.04 10*3/MM3 (ref 0–0.7)
EOSINOPHIL NFR BLD AUTO: 1.1 % (ref 0–5)
ERYTHROCYTE [DISTWIDTH] IN BLOOD BY AUTOMATED COUNT: 22.4 % (ref 11.5–14.5)
ETHANOL BLD-MCNC: <10 MG/DL
ETHANOL UR QL: <0.01 %
GFR SERPL CREATININE-BSD FRML MDRD: 94 ML/MIN/1.73
GLOBULIN UR ELPH-MCNC: 4.6 GM/DL
GLUCOSE BLD-MCNC: 208 MG/DL (ref 70–110)
GLUCOSE UR STRIP-MCNC: ABNORMAL MG/DL
HCT VFR BLD AUTO: 44.1 % (ref 42–52)
HGB BLD-MCNC: 15.1 G/DL (ref 14–18)
HGB UR QL STRIP.AUTO: ABNORMAL
HYALINE CASTS UR QL AUTO: ABNORMAL /LPF
IMM GRANULOCYTES # BLD: 0.01 10*3/MM3 (ref 0–0.03)
IMM GRANULOCYTES NFR BLD: 0.3 % (ref 0–0.5)
INR PPP: 2.26 (ref 0.9–1.1)
KETONES UR QL STRIP: ABNORMAL
LEUKOCYTE ESTERASE UR QL STRIP.AUTO: ABNORMAL
LIPASE SERPL-CCNC: 26 U/L (ref 13–60)
LYMPHOCYTES # BLD AUTO: 0.9 10*3/MM3 (ref 1–3)
LYMPHOCYTES NFR BLD AUTO: 24.2 % (ref 21–51)
MCH RBC QN AUTO: 27.1 PG (ref 27–33)
MCHC RBC AUTO-ENTMCNC: 34.2 G/DL (ref 33–37)
MCV RBC AUTO: 79 FL (ref 80–94)
METHADONE UR QL SCN: NEGATIVE
MICROCYTES BLD QL: NORMAL
MONOCYTES # BLD AUTO: 0.33 10*3/MM3 (ref 0.1–0.9)
MONOCYTES NFR BLD AUTO: 8.9 % (ref 0–10)
NEUTROPHILS # BLD AUTO: 2.42 10*3/MM3 (ref 1.4–6.5)
NEUTROPHILS NFR BLD AUTO: 65 % (ref 30–70)
NITRITE UR QL STRIP: POSITIVE
OPIATES UR QL: NEGATIVE
OSMOLALITY SERPL CALC.SUM OF ELEC: 270.4 MOSM/KG (ref 273–305)
OXYCODONE UR QL SCN: NEGATIVE
PCP UR QL SCN: NEGATIVE
PH UR STRIP.AUTO: 5.5 [PH] (ref 5–8)
PLATELET # BLD AUTO: 63 10*3/MM3 (ref 130–400)
PMV BLD AUTO: ABNORMAL FL (ref 6–10)
POTASSIUM BLD-SCNC: 4.2 MMOL/L (ref 3.5–5.3)
PROT SERPL-MCNC: 7.1 G/DL (ref 6–8)
PROT UR QL STRIP: ABNORMAL
PROTHROMBIN TIME: 25.2 SECONDS (ref 11–15.4)
RBC # BLD AUTO: 5.58 10*6/MM3 (ref 4.7–6.1)
RBC # UR: ABNORMAL /HPF
REF LAB TEST METHOD: ABNORMAL
SALICYLATES SERPL-MCNC: <1 MG/DL (ref 0–30)
SMALL PLATELETS BLD QL SMEAR: NORMAL
SODIUM BLD-SCNC: 132 MMOL/L (ref 135–153)
SP GR UR STRIP: 1.02 (ref 1–1.03)
SQUAMOUS #/AREA URNS HPF: ABNORMAL /HPF
TROPONIN I SERPL-MCNC: 0.06 NG/ML
TROPONIN I SERPL-MCNC: 0.07 NG/ML
UROBILINOGEN UR QL STRIP: ABNORMAL
WBC NRBC COR # BLD: 3.72 10*3/MM3 (ref 4.5–12.5)
WBC UR QL AUTO: ABNORMAL /HPF

## 2018-11-26 PROCEDURE — 82150 ASSAY OF AMYLASE: CPT | Performed by: NURSE PRACTITIONER

## 2018-11-26 PROCEDURE — 83605 ASSAY OF LACTIC ACID: CPT | Performed by: NURSE PRACTITIONER

## 2018-11-26 PROCEDURE — 71046 X-RAY EXAM CHEST 2 VIEWS: CPT

## 2018-11-26 PROCEDURE — 80307 DRUG TEST PRSMV CHEM ANLYZR: CPT | Performed by: NURSE PRACTITIONER

## 2018-11-26 PROCEDURE — 82140 ASSAY OF AMMONIA: CPT | Performed by: NURSE PRACTITIONER

## 2018-11-26 PROCEDURE — 71046 X-RAY EXAM CHEST 2 VIEWS: CPT | Performed by: RADIOLOGY

## 2018-11-26 PROCEDURE — 87341 HEP B SURFACE AG NEUTRLZJ IA: CPT | Performed by: NURSE PRACTITIONER

## 2018-11-26 PROCEDURE — 93010 ELECTROCARDIOGRAM REPORT: CPT | Performed by: INTERNAL MEDICINE

## 2018-11-26 PROCEDURE — 99285 EMERGENCY DEPT VISIT HI MDM: CPT

## 2018-11-26 PROCEDURE — 80053 COMPREHEN METABOLIC PANEL: CPT | Performed by: NURSE PRACTITIONER

## 2018-11-26 PROCEDURE — 87040 BLOOD CULTURE FOR BACTERIA: CPT | Performed by: NURSE PRACTITIONER

## 2018-11-26 PROCEDURE — 82553 CREATINE MB FRACTION: CPT | Performed by: NURSE PRACTITIONER

## 2018-11-26 PROCEDURE — 85025 COMPLETE CBC W/AUTO DIFF WBC: CPT | Performed by: NURSE PRACTITIONER

## 2018-11-26 PROCEDURE — 76705 ECHO EXAM OF ABDOMEN: CPT | Performed by: RADIOLOGY

## 2018-11-26 PROCEDURE — 85730 THROMBOPLASTIN TIME PARTIAL: CPT | Performed by: NURSE PRACTITIONER

## 2018-11-26 PROCEDURE — 82550 ASSAY OF CK (CPK): CPT | Performed by: NURSE PRACTITIONER

## 2018-11-26 PROCEDURE — 93005 ELECTROCARDIOGRAM TRACING: CPT | Performed by: EMERGENCY MEDICINE

## 2018-11-26 PROCEDURE — 84484 ASSAY OF TROPONIN QUANT: CPT | Performed by: NURSE PRACTITIONER

## 2018-11-26 PROCEDURE — 80074 ACUTE HEPATITIS PANEL: CPT | Performed by: NURSE PRACTITIONER

## 2018-11-26 PROCEDURE — 76705 ECHO EXAM OF ABDOMEN: CPT

## 2018-11-26 PROCEDURE — 83690 ASSAY OF LIPASE: CPT | Performed by: NURSE PRACTITIONER

## 2018-11-26 PROCEDURE — 85610 PROTHROMBIN TIME: CPT | Performed by: NURSE PRACTITIONER

## 2018-11-26 PROCEDURE — 81001 URINALYSIS AUTO W/SCOPE: CPT | Performed by: NURSE PRACTITIONER

## 2018-11-26 PROCEDURE — 96360 HYDRATION IV INFUSION INIT: CPT

## 2018-11-26 PROCEDURE — 36415 COLL VENOUS BLD VENIPUNCTURE: CPT

## 2018-11-26 PROCEDURE — 85007 BL SMEAR W/DIFF WBC COUNT: CPT | Performed by: NURSE PRACTITIONER

## 2018-11-26 RX ORDER — SODIUM CHLORIDE 0.9 % (FLUSH) 0.9 %
10 SYRINGE (ML) INJECTION AS NEEDED
Status: DISCONTINUED | OUTPATIENT
Start: 2018-11-26 | End: 2018-11-27 | Stop reason: HOSPADM

## 2018-11-26 RX ORDER — ONDANSETRON 4 MG/1
4 TABLET, ORALLY DISINTEGRATING ORAL ONCE
Status: DISCONTINUED | OUTPATIENT
Start: 2018-11-26 | End: 2018-11-26

## 2018-11-26 RX ADMIN — SODIUM CHLORIDE 1000 ML: 9 INJECTION, SOLUTION INTRAVENOUS at 21:53

## 2018-11-27 ENCOUNTER — APPOINTMENT (OUTPATIENT)
Dept: CT IMAGING | Facility: HOSPITAL | Age: 53
End: 2018-11-27

## 2018-11-27 VITALS
SYSTOLIC BLOOD PRESSURE: 115 MMHG | DIASTOLIC BLOOD PRESSURE: 70 MMHG | TEMPERATURE: 98 F | RESPIRATION RATE: 18 BRPM | WEIGHT: 190 LBS | OXYGEN SATURATION: 100 % | HEART RATE: 98 BPM | HEIGHT: 68 IN | BODY MASS INDEX: 28.79 KG/M2

## 2018-11-27 LAB
HAV IGM SERPL QL IA: REACTIVE
HBV CORE IGM SERPL QL IA: ABNORMAL
HBV SURFACE AG SERPL QL CFM: NORMAL
HBV SURFACE AG SERPL QL IA: REACTIVE
HCV AB SER DONR QL: REACTIVE
TROPONIN I SERPL-MCNC: 0.07 NG/ML

## 2018-11-27 PROCEDURE — 74176 CT ABD & PELVIS W/O CONTRAST: CPT | Performed by: RADIOLOGY

## 2018-11-27 PROCEDURE — 84484 ASSAY OF TROPONIN QUANT: CPT | Performed by: NURSE PRACTITIONER

## 2018-11-27 PROCEDURE — 74176 CT ABD & PELVIS W/O CONTRAST: CPT

## 2018-11-27 PROCEDURE — 51702 INSERT TEMP BLADDER CATH: CPT

## 2018-11-27 NOTE — ED NOTES
Patient noted to be yelling out into the terrell, patient has call light within reach at bedside. Patient states that his ass is hurting and that he needs help getting pulled up, patient states that he is ambulatory and is able to do stuff for himself at home, but states that he just does not feel well today. Patient requests for only one side rail to be placed up so he can sit on side of bed. Discussed with patient in relation not too get up without assistance, verbalizes understanding.      Lise Garcia RN  11/26/18 1951

## 2018-11-27 NOTE — ED NOTES
Patient lying on stretcher at this time, patient states that he hurts all over and just does not feel well. Updated patient, with NP at bedside, patient made aware of not receiving pain medication at this time related to his liver function as well as related to awaiting tests at this time. Patient continues to remove all his physiological monitoring, PA is aware at this time.      Lise Garcia, ALYSE  11/27/18 0124

## 2018-11-27 NOTE — ED NOTES
Called lab at this time to come and stick patient for repeat cardiac enzymes related to patient noted to be a hard stick and has been stuck multiple times.      Lise Garcia RN  11/26/18 4325

## 2018-11-27 NOTE — ED NOTES
Called [facility] to ask about the status of the transfer to [place].  Dispatch will have oic call me back because he sees the transfer on the board but doesn't know what is going on with it.      Nakia Mckeon  11/27/18 0848

## 2018-11-27 NOTE — ED PROVIDER NOTES
Subjective     History provided by:  Patient and EMS personnel  Nausea   The primary symptoms include abdominal pain, nausea, vomiting, jaundice, myalgias and arthralgias. Primary symptoms do not include fever or dysuria. The illness began 3 to 5 days ago. The onset was gradual. The problem has been gradually worsening.   The abdominal pain began more than 2 days ago. The abdominal pain has been unchanged since its onset. The abdominal pain is located in the RUQ. The abdominal pain does not radiate. The severity of the abdominal pain is 4/10. The abdominal pain is relieved by nothing.   The vomiting began more than 2 days ago. Vomiting occurs 2 to 5 times per day.   The jaundice began today.   Significant associated medical issues include liver disease. Risk factors for a gastrointestinal illness include intravenous drug use.   Chest Pain   Pain location:  Substernal area  Pain quality: aching and sharp    Pain radiates to:  Does not radiate  Pain severity:  Moderate  Onset quality:  Sudden  Timing:  Intermittent  Progression:  Waxing and waning  Chronicity:  New  Relieved by:  None tried  Worsened by:  Nothing  Ineffective treatments:  None tried  Associated symptoms: abdominal pain, nausea and vomiting    Associated symptoms: no fever        Review of Systems   Constitutional: Negative.  Negative for fever.   HENT: Negative.    Respiratory: Negative.    Cardiovascular: Positive for chest pain.   Gastrointestinal: Positive for abdominal pain, jaundice, nausea and vomiting.   Endocrine: Negative.    Genitourinary: Negative.  Negative for dysuria.   Musculoskeletal: Positive for arthralgias and myalgias.   Skin: Positive for color change.   Neurological: Negative.    Psychiatric/Behavioral: Negative.    All other systems reviewed and are negative.      Past Medical History:   Diagnosis Date   • Abscess of antecubital fossa 05/2014    Left that required I and D and grew Haemophilus influenza group 1   • Anxiety    •  "Asthma    • Chronic pain disorder     back and neck pain   • Chronic pancreatitis (CMS/HCC)    • COPD (chronic obstructive pulmonary disease) (CMS/[facility])    • DDD (degenerative disc disease), lumbosacral    • Depression    • Diabetes mellitus (CMS/HCC)    • DVT (deep venous thrombosis) (CMS/[facility])     Right arm   • Essential hypertension    • GERD (gastroesophageal reflux disease)    • Hepatitis-C    • Hypercholesteremia    • Injury of back    • Injury of right Achilles tendon     Complicated by MRSA and Pseudomonas   • IV drug abuse (CMS/[facility])    • Mild CAD     Left heart cath at  2012   • MRSA (methicillin resistant staph aureus) culture positive 09/14/2016    LUE   • Obesity    • Opiate addiction (CMS/[facility])     Suboxone IV   • Self-injurious behavior    • Sleep apnea    • Suicidal thoughts    • Suicide attempt (CMS/[facility])    • Systolic CHF, chronic (CMS/[facility])     EF 45-50% in 2013, EF 60% 9/2016       Allergies   Allergen Reactions   • Guaifenesin & Derivatives Shortness Of Breath   • Robitussin Dm [Dextromethorphan-Guaifenesin] Shortness Of Breath   • Tizanidine Shortness Of Breath   • Metformin Nausea And Vomiting   • Sulfa Antibiotics Other (See Comments)     \"I cannot take them, they do something to me.\"   • Contrast Dye Rash   • Tramadol Rash       Past Surgical History:   Procedure Laterality Date   • CHOLECYSTECTOMY  1990s   • INCISION AND DRAINAGE ABSCESS Left 2016    wrist   • ORIF ANKLE FRACTURE Right 2014       Family History   Problem Relation Age of Onset   • Gout Other    • Osteoporosis Other    • Arthritis Other         Rheumatoid and osteoarthritis   • Hypertension Other    • Heart disease Other    • Cancer Other    • Coronary artery disease Mother    • Lung disease Mother    • Gout Sister    • Cancer Maternal Grandmother    • Hypertension Maternal Grandfather    • Gout Maternal Grandfather        Social History     Socioeconomic History   • Marital status:      Spouse name: Not on file   • " Number of children: Not on file   • Years of education: Not on file   • Highest education level: Not on file   Tobacco Use   • Smoking status: Former Smoker     Years: 1.00     Types: Cigarettes   • Smokeless tobacco: Never Used   • Tobacco comment: quit smoking in my 20's   Substance and Sexual Activity   • Alcohol use: No     Comment: denies   • Drug use: Yes     Types: IV     Comment: suboxone   • Sexual activity: Defer     Comment: not currently active.            Objective   Physical Exam   Constitutional: He is oriented to person, place, and time. He appears ill. No distress.   HENT:   Head: Normocephalic and atraumatic.   Right Ear: External ear normal.   Left Ear: External ear normal.   Nose: Nose normal.   Eyes: Conjunctivae and EOM are normal. Pupils are equal, round, and reactive to light. Scleral icterus is present.   Neck: Normal range of motion. Neck supple. No JVD present. No tracheal deviation present.   Cardiovascular: Normal rate, regular rhythm and normal heart sounds.   No murmur heard.  Pulmonary/Chest: Effort normal and breath sounds normal. No respiratory distress. He has no decreased breath sounds. He has no wheezes.   Abdominal: Soft. Bowel sounds are normal. He exhibits ascites. There is tenderness.   Musculoskeletal: Normal range of motion. He exhibits no edema or deformity.   Neurological: He is alert and oriented to person, place, and time. No cranial nerve deficit.   Skin: Skin is warm and dry. No rash noted. He is not diaphoretic. No erythema. No pallor.   Psychiatric: He has a normal mood and affect. His behavior is normal. Thought content normal.   Nursing note and vitals reviewed.      Procedures           ED Course  ED Course as of Nov 27 2324   Tue Nov 27, 2018 0315 Spoke to Dr. Jacob, Hospitalist at [facility] who is willing to accept patient transfer.   [FLORECITA]   0739 Anion Gap: 9.1 [JF]      ED Course User Index  [JF] Pedro Hudson Jr., MD  [FLORECITA] Xenia Darden  APRN                  [facility]      Final diagnoses:   Acute viral hepatitis A   Elevated INR   Ascites with chronic active hepatitis due to toxic liver disease   Intermittent chest pain   Jaundice   Chronic hepatitis (CMS/HCC)   Drug abuse and dependence (CMS/HCC)            Xenia Darden, APRN  11/27/18 7308

## 2018-11-27 NOTE — ED NOTES
Pt reports he cannot urinate and abdominal pain is worst, Dr. Hudson called to room.     Mayi Joyner RN  11/27/18 9584

## 2018-11-27 NOTE — ED NOTES
"Patient states that he has not used \"IV\" meth in a long time related to unable to find a vein, states that he snorts meth and the last time he used was two days.      Lise Garcia RN  11/27/18 0047    "

## 2018-11-27 NOTE — ED NOTES
Patient ambulated out into the hallway at this time, requesting jello or something to eat. Made aware that we do not stock jello in the ED, requesting a popscicle at this time. One provided to patient.      Lise Garcia, ALYSE  11/27/18 0122

## 2018-11-27 NOTE — ED NOTES
"Patient once again asking for pain medication, states \"it doesn't have to be narcotic but I take percocet 5's\" informed him that I asked the ED provider and no orders were placed. Patient sitting up in ED chair, TV channel changed per patient's request. 5 blankets placed on patient per request. Call light within reach of patient      Margie Alcaraz RN  11/27/18 0631    "

## 2018-11-27 NOTE — ED NOTES
Report received from Lise Garcia, RN at bedside at this time; patient is resting on ED stretcher with eyes closed; when hearing Lise and I talk he asked for something for pain; informed him that multiple doctors have been asked and that no orders were placed.  Patient verbalizes understanding and denies any needs. Informed him that we are awaiting for an accepting facility for transfer.      Margie Alcaraz RN  11/27/18 0605       Margie Alcaraz RN  11/27/18 0630

## 2018-11-27 NOTE — ED NOTES
Patient asleep at this time. NADN. WCTM. Resps even and unlabored. Vitals noted to be WNL. IV patent to right upper arm. No complaints of chest pain, nausea or vomiting at this time. Call light and fall precautions in place. Color remains jaundice at this time.      Lise Garcia, ALYSE  11/27/18 0210

## 2018-11-27 NOTE — ED NOTES
Lead Nurse unable to get access at this time, provider made aware     Lise Garcia, RN  11/26/18 0232

## 2018-11-27 NOTE — ED NOTES
PA in room speaking with patient at this time related to CT report. Also related to possible transfer to [facility] in [place] or [place]. Patient verbalizes understanding and states that he will go     Lise Garcia RN  11/27/18 0114

## 2018-11-27 NOTE — ED NOTES
Called [facility] spoke with Radames. He will call [facility] and call me back about the transfer.      Symes, Heather  11/27/18 3108

## 2018-11-27 NOTE — ED NOTES
Patient updated that if he is not able to provide a urine, then he will have to be straight cathed, states that he prefers a male to straight cath him, Lead nurse made aware at this time.      Lise Garcia RN  11/26/18 4078

## 2018-11-27 NOTE — ED NOTES
"Patient rang call light at this time, states that he would like some ice water, made patient aware that he needs to remain NPO, patient states that he got up and drunk some water out of the faucet, patient states that he is too \"weak: to get up by himself, although he states that he did, patient then states that he is too weak to cover him self up. Provider in room attempting to make head or tails of patient complaint, of which he states that he has been so sick for two to three days, reports that he is not able to eat, but is able to drink, reports abdominal pain and chest pain and generalized weakness, states that he has not been compliant with his medications as well.      Lise Garcia RN  11/26/18 2002    "

## 2018-11-27 NOTE — ED NOTES
Called [facility] Dispatch requesting transfer to [facility]. Awaiting approval Per [facility].     Mariana Powell  11/27/18 0329

## 2018-11-27 NOTE — ED NOTES
[facility]ed [facility] to ask about the status of the transfer going to [place].  Dispatch states they have an als crew in [place] so they will see if it is okay to send us a bls crew and call me back to let me know.     Nakia Mckeon  11/27/18 0829

## 2018-11-27 NOTE — ED NOTES
"Called [facility] dispatch to inquire about transfer. Per dispatch [facility] Stated \" Unable to take patient till around 6:30 or so in the morning, Call back and see where we are at around that time.\" Lead nurse aware.     Mariana Powell  11/27/18 0422    "

## 2018-11-27 NOTE — ED NOTES
Unsuccessful IV attempts X 2, called Lead nurse and made aware, patient unable to hold still during attempt.      Lise Garcia, ALYSE  11/26/18 1283

## 2018-11-27 NOTE — ED NOTES
Report gave to LAISHA Alcaraz RN at bedside at this time.      Lise Garcia, ALYSE  11/27/18 0214

## 2018-11-27 NOTE — ED NOTES
"Discussed with patient at this time at length related to med list, patient states that he is not very compliant with medication, states that he has not taken his insulin in a while related to \"breaking it\", also states that he has just been too sick at this time to take his medications.      Lise Garcia, RN  11/27/18 0120    "

## 2018-11-27 NOTE — ED NOTES
Called nutrition to order patient a breakfast; patient made aware     Margie Alcaraz, RN  11/27/18 0698

## 2018-11-27 NOTE — ED NOTES
[facility] noted to be on diversion at this time.      Lise Garcia, ALYSE  11/27/18 0130       Lise Garcia, RN  11/27/18 0131

## 2018-11-27 NOTE — ED NOTES
All information provided to [facility] at this time, awaiting return call at this time.      Lise Garcia, RN  11/27/18 0155

## 2018-11-27 NOTE — ED NOTES
Received call from [facility] Room number 805 Admitting Physician Dr. Yifan Alvares. Nurse jurgen.     Mariana Powell  11/27/18 0865

## 2018-11-27 NOTE — ED NOTES
Received Call From Hospitalist at the [facility] Dr. Jacob Speaking with Xenia Darden at this time.     Mariana Powell  11/27/18 0148

## 2018-11-27 NOTE — ED NOTES
[facility] states that no one will accept the patient related to liver disease, and they have no liver specialist.      Lise Garcia, RN  11/27/18 0137       Lise Garcia, RN  11/27/18 0138

## 2018-11-27 NOTE — ED NOTES
Radames with [facility] called back. He states he spoke with [facility]. [facility] states to call back around 0800, the truck just got back from [place] and states the roads are slick up there at this time. Lead nurse DOLLY Elam RN is aware.      Symes, Heather  11/27/18 0696

## 2018-11-27 NOTE — ED NOTES
Patient requests urinal at this time and to turn water on, states that he is going to attempt to urinate in urinal     Lise Garcia RN  11/26/18 7023

## 2018-11-27 NOTE — ED NOTES
Called [facility] is on a run.  He will call me back when they reach him to tell him.     Nakia Mckeon  11/27/18 0906

## 2018-12-01 LAB
BACTERIA SPEC AEROBE CULT: NORMAL
BACTERIA SPEC AEROBE CULT: NORMAL

## 2021-06-03 NOTE — NURSING NOTE
Pt continuing to come to dayroom being loud c/o roommate. Informed pt that he could not be moved to a different room d/t O2 use along with his roommate using O2.. Pt wanting albuterol inhaler, when informed he could only get a nebulizer tx, pt became more angry. Pt loud, verbally abusive, security contacted.    Azithromycin Counseling:  I discussed with the patient the risks of azithromycin including but not limited to GI upset, allergic reaction, drug rash, diarrhea, and yeast infections.

## 2024-06-11 NOTE — PLAN OF CARE
- does struggle with the heat, tries to mostly stay inside  - controlled with [place]   Problem: Sepsis (Adult)  Goal: Signs and Symptoms of Listed Potential Problems Will be Absent or Manageable (Sepsis)  Outcome: Ongoing (interventions implemented as appropriate)    Problem: Patient Care Overview (Adult)  Goal: Plan of Care Review  Outcome: Ongoing (interventions implemented as appropriate)  Goal: Adult Individualization and Mutuality  Outcome: Ongoing (interventions implemented as appropriate)  Goal: Discharge Needs Assessment  Outcome: Ongoing (interventions implemented as appropriate)       07-May-2018 18:00

## 2025-02-07 NOTE — PROGRESS NOTES
Pharmacokinetics Service Note:    Mr. Lang has been evaluated for vancomycin dosing to treat his RUE cellulitis.  He received a 1.5 gm loading dose in the ED around 1400.  He is known to our service from an admission in 9/16.  Based on his dosing history along with similar demographics and renal function, will continue dosing at 1.25 gm q8hrs to target troughs = 15-20 mg/L.  Will check a steady state trough level to determine if any adjustments are needed.      Thank you.  Tanna Rainey, Pharm.D.  2/4/2017  4:53 PM     PAST MEDICAL HISTORY:  Acute UTI     Bladder tumor     Environmental and seasonal allergies     H/O lymphadenopathy     Hemochromatosis     HLD (hyperlipidemia)     Hypothyroidism     Osteopenia      PAST MEDICAL HISTORY:  Acute UTI     Bladder tumor     Degeneration macular     Environmental and seasonal allergies     Former heavy cigarette smoker (20-39 per day)     H/O lymphadenopathy     Hemochromatosis     HLD (hyperlipidemia)     Hypothyroidism     Osteopenia

## (undated) DEVICE — ENCORE® LATEX MICRO SIZE 7.5, STERILE LATEX POWDER-FREE SURGICAL GLOVE: Brand: ENCORE

## (undated) DEVICE — HOLDER: Brand: DEROYAL

## (undated) DEVICE — PK EXTREM UPPR 70

## (undated) DEVICE — UNDERCAST PADDING: Brand: DEROYAL

## (undated) DEVICE — CURITY AMD ANTIMICROBIAL PACKING STRIPS: Brand: CURITY